# Patient Record
Sex: MALE | Race: WHITE | NOT HISPANIC OR LATINO | Employment: OTHER | URBAN - METROPOLITAN AREA
[De-identification: names, ages, dates, MRNs, and addresses within clinical notes are randomized per-mention and may not be internally consistent; named-entity substitution may affect disease eponyms.]

---

## 2017-01-03 ENCOUNTER — OFFICE VISIT - HEALTHEAST (OUTPATIENT)
Dept: FAMILY MEDICINE | Facility: CLINIC | Age: 67
End: 2017-01-03

## 2017-01-03 DIAGNOSIS — N40.0 BPH WITHOUT URINARY OBSTRUCTION: ICD-10-CM

## 2017-01-03 DIAGNOSIS — D12.6 TUBULAR ADENOMA OF COLON: ICD-10-CM

## 2017-01-03 DIAGNOSIS — H91.13 PRESBYACUSIS, BILATERAL: ICD-10-CM

## 2017-01-03 DIAGNOSIS — L30.9 DERMATITIS: ICD-10-CM

## 2017-01-03 DIAGNOSIS — Z00.01 ENCOUNTER FOR GENERAL ADULT MEDICAL EXAMINATION WITH ABNORMAL FINDINGS: ICD-10-CM

## 2017-01-03 DIAGNOSIS — E78.00 PURE HYPERCHOLESTEROLEMIA: ICD-10-CM

## 2017-01-03 DIAGNOSIS — E66.3 OVERWEIGHT (BMI 25.0-29.9): ICD-10-CM

## 2017-01-03 DIAGNOSIS — E80.6 HYPERBILIRUBINEMIA: ICD-10-CM

## 2017-01-03 DIAGNOSIS — N52.9 ERECTILE DYSFUNCTION, UNSPECIFIED ERECTILE DYSFUNCTION TYPE: ICD-10-CM

## 2017-01-03 LAB
CHOLEST SERPL-MCNC: 238 MG/DL
FASTING STATUS PATIENT QL REPORTED: YES
HDLC SERPL-MCNC: 72 MG/DL
LDLC SERPL CALC-MCNC: 150 MG/DL
PSA SERPL-MCNC: 1.8 NG/ML (ref 0–4.5)
TRIGL SERPL-MCNC: 82 MG/DL

## 2017-01-03 ASSESSMENT — MIFFLIN-ST. JEOR: SCORE: 1830.97

## 2017-01-06 ENCOUNTER — RECORDS - HEALTHEAST (OUTPATIENT)
Dept: ADMINISTRATIVE | Facility: OTHER | Age: 67
End: 2017-01-06

## 2017-01-26 ENCOUNTER — RECORDS - HEALTHEAST (OUTPATIENT)
Dept: ADMINISTRATIVE | Facility: OTHER | Age: 67
End: 2017-01-26

## 2017-02-06 ENCOUNTER — RECORDS - HEALTHEAST (OUTPATIENT)
Dept: ADMINISTRATIVE | Facility: OTHER | Age: 67
End: 2017-02-06

## 2017-08-11 ENCOUNTER — OFFICE VISIT - HEALTHEAST (OUTPATIENT)
Dept: FAMILY MEDICINE | Facility: CLINIC | Age: 67
End: 2017-08-11

## 2017-08-11 ENCOUNTER — HOSPITAL ENCOUNTER (OUTPATIENT)
Dept: RADIOLOGY | Facility: CLINIC | Age: 67
Discharge: HOME | End: 2017-08-11
Attending: PHYSICIAN ASSISTANT

## 2017-08-11 DIAGNOSIS — M79.671 RIGHT FOOT PAIN: ICD-10-CM

## 2017-08-11 DIAGNOSIS — M25.471 RIGHT ANKLE SWELLING: ICD-10-CM

## 2017-10-09 ENCOUNTER — RECORDS - HEALTHEAST (OUTPATIENT)
Dept: ADMINISTRATIVE | Facility: OTHER | Age: 67
End: 2017-10-09

## 2017-10-09 ENCOUNTER — COMMUNICATION - HEALTHEAST (OUTPATIENT)
Dept: SCHEDULING | Facility: CLINIC | Age: 67
End: 2017-10-09

## 2017-10-10 ENCOUNTER — COMMUNICATION - HEALTHEAST (OUTPATIENT)
Dept: FAMILY MEDICINE | Facility: CLINIC | Age: 67
End: 2017-10-10

## 2017-10-10 ENCOUNTER — RECORDS - HEALTHEAST (OUTPATIENT)
Dept: ADMINISTRATIVE | Facility: OTHER | Age: 67
End: 2017-10-10

## 2017-10-10 DIAGNOSIS — I26.99 PULMONARY EMBOLISM, BILATERAL (H): ICD-10-CM

## 2017-10-11 ENCOUNTER — COMMUNICATION - HEALTHEAST (OUTPATIENT)
Dept: FAMILY MEDICINE | Facility: CLINIC | Age: 67
End: 2017-10-11

## 2017-10-13 ENCOUNTER — OFFICE VISIT - HEALTHEAST (OUTPATIENT)
Dept: FAMILY MEDICINE | Facility: CLINIC | Age: 67
End: 2017-10-13

## 2017-10-13 ENCOUNTER — COMMUNICATION - HEALTHEAST (OUTPATIENT)
Dept: NURSING | Facility: CLINIC | Age: 67
End: 2017-10-13

## 2017-10-13 ENCOUNTER — AMBULATORY - HEALTHEAST (OUTPATIENT)
Dept: LAB | Facility: CLINIC | Age: 67
End: 2017-10-13

## 2017-10-13 DIAGNOSIS — I26.99 PULMONARY EMBOLISM, BILATERAL (H): ICD-10-CM

## 2017-10-13 DIAGNOSIS — I48.91 ATRIAL FIBRILLATION (H): ICD-10-CM

## 2017-10-13 DIAGNOSIS — F41.9 ANXIETY: ICD-10-CM

## 2017-10-13 DIAGNOSIS — I34.0 MITRAL REGURGITATION: ICD-10-CM

## 2017-10-16 ENCOUNTER — AMBULATORY - HEALTHEAST (OUTPATIENT)
Dept: LAB | Facility: CLINIC | Age: 67
End: 2017-10-16

## 2017-10-16 ENCOUNTER — COMMUNICATION - HEALTHEAST (OUTPATIENT)
Dept: NURSING | Facility: CLINIC | Age: 67
End: 2017-10-16

## 2017-10-16 DIAGNOSIS — I26.99 PULMONARY EMBOLISM, BILATERAL (H): ICD-10-CM

## 2017-10-17 ENCOUNTER — COMMUNICATION - HEALTHEAST (OUTPATIENT)
Dept: NURSING | Facility: CLINIC | Age: 67
End: 2017-10-17

## 2017-10-17 ENCOUNTER — AMBULATORY - HEALTHEAST (OUTPATIENT)
Dept: LAB | Facility: CLINIC | Age: 67
End: 2017-10-17

## 2017-10-17 ENCOUNTER — RECORDS - HEALTHEAST (OUTPATIENT)
Dept: ADMINISTRATIVE | Facility: OTHER | Age: 67
End: 2017-10-17

## 2017-10-17 DIAGNOSIS — I26.99 PULMONARY EMBOLISM, BILATERAL (H): ICD-10-CM

## 2017-10-19 ENCOUNTER — COMMUNICATION - HEALTHEAST (OUTPATIENT)
Dept: FAMILY MEDICINE | Facility: CLINIC | Age: 67
End: 2017-10-19

## 2017-10-19 DIAGNOSIS — I26.99 PULMONARY EMBOLISM, BILATERAL (H): ICD-10-CM

## 2017-10-23 ENCOUNTER — AMBULATORY - HEALTHEAST (OUTPATIENT)
Dept: LAB | Facility: CLINIC | Age: 67
End: 2017-10-23

## 2017-10-23 ENCOUNTER — COMMUNICATION - HEALTHEAST (OUTPATIENT)
Dept: NURSING | Facility: CLINIC | Age: 67
End: 2017-10-23

## 2017-10-23 DIAGNOSIS — I26.99 PULMONARY EMBOLISM, BILATERAL (H): ICD-10-CM

## 2017-10-24 ENCOUNTER — AMBULATORY - HEALTHEAST (OUTPATIENT)
Dept: CARDIOLOGY | Facility: CLINIC | Age: 67
End: 2017-10-24

## 2017-10-25 ENCOUNTER — OFFICE VISIT - HEALTHEAST (OUTPATIENT)
Dept: CARDIOLOGY | Facility: CLINIC | Age: 67
End: 2017-10-25

## 2017-10-25 DIAGNOSIS — I34.0 MITRAL VALVE INSUFFICIENCY: ICD-10-CM

## 2017-10-25 DIAGNOSIS — R07.89 OTHER CHEST PAIN: ICD-10-CM

## 2017-10-25 DIAGNOSIS — I48.91 ATRIAL FIBRILLATION, UNSPECIFIED TYPE (H): ICD-10-CM

## 2017-10-25 DIAGNOSIS — I26.99 PULMONARY EMBOLISM, BILATERAL (H): ICD-10-CM

## 2017-10-25 ASSESSMENT — MIFFLIN-ST. JEOR: SCORE: 1844.46

## 2017-10-26 ENCOUNTER — OFFICE VISIT - HEALTHEAST (OUTPATIENT)
Dept: FAMILY MEDICINE | Facility: CLINIC | Age: 67
End: 2017-10-26

## 2017-10-26 ENCOUNTER — COMMUNICATION - HEALTHEAST (OUTPATIENT)
Dept: NURSING | Facility: CLINIC | Age: 67
End: 2017-10-26

## 2017-10-26 DIAGNOSIS — I26.99 PULMONARY EMBOLISM, BILATERAL (H): ICD-10-CM

## 2017-10-26 DIAGNOSIS — F41.9 ANXIETY: ICD-10-CM

## 2017-10-26 DIAGNOSIS — I34.0 MITRAL VALVE INSUFFICIENCY, UNSPECIFIED ETIOLOGY: ICD-10-CM

## 2017-10-26 DIAGNOSIS — I48.91 ATRIAL FIBRILLATION, UNSPECIFIED TYPE (H): ICD-10-CM

## 2017-10-26 DIAGNOSIS — R06.02 SOB (SHORTNESS OF BREATH): ICD-10-CM

## 2017-10-26 LAB
ATRIAL RATE - MUSE: NORMAL BPM
DIASTOLIC BLOOD PRESSURE - MUSE: NORMAL MMHG
INTERPRETATION ECG - MUSE: NORMAL
P AXIS - MUSE: NORMAL DEGREES
PR INTERVAL - MUSE: NORMAL MS
QRS DURATION - MUSE: 74 MS
QT - MUSE: 394 MS
QTC - MUSE: 399 MS
R AXIS - MUSE: 9 DEGREES
SYSTOLIC BLOOD PRESSURE - MUSE: NORMAL MMHG
T AXIS - MUSE: 35 DEGREES
VENTRICULAR RATE- MUSE: 62 BPM

## 2017-10-27 ENCOUNTER — SURGERY - HEALTHEAST (OUTPATIENT)
Dept: CARDIOLOGY | Facility: CLINIC | Age: 67
End: 2017-10-27

## 2017-10-28 ENCOUNTER — COMMUNICATION - HEALTHEAST (OUTPATIENT)
Dept: SCHEDULING | Facility: CLINIC | Age: 67
End: 2017-10-28

## 2017-10-30 ENCOUNTER — AMBULATORY - HEALTHEAST (OUTPATIENT)
Dept: LAB | Facility: CLINIC | Age: 67
End: 2017-10-30

## 2017-10-30 ENCOUNTER — COMMUNICATION - HEALTHEAST (OUTPATIENT)
Dept: NURSING | Facility: CLINIC | Age: 67
End: 2017-10-30

## 2017-10-30 DIAGNOSIS — I26.99 PULMONARY EMBOLISM, BILATERAL (H): ICD-10-CM

## 2017-11-06 ENCOUNTER — COMMUNICATION - HEALTHEAST (OUTPATIENT)
Dept: NURSING | Facility: CLINIC | Age: 67
End: 2017-11-06

## 2017-11-06 ENCOUNTER — AMBULATORY - HEALTHEAST (OUTPATIENT)
Dept: LAB | Facility: CLINIC | Age: 67
End: 2017-11-06

## 2017-11-06 DIAGNOSIS — I26.99 PULMONARY EMBOLISM, BILATERAL (H): ICD-10-CM

## 2017-11-10 ENCOUNTER — RECORDS - HEALTHEAST (OUTPATIENT)
Dept: ADMINISTRATIVE | Facility: OTHER | Age: 67
End: 2017-11-10

## 2017-11-10 ENCOUNTER — OFFICE VISIT - HEALTHEAST (OUTPATIENT)
Dept: CARDIOLOGY | Facility: CLINIC | Age: 67
End: 2017-11-10

## 2017-11-10 DIAGNOSIS — I48.19 PERSISTENT ATRIAL FIBRILLATION (H): ICD-10-CM

## 2017-11-10 DIAGNOSIS — R00.1 SINUS BRADYCARDIA: ICD-10-CM

## 2017-11-10 DIAGNOSIS — I34.0 NON-RHEUMATIC MITRAL REGURGITATION: ICD-10-CM

## 2017-11-10 ASSESSMENT — MIFFLIN-ST. JEOR: SCORE: 1841.73

## 2017-11-13 ENCOUNTER — COMMUNICATION - HEALTHEAST (OUTPATIENT)
Dept: NURSING | Facility: CLINIC | Age: 67
End: 2017-11-13

## 2017-11-13 ENCOUNTER — AMBULATORY - HEALTHEAST (OUTPATIENT)
Dept: CARDIOLOGY | Facility: CLINIC | Age: 67
End: 2017-11-13

## 2017-11-13 ENCOUNTER — AMBULATORY - HEALTHEAST (OUTPATIENT)
Dept: LAB | Facility: CLINIC | Age: 67
End: 2017-11-13

## 2017-11-13 DIAGNOSIS — I26.99 PULMONARY EMBOLISM, BILATERAL (H): ICD-10-CM

## 2017-11-15 ENCOUNTER — ANESTHESIA - HEALTHEAST (OUTPATIENT)
Dept: CARDIOLOGY | Facility: CLINIC | Age: 67
End: 2017-11-15

## 2017-11-15 ENCOUNTER — HOSPITAL ENCOUNTER (OUTPATIENT)
Dept: CARDIOLOGY | Facility: CLINIC | Age: 67
Discharge: HOME | End: 2017-11-15
Attending: INTERNAL MEDICINE

## 2017-11-15 DIAGNOSIS — I48.91 ATRIAL FIBRILLATION, UNSPECIFIED TYPE (H): ICD-10-CM

## 2017-11-15 LAB
BSA FOR ECHO PROCEDURE: 2.28 M2
CV BLOOD PRESSURE: NORMAL MMHG
CV ECHO HEIGHT: 75 IN
CV ECHO WEIGHT: 217 LBS
ECHO EJECTION FRACTION ESTIMATED: 55 %
LEFT VENTRICLE HEART RATE: 79 BPM
MITRAL REGURGITANT VELOCITY TIME INTEGRAL: 194 CM
MR FLOW: 54 CM3
MR MEAN GRADIENT: 81 MMHG
MR MEAN VELOCITY: 427 CM/S
MR PEAK GRADIENT: 119.7 MMHG
MR PISA EROA: 0.3 CM2
MR PISA RADIUS: 0.9 CM
MR PISA VN NYQUIST: 29.7 CM/S
MV REGURGITANT VOLUME: 53.6 CC
NUC REST DIASTOLIC VOLUME INDEX: 3472 LBS
NUC REST SYSTOLIC VOLUME INDEX: 75 IN
PISA MR PEAK VEL: 547 CM/S
TRICUSPID REGURGITATION PEAK PRESSURE GRADIENT: 16.5 MMHG
TRICUSPID VALVE PEAK REGURGITANT VELOCITY: 203 CM/S

## 2017-11-16 ENCOUNTER — AMBULATORY - HEALTHEAST (OUTPATIENT)
Dept: NURSING | Facility: CLINIC | Age: 67
End: 2017-11-16

## 2017-11-21 ENCOUNTER — AMBULATORY - HEALTHEAST (OUTPATIENT)
Dept: LAB | Facility: CLINIC | Age: 67
End: 2017-11-21

## 2017-11-21 ENCOUNTER — COMMUNICATION - HEALTHEAST (OUTPATIENT)
Dept: NURSING | Facility: CLINIC | Age: 67
End: 2017-11-21

## 2017-11-21 DIAGNOSIS — I26.99 PULMONARY EMBOLISM, BILATERAL (H): ICD-10-CM

## 2017-11-28 ENCOUNTER — AMBULATORY - HEALTHEAST (OUTPATIENT)
Dept: LAB | Facility: CLINIC | Age: 67
End: 2017-11-28

## 2017-11-28 ENCOUNTER — COMMUNICATION - HEALTHEAST (OUTPATIENT)
Dept: NURSING | Facility: CLINIC | Age: 67
End: 2017-11-28

## 2017-11-28 DIAGNOSIS — I26.99 PULMONARY EMBOLISM, BILATERAL (H): ICD-10-CM

## 2017-12-08 ENCOUNTER — COMMUNICATION - HEALTHEAST (OUTPATIENT)
Dept: NURSING | Facility: CLINIC | Age: 67
End: 2017-12-08

## 2017-12-08 ENCOUNTER — OFFICE VISIT - HEALTHEAST (OUTPATIENT)
Dept: CARDIOLOGY | Facility: CLINIC | Age: 67
End: 2017-12-08

## 2017-12-08 DIAGNOSIS — R00.1 SINUS BRADYCARDIA: ICD-10-CM

## 2017-12-08 DIAGNOSIS — I48.19 PERSISTENT ATRIAL FIBRILLATION (H): ICD-10-CM

## 2017-12-08 DIAGNOSIS — I26.99 PULMONARY EMBOLISM, BILATERAL (H): ICD-10-CM

## 2017-12-08 DIAGNOSIS — I34.0 NON-RHEUMATIC MITRAL REGURGITATION: ICD-10-CM

## 2017-12-08 LAB
ATRIAL RATE - MUSE: 170 BPM
DIASTOLIC BLOOD PRESSURE - MUSE: NORMAL MMHG
INTERPRETATION ECG - MUSE: NORMAL
P AXIS - MUSE: NORMAL DEGREES
PR INTERVAL - MUSE: NORMAL MS
QRS DURATION - MUSE: 74 MS
QT - MUSE: 378 MS
QTC - MUSE: 396 MS
R AXIS - MUSE: 23 DEGREES
SYSTOLIC BLOOD PRESSURE - MUSE: NORMAL MMHG
T AXIS - MUSE: 1 DEGREES
VENTRICULAR RATE- MUSE: 66 BPM

## 2017-12-08 ASSESSMENT — MIFFLIN-ST. JEOR: SCORE: 1830.4

## 2017-12-13 ENCOUNTER — COMMUNICATION - HEALTHEAST (OUTPATIENT)
Dept: FAMILY MEDICINE | Facility: CLINIC | Age: 67
End: 2017-12-13

## 2017-12-13 DIAGNOSIS — I48.19 PERSISTENT ATRIAL FIBRILLATION (H): ICD-10-CM

## 2017-12-22 ENCOUNTER — COMMUNICATION - HEALTHEAST (OUTPATIENT)
Dept: NURSING | Facility: CLINIC | Age: 67
End: 2017-12-22

## 2017-12-22 DIAGNOSIS — I26.99 PULMONARY EMBOLISM, BILATERAL (H): ICD-10-CM

## 2018-01-04 ENCOUNTER — COMMUNICATION - HEALTHEAST (OUTPATIENT)
Dept: NURSING | Facility: CLINIC | Age: 68
End: 2018-01-04

## 2018-01-04 ENCOUNTER — AMBULATORY - HEALTHEAST (OUTPATIENT)
Dept: LAB | Facility: CLINIC | Age: 68
End: 2018-01-04

## 2018-01-04 DIAGNOSIS — I26.99 PULMONARY EMBOLISM, BILATERAL (H): ICD-10-CM

## 2018-01-04 LAB — INR PPP: 2.4 (ref 0.9–1.1)

## 2018-01-16 ENCOUNTER — COMMUNICATION - HEALTHEAST (OUTPATIENT)
Dept: NURSING | Facility: CLINIC | Age: 68
End: 2018-01-16

## 2018-01-16 DIAGNOSIS — I48.19 PERSISTENT ATRIAL FIBRILLATION (H): ICD-10-CM

## 2018-02-06 ENCOUNTER — COMMUNICATION - HEALTHEAST (OUTPATIENT)
Dept: NURSING | Facility: CLINIC | Age: 68
End: 2018-02-06

## 2018-02-06 ENCOUNTER — OFFICE VISIT - HEALTHEAST (OUTPATIENT)
Dept: FAMILY MEDICINE | Facility: CLINIC | Age: 68
End: 2018-02-06

## 2018-02-06 DIAGNOSIS — E78.00 PURE HYPERCHOLESTEROLEMIA: ICD-10-CM

## 2018-02-06 DIAGNOSIS — I48.19 PERSISTENT ATRIAL FIBRILLATION (H): ICD-10-CM

## 2018-02-06 DIAGNOSIS — N40.0 BPH WITHOUT URINARY OBSTRUCTION: ICD-10-CM

## 2018-02-06 DIAGNOSIS — Z00.01 ENCOUNTER FOR GENERAL ADULT MEDICAL EXAMINATION WITH ABNORMAL FINDINGS: ICD-10-CM

## 2018-02-06 DIAGNOSIS — I48.91 ATRIAL FIBRILLATION, UNSPECIFIED TYPE (H): ICD-10-CM

## 2018-02-06 DIAGNOSIS — I26.99 PULMONARY EMBOLISM, BILATERAL (H): ICD-10-CM

## 2018-02-06 DIAGNOSIS — I34.0 MITRAL VALVE INSUFFICIENCY, UNSPECIFIED ETIOLOGY: ICD-10-CM

## 2018-02-06 DIAGNOSIS — D12.6 TUBULAR ADENOMA OF COLON: ICD-10-CM

## 2018-02-06 DIAGNOSIS — N52.9 ERECTILE DYSFUNCTION, UNSPECIFIED ERECTILE DYSFUNCTION TYPE: ICD-10-CM

## 2018-02-06 DIAGNOSIS — E66.3 OVERWEIGHT (BMI 25.0-29.9): ICD-10-CM

## 2018-02-06 DIAGNOSIS — D64.9 NORMOCHROMIC NORMOCYTIC ANEMIA: ICD-10-CM

## 2018-02-06 DIAGNOSIS — F41.9 ANXIETY: ICD-10-CM

## 2018-02-06 LAB
ANION GAP SERPL CALCULATED.3IONS-SCNC: 11 MMOL/L (ref 5–18)
BUN SERPL-MCNC: 11 MG/DL (ref 8–22)
CALCIUM SERPL-MCNC: 9.1 MG/DL (ref 8.5–10.5)
CHLORIDE BLD-SCNC: 107 MMOL/L (ref 98–107)
CHOLEST SERPL-MCNC: 214 MG/DL
CO2 SERPL-SCNC: 22 MMOL/L (ref 22–31)
CREAT SERPL-MCNC: 0.77 MG/DL (ref 0.7–1.3)
ERYTHROCYTE [DISTWIDTH] IN BLOOD BY AUTOMATED COUNT: 12.8 % (ref 11–14.5)
FASTING STATUS PATIENT QL REPORTED: YES
GFR SERPL CREATININE-BSD FRML MDRD: >60 ML/MIN/1.73M2
GLUCOSE BLD-MCNC: 91 MG/DL (ref 70–125)
HCT VFR BLD AUTO: 42 % (ref 40–54)
HCV AB SERPL QL IA: NEGATIVE
HDLC SERPL-MCNC: 66 MG/DL
HGB BLD-MCNC: 14.1 G/DL (ref 14–18)
INR PPP: 3.3 (ref 0.9–1.1)
LDLC SERPL CALC-MCNC: 130 MG/DL
MAGNESIUM SERPL-MCNC: 2 MG/DL (ref 1.8–2.6)
MCH RBC QN AUTO: 30.5 PG (ref 27–34)
MCHC RBC AUTO-ENTMCNC: 33.7 G/DL (ref 32–36)
MCV RBC AUTO: 90 FL (ref 80–100)
PLATELET # BLD AUTO: 169 THOU/UL (ref 140–440)
PMV BLD AUTO: 8.1 FL (ref 7–10)
POTASSIUM BLD-SCNC: 4.2 MMOL/L (ref 3.5–5)
PSA SERPL-MCNC: 2 NG/ML (ref 0–4.5)
RBC # BLD AUTO: 4.64 MILL/UL (ref 4.4–6.2)
SODIUM SERPL-SCNC: 140 MMOL/L (ref 136–145)
TRIGL SERPL-MCNC: 88 MG/DL
TSH SERPL DL<=0.005 MIU/L-ACNC: 1.41 UIU/ML (ref 0.3–5)
WBC: 4.2 THOU/UL (ref 4–11)

## 2018-02-06 ASSESSMENT — MIFFLIN-ST. JEOR: SCORE: 1827

## 2018-02-20 ENCOUNTER — COMMUNICATION - HEALTHEAST (OUTPATIENT)
Dept: NURSING | Facility: CLINIC | Age: 68
End: 2018-02-20

## 2018-02-20 ENCOUNTER — AMBULATORY - HEALTHEAST (OUTPATIENT)
Dept: LAB | Facility: CLINIC | Age: 68
End: 2018-02-20

## 2018-02-20 DIAGNOSIS — I26.99 PULMONARY EMBOLISM, BILATERAL (H): ICD-10-CM

## 2018-02-20 DIAGNOSIS — I48.19 PERSISTENT ATRIAL FIBRILLATION (H): ICD-10-CM

## 2018-02-20 LAB — INR PPP: 2.6 (ref 0.9–1.1)

## 2018-02-28 ENCOUNTER — COMMUNICATION - HEALTHEAST (OUTPATIENT)
Dept: FAMILY MEDICINE | Facility: CLINIC | Age: 68
End: 2018-02-28

## 2018-03-05 ENCOUNTER — COMMUNICATION - HEALTHEAST (OUTPATIENT)
Dept: FAMILY MEDICINE | Facility: CLINIC | Age: 68
End: 2018-03-05

## 2018-03-05 DIAGNOSIS — F41.9 ANXIETY: ICD-10-CM

## 2018-03-06 ENCOUNTER — COMMUNICATION - HEALTHEAST (OUTPATIENT)
Dept: INTERNAL MEDICINE | Facility: CLINIC | Age: 68
End: 2018-03-06

## 2018-03-06 ENCOUNTER — AMBULATORY - HEALTHEAST (OUTPATIENT)
Dept: LAB | Facility: CLINIC | Age: 68
End: 2018-03-06

## 2018-03-06 DIAGNOSIS — I48.19 PERSISTENT ATRIAL FIBRILLATION (H): ICD-10-CM

## 2018-03-06 DIAGNOSIS — I26.99 PULMONARY EMBOLISM, BILATERAL (H): ICD-10-CM

## 2018-03-06 LAB — INR PPP: 2.7 (ref 0.9–1.1)

## 2018-03-08 ENCOUNTER — RECORDS - HEALTHEAST (OUTPATIENT)
Dept: ADMINISTRATIVE | Facility: OTHER | Age: 68
End: 2018-03-08

## 2018-04-06 ENCOUNTER — OFFICE VISIT - HEALTHEAST (OUTPATIENT)
Dept: FAMILY MEDICINE | Facility: CLINIC | Age: 68
End: 2018-04-06

## 2018-04-06 DIAGNOSIS — J40 BRONCHITIS: ICD-10-CM

## 2018-04-06 DIAGNOSIS — I48.91 ATRIAL FIBRILLATION (H): ICD-10-CM

## 2018-04-06 DIAGNOSIS — R05.9 COUGH: ICD-10-CM

## 2018-04-11 ENCOUNTER — AMBULATORY - HEALTHEAST (OUTPATIENT)
Dept: LAB | Facility: CLINIC | Age: 68
End: 2018-04-11

## 2018-04-11 ENCOUNTER — COMMUNICATION - HEALTHEAST (OUTPATIENT)
Dept: ANTICOAGULATION | Facility: CLINIC | Age: 68
End: 2018-04-11

## 2018-04-11 DIAGNOSIS — I48.19 PERSISTENT ATRIAL FIBRILLATION (H): ICD-10-CM

## 2018-04-11 DIAGNOSIS — I26.99 PULMONARY EMBOLISM, BILATERAL (H): ICD-10-CM

## 2018-04-11 LAB — INR PPP: 2.8 (ref 0.9–1.1)

## 2018-04-30 ENCOUNTER — COMMUNICATION - HEALTHEAST (OUTPATIENT)
Dept: FAMILY MEDICINE | Facility: CLINIC | Age: 68
End: 2018-04-30

## 2018-05-09 ENCOUNTER — COMMUNICATION - HEALTHEAST (OUTPATIENT)
Dept: FAMILY MEDICINE | Facility: CLINIC | Age: 68
End: 2018-05-09

## 2018-05-09 DIAGNOSIS — I48.19 PERSISTENT ATRIAL FIBRILLATION (H): ICD-10-CM

## 2018-05-10 ENCOUNTER — AMBULATORY - HEALTHEAST (OUTPATIENT)
Dept: CARDIOLOGY | Facility: CLINIC | Age: 68
End: 2018-05-10

## 2018-05-10 ENCOUNTER — COMMUNICATION - HEALTHEAST (OUTPATIENT)
Dept: ANTICOAGULATION | Facility: CLINIC | Age: 68
End: 2018-05-10

## 2018-05-10 ENCOUNTER — OFFICE VISIT - HEALTHEAST (OUTPATIENT)
Dept: CARDIOLOGY | Facility: CLINIC | Age: 68
End: 2018-05-10

## 2018-05-10 DIAGNOSIS — I26.99 PULMONARY EMBOLISM, BILATERAL (H): ICD-10-CM

## 2018-05-10 DIAGNOSIS — I48.19 PERSISTENT ATRIAL FIBRILLATION (H): ICD-10-CM

## 2018-05-10 DIAGNOSIS — I34.0 NON-RHEUMATIC MITRAL REGURGITATION: ICD-10-CM

## 2018-05-10 LAB — POC INR - HE - HISTORICAL: 2.4 (ref 0.9–1.1)

## 2018-05-10 ASSESSMENT — MIFFLIN-ST. JEOR: SCORE: 1840.6

## 2018-06-21 ENCOUNTER — AMBULATORY - HEALTHEAST (OUTPATIENT)
Dept: LAB | Facility: CLINIC | Age: 68
End: 2018-06-21

## 2018-06-21 ENCOUNTER — COMMUNICATION - HEALTHEAST (OUTPATIENT)
Dept: ANTICOAGULATION | Facility: CLINIC | Age: 68
End: 2018-06-21

## 2018-06-21 DIAGNOSIS — I26.99 PULMONARY EMBOLISM, BILATERAL (H): ICD-10-CM

## 2018-06-21 DIAGNOSIS — I48.19 PERSISTENT ATRIAL FIBRILLATION (H): ICD-10-CM

## 2018-06-21 LAB — INR PPP: 2.2 (ref 0.9–1.1)

## 2018-08-02 ENCOUNTER — COMMUNICATION - HEALTHEAST (OUTPATIENT)
Dept: ANTICOAGULATION | Facility: CLINIC | Age: 68
End: 2018-08-02

## 2018-08-02 ENCOUNTER — AMBULATORY - HEALTHEAST (OUTPATIENT)
Dept: LAB | Facility: CLINIC | Age: 68
End: 2018-08-02

## 2018-08-02 DIAGNOSIS — I48.19 PERSISTENT ATRIAL FIBRILLATION (H): ICD-10-CM

## 2018-08-02 DIAGNOSIS — I26.99 PULMONARY EMBOLISM, BILATERAL (H): ICD-10-CM

## 2018-08-02 LAB — INR PPP: 1.9 (ref 0.9–1.1)

## 2018-08-06 ENCOUNTER — COMMUNICATION - HEALTHEAST (OUTPATIENT)
Dept: FAMILY MEDICINE | Facility: CLINIC | Age: 68
End: 2018-08-06

## 2018-08-06 DIAGNOSIS — F41.9 ANXIETY: ICD-10-CM

## 2018-08-07 ENCOUNTER — COMMUNICATION - HEALTHEAST (OUTPATIENT)
Dept: ANTICOAGULATION | Facility: CLINIC | Age: 68
End: 2018-08-07

## 2018-08-07 ENCOUNTER — OFFICE VISIT - HEALTHEAST (OUTPATIENT)
Dept: FAMILY MEDICINE | Facility: CLINIC | Age: 68
End: 2018-08-07

## 2018-08-07 DIAGNOSIS — I26.99 PULMONARY EMBOLISM, BILATERAL (H): ICD-10-CM

## 2018-08-07 DIAGNOSIS — E78.00 PURE HYPERCHOLESTEROLEMIA: ICD-10-CM

## 2018-08-07 DIAGNOSIS — I48.19 PERSISTENT ATRIAL FIBRILLATION (H): ICD-10-CM

## 2018-08-07 DIAGNOSIS — R06.02 SOB (SHORTNESS OF BREATH): ICD-10-CM

## 2018-08-07 DIAGNOSIS — F41.9 ANXIETY: ICD-10-CM

## 2018-08-07 DIAGNOSIS — R05.9 COUGH: ICD-10-CM

## 2018-08-07 DIAGNOSIS — R06.83 SNORING: ICD-10-CM

## 2018-08-07 LAB
ANION GAP SERPL CALCULATED.3IONS-SCNC: 6 MMOL/L (ref 5–18)
BUN SERPL-MCNC: 13 MG/DL (ref 8–22)
CALCIUM SERPL-MCNC: 9.5 MG/DL (ref 8.5–10.5)
CHLORIDE BLD-SCNC: 107 MMOL/L (ref 98–107)
CO2 SERPL-SCNC: 28 MMOL/L (ref 22–31)
CREAT SERPL-MCNC: 0.77 MG/DL (ref 0.7–1.3)
GFR SERPL CREATININE-BSD FRML MDRD: >60 ML/MIN/1.73M2
GLUCOSE BLD-MCNC: 94 MG/DL (ref 70–125)
INR PPP: 3 (ref 0.9–1.1)
POTASSIUM BLD-SCNC: 4.4 MMOL/L (ref 3.5–5)
SODIUM SERPL-SCNC: 141 MMOL/L (ref 136–145)

## 2018-08-08 ENCOUNTER — COMMUNICATION - HEALTHEAST (OUTPATIENT)
Dept: PULMONOLOGY | Facility: OTHER | Age: 68
End: 2018-08-08

## 2018-08-16 ENCOUNTER — AMBULATORY - HEALTHEAST (OUTPATIENT)
Dept: LAB | Facility: CLINIC | Age: 68
End: 2018-08-16

## 2018-08-16 ENCOUNTER — COMMUNICATION - HEALTHEAST (OUTPATIENT)
Dept: ANTICOAGULATION | Facility: CLINIC | Age: 68
End: 2018-08-16

## 2018-08-16 DIAGNOSIS — I48.19 PERSISTENT ATRIAL FIBRILLATION (H): ICD-10-CM

## 2018-08-16 DIAGNOSIS — I26.99 PULMONARY EMBOLISM, BILATERAL (H): ICD-10-CM

## 2018-08-16 LAB — INR PPP: 1.9 (ref 0.9–1.1)

## 2018-08-23 ENCOUNTER — OFFICE VISIT - HEALTHEAST (OUTPATIENT)
Dept: PULMONOLOGY | Facility: OTHER | Age: 68
End: 2018-08-23

## 2018-08-23 DIAGNOSIS — R05.3 CHRONIC COUGH: ICD-10-CM

## 2018-08-23 DIAGNOSIS — R06.02 SHORTNESS OF BREATH: ICD-10-CM

## 2018-08-29 ENCOUNTER — COMMUNICATION - HEALTHEAST (OUTPATIENT)
Dept: FAMILY MEDICINE | Facility: CLINIC | Age: 68
End: 2018-08-29

## 2018-08-30 ENCOUNTER — COMMUNICATION - HEALTHEAST (OUTPATIENT)
Dept: ANTICOAGULATION | Facility: CLINIC | Age: 68
End: 2018-08-30

## 2018-08-30 ENCOUNTER — AMBULATORY - HEALTHEAST (OUTPATIENT)
Dept: LAB | Facility: CLINIC | Age: 68
End: 2018-08-30

## 2018-08-30 DIAGNOSIS — I26.99 PULMONARY EMBOLISM, BILATERAL (H): ICD-10-CM

## 2018-08-30 DIAGNOSIS — I48.19 PERSISTENT ATRIAL FIBRILLATION (H): ICD-10-CM

## 2018-08-30 LAB — INR PPP: 2.9 (ref 0.9–1.1)

## 2018-09-04 ENCOUNTER — HOSPITAL ENCOUNTER (OUTPATIENT)
Dept: RADIOLOGY | Facility: HOSPITAL | Age: 68
Discharge: HOME | End: 2018-09-04
Attending: INTERNAL MEDICINE

## 2018-09-04 ENCOUNTER — HOSPITAL ENCOUNTER (OUTPATIENT)
Dept: RESPIRATORY THERAPY | Facility: HOSPITAL | Age: 68
Discharge: HOME | End: 2018-09-04
Attending: INTERNAL MEDICINE

## 2018-09-04 DIAGNOSIS — R06.02 SHORTNESS OF BREATH: ICD-10-CM

## 2018-09-04 LAB — HGB BLD-MCNC: 13.8 G/DL (ref 14–18)

## 2018-09-05 ENCOUNTER — RECORDS - HEALTHEAST (OUTPATIENT)
Dept: ADMINISTRATIVE | Facility: OTHER | Age: 68
End: 2018-09-05

## 2018-09-13 ENCOUNTER — AMBULATORY - HEALTHEAST (OUTPATIENT)
Dept: LAB | Facility: CLINIC | Age: 68
End: 2018-09-13

## 2018-09-13 ENCOUNTER — COMMUNICATION - HEALTHEAST (OUTPATIENT)
Dept: ANTICOAGULATION | Facility: CLINIC | Age: 68
End: 2018-09-13

## 2018-09-13 DIAGNOSIS — I26.99 PULMONARY EMBOLISM, BILATERAL (H): ICD-10-CM

## 2018-09-13 DIAGNOSIS — I48.19 PERSISTENT ATRIAL FIBRILLATION (H): ICD-10-CM

## 2018-09-13 LAB — INR PPP: 2.7 (ref 0.9–1.1)

## 2018-09-26 ENCOUNTER — OFFICE VISIT - HEALTHEAST (OUTPATIENT)
Dept: SLEEP MEDICINE | Facility: CLINIC | Age: 68
End: 2018-09-26

## 2018-09-26 DIAGNOSIS — R06.83 SNORING: ICD-10-CM

## 2018-09-26 DIAGNOSIS — G47.8 SLEEP DYSFUNCTION WITH SLEEP STAGE DISTURBANCE: ICD-10-CM

## 2018-09-26 DIAGNOSIS — G47.10 HYPERSOMNIA: ICD-10-CM

## 2018-09-26 ASSESSMENT — MIFFLIN-ST. JEOR: SCORE: 1854.21

## 2018-10-11 ENCOUNTER — AMBULATORY - HEALTHEAST (OUTPATIENT)
Dept: NURSING | Facility: CLINIC | Age: 68
End: 2018-10-11

## 2018-10-11 ENCOUNTER — COMMUNICATION - HEALTHEAST (OUTPATIENT)
Dept: ANTICOAGULATION | Facility: CLINIC | Age: 68
End: 2018-10-11

## 2018-10-11 ENCOUNTER — AMBULATORY - HEALTHEAST (OUTPATIENT)
Dept: LAB | Facility: CLINIC | Age: 68
End: 2018-10-11

## 2018-10-11 DIAGNOSIS — I48.19 PERSISTENT ATRIAL FIBRILLATION (H): ICD-10-CM

## 2018-10-11 DIAGNOSIS — I26.99 PULMONARY EMBOLISM, BILATERAL (H): ICD-10-CM

## 2018-10-11 LAB — INR PPP: 2.9 (ref 0.9–1.1)

## 2018-10-30 ENCOUNTER — COMMUNICATION - HEALTHEAST (OUTPATIENT)
Dept: FAMILY MEDICINE | Facility: CLINIC | Age: 68
End: 2018-10-30

## 2018-10-30 DIAGNOSIS — I48.19 PERSISTENT ATRIAL FIBRILLATION (H): ICD-10-CM

## 2018-11-01 ENCOUNTER — RECORDS - HEALTHEAST (OUTPATIENT)
Dept: ADMINISTRATIVE | Facility: OTHER | Age: 68
End: 2018-11-01

## 2018-11-01 ENCOUNTER — RECORDS - HEALTHEAST (OUTPATIENT)
Dept: SLEEP MEDICINE | Facility: CLINIC | Age: 68
End: 2018-11-01

## 2018-11-01 DIAGNOSIS — G47.10 HYPERSOMNIA, UNSPECIFIED: ICD-10-CM

## 2018-11-01 DIAGNOSIS — G47.8 OTHER SLEEP DISORDERS: ICD-10-CM

## 2018-11-01 DIAGNOSIS — R06.83 SNORING: ICD-10-CM

## 2018-11-07 ENCOUNTER — COMMUNICATION - HEALTHEAST (OUTPATIENT)
Dept: SLEEP MEDICINE | Facility: CLINIC | Age: 68
End: 2018-11-07

## 2018-11-07 DIAGNOSIS — G47.33 OSA (OBSTRUCTIVE SLEEP APNEA): ICD-10-CM

## 2018-11-08 ENCOUNTER — COMMUNICATION - HEALTHEAST (OUTPATIENT)
Dept: SLEEP MEDICINE | Facility: CLINIC | Age: 68
End: 2018-11-08

## 2018-11-08 ENCOUNTER — AMBULATORY - HEALTHEAST (OUTPATIENT)
Dept: LAB | Facility: CLINIC | Age: 68
End: 2018-11-08

## 2018-11-08 ENCOUNTER — COMMUNICATION - HEALTHEAST (OUTPATIENT)
Dept: ADMINISTRATIVE | Facility: CLINIC | Age: 68
End: 2018-11-08

## 2018-11-08 ENCOUNTER — COMMUNICATION - HEALTHEAST (OUTPATIENT)
Dept: ANTICOAGULATION | Facility: CLINIC | Age: 68
End: 2018-11-08

## 2018-11-08 DIAGNOSIS — I26.99 PULMONARY EMBOLISM, BILATERAL (H): ICD-10-CM

## 2018-11-08 DIAGNOSIS — I48.19 PERSISTENT ATRIAL FIBRILLATION (H): ICD-10-CM

## 2018-11-08 LAB — INR PPP: 2.8 (ref 0.9–1.1)

## 2018-11-14 ENCOUNTER — COMMUNICATION - HEALTHEAST (OUTPATIENT)
Dept: SLEEP MEDICINE | Facility: CLINIC | Age: 68
End: 2018-11-14

## 2018-11-15 ENCOUNTER — OFFICE VISIT - HEALTHEAST (OUTPATIENT)
Dept: PULMONOLOGY | Facility: OTHER | Age: 68
End: 2018-11-15

## 2018-11-15 DIAGNOSIS — R05.9 COUGH: ICD-10-CM

## 2018-11-15 DIAGNOSIS — J34.89 RHINORRHEA: ICD-10-CM

## 2018-11-19 ENCOUNTER — AMBULATORY - HEALTHEAST (OUTPATIENT)
Dept: SLEEP MEDICINE | Facility: CLINIC | Age: 68
End: 2018-11-19

## 2018-11-20 ENCOUNTER — COMMUNICATION - HEALTHEAST (OUTPATIENT)
Dept: PULMONOLOGY | Facility: OTHER | Age: 68
End: 2018-11-20

## 2018-11-20 DIAGNOSIS — J34.89 RHINORRHEA: ICD-10-CM

## 2018-11-20 DIAGNOSIS — R05.9 COUGH: ICD-10-CM

## 2018-12-03 ENCOUNTER — COMMUNICATION - HEALTHEAST (OUTPATIENT)
Dept: FAMILY MEDICINE | Facility: CLINIC | Age: 68
End: 2018-12-03

## 2018-12-03 DIAGNOSIS — Z00.00 HEALTHCARE MAINTENANCE: ICD-10-CM

## 2018-12-04 ENCOUNTER — AMBULATORY - HEALTHEAST (OUTPATIENT)
Dept: NURSING | Facility: CLINIC | Age: 68
End: 2018-12-04

## 2018-12-04 DIAGNOSIS — Z00.00 HEALTHCARE MAINTENANCE: ICD-10-CM

## 2018-12-05 ENCOUNTER — COMMUNICATION - HEALTHEAST (OUTPATIENT)
Dept: ANTICOAGULATION | Facility: CLINIC | Age: 68
End: 2018-12-05

## 2018-12-05 DIAGNOSIS — I26.99 PULMONARY EMBOLISM, BILATERAL (H): ICD-10-CM

## 2018-12-05 DIAGNOSIS — I48.19 PERSISTENT ATRIAL FIBRILLATION (H): ICD-10-CM

## 2018-12-20 ENCOUNTER — COMMUNICATION - HEALTHEAST (OUTPATIENT)
Dept: ANTICOAGULATION | Facility: CLINIC | Age: 68
End: 2018-12-20

## 2018-12-20 ENCOUNTER — AMBULATORY - HEALTHEAST (OUTPATIENT)
Dept: LAB | Facility: CLINIC | Age: 68
End: 2018-12-20

## 2018-12-20 ENCOUNTER — COMMUNICATION - HEALTHEAST (OUTPATIENT)
Dept: FAMILY MEDICINE | Facility: CLINIC | Age: 68
End: 2018-12-20

## 2018-12-20 DIAGNOSIS — I26.99 PULMONARY EMBOLISM, BILATERAL (H): ICD-10-CM

## 2018-12-20 DIAGNOSIS — I48.19 PERSISTENT ATRIAL FIBRILLATION (H): ICD-10-CM

## 2018-12-20 LAB — INR PPP: 2.9 (ref 0.9–1.1)

## 2019-01-02 ENCOUNTER — COMMUNICATION - HEALTHEAST (OUTPATIENT)
Dept: FAMILY MEDICINE | Facility: CLINIC | Age: 69
End: 2019-01-02

## 2019-01-03 ENCOUNTER — OFFICE VISIT - HEALTHEAST (OUTPATIENT)
Dept: SLEEP MEDICINE | Facility: CLINIC | Age: 69
End: 2019-01-03

## 2019-01-03 DIAGNOSIS — G47.8 SLEEP DYSFUNCTION WITH SLEEP STAGE DISTURBANCE: ICD-10-CM

## 2019-01-03 DIAGNOSIS — G47.33 OBSTRUCTIVE SLEEP APNEA: ICD-10-CM

## 2019-01-03 ASSESSMENT — MIFFLIN-ST. JEOR: SCORE: 1895.04

## 2019-01-04 ENCOUNTER — COMMUNICATION - HEALTHEAST (OUTPATIENT)
Dept: FAMILY MEDICINE | Facility: CLINIC | Age: 69
End: 2019-01-04

## 2019-01-31 ENCOUNTER — COMMUNICATION - HEALTHEAST (OUTPATIENT)
Dept: ANTICOAGULATION | Facility: CLINIC | Age: 69
End: 2019-01-31

## 2019-01-31 ENCOUNTER — AMBULATORY - HEALTHEAST (OUTPATIENT)
Dept: LAB | Facility: CLINIC | Age: 69
End: 2019-01-31

## 2019-01-31 DIAGNOSIS — I26.99 PULMONARY EMBOLISM, BILATERAL (H): ICD-10-CM

## 2019-01-31 DIAGNOSIS — I48.19 PERSISTENT ATRIAL FIBRILLATION (H): ICD-10-CM

## 2019-01-31 LAB — INR PPP: 3.9 (ref 0.9–1.1)

## 2019-02-08 ENCOUNTER — OFFICE VISIT - HEALTHEAST (OUTPATIENT)
Dept: FAMILY MEDICINE | Facility: CLINIC | Age: 69
End: 2019-02-08

## 2019-02-08 DIAGNOSIS — F41.9 ANXIETY: ICD-10-CM

## 2019-02-08 DIAGNOSIS — R05.3 CHRONIC COUGH: ICD-10-CM

## 2019-02-08 DIAGNOSIS — Z00.01 ENCOUNTER FOR GENERAL ADULT MEDICAL EXAMINATION WITH ABNORMAL FINDINGS: ICD-10-CM

## 2019-02-08 DIAGNOSIS — D64.9 NORMOCHROMIC NORMOCYTIC ANEMIA: ICD-10-CM

## 2019-02-08 DIAGNOSIS — I26.99 PULMONARY EMBOLISM, BILATERAL (H): ICD-10-CM

## 2019-02-08 DIAGNOSIS — E66.3 OVERWEIGHT (BMI 25.0-29.9): ICD-10-CM

## 2019-02-08 DIAGNOSIS — D12.6 TUBULAR ADENOMA OF COLON: ICD-10-CM

## 2019-02-08 DIAGNOSIS — I34.0 MITRAL VALVE INSUFFICIENCY, UNSPECIFIED ETIOLOGY: ICD-10-CM

## 2019-02-08 DIAGNOSIS — N40.0 BPH WITHOUT URINARY OBSTRUCTION: ICD-10-CM

## 2019-02-08 DIAGNOSIS — I48.19 PERSISTENT ATRIAL FIBRILLATION (H): ICD-10-CM

## 2019-02-08 DIAGNOSIS — E78.00 PURE HYPERCHOLESTEROLEMIA: ICD-10-CM

## 2019-02-08 DIAGNOSIS — G47.33 OSA ON CPAP: ICD-10-CM

## 2019-02-08 DIAGNOSIS — K40.90 LEFT INGUINAL HERNIA: ICD-10-CM

## 2019-02-08 DIAGNOSIS — Z12.5 ENCOUNTER FOR SCREENING FOR MALIGNANT NEOPLASM OF PROSTATE: ICD-10-CM

## 2019-02-08 LAB
ANION GAP SERPL CALCULATED.3IONS-SCNC: 8 MMOL/L (ref 5–18)
BUN SERPL-MCNC: 16 MG/DL (ref 8–22)
CALCIUM SERPL-MCNC: 9.4 MG/DL (ref 8.5–10.5)
CHLORIDE BLD-SCNC: 107 MMOL/L (ref 98–107)
CHOLEST SERPL-MCNC: 231 MG/DL
CO2 SERPL-SCNC: 25 MMOL/L (ref 22–31)
CREAT SERPL-MCNC: 0.85 MG/DL (ref 0.7–1.3)
ERYTHROCYTE [DISTWIDTH] IN BLOOD BY AUTOMATED COUNT: 11.7 % (ref 11–14.5)
FASTING STATUS PATIENT QL REPORTED: YES
GFR SERPL CREATININE-BSD FRML MDRD: >60 ML/MIN/1.73M2
GLUCOSE BLD-MCNC: 85 MG/DL (ref 70–125)
HCT VFR BLD AUTO: 42.9 % (ref 40–54)
HDLC SERPL-MCNC: 67 MG/DL
HGB BLD-MCNC: 14.2 G/DL (ref 14–18)
LDLC SERPL CALC-MCNC: 148 MG/DL
MCH RBC QN AUTO: 30.6 PG (ref 27–34)
MCHC RBC AUTO-ENTMCNC: 33 G/DL (ref 32–36)
MCV RBC AUTO: 93 FL (ref 80–100)
PLATELET # BLD AUTO: 170 THOU/UL (ref 140–440)
PMV BLD AUTO: 8.1 FL (ref 7–10)
POTASSIUM BLD-SCNC: 4.5 MMOL/L (ref 3.5–5)
PSA SERPL-MCNC: 1.3 NG/ML (ref 0–4.5)
RBC # BLD AUTO: 4.62 MILL/UL (ref 4.4–6.2)
SODIUM SERPL-SCNC: 140 MMOL/L (ref 136–145)
TRIGL SERPL-MCNC: 82 MG/DL
WBC: 3.5 THOU/UL (ref 4–11)

## 2019-02-08 ASSESSMENT — MIFFLIN-ST. JEOR: SCORE: 1885.4

## 2019-02-11 ENCOUNTER — COMMUNICATION - HEALTHEAST (OUTPATIENT)
Dept: FAMILY MEDICINE | Facility: CLINIC | Age: 69
End: 2019-02-11

## 2019-02-11 ENCOUNTER — RECORDS - HEALTHEAST (OUTPATIENT)
Dept: ADMINISTRATIVE | Facility: OTHER | Age: 69
End: 2019-02-11

## 2019-02-15 ENCOUNTER — AMBULATORY - HEALTHEAST (OUTPATIENT)
Dept: LAB | Facility: CLINIC | Age: 69
End: 2019-02-15

## 2019-02-15 ENCOUNTER — COMMUNICATION - HEALTHEAST (OUTPATIENT)
Dept: ANTICOAGULATION | Facility: CLINIC | Age: 69
End: 2019-02-15

## 2019-02-15 DIAGNOSIS — I26.99 PULMONARY EMBOLISM, BILATERAL (H): ICD-10-CM

## 2019-02-15 DIAGNOSIS — I48.19 PERSISTENT ATRIAL FIBRILLATION (H): ICD-10-CM

## 2019-02-15 LAB — INR PPP: 1.4 (ref 0.9–1.1)

## 2019-02-19 ENCOUNTER — AMBULATORY - HEALTHEAST (OUTPATIENT)
Dept: LAB | Facility: CLINIC | Age: 69
End: 2019-02-19

## 2019-02-19 ENCOUNTER — COMMUNICATION - HEALTHEAST (OUTPATIENT)
Dept: ANTICOAGULATION | Facility: CLINIC | Age: 69
End: 2019-02-19

## 2019-02-19 DIAGNOSIS — I26.99 PULMONARY EMBOLISM, BILATERAL (H): ICD-10-CM

## 2019-02-19 DIAGNOSIS — I48.19 PERSISTENT ATRIAL FIBRILLATION (H): ICD-10-CM

## 2019-02-19 LAB — INR PPP: 2.1 (ref 0.9–1.1)

## 2019-03-04 ENCOUNTER — COMMUNICATION - HEALTHEAST (OUTPATIENT)
Dept: ANTICOAGULATION | Facility: CLINIC | Age: 69
End: 2019-03-04

## 2019-03-04 ENCOUNTER — AMBULATORY - HEALTHEAST (OUTPATIENT)
Dept: LAB | Facility: CLINIC | Age: 69
End: 2019-03-04

## 2019-03-04 DIAGNOSIS — I48.19 PERSISTENT ATRIAL FIBRILLATION (H): ICD-10-CM

## 2019-03-04 DIAGNOSIS — I26.99 PULMONARY EMBOLISM, BILATERAL (H): ICD-10-CM

## 2019-03-04 LAB — INR PPP: 2.3 (ref 0.9–1.1)

## 2019-03-22 ENCOUNTER — COMMUNICATION - HEALTHEAST (OUTPATIENT)
Dept: FAMILY MEDICINE | Facility: CLINIC | Age: 69
End: 2019-03-22

## 2019-03-22 DIAGNOSIS — I48.19 PERSISTENT ATRIAL FIBRILLATION (H): ICD-10-CM

## 2019-03-25 ENCOUNTER — COMMUNICATION - HEALTHEAST (OUTPATIENT)
Dept: FAMILY MEDICINE | Facility: CLINIC | Age: 69
End: 2019-03-25

## 2019-03-25 DIAGNOSIS — I48.19 PERSISTENT ATRIAL FIBRILLATION (H): ICD-10-CM

## 2019-04-01 ENCOUNTER — COMMUNICATION - HEALTHEAST (OUTPATIENT)
Dept: ANTICOAGULATION | Facility: CLINIC | Age: 69
End: 2019-04-01

## 2019-04-01 ENCOUNTER — OFFICE VISIT - HEALTHEAST (OUTPATIENT)
Dept: SLEEP MEDICINE | Facility: CLINIC | Age: 69
End: 2019-04-01

## 2019-04-01 ENCOUNTER — AMBULATORY - HEALTHEAST (OUTPATIENT)
Dept: LAB | Facility: CLINIC | Age: 69
End: 2019-04-01

## 2019-04-01 DIAGNOSIS — I26.99 PULMONARY EMBOLISM, BILATERAL (H): ICD-10-CM

## 2019-04-01 DIAGNOSIS — I48.19 PERSISTENT ATRIAL FIBRILLATION (H): ICD-10-CM

## 2019-04-01 DIAGNOSIS — G47.33 OBSTRUCTIVE SLEEP APNEA: ICD-10-CM

## 2019-04-01 DIAGNOSIS — G47.8 SLEEP DYSFUNCTION WITH SLEEP STAGE DISTURBANCE: ICD-10-CM

## 2019-04-01 LAB — INR PPP: 2.8 (ref 0.9–1.1)

## 2019-04-01 ASSESSMENT — MIFFLIN-ST. JEOR: SCORE: 1880.86

## 2019-04-03 ENCOUNTER — COMMUNICATION - HEALTHEAST (OUTPATIENT)
Dept: FAMILY MEDICINE | Facility: CLINIC | Age: 69
End: 2019-04-03

## 2019-04-03 DIAGNOSIS — I48.19 PERSISTENT ATRIAL FIBRILLATION (H): ICD-10-CM

## 2019-04-29 ENCOUNTER — COMMUNICATION - HEALTHEAST (OUTPATIENT)
Dept: ANTICOAGULATION | Facility: CLINIC | Age: 69
End: 2019-04-29

## 2019-04-29 ENCOUNTER — AMBULATORY - HEALTHEAST (OUTPATIENT)
Dept: LAB | Facility: CLINIC | Age: 69
End: 2019-04-29

## 2019-04-29 DIAGNOSIS — I26.99 PULMONARY EMBOLISM, BILATERAL (H): ICD-10-CM

## 2019-04-29 DIAGNOSIS — I48.19 PERSISTENT ATRIAL FIBRILLATION (H): ICD-10-CM

## 2019-04-29 LAB — INR PPP: 2.6 (ref 0.9–1.1)

## 2019-05-21 ENCOUNTER — HOSPITAL ENCOUNTER (OUTPATIENT)
Dept: CARDIOLOGY | Facility: HOSPITAL | Age: 69
Discharge: HOME | End: 2019-05-21
Attending: INTERNAL MEDICINE

## 2019-05-21 DIAGNOSIS — I34.0 NON-RHEUMATIC MITRAL REGURGITATION: ICD-10-CM

## 2019-05-21 DIAGNOSIS — I48.19 PERSISTENT ATRIAL FIBRILLATION (H): ICD-10-CM

## 2019-05-21 LAB
AORTIC ROOT: 3.7 CM
AORTIC VALVE MEAN VELOCITY: 98.2 CM/S
AV CUSP SEPERATION: 2 CM
AV CUSP SEPERATION: 2 CM
AV DIMENSIONLESS INDEX VTI: 0.7
AV MEAN GRADIENT: 4 MMHG
AV PEAK GRADIENT: 5.8 MMHG
BSA FOR ECHO PROCEDURE: 2.33 M2
CV ECHO HEIGHT: 74.8 IN
CV ECHO WEIGHT: 228 LBS
DOP CALC AO PEAK VEL: 120 CM/S
DOP CALC AO VTI: 27.7 CM
DOP CALC MV VTI: 30.1 CM
DOP CALCLVOT PEAK VEL VTI: 19.4 CM
EJECTION FRACTION: 54 % (ref 55–75)
FRACTIONAL SHORTENING: 33.3 % (ref 28–44)
INTERVENTRICULAR SEPTUM IN END DIASTOLE: 1.3 CM (ref 0.6–1)
IVS/PW RATIO: 1
LA AREA 1: 33.8 CM2
LA AREA 2: 34.7 CM2
LEFT ATRIUM LENGTH: 7.08 CM
LEFT ATRIUM SIZE: 5 CM
LEFT ATRIUM VOLUME INDEX: 60.4 ML/M2
LEFT ATRIUM VOLUME: 140.8 ML
LEFT VENTRICLE DIASTOLIC VOLUME INDEX: 49.8 CM3/M2 (ref 34–74)
LEFT VENTRICLE DIASTOLIC VOLUME: 116 CM3 (ref 62–150)
LEFT VENTRICLE HEART RATE: 86 BPM
LEFT VENTRICLE MASS INDEX: 105.5 G/M2
LEFT VENTRICLE SYSTOLIC VOLUME INDEX: 22.7 CM3/M2 (ref 11–31)
LEFT VENTRICLE SYSTOLIC VOLUME: 53 CM3 (ref 21–61)
LEFT VENTRICULAR INTERNAL DIMENSION IN DIASTOLE: 4.8 CM (ref 4.2–5.8)
LEFT VENTRICULAR INTERNAL DIMENSION IN SYSTOLE: 3.2 CM (ref 2.5–4)
LEFT VENTRICULAR MASS: 245.7 G
LEFT VENTRICULAR OUTFLOW TRACT MEAN GRADIENT: 2 MMHG
LEFT VENTRICULAR OUTFLOW TRACT MEAN VELOCITY: 71.5 CM/S
LEFT VENTRICULAR POSTERIOR WALL IN END DIASTOLE: 1.3 CM (ref 0.6–1)
MITRAL REGURGITANT VELOCITY TIME INTEGRAL: 186 CM
MITRAL VALVE DECELERATION SLOPE: 3100 MM/S2
MITRAL VALVE MEAN INFLOW VELOCITY: 69.6 CM/S
MITRAL VALVE PEAK VELOCITY: 120 CM/S
MITRAL VALVE PRESSURE HALF-TIME: 116 MS
MR FLOW: 22 CM3
MR MEAN GRADIENT: 98 MMHG
MR MEAN VELOCITY: 487 CM/S
MR PEAK GRADIENT: 121.9 MMHG
MR PISA EROA: 0.2 CM2
MR PISA RADIUS: 0.8 CM
MR PISA VN NYQUIST: 27.9 CM/S
MV AVERAGE E/E' RATIO: 14.2 CM/S
MV DECELERATION TIME: 194 MS
MV E'TISSUE VEL-LAT: 9.85 CM/S
MV E'TISSUE VEL-MED: 5.65 CM/S
MV LATERAL E/E' RATIO: 11.2
MV MEAN GRADIENT: 2 MMHG
MV MEDIAL E/E' RATIO: 19.5
MV PEAK E VELOCITY: 110 CM/S
MV PEAK GRADIENT: 5.8 MMHG
MV REGURGITANT VOLUME: 37.8 CC
MV VALVE AREA PRESSURE 1/2 METHOD: 1.9 CM2
NUC REST DIASTOLIC VOLUME INDEX: 3648 LBS
NUC REST SYSTOLIC VOLUME INDEX: 74.75 IN
PISA MR PEAK VEL: 552 CM/S
TRICUSPID REGURGITATION PEAK PRESSURE GRADIENT: 28.9 MMHG
TRICUSPID VALVE ANULAR PLANE SYSTOLIC EXCURSION: 2.2 CM
TRICUSPID VALVE PEAK REGURGITANT VELOCITY: 269 CM/S

## 2019-05-21 ASSESSMENT — MIFFLIN-ST. JEOR: SCORE: 1880.86

## 2019-06-03 ENCOUNTER — OFFICE VISIT - HEALTHEAST (OUTPATIENT)
Dept: CARDIOLOGY | Facility: CLINIC | Age: 69
End: 2019-06-03

## 2019-06-03 ENCOUNTER — COMMUNICATION - HEALTHEAST (OUTPATIENT)
Dept: ANTICOAGULATION | Facility: CLINIC | Age: 69
End: 2019-06-03

## 2019-06-03 ENCOUNTER — AMBULATORY - HEALTHEAST (OUTPATIENT)
Dept: LAB | Facility: CLINIC | Age: 69
End: 2019-06-03

## 2019-06-03 ENCOUNTER — AMBULATORY - HEALTHEAST (OUTPATIENT)
Dept: ANTICOAGULATION | Facility: CLINIC | Age: 69
End: 2019-06-03

## 2019-06-03 DIAGNOSIS — I26.99 PULMONARY EMBOLISM, BILATERAL (H): ICD-10-CM

## 2019-06-03 DIAGNOSIS — I34.0 MITRAL VALVE INSUFFICIENCY: ICD-10-CM

## 2019-06-03 DIAGNOSIS — I48.19 PERSISTENT ATRIAL FIBRILLATION (H): ICD-10-CM

## 2019-06-03 LAB — INR PPP: 3.5 (ref 0.9–1.1)

## 2019-06-03 ASSESSMENT — MIFFLIN-ST. JEOR: SCORE: 1876.33

## 2019-06-18 ENCOUNTER — COMMUNICATION - HEALTHEAST (OUTPATIENT)
Dept: FAMILY MEDICINE | Facility: CLINIC | Age: 69
End: 2019-06-18

## 2019-06-18 ENCOUNTER — COMMUNICATION - HEALTHEAST (OUTPATIENT)
Dept: ANTICOAGULATION | Facility: CLINIC | Age: 69
End: 2019-06-18

## 2019-06-18 ENCOUNTER — AMBULATORY - HEALTHEAST (OUTPATIENT)
Dept: LAB | Facility: CLINIC | Age: 69
End: 2019-06-18

## 2019-06-18 DIAGNOSIS — I26.99 PULMONARY EMBOLISM, BILATERAL (H): ICD-10-CM

## 2019-06-18 DIAGNOSIS — I48.19 PERSISTENT ATRIAL FIBRILLATION (H): ICD-10-CM

## 2019-06-18 DIAGNOSIS — F41.9 ANXIETY: ICD-10-CM

## 2019-06-18 LAB — INR PPP: 3 (ref 0.9–1.1)

## 2019-07-02 ENCOUNTER — COMMUNICATION - HEALTHEAST (OUTPATIENT)
Dept: ANTICOAGULATION | Facility: CLINIC | Age: 69
End: 2019-07-02

## 2019-07-02 ENCOUNTER — AMBULATORY - HEALTHEAST (OUTPATIENT)
Dept: LAB | Facility: CLINIC | Age: 69
End: 2019-07-02

## 2019-07-02 DIAGNOSIS — I48.19 PERSISTENT ATRIAL FIBRILLATION (H): ICD-10-CM

## 2019-07-02 DIAGNOSIS — I26.99 PULMONARY EMBOLISM, BILATERAL (H): ICD-10-CM

## 2019-07-02 LAB — INR PPP: 2.8 (ref 0.9–1.1)

## 2019-07-25 ENCOUNTER — OFFICE VISIT - HEALTHEAST (OUTPATIENT)
Dept: FAMILY MEDICINE | Facility: CLINIC | Age: 69
End: 2019-07-25

## 2019-07-25 ENCOUNTER — COMMUNICATION - HEALTHEAST (OUTPATIENT)
Dept: ANTICOAGULATION | Facility: CLINIC | Age: 69
End: 2019-07-25

## 2019-07-25 DIAGNOSIS — D64.9 NORMOCHROMIC NORMOCYTIC ANEMIA: ICD-10-CM

## 2019-07-25 DIAGNOSIS — I26.99 PULMONARY EMBOLISM, BILATERAL (H): ICD-10-CM

## 2019-07-25 DIAGNOSIS — K40.90 LEFT INGUINAL HERNIA: ICD-10-CM

## 2019-07-25 DIAGNOSIS — I34.0 MITRAL VALVE INSUFFICIENCY, UNSPECIFIED ETIOLOGY: ICD-10-CM

## 2019-07-25 DIAGNOSIS — I48.19 PERSISTENT ATRIAL FIBRILLATION (H): ICD-10-CM

## 2019-07-25 LAB
ANION GAP SERPL CALCULATED.3IONS-SCNC: 10 MMOL/L (ref 5–18)
BUN SERPL-MCNC: 16 MG/DL (ref 8–22)
CALCIUM SERPL-MCNC: 10.2 MG/DL (ref 8.5–10.5)
CHLORIDE BLD-SCNC: 107 MMOL/L (ref 98–107)
CO2 SERPL-SCNC: 26 MMOL/L (ref 22–31)
CREAT SERPL-MCNC: 0.87 MG/DL (ref 0.7–1.3)
ERYTHROCYTE [DISTWIDTH] IN BLOOD BY AUTOMATED COUNT: 12 % (ref 11–14.5)
GFR SERPL CREATININE-BSD FRML MDRD: >60 ML/MIN/1.73M2
GLUCOSE BLD-MCNC: 76 MG/DL (ref 70–125)
HCT VFR BLD AUTO: 41.5 % (ref 40–54)
HGB BLD-MCNC: 13.9 G/DL (ref 14–18)
INR PPP: 2.4 (ref 0.9–1.1)
MCH RBC QN AUTO: 30.6 PG (ref 27–34)
MCHC RBC AUTO-ENTMCNC: 33.4 G/DL (ref 32–36)
MCV RBC AUTO: 92 FL (ref 80–100)
PLATELET # BLD AUTO: 193 THOU/UL (ref 140–440)
PMV BLD AUTO: 9 FL (ref 7–10)
POTASSIUM BLD-SCNC: 4.5 MMOL/L (ref 3.5–5)
RBC # BLD AUTO: 4.53 MILL/UL (ref 4.4–6.2)
SODIUM SERPL-SCNC: 143 MMOL/L (ref 136–145)
WBC: 5.6 THOU/UL (ref 4–11)

## 2019-07-29 ENCOUNTER — OFFICE VISIT - HEALTHEAST (OUTPATIENT)
Dept: SURGERY | Facility: CLINIC | Age: 69
End: 2019-07-29

## 2019-07-29 DIAGNOSIS — K40.90 LEFT INGUINAL HERNIA: ICD-10-CM

## 2019-07-30 ENCOUNTER — COMMUNICATION - HEALTHEAST (OUTPATIENT)
Dept: FAMILY MEDICINE | Facility: CLINIC | Age: 69
End: 2019-07-30

## 2019-08-02 ENCOUNTER — OFFICE VISIT - HEALTHEAST (OUTPATIENT)
Dept: FAMILY MEDICINE | Facility: CLINIC | Age: 69
End: 2019-08-02

## 2019-08-02 DIAGNOSIS — I34.0 MITRAL VALVE INSUFFICIENCY, UNSPECIFIED ETIOLOGY: ICD-10-CM

## 2019-08-02 DIAGNOSIS — I48.19 PERSISTENT ATRIAL FIBRILLATION (H): ICD-10-CM

## 2019-08-02 DIAGNOSIS — D64.9 NORMOCHROMIC NORMOCYTIC ANEMIA: ICD-10-CM

## 2019-08-02 DIAGNOSIS — Z01.818 PRE-OP EXAM: ICD-10-CM

## 2019-08-02 DIAGNOSIS — I26.99 PULMONARY EMBOLISM, BILATERAL (H): ICD-10-CM

## 2019-08-02 DIAGNOSIS — K40.90 LEFT INGUINAL HERNIA: ICD-10-CM

## 2019-08-02 LAB
ATRIAL RATE - MUSE: 71 BPM
DIASTOLIC BLOOD PRESSURE - MUSE: NORMAL MMHG
INTERPRETATION ECG - MUSE: NORMAL
P AXIS - MUSE: NORMAL DEGREES
PR INTERVAL - MUSE: NORMAL MS
QRS DURATION - MUSE: 84 MS
QT - MUSE: 396 MS
QTC - MUSE: 427 MS
R AXIS - MUSE: -13 DEGREES
SYSTOLIC BLOOD PRESSURE - MUSE: NORMAL MMHG
T AXIS - MUSE: -3 DEGREES
VENTRICULAR RATE- MUSE: 70 BPM

## 2019-08-02 ASSESSMENT — MIFFLIN-ST. JEOR: SCORE: 1863.46

## 2019-08-09 ASSESSMENT — MIFFLIN-ST. JEOR: SCORE: 1862.15

## 2019-08-13 ENCOUNTER — COMMUNICATION - HEALTHEAST (OUTPATIENT)
Dept: SURGERY | Facility: CLINIC | Age: 69
End: 2019-08-13

## 2019-08-14 ENCOUNTER — ANESTHESIA - HEALTHEAST (OUTPATIENT)
Dept: SURGERY | Facility: AMBULATORY SURGERY CENTER | Age: 69
End: 2019-08-14

## 2019-08-15 ENCOUNTER — SURGERY - HEALTHEAST (OUTPATIENT)
Dept: SURGERY | Facility: AMBULATORY SURGERY CENTER | Age: 69
End: 2019-08-15

## 2019-08-15 ASSESSMENT — MIFFLIN-ST. JEOR: SCORE: 1862.15

## 2019-08-20 ENCOUNTER — COMMUNICATION - HEALTHEAST (OUTPATIENT)
Dept: ANTICOAGULATION | Facility: CLINIC | Age: 69
End: 2019-08-20

## 2019-08-20 ENCOUNTER — AMBULATORY - HEALTHEAST (OUTPATIENT)
Dept: LAB | Facility: CLINIC | Age: 69
End: 2019-08-20

## 2019-08-20 DIAGNOSIS — I26.99 PULMONARY EMBOLISM, BILATERAL (H): ICD-10-CM

## 2019-08-20 DIAGNOSIS — I48.19 PERSISTENT ATRIAL FIBRILLATION (H): ICD-10-CM

## 2019-08-20 LAB — INR PPP: 1.5 (ref 0.9–1.1)

## 2019-08-29 ENCOUNTER — OFFICE VISIT - HEALTHEAST (OUTPATIENT)
Dept: SURGERY | Facility: CLINIC | Age: 69
End: 2019-08-29

## 2019-08-29 DIAGNOSIS — Z48.89 POSTOPERATIVE VISIT: ICD-10-CM

## 2019-08-29 ASSESSMENT — MIFFLIN-ST. JEOR: SCORE: 1844.01

## 2019-09-03 ENCOUNTER — AMBULATORY - HEALTHEAST (OUTPATIENT)
Dept: LAB | Facility: CLINIC | Age: 69
End: 2019-09-03

## 2019-09-03 ENCOUNTER — COMMUNICATION - HEALTHEAST (OUTPATIENT)
Dept: ANTICOAGULATION | Facility: CLINIC | Age: 69
End: 2019-09-03

## 2019-09-03 DIAGNOSIS — I48.19 PERSISTENT ATRIAL FIBRILLATION (H): ICD-10-CM

## 2019-09-03 DIAGNOSIS — I26.99 PULMONARY EMBOLISM, BILATERAL (H): ICD-10-CM

## 2019-09-03 LAB — INR PPP: 2.4 (ref 0.9–1.1)

## 2019-09-10 ENCOUNTER — COMMUNICATION - HEALTHEAST (OUTPATIENT)
Dept: CARDIOLOGY | Facility: CLINIC | Age: 69
End: 2019-09-10

## 2019-09-10 ENCOUNTER — AMBULATORY - HEALTHEAST (OUTPATIENT)
Dept: CARDIOLOGY | Facility: CLINIC | Age: 69
End: 2019-09-10

## 2019-09-10 DIAGNOSIS — I48.91 A-FIB (H): ICD-10-CM

## 2019-09-10 DIAGNOSIS — Z00.6 CLINICAL TRIAL EXAM: ICD-10-CM

## 2019-09-10 DIAGNOSIS — I48.19 PERSISTENT ATRIAL FIBRILLATION (H): ICD-10-CM

## 2019-09-10 LAB
ATRIAL RATE - MUSE: 468 BPM
DIASTOLIC BLOOD PRESSURE - MUSE: NORMAL MMHG
INTERPRETATION ECG - MUSE: NORMAL
P AXIS - MUSE: NORMAL DEGREES
PR INTERVAL - MUSE: NORMAL MS
QRS DURATION - MUSE: 86 MS
QT - MUSE: 402 MS
QTC - MUSE: 427 MS
R AXIS - MUSE: -1 DEGREES
SYSTOLIC BLOOD PRESSURE - MUSE: NORMAL MMHG
T AXIS - MUSE: 6 DEGREES
VENTRICULAR RATE- MUSE: 68 BPM

## 2019-09-10 ASSESSMENT — MIFFLIN-ST. JEOR: SCORE: 1853.99

## 2019-09-13 ENCOUNTER — AMBULATORY - HEALTHEAST (OUTPATIENT)
Dept: CARDIOLOGY | Facility: CLINIC | Age: 69
End: 2019-09-13

## 2019-09-17 ENCOUNTER — AMBULATORY - HEALTHEAST (OUTPATIENT)
Dept: LAB | Facility: CLINIC | Age: 69
End: 2019-09-17

## 2019-09-17 ENCOUNTER — COMMUNICATION - HEALTHEAST (OUTPATIENT)
Dept: ANTICOAGULATION | Facility: CLINIC | Age: 69
End: 2019-09-17

## 2019-09-17 DIAGNOSIS — I26.99 PULMONARY EMBOLISM, BILATERAL (H): ICD-10-CM

## 2019-09-17 DIAGNOSIS — I48.19 PERSISTENT ATRIAL FIBRILLATION (H): ICD-10-CM

## 2019-09-17 LAB — INR PPP: 2.8 (ref 0.9–1.1)

## 2019-09-24 ENCOUNTER — OFFICE VISIT - HEALTHEAST (OUTPATIENT)
Dept: FAMILY MEDICINE | Facility: CLINIC | Age: 69
End: 2019-09-24

## 2019-09-24 DIAGNOSIS — L50.9 HIVES: ICD-10-CM

## 2019-09-24 ASSESSMENT — MIFFLIN-ST. JEOR: SCORE: 1871.22

## 2019-10-07 ENCOUNTER — COMMUNICATION - HEALTHEAST (OUTPATIENT)
Dept: FAMILY MEDICINE | Facility: CLINIC | Age: 69
End: 2019-10-07

## 2019-10-07 DIAGNOSIS — I48.19 PERSISTENT ATRIAL FIBRILLATION (H): ICD-10-CM

## 2019-10-15 ENCOUNTER — AMBULATORY - HEALTHEAST (OUTPATIENT)
Dept: LAB | Facility: CLINIC | Age: 69
End: 2019-10-15

## 2019-10-15 ENCOUNTER — COMMUNICATION - HEALTHEAST (OUTPATIENT)
Dept: ANTICOAGULATION | Facility: CLINIC | Age: 69
End: 2019-10-15

## 2019-10-15 DIAGNOSIS — I26.99 PULMONARY EMBOLISM, BILATERAL (H): ICD-10-CM

## 2019-10-15 DIAGNOSIS — I48.19 PERSISTENT ATRIAL FIBRILLATION (H): ICD-10-CM

## 2019-10-15 LAB — INR PPP: 2.4 (ref 0.9–1.1)

## 2019-11-12 ENCOUNTER — AMBULATORY - HEALTHEAST (OUTPATIENT)
Dept: LAB | Facility: CLINIC | Age: 69
End: 2019-11-12

## 2019-11-12 ENCOUNTER — COMMUNICATION - HEALTHEAST (OUTPATIENT)
Dept: ANTICOAGULATION | Facility: CLINIC | Age: 69
End: 2019-11-12

## 2019-11-12 DIAGNOSIS — I26.99 PULMONARY EMBOLISM, BILATERAL (H): ICD-10-CM

## 2019-11-12 DIAGNOSIS — I48.19 PERSISTENT ATRIAL FIBRILLATION (H): ICD-10-CM

## 2019-11-12 LAB — INR PPP: 3.3 (ref 0.9–1.1)

## 2019-11-20 ENCOUNTER — AMBULATORY - HEALTHEAST (OUTPATIENT)
Dept: OTHER | Facility: CLINIC | Age: 69
End: 2019-11-20

## 2019-11-25 ENCOUNTER — AMBULATORY - HEALTHEAST (OUTPATIENT)
Dept: CARDIOLOGY | Facility: CLINIC | Age: 69
End: 2019-11-25

## 2019-11-25 DIAGNOSIS — I48.19 PERSISTENT ATRIAL FIBRILLATION (H): ICD-10-CM

## 2019-11-25 DIAGNOSIS — Z00.6 CLINICAL TRIAL EXAM: ICD-10-CM

## 2019-11-25 LAB
ATRIAL RATE - MUSE: 441 BPM
DIASTOLIC BLOOD PRESSURE - MUSE: NORMAL
INTERPRETATION ECG - MUSE: NORMAL
P AXIS - MUSE: NORMAL
PR INTERVAL - MUSE: NORMAL
QRS DURATION - MUSE: 86 MS
QT - MUSE: 404 MS
QTC - MUSE: 420 MS
R AXIS - MUSE: -13 DEGREES
SYSTOLIC BLOOD PRESSURE - MUSE: NORMAL
T AXIS - MUSE: 10 DEGREES
VENTRICULAR RATE- MUSE: 65 BPM

## 2019-11-26 ENCOUNTER — COMMUNICATION - HEALTHEAST (OUTPATIENT)
Dept: ANTICOAGULATION | Facility: CLINIC | Age: 69
End: 2019-11-26

## 2019-11-26 ENCOUNTER — AMBULATORY - HEALTHEAST (OUTPATIENT)
Dept: LAB | Facility: CLINIC | Age: 69
End: 2019-11-26

## 2019-11-26 DIAGNOSIS — I48.19 PERSISTENT ATRIAL FIBRILLATION (H): ICD-10-CM

## 2019-11-26 DIAGNOSIS — I26.99 PULMONARY EMBOLISM, BILATERAL (H): ICD-10-CM

## 2019-11-26 LAB — INR PPP: 2.6 (ref 0.9–1.1)

## 2019-12-04 ENCOUNTER — COMMUNICATION - HEALTHEAST (OUTPATIENT)
Dept: ANTICOAGULATION | Facility: CLINIC | Age: 69
End: 2019-12-04

## 2019-12-04 DIAGNOSIS — I48.19 PERSISTENT ATRIAL FIBRILLATION (H): ICD-10-CM

## 2019-12-04 DIAGNOSIS — I26.99 PULMONARY EMBOLISM, BILATERAL (H): ICD-10-CM

## 2019-12-10 ENCOUNTER — AMBULATORY - HEALTHEAST (OUTPATIENT)
Dept: LAB | Facility: CLINIC | Age: 69
End: 2019-12-10

## 2019-12-10 ENCOUNTER — COMMUNICATION - HEALTHEAST (OUTPATIENT)
Dept: ANTICOAGULATION | Facility: CLINIC | Age: 69
End: 2019-12-10

## 2019-12-10 DIAGNOSIS — I26.99 PULMONARY EMBOLISM, BILATERAL (H): ICD-10-CM

## 2019-12-10 DIAGNOSIS — I48.19 PERSISTENT ATRIAL FIBRILLATION (H): ICD-10-CM

## 2019-12-10 LAB — INR PPP: 2.7 (ref 0.9–1.1)

## 2020-01-07 ENCOUNTER — AMBULATORY - HEALTHEAST (OUTPATIENT)
Dept: LAB | Facility: CLINIC | Age: 70
End: 2020-01-07

## 2020-01-07 ENCOUNTER — COMMUNICATION - HEALTHEAST (OUTPATIENT)
Dept: ANTICOAGULATION | Facility: CLINIC | Age: 70
End: 2020-01-07

## 2020-01-07 DIAGNOSIS — I48.19 PERSISTENT ATRIAL FIBRILLATION (H): ICD-10-CM

## 2020-01-07 DIAGNOSIS — I26.99 PULMONARY EMBOLISM, BILATERAL (H): ICD-10-CM

## 2020-01-07 LAB — INR PPP: 3.3 (ref 0.9–1.1)

## 2020-01-21 ENCOUNTER — COMMUNICATION - HEALTHEAST (OUTPATIENT)
Dept: ANTICOAGULATION | Facility: CLINIC | Age: 70
End: 2020-01-21

## 2020-01-21 ENCOUNTER — AMBULATORY - HEALTHEAST (OUTPATIENT)
Dept: LAB | Facility: CLINIC | Age: 70
End: 2020-01-21

## 2020-01-21 DIAGNOSIS — I26.99 PULMONARY EMBOLISM, BILATERAL (H): ICD-10-CM

## 2020-01-21 DIAGNOSIS — I48.19 PERSISTENT ATRIAL FIBRILLATION (H): ICD-10-CM

## 2020-01-21 LAB — INR PPP: 2.3 (ref 0.9–1.1)

## 2020-02-05 ENCOUNTER — COMMUNICATION - HEALTHEAST (OUTPATIENT)
Dept: FAMILY MEDICINE | Facility: CLINIC | Age: 70
End: 2020-02-05

## 2020-02-05 ENCOUNTER — AMBULATORY - HEALTHEAST (OUTPATIENT)
Dept: LAB | Facility: CLINIC | Age: 70
End: 2020-02-05

## 2020-02-05 ENCOUNTER — COMMUNICATION - HEALTHEAST (OUTPATIENT)
Dept: ANTICOAGULATION | Facility: CLINIC | Age: 70
End: 2020-02-05

## 2020-02-05 DIAGNOSIS — I26.99 PULMONARY EMBOLISM, BILATERAL (H): ICD-10-CM

## 2020-02-05 DIAGNOSIS — F41.9 ANXIETY: ICD-10-CM

## 2020-02-05 DIAGNOSIS — I48.19 PERSISTENT ATRIAL FIBRILLATION (H): ICD-10-CM

## 2020-02-05 LAB — INR PPP: 2.2 (ref 0.9–1.1)

## 2020-02-11 ENCOUNTER — OFFICE VISIT - HEALTHEAST (OUTPATIENT)
Dept: FAMILY MEDICINE | Facility: CLINIC | Age: 70
End: 2020-02-11

## 2020-02-11 DIAGNOSIS — G47.33 OSA ON CPAP: ICD-10-CM

## 2020-02-11 DIAGNOSIS — Z12.5 ENCOUNTER FOR SCREENING FOR MALIGNANT NEOPLASM OF PROSTATE: ICD-10-CM

## 2020-02-11 DIAGNOSIS — E78.00 PURE HYPERCHOLESTEROLEMIA: ICD-10-CM

## 2020-02-11 DIAGNOSIS — F41.9 ANXIETY: ICD-10-CM

## 2020-02-11 DIAGNOSIS — I34.0 MITRAL VALVE INSUFFICIENCY, UNSPECIFIED ETIOLOGY: ICD-10-CM

## 2020-02-11 DIAGNOSIS — E66.3 OVERWEIGHT (BMI 25.0-29.9): ICD-10-CM

## 2020-02-11 DIAGNOSIS — Z00.01 ENCOUNTER FOR GENERAL ADULT MEDICAL EXAMINATION WITH ABNORMAL FINDINGS: ICD-10-CM

## 2020-02-11 DIAGNOSIS — D12.6 TUBULAR ADENOMA OF COLON: ICD-10-CM

## 2020-02-11 DIAGNOSIS — I48.19 PERSISTENT ATRIAL FIBRILLATION (H): ICD-10-CM

## 2020-02-11 DIAGNOSIS — D64.9 NORMOCHROMIC NORMOCYTIC ANEMIA: ICD-10-CM

## 2020-02-11 LAB
ANION GAP SERPL CALCULATED.3IONS-SCNC: 12 MMOL/L (ref 5–18)
BUN SERPL-MCNC: 10 MG/DL (ref 8–22)
CALCIUM SERPL-MCNC: 9.6 MG/DL (ref 8.5–10.5)
CHLORIDE BLD-SCNC: 106 MMOL/L (ref 98–107)
CHOLEST SERPL-MCNC: 215 MG/DL
CO2 SERPL-SCNC: 23 MMOL/L (ref 22–31)
CREAT SERPL-MCNC: 0.81 MG/DL (ref 0.7–1.3)
ERYTHROCYTE [DISTWIDTH] IN BLOOD BY AUTOMATED COUNT: 12.1 % (ref 11–14.5)
FASTING STATUS PATIENT QL REPORTED: YES
GFR SERPL CREATININE-BSD FRML MDRD: >60 ML/MIN/1.73M2
GLUCOSE BLD-MCNC: 78 MG/DL (ref 70–125)
HCT VFR BLD AUTO: 40.2 % (ref 40–54)
HDLC SERPL-MCNC: 72 MG/DL
HGB BLD-MCNC: 13.8 G/DL (ref 14–18)
LDLC SERPL CALC-MCNC: 127 MG/DL
MCH RBC QN AUTO: 30.7 PG (ref 27–34)
MCHC RBC AUTO-ENTMCNC: 34.2 G/DL (ref 32–36)
MCV RBC AUTO: 90 FL (ref 80–100)
PLATELET # BLD AUTO: 163 THOU/UL (ref 140–440)
PMV BLD AUTO: 8.5 FL (ref 7–10)
POTASSIUM BLD-SCNC: 4.2 MMOL/L (ref 3.5–5)
PSA SERPL-MCNC: 1.6 NG/ML (ref 0–4.5)
RBC # BLD AUTO: 4.49 MILL/UL (ref 4.4–6.2)
SODIUM SERPL-SCNC: 141 MMOL/L (ref 136–145)
TRIGL SERPL-MCNC: 82 MG/DL
WBC: 4.7 THOU/UL (ref 4–11)

## 2020-02-11 ASSESSMENT — ANXIETY QUESTIONNAIRES
1. FEELING NERVOUS, ANXIOUS, OR ON EDGE: NOT AT ALL
1. FEELING NERVOUS, ANXIOUS, OR ON EDGE: NOT AT ALL
IF YOU CHECKED OFF ANY PROBLEMS ON THIS QUESTIONNAIRE, HOW DIFFICULT HAVE THESE PROBLEMS MADE IT FOR YOU TO DO YOUR WORK, TAKE CARE OF THINGS AT HOME, OR GET ALONG WITH OTHER PEOPLE: NOT DIFFICULT AT ALL
2. NOT BEING ABLE TO STOP OR CONTROL WORRYING: NOT AT ALL
5. BEING SO RESTLESS THAT IT IS HARD TO SIT STILL: NOT AT ALL
GAD7 TOTAL SCORE: 0
3. WORRYING TOO MUCH ABOUT DIFFERENT THINGS: NOT AT ALL
6. BECOMING EASILY ANNOYED OR IRRITABLE: NOT AT ALL
4. TROUBLE RELAXING: NOT AT ALL
2. NOT BEING ABLE TO STOP OR CONTROL WORRYING: NOT AT ALL
7. FEELING AFRAID AS IF SOMETHING AWFUL MIGHT HAPPEN: NOT AT ALL

## 2020-02-11 ASSESSMENT — PATIENT HEALTH QUESTIONNAIRE - PHQ9: SUM OF ALL RESPONSES TO PHQ QUESTIONS 1-9: 1

## 2020-02-11 ASSESSMENT — MIFFLIN-ST. JEOR: SCORE: 1854.78

## 2020-02-12 ASSESSMENT — PATIENT HEALTH QUESTIONNAIRE - PHQ9: SUM OF ALL RESPONSES TO PHQ QUESTIONS 1-9: 1

## 2020-02-27 ENCOUNTER — OFFICE VISIT - HEALTHEAST (OUTPATIENT)
Dept: FAMILY MEDICINE | Facility: CLINIC | Age: 70
End: 2020-02-27

## 2020-02-27 DIAGNOSIS — I48.19 PERSISTENT ATRIAL FIBRILLATION (H): ICD-10-CM

## 2020-02-27 DIAGNOSIS — H26.9 CATARACT OF BOTH EYES, UNSPECIFIED CATARACT TYPE: ICD-10-CM

## 2020-02-27 DIAGNOSIS — Z01.818 PREOPERATIVE EXAMINATION: ICD-10-CM

## 2020-02-27 DIAGNOSIS — G47.33 OSA ON CPAP: ICD-10-CM

## 2020-02-27 DIAGNOSIS — D64.9 NORMOCHROMIC NORMOCYTIC ANEMIA: ICD-10-CM

## 2020-02-27 DIAGNOSIS — F41.9 ANXIETY: ICD-10-CM

## 2020-02-27 ASSESSMENT — MIFFLIN-ST. JEOR: SCORE: 1868.39

## 2020-03-03 ENCOUNTER — AMBULATORY - HEALTHEAST (OUTPATIENT)
Dept: LAB | Facility: CLINIC | Age: 70
End: 2020-03-03

## 2020-03-03 ENCOUNTER — COMMUNICATION - HEALTHEAST (OUTPATIENT)
Dept: ANTICOAGULATION | Facility: CLINIC | Age: 70
End: 2020-03-03

## 2020-03-03 DIAGNOSIS — I26.99 PULMONARY EMBOLISM, BILATERAL (H): ICD-10-CM

## 2020-03-03 DIAGNOSIS — I48.19 PERSISTENT ATRIAL FIBRILLATION (H): ICD-10-CM

## 2020-03-03 LAB — INR PPP: 2.6 (ref 0.9–1.1)

## 2020-03-09 ENCOUNTER — COMMUNICATION - HEALTHEAST (OUTPATIENT)
Dept: FAMILY MEDICINE | Facility: CLINIC | Age: 70
End: 2020-03-09

## 2020-04-01 ENCOUNTER — AMBULATORY - HEALTHEAST (OUTPATIENT)
Dept: LAB | Facility: CLINIC | Age: 70
End: 2020-04-01

## 2020-04-01 ENCOUNTER — COMMUNICATION - HEALTHEAST (OUTPATIENT)
Dept: ANTICOAGULATION | Facility: CLINIC | Age: 70
End: 2020-04-01

## 2020-04-01 DIAGNOSIS — I26.99 PULMONARY EMBOLISM, BILATERAL (H): ICD-10-CM

## 2020-04-01 DIAGNOSIS — I48.19 PERSISTENT ATRIAL FIBRILLATION (H): ICD-10-CM

## 2020-04-01 LAB — INR PPP: 2.5 (ref 0.9–1.1)

## 2020-05-13 ENCOUNTER — AMBULATORY - HEALTHEAST (OUTPATIENT)
Dept: LAB | Facility: CLINIC | Age: 70
End: 2020-05-13

## 2020-05-13 ENCOUNTER — COMMUNICATION - HEALTHEAST (OUTPATIENT)
Dept: ANTICOAGULATION | Facility: CLINIC | Age: 70
End: 2020-05-13

## 2020-05-13 DIAGNOSIS — I48.19 PERSISTENT ATRIAL FIBRILLATION (H): ICD-10-CM

## 2020-05-13 DIAGNOSIS — I26.99 PULMONARY EMBOLISM, BILATERAL (H): ICD-10-CM

## 2020-05-13 LAB — INR PPP: 2.4 (ref 0.9–1.1)

## 2020-06-02 ENCOUNTER — COMMUNICATION - HEALTHEAST (OUTPATIENT)
Dept: FAMILY MEDICINE | Facility: CLINIC | Age: 70
End: 2020-06-02

## 2020-06-02 DIAGNOSIS — I48.19 PERSISTENT ATRIAL FIBRILLATION (H): ICD-10-CM

## 2020-06-05 ENCOUNTER — OFFICE VISIT - HEALTHEAST (OUTPATIENT)
Dept: CARDIOLOGY | Facility: CLINIC | Age: 70
End: 2020-06-05

## 2020-06-05 DIAGNOSIS — I48.19 PERSISTENT ATRIAL FIBRILLATION (H): ICD-10-CM

## 2020-06-05 DIAGNOSIS — G47.33 OSA ON CPAP: ICD-10-CM

## 2020-06-05 DIAGNOSIS — I34.0 MITRAL VALVE INSUFFICIENCY: ICD-10-CM

## 2020-06-05 ASSESSMENT — MIFFLIN-ST. JEOR: SCORE: 1871.22

## 2020-06-29 ENCOUNTER — AMBULATORY - HEALTHEAST (OUTPATIENT)
Dept: LAB | Facility: CLINIC | Age: 70
End: 2020-06-29

## 2020-06-29 ENCOUNTER — COMMUNICATION - HEALTHEAST (OUTPATIENT)
Dept: ANTICOAGULATION | Facility: CLINIC | Age: 70
End: 2020-06-29

## 2020-06-29 DIAGNOSIS — I26.99 PULMONARY EMBOLISM, BILATERAL (H): ICD-10-CM

## 2020-06-29 DIAGNOSIS — I48.19 PERSISTENT ATRIAL FIBRILLATION (H): ICD-10-CM

## 2020-06-29 LAB — INR PPP: 2.6 (ref 0.9–1.1)

## 2020-08-25 ENCOUNTER — AMBULATORY - HEALTHEAST (OUTPATIENT)
Dept: LAB | Facility: CLINIC | Age: 70
End: 2020-08-25

## 2020-08-25 ENCOUNTER — COMMUNICATION - HEALTHEAST (OUTPATIENT)
Dept: ANTICOAGULATION | Facility: CLINIC | Age: 70
End: 2020-08-25

## 2020-08-25 DIAGNOSIS — I26.99 PULMONARY EMBOLISM, BILATERAL (H): ICD-10-CM

## 2020-08-25 DIAGNOSIS — I48.19 PERSISTENT ATRIAL FIBRILLATION (H): ICD-10-CM

## 2020-08-25 LAB — INR PPP: 3 (ref 0.9–1.1)

## 2020-09-01 ENCOUNTER — COMMUNICATION - HEALTHEAST (OUTPATIENT)
Dept: FAMILY MEDICINE | Facility: CLINIC | Age: 70
End: 2020-09-01

## 2020-09-09 ENCOUNTER — OFFICE VISIT - HEALTHEAST (OUTPATIENT)
Dept: FAMILY MEDICINE | Facility: CLINIC | Age: 70
End: 2020-09-09

## 2020-09-09 ENCOUNTER — COMMUNICATION - HEALTHEAST (OUTPATIENT)
Dept: ANTICOAGULATION | Facility: CLINIC | Age: 70
End: 2020-09-09

## 2020-09-09 DIAGNOSIS — F41.9 ANXIETY: ICD-10-CM

## 2020-09-09 DIAGNOSIS — Z01.818 PREOPERATIVE EXAMINATION: ICD-10-CM

## 2020-09-09 DIAGNOSIS — I48.19 PERSISTENT ATRIAL FIBRILLATION (H): ICD-10-CM

## 2020-09-09 DIAGNOSIS — H43.393 FLOATER, VITREOUS, BILATERAL: ICD-10-CM

## 2020-09-09 DIAGNOSIS — I26.99 PULMONARY EMBOLISM, BILATERAL (H): ICD-10-CM

## 2020-09-09 DIAGNOSIS — G47.33 OSA ON CPAP: ICD-10-CM

## 2020-09-09 DIAGNOSIS — Z86.711 HISTORY OF PULMONARY EMBOLISM: ICD-10-CM

## 2020-09-09 DIAGNOSIS — D64.9 NORMOCHROMIC NORMOCYTIC ANEMIA: ICD-10-CM

## 2020-09-09 LAB
ANION GAP SERPL CALCULATED.3IONS-SCNC: 9 MMOL/L (ref 5–18)
BUN SERPL-MCNC: 10 MG/DL (ref 8–22)
CALCIUM SERPL-MCNC: 9.2 MG/DL (ref 8.5–10.5)
CHLORIDE BLD-SCNC: 106 MMOL/L (ref 98–107)
CO2 SERPL-SCNC: 26 MMOL/L (ref 22–31)
CREAT SERPL-MCNC: 0.75 MG/DL (ref 0.7–1.3)
ERYTHROCYTE [DISTWIDTH] IN BLOOD BY AUTOMATED COUNT: 12.3 % (ref 11–14.5)
GFR SERPL CREATININE-BSD FRML MDRD: >60 ML/MIN/1.73M2
GLUCOSE BLD-MCNC: 96 MG/DL (ref 70–125)
HCT VFR BLD AUTO: 43.5 % (ref 40–54)
HGB BLD-MCNC: 14.5 G/DL (ref 14–18)
MCH RBC QN AUTO: 30.5 PG (ref 27–34)
MCHC RBC AUTO-ENTMCNC: 33.2 G/DL (ref 32–36)
MCV RBC AUTO: 92 FL (ref 80–100)
PLATELET # BLD AUTO: 178 THOU/UL (ref 140–440)
PMV BLD AUTO: 8.5 FL (ref 7–10)
POTASSIUM BLD-SCNC: 4.3 MMOL/L (ref 3.5–5)
RBC # BLD AUTO: 4.74 MILL/UL (ref 4.4–6.2)
SODIUM SERPL-SCNC: 141 MMOL/L (ref 136–145)
WBC: 4.6 THOU/UL (ref 4–11)

## 2020-09-09 ASSESSMENT — MIFFLIN-ST. JEOR: SCORE: 1836.07

## 2020-09-10 LAB
ATRIAL RATE - MUSE: 51 BPM
DIASTOLIC BLOOD PRESSURE - MUSE: NORMAL
INTERPRETATION ECG - MUSE: NORMAL
P AXIS - MUSE: NORMAL
PR INTERVAL - MUSE: NORMAL
QRS DURATION - MUSE: 148 MS
QT - MUSE: 446 MS
QTC - MUSE: 422 MS
R AXIS - MUSE: -36 DEGREES
SYSTOLIC BLOOD PRESSURE - MUSE: NORMAL
T AXIS - MUSE: 22 DEGREES
VENTRICULAR RATE- MUSE: 54 BPM

## 2020-09-11 ENCOUNTER — COMMUNICATION - HEALTHEAST (OUTPATIENT)
Dept: FAMILY MEDICINE | Facility: CLINIC | Age: 70
End: 2020-09-11

## 2020-09-17 ENCOUNTER — COMMUNICATION - HEALTHEAST (OUTPATIENT)
Dept: FAMILY MEDICINE | Facility: CLINIC | Age: 70
End: 2020-09-17

## 2020-09-17 DIAGNOSIS — I26.99 PULMONARY EMBOLISM, BILATERAL (H): ICD-10-CM

## 2020-10-02 ENCOUNTER — RECORDS - HEALTHEAST (OUTPATIENT)
Dept: ADMINISTRATIVE | Facility: OTHER | Age: 70
End: 2020-10-02

## 2020-10-16 ENCOUNTER — COMMUNICATION - HEALTHEAST (OUTPATIENT)
Dept: ANTICOAGULATION | Facility: CLINIC | Age: 70
End: 2020-10-16

## 2020-10-16 ENCOUNTER — AMBULATORY - HEALTHEAST (OUTPATIENT)
Dept: LAB | Facility: CLINIC | Age: 70
End: 2020-10-16

## 2020-10-16 DIAGNOSIS — I26.99 PULMONARY EMBOLISM, BILATERAL (H): ICD-10-CM

## 2020-10-16 DIAGNOSIS — I48.19 PERSISTENT ATRIAL FIBRILLATION (H): ICD-10-CM

## 2020-10-16 LAB — INR PPP: 1.6 (ref 0.9–1.1)

## 2020-10-20 ENCOUNTER — COMMUNICATION - HEALTHEAST (OUTPATIENT)
Dept: ANTICOAGULATION | Facility: CLINIC | Age: 70
End: 2020-10-20

## 2020-10-20 ENCOUNTER — AMBULATORY - HEALTHEAST (OUTPATIENT)
Dept: LAB | Facility: CLINIC | Age: 70
End: 2020-10-20

## 2020-10-20 DIAGNOSIS — I48.19 PERSISTENT ATRIAL FIBRILLATION (H): ICD-10-CM

## 2020-10-20 DIAGNOSIS — I26.99 PULMONARY EMBOLISM, BILATERAL (H): ICD-10-CM

## 2020-10-20 LAB — INR PPP: 2.3 (ref 0.9–1.1)

## 2020-11-03 ENCOUNTER — COMMUNICATION - HEALTHEAST (OUTPATIENT)
Dept: ANTICOAGULATION | Facility: CLINIC | Age: 70
End: 2020-11-03

## 2020-11-03 ENCOUNTER — AMBULATORY - HEALTHEAST (OUTPATIENT)
Dept: LAB | Facility: CLINIC | Age: 70
End: 2020-11-03

## 2020-11-03 DIAGNOSIS — I26.99 PULMONARY EMBOLISM, BILATERAL (H): ICD-10-CM

## 2020-11-03 DIAGNOSIS — I48.19 PERSISTENT ATRIAL FIBRILLATION (H): ICD-10-CM

## 2020-11-03 LAB — INR PPP: 2.9 (ref 0.9–1.1)

## 2020-11-09 ENCOUNTER — COMMUNICATION - HEALTHEAST (OUTPATIENT)
Dept: SCHEDULING | Facility: CLINIC | Age: 70
End: 2020-11-09

## 2020-11-09 DIAGNOSIS — I48.19 PERSISTENT ATRIAL FIBRILLATION (H): ICD-10-CM

## 2020-11-09 DIAGNOSIS — F41.9 ANXIETY: ICD-10-CM

## 2020-11-25 ENCOUNTER — AMBULATORY - HEALTHEAST (OUTPATIENT)
Dept: ANTICOAGULATION | Facility: CLINIC | Age: 70
End: 2020-11-25

## 2020-11-25 DIAGNOSIS — I26.99 PULMONARY EMBOLISM, BILATERAL (H): ICD-10-CM

## 2020-11-25 DIAGNOSIS — I48.19 PERSISTENT ATRIAL FIBRILLATION (H): ICD-10-CM

## 2020-12-01 ENCOUNTER — COMMUNICATION - HEALTHEAST (OUTPATIENT)
Dept: ANTICOAGULATION | Facility: CLINIC | Age: 70
End: 2020-12-01

## 2020-12-01 ENCOUNTER — AMBULATORY - HEALTHEAST (OUTPATIENT)
Dept: LAB | Facility: CLINIC | Age: 70
End: 2020-12-01

## 2020-12-01 DIAGNOSIS — I26.99 PULMONARY EMBOLISM, BILATERAL (H): ICD-10-CM

## 2020-12-01 DIAGNOSIS — I48.19 PERSISTENT ATRIAL FIBRILLATION (H): ICD-10-CM

## 2020-12-01 LAB — INR PPP: 3.4 (ref 0.9–1.1)

## 2020-12-15 ENCOUNTER — AMBULATORY - HEALTHEAST (OUTPATIENT)
Dept: LAB | Facility: CLINIC | Age: 70
End: 2020-12-15

## 2020-12-15 ENCOUNTER — COMMUNICATION - HEALTHEAST (OUTPATIENT)
Dept: ANTICOAGULATION | Facility: CLINIC | Age: 70
End: 2020-12-15

## 2020-12-15 DIAGNOSIS — I26.99 PULMONARY EMBOLISM, BILATERAL (H): ICD-10-CM

## 2020-12-15 DIAGNOSIS — I48.19 PERSISTENT ATRIAL FIBRILLATION (H): ICD-10-CM

## 2020-12-15 LAB — INR PPP: 2.3 (ref 0.9–1.1)

## 2021-01-05 ENCOUNTER — AMBULATORY - HEALTHEAST (OUTPATIENT)
Dept: LAB | Facility: CLINIC | Age: 71
End: 2021-01-05

## 2021-01-05 ENCOUNTER — COMMUNICATION - HEALTHEAST (OUTPATIENT)
Dept: ANTICOAGULATION | Facility: CLINIC | Age: 71
End: 2021-01-05

## 2021-01-05 DIAGNOSIS — I26.99 PULMONARY EMBOLISM, BILATERAL (H): ICD-10-CM

## 2021-01-05 DIAGNOSIS — I48.19 PERSISTENT ATRIAL FIBRILLATION (H): ICD-10-CM

## 2021-01-05 LAB — INR PPP: 2.5 (ref 0.9–1.1)

## 2021-01-26 ENCOUNTER — COMMUNICATION - HEALTHEAST (OUTPATIENT)
Dept: FAMILY MEDICINE | Facility: CLINIC | Age: 71
End: 2021-01-26

## 2021-02-02 ENCOUNTER — COMMUNICATION - HEALTHEAST (OUTPATIENT)
Dept: ANTICOAGULATION | Facility: CLINIC | Age: 71
End: 2021-02-02

## 2021-02-02 ENCOUNTER — AMBULATORY - HEALTHEAST (OUTPATIENT)
Dept: LAB | Facility: CLINIC | Age: 71
End: 2021-02-02

## 2021-02-02 DIAGNOSIS — I26.99 PULMONARY EMBOLISM, BILATERAL (H): ICD-10-CM

## 2021-02-02 DIAGNOSIS — I48.19 PERSISTENT ATRIAL FIBRILLATION (H): ICD-10-CM

## 2021-02-02 LAB — INR PPP: 2.4 (ref 0.9–1.1)

## 2021-02-05 ENCOUNTER — COMMUNICATION - HEALTHEAST (OUTPATIENT)
Dept: ANTICOAGULATION | Facility: CLINIC | Age: 71
End: 2021-02-05

## 2021-02-05 ENCOUNTER — OFFICE VISIT - HEALTHEAST (OUTPATIENT)
Dept: FAMILY MEDICINE | Facility: CLINIC | Age: 71
End: 2021-02-05

## 2021-02-05 DIAGNOSIS — Z01.818 PREOP GENERAL PHYSICAL EXAM: ICD-10-CM

## 2021-02-05 DIAGNOSIS — I26.99 PULMONARY EMBOLISM, BILATERAL (H): ICD-10-CM

## 2021-02-05 DIAGNOSIS — D64.9 NORMOCHROMIC NORMOCYTIC ANEMIA: ICD-10-CM

## 2021-02-05 DIAGNOSIS — I48.19 PERSISTENT ATRIAL FIBRILLATION (H): ICD-10-CM

## 2021-02-05 DIAGNOSIS — H43.392 FLOATER, VITREOUS, LEFT: ICD-10-CM

## 2021-02-05 DIAGNOSIS — G47.33 OSA ON CPAP: ICD-10-CM

## 2021-02-05 DIAGNOSIS — F41.9 ANXIETY: ICD-10-CM

## 2021-02-05 LAB
ANION GAP SERPL CALCULATED.3IONS-SCNC: 9 MMOL/L (ref 5–18)
ATRIAL RATE - MUSE: NORMAL
BUN SERPL-MCNC: 12 MG/DL (ref 8–22)
CALCIUM SERPL-MCNC: 9.1 MG/DL (ref 8.5–10.5)
CHLORIDE BLD-SCNC: 106 MMOL/L (ref 98–107)
CO2 SERPL-SCNC: 25 MMOL/L (ref 22–31)
CREAT SERPL-MCNC: 0.81 MG/DL (ref 0.7–1.3)
DIASTOLIC BLOOD PRESSURE - MUSE: NORMAL
ERYTHROCYTE [DISTWIDTH] IN BLOOD BY AUTOMATED COUNT: 12.7 % (ref 11–14.5)
GFR SERPL CREATININE-BSD FRML MDRD: >60 ML/MIN/1.73M2
GLUCOSE BLD-MCNC: 76 MG/DL (ref 70–125)
HCT VFR BLD AUTO: 40.4 % (ref 40–54)
HGB BLD-MCNC: 13.5 G/DL (ref 14–18)
INR PPP: 2.2 (ref 0.9–1.1)
INTERPRETATION ECG - MUSE: NORMAL
MCH RBC QN AUTO: 30.6 PG (ref 27–34)
MCHC RBC AUTO-ENTMCNC: 33.4 G/DL (ref 32–36)
MCV RBC AUTO: 92 FL (ref 80–100)
P AXIS - MUSE: NORMAL
PLATELET # BLD AUTO: 172 THOU/UL (ref 140–440)
PMV BLD AUTO: 10 FL (ref 7–10)
POTASSIUM BLD-SCNC: 4.3 MMOL/L (ref 3.5–5)
PR INTERVAL - MUSE: NORMAL
QRS DURATION - MUSE: 140 MS
QT - MUSE: 430 MS
QTC - MUSE: 450 MS
R AXIS - MUSE: -32 DEGREES
RBC # BLD AUTO: 4.41 MILL/UL (ref 4.4–6.2)
SODIUM SERPL-SCNC: 140 MMOL/L (ref 136–145)
SYSTOLIC BLOOD PRESSURE - MUSE: NORMAL
T AXIS - MUSE: 5 DEGREES
VENTRICULAR RATE- MUSE: 66 BPM
WBC: 5.9 THOU/UL (ref 4–11)

## 2021-02-16 ENCOUNTER — COMMUNICATION - HEALTHEAST (OUTPATIENT)
Dept: ANTICOAGULATION | Facility: CLINIC | Age: 71
End: 2021-02-16

## 2021-02-16 ENCOUNTER — AMBULATORY - HEALTHEAST (OUTPATIENT)
Dept: LAB | Facility: CLINIC | Age: 71
End: 2021-02-16

## 2021-02-16 DIAGNOSIS — I48.19 PERSISTENT ATRIAL FIBRILLATION (H): ICD-10-CM

## 2021-02-16 DIAGNOSIS — I26.99 PULMONARY EMBOLISM, BILATERAL (H): ICD-10-CM

## 2021-02-16 LAB — INR PPP: 1.9 (ref 0.9–1.1)

## 2021-02-19 ENCOUNTER — COMMUNICATION - HEALTHEAST (OUTPATIENT)
Dept: ANTICOAGULATION | Facility: CLINIC | Age: 71
End: 2021-02-19

## 2021-02-19 ENCOUNTER — AMBULATORY - HEALTHEAST (OUTPATIENT)
Dept: LAB | Facility: CLINIC | Age: 71
End: 2021-02-19

## 2021-02-19 DIAGNOSIS — I26.99 PULMONARY EMBOLISM, BILATERAL (H): ICD-10-CM

## 2021-02-19 DIAGNOSIS — I48.19 PERSISTENT ATRIAL FIBRILLATION (H): ICD-10-CM

## 2021-02-19 LAB — INR PPP: 2.3 (ref 0.9–1.1)

## 2021-03-08 ENCOUNTER — COMMUNICATION - HEALTHEAST (OUTPATIENT)
Dept: ANTICOAGULATION | Facility: CLINIC | Age: 71
End: 2021-03-08

## 2021-03-08 ENCOUNTER — AMBULATORY - HEALTHEAST (OUTPATIENT)
Dept: LAB | Facility: CLINIC | Age: 71
End: 2021-03-08

## 2021-03-08 DIAGNOSIS — I26.99 PULMONARY EMBOLISM, BILATERAL (H): ICD-10-CM

## 2021-03-08 DIAGNOSIS — I48.19 PERSISTENT ATRIAL FIBRILLATION (H): ICD-10-CM

## 2021-03-08 LAB — INR PPP: 2.4 (ref 0.9–1.1)

## 2021-04-05 ENCOUNTER — COMMUNICATION - HEALTHEAST (OUTPATIENT)
Dept: PHARMACY | Facility: CLINIC | Age: 71
End: 2021-04-05

## 2021-04-05 ENCOUNTER — AMBULATORY - HEALTHEAST (OUTPATIENT)
Dept: LAB | Facility: CLINIC | Age: 71
End: 2021-04-05

## 2021-04-05 ENCOUNTER — COMMUNICATION - HEALTHEAST (OUTPATIENT)
Dept: ANTICOAGULATION | Facility: CLINIC | Age: 71
End: 2021-04-05

## 2021-04-05 ENCOUNTER — COMMUNICATION - HEALTHEAST (OUTPATIENT)
Dept: ADMINISTRATIVE | Facility: CLINIC | Age: 71
End: 2021-04-05

## 2021-04-05 DIAGNOSIS — I26.99 PULMONARY EMBOLISM, BILATERAL (H): ICD-10-CM

## 2021-04-05 DIAGNOSIS — I48.19 PERSISTENT ATRIAL FIBRILLATION (H): ICD-10-CM

## 2021-04-05 LAB — INR PPP: 2.5 (ref 0.9–1.1)

## 2021-04-05 RX ORDER — WARFARIN SODIUM 7.5 MG/1
TABLET ORAL
Qty: 90 TABLET | Refills: 3 | Status: SHIPPED | OUTPATIENT
Start: 2021-04-05 | End: 2022-04-19

## 2021-04-12 ENCOUNTER — COMMUNICATION - HEALTHEAST (OUTPATIENT)
Dept: PHARMACY | Facility: CLINIC | Age: 71
End: 2021-04-12

## 2021-04-12 DIAGNOSIS — F41.9 ANXIETY: ICD-10-CM

## 2021-04-12 RX ORDER — ESCITALOPRAM OXALATE 10 MG/1
10 TABLET ORAL DAILY
Qty: 90 TABLET | Refills: 3 | Status: SHIPPED | OUTPATIENT
Start: 2021-04-12 | End: 2022-03-08

## 2021-05-03 ENCOUNTER — COMMUNICATION - HEALTHEAST (OUTPATIENT)
Dept: ANTICOAGULATION | Facility: CLINIC | Age: 71
End: 2021-05-03

## 2021-05-03 ENCOUNTER — AMBULATORY - HEALTHEAST (OUTPATIENT)
Dept: LAB | Facility: CLINIC | Age: 71
End: 2021-05-03

## 2021-05-03 DIAGNOSIS — I26.99 PULMONARY EMBOLISM, BILATERAL (H): ICD-10-CM

## 2021-05-03 DIAGNOSIS — I48.19 PERSISTENT ATRIAL FIBRILLATION (H): ICD-10-CM

## 2021-05-03 LAB — INR PPP: 2.8 (ref 0.9–1.1)

## 2021-05-06 DIAGNOSIS — G47.33 OBSTRUCTIVE SLEEP APNEA (ADULT) (PEDIATRIC): Primary | ICD-10-CM

## 2021-05-24 ENCOUNTER — RECORDS - HEALTHEAST (OUTPATIENT)
Dept: ADMINISTRATIVE | Facility: CLINIC | Age: 71
End: 2021-05-24

## 2021-05-26 VITALS
DIASTOLIC BLOOD PRESSURE: 88 MMHG | RESPIRATION RATE: 20 BRPM | SYSTOLIC BLOOD PRESSURE: 134 MMHG | HEART RATE: 74 BPM | TEMPERATURE: 97.8 F

## 2021-05-27 ASSESSMENT — PATIENT HEALTH QUESTIONNAIRE - PHQ9: SUM OF ALL RESPONSES TO PHQ QUESTIONS 1-9: 1

## 2021-05-27 NOTE — PROGRESS NOTES
Dear Dr. Baez, Mamadou MONTEMAYOR MD  [address]  [address] [address],    Thank you for the opportunity to participate in the care of Prem Taylor.     He is a 68 y.o. y/o male patient who comes to the sleep medicine clinic for follow up.  The patient states that he still has not noticed any dramatic improvement in his quality of sleep.  His wife is still happy that his snoring has disappeared with CPAP usage.  He will continue using CPAP therapy despite no drastic improvement in his quality of sleep.    Compliance Download data for 30 Days:  Pressure setting:APAP 6-16 cwp  Residual AHI:2 events per hour  Leak:Minimal   Compliance:100%  Mask Tolerance:Good  Skin irritation:None    Past Medical History:   Diagnosis Date     Atrial fibrillation (H) 10/09/2017     Pulmonary embolism, blood-clot, obstetric 10/09/2017       Past Surgical History:   Procedure Laterality Date     CERVICAL FUSION  2001     HERNIA REPAIR  2005     TONSILLECTOMY      childhood       Social History     Socioeconomic History     Marital status:      Spouse name: Not on file     Number of children: Not on file     Years of education: Not on file     Highest education level: Not on file   Occupational History     Not on file   Social Needs     Financial resource strain: Not on file     Food insecurity:     Worry: Not on file     Inability: Not on file     Transportation needs:     Medical: Not on file     Non-medical: Not on file   Tobacco Use     Smoking status: Former Smoker     Smokeless tobacco: Never Used   Substance and Sexual Activity     Alcohol use: Yes     Drug use: No     Sexual activity: Not on file   Lifestyle     Physical activity:     Days per week: Not on file     Minutes per session: Not on file     Stress: Not on file   Relationships     Social connections:     Talks on phone: Not on file     Gets together: Not on file     Attends Cheondoism service: Not on file     Active member of club or organization: Not on  file     Attends meetings of clubs or organizations: Not on file     Relationship status: Not on file     Intimate partner violence:     Fear of current or ex partner: Not on file     Emotionally abused: Not on file     Physically abused: Not on file     Forced sexual activity: Not on file   Other Topics Concern     Not on file   Social History Narrative     Not on file         Current Outpatient Medications   Medication Sig Dispense Refill     enoxaparin (LOVENOX) 100 mg/mL Syrg Inject 1 mL (100 mg total) under the skin every 12 (twelve) hours. Before and after procedure as directed 6 Syringe 0     escitalopram oxalate (LEXAPRO) 10 MG tablet Take 1 tablet (10 mg total) by mouth daily. 90 tablet 3     fluticasone (FLONASE) 50 mcg/actuation nasal spray 1 spray per nare twice daily. 48 g 3     loratadine (CLARITIN) 10 mg tablet Take 1 tablet (10 mg total) by mouth daily. 90 tablet 3     metoprolol tartrate (LOPRESSOR) 25 MG tablet Take 0.5 tablets (12.5 mg total) by mouth 2 (two) times a day. 90 tablet 3     MULTIVITAMIN (MULTIPLE VITAMIN ORAL) Take 1 tablet by mouth daily.       sildenafil (VIAGRA) 100 MG tablet Take 50 mg by mouth. One hour prior to intercourse.       warfarin (COUMADIN/JANTOVEN) 7.5 MG tablet Take 1 tablet (7.5 mg) by mouth daily. Adjust dose per INR results as instructed. 90 tablet 1     No current facility-administered medications for this visit.        No Known Allergies    Epworths Sleepiness Scale 9/26/2018 1/3/2019 4/1/2019   Sitting and reading 3 3 3   Watching TV 2 1 2   Sitting, inactive in a public place (e.g. a theatre or a meeting) 1 0 0   As a passenger in a car for an hour without a break 0 0 0   Lying down to rest in the afternoon when circumstances permit 2 2 2   Sitting and talking to someone 1 0 0   Sitting quietly after a lunch without alcohol 1 1 1   In a car, while stopped for a few minutes in traffic 0 0 0   Total score 10 7 8       Physical Exam:  /70 (Patient Site:  "Right Arm, Patient Position: Sitting, Cuff Size: Adult Regular)   Pulse 72   Ht 6' 2.75\" (1.899 m)   Wt (!) 228 lb (103.4 kg)   SpO2 96%   BMI 28.69 kg/m    BMI:Body mass index is 28.69 kg/m .   GEN: NAD,   Psych: normal mood, normal affect    Labs/Studies:     I reviewed the efficacy and compliance report from his device. Data summarized on the HPI and the CPAP compliance flow sheet.     Lab Results   Component Value Date    WBC 3.5 (L) 02/08/2019    HGB 14.2 02/08/2019    HCT 42.9 02/08/2019    MCV 93 02/08/2019     02/08/2019         Chemistry        Component Value Date/Time     02/08/2019 0855    K 4.5 02/08/2019 0855     02/08/2019 0855    CO2 25 02/08/2019 0855    BUN 16 02/08/2019 0855    CREATININE 0.85 02/08/2019 0855    GLU 85 02/08/2019 0855        Component Value Date/Time    CALCIUM 9.4 02/08/2019 0855    ALKPHOS 55 01/03/2017 0753    AST 21 01/03/2017 0753    ALT 19 01/03/2017 0753    BILITOT 1.0 01/03/2017 0753            No results found for: FERRITIN         Assessment and Plan:  In summary Prem Taylor is a 68 y.o. year old male who is here for follow-up.    1.  Obstructive sleep apnea on CPAP  I will narrow the patient CPAP pressure range to 9-13.2 CWP.  I had the patient test at this pressure setting in front of me and he felt it was comfortable.  2.  Other sleep disturbance     Patient verbalized understanding of these issues, agrees with the plan and all questions were answered today. Patient was given an opportuntity to voice any other symptoms or concerns not listed above. Patient did not have any other symptoms or concerns.      Patient told to return in 24 months.     Adarsh Titus DO  Board Certified in Internal Medicine and Sleep Medicine  [facility].    more than 50% of the encounter was used for counseling or coordination of care.    (Note created with Dragon voice recognition and unintended spelling errors and word substitutions may " occur)

## 2021-05-27 NOTE — TELEPHONE ENCOUNTER
ANTICOAGULATION  MANAGEMENT    Assessment     Today's INR result of 2.8 is Therapeutic (goal INR of 2.0-3.0)        Warfarin taken as previously instructed    No new diet changes affecting INR    No new medication/supplements affecting INR    Continues to tolerate warfarin with no reported s/s of bleeding or thromboembolism     Previous INR was Therapeutic    Plan:     Left a detailed message for Prem regarding INR result and instructed:     Warfarin Dosing Instructions:  Continue current warfarin dose 7.5 mg daily.    Instructed patient to follow up no later than: 4 weeks.    Education provided: importance of taking warfarin as instructed    Instructed to call the [facility] for any changes, questions or concerns. (#[phone])   ?   Morales Tapia RN    Subjective/Objective:      Prem Taylor, a 68 y.o. male is on warfarin.     Prem reports:     Home warfarin dose: as updated on anticoagulation calendar per template     Missed doses: No     Medication changes:  No     S/S of bleeding or thromboembolism:  No     New Injury or illness:  No     Changes in diet or alcohol consumption:  No  Upcoming surgery, procedure or cardioversion:  No    Anticoagulation Episode Summary     Current INR goal:   2.0-3.0   TTR:   81.5 % (1.4 y)   Next INR check:   4/29/2019   INR from last check:   2.80 (4/1/2019)   Weekly max warfarin dose:      Target end date:      INR check location:      Preferred lab:      Send INR reminders to:   ANTICOAGULATION POOL B (MPW,HUG,STW,RVL,OAK,RLN)    Indications    Pulmonary embolism  bilateral (H) [I26.99]           Comments:            Anticoagulation Care Providers     Provider Role Specialty Phone number    Mamadou Baez MD Referring Family Medicine [phone]

## 2021-05-27 NOTE — TELEPHONE ENCOUNTER
"Last RX was set to \"no print\".    Patient is out of medication. Needs ASAP.    Please have a covering provider approve/send.   "

## 2021-05-28 ASSESSMENT — ANXIETY QUESTIONNAIRES: GAD7 TOTAL SCORE: 0

## 2021-05-29 NOTE — TELEPHONE ENCOUNTER
ANTICOAGULATION  MANAGEMENT    Assessment     Today's INR result of 3.0 is Therapeutic (goal INR of 2.0-3.0)        Warfarin taken as previously instructed    No new diet changes affecting INR    No new medication/supplements affecting INR    Continues to tolerate warfarin with no reported s/s of bleeding or thromboembolism     Previous INR was Supratherapeutic    Plan:     Left a detailed message for Prem regarding INR result and instructed:     Warfarin Dosing Instructions:  Continue current warfarin dose 7.5 mg daily.    Instructed patient to follow up no later than: 2 weeks.     Education provided: importance of taking warfarin as instructed      Instructed to call the [facility] for any changes, questions or concerns. (#[phone])   ?   Morales Tapia RN    Subjective/Objective:      Prem Taylor, a 68 y.o. male is on warfarin.     Prem reports:     Home warfarin dose: as updated on anticoagulation calendar per template     Missed doses: No     Medication changes:  No     S/S of bleeding or thromboembolism:  No     New Injury or illness:  No     Changes in diet or alcohol consumption:  No     Upcoming surgery, procedure or cardioversion:  No    Anticoagulation Episode Summary     Current INR goal:   2.0-3.0   TTR:   78.2 % (1.7 y)   Next INR check:   7/2/2019   INR from last check:   3.00 (6/18/2019)   Weekly max warfarin dose:      Target end date:      INR check location:      Preferred lab:      Send INR reminders to:   RUBY MAURICIO    Indications    Pulmonary embolism  bilateral (H) [I26.99]           Comments:            Anticoagulation Care Providers     Provider Role Specialty Phone number    Mamadou Baez MD Referring Family Medicine [phone]

## 2021-05-29 NOTE — PROGRESS NOTES
[facility] Office Note    Assessment / Plan:    1. Persistent, likely permanent, atrial fibrillation.  Plan to continue rate control strategy.  Vague sense of decreased activity tolerance may be at this point due to metoprolol's slowing of ventricular response.  He will try stopping the low-dose metoprolol at this point.  We discussed that while ablation may be an option in the future at this point given his lack of symptoms generally associated with the atrial fibrillation, it is unlikely to be helpful.  He will of course need to maintain anticoagulant use regardless.  2. Mitral insufficiency.  Mild to moderate by echo.  Insufficient severity to contribute to exertional dyspnea  3. Bilateral pulmonary embolism.  Uncertain etiology, will be on long-term anticoagulation.    Follow-up one year  ______________________________________________________________________    Subjective:    I had the opportunity to see Prem Taylor at the [facility]. Prem Taylor is a 68 y.o. male with a history of  hyperlipidemia, who presented 10/2017 with exertional shortness of breath.  He was found to have bilateral pulmonary emboli and new onset atrial fibrillation.  He was initiated on warfarin and Lovenox, along with metoprolol for rate control.  No DVT was identified.  Echocardiogram showed normal left and right ventricular function.  He subsequently underwent cardioversion, but had asymptomatic return to atrial fibrillation.  Since that time he has been maintained on a rate control strategy.  He returns today for routine follow-up with his wife.    Since I saw him last year, his weight is up 7 pounds.  He was also diagnosed with obstructive sleep apnea and uses a CPAP mask nightly, but notes no significant improvement in his activity tolerance.  His wife notes a significant improvement in his snoring, however.  He continues to be active, working out on the treadmill 5 days a week.  He reports that  his stamina is overall fairly stable but still not what it was before he was diagnosed with pulmonary emboli and atrial fibrillation.  He is only aware of the fibrillation when he checks his pulse and notes that it is always present when he checks.  He has no lightheadedness or dizziness.  He denies orthopnea or paroxysmal nocturnal dyspnea.  He has had no syncope or near syncopal spells.  His brother underwent ablation, he wonders whether this would apply to him.    ______________________________________________________________________    Problem List:  Patient Active Problem List   Diagnosis     Hearing Loss     Hypercholesterolemia     Presbycusis     Sinus bradycardia     BPH without urinary obstruction     Atypical Chest Pain     Blood Pressure Isolated Elevated     Male erectile dysfunction     Basal cell carcinoma, ear     Erectile dysfunction, unspecified erectile dysfunction type     Overweight (BMI 25.0-29.9)     Tubular adenoma of colon     Hyperbilirubinemia     Pulmonary embolism, bilateral (H)     Persistent atrial fibrillation (H)     Mitral valve insufficiency     ALISE on CPAP     Anxiety     Chronic cough     Normochromic normocytic anemia     Left inguinal hernia     Medical History:  Past Medical History:   Diagnosis Date     Atrial fibrillation (H) 10/09/2017     Pulmonary embolism, blood-clot, obstetric 10/09/2017     Surgical History:  Past Surgical History:   Procedure Laterality Date     CERVICAL FUSION  2001     HERNIA REPAIR  2005     TONSILLECTOMY      childhood     Social History:  Social History     Socioeconomic History     Marital status:      Spouse name: Not on file     Number of children: Not on file     Years of education: Not on file     Highest education level: Not on file   Occupational History     Not on file   Social Needs     Financial resource strain: Not on file     Food insecurity:     Worry: Not on file     Inability: Not on file     Transportation needs:      Medical: Not on file     Non-medical: Not on file   Tobacco Use     Smoking status: Former Smoker     Smokeless tobacco: Never Used   Substance and Sexual Activity     Alcohol use: Yes     Drug use: No     Sexual activity: Not on file   Lifestyle     Physical activity:     Days per week: Not on file     Minutes per session: Not on file     Stress: Not on file   Relationships     Social connections:     Talks on phone: Not on file     Gets together: Not on file     Attends Hindu service: Not on file     Active member of club or organization: Not on file     Attends meetings of clubs or organizations: Not on file     Relationship status: Not on file     Intimate partner violence:     Fear of current or ex partner: Not on file     Emotionally abused: Not on file     Physically abused: Not on file     Forced sexual activity: Not on file   Other Topics Concern     Not on file   Social History Narrative     Not on file     Sleep History:  Restorative  Exercise History:  At the [facility] does treadmill and a variety of different aerobic activities 5 days a week plus some weightlifting.     Review of Systems:   General: WNL  Eyes: WNL  Ears/Nose/Throat: Hearing Loss  Lungs: Cough, Shortness of Breath  Heart: Shortness of Breath with activity, Irregular Heartbeat  Stomach: WNL  Bladder: WNL  Muscle/Joints: WNL  Skin: WNL  Nervous System: WNL  Mental Health: Anxiety     Blood: WNL          Family History:  Family History   Problem Relation Age of Onset     Heart disease Mother      Hypertension Mother      Dementia Mother      Heart attack Father          Allergies:  No Known Allergies  Medications:  Current Outpatient Medications   Medication Sig Dispense Refill     escitalopram oxalate (LEXAPRO) 10 MG tablet Take 1 tablet (10 mg total) by mouth daily. 90 tablet 3     metoprolol tartrate (LOPRESSOR) 25 MG tablet Take 0.5 tablets (12.5 mg total) by mouth 2 (two) times a day. 90 tablet 3     MULTIVITAMIN (MULTIPLE VITAMIN ORAL)  "Take 1 tablet by mouth daily.       warfarin (COUMADIN/JANTOVEN) 7.5 MG tablet TAKE ONE TABLET BY MOUTH EVERY DAY . ADJUST DOSE PER INR RESULTS AS INSTRUCTED 90 tablet 1     enoxaparin (LOVENOX) 100 mg/mL Syrg Inject 1 mL (100 mg total) under the skin every 12 (twelve) hours. Before and after procedure as directed 6 Syringe 0     fluticasone (FLONASE) 50 mcg/actuation nasal spray 1 spray per nare twice daily. 48 g 3     loratadine (CLARITIN) 10 mg tablet Take 1 tablet (10 mg total) by mouth daily. 90 tablet 3     sildenafil (VIAGRA) 100 MG tablet Take 50 mg by mouth. One hour prior to intercourse.       No current facility-administered medications for this visit.        Objective:   Wt Readings from Last 3 Encounters:   06/03/19 (!) 227 lb (103 kg)   05/21/19 (!) 228 lb (103.4 kg)   04/01/19 (!) 228 lb (103.4 kg)     Vital signs:  /80 (Patient Site: Left Arm, Patient Position: Sitting, Cuff Size: Adult Regular)   Pulse (!) 56   Resp 16   Ht 6' 2.75\" (1.899 m)   Wt (!) 227 lb (103 kg)   BMI 28.56 kg/m        Physical Exam:  Resting heart rate 72 bpm, up to 84 after climbing 10 steps rapidly.  Mildly dyspneic  GENERAL APPEARANCE: Alert, cooperative and in no acute distress.  HEENT: Conjunctivae not injected.  No scleral icterus. No Xanthelasma. Oral mucous membranes pink and moist.  NECK: No JVD.  No Hepatojugular reflux. Thyroid not enlarged.  CHEST: clear to auscultation  CARDIOVASCULAR: Nondisplaced point of maximal impulse.  Irregularly irregular S1, S2 without murmur ,clicks or rubs. Brachial, radial and posterior tibial pulses are intact and symmetric. No carotid bruits noted.  ABDOMEN: Nontender. BS+. No bruits.  EXTREMITIES: No cyanosis, clubbing or edema.  SKIN:  No rash, bruising    Lab Results:  LIPIDS:  Lab Results   Component Value Date    CHOL 231 (H) 02/08/2019    CHOL 214 (H) 02/06/2018    CHOL 238 (H) 01/03/2017     Lab Results   Component Value Date    HDL 67 02/08/2019    HDL 66 " 02/06/2018    HDL 72 01/03/2017     Lab Results   Component Value Date    LDLCALC 148 (H) 02/08/2019    LDLCALC 130 (H) 02/06/2018    LDLCALC 150 (H) 01/03/2017     Lab Results   Component Value Date    TRIG 82 02/08/2019    TRIG 88 02/06/2018    TRIG 82 01/03/2017       Transesophageal echo 11/2017:  Summary     No previous study for comparison.     Left ventricle ejection fraction is normal. The estimated left ventricular ejection fraction is 55%.    Moderate mitral regurgitation    No left atrial appendage thrombus seen     Echocardiogram 5/2019:    When compared to the previous study dated 11/15/2017, no significant change.    Left ventricle ejection fraction is normal. The calculated left ventricular ejection fraction is 54%. Mild concentric hypertrophy noted.    Normal left ventricular size and systolic function.    Normal right ventricular size and systolic function.    Mild to moderate mitral regurgitation.          HOLLY GRACIA MD [facility]  [phone]    This note created using Dragon voice recognition software.  Sound alike errors may have escaped editing.

## 2021-05-29 NOTE — TELEPHONE ENCOUNTER
Refill Approved    Rx renewed per Medication Renewal Policy. Medication was last renewed on 2.8.19.    Viki Sheth, [facility] Connection Triage/Med Refill 6/19/2019     Requested Prescriptions   Pending Prescriptions Disp Refills     escitalopram oxalate (LEXAPRO) 10 MG tablet [Pharmacy Med Name: ESCITALOPRAM OXALATE 10MG TABS] 90 tablet 3     Sig: TAKE ONE TABLET BY MOUTH EVERY DAY       [facility] Refill Protocol  Passed - 6/18/2019 12:16 PM        Passed - PCP or prescribing provider visit in last year     Last office visit with prescriber/PCP: 8/7/2018 Mamadou Baez MD OR same dept: 8/7/2018 Mamadou Baez MD OR same specialty: 8/7/2018 Mamadou Baez MD  Last physical: 2/8/2019 Last MTM visit: Visit date not found   Next visit within 3 mo: Visit date not found  Next physical within 3 mo: Visit date not found  Prescriber OR PCP: Mamadou Baez MD  Last diagnosis associated with med order: 1. Anxiety  - escitalopram oxalate (LEXAPRO) 10 MG tablet [Pharmacy Med Name: ESCITALOPRAM OXALATE 10MG TABS]; TAKE ONE TABLET BY MOUTH EVERY DAY  Dispense: 90 tablet; Refill: 3    If protocol passes may refill for 12 months if within 3 months of last provider visit (or a total of 15 months).

## 2021-05-29 NOTE — PATIENT INSTRUCTIONS - HE
1. Try stopping the metoprolol, see if this improves your activity tolerance.  2. Continue your regular exercise regimen.  There is no substitute!  3. I look forward to seeing you again in 1 year

## 2021-05-29 NOTE — TELEPHONE ENCOUNTER
Patient Returning Call  Reason for call:  Returning INR call  Information relayed to patient:  Message will be sent & he will receive a call back.  Patient has additional questions:  No  If YES, what are your questions/concerns:  n/a  Okay to leave a detailed message?: Yes

## 2021-05-29 NOTE — TELEPHONE ENCOUNTER
ANTICOAGULATION  MANAGEMENT    Assessment     Today's INR result of 3.5 is Supratherapeutic (goal INR of 2.0-3.0)        Warfarin taken as previously instructed    Change in alcohol intake may be affecting INR- had more alcohol over the weekend.     No new medication/supplements affecting INR    Continues to tolerate warfarin with no reported s/s of bleeding or thromboembolism     Previous INR was Therapeutic    Plan:     Spoke with Prem regarding INR result and instructed:     Warfarin Dosing Instructions:  Take one time lower dose of 3.75 mg today only then continue current warfarin dose 7.5 mg daily.    Instructed patient to follow up no later than: 2 weeks.     Education provided: potential interaction between warfarin and alcohol and importance of taking warfarin as instructed    Ryan verbalizes understanding and agrees to warfarin dosing plan.    Instructed to call the [facility] for any changes, questions or concerns. (#[phone])   ?   Morales Tapia RN    Subjective/Objective:      Prem Taylor, a 68 y.o. male is on warfarin.     Prem reports:     Home warfarin dose: verbally confirmed home dose with pt and updated on anticoagulation calendar     Missed doses: No    Medication changes:  No     S/S of bleeding or thromboembolism:  No     New Injury or illness:  No     Changes in diet or alcohol consumption:  Yes: had more alcohol over the weekend.      Upcoming surgery, procedure or cardioversion:  No    Anticoagulation Episode Summary     Current INR goal:   2.0-3.0   TTR:   80.2 % (1.6 y)   Next INR check:   6/17/2019   INR from last check:   3.50! (6/3/2019)   Weekly max warfarin dose:      Target end date:      INR check location:      Preferred lab:      Send INR reminders to:   RUBY MAURICIO    Indications    Pulmonary embolism  bilateral (H) [I26.99]           Comments:            Anticoagulation Care Providers     Provider Role Specialty Phone number    Mamadou Baez MD  [facility] [phone]

## 2021-05-30 VITALS — BODY MASS INDEX: 26.98 KG/M2 | HEIGHT: 75 IN | WEIGHT: 217 LBS

## 2021-05-30 NOTE — TELEPHONE ENCOUNTER
ANTICOAGULATION  MANAGEMENT    Assessment     Today's INR result of 2.8 is Therapeutic (goal INR of 2.0-3.0)        Warfarin taken as previously instructed    No new diet changes affecting INR    No new medication/supplements affecting INR    Continues to tolerate warfarin with no reported s/s of bleeding or thromboembolism     Previous INR was Therapeutic    Plan:     Left a detailed message for Prem regarding INR result and instructed:     Warfarin Dosing Instructions:  Continue current warfarin dose 7.5 mg daily.    Instructed patient to follow up no later than: 4 weeks.    Education provided: importance of taking warfarin as instructed      Instructed to call the [facility] for any changes, questions or concerns. (#[phone])   ?   Morales Tapia RN    Subjective/Objective:      rPem Taylor, a 68 y.o. male is on warfarin.     Prem reports:     Home warfarin dose: as updated on anticoagulation calendar per template     Missed doses: No     Medication changes:  No     S/S of bleeding or thromboembolism:  No     New Injury or illness:  No     Changes in diet or alcohol consumption:  No     Upcoming surgery, procedure or cardioversion:  No    Anticoagulation Episode Summary     Current INR goal:   2.0-3.0   TTR:   78.7 % (1.7 y)   Next INR check:   7/30/2019   INR from last check:   2.80 (7/2/2019)   Weekly max warfarin dose:      Target end date:      INR check location:      Preferred lab:      Send INR reminders to:   RUBY MAURICIO    Indications    Pulmonary embolism  bilateral (H) [I26.99]           Comments:            Anticoagulation Care Providers     Provider Role Specialty Phone number    Mamadou Baez MD Referring Family Medicine [phone]

## 2021-05-30 NOTE — PROGRESS NOTES
HPI:  This is a 68 y.o. male here today with concerns of pain and bulging in his left groin area. He has noted this for the past few month(s). The symptoms have progressed and increased over this time. He comes in for evaluation secondary to the hernia causing enough issues to bother them with daily activities or chores.    Allergies:Patient has no known allergies.    Past Medical History:   Diagnosis Date     Atrial fibrillation (H) 10/09/2017     Pulmonary embolism, blood-clot, obstetric 10/09/2017     Sleep apnea     Uses CPAP       Past Surgical History:   Procedure Laterality Date     CERVICAL FUSION  2001     HERNIA REPAIR  2005     TONSILLECTOMY      childhood       CURRENT MEDS:      Family History   Problem Relation Age of Onset     Heart disease Mother      Hypertension Mother      Dementia Mother      Heart attack Father         reports that he has quit smoking. He has never used smokeless tobacco. He reports that he drinks alcohol. He reports that he does not use drugs.    Review of Systems - Negative except left groin pain and bulge.  Increase in size over time.Otherwise twelve system of review is negative.      Vitals:    07/29/19 1012   BP: 114/66   Patient Site: Right Arm   Patient Position: Sitting   Cuff Size: Adult Small   Pulse: 76   SpO2: 98%   Weight: (!) 223 lb (101.2 kg)       Body mass index is 28.06 kg/m .    EXAM:  General: NAD   HEENT: normocephalic, PERRLA and EOMS intact  Mounth: Mucus membranes moist  Neck: Supple  Chest: Clear to auscultation bilaterally  CV: RRR  ABD: Soft nontender and nondistended, left inguinal hernia, reducible  EXT: Warm, pulses intact,   Neuro: Alert and oriented x3  Back: no CVA tenderness      Assessment/Plan: Pt with a left inguinal hernia. I discussed this at length with He.  I went over conservative management as well as surgical treatment of this.. I would plan on doing this via anlaparoscopic  approach with possible use of mesh. I went over the small  risks of surgery including but not limited to bleeding and infection, anesthesia, recurrence rates and nerve injury. I discussed the expected recovery time as well. Will get this scheduled. He will contact us to have this scheduled.     He is on Coumadin he will need to stop the 6 days ahead of time and convert over Lovenox is done this before and we will work this out with Dr. Baez his primary doc.     Yan Leiva MD  [facility] Department of Surgery

## 2021-05-30 NOTE — PROGRESS NOTES
"Assessment/Plan:    1. Left inguinal hernia  Large left inguinal hernia, reducible.  Symptomatic however.  Referred to see Dr. Yan Leiva for definitive herniorrhaphy procedure.  - Ambulatory referral to General Surgery    2. Persistent atrial fibrillation (H)  Persistent atrial fibrillation, rate controlled with heart rate 80 bpm.  Continues chronic warfarin anticoagulation currently 7.5 mg daily with INR 2.4.  Does not qualify for novel anticoagulant with mitral valve insufficiency.  - INR  - Basic Metabolic Panel    3. Pulmonary embolism, bilateral (H)  Requires chronic warfarin anticoagulation.  Warfarin 7.5 mg daily.  - INR    4. Mitral valve insufficiency, unspecified etiology  Mitral valve insufficiency history.  Followed by Dr. Xavier.  Seen Briseyda 3, 2019 and will follow-up on annual basis.    5. Normochromic normocytic anemia  CBC obtained.  - HM2(CBC w/o Differential)        Subjective:    Prem Taylor is seen today for left inguinal hernia.  Noted at physical 2019.  Symptomatic.  Using a truss with some benefit.  Able to get it to go back if laying down at night.  Worse during the day.  Chronic atrial fibrillation.  Needs to be on warfarin anticoagulation with bilateral pulmonary emboli.  Does not qualify for novel agent due to mitral insufficiency history.  Normochromic normocytic anemia noted.  No bleeding concerns.  Denies recent illness.  Denies chest pain.  Had discontinued metoprolol due to some exercise intolerance.  Is not had associated tachycardia with atrial fibrillation.  Comprehensive review of systems as above otherwise all negative.     \"Aubree\" x 44 years   2 daughters - Renee (38) and Susannah (35)  3 grandchildren  No smoke (quit  after 1 and / ppd)   EtOH: weekends \"few beers\"   Surgeries: anterior cervical fusion; inguinal hernia; tonsils   Hospitalizations: [facility] 10/9/17-10/10/17 bilateral pulmonary emboli with a. 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)  Exercise: run 4x/week at 6-6.5 mph; situps and pushups    Past Surgical History:   Procedure Laterality Date     CERVICAL FUSION       HERNIA REPAIR       TONSILLECTOMY      childhood        Family History   Problem Relation Age of Onset     Heart disease Mother      Hypertension Mother      Dementia Mother      Heart attack Father         Past Medical History:   Diagnosis Date     Atrial fibrillation (H) 10/09/2017     Pulmonary embolism, blood-clot, obstetric 10/09/2017        Social History     Tobacco Use     Smoking status: Former Smoker     Smokeless tobacco: Never Used   Substance Use Topics     Alcohol use: Yes     Drug use: No        Current Outpatient Medications   Medication Sig Dispense Refill     escitalopram oxalate (LEXAPRO) 10 MG tablet TAKE ONE TABLET BY MOUTH EVERY DAY 90 tablet 2     MULTIVITAMIN (MULTIPLE VITAMIN ORAL) Take 1 tablet by mouth daily.       sildenafil (VIAGRA) 100 MG tablet Take 50 mg by mouth. One hour prior to intercourse.       warfarin (COUMADIN/JANTOVEN) 7.5 MG tablet TAKE ONE TABLET BY MOUTH EVERY DAY . ADJUST DOSE PER INR RESULTS AS INSTRUCTED 90 tablet 1     No current facility-administered medications for this visit.           Objective:    Vitals:    19 1521   BP: 130/80   Pulse: 79   SpO2: 96%   Weight: 222 lb (100.7 kg)      Body mass index is 27.93 kg/m .    Alert.  No apparent distress.  Chest clear.  Cardiac exam irregular.  Rate controlled.  Ventricular rate 80 bpm.  Abdomen benign.  Large left inguinal hernia, reducible without evidence for incarceration or strangulation.  No right inguinal hernia appreciated.  Extremities warm and dry.      Echo 19 Summary       When compared to the previous study dated 11/15/2017, no significant change.    Left ventricle ejection fraction is normal. The calculated left  ventricular ejection fraction is 54%. Mild concentric hypertrophy noted.    Normal left ventricular size and systolic function.    Normal right ventricular size and systolic function.    Mild to moderate mitral regurgitation.           This note has been dictated using voice recognition software and as a result may contain minor grammatical errors and unintended word substitutions.

## 2021-05-30 NOTE — TELEPHONE ENCOUNTER
"YUNG-PROCEDURAL ANTICOAGULATION  MANAGEMENT    Assessment     Warfarin interruption plan for Hernia surgery on 08/15/2019.    Atrial Fibrillation and History of VTE, mitral insufficiency      Risk stratification for thromboembolism: low/moderate. (2017 ACC periprocedure pathway for NVAF)  But patient has 2 separate indications for bridging, which each alone are low, but may be additive.       \"[id number] = 3\",    2018 American Society of Hematology recommends against bridging in low and moderate risk VTE patients holding warfarin      Plan       Pre-Procedure:    Hold warfarin until after procedure startin/10/2019       Post-Procedure:    Resume home warfarin dose if okay with provider doing procedure on night of procedure, 08/15/2019 PM: 7.5mg    No Lovenox necessary per cardiology    Recheck INR 5 days after resuming warfarin   ?   Jeanteh Munoz, PharmD    Subjective/Objective:      Prem Taylor, a 68 y.o. male    Reason for Anticoagulation: Atrial Fibrillation and History of VTE    Goal INR Range: 2-3    Patient bridged in past: Yes, 2019    Pertinent History: Bridged for colonoscopy in 2019, as of 2019, 2.5 years+ post PE.    Wt Readings from Last 3 Encounters:   19 (!) 223 lb (101.2 kg)   19 222 lb (100.7 kg)   19 (!) 227 lb (103 kg)        Ideal body weight: 83.9 kg (185 lb 0.3 oz)  Adjusted ideal body weight: 90.8 kg (200 lb 3.4 oz)     BMI: There is no height or weight on file to calculate BMI.    Lab Results   Component Value Date    INR 2.40 (H) 2019    INR 2.80 (H) 2019    INR 3.00 (H) 2019     Lab Results   Component Value Date    HGB 13.9 (L) 2019    HCT 41.5 2019     2019     Lab Results   Component Value Date    CREATININE 0.87 2019    CREATININE 0.85 2019    CREATININE 0.77 2018     Estimated CrCl: Estimated Creatinine Clearance: 105.8 mL/min (by C-G formula based on SCr of 0.87 " mg/dL).

## 2021-05-30 NOTE — TELEPHONE ENCOUNTER
Orders being requested: Hold and Bridge order for hernia surgery  Reason service is needed/diagnosis: Hernia Surgery 8/15  When are orders needed by: in time for surgery  Where to send Orders: in chart call the patient to advise  Okay to leave detailed message?  Yes

## 2021-05-30 NOTE — TELEPHONE ENCOUNTER
ANTICOAGULATION  MANAGEMENT    Assessment     Today's INR result of 2.4 is Therapeutic (goal INR of 2.0-3.0)        Warfarin taken as previously instructed    No new diet changes affecting INR    No new medication/supplements affecting INR    Continues to tolerate warfarin with no reported s/s of bleeding or thromboembolism     Previous INR was Therapeutic    Plan:     Left a detailed message for Prem regarding INR result and instructed:     Warfarin Dosing Instructions:  Continue current warfarin dose 7.5 mg daily.    Instructed patient to follow up no later than: 4 weeks.     Education provided: importance of taking warfarin as instructed      Instructed to call the [facility] for any changes, questions or concerns. (#[phone])   ?   Morales Tapia RN    Subjective/Objective:      Prem Taylor, a 68 y.o. male is on warfarin.     Prem reports:     Home warfarin dose: as updated on anticoagulation calendar per template     Missed doses: No     Medication changes:  No     S/S of bleeding or thromboembolism:  No     New Injury or illness:  No     Changes in diet or alcohol consumption:  No     Upcoming surgery, procedure or cardioversion: hernia surgery not scheduled yet.     Anticoagulation Episode Summary     Current INR goal:   2.0-3.0   TTR:   79.4 % (1.8 y)   Next INR check:   8/22/2019   INR from last check:   2.40 (7/25/2019)   Weekly max warfarin dose:      Target end date:      INR check location:      Preferred lab:      Send INR reminders to:   RUBY MAURICIO    Indications    Pulmonary embolism  bilateral (H) [I26.99]           Comments:            Anticoagulation Care Providers     Provider Role Specialty Phone number    Mamadou Baez MD Referring Family Medicine [phone]

## 2021-05-31 VITALS — BODY MASS INDEX: 26.98 KG/M2 | WEIGHT: 217 LBS | HEIGHT: 75 IN

## 2021-05-31 VITALS — WEIGHT: 215 LBS | BODY MASS INDEX: 27.05 KG/M2

## 2021-05-31 VITALS — WEIGHT: 214 LBS | BODY MASS INDEX: 26.93 KG/M2

## 2021-05-31 VITALS — BODY MASS INDEX: 26.18 KG/M2 | HEIGHT: 76 IN | WEIGHT: 215 LBS

## 2021-05-31 VITALS — BODY MASS INDEX: 26.25 KG/M2 | WEIGHT: 215.6 LBS | HEIGHT: 76 IN

## 2021-05-31 VITALS — HEIGHT: 75 IN | BODY MASS INDEX: 26.86 KG/M2 | WEIGHT: 216 LBS

## 2021-05-31 VITALS — BODY MASS INDEX: 26.41 KG/M2 | WEIGHT: 217 LBS

## 2021-05-31 NOTE — PROGRESS NOTES
"HPI: Pt is here for follow up left inguinal hernia with Dr. Leiva on 8/15/2019.   he is doing well.  Pain is well controlled.  No difficulties with the surgical wound/wounds.  he is eating well and denies fever and chills.         /78 (Patient Site: Right Arm, Patient Position: Sitting, Cuff Size: Adult Large)   Pulse 83   Ht 6' 3\" (1.905 m)   Wt 219 lb (99.3 kg)   SpO2 96%   BMI 27.37 kg/m      EXAM:  GENERAL:Appears well  ABDOMEN:  Soft, +BS  SURGICAL WOUNDS:  Incisions healing well, no enduration or drainage.      Assessment/Plan: Doing well after surgery and should follow up as needed.    Johana Arauz , [facility] Surgery       "

## 2021-05-31 NOTE — TELEPHONE ENCOUNTER
I spoke to Ryan today regarding his insurance. He is aware that Dr. Leiva is out of network. He was curious to find out what the out of network cost would be for the surgeons fee. I did let him know that I put in a cost of care estimate on the fair"[employer]org/billing website and they will call him with that estimate. I made a note in the request that we are only looking for the out of network surgeon fee with any discounts applied as patient is aware that he will owe 40% of that balance. Ryan really appreciated the call and plans to move forward with his procedure.    I also informed him that per [facility], he would only be responsible for the $75.00 copay and other charges will be covered at 100%.     Raquel Rolon, [facility]  Surgery Scheduling  [phone]

## 2021-05-31 NOTE — TELEPHONE ENCOUNTER
ANTICOAGULATION  MANAGEMENT    Assessment     Today's INR result of 2.4 is Therapeutic (goal INR of 2.0-3.0)        Warfarin taken as previously instructed    No new diet changes affecting INR    No new medication/supplements affecting INR    Continues to tolerate warfarin with no reported s/s of bleeding or thromboembolism     Previous INR was Subtherapeutic    Plan:     Left a detailed message for Prem regarding INR result and instructed:     Warfarin Dosing Instructions:  Continue current warfarin dose 7.5 mg daily.    Instructed patient to follow up no later than: 2 weeks.     Education provided: importance of taking warfarin as instructed      Instructed to call the [facility] for any changes, questions or concerns. (#[phone])   ?   Morales Tapia RN    Subjective/Objective:      Prem Taylor, a 68 y.o. male is on warfarin.     Prem reports:     Home warfarin dose: as updated on anticoagulation calendar per template     Missed doses: No     Medication changes:  No     S/S of bleeding or thromboembolism:  No     New Injury or illness:  No     Changes in diet or alcohol consumption:  No     Upcoming surgery, procedure or cardioversion:  No    Anticoagulation Episode Summary     Current INR goal:   2.0-3.0   TTR:   69.4 %   Next INR check:   9/17/2019   INR from last check:   2.40 (9/3/2019)   Weekly max warfarin dose:      Target end date:      INR check location:      Preferred lab:      Send INR reminders to:   RUBY MAURICIO    Indications    Pulmonary embolism  bilateral (H) [I26.99]           Comments:            Anticoagulation Care Providers     Provider Role Specialty Phone number    Mamadou Baez MD Referring Family Medicine [phone]

## 2021-05-31 NOTE — TELEPHONE ENCOUNTER
Reason contacted:  Hold/bridge orders  Information relayed:  Below message   Additional questions:  No  Further follow-up needed:  No  Okay to leave a detailed message:  No

## 2021-05-31 NOTE — ANESTHESIA POSTPROCEDURE EVALUATION
Patient: Prem Taylor  HERNIORRHAPHY, INGUINAL, LAPAROSCOPIC  Anesthesia type: general    Patient location: Phase II Recovery  Last vitals:   Vitals Value Taken Time   /75 8/15/2019 12:01 PM   Temp 36.4  C (97.5  F) 8/15/2019 11:12 AM   Pulse 68 8/15/2019 12:09 PM   Resp 16 8/15/2019 12:00 PM   SpO2 96 % 8/15/2019 12:09 PM   Vitals shown include unvalidated device data.  Post vital signs: stable  Level of consciousness: awake and responds to simple questions  Post-anesthesia pain: pain controlled  Post-anesthesia nausea and vomiting: no  Pulmonary: unassisted  Cardiovascular: stable  Hydration: adequate  Anesthetic events: no    QCDR Measures:  ASA# 11 - Deedee-op Cardiac Arrest: ASA11B - Patient did NOT experience unanticipated cardiac arrest  ASA# 12 - Deedee-op Mortality Rate: ASA12B - Patient did NOT die  ASA# 13 - PACU Re-Intubation Rate: ASA13B - Patient did NOT require a new airway mgmt  ASA# 10 - Composite Anes Safety: ASA10A - No serious adverse event    Additional Notes:

## 2021-05-31 NOTE — TELEPHONE ENCOUNTER
Pts Care Provider: Dr. Leiva    Caller: Ryan    Phone Number: [phone]   OK to leave message: Yes    Reason for Call:  PT has questions about how the surgery with Dr. Leiva is going to be billed. He already called the cost of care department and they told him to call here and figure that out and then they will be able to let him know about the cost.   He was notified by his insurance that the provider is not covered but the facility is.  He would like to discuss what the cost would be for this. Please call him at his home phone [phone] first then try mobile [phone]

## 2021-05-31 NOTE — ANESTHESIA CARE TRANSFER NOTE
Last vitals:   Vitals:    08/15/19 1112   BP: (!) 131/93   Pulse: 76   Resp: 18   Temp: 36.4  C (97.5  F)   SpO2: 99%     Patient spontaneous RR, -500s, suctioned, following commands, extubated to facemask 10LPM, O2 sats 100%. VSS. Report to RN.    Patient's level of consciousness is drowsy  Spontaneous respirations: yes  Maintains airway independently: yes  Dentition unchanged: yes  Oropharynx: oropharynx clear of all foreign objects    QCDR Measures:  ASA# 20 - Surgical Safety Checklist: WHO surgical safety checklist completed prior to induction    PQRS# 430 - Adult PONV Prevention: 4558F - Pt received => 2 anti-emetic agents (different classes) preop & intraop  ASA# 8 - Peds PONV Prevention: NA - Not pediatric patient, not GA or 2 or more risk factors NOT present  PQRS# 424 - Deedee-op Temp Management: 4559F - At least one body temp DOCUMENTED => 35.5C or 95.9F within required timeframe  PQRS# 426 - PACU Transfer Protocol: - Transfer of care checklist used  ASA# 14 - Acute Post-op Pain: ASA14B - Patient did NOT experience pain >= 7 out of 10

## 2021-05-31 NOTE — TELEPHONE ENCOUNTER
ANTICOAGULATION  MANAGEMENT    Assessment     Today's INR result of 1.5 is Subtherapeutic (goal INR of 2.0-3.0)        Warfarin recently held as instructed which may be affecting INR- resumed after hernia surgery on 8/15.     No new diet changes affecting INR    No new medication/supplements affecting INR    Continues to tolerate warfarin with no reported s/s of bleeding or thromboembolism     Previous INR was Subtherapeutic    Plan:     Spoke with Prem regarding INR result and instructed:     Warfarin Dosing Instructions:  Take booster dose of 11.25 mg today only then continue current warfarin dose 7.5 mg daily.    Instructed patient to follow up no later than: 1-2 weeks.     Education provided: importance of taking warfarin as instructed    Ryan verbalizes understanding and agrees to warfarin dosing plan.    Instructed to call the [facility] for any changes, questions or concerns. (#[phone])   ?   Morales Tapia RN    Subjective/Objective:      Prem Taylor, a 68 y.o. male is on warfarin.     Prem reports:     Home warfarin dose: verbally confirmed home dose with pt and updated on anticoagulation calendar     Missed doses: No     Medication changes:  No     S/S of bleeding or thromboembolism:  No     New Injury or illness:  No     Changes in diet or alcohol consumption:  No     Upcoming surgery, procedure or cardioversion:  No    Anticoagulation Episode Summary     Current INR goal:   2.0-3.0   TTR:   77.3 % (1.8 y)   Next INR check:   9/3/2019   INR from last check:   1.50! (8/20/2019)   Weekly max warfarin dose:      Target end date:      INR check location:      Preferred lab:      Send INR reminders to:   RUBY MAURICIO    Indications    Pulmonary embolism  bilateral (H) [I26.99]           Comments:            Anticoagulation Care Providers     Provider Role Specialty Phone number    Mamadou Baez MD Referring Family Medicine [phone]

## 2021-05-31 NOTE — TELEPHONE ENCOUNTER
(NOTE COPIED FROM PA WORK QUEUE)  Called [facility] today to see if a PA is required for his upcoming surgery with Dr. Leiva.     Representative: Rich  Phone#: [phone]  CPT code: 22988  RICK NPI# [id number]  Ferny NPI: [id number]  Reference#: [id number]     Prior authorization is not required for code 66169. RICK is in network with the patients plan coverage.  However, [facility] providers are not in network with the patients plan this would mean  would be billed out of network.      Raquel Rolon [facility]  Surgery Scheduling  [phone]

## 2021-05-31 NOTE — ANESTHESIA PREPROCEDURE EVALUATION
Anesthesia Evaluation      Patient summary reviewed   History of anesthetic complications: may have PONV.     Airway   Mallampati: II  Neck ROM: full   Pulmonary - normal exam   (+) sleep apnea on CPAP, ,                          Cardiovascular - normal exam  (+) valvular problems/murmurs (mild to moderate) MR, dysrhythmias (Afib), ,     ECG reviewed     ROS comment: H/o PE, on coumadin. Last dose 1 week ago. Was told not to bridge with lovenox. Will take next coumadin dose this evening.      Neuro/Psych    (+) depression, anxiety/panic attacks,     Endo/Other - negative ROS      GI/Hepatic/Renal - negative ROS           Dental    (+) caps                       Anesthesia Plan  Planned anesthetic: general endotracheal  D/t questionable PONV, patient okay with zofran/decadron. Declines scop patch at this time.    ASA 2   Induction: intravenous   Anesthetic plan and risks discussed with: patient    Post-op plan: routine recovery

## 2021-05-31 NOTE — PROGRESS NOTES
Preoperative Exam    Scheduled Procedure: Left Inguinal Hernia  Surgery Date:  08/15/2019  Surgery Location: [facility], fax [phone]    Surgeon:  Dr. Leiva    Assessment/Plan:     1. Pre-op exam  Preoperative cardiovascular exam completed.  Scheduled left inguinal herniorrhaphy procedure noted.  No contraindication to scheduled surgery identified.  - Electrocardiogram Perform and Read    2. Left inguinal hernia  Left inguinal hernia, large, reducible.  Scheduled left inguinal herniorrhaphy procedure noted.    3. Persistent atrial fibrillation (H)  Persistent atrial fibrillation, rate controlled.  Remains off metoprolol due to prior exercise intolerance.  No tachycardia described.  Anticipate holding warfarin 5 days prior to scheduled procedure with Lovenox bridging to be utilized.  Recent base metabolic panel July 25, 2019 normal.    4. Pulmonary embolism, bilateral (H)  History of bilateral pulmonary emboli.  Is on chronic warfarin anticoagulation.  Anticipate holding warfarin 5 days prior to scheduled procedure with Lovenox bridging to be utilized.    5. Mitral valve insufficiency, unspecified etiology  Mitral valve insufficiency.  Does not qualify for novel agent for anticoagulation.    6. Normochromic normocytic anemia  Recent CBC normal with resolved normochromic normocytic anemia.        Surgical Procedure Risk: Low (reported cardiac risk generally < 1%)  Have you had prior anesthesia?: Yes  Have you or any family members had a previous anesthesia reaction:  No  Do you or any family members have a history of a clotting or bleeding disorder?: No  Cardiac Risk Assessment: no increased risk for major cardiac complications    APPROVAL GIVEN to proceed with proposed procedure, without further diagnostic evaluation    Functional Status: Independent  Patient plans to recover at home with family.     Subjective:      Prem Taylor is a 68 y.o. male who presents for a preoperative consultation.  "  Noted left inguinal hernia.  Noted at physical 2019.  Symptomatic.  Using a truss with some benefit.  Able to get it to go back if laying down at night.  Worse during the day.  Chronic atrial fibrillation.  Needs to be on warfarin anticoagulation with bilateral pulmonary emboli.  Does not qualify for novel agent due to mitral insufficiency history.  Normochromic normocytic anemia noted.  No bleeding concerns.  Denies recent illness.  Denies chest pain.  Had discontinued metoprolol due to some exercise intolerance.  Is not had associated tachycardia with atrial fibrillation.    All other systems reviewed and are negative, other than those listed in the HPI.     \"Aubree\" x 44 years   2 daughters - Renee (38) and Susannah (35)  3 grandchildren  No smoke (quit  after 1 and  ppd)   EtOH: weekends \"few beers\"   Surgeries: anterior cervical fusion; inguinal hernia; tonsils   Hospitalizations: [facility] 10/9/17-10/10/17 bilateral pulmonary emboli with a. 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    Current Outpatient Medications   Medication Sig Dispense Refill     enoxaparin (LOVENOX) 100 mg/mL Syrg        escitalopram oxalate (LEXAPRO) 10 MG tablet TAKE ONE TABLET BY MOUTH EVERY DAY 90 tablet 2     MULTIVITAMIN (MULTIPLE VITAMIN ORAL) Take 1 tablet by mouth daily.   "     warfarin (COUMADIN/JANTOVEN) 7.5 MG tablet TAKE ONE TABLET BY MOUTH EVERY DAY . ADJUST DOSE PER INR RESULTS AS INSTRUCTED 90 tablet 1     No current facility-administered medications for this visit.         No Known Allergies    Patient Active Problem List   Diagnosis     Hearing Loss     Hypercholesterolemia     Presbycusis     Sinus bradycardia     BPH without urinary obstruction     Atypical Chest Pain     Blood Pressure Isolated Elevated     Male erectile dysfunction     Basal cell carcinoma, ear     Erectile dysfunction, unspecified erectile dysfunction type     Overweight (BMI 25.0-29.9)     Tubular adenoma of colon     Hyperbilirubinemia     Pulmonary embolism, bilateral (H)     Persistent atrial fibrillation (H)     Mitral valve insufficiency     ALISE on CPAP     Anxiety     Chronic cough     Normochromic normocytic anemia     Left inguinal hernia       Past Medical History:   Diagnosis Date     Atrial fibrillation (H) 10/09/2017     Pulmonary embolism, blood-clot, obstetric 10/09/2017     Sleep apnea     Uses CPAP       Past Surgical History:   Procedure Laterality Date     CERVICAL FUSION  2001     HERNIA REPAIR  2005     TONSILLECTOMY      childhood       Social History     Socioeconomic History     Marital status:      Spouse name: Not on file     Number of children: Not on file     Years of education: Not on file     Highest education level: Not on file   Occupational History     Not on file   Social Needs     Financial resource strain: Not on file     Food insecurity:     Worry: Not on file     Inability: Not on file     Transportation needs:     Medical: Not on file     Non-medical: Not on file   Tobacco Use     Smoking status: Former Smoker     Smokeless tobacco: Never Used   Substance and Sexual Activity     Alcohol use: Yes     Drug use: No     Sexual activity: Not on file   Lifestyle     Physical activity:     Days per week: Not on file     Minutes per session: Not on file     Stress:  "Not on file   Relationships     Social connections:     Talks on phone: Not on file     Gets together: Not on file     Attends Rastafarian service: Not on file     Active member of club or organization: Not on file     Attends meetings of clubs or organizations: Not on file     Relationship status: Not on file     Intimate partner violence:     Fear of current or ex partner: Not on file     Emotionally abused: Not on file     Physically abused: Not on file     Forced sexual activity: Not on file   Other Topics Concern     Not on file   Social History Narrative     Not on file       Patient Care Team:  Mamadou Baez MD as PCP - General  Gerardo Velez MD as Physician (Cardiology)  Miky Lindsey MD (Hematology and Oncology)          Objective:     Vitals:    08/02/19 1219   BP: 110/80   Pulse: 66   SpO2: 97%   Weight: (!) 222 lb 9.6 oz (101 kg)   Height: 6' 3.2\" (1.91 m)         Physical Exam:    General Appearance:    Alert, cooperative, no distress, appears stated age.     Head:    Normocephalic, without obvious abnormality, atraumatic   Eyes:    PERRL, conjunctiva/corneas clear, EOM's intact, fundi     benign, both eyes        Ears:    Normal TM's and external ear canals, both ears   Nose:   Nares normal, septum midline, mucosa normal, no drainage    or sinus tenderness   Throat:   Lips, mucosa, and tongue normal; teeth and gums normal   Neck:   Supple, symmetrical, trachea midline, no adenopathy;        thyroid:  No enlargement/tenderness/nodules; no carotid    bruit or JVD   Back:     Symmetric, no curvature, ROM normal, no CVA tenderness   Lungs:     Clear to auscultation bilaterally, respirations unlabored   Chest wall:    No tenderness or deformity   Heart:    Regular rate and rhythm, S1 and S2 normal, no murmur, rub   or gallop   Abdomen:     Soft, non-tender, bowel sounds active all four quadrants,     no masses, no organomegaly.     Genitalia:    deferred.  Large left inguinal hernia, reducible " without evidence for incarceration or strangulation.  No right inguinal hernia appreciated on exam 7/25/19.   Rectal:    deferred   Extremities:   Extremities normal, atraumatic, no cyanosis or edema   Pulses:   2+ and symmetric all extremities   Skin:   Skin color, texture, turgor normal, no rashes or lesions   Lymph nodes:   Cervical, supraclavicular, and axillary nodes normal   Neurologic:   CNII-XII intact. Normal strength, sensation and reflexes       throughout        There are no Patient Instructions on file for this visit.    EKG 5/21/19:    Atrial fibrillation  Inferior infarct , age undetermined  Abnormal ECG  When compared with ECG of 08-DEC-2017 09:19,  Inferior infarct is now Present    Labs (from 7/25/19):  Results for orders placed or performed in visit on 07/25/19   Basic Metabolic Panel   Result Value Ref Range    Sodium 143 136 - 145 mmol/L    Potassium 4.5 3.5 - 5.0 mmol/L    Chloride 107 98 - 107 mmol/L    CO2 26 22 - 31 mmol/L    Anion Gap, Calculation 10 5 - 18 mmol/L    Glucose 76 70 - 125 mg/dL    Calcium 10.2 8.5 - 10.5 mg/dL    BUN 16 8 - 22 mg/dL    Creatinine 0.87 0.70 - 1.30 mg/dL    GFR MDRD Af Amer >60 >60 mL/min/1.73m2    GFR MDRD Non Af Amer >60 >60 mL/min/1.73m2        Lab Results   Component Value Date    WBC 5.6 07/25/2019    HGB 13.9 (L) 07/25/2019    HCT 41.5 07/25/2019    MCV 92 07/25/2019     07/25/2019        Immunization History   Administered Date(s) Administered     DT (pediatric) 09/09/1997     Influenza high dose, seasonal 10/03/2016, 10/11/2018     Influenza, inj, historic,unspecified 10/30/2014, 10/10/2017     Influenza, seasonal,quad inj 36+ mos 09/22/2015     Influenza, seasonal,quad inj 6-35 mos 10/26/2010, 11/16/2011, 12/06/2012     Pneumo Conj 13-V (2010&after) 01/03/2017     Pneumo Polysac 23-V 02/06/2018     Td, adult adsorbed, PF 01/03/2017     Td,adult,historic,unspecified 09/09/1997     Tdap 09/27/2007     ZOSTER, LIVE 11/16/2011     ZOSTER,  RECOMBINANT, IM 12/04/2018, 02/08/2019         Echo 5/21/19 Summary:     When compared to the previous study dated 11/15/2017, no significant change.    Left ventricle ejection fraction is normal. The calculated left ventricular ejection fraction is 54%. Mild concentric hypertrophy noted.    Normal left ventricular size and systolic function.    Normal right ventricular size and systolic function.    Mild to moderate mitral regurgitation.           Electronically signed by Mamadou Baez MD 08/02/19 12:23 PM

## 2021-06-01 VITALS — WEIGHT: 217.8 LBS | BODY MASS INDEX: 27.59 KG/M2

## 2021-06-01 VITALS — WEIGHT: 219 LBS | BODY MASS INDEX: 27.74 KG/M2

## 2021-06-01 VITALS — WEIGHT: 224 LBS | BODY MASS INDEX: 28.38 KG/M2

## 2021-06-01 VITALS — BODY MASS INDEX: 27.35 KG/M2 | WEIGHT: 220 LBS | HEIGHT: 75 IN

## 2021-06-01 NOTE — PROGRESS NOTES
Assessment/Plan:     Patient presents to clinic with significant and diffuse urticaria of unknown origin.  Recommended 5 days of prednisone 10 mg if tolerated.  Zyrtec during the day and Benadryl at night.  Topical anti-itch creams.  I recommended using ice if patient finds the urge to scratch overwhelming.    We had an extensive discussion about the respiratory risks and patient states that he is not currently concerned.  He declined an EpiPen.  I think this is reasonable as he had no respiratory compromise.  Discussed that if he has a severe reaction again, low threshold to send to allergy and immunology, although it is unlikely they will be able to determine the cause.    Handout provided on urticaria, symptomatic treatment recommended, patient will attempt to avoid triggers or to identify triggers if they recur.    Problem List Items Addressed This Visit     None      Visit Diagnoses     Hives    -  Primary    Relevant Medications    predniSONE (DELTASONE) 10 mg tablet          Return to clinic in 6 months for routine physical.    Total time spent with patient was 15 minutes with greater than 50% spent in face-to-face counseling regarding the above plan.    Subjective:      Prem Taylor is a 69 y.o. male who presents to clinic for hives.    Patient has never had significant urticaria in the past.  These lesions occurred less than 24 hours ago.  He has tried Zyrtec which helped during the night, but when he thinks about scratching it makes him feel almost overwhelmed with the urge.  He has scratched himself significantly on the legs secondary to be being unable to control it.  He is uncertain what caused the hives.  He was hanging drapes made in [place] which she thought might be a culprit.  Otherwise, he had a spring roll but no other significantly new foods.  It is not worsening and he denies any shortness of breath or throat swelling.  He thought he might be having a reaction like that one point but then  "states he thought it was on his head.  It is not present now.        Past Medical History, Family History, and Social History reviewed.     Current Outpatient Medications on File Prior to Visit   Medication Sig Dispense Refill     escitalopram oxalate (LEXAPRO) 10 MG tablet TAKE ONE TABLET BY MOUTH EVERY DAY 90 tablet 2     MULTIVITAMIN (MULTIPLE VITAMIN ORAL) Take 1 tablet by mouth daily.       warfarin (COUMADIN/JANTOVEN) 7.5 MG tablet TAKE ONE TABLET BY MOUTH EVERY DAY . ADJUST DOSE PER INR RESULTS AS INSTRUCTED 90 tablet 1     No current facility-administered medications on file prior to visit.        Review of systems is as stated in HPI.  Endorses: tinnitus, spots in vision  The remainder of the 10 system review is otherwise negative.    Objective:     /80   Pulse 81   Temp 97.9  F (36.6  C)   Ht 6' 3\" (1.905 m)   Wt (!) 225 lb (102.1 kg)   SpO2 97%   BMI 28.12 kg/m   Body mass index is 28.12 kg/m .    Gen: Alert, NAD, appears stated age, normal hygiene   Resp: CTAB, no wheezes, rales or ronchi  Lymph: Significant submandibular adenopathy  SKIN: Dramatic and extensive urticarial lesions scattered diffusely across patient's extremities and trunk, some so tightly clustered they appear confluent, evidence of significant excoriation      This note has been dictated using voice recognition software. Any grammatical or context distortions are unintentional and inherent to the the software.     Priscilla Ye MD      "

## 2021-06-01 NOTE — TELEPHONE ENCOUNTER
Zestar Study Consent Visit    Study description: ECG and PPG Study: Zestar Study      Prem Taylor a 69 y.o. male , was contacted by today to discuss participation in the Zestar study.     The patient called the Clinical Trials Office to inquire about study participation.  The consent form was reviewed with the patient.     The review of the study included:    Study purpose     Conflict of interest    Device description      Study visits    Risks of participation    Benefits (if any)    Alternatives    Voluntary participation    Confidentiality     Compensation/costs of participation    Study stipends    Injury and legal rights    The subject was queried in regards to his willingness to continue and come in for scheduled appointment. his questions were answered to his satisfaction.   The patient has given his preliminary agreement to volunteer to participate in the above noted study.     Plan: Prem Taylor will come to [facility] on 09/10/2019 to continue consent process. If he continues to agrees to participate, the study visit will be done on the same day.    Nery Gary RN

## 2021-06-01 NOTE — TELEPHONE ENCOUNTER
ANTICOAGULATION  MANAGEMENT    Assessment     Today's INR result of 2.8 is Therapeutic (goal INR of 2.0-3.0)        Warfarin taken as previously instructed    No new diet changes affecting INR    No new medication/supplements affecting INR    Continues to tolerate warfarin with no reported s/s of bleeding or thromboembolism     Previous INR was Therapeutic    Plan:     Spoke with Prem regarding INR result and instructed:     Warfarin Dosing Instructions:  Continue current warfarin dose 7.5 mg daily.    Instructed patient to follow up no later than: 4 weeks.     Education provided: importance of taking warfarin as instructed    Ryan verbalizes understanding and agrees to warfarin dosing plan.    Instructed to call the [facility] for any changes, questions or concerns. (#[phone])   ?   Morales Tapia RN    Subjective/Objective:      Prem Taylor, a 69 y.o. male is on warfarin.     Prem reports:     Home warfarin dose: verbally confirmed home dose with pt and updated on anticoagulation calendar     Missed doses: No     Medication changes:  No     S/S of bleeding or thromboembolism:  No     New Injury or illness:  No     Changes in diet or alcohol consumption:  No     Upcoming surgery, procedure or cardioversion:  No    Anticoagulation Episode Summary     Current INR goal:   2.0-3.0   TTR:   69.4 %   Next INR check:   10/15/2019   INR from last check:   2.80 (9/17/2019)   Weekly max warfarin dose:      Target end date:      INR check location:      Preferred lab:      Send INR reminders to:   RUBY MAURICIO    Indications    Pulmonary embolism  bilateral (H) [I26.99]           Comments:            Anticoagulation Care Providers     Provider Role Specialty Phone number    Mamadou Baez MD Referring Family Medicine [phone]

## 2021-06-02 VITALS — BODY MASS INDEX: 28.85 KG/M2 | HEIGHT: 75 IN | WEIGHT: 232 LBS

## 2021-06-02 VITALS — WEIGHT: 223 LBS | BODY MASS INDEX: 28.25 KG/M2

## 2021-06-02 VITALS — WEIGHT: 228 LBS | HEIGHT: 75 IN | BODY MASS INDEX: 28.35 KG/M2

## 2021-06-02 VITALS — BODY MASS INDEX: 28.25 KG/M2 | WEIGHT: 223 LBS

## 2021-06-02 VITALS — BODY MASS INDEX: 28.25 KG/M2 | HEIGHT: 75 IN

## 2021-06-02 VITALS — HEIGHT: 75 IN | WEIGHT: 229 LBS | BODY MASS INDEX: 28.47 KG/M2

## 2021-06-02 VITALS — BODY MASS INDEX: 27.73 KG/M2 | WEIGHT: 223 LBS | HEIGHT: 75 IN

## 2021-06-02 NOTE — TELEPHONE ENCOUNTER
ANTICOAGULATION  MANAGEMENT    Assessment     Today's INR result of 2.4 is Therapeutic (goal INR of 2.0-3.0)        Warfarin taken as previously instructed    No new diet changes affecting INR    No new medication/supplements affecting INR    Continues to tolerate warfarin with no reported s/s of bleeding or thromboembolism     Previous INR was Therapeutic    Plan:     Left a detailed message for Prem regarding INR result and instructed:     Warfarin Dosing Instructions:  Continue current warfarin dose 7.5 mg daily.    Instructed patient to follow up no later than: 4 weeks.    Education provided: importance of taking warfarin as instructed      Instructed to call the [facility] for any changes, questions or concerns. (#[phone])   ?   Morales Tapia RN    Subjective/Objective:      Prem Taylor, a 69 y.o. male is on warfarin.     Prem reports:     Home warfarin dose: as updated on anticoagulation calendar per template     Missed doses: No     Medication changes:  Yes: 9/24 started Prednisone x5 days (per chart review)     S/S of bleeding or thromboembolism:  No     New Injury or illness:  Yes: hives (per chart review of OV 9/24)     Changes in diet or alcohol consumption:  No     Upcoming surgery, procedure or cardioversion:  No    Anticoagulation Episode Summary     Current INR goal:   2.0-3.0   TTR:   69.4 %   Next INR check:   11/12/2019   INR from last check:   2.40 (10/15/2019)   Weekly max warfarin dose:      Target end date:      INR check location:      Preferred lab:      Send INR reminders to:   RUBY MAURICIO    Indications    Pulmonary embolism  bilateral (H) [I26.99]           Comments:            Anticoagulation Care Providers     Provider Role Specialty Phone number    Mamadou Baez MD Referring Family Medicine [phone]

## 2021-06-03 VITALS — WEIGHT: 228 LBS | HEIGHT: 75 IN | BODY MASS INDEX: 28.35 KG/M2

## 2021-06-03 VITALS
TEMPERATURE: 97.9 F | OXYGEN SATURATION: 97 % | SYSTOLIC BLOOD PRESSURE: 110 MMHG | BODY MASS INDEX: 27.98 KG/M2 | HEART RATE: 81 BPM | WEIGHT: 225 LBS | DIASTOLIC BLOOD PRESSURE: 80 MMHG | HEIGHT: 75 IN

## 2021-06-03 VITALS
BODY MASS INDEX: 27.5 KG/M2 | SYSTOLIC BLOOD PRESSURE: 124 MMHG | WEIGHT: 221.2 LBS | DIASTOLIC BLOOD PRESSURE: 80 MMHG | HEIGHT: 75 IN | TEMPERATURE: 97.9 F | HEART RATE: 72 BPM

## 2021-06-03 VITALS — WEIGHT: 223 LBS | HEIGHT: 75 IN | BODY MASS INDEX: 27.73 KG/M2

## 2021-06-03 VITALS — BODY MASS INDEX: 27.68 KG/M2 | WEIGHT: 222.6 LBS | HEIGHT: 75 IN

## 2021-06-03 VITALS — BODY MASS INDEX: 28.23 KG/M2 | WEIGHT: 227 LBS | HEIGHT: 75 IN

## 2021-06-03 VITALS — BODY MASS INDEX: 27.23 KG/M2 | HEIGHT: 75 IN | WEIGHT: 219 LBS

## 2021-06-03 VITALS — WEIGHT: 222 LBS | BODY MASS INDEX: 27.93 KG/M2

## 2021-06-03 VITALS — BODY MASS INDEX: 28.06 KG/M2 | WEIGHT: 223 LBS

## 2021-06-03 NOTE — TELEPHONE ENCOUNTER
ANTICOAGULATION  MANAGEMENT    Assessment     Today's INR result of 2.6 is Therapeutic (goal INR of 2.0-3.0)        Warfarin taken as previously instructed    No new diet changes affecting INR    No new medication/supplements affecting INR    Continues to tolerate warfarin with no reported s/s of bleeding or thromboembolism     Previous INR was Supratherapeutic    Plan:     Left a detailed message for Prem regarding INR result and instructed:     Warfarin Dosing Instructions:  Continue current warfarin dose 7.5 mg daily.    Instructed patient to follow up no later than: 2 weeks.    Education provided: importance of taking warfarin as instructed      Instructed to call the [facility] for any changes, questions or concerns. (#[phone])   ?   Morales Tapia RN    Subjective/Objective:      Prem Taylor, a 69 y.o. male is on warfarin.     Prem reports:     Home warfarin dose: as updated on anticoagulation calendar per template     Missed doses: No     Medication changes:  No     S/S of bleeding or thromboembolism:  No     New Injury or illness:  No     Changes in diet or alcohol consumption:  No     Upcoming surgery, procedure or cardioversion:  No    Anticoagulation Episode Summary     Current INR goal:   2.0-3.0   TTR:   65.2 % (1 y)   Next INR check:   12/10/2019   INR from last check:   2.60 (11/26/2019)   Weekly max warfarin dose:      Target end date:      INR check location:      Preferred lab:      Send INR reminders to:   RUBY MAURICIO    Indications    Pulmonary embolism  bilateral (H) [I26.99]           Comments:            Anticoagulation Care Providers     Provider Role Specialty Phone number    Mamadou Baez MD Referring Family Medicine [phone]

## 2021-06-03 NOTE — TELEPHONE ENCOUNTER
ANTICOAGULATION  MANAGEMENT    Assessment     Today's INR result of 3.3 is Supratherapeutic (goal INR of 2.0-3.0)        Warfarin taken as previously instructed    Decreased greens/vitamin K intake may be affecting INR- getting back to normal greens    Had more alcohol last week in [facility].     No new medication/supplements affecting INR    Continues to tolerate warfarin with no reported s/s of bleeding or thromboembolism     Previous INR was Therapeutic    Plan:     Spoke with Prem regarding INR result and instructed:     Warfarin Dosing Instructions:  Take lower dose of 3.75 mg today only then continue current warfarin dose 7.5 mg daily.    Instructed patient to follow up no later than: 2 weeks. Appt is made for 11/26.     Education provided: importance of consistent vitamin K intake, potential interaction between warfarin and alcohol and importance of taking warfarin as instructed    Ryan verbalizes understanding and agrees to warfarin dosing plan.    Instructed to call the [facility] for any changes, questions or concerns. (#[phone])   ?   Morales Tapia RN    Subjective/Objective:      Prem FALCON Claudia, a 69 y.o. male is on warfarin.     Prem reports:     Home warfarin dose: verbally confirmed home dose with pt and updated on anticoagulation calendar     Missed doses: No     Medication changes:  No     S/S of bleeding or thromboembolism:  No     New Injury or illness:  No     Changes in diet or alcohol consumption:  Yes: had less greens and more alcohol.     Upcoming surgery, procedure or cardioversion:  No    Anticoagulation Episode Summary     Current INR goal:   2.0-3.0   TTR:   66.9 %   Next INR check:   11/26/2019   INR from last check:   3.30! (11/12/2019)   Weekly max warfarin dose:      Target end date:      INR check location:      Preferred lab:      Send INR reminders to:   RUBY MAURICIO    Indications    Pulmonary embolism  bilateral (H) [I26.99]           Comments:             Anticoagulation Care Providers     Provider Role Specialty Phone number    Mamadou Baez MD Referring Family Medicine [phone]

## 2021-06-04 VITALS
BODY MASS INDEX: 28.75 KG/M2 | DIASTOLIC BLOOD PRESSURE: 70 MMHG | HEIGHT: 74 IN | WEIGHT: 224 LBS | OXYGEN SATURATION: 98 % | HEART RATE: 84 BPM | SYSTOLIC BLOOD PRESSURE: 130 MMHG

## 2021-06-04 VITALS
HEART RATE: 72 BPM | BODY MASS INDEX: 29.13 KG/M2 | WEIGHT: 227 LBS | OXYGEN SATURATION: 98 % | DIASTOLIC BLOOD PRESSURE: 70 MMHG | HEIGHT: 74 IN | SYSTOLIC BLOOD PRESSURE: 120 MMHG

## 2021-06-04 VITALS
WEIGHT: 219 LBS | SYSTOLIC BLOOD PRESSURE: 124 MMHG | HEART RATE: 72 BPM | DIASTOLIC BLOOD PRESSURE: 78 MMHG | BODY MASS INDEX: 27.23 KG/M2 | TEMPERATURE: 97.3 F | OXYGEN SATURATION: 98 % | HEIGHT: 75 IN

## 2021-06-04 VITALS
BODY MASS INDEX: 27.98 KG/M2 | RESPIRATION RATE: 16 BRPM | HEIGHT: 75 IN | DIASTOLIC BLOOD PRESSURE: 74 MMHG | SYSTOLIC BLOOD PRESSURE: 118 MMHG | HEART RATE: 70 BPM | WEIGHT: 225 LBS

## 2021-06-04 NOTE — TELEPHONE ENCOUNTER
ANTICOAGULATION  MANAGEMENT    Assessment     Today's INR result of 2.7 is Therapeutic (goal INR of 2.0-3.0)        Warfarin taken as previously instructed    No new diet changes affecting INR    No new medication/supplements affecting INR    Continues to tolerate warfarin with no reported s/s of bleeding or thromboembolism     Previous INR was Therapeutic    Plan:     Left a detailed message for Prem regarding INR result and instructed:     Warfarin Dosing Instructions:  Continue current warfarin dose 7.5 mg daily.    Instructed patient to follow up no later than: 4 weeks.    Education provided: importance of taking warfarin as instructed      Instructed to call the [facility] for any changes, questions or concerns. (#[phone])   ?   Morales Tapia RN    Subjective/Objective:      Prem Taylor, a 69 y.o. male is on warfarin.     Prem reports:     Home warfarin dose: as updated on anticoagulation calendar per template     Missed doses: No     Medication changes:  No     S/S of bleeding or thromboembolism:  No     New Injury or illness:  No     Changes in diet or alcohol consumption:  No     Upcoming surgery, procedure or cardioversion:  No    Anticoagulation Episode Summary     Current INR goal:   2.0-3.0   TTR:   65.2 % (1 y)   Next INR check:   1/7/2020   INR from last check:   2.70 (12/10/2019)   Weekly max warfarin dose:      Target end date:      INR check location:      Preferred lab:      Send INR reminders to:   RUBY MAURICIO    Indications    Pulmonary embolism  bilateral (H) [I26.99]           Comments:            Anticoagulation Care Providers     Provider Role Specialty Phone number    Mamadou Baez MD Referring Family Medicine [phone]

## 2021-06-05 VITALS
OXYGEN SATURATION: 99 % | WEIGHT: 216 LBS | DIASTOLIC BLOOD PRESSURE: 70 MMHG | SYSTOLIC BLOOD PRESSURE: 116 MMHG | TEMPERATURE: 98.2 F | HEART RATE: 66 BPM | BODY MASS INDEX: 27.36 KG/M2

## 2021-06-05 NOTE — TELEPHONE ENCOUNTER
Refill Approved    Rx renewed per Medication Renewal Policy. Medication was last renewed on 6/19/19.    Kayleigh Hussein, [facility] Triage/Med Refill 2/5/2020     Requested Prescriptions   Pending Prescriptions Disp Refills     escitalopram oxalate (LEXAPRO) 10 MG tablet [Pharmacy Med Name: ESCITALOPRAM OXALATE 10MG TABS] 90 tablet 2     Sig: TAKE ONE TABLET BY MOUTH EVERY DAY       [facility] Refill Protocol  Passed - 2/5/2020 11:45 AM        Passed - PCP or prescribing provider visit in last year     Last office visit with prescriber/PCP: 7/25/2019 Mamadou Baez MD OR same dept: 9/24/2019 Priscilla Ye MD OR same specialty: 9/24/2019 Priscilla Ye MD  Last physical: 8/2/2019 Last MTM visit: Visit date not found   Next visit within 3 mo: Visit date not found  Next physical within 3 mo: Visit date not found  Prescriber OR PCP: Mamadou Baez MD  Last diagnosis associated with med order: 1. Anxiety  - escitalopram oxalate (LEXAPRO) 10 MG tablet [Pharmacy Med Name: ESCITALOPRAM OXALATE 10MG TABS]; TAKE ONE TABLET BY MOUTH EVERY DAY  Dispense: 90 tablet; Refill: 2    If protocol passes may refill for 12 months if within 3 months of last provider visit (or a total of 15 months).

## 2021-06-05 NOTE — TELEPHONE ENCOUNTER
ANTICOAGULATION  MANAGEMENT    Assessment     Today's INR result of 2.3 is Therapeutic (goal INR of 2.0-3.0)        Warfarin taken as previously instructed    No new diet changes affecting INR    No new medication/supplements affecting INR    Continues to tolerate warfarin with no reported s/s of bleeding or thromboembolism     Previous INR was Supratherapeutic    Plan:     Left a detailed message for Prem regarding INR result and instructed:     Warfarin Dosing Instructions:  Continue current warfarin dose 7.5 mg daily.    Instructed patient to follow up no later than: 2 weeks.    Education provided: importance of taking warfarin as instructed      Instructed to call the [facility] for any changes, questions or concerns. (#[phone])   ?   Morales Tapia RN    Subjective/Objective:      Prem Taylor, a 69 y.o. male is on warfarin.     Prem reports:     Home warfarin dose: as updated on anticoagulation calendar per template     Missed doses: No     Medication changes:  No     S/S of bleeding or thromboembolism:  No     New Injury or illness:  No     Changes in diet or alcohol consumption:  No     Upcoming surgery, procedure or cardioversion:  No    Anticoagulation Episode Summary     Current INR goal:   2.0-3.0   TTR:   67.8 % (1 y)   Next INR check:   2/4/2020   INR from last check:   2.30 (1/21/2020)   Weekly max warfarin dose:      Target end date:      INR check location:      Preferred lab:      Send INR reminders to:   RUBY MAURICIO    Indications    Pulmonary embolism  bilateral (H) [I26.99]           Comments:            Anticoagulation Care Providers     Provider Role Specialty Phone number    Mamadou Baez MD Referring Family Medicine [phone]

## 2021-06-05 NOTE — TELEPHONE ENCOUNTER
ANTICOAGULATION  MANAGEMENT    Assessment     Today's INR result of 2.2 is Therapeutic (goal INR of 2.0-3.0)        Warfarin taken as previously instructed    No new diet changes affecting INR    No new medication/supplements affecting INR    Continues to tolerate warfarin with no reported s/s of bleeding or thromboembolism     Previous INR was Therapeutic    Plan:     Left a detailed message for Perm regarding INR result and instructed:     Warfarin Dosing Instructions:  Continue current warfarin dose 7.5 mg daily.    Instructed patient to follow up no later than: 4 weeks.     Education provided: importance of taking warfarin as instructed      Instructed to call the [facility] for any changes, questions or concerns. (#[phone])   ?   Morales Tapia RN    Subjective/Objective:      Prem Taylor, a 69 y.o. male is on warfarin.     Prem reports:     Home warfarin dose: as updated on anticoagulation calendar per template     Missed doses: No     Medication changes:  No     S/S of bleeding or thromboembolism:  No     New Injury or illness:  No     Changes in diet or alcohol consumption:  No     Upcoming surgery, procedure or cardioversion:  No    Anticoagulation Episode Summary     Current INR goal:   2.0-3.0   TTR:   71.9 % (1 y)   Next INR check:   3/4/2020   INR from last check:   2.20 (2/5/2020)   Weekly max warfarin dose:      Target end date:      INR check location:      Preferred lab:      Send INR reminders to:   RUBY MAURICIO    Indications    Pulmonary embolism  bilateral (H) [I26.99]           Comments:            Anticoagulation Care Providers     Provider Role Specialty Phone number    Mamadou Baez MD Referring Family Medicine [phone]

## 2021-06-05 NOTE — TELEPHONE ENCOUNTER
ANTICOAGULATION  MANAGEMENT    Assessment     Today's INR result of 3.3 is Supratherapeutic (goal INR of 2.0-3.0)        Warfarin taken as previously instructed    Decreased greens/vitamin K intake may be affecting INR. Had fewer greens and a little more alcohol during vacation last week.     No new medication/supplements affecting INR    Continues to tolerate warfarin with no reported s/s of bleeding or thromboembolism     Previous INR was Therapeutic    Plan:     Spoke with Prem regarding INR result and instructed:     Warfarin Dosing Instructions:  Take lower dose of 3.75 mg today only then continue current warfarin dose 7.5 mg daily.    Instructed patient to follow up no later than: 2 weeks.    Education provided: importance of consistent vitamin K intake and importance of taking warfarin as instructed    Ryan verbalizes understanding and agrees to warfarin dosing plan.    Instructed to call the [facility] for any changes, questions or concerns. (#[phone])   ?   Morales Tapai RN    Subjective/Objective:      Prem Taylor, a 69 y.o. male is on warfarin.     Prem reports:     Home warfarin dose: verbally confirmed home dose with pt and updated on anticoagulation calendar     Missed doses: No     Medication changes:  No     S/S of bleeding or thromboembolism:  No     New Injury or illness:  No     Changes in diet or alcohol consumption:  Yes: less greens and more alcohol.     Upcoming surgery, procedure or cardioversion:  No    Anticoagulation Episode Summary     Current INR goal:   2.0-3.0   TTR:   65.1 % (1 y)   Next INR check:   1/21/2020   INR from last check:   3.30! (1/7/2020)   Weekly max warfarin dose:      Target end date:      INR check location:      Preferred lab:      Send INR reminders to:   RUBY MAURICIO    Indications    Pulmonary embolism  bilateral (H) [I26.99]           Comments:            Anticoagulation Care Providers     Provider Role Specialty Phone number    Nestor  Mamadou MONTEMAYOR MD [facility] [phone]

## 2021-06-06 NOTE — TELEPHONE ENCOUNTER
Called back and spoke with Ryan. He said he's having a little cold and wondering if he should take Tylenol or Advil. ACN advised that pt should take Tylenol and avoid Advil since he's also on warfarin.     Pt verbalized understanding.

## 2021-06-06 NOTE — TELEPHONE ENCOUNTER
ANTICOAGULATION  MANAGEMENT    Assessment     Today's INR result of 2.6 is Therapeutic (goal INR of 2.0-3.0)        Warfarin taken as previously instructed    No new diet changes affecting INR    No new medication/supplements affecting INR    Continues to tolerate warfarin with no reported s/s of bleeding or thromboembolism     Previous INR was Therapeutic    Plan:     Spoke with Prem regarding INR result and instructed:     Warfarin Dosing Instructions:  Continue current warfarin dose 7.5 mg daily. (0 % change)    Instructed patient to follow up no later than: one month.    Education provided: importance of therapeutic range, importance of following up for INR monitoring at instructed interval and importance of taking warfarin as instructed    Ryan verbalizes understanding and agrees to warfarin dosing plan.    Instructed to call the [facility] for any changes, questions or concerns. (#[phone])   ?   Mee Maguire RN    Subjective/Objective:      Prem Taylor, a 69 y.o. male is on warfarin.     Prem reports:     Home warfarin dose: verbally confirmed home dose with Ryan and updated on anticoagulation calendar     Missed doses: No     Medication changes:  No     S/S of bleeding or thromboembolism:  No     New Injury or illness:  No     Changes in diet or alcohol consumption:  No     Upcoming surgery, procedure or cardioversion:  Yes: cataract tomorrow and other eye next week.    Anticoagulation Episode Summary     Current INR goal:   2.0-3.0   TTR:   74.0 % (1 y)   Next INR check:   3/31/2020   INR from last check:   2.60 (3/3/2020)   Weekly max warfarin dose:      Target end date:      INR check location:      Preferred lab:      Send INR reminders to:   RUBY MAURICIO    Indications    Pulmonary embolism  bilateral (H) [I26.99]           Comments:            Anticoagulation Care Providers     Provider Role Specialty Phone number    Mamadou Baez MD Referring Family Medicine [phone]

## 2021-06-06 NOTE — TELEPHONE ENCOUNTER
FYI - Status Update  Who is Calling: Patient  Update: Anticoagulation patient has question. Tylenol or Advil - can't remember which one is OK for him to take, and which one he should avoid.  Please call   Okay to leave a detailed message?:  Yes

## 2021-06-06 NOTE — PROGRESS NOTES
Preoperative Exam    Scheduled Procedure:   bilateral cataract repair  Surgery Date:  3/4/20 & 3/11/20  Surgery Location: [facility] in [place]    Surgeon:  Dr. Ventura    Assessment/Plan:     1. Preoperative examination  Preoperative examination completed.  Bilateral cataracts noted.  No contraindication to schedule procedure identified.    2. Cataract of both eyes, unspecified cataract type  Bilateral cataracts noted with scheduled bilateral cataract repair noted as above.    3. Persistent atrial fibrillation  Persistent atrial fibrillation remains weight rate controlled and continues warfarin anticoagulation 7.5 mg daily with recent INR 2.2.  Has scheduled repeat INR on March 3, 2020.    4. ALISE on CPAP  CPAP for ALISE management.    5. Normochromic normocytic anemia  Recent mild normochromic normocytic anemia, stable.  No bleeding concerns currently.    Lab Results   Component Value Date    WBC 4.7 02/11/2020    HGB 13.8 (L) 02/11/2020    HCT 40.2 02/11/2020    MCV 90 02/11/2020     02/11/2020        6. Anxiety  Escitalopram 10 mg daily for anxiety management.        Surgical Procedure Risk: Low (reported cardiac risk generally < 1%)  Have you had prior anesthesia?: Yes  Have you or any family members had a previous anesthesia reaction:  No  Do you or any family members have a history of a clotting or bleeding disorder?: Yes - PE on warfarin   Cardiac Risk Assessment: no increased risk for major cardiac complications    APPROVAL GIVEN to proceed with proposed procedure, without further diagnostic evaluation    Functional Status: Independent  Patient plans to recover at home with family.     Subjective:      Prem Taylor is a 69 y.o. male who presents for a preoperative consultation.  Bilateral cataracts requiring repair.  History of persistent atrial fibrillation.  Remains rate controlled.  Prior EKG 11/25/19.  Warfarin anticoagulation 7.5 mg daily.  Recent INR = 2.2  on 2/5/20.   Escitalopram  "10 mg daily for anxiety management.  Recent left inguinal herniorrhaphy procedure which went well without complication.  History of bilateral pulmonary emboli historically.  CPAP for ALISE management.  Prior tubular adenoma on colonoscopy 2019 and repeats at 5-year interval.  Suboptimal diet.  Remains active.  Comprehensive review of systems as above otherwise all negative.    All other systems reviewed and are negative, other than those listed in the HPI.     \"[place]\" x 44 years   2 daughters - Renee (38) and Susannah (35)  3 grandchildren  No smoke (quit  after 1 and  ppd)   EtOH: weekends \"few beers\"   Surgeries: anterior cervical fusion; inguinal hernia; tonsils   Hospitalizations: [facility] 10/9/17-10/10/17 bilateral pulmonary emboli with a. 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    Current Outpatient Medications   Medication Sig Dispense Refill     escitalopram oxalate (LEXAPRO) 10 MG tablet TAKE ONE TABLET BY MOUTH EVERY DAY 90 tablet 2     MULTIVITAMIN (MULTIPLE VITAMIN ORAL) Take 1 tablet by mouth daily.       warfarin (COUMADIN/JANTOVEN) 7.5 MG tablet TAKE ONE TABLET BY MOUTH EVERY DAY . ADJUST DOSE PER INR RESULTS AS INSTRUCTED 90 tablet 1     No current facility-administered medications for this visit.         No Known " Allergies    Patient Active Problem List   Diagnosis     Hearing Loss     Hypercholesterolemia     Presbycusis     Sinus bradycardia     BPH without urinary obstruction     Atypical Chest Pain     Blood Pressure Isolated Elevated     Male erectile dysfunction     Basal cell carcinoma, ear     Erectile dysfunction, unspecified erectile dysfunction type     Overweight (BMI 25.0-29.9)     Tubular adenoma of colon     Hyperbilirubinemia     Pulmonary embolism, bilateral (H)     Persistent atrial fibrillation     Mitral valve insufficiency     ALISE on CPAP     Anxiety     Chronic cough     Normochromic normocytic anemia     Left inguinal hernia       Past Medical History:   Diagnosis Date     Anemia      Anxiety      Atrial fibrillation (H) 10/09/2017     Cancer (H)      CPAP (continuous positive airway pressure) dependence 10/2019    per patient     Depression      History of blood clots      Pulmonary embolism, blood-clot, obstetric 10/09/2017     Sleep apnea     Uses CPAP       Past Surgical History:   Procedure Laterality Date     BACK SURGERY       CERVICAL FUSION  2001     HERNIA REPAIR  2005     ID LAP,INGUINAL HERNIA REPR,INITIAL Left 8/15/2019    Procedure: HERNIORRHAPHY, INGUINAL, LAPAROSCOPIC;  Surgeon: Yan Leiva MD;  Location: [facility];  Service: General     TONSILLECTOMY      childhood       Social History     Socioeconomic History     Marital status:      Spouse name: Not on file     Number of children: Not on file     Years of education: Not on file     Highest education level: Not on file   Occupational History     Not on file   Social Needs     Financial resource strain: Not on file     Food insecurity:     Worry: Not on file     Inability: Not on file     Transportation needs:     Medical: Not on file     Non-medical: Not on file   Tobacco Use     Smoking status: Former Smoker     Smokeless tobacco: Never Used   Substance and Sexual Activity     Alcohol use: Yes     Drug use: No  "    Sexual activity: Not on file   Lifestyle     Physical activity:     Days per week: Not on file     Minutes per session: Not on file     Stress: Not on file   Relationships     Social connections:     Talks on phone: Not on file     Gets together: Not on file     Attends Sabianism service: Not on file     Active member of club or organization: Not on file     Attends meetings of clubs or organizations: Not on file     Relationship status: Not on file     Intimate partner violence:     Fear of current or ex partner: Not on file     Emotionally abused: Not on file     Physically abused: Not on file     Forced sexual activity: Not on file   Other Topics Concern     Not on file   Social History Narrative     Not on file       Patient Care Team:  Mamadou Baez MD as PCP - General  Gerardo Velez MD as Physician (Cardiology)  Miky Lindsey MD (Hematology and Oncology)  Mamadou Baez MD as Assigned PCP          Objective:     Vitals:    20 1617   BP: 120/70   Pulse: 72   SpO2: 98%   Weight: (!) 227 lb (103 kg)   Height: 6' 2.25\" (1.886 m)         Physical Exam:  Physical Exam     General Appearance:  Alert, cooperative, no distress, appears stated age   HEENT:  Normal.  No acute findings.  Glasses in place.   Neck: Supple, symmetrical, no adenopathy.   Lungs:   Clear to auscultation bilaterally, respirations unlabored.  No expiratory wheeze or inspiratory crackles noted.   Heart:  Regular rate and rhythm, S1, S2 normal, no murmur, rub or gallop   Abdomen:   Soft, non-tender, positive bowel sounds, no masses, no organomegaly   Extremities: Extremities normal.  No cyanosis or edema   Skin: Warm, dry.  Skin color, texture, turgor normal, no rashes or lesions   Neurologic: Nonfocal.          There are no Patient Instructions on file for this visit.    EK19  Atrial fibrillation  Abnormal ECG  When compared with ECG of 10-SEP-2019 13:25,  Criteria for Septal infarct are no longer " Present  Confirmed by JACLYN RODAS MD LOC:[facility] ([id number]) on 11/25/2019 3:45:46 PM    Labs:  No labs were ordered during this visit    Immunization History   Administered Date(s) Administered     DT (pediatric) 09/09/1997     Influenza high dose,seasonal,PF, 65+ yrs 10/03/2016, 10/11/2018, 09/24/2019     Influenza, inj, historic,unspecified 10/30/2014, 10/10/2017     Influenza, seasonal,quad inj 6-35 mos 10/26/2010, 11/16/2011, 12/06/2012     Influenza,seasonal,quad inj =/> 6months 09/22/2015     Pneumo Conj 13-V (2010&after) 01/03/2017     Pneumo Polysac 23-V 02/06/2018     Td, adult adsorbed, PF 01/03/2017     Td,adult,historic,unspecified 09/09/1997     Tdap 09/27/2007     ZOSTER, LIVE 11/16/2011     ZOSTER, RECOMBINANT, IM 12/04/2018, 02/08/2019           Electronically signed by Mamadou Baez MD 02/27/20 4:18 PM

## 2021-06-06 NOTE — PROGRESS NOTES
Assessment and Plan:       1. Encounter for general adult medical examination with abnormal findings  Annual wellness visit completed.  Risks associated with suboptimal diet as well as some decreased hearing.  Cerumen impaction noted bilateral removed with curette tolerated well.  Improvement noted following.  Annual wellness visits to continue.  Screening PSA to be completed based on age criteria.  Immunizations reviewed and up-to-date.  Prior colonoscopy February 11, 2019 with tubular adenoma and will repeat at 5-year interval.  - PSA (Prostatic-Specific Antigen), Annual Screen    2. Overweight (BMI 25.0-29.9)  Overweight status with weight goal less than 220 pounds initially, less than 210 pounds ideally.    3. ALISE on CPAP  CPAP for ALISE.  Tolerating well.  Also describes benefit apparently with reduced nocturia.    4. Tubular adenoma of colon  As above, repeat colonoscopy at 5-year interval with prior colonoscopy February 11, 2019.    5. Persistent atrial fibrillation  Persistent atrial fibrillation.  Continues warfarin anticoagulation 7.5 mg daily.  - Basic Metabolic Panel    6. Hypercholesterolemia  Check lipid cascade today while fasting.  Therapeutic lifestyle changes reviewed as noted above.  - Lipid Cascade    7. Mitral valve insufficiency, unspecified etiology  Mitral valve insufficiency, asymptomatic.    8. Normochromic normocytic anemia  History of mild normochromic normocytic anemia.  Repeat CBC.  - HM2(CBC w/o Differential)    9. Anxiety  Escitalopram 10 mg daily to continue.  KALPANA 7 questionnaire 0 out of 21 with PHQ 9 questionnaire 1 out of 27.    10. Encounter for screening for malignant neoplasm of prostate   Screening PSA completed.  - PSA (Prostatic-Specific Antigen), Annual Screen        The patient's current medical problems were reviewed.    I have had an Advance Directives discussion with the patient.  The following high BMI interventions were performed this visit: encouragement to exercise,  "weight monitoring, weight loss from baseline weight and lifestyle education regarding diet.  Ensure ongoing efforts to achieve weight goal < 210 pounds initially, < 200 pounds ideally.     The following health maintenance schedule was reviewed with the patient and provided in printed form in the after visit summary:   Health Maintenance   Topic Date Due     FALL RISK ASSESSMENT  2020     MEDICARE ANNUAL WELLNESS VISIT  2021     LIPID  2024     ADVANCE CARE PLANNING  2024     TD 18+ HE  2027     COLONOSCOPY  2029     HEPATITIS C SCREENING  Completed     PNEUMOCOCCAL IMMUNIZATION 65+ LOW/MEDIUM RISK  Completed     INFLUENZA VACCINE RULE BASED  Completed     ZOSTER VACCINES  Completed        Subjective:     Chief Complaint: Prem Taylor is an 69 y.o. male here for an Annual Wellness visit.     HPI: In general doing well.  Has upcoming cataract surgery in the next month or so and will schedule preoperative clearance for that.  History of persistent atrial fibrillation.  Remains rate controlled.  Warfarin anticoagulation 7.5 mg daily.  Escitalopram 10 mg daily for anxiety management.  Recent left inguinal herniorrhaphy procedure which went well without complication.  History of bilateral pulmonary emboli historically.  CPAP for ALISE management.  Prior tubular adenoma on colonoscopy 2019 and repeats at 5-year interval.  Suboptimal diet.  Remains active.  Comprehensive review of systems as above otherwise all negative.    Review of Systems:  Please see above.  The rest of the review of systems are negative for all systems.     \"Aubree\" x 44 years   2 daughters - Renee (38) and Susannah (35)  3 grandchildren  No smoke (quit  after 1 and 1/2 ppd)   EtOH: weekends \"few beers\"   Surgeries: anterior cervical fusion; inguinal hernia; tonsils   Hospitalizations: [facility] 10/9/17-10/10/17 bilateral pulmonary emboli with a. 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)  Exercise: run 4x/week at 6-6.5 mph; situps and pushups    Patient Care Team:  Mamadou Baez MD as PCP - Gerardo Weaver MD as Physician (Cardiology)  Miky Lindsey MD (Hematology and Oncology)  Mamadou Baez MD as Assigned PCP     Patient Active Problem List   Diagnosis     Hearing Loss     Hypercholesterolemia     Presbycusis     Sinus bradycardia     BPH without urinary obstruction     Atypical Chest Pain     Blood Pressure Isolated Elevated     Male erectile dysfunction     Basal cell carcinoma, ear     Erectile dysfunction, unspecified erectile dysfunction type     Overweight (BMI 25.0-29.9)     Tubular adenoma of colon     Hyperbilirubinemia     Pulmonary embolism, bilateral (H)     Persistent atrial fibrillation     Mitral valve insufficiency     ALISE on CPAP     Anxiety     Chronic cough     Normochromic normocytic anemia     Left inguinal hernia     Past Medical History:   Diagnosis Date     Anemia      Anxiety      Atrial fibrillation (H) 10/09/2017     Cancer (H)      CPAP (continuous positive airway pressure) dependence 10/2019    per patient     Depression      History of blood clots      Pulmonary embolism, blood-clot, obstetric 10/09/2017     Sleep apnea     Uses CPAP      Past Surgical History:   Procedure Laterality Date     BACK SURGERY       CERVICAL FUSION  2001     HERNIA REPAIR       OR LAP,INGUINAL HERNIA REPR,INITIAL Left 8/15/2019    Procedure: HERNIORRHAPHY, INGUINAL, LAPAROSCOPIC;  Surgeon: Yan Leiva MD;  Location: [facility];  Service: General     TONSILLECTOMY      childhood      Family History   Problem Relation Age of Onset     Heart disease Mother      Hypertension Mother      Dementia Mother      Heart attack Father       Social History     Socioeconomic History     Marital status:      Spouse name: Not on file      "Number of children: Not on file     Years of education: Not on file     Highest education level: Not on file   Occupational History     Not on file   Social Needs     Financial resource strain: Not on file     Food insecurity:     Worry: Not on file     Inability: Not on file     Transportation needs:     Medical: Not on file     Non-medical: Not on file   Tobacco Use     Smoking status: Former Smoker     Smokeless tobacco: Never Used   Substance and Sexual Activity     Alcohol use: Yes     Drug use: No     Sexual activity: Not on file   Lifestyle     Physical activity:     Days per week: Not on file     Minutes per session: Not on file     Stress: Not on file   Relationships     Social connections:     Talks on phone: Not on file     Gets together: Not on file     Attends Taoism service: Not on file     Active member of club or organization: Not on file     Attends meetings of clubs or organizations: Not on file     Relationship status: Not on file     Intimate partner violence:     Fear of current or ex partner: Not on file     Emotionally abused: Not on file     Physically abused: Not on file     Forced sexual activity: Not on file   Other Topics Concern     Not on file   Social History Narrative     Not on file      Current Outpatient Medications   Medication Sig Dispense Refill     escitalopram oxalate (LEXAPRO) 10 MG tablet TAKE ONE TABLET BY MOUTH EVERY DAY 90 tablet 2     MULTIVITAMIN (MULTIPLE VITAMIN ORAL) Take 1 tablet by mouth daily.       warfarin (COUMADIN/JANTOVEN) 7.5 MG tablet TAKE ONE TABLET BY MOUTH EVERY DAY . ADJUST DOSE PER INR RESULTS AS INSTRUCTED 90 tablet 1     No current facility-administered medications for this visit.       Objective:   Vital Signs:   Visit Vitals  /70   Pulse 84   Ht 6' 2.25\" (1.886 m)   Wt (!) 224 lb (101.6 kg)   SpO2 98%   BMI 28.57 kg/m         VisionScreening:  No exam data present     PHYSICAL EXAM    General Appearance:    Alert, cooperative, no distress, " appears stated age.  Overweight.   Head:    Normocephalic, without obvious abnormality, atraumatic   Eyes:    PERRL, conjunctiva/corneas clear, EOM's intact, fundi     benign, both eyes        Ears:    Normal TM's and external ear canals, both ears   Nose:   Nares normal, septum midline, mucosa normal, no drainage    or sinus tenderness   Throat:   Lips, mucosa, and tongue normal; teeth and gums normal   Neck:   Supple, symmetrical, trachea midline, no adenopathy;        thyroid:  No enlargement/tenderness/nodules; no carotid    bruit or JVD   Back:     Symmetric, no curvature, ROM normal, no CVA tenderness   Lungs:     Clear to auscultation bilaterally, respirations unlabored   Chest wall:    No tenderness or deformity   Heart:    Regular rate and rhythm, S1 and S2 normal, no murmur, rub   or gallop   Abdomen:     Soft, non-tender, bowel sounds active all four quadrants,     no masses, no organomegaly.     Genitalia:    Normal male without lesion, discharge or tenderness.  No inguinal hernia noted.     Rectal:    Normal tone.  Prostate enlarged o/w symmetric, no masses or tenderness.   Extremities:   Extremities normal, atraumatic, no cyanosis or edema   Pulses:   2+ and symmetric all extremities   Skin:   Skin color, texture, turgor normal, no rashes or lesions   Lymph nodes:   Cervical, supraclavicular, and axillary nodes normal   Neurologic:   CNII-XII intact. Normal strength, sensation and reflexes       throughout        Assessment Results 2/11/2020   Activities of Daily Living No help needed   Instrumental Activities of Daily Living No help needed   Get Up and Go Score Less than 12 seconds   Mini Cog Total Score 5   Some recent data might be hidden     A Mini-Cog score of 0-2 suggests the possibility of dementia, score of 3-5 suggests no dementia    Identified Health Risks:     The patient was counseled and encouraged to consider modifying their diet and eating habits. He was provided with information on  recommended healthy diet options.  The patient was provided with written information regarding signs of hearing loss.  Patient's advanced directive was discussed and I am comfortable with the patient's wishes.

## 2021-06-08 ENCOUNTER — OFFICE VISIT - HEALTHEAST (OUTPATIENT)
Dept: CARDIOLOGY | Facility: CLINIC | Age: 71
End: 2021-06-08

## 2021-06-08 DIAGNOSIS — I48.19 PERSISTENT ATRIAL FIBRILLATION (H): ICD-10-CM

## 2021-06-08 DIAGNOSIS — G47.33 OSA ON CPAP: ICD-10-CM

## 2021-06-08 DIAGNOSIS — I26.99 PULMONARY EMBOLISM, BILATERAL (H): ICD-10-CM

## 2021-06-08 ASSESSMENT — MIFFLIN-ST. JEOR: SCORE: 1848.54

## 2021-06-08 NOTE — TELEPHONE ENCOUNTER
ANTICOAGULATION  MANAGEMENT    Assessment     Today's INR result of 2.4 is Therapeutic (goal INR of 2.0-3.0)        Warfarin taken as previously instructed    No new diet changes affecting INR    No new medication/supplements affecting INR    Continues to tolerate warfarin with no reported s/s of bleeding or thromboembolism     Previous INR was Therapeutic    Plan:     Spoke with Prem regarding INR result and instructed:     Warfarin Dosing Instructions:  Continue current warfarin dose 7.5 mg daily.  (0 % change)    Instructed patient to follow up no later than: 6 weeks.    Education provided: importance of therapeutic range, importance of following up for INR monitoring at instructed interval and importance of taking warfarin as instructed    Ryan verbalizes understanding and agrees to warfarin dosing plan.    Instructed to call the [facility] for any changes, questions or concerns. (#[phone])   ?   Mee Maguire RN    Subjective/Objective:      Prem Taylor, a 69 y.o. male is on warfarin.     Prem reports:     Home warfarin dose: verbally confirmed home dose with Ryan and updated on anticoagulation calendar     Missed doses: No     Medication changes:  No     S/S of bleeding or thromboembolism:  No     New Injury or illness:  No     Changes in diet or alcohol consumption:  No     Upcoming surgery, procedure or cardioversion:  No    Anticoagulation Episode Summary     Current INR goal:   2.0-3.0   TTR:   74.0 % (1 y)   Next INR check:   6/24/2020   INR from last check:   2.40 (5/13/2020)   Weekly max warfarin dose:      Target end date:      INR check location:      Preferred lab:      Send INR reminders to:   RUBY MAURICIO    Indications    Pulmonary embolism  bilateral (H) [I26.99]           Comments:            Anticoagulation Care Providers     Provider Role Specialty Phone number    Mamadou Baez MD Referring Family Medicine [phone]

## 2021-06-08 NOTE — PROGRESS NOTES
Assessment and Plan:       1. Encounter for general adult medical examination with abnormal findings  Anticipate repeat colonoscopy January 2019 with history of tubular adenoma ×2 January 30, 2014.  Patient elects annual PSA screening.  Annual exams to continue.  - Pneumococcal conjugate vaccine 13-valent 6wks-17yrs; >50yrs  - Td, Preservative Free  - PSA (Prostatic-Specific Antigen), Annual Screen    2. Presbyacusis, bilateral  Declines audiology referral.  Describes issue as more of a problem for his wife.  Cerumen removed from bilateral external auditory canals with curet.  Tympanic membranes otherwise appear normal.  We will continue to monitor.    3. Hypercholesterolemia  History of hypercholesterolemia however protective HDL 72 December 29, 2015.  Repeat lipid cascade today and continue attempts at dietary and exercise modifications for weight goal less than 210 pounds initially, less than 200 pounds ideally.  - Lipid Cascade    4. Overweight (BMI 25.0-29.9)  Weight goal < 210 pounds initially, < 200 pounds ideally.     5. Erectile dysfunction, unspecified erectile dysfunction type  Viagra available on as-needed basis.    6. BPH without urinary obstruction  Otherwise asymptomatic.  Nocturia one time per night.  We will continue to monitor.    7. Hyperbilirubinemia  Prior mild elevation of 1.3.  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  Prior CBC normal.  Continue to monitor.  - Comprehensive Metabolic Panel    8. Tubular adenoma of colon  Repeat colonoscopy January 2019 (5 year interval).    9.  Dermatitis  Hydrocortisone 1% cream 2-3 times daily as needed to areas of dermatitis involving right gluteal region.    The patient's current medical problems were reviewed.    I have had an Advance Directives discussion with the patient.  The following high BMI interventions were performed this visit: encouragement to exercise, weight monitoring, weight loss from baseline weight and lifestyle education regarding diet  The  "following health maintenance schedule was reviewed with the patient and provided in printed form in the after visit summary:   Health Maintenance   Topic Date Due     INFLUENZA VACCINE RULE BASED (1) 2016     PNEUMOCOCCAL POLYSACCHARIDE VACCINE AGE 65 AND OVER  2016     PNEUMOCOCCAL CONJUGATE VACCINE FOR ADULTS (PCV13 OR PREVNAR)  2016     FALL RISK ASSESSMENT  2016     TD 18+ HE  2017     ADVANCE DIRECTIVES DISCUSSED WITH PATIENT  2019     COLONOSCOPY  2024     TDAP ADULT ONE TIME DOSE  Completed     ZOSTER VACCINE  Completed        Subjective:   Chief Complaint: Prem Taylor is an 66 y.o. male here for an Annual Wellness visit.   HPI:  Doing well.  No concerns.  Past medical social and family history updated as noted below.  Does not require refills.  Denies chest pain or shortness of breath.  Exercises on a consistent basis.  Needs to include more fruits and vegetables in diet.  Hearing loss issues according to his wife.  Has not completed advanced directives previously.     \"Aubree\" x 41 years   2 daughters   No smoke (quit  after 1 and 1/ ppd)   EtOH: weekends \"few beers\"   Surgeries: anterior cervical fusion; inguinal hernia; tonsils   Hospitalizations: none   Mom -  86 due to CHF; Alzeihmer's dementia   Dad -  52 due to MI   1 bro - prostate CA   1 sis -   Work: manager in a dental lab   Exercise: run 4x/week at 6-6.5 mph; situps and pushups     Review of Systems:  Please see above.  The rest of the review of systems are negative for all systems.    Patient Care Team:  Mamadou Baez MD as PCP - General     Patient Active Problem List   Diagnosis     Hearing Loss     Hypercholesterolemia     Presbycusis     Sinus Bradycardia     BPH without urinary obstruction     Atypical Chest Pain     Blood Pressure Isolated Elevated     Male erectile dysfunction     Basal cell carcinoma, ear     Erectile dysfunction, unspecified erectile dysfunction " "type     Overweight (BMI 25.0-29.9)     Tubular adenoma of colon     Hyperbilirubinemia     Past Medical History   Diagnosis Date     Hypercholesterolemia       Past Surgical History   Procedure Laterality Date     Hernia repair       Cervical fusion       Tonsillectomy       Spine surgery        Family History   Problem Relation Age of Onset     Heart disease Mother      Hypertension Mother      Dementia Mother      Heart attack Father       Social History     Social History     Marital status:      Spouse name: N/A     Number of children: N/A     Years of education: N/A     Occupational History     Not on file.     Social History Main Topics     Smoking status: Former Smoker     Smokeless tobacco: Never Used     Alcohol use Yes     Drug use: No     Sexual activity: Not on file     Other Topics Concern     Not on file     Social History Narrative      Current Outpatient Prescriptions   Medication Sig Dispense Refill     aspirin 81 MG EC tablet Take 81 mg by mouth daily.        MULTIVITAMIN (MULTIPLE VITAMIN ORAL) Take 1 tablet by mouth daily.       sildenafil (VIAGRA) 100 MG tablet Take 50 mg by mouth. One hour prior to intercourse.       No current facility-administered medications for this visit.       Objective:   Vital Signs:   Visit Vitals     /74     Pulse (!) 52     Ht 6' 2.75\" (1.899 m)     Wt 217 lb (98.4 kg)     BMI 27.3 kg/m2        VisionScreening:   Visual Acuity Screening    Right eye Left eye Both eyes   Without correction:      With correction: 20/40 20/25 20/25        PHYSICAL EXAM    General Appearance:    Alert, cooperative, no distress, appears stated age.  Overweight.   Head:    Normocephalic, without obvious abnormality, atraumatic   Eyes:    PERRL, conjunctiva/corneas clear, EOM's intact, fundi     benign, both eyes        Ears:    Normal TM's and external ear canals, both ears   Nose:   Nares normal, septum midline, mucosa normal, no drainage    or sinus tenderness   Throat:   " Lips, mucosa, and tongue normal; teeth and gums normal   Neck:   Supple, symmetrical, trachea midline, no adenopathy;        thyroid:  No enlargement/tenderness/nodules; no carotid    bruit or JVD   Back:     Symmetric, no curvature, ROM normal, no CVA tenderness   Lungs:     Clear to auscultation bilaterally, respirations unlabored   Chest wall:    No tenderness or deformity   Heart:    Regular rate and rhythm, S1 and S2 normal, no murmur, rub   or gallop   Abdomen:     Soft, non-tender, bowel sounds active all four quadrants,     no masses, no organomegaly.  No AAA.   Genitalia:    Normal male without lesion, discharge or tenderness.  No inguinal hernia noted.     Rectal:    Normal tone.  Prostate normal/symmetric, no masses or tenderness.   Extremities:   Extremities normal, atraumatic, no cyanosis or edema   Pulses:   2+ and symmetric all extremities   Skin:   Skin color, texture, turgor normal, or lesions.  Mild right gluteal dermatitis.     Lymph nodes:   Cervical, supraclavicular, and axillary nodes normal   Neurologic:   CNII-XII intact. Normal strength, sensation and reflexes       throughout        Assessment Results 1/3/2017   Activities of Daily Living No help needed   Instrumental Activities of Daily Living No help needed   Mini Cog Total Score 5     A Mini-Cog score of 0-2 suggests the possibility of dementia, score of 3-5 suggests no dementia    Identified Health Risks:     The patient was counseled and encouraged to consider modifying their diet and eating habits. He was provided with information on recommended healthy diet options.  The patient was provided with written information regarding signs of hearing loss.  Information regarding advance directives (living [place]), including where he can download the appropriate form, was provided to the patient via the AVS.

## 2021-06-09 NOTE — TELEPHONE ENCOUNTER
ANTICOAGULATION  MANAGEMENT    Assessment     Today's INR result of 2.6 is Therapeutic (goal INR of 2.0-3.0)        Warfarin taken as previously instructed    No new diet changes affecting INR    No new medication/supplements affecting INR    Continues to tolerate warfarin with no reported s/s of bleeding or thromboembolism     Previous INR was Therapeutic    Plan:     Spoke with Prem regarding INR result and instructed:     Warfarin Dosing Instructions:  Continue current warfarin dose 7.5 mg daily.    Instructed patient to follow up no later than: 6-8 weeks.    Education provided: importance of following up for INR monitoring at instructed interval and importance of taking warfarin as instructed    Ryan verbalizes understanding and agrees to warfarin dosing plan.    Instructed to call the [facility] for any changes, questions or concerns. (#[phone])   ?   Morales Tapia RN    Subjective/Objective:      Prem Taylor, a 69 y.o. male is on warfarin.     Prem reports:     Home warfarin dose: verbally confirmed home dose with pt and updated on anticoagulation calendar     Missed doses: No     Medication changes:  No     S/S of bleeding or thromboembolism:  No     New Injury or illness:  No     Changes in diet or alcohol consumption:  No     Upcoming surgery, procedure or cardioversion:  No    Anticoagulation Episode Summary     Current INR goal:   2.0-3.0   TTR:   83.4 % (1 y)   Next INR check:   8/24/2020   INR from last check:   2.60 (6/29/2020)   Weekly max warfarin dose:      Target end date:      INR check location:      Preferred lab:      Send INR reminders to:   RUBY MAURICIO    Indications    Pulmonary embolism  bilateral (H) [I26.99]           Comments:            Anticoagulation Care Providers     Provider Role Specialty Phone number    Mamadou Baez MD Referring Family Medicine [phone]

## 2021-06-10 NOTE — TELEPHONE ENCOUNTER
ANTICOAGULATION  MANAGEMENT    Assessment     Today's INR result of 3.0 is Therapeutic (goal INR of 2.0-3.0)        Warfarin taken as previously instructed    No new diet changes affecting INR    No new medication/supplements affecting INR    Continues to tolerate warfarin with no reported s/s of bleeding or thromboembolism     Previous INR was Therapeutic    Plan:     Left detailed message for Prem regarding INR result and instructed:     Warfarin Dosing Instructions:  Continue current warfarin dose 7.5 mg daily.    Instructed patient to follow up no later than: 8 weeks.    Education provided: importance of taking warfarin as instructed      Instructed to call the [facility] for any changes, questions or concerns. (#[phone])   ?   Morales Tapia RN    Subjective/Objective:      Prem Taylor, a 69 y.o. male is on warfarin.     Prem reports:     Home warfarin dose: as updated on anticoagulation calendar per template     Missed doses: No     Medication changes:  No     S/S of bleeding or thromboembolism:  No     New Injury or illness:  No     Changes in diet or alcohol consumption:  No     Upcoming surgery, procedure or cardioversion:  No    Anticoagulation Episode Summary     Current INR goal:   2.0-3.0   TTR:   90.2 % (1 y)   Next INR check:   10/20/2020   INR from last check:   3.00 (8/25/2020)   Weekly max warfarin dose:      Target end date:      INR check location:      Preferred lab:      Send INR reminders to:   RUBY MAURICIO    Indications    Pulmonary embolism  bilateral (H) [I26.99]           Comments:            Anticoagulation Care Providers     Provider Role Specialty Phone number    Mamadou Baez MD Referring Family Medicine [phone]

## 2021-06-11 NOTE — TELEPHONE ENCOUNTER
New Appointment Needed  What is the reason for the visit:    Pre-Op Appt Request  When is the surgery? :  09/14/20    Where is the surgery?:   [facility]    Who is the surgeon? :  Dr. Tamez    What type of surgery is being done?: Retina Surgery    Provider Preference: PCP only     How soon do you need to be seen?: Asap - Will be out of town 09/07 & 09/08    Waitlist offered?: No    Okay to leave a detailed message:  Yes

## 2021-06-11 NOTE — TELEPHONE ENCOUNTER
"YUNG-PROCEDURAL ANTICOAGULATION  MANAGEMENT    Assessment     Warfarin interruption plan for Right Eye Vitrectomy on 9/21/20.    Atrial Fibrillation, Hx of PE      Bridging requested by Dr. Baez    Plan       Pre-Procedure:    Hold warfarin until after procedure starting: Thursday 9/17    Lovenox 100 mg subq Q 12 hrs (1 mg/kg Q 12 hr for CrCl >30)     Start Lovenox: Sat 9/19  AM    Last dose of Lovenox prior to procedure: Sunday 9/20 AM       Post-Procedure:    Resume warfarin dose if okay with provider doing procedure on night of procedure, 9/21 PM: 15 mg x 1 then resume 7.5 mg daily    Resume Lovenox ~ 24-48 hrs post procedure when okay with provider doing procedure. Continue until INR >= 2 per protocol    Recheck INR 4-6 days after resuming warfarin     Spoke to Ryan. Reviewed above instructions.  Enoxaparin (Lovenox) education reviewed: role of enoxaparin in bridge therapy, prescribed enoxaparin dose and frequency, recommended injection site and site rotation, proper technique for administration of subcutaneous injection, disposal of syringes, monitoring for signs and symptoms of bruising and bleeding and monitoring for signs and symptoms of clotting.     Ryan verbalizes understanding and agrees to plan    Lovenox Rx sent to [facility]  ?  Jessica Orosco, PharmD    Subjective/Objective:      Prem Taylor, a 70 y.o. male    Reason for Anticoagulation: Atrial Fibrillation and History of VTE    Goal INR Range: 2-3    Patient bridged in past: Yes      Wt Readings from Last 3 Encounters:   09/09/20 219 lb (99.3 kg)   06/05/20 (!) 225 lb (102.1 kg)   02/27/20 (!) 227 lb (103 kg)        Ideal body weight: 83.4 kg (183 lb 12.1 oz)  Adjusted ideal body weight: 89.7 kg (197 lb 13.6 oz)     Estimated body mass index is 27.74 kg/m  as calculated from the following:    Height as of an earlier encounter on 9/9/20: 6' 2.5\" (1.892 m).    Weight as of an earlier encounter on 9/9/20: 219 lb (99.3 kg).      Lab Results "   Component Value Date    INR 3.00 (H) 08/25/2020    INR 2.60 (H) 06/29/2020    INR 2.40 (H) 05/13/2020     Lab Results   Component Value Date    HGB 14.5 09/09/2020    HCT 43.5 09/09/2020     09/09/2020     Lab Results   Component Value Date    CREATININE 0.75 09/09/2020    CREATININE 0.81 02/11/2020    CREATININE 0.87 07/25/2019      Estimated Creatinine Clearance: 130.6 mL/min (by C-G formula based on SCr of 0.75 mg/dL).

## 2021-06-11 NOTE — TELEPHONE ENCOUNTER
Spoke with Ryan. His eye surgery is rescheduled to 10/12/2020 because the surgeon office said there's other more urgent cases had to be done before his.    Revised warfarin hold and Lovenox bridge orders (based on orders in 9/9/2020 encounter):    Pre-Procedure:  ? Hold warfarin until after procedure starting: Thursday 10/8  ? Lovenox 100 mg subq Q 12 hrs (1 mg/kg Q 12 hr for CrCl >30)     Start Lovenox: Sat 10/10  AM    Last dose of Lovenox prior to procedure: Sunday 10/11 AM            ? Post-Procedure:    Resume warfarin dose if okay with provider doing procedure on night of procedure, 10/12 PM: 15 mg x 1 then resume 7.5 mg daily    Resume Lovenox ~ 24-48 hrs post procedure when okay with provider doing procedure. Continue until INR >= 2 per protocol    Recheck INR 4-6 days after resuming warfarin    Pt verbalized understanding. No further questions/ concerns at this time.

## 2021-06-11 NOTE — TELEPHONE ENCOUNTER
Who is calling:  Patient  Reason for Call:  The patient is calling regarding his procedure for Eye Vitrectomy as the date has changed to 9/23, next Wednesday rather than Monday 9/21. He is bridging for his procedure and is scheduled for a INR on 9/25. Please advise at the number provided regarding his surgery being moved and his bridging instruction.    Okay to leave a detailed message: Yes

## 2021-06-11 NOTE — PROGRESS NOTES
"[facility]  [address]  [facility] [address]  Dept: [phone]  Dept Fax: [phone]  Primary Provider: Hanna Tyson MD  Pre-op Performing Provider: HANNA TYSON    PREOPERATIVE EVALUATION:  Today's date: 2020    Prem Taylor is a 70 y.o. male who presents for a preoperative evaluation.    Surgical Information:  Surgery Details 2020   Surgery/Procedure: Victrectomy   Surgery Location: [facility]   Surgeon: Amber Bull [facility]ina   Surgery Date: 2020   Time of Surgery: 9:00 AM   Where patient plans to recover: at home with family     Fax number for surgical facility: [phone] and also [phone]  Type of Anesthesia Anticipated: to be determined    Subjective     HPI related to upcoming procedure: bilateral eye floaters.  Scheduled right eye vitrectomy 20 likely followed by similar procedure left eye ~ 4 months later.     \"Aubree\" x 44 years   2 daughters - Renee (38) and Susannah (35)  3 grandchildren  No smoke (quit  after 1 and  ppd)   EtOH: weekends \"few beers\"   Surgeries: anterior cervical fusion; inguinal hernia; tonsils; right eye cataract 3/4/20 and left eye cataract 3/11/20; \"right eye vitrectomy scheduled for 20 due to floaters\"  Hospitalizations: [facility] 10/9/17-10/10/17 bilateral pulmonary emboli with a. 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 No   Have you ever had surgery on your heart or blood vessels, such as a stent placement, a coronary artery bypass, or surgery on an artery in your head, neck, heart, or legs? No   Do you have chest pain with activity? No   Do you have a history of  heart failure? No   Do you currently have a cold, bronchitis or symptoms of " other infection? No   Do you have a cough, shortness of breath, or wheezing? No   Do you or anyone in your family have previous history of blood clots? YES - hx PE 3 years ago   Do you or does anyone in your family have a serious bleeding problem such as prolonged bleeding following surgeries or cuts? No   Have you ever had problems with anemia or been told to take iron pills? No   Have you had any abnormal blood loss such as black, tarry or bloody stools? No   Have you ever had a blood transfusion? No   Are you willing to have a blood transfusion if it is medically needed before, during, or after your surgery? Yes   Have you or any of your relatives ever had problems with anesthesia? No   Do you have sleep apnea, excessive snoring or daytime drowsiness? YES - using a CPAP machine    Do you have a CPAP machine? Yes   Do you have any artifical heart valves or other implanted medical devices like a pacemaker, defibrillator, or continuous glucose monitor? No   Do you have artificial joints? No   Are you allergic to latex? No           RX [place] ([place] reviewed:  reviewed - no concerns    See problem list for active medical problems.  Problems all longstanding and stable, except as noted/documented.  See ROS for pertinent symptoms related to these conditions.      Review of Systems  CONSTITUTIONAL: NEGATIVE for fever, chills, change in weight  ENT/MOUTH: Negative for ear, mouth, and throat problems  RESP: NEGATIVE for significant cough or SOB  CV: NEGATIVE for chest pain, palpitations or peripheral edema    Patient Active Problem List    Diagnosis Date Noted     ALISE on CPAP 02/08/2019     Anxiety 02/08/2019     Chronic cough 02/08/2019     Normochromic normocytic anemia 02/08/2019     Left inguinal hernia 02/08/2019     Persistent atrial fibrillation (H) 10/25/2017     Mitral valve insufficiency 10/25/2017     Pulmonary embolism, bilateral (H) 10/10/2017     Erectile dysfunction, unspecified erectile  dysfunction type 01/03/2017     Overweight (BMI 25.0-29.9) 01/03/2017     Tubular adenoma of colon 01/03/2017     Hyperbilirubinemia 01/03/2017     Male erectile dysfunction 12/23/2014     Basal cell carcinoma, ear 12/23/2014     Hearing Loss      Hypercholesterolemia      Presbycusis      Sinus bradycardia      BPH without urinary obstruction      Atypical Chest Pain      Blood Pressure Isolated Elevated      Past Medical History:   Diagnosis Date     Anemia      Anxiety      Atrial fibrillation (H) 10/09/2017     Cancer (H)      CPAP (continuous positive airway pressure) dependence 10/2019    per patient     Depression      History of blood clots      Pulmonary embolism, blood-clot, obstetric 10/09/2017     Sleep apnea     Uses CPAP     Past Surgical History:   Procedure Laterality Date     BACK SURGERY       CERVICAL FUSION  2001     HERNIA REPAIR  2005     UT LAP,INGUINAL HERNIA REPR,INITIAL Left 8/15/2019    Procedure: HERNIORRHAPHY, INGUINAL, LAPAROSCOPIC;  Surgeon: Yan Leiva MD;  Location: [facility];  Service: General     TONSILLECTOMY      childhood     Current Outpatient Medications   Medication Sig Dispense Refill     escitalopram oxalate (LEXAPRO) 10 MG tablet TAKE ONE TABLET BY MOUTH EVERY DAY 90 tablet 2     MULTIVITAMIN (MULTIPLE VITAMIN ORAL) Take 1 tablet by mouth daily.       warfarin ANTICOAGULANT (COUMADIN/JANTOVEN) 7.5 MG tablet Take 1 tablet (7.5 mg) by mouth daily. Adjust dose based on INR results as directed. 90 tablet 1     No current facility-administered medications for this visit.        No Known Allergies    Social History     Tobacco Use     Smoking status: Former Smoker     Smokeless tobacco: Never Used   Substance Use Topics     Alcohol use: Yes      Family History   Problem Relation Age of Onset     Heart disease Mother      Hypertension Mother      Dementia Mother      Heart attack Father      Social History     Substance and Sexual Activity   Drug Use No          "  Objective   /78   Pulse 72   Temp 97.3  F (36.3  C)   Ht 6' 2.5\" (1.892 m)   Wt 219 lb (99.3 kg)   SpO2 98%   BMI 27.74 kg/m    Physical Exam  GENERAL APPEARANCE: healthy, alert and no distress  HENT: ear canals and TM's normal and nose and mouth without ulcers or lesions  RESP: lungs clear to auscultation - no rales, rhonchi or wheezes  CV: regular rate and rhythm, normal S1 S2, no S3 or S4 and no murmur, click or rub  ABDOMEN: soft, nontender, no HSM or masses and bowel sounds normal  NEURO: Normal strength and tone, sensory exam grossly normal, mentation intact and speech normal    Recent Labs   Lab Test 08/25/20  0723 06/29/20  0912  02/11/20  1429  07/25/19  1537   HGB  --   --   --  13.8*  --  13.9*   PLT  --   --   --  163  --  193   INR 3.00* 2.60*   < >  --    < >  --    NA  --   --   --  141  --  143   K  --   --   --  4.2  --  4.5   CREATININE  --   --   --  0.81  --  0.87    < > = values in this interval not displayed.        PRE-OP Diagnostics:   Labs pending at this time. Results will be reviewed when available.  EKG required for significant arrhythmia and not completed in the last 90 days.  Atrial fibrillation with slow ventricular response   Left axis deviation   Right bundle branch block   Inferior infarct (cited on or before 25-NOV-2019)   Abnormal ECG   When compared with ECG of 25-NOV-2019 12:57,   Right bundle branch block is now Present      Assessment & Plan       General Appearance:  Alert, cooperative, no distress, appears stated age   HEENT:  Normal.  No acute findings.   Neck: Supple, symmetrical, no adenopathy.   Lungs:   Clear to auscultation bilaterally, respirations unlabored.  No expiratory wheeze or inspiratory crackles noted.   Heart:  Irregular rate and rhythm, S1, S2 normal, no murmur, rub or gallop   Abdomen:   Soft, non-tender, positive bowel sounds, no masses, no organomegaly   Extremities: Extremities normal.  No cyanosis or edema   Skin: Warm, dry.  Skin color, " texture, turgor normal, no rashes or lesions   Neurologic: Nonfocal.          The proposed surgical procedure is considered LOW risk.    REVISED CARDIAC RISK INDEX   The patient has the following serious cardiovascular risks for perioperative complications:  No serious cardiac risks = 0 points    INTERPRETATION: 0 points: Class I (very low risk - 0.4% complication rate)    1. Preoperative examination  Preoperative examination completed.  Bilateral floaters described with scheduled right eye vitrectomy September 24, 2020 followed by left eye vitrectomy approximately 4 months later likely.    2. Floater, vitreous, bilateral  Bilateral eye floaters with scheduled vitrectomy procedure as noted.  No contraindication to scheduled procedure.    3. Persistent atrial fibrillation (H)  Persistent atrial fibrillation, rate controlled.  Continues chronic warfarin anticoagulation.  Will hold 4 days prior to procedure with bridging with Lovenox due to history of pulmonary emboli.  - Electrocardiogram Perform and Read  - Basic Metabolic Panel    4. ALISE on CPAP  Continue CPAP for ALISE management.    5. Normochromic normocytic anemia  Normochromic normocytic anemia noted.  CBC to ensure stable.  - HM2(CBC w/o Differential)    6. Anxiety  History of anxiety.  S-Citalopram 10 mg daily.  Tolerating well.    7. History of pulmonary embolism  History of bilateral pulmonary emboli noted.  Is on chronic warfarin anticoagulation.  Will bridge while holding warfarin x4 days prior to scheduled procedure.      The patient has the following additional risks and recommendations for perioperative complications:      CARDIOVASCULAR:   - continues to follow with Dr. Velez    OBSTRUCTIVE SLEEP APNEA:    - Patient advised to use their home CPAP when released from surgery    ANEMIA/BLEEDING/CLOTTING:    - History of DVT or PE, consider DVT prevention postoperatively   - Anemia and does not require treatment prior to surgery. Monitor hemoglobin  postoperatively     MEDICATION INSTRUCTIONS:  Patient is to take all scheduled medications on the day of surgery EXCEPT for modifications listed below:   Hold warfarin x 4 days prior (will bridge with Lovenox)    RECOMMENDATION:  APPROVAL GIVEN to proceed with proposed procedure pending review of diagnostic evaluation.    Return in about 4 months (around 1/9/2021) for preop exam for left eye vitrectomy anticipated.    Signed Electronically by: Mamadou Baez MD    Copy of this evaluation report is provided to requesting physician.    Preop [facility] Preop Guidelines    Revised Cardiac Risk Index

## 2021-06-11 NOTE — TELEPHONE ENCOUNTER
----- Message from Mamadou Baez MD sent at 9/9/2020  9:38 AM CDT -----  Regarding: hold and bridge orders  Please contact patient to assist with warfarin hold (4 days per eye specialist) and bridge orders (h/o P.E.) prior to scheduled eye surgery on 9/24/20.    Thank you :)    Mamadou Baez MD

## 2021-06-12 NOTE — PROGRESS NOTES
ASSESSMENT/PLAN:   1. Right ankle swelling  HM1(CBC and Differential)    Lyme Antibody Cascade    C-Reactive Protein(CRP)    Uric Acid    Sedimentation Rate    XR Ankle Right 3 or More VWS    XR Foot Right 3 or More VWS    HM1 (CBC with Diff)    doxycycline (MONODOX) 100 MG capsule    predniSONE (DELTASONE) 20 MG tablet   2. Right foot pain  HM1(CBC and Differential)    Lyme Antibody Cascade    C-Reactive Protein(CRP)    Uric Acid    Sedimentation Rate    XR Ankle Right 3 or More VWS    XR Foot Right 3 or More VWS    HM1 (CBC with Diff)     Patient presents with an acute right ankle and foot swelling. He appeared well, was afebrile, and was not having any systemic symptoms to suggest osteomyelitis or septic joint. No leukocytosis or left shift, either. Sed rate was WNL, however CRP was elevated at 1.9, suggesting acute inflammatory process. Uric acid level was WNL, however does not completely r/o gout, though he has never had gout before.   Xrays demonstrated no fracture, dislocation, or any other acute findings to be causing his pain.  No calf pain/tenderness and all symptoms are just involving foot and ankle. Furthermore, no clot risk factors thus I do not suspect DVT.  Remaining differential includes but is not limited to: gout, pseudogout, Lyme arthritis.  We will start him on doxycycline for possible Lyme arthritis today despite no known exposures, we are in endemic [place]. I encouraged him to finish out the course of doxycycline even if tests return normal.   I also prescribed prednisone for anti-inflammatory effects.  Discussed safe use of both medications and precautions. Pain is well under control and he is getting around safely.  At the end of the encounter, I discussed results, diagnosis, medications. Discussed red flags for immediate return to clinic/ER, as well as indications for follow up if no improvement. Patient understood and agreed to plan. Patient was stable for discharge.                SUBJECTIVE:    Prem Taylor is a 66 y.o. male who presents today for evaluation of right foot pain and swelling x 3 days. He admits to redness just past the ankle. It is very tender to the touch. The ankle also hurts.  It started on the dorsal forefoot and now the ankle is getting more puffy. He admits to pain with weight bearing, but he is still able to walk.   He did have a blister on the back of the right heel several weeks ago, but it is not visible anymore.   No fever.  No known injuries. No known tick exposures. No recent travel.  No PMH surgeries to right foot. He did have a cortisone shot in the ankle for osteoarthritis back in February 2017.  No other joint swelling or pain. No PMH gout. This has never happened to him before.    Past Medical History:  Patient Active Problem List   Diagnosis     Hearing Loss     Hypercholesterolemia     Presbycusis     Sinus Bradycardia     BPH without urinary obstruction     Atypical Chest Pain     Blood Pressure Isolated Elevated     Male erectile dysfunction     Basal cell carcinoma, ear     Erectile dysfunction, unspecified erectile dysfunction type     Overweight (BMI 25.0-29.9)     Tubular adenoma of colon     Hyperbilirubinemia       Surgical History:  Past Surgical History:   Procedure Laterality Date     CERVICAL FUSION       HERNIA REPAIR       SPINE SURGERY       TONSILLECTOMY             Family History:  Family History   Problem Relation Age of Onset     Heart disease Mother      Hypertension Mother      Dementia Mother      Heart attack Father      Reviewed; Non-contributory      Social History:    History   Smoking Status     Former Smoker   Smokeless Tobacco     Never Used     Smoking: none  Alcohol use: a few beers/weekend  Other drug use: none  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        MULTIVITAMIN  (MULTIPLE VITAMIN ORAL) Take 1 tablet by mouth daily.       sildenafil (VIAGRA) 100 MG tablet Take 50 mg by mouth. One hour prior to intercourse.       No current facility-administered medications on file prior to visit.        Allergies:   No Known Allergies    I personally reviewed patient's past medical, surgical, social, family history and allergies.    ROS:  Review of Systems  See HPI for 6pt ROS, otherwise negative      OBJECTIVE:   Vitals:    08/11/17 1736   BP: 114/60   Patient Site: Right Arm   Patient Position: Sitting   Cuff Size: Adult Regular   Pulse: 64   Resp: 20   Temp: 98.1  F (36.7  C)   TempSrc: Oral   SpO2: 98%   Weight: 215 lb (97.5 kg)     General: well-appearing, male in no acute distress. Afebrile. Appears comfortable.   Lungs:  No distress.  Muscloloskeletal: Right lower extremity: DP pulse 2+.  Significant edema of left ankle with erythema and warmth of skin, most intense at the dorsal foot just distal to the ankle. There is tenderness of the soft tissue overlaying the tarsals. No ankle bony tenderness. No achilles tenderness. No calf tenderness.  No tenderness of proximal, middle, or distal tibia or fibula.  Ankle AROM plantar and dorsiflexion intact and with 5/5 strength. No ankle joint instability.  Gait: very mild antalgia favoring left lower extremity.  Neuro: Alert. Sensation to light touch intact in distal right lower extremity.  Psych: Normal mood and affect.         Radiology:  I personally ordered and viewed this study. I agree with below radiology findings.    Xr Ankle Right 3 Or More Vws    Result Date: 8/11/2017  XR ANKLE RIGHT 3 OR MORE VWS 8/11/2017 7:16 PM INDICATION: Effusion, right ankle COMPARISON: None. FINDINGS: There is soft tissue swelling with ankle joint effusion no acute fracture or dislocation. There is osteoarthritic change at the talonavicular joint. There is pes planus and a plantar calcaneal spur. Findings were called to the ER posteriorly at  this 1924 hours  on 8/11/2017.    Xr Foot Right 3 Or More Vws    Result Date: 8/11/2017  XR FOOT RIGHT 3 OR MORE VWS 8/11/2017 7:16 PM INDICATION: Foot pain.    COMPARISON: None. FINDINGS: There is past planus with narrowing of the tibiotalar joint and osteophytic spurring suggesting osteoarthritis. No acute fracture or dislocation. No plain radiographic evidence for osteomyelitis. There is a plantar calcaneal spur.        Laboratory Studies:  I personally ordered and interpreted these studies.    Results for orders placed or performed in visit on 08/11/17   C-Reactive Protein(CRP)   Result Value Ref Range    CRP 1.9 (H) 0.0 - 0.8 mg/dL   Uric Acid   Result Value Ref Range    Uric Acid 4.2 3.0 - 8.0 mg/dL   Sedimentation Rate   Result Value Ref Range    Sed Rate 9 0 - 15 mm/hr   HM1 (CBC with Diff)   Result Value Ref Range    WBC 5.2 4.0 - 11.0 thou/uL    RBC 4.66 4.40 - 6.20 mill/uL    Hemoglobin 14.1 14.0 - 18.0 g/dL    Hematocrit 42.8 40.0 - 54.0 %    MCV 92 80 - 100 fL    MCH 30.1 27.0 - 34.0 pg    MCHC 32.8 32.0 - 36.0 g/dL    RDW 11.6 11.0 - 14.5 %    Platelets 180 140 - 440 thou/uL    MPV 8.1 7.0 - 10.0 fL    Neutrophils % 55 50 - 70 %    Lymphocytes % 30 20 - 40 %    Monocytes % 12 (H) 2 - 10 %    Eosinophils % 3 0 - 6 %    Basophils % 1 0 - 2 %    Neutrophils Absolute 2.9 2.0 - 7.7 thou/uL    Lymphocytes Absolute 1.6 0.8 - 4.4 thou/uL    Monocytes Absolute 0.6 0.0 - 0.9 thou/uL    Eosinophils Absolute 0.1 0.0 - 0.4 thou/uL    Basophils Absolute 0.0 0.0 - 0.2 thou/uL

## 2021-06-12 NOTE — TELEPHONE ENCOUNTER
ANTICOAGULATION  MANAGEMENT    Assessment     Today's INR result of 2.3 is Therapeutic (goal INR of 2.0-3.0)        Warfarin taken as previously instructed    No new diet changes affecting INR    No new medication/supplements affecting INR    Continues to tolerate warfarin with no reported s/s of bleeding or thromboembolism     Previous INR was Subtherapeutic    Plan:     Spoke on phone with Prem regarding INR result and instructed:     Warfarin Dosing Instructions:  Continue current warfarin dose 7.5 mg daily.    Discontinue Lovenox now.    Instructed patient to follow up no later than: 2 weeks.    Education provided: importance of following up for INR monitoring at instructed interval and importance of taking warfarin as instructed    Ryan verbalizes understanding and agrees to warfarin dosing plan.    Instructed to call the [facility] for any changes, questions or concerns. (#[phone])   ?   Morales Tapia RN    Subjective/Objective:      Prem Taylor, a 70 y.o. male is on warfarin.     Prem reports:     Home warfarin dose: template incorrect; verbally confirmed home dose with pt and updated on anticoagulation calendar     Missed doses: No     Medication changes:  No     S/S of bleeding or thromboembolism:  No     New Injury or illness:  No     Changes in diet or alcohol consumption:  No     Upcoming surgery, procedure or cardioversion:  No    Anticoagulation Episode Summary     Current INR goal:  2.0-3.0   TTR:  86.1 % (1 y)   Next INR check:  11/3/2020   INR from last check:  2.30 (10/20/2020)   Weekly max warfarin dose:     Target end date:     INR check location:     Preferred lab:     Send INR reminders to:  RUBY MAURICIO    Indications    Pulmonary embolism  bilateral (H) [I26.99]           Comments:           Anticoagulation Care Providers     Provider Role Specialty Phone number    Mamadou Baez MD Referring Family Medicine [phone]         appropriate

## 2021-06-12 NOTE — TELEPHONE ENCOUNTER
ANTICOAGULATION  MANAGEMENT    Assessment     Today's INR result of 2.9 is Therapeutic (goal INR of 2.0-3.0)        Warfarin taken as previously instructed    No new diet changes affecting INR    No new medication/supplements affecting INR    Continues to tolerate warfarin with no reported s/s of bleeding or thromboembolism     Previous INR was Therapeutic    Plan:     Left detailed message for Prem regarding INR result and instructed:     Warfarin Dosing Instructions:  Continue current warfarin dose 7.5 mg daily.    Instructed patient to follow up no later than: 4 weeks.     Education provided: importance of following up for INR monitoring at instructed interval and importance of taking warfarin as instructed      Instructed to call the [facility] for any changes, questions or concerns. (#[phone])   ?   Morales Tapia RN    Subjective/Objective:      Prem Taylor, a 70 y.o. male is on warfarin.     Prem reports:     Home warfarin dose: as updated on anticoagulation calendar per template     Missed doses: No     Medication changes:  No     S/S of bleeding or thromboembolism:  No     New Injury or illness:  No     Changes in diet or alcohol consumption:  No     Upcoming surgery, procedure or cardioversion:  No    Anticoagulation Episode Summary     Current INR goal:  2.0-3.0   TTR:  86.4 % (1 y)   Next INR check:  12/1/2020   INR from last check:  2.90 (11/3/2020)   Weekly max warfarin dose:     Target end date:     INR check location:     Preferred lab:     Send INR reminders to:  RUBY MAURICIO    Indications    Pulmonary embolism  bilateral (H) [I26.99]           Comments:           Anticoagulation Care Providers     Provider Role Specialty Phone number    Mamadou Baez MD Referring Family Medicine [phone]

## 2021-06-13 NOTE — PROGRESS NOTES
Assessment:    1. Pulmonary embolism, bilateral     2. Atrial fibrillation  Ambulatory referral to Cardiology   3. Mitral regurgitation  Ambulatory referral to Cardiology   4. Anxiety  escitalopram oxalate (LEXAPRO) 5 MG tablet         Plan:    40 minutes total time with patient, > 50% with counseling and coordination of cares.  Transitional care management was completed.  Medication reconciliation as noted below.  Reviewed recent admission to [facility] following presentation to emergency room with shortness of breath worse with exertion with noted bilateral pulmonary emboli with cor pulmonale and CT.  Bilateral leg ultrasound negative for DVT.  Was admitted October 9, 2017 through October 10, 2017 at [facility] and started on anticoagulation.  Noted atrial fibrillation, new diagnosis, rate controlled.  Continues metoprolol tartrate 25 mg twice daily.  Is on Lovenox 95 mg every 12 hours while titrating warfarin currently 7.5 mg daily since October 10, 2017 with INR goal 2-3 ongoing.  Will see hematologist through [facility] in November 7, 2017.  Referral placed to see cardiologist regarding moderate to severe mitral regurgitation as well as underlying atrial fibrillation.  Did discuss patient's request to go to [place] Tuesday through Saturday next week and need for compression stockings and frequent stops in order to exercise to prevent venous pooling, further DVT or PE issues etc.  Will restrict activities ×2 weeks then may return to activity as tolerated per discharge summary.  Discussed associated anxiety with recent diagnoses as well as upcoming [facility] etc.  S-Citalopram 5 mg once daily was prescribed.  Patient elects follow-up in this office for physical exam in 3 months, sooner if concerns otherwise would consider titrating a citalopram from 5 mg up to 10 mg daily.        Subjective:    Prem Tayolr is seen today for hospital follow-up.  Presented to emergency room with shortness of  "breath with exertion plus palpitations.  Had chest CT that showed bilateral pulmonary emboli.  Bilateral lower extremity ultrasound negative for DVT.  CT showing cor pulmonale.  Echocardiogram with evidence of moderate to severe mitral regurgitation described.  Mild to moderate previously in  on echocardiogram.  Still has some shortness of breath with exertion however no significant change.  No fevers or chills.  No leg pain.  No ankle swelling.  Started on metoprolol tartrate 25 mg twice daily for rate control.  States blood pressures were elevated during hospitalization without history of hypertension.  Currently on warfarin 7.5 mg daily for INR goal between 2 and 3 otherwise is using Lovenox 95 mg every 12 hours currently.  Has appointment hematologist 2017 however has not scheduled appointment with cardiologist.  INR today was 1.2.  Hoping to travel to [place] next week after initially planning to go to [facility] now only [place] from Tuesday through Saturday and then will return.  Has compression stockings available.  No recent illness.  Does have increased anxiety in general however worsens recent diagnoses as noted above.  Past medical social and family history reviewed and updated as noted below.     \"Aubree\" x 44 years   2 daughters - Renee (38) and Susannah (35)  No smoke (quit  after 1 and  ppd)   EtOH: weekends \"few beers\"   Surgeries: anterior cervical fusion; inguinal hernia; tonsils   Hospitalizations: [facility] 10/9/18-10/10/17 bilateral pulmonary emboli with a. fib  Mom -  86 due to CHF; Alzheimer's dementia   Dad -  52 due to MI   1 bro - prostate CA   1 sis -   Work: manager in a dental lab   Exercise: run 4x/week at 6-6.5 mph; situps and pushups    Past Surgical History:   Procedure Laterality Date     CERVICAL FUSION       HERNIA REPAIR       SPINE SURGERY       TONSILLECTOMY          Family History   Problem Relation Age of Onset     Heart " disease Mother      Hypertension Mother      Dementia Mother      Heart attack Father         Past Medical History:   Diagnosis Date     Hypercholesterolemia         Social History   Substance Use Topics     Smoking status: Former Smoker     Smokeless tobacco: Never Used     Alcohol use Yes        Current Outpatient Prescriptions   Medication Sig Dispense Refill     aspirin 81 MG EC tablet Take 81 mg by mouth daily.        enoxaparin (LOVENOX) 100 mg/mL Syrg        metoprolol tartrate (LOPRESSOR) 25 MG tablet Take 25 mg by mouth 2 (two) times a day.       MULTIVITAMIN (MULTIPLE VITAMIN ORAL) Take 1 tablet by mouth daily.       sildenafil (VIAGRA) 100 MG tablet Take 50 mg by mouth. One hour prior to intercourse.       warfarin (COUMADIN) 7.5 MG tablet        escitalopram oxalate (LEXAPRO) 5 MG tablet Take 1 tablet (5 mg total) by mouth daily. 30 tablet 5     No current facility-administered medications for this visit.           Objective:    Vitals:    10/13/17 1317   BP: 120/70   Pulse: 72   SpO2: 99%   Weight: 214 lb (97.1 kg)      Body mass index is 26.93 kg/(m^2).    Alert.  No apparent distress.  HEENT exam normal with moist mucous membranes.  Glasses in place.  Chest clear to auscultation.  Cardiac exam irregular however rate controlled pulse 72.  Abdomen benign.  Extremities warm and dry without calf tenderness.  No ankle edema.  No rash.

## 2021-06-13 NOTE — PROGRESS NOTES
Anticoagulation Annual Referral Renewal Review    Prem Taylor's chart reviewed for annual renewal of referral to anticoagulation monitoring.        Criteria for anticoagulation nurse and/or pharmacist renewal met   Warfarin indication: Atrial Fibrillation and PE Yes, per indication   Current with INR monitoring/compliant Yes Yes   Date of last office visit 9/9/20 Yes, had office visit within last year   Time in Therapeutic Range (TTR) 86 % Yes, TTR > 60%       Prem Taylor met all criteria for anticoagulation management program initiated renewal.  New INR standing orders and anticoagulation referral renewal placed.      Amanda Borges RN  1:08 PM

## 2021-06-13 NOTE — PROGRESS NOTES
CARDIOLOGY CLINIC CONSULT NOTE     Assessment/Plan:   1. New onset atrial fibrillation.  Rate control adequate on low-dose metoprolol.  We will plan A. fib clinic in 2 weeks time to discuss cardioversion, with likely JANUSZ and cardioversion on the same day.  Follow-up with me one month after that  2. Mitral insufficiency.  Reported to be moderate to severe on recent echocardiogram.  Valve abnormalities leading to the mitral insufficiency is not reported.  We will schedule a transesophageal echocardiogram to better assess and determine whether further intervention is warranted.  3. Bilateral pulmonary embolism.  Uncertain etiology, may have triggered onset of atrial fibrillation.    Follow up A. fib clinic 2-3 weeks, with me in 2 months     History of Present Illness:     It is my pleasure to see Prem Taylor at the [facility] clinic for evaluation of atrial fibrillation.    Prem Taylor is a 67 y.o. male with a past medical history of hyperlipidemia, who presented 10/10/2017 to [facility] with 1 week history of exertional shortness of breath and was found to have bilateral pulmonary emboli and new onset atrial fibrillation.  He was initiated on warfarin and Lovenox, along with metoprolol for rate control.  No DVt was identified.  Echocardiogram showed normal left and right ventricular function.    Since his hospital discharge he has continued to note easy fatigue.  He has not been aware of palpitations or chest pains.  He has not been able to resume his regular treadmill routine.  Previously he had been feeling well, with no recent weight loss.  He was on no medications prior to his hospitalization.  His INR has now been therapeutic over the last week.    He has no history of orthopnea or paroxysmal nocturnal dyspnea.  He has not had syncope or near syncopal spells.  He has had no peripheral edema.  He has had no weight loss or change in his bowel or bladder habits.    Past Medical  History:     Patient Active Problem List   Diagnosis     Hearing Loss     Hypercholesterolemia     Presbycusis     Sinus Bradycardia     BPH without urinary obstruction     Atypical Chest Pain     Blood Pressure Isolated Elevated     Male erectile dysfunction     Basal cell carcinoma, ear     Erectile dysfunction, unspecified erectile dysfunction type     Overweight (BMI 25.0-29.9)     Tubular adenoma of colon     Hyperbilirubinemia     Pulmonary embolism, bilateral       Past Surgical History:     Past Surgical History:   Procedure Laterality Date     CERVICAL FUSION       HERNIA REPAIR       SPINE SURGERY       TONSILLECTOMY         Family History:     Family History   Problem Relation Age of Onset     Heart disease Mother      Hypertension Mother      Dementia Mother      Heart attack Father        Social History:    reports that he has quit smoking. He has never used smokeless tobacco. He reports that he drinks alcohol. He reports that he does not use illicit drugs.  Alcohol intake is approximately 6 drinks per week, generally on weekends   Exercise: Treadmill exercise for 50 minutes, 3.5 miles 4 days a week until his recent hospitalization    Sleep: Restorative    Meds:     Current Outpatient Prescriptions on File Prior to Visit   Medication Sig Dispense Refill     aspirin 81 MG EC tablet Take 81 mg by mouth daily.        enoxaparin (LOVENOX) 100 mg/mL Syrg Inject 95 mg subcutaneously every 12 hours. 2 Syringe 0     escitalopram oxalate (LEXAPRO) 5 MG tablet Take 1 tablet (5 mg total) by mouth daily. 30 tablet 5     metoprolol tartrate (LOPRESSOR) 25 MG tablet Take 25 mg by mouth 2 (two) times a day.       MULTIVITAMIN (MULTIPLE VITAMIN ORAL) Take 1 tablet by mouth daily.       sildenafil (VIAGRA) 100 MG tablet Take 50 mg by mouth. One hour prior to intercourse.       warfarin (COUMADIN) 7.5 MG tablet        No current facility-administered medications on file prior to visit.        Allergies:   Review of  "patient's allergies indicates no known allergies.    Review of Systems:     General: Night Sweats  Eyes: WNL  Ears/Nose/Throat: WNL  Lungs: Cough, Shortness of Breath  Heart: Shortness of Breath with activity, Irregular Heartbeat  Stomach: WNL  Bladder: WNL  Muscle/Joints: WNL  Skin: WNL  Nervous System: WNL  Mental Health: Anxiety     Blood: WNL        Objective:      Physical Exam  215 lb 9.6 oz (97.8 kg)  6' 4\" (1.93 m)  Body mass index is 26.24 kg/(m^2).  /62 (Patient Site: Right Arm, Patient Position: Sitting, Cuff Size: Adult Large)  Pulse 72  Resp 28  Ht 6' 4\" (1.93 m) Comment: shoes on  Wt 215 lb 9.6 oz (97.8 kg) Comment: shoes on  BMI 26.24 kg/m2  Heart rate at rest 81, increasing to 96 bpm following 10 steps.  No dyspnea  General Appearance : Awake, Alert, No acute distress  HEENT: No Scleral icterus; the mucous membranes were pink and moist.  Conjunctivae not injected  Neck:  No cervical bruits, jugular venous distention, or thyromegaly   Chest: The spine was straight  Lungs: Respirations unlabored; the lungs are clear to auscultation.  No wheezing   Cardiovascular:   Normal point of maximal impulse.  Auscultation reveals irregularly irregular first and second heart sounds with norubs, or gallops.  2 out of 6 blowing apical systolic murmur with radiation to the axilla.   carotid, radial, and dorsalis pedal pulses and intact.    Abdomen: No organomegaly, masses, bruits, or tenderness. Bowels sounds are present  Extremities: No edema  Skin: No xanthelasma. Warm, Dry.  Musculoskeletal: No tenderness.  Neurologic:  No tenderness.      EKG:  Atrial fibrillation at 62 bpm.  No previous study is available for comparison.    Cardiac Imaging Studies:  Echocardiogram 10/2017 at [facility]  :  CONCLUSION:    1. Normal LV size, thickness and systolic function, EF > 55%.    2. Mild RV dilation with normal systolic function.    3. Mild to moderate biatrial enlargement.    4. Moderate to severe mitral " regurgitation.    5. Mildly dilated aortic root (3.9 cm) and ascending aorta (4 cm).    6. Mildly elevated RA pressure based on IVC dimensions.    7. Negative bubble study, with and without Valsalva.     8. No comparison studies available.    Left Ventricular Ejection Fraction: 60 %     Lab Review   Lab Results   Component Value Date     01/03/2017    K 4.2 01/03/2017     (H) 01/03/2017    CO2 25 01/03/2017    BUN 13 01/03/2017    CREATININE 0.80 01/03/2017    CALCIUM 9.5 01/03/2017     Lab Results   Component Value Date    WBC 5.2 08/11/2017    WBC 4.5 12/23/2014    HGB 14.1 08/11/2017    HCT 42.8 08/11/2017    MCV 92 08/11/2017     08/11/2017     Lab Results   Component Value Date    CHOL 238 (H) 01/03/2017    TRIG 82 01/03/2017    HDL 72 01/03/2017    LDLCALC 150 (H) 01/03/2017     No results found for: TROPONINI  No results found for: BNP  Lab Results   Component Value Date    TSH 1.15 12/23/2014       Gerardo Vleez MD [facility]    His note created using Dragon voice recognition software. Sound alike errors may have escaped editing.

## 2021-06-13 NOTE — TELEPHONE ENCOUNTER
ANTICOAGULATION  MANAGEMENT    Assessment     Today's INR result of 2.3 is Therapeutic (goal INR of 2.0-3.0)        Warfarin taken as previously instructed    No new diet changes affecting INR    No new medication/supplements affecting INR    Continues to tolerate warfarin with no reported s/s of bleeding or thromboembolism     Previous INR was Supratherapeutic    Plan:     Spoke on phone with Prem regarding INR result and instructed:     Warfarin Dosing Instructions:  Continue current warfarin dose 7.5 mg daily   (0 % change)    Instructed patient to follow up no later than: 3 weeks    Education provided: target INR goal and significance of current INR result, importance of notifying clinic for changes in medications and importance of notifying clinic for diarrhea, nausea/vomiting, reduced intake and/or illness    Ryan verbalizes understanding and agrees to warfarin dosing plan.    Instructed to call the [facility] for any changes, questions or concerns. (#[phone])   ?   Marcia Angel RN    Subjective/Objective:      Prem Taylor, a 70 y.o. male is on warfarin.     Prem reports:     Home warfarin dose: verbally confirmed home dose with Ryan and updated on anticoagulation calendar     Missed doses: No     Medication changes:  No     S/S of bleeding or thromboembolism:  No     New Injury or illness:  No     Changes in diet or alcohol consumption:  No     Upcoming surgery, procedure or cardioversion:  No    Anticoagulation Episode Summary     Current INR goal:  2.0-3.0   TTR:  82.8 % (1 y)   Next INR check:  1/5/2021   INR from last check:  2.30 (12/15/2020)   Weekly max warfarin dose:     Target end date:     INR check location:     Preferred lab:     Send INR reminders to:  RUBY MAURICIO    Indications    Pulmonary embolism  bilateral (H) [I26.99]           Comments:           Anticoagulation Care Providers     Provider Role Specialty Phone number    Mamadou Baez MD Referring Family Medicine  [phone]

## 2021-06-13 NOTE — PROGRESS NOTES
Assessment:    1. Pulmonary embolism, bilateral     2. Atrial fibrillation, unspecified type     3. Mitral valve insufficiency, unspecified etiology     4. Anxiety     5. SOB (shortness of breath)  HM2(CBC w/o Differential)    Basic Metabolic Panel         Plan:    Reviewed history of bilateral pulmonary emboli as noted below.  Stable.  Component of anxiety with noted shortness of breath with activity likely new baseline following prior diagnoses with anticipated ongoing improvement over next 3-6 months before returning to baseline.  Patient will follow up with Dr. Xavier December 8, 2017 with JANUSZ to be completed prior.  Also will see Makayla Mcdaniel November 10, 2017 for atrial fibrillation management currently rate controlled.  Currently warfarin 7.5 mg daily and will repeat INR today.  CBC and basic metabolic panel regarding mild shortness of breath however anticipate secondary to PE load with ongoing improvement.  Moderate to severe mitral regurgitation as noted.  Patient will follow up in this office no later than January 2018.  Sooner with concerns.        Subjective:    Prem Taylor is seen today for follow-up evaluation.  Some shortness of breath since resuming light activity on Monday morning.  Has returned to work this week after returning from a trip to [place].  Past medical history was reviewed as noted below from office visit October 13, 2017 regarding findings of bilateral pulmonary emboli requiring hospitalization to [facility].  Since this time continues to improve slowly.  Noted atrial fibrillation.  Was seen by Dr. Xavier earlier this week with recommendation to pursue JANUSZ study regarding moderate to severe mitral regurgitation findings otherwise we will see atrial fibrillation clinic November 10, 2017.  Using a citalopram 5 mg daily for anxiety and has not noticed significant side effects nor change per se.  Metoprolol tartrate 25 mg twice daily for atrial fibrillation rate control.   Comprehensive review of systems as above otherwise all negative.  No illness.    Reviewed office note from 10/13/17:  Prem Taylor is seen today for hospital follow-up.  Presented to emergency room with shortness of breath with exertion plus palpitations.  Had chest CT that showed bilateral pulmonary emboli.  Bilateral lower extremity ultrasound negative for DVT.  CT showing cor pulmonale.  Echocardiogram with evidence of moderate to severe mitral regurgitation described.  Mild to moderate previously in 2015 on echocardiogram.  Still has some shortness of breath with exertion however no significant change.  No fevers or chills.  No leg pain.  No ankle swelling.  Started on metoprolol tartrate 25 mg twice daily for rate control.  States blood pressures were elevated during hospitalization without history of hypertension.  Currently on warfarin 7.5 mg daily for INR goal between 2 and 3 otherwise is using Lovenox 95 mg every 12 hours currently.  Has appointment hematologist November 7, 2017 however has not scheduled appointment with cardiologist.  INR today was 1.2.  Hoping to travel to [place] next week after initially planning to go to [facility] now only [place] from Tuesday through Saturday and then will return.  Has compression stockings available.  No recent illness.  Does have increased anxiety in general however worsens recent diagnoses as noted above.  Past medical social and family history reviewed and updated as noted below.  Plan:  40 minutes total time with patient, > 50% with counseling and coordination of cares.  Transitional care management was completed.  Medication reconciliation as noted below.  Reviewed recent admission to [facility] following presentation to emergency room with shortness of breath worse with exertion with noted bilateral pulmonary emboli with cor pulmonale and CT.  Bilateral leg ultrasound negative for DVT.  Was admitted October 9, 2017 through October 10, 2017 at  "[facility] and started on anticoagulation.  Noted atrial fibrillation, new diagnosis, rate controlled.  Continues metoprolol tartrate 25 mg twice daily.  Is on Lovenox 95 mg every 12 hours while titrating warfarin currently 7.5 mg daily since October 10, 2017 with INR goal 2-3 ongoing.  Will see hematologist through health [facility] in 2017.  Referral placed to see cardiologist regarding moderate to severe mitral regurgitation as well as underlying atrial fibrillation.  Did discuss patient's request to go to [place] Tuesday through Saturday next week and need for compression stockings and frequent stops in order to exercise to prevent venous pooling, further DVT or PE issues etc.  Will restrict activities ×2 weeks then may return to activity as tolerated per discharge summary.  Discussed associated anxiety with recent diagnoses as well as upcoming senior care etc.  S-Citalopram 5 mg once daily was prescribed.  Patient elects follow-up in this office for physical exam in 3 months, sooner if concerns otherwise would consider titrating a citalopram from 5 mg up to 10 mg daily.      \"Aubree\" x 44 years   2 daughters - Renee (38) and Susannah (35)  No smoke (quit  after 1 and  ppd)   EtOH: weekends \"few beers\"   Surgeries: anterior cervical fusion; inguinal hernia; tonsils   Hospitalizations: [facility] 10/9/18-10/10/17 bilateral pulmonary emboli with a. fib  Mom -  86 due to CHF; Alzheimer's dementia   Dad -  52 due to MI   1 bro - prostate CA   1 sis -   Work: manager in a dental lab   Exercise: run 4x/week at 6-6.5 mph; situps and pushups    Past Surgical History:   Procedure Laterality Date     CERVICAL FUSION       HERNIA REPAIR       SPINE SURGERY       TONSILLECTOMY          Family History   Problem Relation Age of Onset     Heart disease Mother      Hypertension Mother      Dementia Mother      Heart attack Father         Past Medical History:   Diagnosis Date     " Hypercholesterolemia         Social History   Substance Use Topics     Smoking status: Former Smoker     Smokeless tobacco: Never Used     Alcohol use Yes        Current Outpatient Prescriptions   Medication Sig Dispense Refill     aspirin 81 MG EC tablet Take 81 mg by mouth daily.        enoxaparin (LOVENOX) 100 mg/mL Syrg Inject 95 mg subcutaneously every 12 hours. 2 Syringe 0     escitalopram oxalate (LEXAPRO) 5 MG tablet Take 1 tablet (5 mg total) by mouth daily. 30 tablet 5     metoprolol tartrate (LOPRESSOR) 25 MG tablet Take 25 mg by mouth 2 (two) times a day.       MULTIVITAMIN (MULTIPLE VITAMIN ORAL) Take 1 tablet by mouth daily.       sildenafil (VIAGRA) 100 MG tablet Take 50 mg by mouth. One hour prior to intercourse.       warfarin (COUMADIN) 7.5 MG tablet        No current facility-administered medications for this visit.           Objective:    Vitals:    10/26/17 1019   BP: 104/80   Pulse: 75   SpO2: 97%   Weight: 217 lb (98.4 kg)      Body mass index is 26.41 kg/(m^2).    Alert.  No apparent distress with heart rate stable pulse 75, irregular.  Blood pressure 112/76 on recheck.  Abdomen benign.  Extremities warm dry without peripheral edema issues.  No significant psychomotor agitation.  Pleasant.

## 2021-06-13 NOTE — TELEPHONE ENCOUNTER
Patient last seen Sept 2020.  Rx sent for 1 year.    Judit Young, Pharm.D., [facility]  Medication Therapy Management Pharmacist  [facility]

## 2021-06-14 ENCOUNTER — HOSPITAL ENCOUNTER (OUTPATIENT)
Dept: CARDIOLOGY | Facility: HOSPITAL | Age: 71
Discharge: HOME | End: 2021-06-14
Attending: INTERNAL MEDICINE
Payer: COMMERCIAL

## 2021-06-14 DIAGNOSIS — I26.99 PULMONARY EMBOLISM, BILATERAL (H): ICD-10-CM

## 2021-06-14 DIAGNOSIS — G47.33 OSA ON CPAP: ICD-10-CM

## 2021-06-14 DIAGNOSIS — I48.19 PERSISTENT ATRIAL FIBRILLATION (H): ICD-10-CM

## 2021-06-14 NOTE — TELEPHONE ENCOUNTER
ANTICOAGULATION  MANAGEMENT    Assessment     Today's INR result of 2.4 is Therapeutic (goal INR of 2.0-3.0)        Warfarin taken as previously instructed    No new diet changes affecting INR    No new medication/supplements affecting INR    Continues to tolerate warfarin with no reported s/s of bleeding or thromboembolism     Previous INR was Therapeutic    Plan:     Spoke on phone with Ryan regarding INR result and instructed:      Warfarin Dosing Instructions:  Continue current warfarin dose 7.5 mg daily.  Start holding warfarin 2/5/21.  Patient will call eye surgeon's clinic with INR goal prior to surgery    Instructed patient to follow up no later than: 2/5 pre-op    Education provided: monitoring for bleeding signs and symptoms, monitoring for clotting signs and symptoms and importance of notifying clinic of upcoming surgeries and procedures 2 weeks in advance    Ryan verbalizes understanding and agrees to warfarin dosing plan.    Instructed to call the [facility] for any changes, questions or concerns. (#[phone])   ?   Priscilla Saxena RN    Subjective/Objective:      Prem Taylor, a 70 y.o. male is on warfarin. Ryan Davis reports:     Home warfarin dose: verbally confirmed home dose with Ryan and updated on anticoagulation calendar     Missed doses: No     Medication changes:  No     S/S of bleeding or thromboembolism:  No     New Injury or illness:  No     Changes in diet or alcohol consumption:  No     Upcoming surgery, procedure or cardioversion:  Yes, Eye Vitrectomy 2/10/21    Anticoagulation Episode Summary     Current INR goal:  2.0-3.0   TTR:  87.7 % (1 y)   Next INR check:  3/2/2021   INR from last check:  2.40 (2/2/2021)   Weekly max warfarin dose:     Target end date:     INR check location:     Preferred lab:     Send INR reminders to:  RUBY MAURICIO    Indications    Pulmonary embolism  bilateral (H) [I26.99]           Comments:           Anticoagulation Care Providers      Provider Role Specialty Phone number    Mamadou Baez MD Referring Family Medicine [phone]        Priscilla Holloway, RN, BSN  Anticoagulation Clinic

## 2021-06-14 NOTE — PROGRESS NOTES
ANTICOAGULATION  MANAGEMENT    Reviewed records from recent Hospital Admission for cardioversion.    No adjustment to anticoagulation plan needed    Morales Tapia RN

## 2021-06-14 NOTE — ANESTHESIA PREPROCEDURE EVALUATION
Anesthesia Evaluation      Patient summary reviewed   No history of anesthetic complications     Airway   Mallampati: III  Neck ROM: full   Pulmonary - negative ROS and normal exam    breath sounds clear to auscultation                         Cardiovascular - normal exam  Exercise tolerance: > or = 4 METS  (+) valvular problems/murmurs, dysrhythmias, , hypercholesterolemia,   Received beta blockers in last 24 hours prior to incision.  ,  ECG reviewed  Rhythm: regular  Rate: normal,         Neuro/Psych - negative ROS     Endo/Other       Comments: H/o pe      GI/Hepatic/Renal - negative ROS      Other findings: Summary   Normal left ventricular size and systolic function.   Left ventricular ejection fraction is visually estimated to be 50-55%.   LV function appears lower limits of normal   Mildly enlarged left atrium.   Mildly enlarged right atrium.   Mild to moderate mitral regurgitation is present.   Mild bradycardia      Dental    (+) poor dentition and chipped                       Anesthesia Plan  Planned anesthetic: general mask and total IV anesthesia  Brief GA with Brevital  ASA 4   Induction: intravenous   Anesthetic plan and risks discussed with: patient and spouse    Post-op plan: routine recovery

## 2021-06-14 NOTE — TELEPHONE ENCOUNTER
Left message to call back for: pt  Information to relay to patient:  Left to call and schedule his pre op with dr alves or Brittanie Graham.

## 2021-06-14 NOTE — PROGRESS NOTES
[facility] Office Note    Assessment / Plan:    1. Persistent recurrent atrial fibrillation, onset 10/2017.  Status post successful cardioversion.   But now with evidence of recurrence, without symptoms.  Rate control adequate on low-dose metoprolol.    Continue anticoagulation.  Okay to resume treadmill exercise.  Plan rate control strategy at this time.  2. Mitral insufficiency.  Moderate insufficiency due to leaflet thickening.  May be contributing to recurrences of atrial fibrillation.  Not severe enough to warrant intervention at this time  3. Bilateral pulmonary embolism.  Uncertain etiology, may have triggered onset of atrial fibrillation.  Will be on long-term anticoagulation.    Follow-up 6 months    ______________________________________________________________________    Subjective:    I had the opportunity to see Prem Taylor at the [facility]. Prem Taylor is a 67 y.o. male with a history of  hyperlipidemia, who presented 10/10/2017 to [facility] with 1 week history of exertional shortness of breath.  He was found to have bilateral pulmonary emboli and new onset atrial fibrillation.  He was initiated on warfarin and Lovenox, along with metoprolol for rate control.  No DVT was identified.  Echocardiogram showed normal left and right ventricular function.  He was subsequently seen here and scheduled for cardioversion which was completed a month ago.  He returns today for routine follow-up with his wife and daughter.    Transesophageal echo imaging prior to cardioversion showed only moderate mitral insufficiency with nonspecific valve thickening.  Normal left ventricular systolic function was again confirmed.  Normal right heart function was also noted.  He continues on warfarin.  He did not notice any significant change in his stamina but has not resumed his treadmill exercise yet.  He does have a heart rate monitor on his wrist that he has noted as quite a  variable heart rate though the rate is generally from the 60s-80s.  He has had no lightheadedness or dizziness, chest pains, or peripheral edema.    ______________________________________________________________________    Problem List:  Patient Active Problem List   Diagnosis     Hearing Loss     Hypercholesterolemia     Presbycusis     Sinus bradycardia     BPH without urinary obstruction     Atypical Chest Pain     Blood Pressure Isolated Elevated     Male erectile dysfunction     Basal cell carcinoma, ear     Erectile dysfunction, unspecified erectile dysfunction type     Overweight (BMI 25.0-29.9)     Tubular adenoma of colon     Hyperbilirubinemia     Pulmonary embolism, bilateral     Persistent atrial fibrillation     Mitral valve insufficiency     Medical History:  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     Surgical History:  Past Surgical History:   Procedure Laterality Date     CERVICAL FUSION  2001     HERNIA REPAIR  2005     TONSILLECTOMY      childhood     Social History:  Social History     Social History     Marital status:      Spouse name: N/A     Number of children: N/A     Years of education: N/A     Occupational History     Not on file.     Social History Main Topics     Smoking status: Former Smoker     Smokeless tobacco: Never Used     Alcohol use Yes     Drug use: No     Sexual activity: Not on file     Other Topics Concern     Not on file     Social History Narrative     Sleep History:  Restorative  Exercise History:  Walking.  Plans to resume walking on treadmill 4-5 days a week    Review of Systems:   General: WNL  Eyes: WNL  Ears/Nose/Throat: WNL  Lungs: WNL  Heart: WNL  Stomach: WNL  Bladder: WNL  Muscle/Joints: WNL  Skin: WNL  Nervous System: WNL  Mental Health: WNL     Blood: WNL          Family History:  Family History   Problem Relation Age of Onset     Heart disease Mother       "Hypertension Mother      Dementia Mother      Heart attack Father          Allergies:  No Known Allergies  Medications:  Current Outpatient Prescriptions   Medication Sig Dispense Refill     escitalopram oxalate (LEXAPRO) 5 MG tablet Take 1 tablet (5 mg total) by mouth daily. 30 tablet 5     metoprolol tartrate (LOPRESSOR) 25 MG tablet Take 0.5 tablets (12.5 mg total) by mouth 2 (two) times a day.  0     MULTIVITAMIN (MULTIPLE VITAMIN ORAL) Take 1 tablet by mouth daily.       sildenafil (VIAGRA) 100 MG tablet Take 50 mg by mouth. One hour prior to intercourse.       warfarin (COUMADIN) 7.5 MG tablet        No current facility-administered medications for this visit.        Objective:   Wt Readings from Last 3 Encounters:   12/08/17 216 lb (98 kg)   11/15/17 217 lb (98.4 kg)   11/10/17 215 lb (97.5 kg)     Vital signs:  /82 (Patient Site: Right Arm, Patient Position: Sitting, Cuff Size: Adult Regular)  Pulse 68  Resp 24  Ht 6' 3\" (1.905 m)  Wt 216 lb (98 kg)  BMI 27 kg/m2      Physical Exam:  Resting heart rate 80 bpm, up to 100 after climbing 10 steps rapidly.  GENERAL APPEARANCE: Alert, cooperative and in no acute distress.  HEENT: Conjunctivae not injected.  No scleral icterus. No Xanthelasma. Oral mucous membranes pink and moist.  NECK: No JVD.  No Hepatojugular reflux. Thyroid not enlarged.  CHEST: clear to auscultation  CARDIOVASCULAR: Irregularly irregular S1, S2 without murmur ,clicks or rubs. Brachial, radial and posterior tibial pulses are intact and symmetric. No carotid bruits noted.  ABDOMEN: Nontender. BS+. No bruits.  EXTREMITIES: No cyanosis, clubbing or edema.  SKIN:  No rash, bruising    Lab Results:  LIPIDS:  Lab Results   Component Value Date    CHOL 238 (H) 01/03/2017    CHOL 253 (H) 12/29/2015    CHOL 240 (H) 12/23/2014     Lab Results   Component Value Date    HDL 72 01/03/2017    HDL 72 12/29/2015    HDL 78 12/23/2014     Lab Results   Component Value Date    LDLCALC 150 (H) " 01/03/2017    LDLCALC 164 (H) 12/29/2015    LDLCALC 148 (H) 12/23/2014     Lab Results   Component Value Date    TRIG 82 01/03/2017    TRIG 83 12/29/2015    TRIG 68 12/23/2014       Transesophageal echo 11/2017:  Summary     No previous study for comparison.     Left ventricle ejection fraction is normal. The estimated left ventricular ejection fraction is 55%.    Moderate mitral regurgitation    No left atrial appendage thrombus seen       ECG today: Atrial fibrillation at 66 bpm.        HOLLY GARCIA MD [facility]  [phone]    This note created using Dragon voice recognition software.  Sound alike errors may have escaped editing.

## 2021-06-14 NOTE — ANESTHESIA CARE TRANSFER NOTE
Last vitals:   Vitals:    11/15/17 1322   BP: 148/76   Pulse: 64   Resp: 16   Temp:    SpO2: 100%     Patient's level of consciousness is awake and drowsy  Spontaneous respirations: yes  Maintains airway independently: yes  Dentition unchanged: yes  Oropharynx: oropharynx clear of all foreign objects    QCDR Measures:  ASA# 20 - Surgical Safety Checklist: WHO surgical safety checklist completed prior to induction  PQRS# 430 - Adult PONV Prevention: [id number]-1P - Medical reason for NOT administering combination therapy  ASA# 8 - Peds PONV Prevention: NA - Not pediatric patient, not GA or 2 or more risk factors NOT present  PQRS# 424 - Deedee-op Temp Management: NA - MAC anesthesia or case < 60 minutes  PQRS# 426 - PACU Transfer Protocol:NA - Patient did not go to PACU  ASA# 14 - Acute Post-op Pain: ASA14B - Patient did NOT experience pain >= 7 out of 10

## 2021-06-14 NOTE — TELEPHONE ENCOUNTER
[facility]G-PROCEDURAL ANTICOAGULATION  MANAGEMENT    Assessment     Warfarin interruption plan for eye surgery, vitrectomy on 02/10/2021.      Patient states eye provider requests 4-5 day hold ideally, with INR below 2.0    History of PE, Afib      Risk stratification for thromboembolism: moderate. (2012 Chest guidelines)      [id number] = 3 (Age, HX VTE ++) DX at time of PE    PE 2017      VTE: 2016 Anticoagulation Forum clinical guidance recommends, no bridge therapy for most VTE patients interrupting warfarin with possible exceptions for VTE within previous month, prior hx recurrent VTE during anticoagulation therapy interruption, or undergoing a procedure with high for VTE  (joint replacement surgery or major abdominal cancer resection    NVAF: 2017 ACC periprocedure pathway for NVAF advises No/possible bridge for low risk stratification ([id number] score <=4 or and no prior hx of stroke, TIA or systemic embolism    Plan       Pre-Procedure:    Option 1: INR check at pre-op 2021, and adjust dose to target INR slightly below 2, with possible 1-2 day hold, and boost night of procedure  Option 2    Hold warfarin until after procedure startin2021     Lovenox 100 mg subq Q 12 hrs (1 mg/kg Q 12 hr for CrCl >30ml/min, calc ~53ml/min with most recent Scr on file)     Start Lovenox: 2021    Last dose of Lovenox prior to procedure: 2021       Post-Procedure:    Resume home warfarin dose if okay with provider doing procedure on night of procedure, 02/10/2021 PM: 15mg    Resume Lovenox ~ 24-48 hrs post procedure when okay with provider doing procedure. Continue until INR >= 2.3    Recheck INR 5-7 days after resuming warfarin   ?   Jeaneth Munoz, PharmD  [facility] Anticoagulation    Subjective/Objective:      Prem Taylor, a 70 y.o. male    Goal INR Range: 2-3    Patient bridged in past: Yes with same procedure in 2020    Pertinent History: N/A    Wt Readings from Last 3 Encounters:  "  09/09/20 219 lb (99.3 kg)   06/05/20 (!) 225 lb (102.1 kg)   02/27/20 (!) 227 lb (103 kg)        Ideal body weight: 83.4 kg (183 lb 12.1 oz)  Adjusted ideal body weight: 89.7 kg (197 lb 13.6 oz)     Estimated body mass index is 27.74 kg/m  as calculated from the following:    Height as of 9/9/20: 6' 2.5\" (1.892 m).    Weight as of 9/9/20: 219 lb (99.3 kg).    Lab Results   Component Value Date    INR 2.40 (H) 02/02/2021    INR 2.50 (H) 01/05/2021    INR 2.30 (H) 12/15/2020     Lab Results   Component Value Date    HGB 14.5 09/09/2020    HCT 43.5 09/09/2020     09/09/2020     Lab Results   Component Value Date    CREATININE 0.75 09/09/2020    CREATININE 0.81 02/11/2020    CREATININE 0.87 07/25/2019     Estimated CrCl: CrCl cannot be calculated (Patient's most recent lab result is older than the maximum 10 days allowed.).    "

## 2021-06-14 NOTE — TELEPHONE ENCOUNTER
Who is calling:  Patient  Reason for Call:  The patient is calling [facility] regarding his upcoming surgery on 2/10. His surgeon is recommending a 4-5 hold on Warfarin but would defer to PCP to decide and a 2.0 threshold for his INR. The patient is not requesting a return call but if there are questions please advise at the number provided.    Okay to leave a detailed message: Yes

## 2021-06-14 NOTE — PROGRESS NOTES
[facility] HEART [facility]   Arrhythmia Clinic    Assessment/Plan:  Diagnoses and all orders for this visit:    Persistent atrial fibrillation and difficult to tell if shortness of breath is related to PEs or A. fib or valvular disease.  I discussed high association between mitral valve disease and atrial fibrillation.  Atrial fibrillation will likely be an ongoing issue for Ryan.  I discussed natural progression with atrial fibrillation.  I discussed differences between atrial fib in sinus rhythm clinically.  I think it will be easier for him to tell if he is out of rhythm if he is exercising as will note heart rates will elevate more easily and stay up longer.  He wears a heart rate bracelet which may be helpful.  This is tied to his cell phone.  I discussed the differences between heart rate graphs in sinus versus A. fib.  This is based on  limited knowledge of his  heart rates in sinus rhythm.  Will need to evaluate whether he is symptomatic or not post cardioversion.  It is not taking a lot of medication to control heart rate in atrial fib so doable if he were asymptomatic to follow rate control.  If we are going to strive for rhythm control we have limited antiarrhythmic options.  Tigas and is probably the best option since it does not lower heart rate in regular rhythm.  I discussed risks with antiarrhythmics with Ryan with a tendency toward sinus bradycardia as to precipitating need for permanent pacemaker.  Will decrease metoprolol tartrate from 25 mg twice daily to 12.5 mg twice daily the morning of cardioversion.  Will need to evaluate heart rates after cardioversion to see if he will tolerate staying on this medication.  If he needs surgery for his valve I would ask surgeon to consider maze and left atrial appendage amputation at the time of cardiac surgery.  I recommended only cardioversion for now and will see what his natural progression is.  I have warned him that he may have early reoccurrence given  mitral valve disease.  I discussed cardioversion and prep for it.  I gave him information sheets on atrial fib and cardioversion since they are both new for him.  He had a lot of questions as he is done some reading.  These were answered to his and his wife's satisfaction.  See after visit summary for more instructions regarding cardioversion.  He is ready and will add cardioversion on Wednesday since this is what Dr. Velez promised him.  To follow-up with Dr. Velez as planned on December 8  -     EP Cardioversion External; Future; Expected date: 11/10/17    Non-rheumatic mitral regurgitation and scheduled for JANUSZ next Wednesday to evaluate degree of mitral regurg as listed moderate to severe on transthoracic echo.    Sinus bradycardia and no bradycardic symptoms in the past.    Other orders  -     Verify informed consent for Elective Cardioversion; Standing  -     Vital signs; Standing  -     NPO 8 hours prior to procedure; Standing  -     Notify Anesthesiologist -; Standing  -     Verify 100% compliance with oral anti-coagulant over the past 3 weeks verbally with the patient prior to cardioversion. Notify procedularlist if missed doses.; Standing  -     Emergency equipment at bedside; Standing  -     Oxygen nasal cannula; Standing  -     Inpatient consult to Anesthesiology Reason for Consult? cardioversion; Did you contact the consulting MD? No; Consult priority: Today (urgent); Communication for MD: No phone communication necessary for now; Standing  -     POCT INR (if on warfarin); Standing  -     Insert and maintain IV; Standing  -     lidocaine (PF) 10 mg/mL (1 %) injection 0.1-0.3 mL (XYLOCAINE-MPF); Inject 0.1-0.3 mL under the skin as needed (for IV insertion).  -     sodium chloride 0.9%; Infuse 25 mL/hr into a venous catheter continuous.  -     Saline lock IV; Standing  -     sodium chloride 0.9 % flush 3 mL (NS); Infuse 3 mL into a venous catheter Line Care.    40 minutes were spent in face-to-face  counseling regarding above diagnoses and options for treatment.    ______________________________________________________________________    Subjective:    I had the opportunity to see Prem Taylor at the [facility]. Prem Taylor is a 67 y.o. male and here for EP follow-up regarding persistent atrial fibrillation which is new diagnosis for Ryan .  Prem Taylor has a known history of hyperlipidemia, who presented 10/10/2017 to [facility] with 1 week history of exertional shortness of breath and was found to have bilateral pulmonary emboli and new onset atrial fibrillation.  He was initiated on warfarin and Lovenox, along with metoprolol for rate control.  No DVT was identified.  Echocardiogram showed normal left and right ventricular function and moderate to severe MR.  He has seen Dr. Velez in our clinic for this and persistent atrial fib.  He has a tendency towards sinus bradycardia.  In 2014 I noted in primary clinic note his heart rate was 48 and no medications.  Prior to admission to [facility], he was on no prescription medications.  He has been doing weekly or twice a week INRs and all therapeutic since October 16, 2017.  He has another INR planned for Monday.  He has been told to take it easy so he has not been exercising on the treadmill but prior to admission to [facility] was noting shortness of breath with activity like this.  He is not short of breath at rest or with mild ADLs like walking room to room.  He denies any lightheadedness, dizziness or angina.  He has no palpitations.   This visit will serve as history and physical for cardioversion.  See problem list for more details.  Medical, past medical, surgical and social history reviewed and updated.  Meds and allergies reviewed.       ______________________________________________________________________    Problem List:  Patient Active Problem List   Diagnosis     Hearing Loss     Hypercholesterolemia     Presbycusis      "Sinus bradycardia     BPH without urinary obstruction     Atypical Chest Pain     Blood Pressure Isolated Elevated     Male erectile dysfunction     Basal cell carcinoma, ear     Erectile dysfunction, unspecified erectile dysfunction type     Overweight (BMI 25.0-29.9)     Tubular adenoma of colon     Hyperbilirubinemia     Pulmonary embolism, bilateral     Persistent atrial fibrillation     Mitral valve insufficiency     Medical History:  Past Medical History:   Diagnosis Date     Hypercholesterolemia      Surgical History:  Past Surgical History:   Procedure Laterality Date     CERVICAL FUSION       HERNIA REPAIR       SPINE SURGERY       TONSILLECTOMY       Social History:  Social History   Substance Use Topics     Smoking status: Former Smoker     Smokeless tobacco: Never Used     Alcohol use Yes        Review of Systems: Review of Systems:   General: WNL  Eyes: WNL  Ears/Nose/Throat: WNL  Lungs: WNL  Heart: WNL  Stomach: WNL  Bladder: WNL  Muscle/Joints: WNL  Skin: WNL  Nervous System: WNL  Mental Health: WNL     Blood: WNL      Family History:  Family History   Problem Relation Age of Onset     Heart disease Mother      Hypertension Mother      Dementia Mother      Heart attack Father          Allergies:  No Known Allergies  Medications:  Current Outpatient Prescriptions   Medication Sig Dispense Refill     escitalopram oxalate (LEXAPRO) 5 MG tablet Take 1 tablet (5 mg total) by mouth daily. 30 tablet 5     metoprolol tartrate (LOPRESSOR) 25 MG tablet Take 25 mg by mouth 2 (two) times a day.       MULTIVITAMIN (MULTIPLE VITAMIN ORAL) Take 1 tablet by mouth daily.       sildenafil (VIAGRA) 100 MG tablet Take 50 mg by mouth. One hour prior to intercourse.       warfarin (COUMADIN) 7.5 MG tablet        No current facility-administered medications for this visit.        Objective:   Vital signs:  /76 (Patient Site: Right Arm, Patient Position: Sitting, Cuff Size: Adult Large)  Pulse 64  Resp 16  Ht 6' 4\" " (1.93 m)  Wt 215 lb (97.5 kg)  BMI 26.17 kg/m2      Physical Exam:    GENERAL APPEARANCE: Alert, cooperative and in no acute distress.  HEENT: No scleral icterus. No Xanthelasma. Oral mucuos membranes pink and moist.  NECK: JVP Nl.   CHEST: clear to auscultation  CARDIOVASCULAR: S1, S2 without clicks or rubs. Irregular, irregular and systolic murmur.  Radial and posterior tibial pulses are intact and symetric.   EXTREMITIES: No cyanosis, clubbing or edema.    Results personally reviewed:  Results for orders placed during the hospital encounter of 02/05/15   Echo Complete [ECH10] 02/05/2015    Narrative     Summary   Normal left ventricular size and systolic function.   Left ventricular ejection fraction is visually estimated to be 50-55%.   LV function appears lower limits of normal   Mildly enlarged left atrium.   Mildly enlarged right atrium.   Mild to moderate mitral regurgitation is present.   Mild bradycardia           Echocardiogram 10/2017 at [facility]  :  CONCLUSION:    1. Normal LV size, thickness and systolic function, EF > 55%.    2. Mild RV dilation with normal systolic function.    3. Mild to moderate biatrial enlargement.    4. Moderate to severe mitral regurgitation.    5. Mildly dilated aortic root (3.9 cm) and ascending aorta (4 cm).    6. Mildly elevated RA pressure based on IVC dimensions.    7. Negative bubble study, with and without Valsalva.     8. No comparison studies available.    Left Ventricular Ejection Fraction: 60 %    Results for orders placed or performed in visit on 10/25/17   ECG 12 lead with MUSE   Result Value Ref Range    SYSTOLIC BLOOD PRESSURE  mmHg    DIASTOLIC BLOOD PRESSURE  mmHg    VENTRICULAR RATE 62 BPM    ATRIAL RATE  BPM    P-R INTERVAL  ms    QRS DURATION 74 ms    Q-T INTERVAL 394 ms    QTC CALCULATION (BEZET) 399 ms    P Axis  degrees    R AXIS 9 degrees    T AXIS 35 degrees    MUSE DIAGNOSIS       Atrial fibrillation  Abnormal ECG  No previous ECGs  available  Confirmed by CRISTOBAL VERMA MD LOC:JN ([id number]) on 10/26/2017 4:34:24 PM         TSH:   Lab Results   Component Value Date    TSH 1.15 12/23/2014     BNP: No results found for: BNP  BMP:  Lab Results   Component Value Date    CREATININE 0.80 10/26/2017    BUN 11 10/26/2017     10/26/2017    K 4.3 10/26/2017     10/26/2017    CO2 24 10/26/2017       This note has been dictated using voice recognition software. Any grammatical or context distortions are unintentional and inherent to the software.    KASANDRA ROSSI RN, [facility] HEART [facility]  [phone]

## 2021-06-14 NOTE — PROGRESS NOTES
JANUSZ done pre cardioversion.  Rhythm atrial fib., rate 60-70's.  Meds versed 3mg and fentanyl 100 mcg given IV per order. Will continue NPO status until after cardioversion.  Report given to MARIA LUISA RN.

## 2021-06-14 NOTE — PROGRESS NOTES
1950  [phone] (home)  [phone] (mobile)    +++Important patient information for CSC/Cath Lab staff : PT HAVING JANUSZ AT 9 45+++    [facility] EP Cath Lab Procedure Order   Cardioversion:    Cardioversion  AND JANUSZ    PT INR'S ARE IN [facility] UNDER LAB TAB CLEARED BY NICHOLAS MCDANIEL CNP  PT WILL HAVE INR TODAY KNOWING IF BELOW 2 CV WOULD BE CANCELLED  11/6=2.9  10/30=3.5  10/26=3.2  10/23=2.9  10/19=2.0  10/17=2.0      Diagnosis:  AF  Anticipated Case Duration:  Standard  Scheduling Needs/Timeframe:  PT IS SET FOR JANUSZ AT 9 45 AND CV AT 1 00 WITH NICHOLAS MCDANIEL CNP    Current Device: None None  Device Company/Device Rep Needed for Procedure: None    Anesthesia:  General-CV Only  Research Protocol:  No    [facility] EP Cath Lab Prep   Ordering Provider: Nicholas Mcdaniel NP  Ordering Date: 11/10/2017  Orders Status: Intial order placed and Order set placed  EP NC Contact: Bozena Saravia LPN    H&P:  Compled by NICHOLAS MCDANIEL CNP on 11/10/2017  PCP: Mamadou Baez MD, [phone]    Pre-op Labs: N/A for procedure    Medical Records Pertinent for Procedure:  N/A    Patient Education:    PT HAS A  FOR PROCEDURE  PT INSTRUCTED TO HOLD ANY VITAMINS, CALCIUM, IRON OR SUPPLEMENTS THE MORNING OF CV  PT HAVING JANUSZ 9 45 ARRIVAL, PREP REVIEWED  PT INSTRUCTED TO DECREASE METOPROLOL TO 12.5 MG. MORNING OF CV  PT'S INR'S ARE IN EPIC UNDER LAB TAB, CLEARED BY NICHOLAS MCDANIEL CNP  PT HAS A FOLLOW UP WITH DR GARCIA 12/8 AND IS TO KEEP THAT    Teach with Patient: Completed via phone on 11/13/2017    Risks Reviewed:     Cardioversion    >90% acute success rate, <10% failure to convert or   reverts shortly after cardioversion.    <1% embolic event of (CVA, pulmonary embolism, or   other site).    75% risk for superficial burn.  Risks associated with general anesthesia will be addressed by the Anesthesiology Department    Consent: Will be obtained in [facility] day of procedure    Pre-Procedure Instructions that were Reviewed with Patient:  NPO after  midnight, Notified patient of time and date of procedure by CV , Transportation arrangements needed s/p procedure, Post-procedure follow up process and Sedation plan/orders    Pre-Procedure Medication Instructions:  Instructions given to pt regarding anticoagulants: Coumadin- instructed to continue anticoagulation uninterrupted through their procedure  Instructions given to pt regarding antiarrhythmic medication: Beta Blocker; Pt instructed to continue medication prior to procedure  Instructions for medication, other than anticoagulants/antiarrhythmics listed above, given to pt: to take all morning medications with small sips of water, with the exception of OTC supplements and MVI    No Known Allergies    Current Outpatient Prescriptions:      escitalopram oxalate (LEXAPRO) 5 MG tablet, Take 1 tablet (5 mg total) by mouth daily., Disp: 30 tablet, Rfl: 5     metoprolol tartrate (LOPRESSOR) 25 MG tablet, Take 25 mg by mouth 2 (two) times a day., Disp: , Rfl:      MULTIVITAMIN (MULTIPLE VITAMIN ORAL), Take 1 tablet by mouth daily., Disp: , Rfl:      sildenafil (VIAGRA) 100 MG tablet, Take 50 mg by mouth. One hour prior to intercourse., Disp: , Rfl:      warfarin (COUMADIN) 7.5 MG tablet, , Disp: , Rfl:

## 2021-06-14 NOTE — TELEPHONE ENCOUNTER
ANTICOAGULATION  MANAGEMENT    Assessment     Today's INR result of 2.5 is Therapeutic (goal INR of 2.0-3.0)        Warfarin taken as previously instructed    No new diet changes affecting INR    No new medication/supplements affecting INR    Continues to tolerate warfarin with no reported s/s of bleeding or thromboembolism     Previous INR was Therapeutic    Plan:     Left detailed message for Prem regarding INR result and instructed:     Warfarin Dosing Instructions:  Continue current warfarin dose 7.5 mg daily     Instructed patient to follow up no later than: 4 weeks     Instructed to call the [facility] for any changes, questions or concerns. (#[phone])   ?   Annabella Hannah RN    Subjective/Objective:      Prem Taylor, a 70 y.o. male is on warfarin.     Prem reports:     Home warfarin dose: as updated on anticoagulation calendar per template     Missed doses: No     Medication changes:  No     S/S of bleeding or thromboembolism:  No     New Injury or illness:  No     Changes in diet or alcohol consumption:  No     Upcoming surgery, procedure or cardioversion:  No    Anticoagulation Episode Summary     Current INR goal:  2.0-3.0   TTR:  86.2 % (1 y)   Next INR check:  2/2/2021   INR from last check:  2.50 (1/5/2021)   Weekly max warfarin dose:     Target end date:     INR check location:     Preferred lab:     Send INR reminders to:  RUBY MAURICIO    Indications    Pulmonary embolism  bilateral (H) [I26.99]           Comments:           Anticoagulation Care Providers     Provider Role Specialty Phone number    Mamadou Baez MD Referring Family Medicine [phone]

## 2021-06-14 NOTE — TELEPHONE ENCOUNTER
See separate telephone encounter for bridging plan for upcoming procedure.    Priscilla Holloway, RN, BSN  Anticoagulation Clinic

## 2021-06-14 NOTE — ANESTHESIA POSTPROCEDURE EVALUATION
Patient: Prem Taylor  * No procedures listed *  Anesthesia type: general    Patient location: [facility]  Last vitals:   Vitals:    11/15/17 1343   BP:    Pulse:    Resp:    Temp: 36.8  C (98.3  F)   SpO2:      Post vital signs: stable  Level of consciousness: awake and responds to simple questions  Post-anesthesia pain: pain controlled  Post-anesthesia nausea and vomiting: no  Pulmonary: unassisted, return to baseline  Cardiovascular: stable and blood pressure at baseline  Hydration: adequate  Anesthetic events: no    QCDR Measures:  ASA# 11 - Deedee-op Cardiac Arrest: ASA11B - Patient did NOT experience unanticipated cardiac arrest  ASA# 12 - Deedee-op Mortality Rate: ASA12B - Patient did NOT die  ASA# 13 - PACU Re-Intubation Rate: NA - No ETT / LMA used for case  ASA# 10 - Composite Anes Safety: ASA10A - No serious adverse event    Additional Notes:

## 2021-06-14 NOTE — TELEPHONE ENCOUNTER
New Appointment Needed  What is the reason for the visit:    Pre-Op Appt Request  When is the surgery? :  02/10/2021  Where is the surgery?:  [facility]  Who is the surgeon? :  Dr. Tamez  What type of surgery is being done?: L Eye Floater Surgery  Provider Preference: PCP only  How soon do you need to be seen?: As soon as possible  Waitlist offered?: No  Okay to leave a detailed message:  Yes

## 2021-06-15 ENCOUNTER — COMMUNICATION - HEALTHEAST (OUTPATIENT)
Dept: ANTICOAGULATION | Facility: CLINIC | Age: 71
End: 2021-06-15

## 2021-06-15 ENCOUNTER — AMBULATORY - HEALTHEAST (OUTPATIENT)
Dept: LAB | Facility: CLINIC | Age: 71
End: 2021-06-15

## 2021-06-15 DIAGNOSIS — I26.99 PULMONARY EMBOLISM, BILATERAL (H): ICD-10-CM

## 2021-06-15 DIAGNOSIS — I48.19 PERSISTENT ATRIAL FIBRILLATION (H): ICD-10-CM

## 2021-06-15 PROBLEM — D12.6 TUBULAR ADENOMA OF COLON: Status: ACTIVE | Noted: 2017-01-03

## 2021-06-15 PROBLEM — E80.6 HYPERBILIRUBINEMIA: Status: ACTIVE | Noted: 2017-01-03

## 2021-06-15 PROBLEM — N52.9 ERECTILE DYSFUNCTION, UNSPECIFIED ERECTILE DYSFUNCTION TYPE: Status: ACTIVE | Noted: 2017-01-03

## 2021-06-15 PROBLEM — E66.3 OVERWEIGHT: Status: ACTIVE | Noted: 2017-01-03

## 2021-06-15 LAB — INR PPP: 2 (ref 0.9–1.1)

## 2021-06-15 NOTE — TELEPHONE ENCOUNTER
ANTICOAGULATION  MANAGEMENT    Assessment     Today's INR result of 2.2 is Therapeutic (goal INR of 2.0-3.0)        Warfarin taken as previously instructed    No new diet changes affecting INR    No new medication/supplements affecting INR    Continues to tolerate warfarin with no reported s/s of bleeding or thromboembolism     Previous INR was Therapeutic    Plan:     Spoke on phone with Ryan regarding INR result and instructed:      Warfarin Dosing Instructions:  Continue current warfarin dose 7.5 mg daily   (0 % change)  Hold/bridge instructions in 2/2 anticoagulation encounter. Pt will start holding warfarin 2/6, bridge as instructed, and resume warfarin/lovenox after procedure as instructed by provider doing procedure.    Instructed patient to follow up no later than: Tues 2/16    Education provided: target INR goal and significance of current INR result, importance of following up for INR monitoring at instructed interval and importance of taking warfarin as instructed    Ryan verbalizes understanding and agrees to warfarin dosing plan.    Instructed to call the [facility] for any changes, questions or concerns. (#[phone])   ?   Marcia Angel RN    Subjective/Objective:      Prem Taylor, a 70 y.o. male is on warfarin. Ryan Davis reports:     Home warfarin dose: as updated on anticoagulation calendar per template     Missed doses: No     Medication changes:  No     S/S of bleeding or thromboembolism:  No     New Injury or illness:  No     Changes in diet or alcohol consumption:  No     Upcoming surgery, procedure or cardioversion:  Yes: eye surgery on 2/10    Anticoagulation Episode Summary     Current INR goal:  2.0-3.0   TTR:  87.8 % (1 y)   Next INR check:  3/2/2021   INR from last check:  2.20 (2/5/2021)   Weekly max warfarin dose:     Target end date:     INR check location:     Preferred lab:     Send INR reminders to:  RUBY MAURICIO    Indications    Pulmonary embolism  bilateral (H)  [I26.99]           Comments:           Anticoagulation Care Providers     Provider Role Specialty Phone number    Mamadou Baez MD Referring Family Medicine [phone]

## 2021-06-15 NOTE — PROGRESS NOTES
Assessment and Plan:       1. Encounter for general adult medical examination with abnormal findings  Annual wellness visit completed.  Risks associate with suboptimal diet, hearing loss and need to complete advanced directives reviewed.  Annual wellness visits to continue.  Pneumovax immunizations provided otherwise immunizations up-to-date.  Check screening PSA.  Hepatitis C screen completed based on age criteria.  - Pneumococcal polysaccharide vaccine 23-valent 3 yo or older, subq/IM  - PSA, Annual Screen (Prostatic-Specific Antigen)    2. Overweight (BMI 25.0-29.9)  Dietary and exercise modifications for weight goal less than 210 pounds initially, less than 205 pounds ideally.    3. Atrial fibrillation, unspecified type  Atrial fibrillation, rate controlled.  Check basic metabolic panel, TSH and magnesium.  Will follow up with Dr. Xavier cardiologist around May 2018 for six-month follow-up.  Continue metoprolol tartrate 25 mg using half tablet twice daily.  Able to exercise on treadmill at 4 mph rate, shovel, etc. without concerns.  - Basic Metabolic Panel  - Thyroid Stimulating Hormone (TSH)  - Magnesium    4. Pulmonary embolism, bilateral  Continues warfarin anticoagulation 7.5 mg daily.  Check INR today.    5. Anxiety  Remains on escitalopram 5 mg daily with improvement in anxiety with PHQ 9 questionnaire 0 out of 27 and KALPANA 7 questionnaire is 0 out of 21.    6. Hypercholesterolemia  Check with a cascade today.  Dietary and exercise modifications as noted above.  - Lipid Cascade    7. BPH without urinary obstruction  Stable.  No current concerns requiring BPH Rx.    8. Erectile dysfunction, unspecified erectile dysfunction type  Viagra available on an as-needed basis.    9. Tubular adenoma of colon  Tubular adenoma ×2 on colonoscopy January 30, 2014 with recommendation for repeat colonoscopy in 5 years (January 2019).    10. Mitral valve insufficiency, unspecified etiology  Asymptomatic currently described  as moderate to severe during hospitalization at [facility] October 2017.  We will continue to follow with cardiologist.    11. Normochromic normocytic anemia  Repeat CBC regarding mild normochromic normocytic anemia previously with hemoglobin 13.4 MCV 91.  - HM2(CBC w/o Differential)       The patient's current medical problems were reviewed.    I have had an Advance Directives discussion with the patient.  The following high BMI interventions were performed this visit: encouragement to exercise, weight monitoring, weight loss from baseline weight and lifestyle education regarding diet.  Weight goal < 210 pounds initially, < 205 pounds ideally.     The following health maintenance schedule was reviewed with the patient and provided in printed form in the after visit summary:   Health Maintenance   Topic Date Due     FALL RISK ASSESSMENT  02/06/2019     ADVANCE DIRECTIVES DISCUSSED WITH PATIENT  02/06/2023     COLONOSCOPY  01/30/2024     TD 18+ HE  01/03/2027     PNEUMOCOCCAL POLYSACCHARIDE VACCINE AGE 65 AND OVER  Completed     INFLUENZA VACCINE RULE BASED  Completed     PNEUMOCOCCAL CONJUGATE VACCINE FOR ADULTS (PCV13 OR PREVNAR)  Completed     ZOSTER VACCINE  Completed        Subjective:     Chief Complaint: Prem Taylor is an 67 y.o. male here for an Annual Wellness visit.     HPI: Patient seen today for annual wellness visit.  In general doing well.  Did review hospitalization October 2017 at [facility] regarding pulmonary emboli bilateral as well as noted atrial fibrillation.  Cardioversion completed however patient did return to atrial fibrillation rhythm.  Continues metoprolol tartrate 25 mg using half tablet twice daily as well as warfarin 7.5 mg daily.  Followed by Dr. Xavier and seen in December was to follow-up in 6 months and will schedule for May 2018 prior to leaving for a river cruise in [place].  History of cluster elevation.  Known BPH with urinary obstruction.  Male erectile  "dysfunction.  Hearing last without hearing aids.  History of mitral valve insufficiency.  Anxiety improved with citalopram 5 mg daily.  Able to do activities of daily living including walking on treadmill 4 mph as well as shoveling without significant concerns.  Comprehensive review of systems as above otherwise all negative.     \"Aubree\" x 44 years   2 daughters - Renee (38) and Susannah (35)  No smoke (quit  after 1 and 1/2 ppd)   EtOH: weekends \"few beers\"   Surgeries: anterior cervical fusion; inguinal hernia; tonsils   Hospitalizations: [facility] 10/9/18-10/10/17 bilateral pulmonary emboli with a. fib  Mom -  86 due to CHF; Alzheimer's dementia   Dad -  52 due to MI   1 bro - prostate CA   1 sis -   Work: manager in a dental lab   Exercise: run 4x/week at 6-6.5 mph; situps and pushups    Review of Systems:  Please see above.  The rest of the review of systems are negative for all systems.    Patient Care Team:  Mamadou Baez MD as PCP - General  Gerardo Velez MD as Physician (Cardiology)  Miky Lindsey MD (Hematology and Oncology)     Patient Active Problem List   Diagnosis     Hearing Loss     Hypercholesterolemia     Presbycusis     Sinus bradycardia     BPH without urinary obstruction     Atypical Chest Pain     Blood Pressure Isolated Elevated     Male erectile dysfunction     Basal cell carcinoma, ear     Erectile dysfunction, unspecified erectile dysfunction type     Overweight (BMI 25.0-29.9)     Tubular adenoma of colon     Hyperbilirubinemia     Pulmonary embolism, bilateral     Persistent atrial fibrillation     Mitral valve insufficiency     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      Past Surgical History:   Procedure Laterality Date     CERVICAL FUSION       HERNIA REPAIR       TONSILLECTOMY      childhood      Family History   Problem Relation Age of Onset     Heart " "disease Mother      Hypertension Mother      Dementia Mother      Heart attack Father       Social History     Social History     Marital status:      Spouse name: N/A     Number of children: N/A     Years of education: N/A     Occupational History     Not on file.     Social History Main Topics     Smoking status: Former Smoker     Smokeless tobacco: Never Used     Alcohol use Yes     Drug use: No     Sexual activity: Not on file     Other Topics Concern     Not on file     Social History Narrative      Current Outpatient Prescriptions   Medication Sig Dispense Refill     escitalopram oxalate (LEXAPRO) 5 MG tablet Take 1 tablet (5 mg total) by mouth daily. 30 tablet 5     metoprolol tartrate (LOPRESSOR) 25 MG tablet Take 0.5 tablets (12.5 mg total) by mouth 2 (two) times a day.  0     MULTIVITAMIN (MULTIPLE VITAMIN ORAL) Take 1 tablet by mouth daily.       sildenafil (VIAGRA) 100 MG tablet Take 50 mg by mouth. One hour prior to intercourse.       warfarin (COUMADIN) 7.5 MG tablet Take 1 tablet (7.5 mg) by mouth daily. Adjust dose per INR results as instructed. 90 tablet 1     No current facility-administered medications for this visit.       Objective:   Vital Signs:   Visit Vitals     /70     Pulse 72     Ht 6' 2.5\" (1.892 m)     Wt 217 lb (98.4 kg)     BMI 27.49 kg/m2        VisionScreening:  No exam data present     PHYSICAL EXAM    General Appearance:    Alert, cooperative, no distress, appears stated age.  Overweight.   Head:    Normocephalic, without obvious abnormality, atraumatic   Eyes:    PERRL, conjunctiva/corneas clear, EOM's intact, fundi     benign, both eyes.  Glasses.        Ears:    Normal TM's and external ear canals, both ears   Nose:   Nares normal, septum midline, mucosa normal, no drainage    or sinus tenderness   Throat:   Lips, mucosa, and tongue normal; teeth and gums normal   Neck:   Supple, symmetrical, trachea midline, no adenopathy;        thyroid:  No " enlargement/tenderness/nodules; no carotid    bruit or JVD   Back:     Symmetric, no curvature, ROM normal, no CVA tenderness   Lungs:     Clear to auscultation bilaterally, respirations unlabored   Chest wall:    No tenderness or deformity   Heart:    Regular rate and rhythm, S1 and S2 normal, no murmur, rub   or gallop   Abdomen:     Soft, non-tender, bowel sounds active all four quadrants,     no masses, no organomegaly.     Genitalia:    Normal male without lesion, discharge or tenderness.  No inguinal hernia noted.     Rectal:    Normal tone.  Prostate enlarged/symmetric, no masses or tenderness.   Extremities:   Extremities normal, atraumatic, no cyanosis or edema   Pulses:   2+ and symmetric all extremities   Skin:   Skin color, texture, turgor normal, no rashes or lesions   Lymph nodes:   Cervical, supraclavicular, and axillary nodes normal   Neurologic:   CNII-XII intact. Normal strength, sensation and reflexes       throughout        Assessment Results 2/6/2018   Activities of Daily Living No help needed   Instrumental Activities of Daily Living No help needed   Get Up and Go Score Less than 12 seconds   Mini Cog Total Score 5   Some recent data might be hidden     A Mini-Cog score of 0-2 suggests the possibility of dementia, score of 3-5 suggests no dementia    Identified Health Risks:     The patient was counseled and encouraged to consider modifying their diet and eating habits. He was provided with information on recommended healthy diet options.  The patient was provided with written information regarding signs of hearing loss.  Information regarding advance directives (living [place]), including where he can download the appropriate form, was provided to the patient via the AVS.

## 2021-06-15 NOTE — TELEPHONE ENCOUNTER
ANTICOAGULATION  MANAGEMENT    Assessment     Today's INR result of 2.4 is Therapeutic (goal INR of 2.0-3.0)        Warfarin taken as previously instructed    No new diet changes affecting INR    No new medication/supplements affecting INR    Continues to tolerate warfarin with no reported s/s of bleeding or thromboembolism     Previous INR was Therapeutic    Plan:     Left detailed message for Ryan regarding INR result and instructed:      Warfarin Dosing Instructions:  Continue current warfarin dose 7.5 mg daily  (0 % change)    Instructed patient to follow up no later than: 3-4 weeks.    Education provided:     Instructed to call the [facility] for any changes, questions or concerns. (#[phone])   ?   Mee Maguire RN    Subjective/Objective:      Prem Taylor, a 70 y.o. male is on warfarin. Ryan Davis reports:     Home warfarin dose: as updated on anticoagulation calendar per template     Missed doses: No     Medication changes:  No     S/S of bleeding or thromboembolism:  No     New Injury or illness:  No     Changes in diet or alcohol consumption:  No     Upcoming surgery, procedure or cardioversion:  No    Anticoagulation Episode Summary     Current INR goal:  2.0-3.0   TTR:  86.6 % (1 y)   Next INR check:  4/5/2021   INR from last check:  2.40 (3/8/2021)   Weekly max warfarin dose:     Target end date:     INR check location:     Preferred lab:     Send INR reminders to:  RUBY MAURICIO    Indications    Pulmonary embolism  bilateral (H) [I26.99]           Comments:           Anticoagulation Care Providers     Provider Role Specialty Phone number    Mamadou Baez MD Referring Family Medicine [phone]

## 2021-06-15 NOTE — PROGRESS NOTES
[facility]  [address]  [place] [address]  Dept: [phone]  Dept Fax: [phone]  Primary Provider: Mamadou Baez MD      PREOPERATIVE EVALUATION:  Today's date: 2/5/2021    Prem Taylor is a 70 y.o. male who presents for a preoperative evaluation.    Surgical Information:  Surgery/Procedure: left eye pars plana vitrectomy, 25 gauge, laser  Surgery Location: [facility] [place]  Surgeon: Dr. Tamez  Surgery Date: 2/10/21  Time of Surgery: TBD   Where patient plans to recover: At home with family  Fax number for surgical facility: [phone] & [phone]    Type of Anesthesia Anticipated: to be determined    Assessment & Plan      The proposed surgical procedure is considered LOW risk.    Preop general physical exam  Preoperative examination completed.  No contraindication to left eye surgery identified.    - Electrocardiogram Perform and Read - Clinic    Floater, vitreous, left  H/O vitreous floaters bilateral.  Status post right eye vitrectomy surgery September 24, 2020 with excellent results.  Scheduled left eye vitrectomy procedure as noted.    Persistent atrial fibrillation (H)  Persistent atrial fibrillation, rate controlled.  Continues current warfarin anticoagulation as noted.  Will hold x4 days prior to upcoming surgery with therapeutic bridging with Lovenox to be completed with known history of pulmonary emboli.  - Basic Metabolic Panel    ALISE on CPAP  Continues CPAP for ALISE management.    Normochromic normocytic anemia  History of mild normochromic normocytic anemia.  Repeat CBC to ensure stable or resolved.  - HM2(CBC w/o Differential)    Anxiety  Escitalopram 10 mg daily with benefits for anxiety management.    Pulmonary embolism, bilateral (H)  Continues current warfarin anticoagulation as noted.  Will hold x4 days prior to upcoming surgery with therapeutic bridging with Lovenox to be completed with known history of pulmonary emboli.  -  "enoxaparin ANTICOAGULANT (LOVENOX) 100 mg/mL Syrg  Dispense: 10 Syringe; Refill: 1           Risks and Recommendations:  The patient has the following additional risks and recommendations for perioperative complications:  Cardiovascular:   - continues to follow with Dr. Velez  Obstructive Sleep Apnea:    - patient advised to use their home CPAP when released from surgery  Anemia/Bleeding/Clotting:    - History of DVT or PE, consider DVT prevention postoperatively   - Hold warfarin x 4 days prior (will bridge with Lovenox)    Medication Instructions:  Patient is to take all scheduled medications on the day of surgery EXCEPT for modifications listed below:    RECOMMENDATION:  APPROVAL GIVEN to proceed with proposed procedure, without further diagnostic evaluation.          Subjective     HPI related to upcoming procedure:  History of bilateral eye floaters.  Completed right eye vitrectomy 20 with excellent results.  Now scheduled for left eye surgery 2/10/21.  History of persistent atrial fibrillation.  Tolerates well.  Has history of pulmonary emboli.  Does require therapeutic bridging with hold orders for warfarin.  Escitalopram 10 mg daily for anxiety.  CPAP for ALISE management.  History of mild normochromic normocytic anemia.  No evidence for active bleeding currently.  Denies recent illness including fever cough or shortness of breath.  Comprehensive review of systems as above otherwise all negative.      \"Aubree\" x 44 years   2 daughters - Renee (38) and Susannah (35)  3 grandchildren  No smoke (quit  after 1 and  ppd)   EtOH: weekends \"few beers\"   Surgeries: anterior cervical fusion; inguinal hernia; tonsils; right eye cataract 3/4/20 and left eye cataract 3/11/20; \"right eye vitrectomy scheduled for 20 due to floaters\"  Hospitalizations: [facility] 10/9/17-10/10/17 bilateral pulmonary emboli with a. 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 No   Have you ever had surgery on your heart or blood vessels, such as a stent placement, a coronary artery bypass, or surgery on an artery in your head, neck, heart, or legs? No   Do you have chest pain with activity? No   Do you have a history of  heart failure? No   Do you currently have a cold, bronchitis or symptoms of other infection? No   Do you have a cough, shortness of breath, or wheezing? No   Do you or anyone in your family have previous history of blood clots? YES - h/o PE 3 years ago   Do you or does anyone in your family have a serious bleeding problem such as prolonged bleeding following surgeries or cuts? No   Have you ever had problems with anemia or been told to take iron pills? No   Have you had any abnormal blood loss such as black, tarry or bloody stools? No   Have you ever had a blood transfusion? No   Are you willing to have a blood transfusion if it is medically needed before, during, or after your surgery? Yes   Have you or any of your relatives ever had problems with anesthesia? No   Do you have sleep apnea, excessive snoring or daytime drowsiness? YES - using CPAP machine   Do you have a CPAP machine? Yes   Do you have any artifical heart valves or other implanted medical devices like a pacemaker, defibrillator, or continuous glucose monitor? No   Do you have artificial joints? No   Are you allergic to latex? No     Health Care Directive:  Patient has a Health Care Directive on file.     Preoperative Review of :    reviewed - no record of controlled substances prescribed.    A-FIB - Patient has a longstanding history of chronic A-fib currently on rate control. Current treatment regimen includes Warfarin for stroke prevention and denies significant symptoms of lightheadedness,  palpitations or dyspnea.     SLEEP PROBLEM - Patient has a longstanding history of ALISE on CPAP. Patient has tried OTC medications with limited success.       Review of Systems  CONSTITUTIONAL: NEGATIVE for fever, chills, change in weight  INTEGUMENTARY/SKIN: NEGATIVE for worrisome rashes, moles or lesions  EYES: NEGATIVE for vision changes or irritation  ENT/MOUTH: NEGATIVE for ear, mouth and throat problems  RESP: NEGATIVE for significant cough or SOB  BREAST: NEGATIVE for masses, tenderness or discharge  CV: NEGATIVE for chest pain, palpitations or peripheral edema and chronic atrial fibrillation  GI: NEGATIVE for nausea, abdominal pain, heartburn, or change in bowel habits  : NEGATIVE for frequency, dysuria, or hematuria  MUSCULOSKELETAL: NEGATIVE for significant arthralgias or myalgia  NEURO: NEGATIVE for weakness, dizziness or paresthesias  ENDOCRINE: NEGATIVE for temperature intolerance, skin/hair changes  HEME: NEGATIVE for bleeding problems  PSYCHIATRIC: NEGATIVE for changes in mood or affect    Patient Active Problem List    Diagnosis Date Noted     ALISE on CPAP 02/08/2019     Anxiety 02/08/2019     Chronic cough 02/08/2019     Normochromic normocytic anemia 02/08/2019     Left inguinal hernia 02/08/2019     Persistent atrial fibrillation (H) 10/25/2017     Mitral valve insufficiency 10/25/2017     Pulmonary embolism, bilateral (H) 10/10/2017     Erectile dysfunction, unspecified erectile dysfunction type 01/03/2017     Overweight (BMI 25.0-29.9) 01/03/2017     Tubular adenoma of colon 01/03/2017     Hyperbilirubinemia 01/03/2017     Male erectile dysfunction 12/23/2014     Basal cell carcinoma, ear 12/23/2014     Hearing Loss      Hypercholesterolemia      Presbycusis      Sinus bradycardia      BPH without urinary obstruction      Atypical Chest Pain      Blood Pressure Isolated Elevated      Past Medical History:   Diagnosis Date     Anemia      Anxiety      Atrial fibrillation (H) 10/09/2017     Cancer  (H)      CPAP (continuous positive airway pressure) dependence 10/2019    per patient     Depression      History of blood clots      Pulmonary embolism, blood-clot, obstetric 10/09/2017     Sleep apnea     Uses CPAP     Past Surgical History:   Procedure Laterality Date     BACK SURGERY       CERVICAL FUSION  2001     HERNIA REPAIR  2005     CA LAP,INGUINAL HERNIA REPR,INITIAL Left 8/15/2019    Procedure: HERNIORRHAPHY, INGUINAL, LAPAROSCOPIC;  Surgeon: Yan Leiva MD;  Location: [facility];  Service: General     TONSILLECTOMY      childhood     Current Outpatient Medications   Medication Sig Dispense Refill     escitalopram oxalate (LEXAPRO) 10 MG tablet Take 1 tablet (10 mg total) by mouth daily. 90 tablet 2     MULTIVITAMIN (MULTIPLE VITAMIN ORAL) Take 1 tablet by mouth daily.       warfarin ANTICOAGULANT (COUMADIN/JANTOVEN) 7.5 MG tablet Take 1 tablet (7.5 mg) by mouth daily. Adjust dose based on INR results as directed. 90 tablet 1     enoxaparin ANTICOAGULANT (LOVENOX) 100 mg/mL Syrg Inject 1 mL (100 mg total) under the skin 2 (two) times a day. Before and after procedure as directed 10 Syringe 1     No current facility-administered medications for this visit.        No Known Allergies    Social History     Tobacco Use     Smoking status: Former Smoker     Smokeless tobacco: Never Used   Substance Use Topics     Alcohol use: Yes      Family History   Problem Relation Age of Onset     Heart disease Mother      Hypertension Mother      Dementia Mother      Heart attack Father      Social History     Substance and Sexual Activity   Drug Use No        Objective     /70   Pulse 66   Temp 98.2  F (36.8  C)   Wt 216 lb (98 kg)   SpO2 99%   BMI 27.36 kg/m    Physical Exam  GENERAL APPEARANCE: healthy, alert and no distress  HENT: ear canals and TM's normal and nose and mouth without ulcers or lesions. Glasses.  RESP: lungs clear to auscultation - no rales, rhonchi or wheezes  CV: normal S1  S2, no S3 or S4, no murmur, click or rub and irregular rhythm  ABDOMEN: soft, nontender, no HSM or masses and bowel sounds normal  NEURO: Normal strength and tone, sensory exam grossly normal, mentation intact and speech normal    Recent Labs   Lab Test 02/02/21  0724 01/05/21  0726 09/09/20  0959 09/09/20  0959 02/11/20  1429 02/11/20  1429   HGB  --   --   --  14.5  --  13.8*   PLT  --   --   --  178  --  163   INR 2.40* 2.50*   < >  --    < >  --    NA  --   --   --  141  --  141   K  --   --   --  4.3  --  4.2   CREATININE  --   --   --  0.75  --  0.81    < > = values in this interval not displayed.        PRE-OP Diagnostics:   Labs pending at this time. Results will be reviewed when available.  EKG required for significant arrhythmia and not completed in the last 90 days.  EKG (2/5/21):  Atrial fibrillation noted for ventricular rate 66 bpm.  Left axis deviation.  Right bundle branch block.  Inferior infarct, age undetermined.  Abnormal EKG.      REVISED CARDIAC RISK INDEX (RCRI)   The patient has the following serious cardiovascular risks for perioperative complications:   - No serious cardiac risks = 0 points    RCRI INTERPRETATION: 0 points: Class I (very low risk - 0.4% complication rate)           Signed Electronically by: Mamadou Baez MD    Copy of this evaluation report is provided to requesting physician.    [facility] Guidelines    Revised Cardiac Risk Index

## 2021-06-15 NOTE — TELEPHONE ENCOUNTER
ANTICOAGULATION  MANAGEMENT    Assessment     Today's INR result of 2.3 is Therapeutic (goal INR of 2.0-3.0)        Warfarin taken as previously instructed    No new diet changes affecting INR    No new medication/supplements affecting INR    Continues to tolerate warfarin with no reported s/s of bleeding or thromboembolism     Previous INR was Subtherapeutic    Plan:     Spoke on phone with Ryan regarding INR result and instructed:      Warfarin Dosing Instructions:  Continue current warfarin dose 7.5 mg daily (0 % change)  STOP lovenox    Instructed patient to follow up no later than: 2 weeks-- scheduled for 3/8    Education provided: target INR goal and significance of current INR result, importance of notifying clinic for changes in medications, importance of notifying clinic for diarrhea, nausea/vomiting, reduced intake and/or illness and importance of notifying clinic of upcoming surgeries and procedures 2 weeks in advance    Ryan verbalizes understanding and agrees to warfarin dosing plan.    Instructed to call the [facility] for any changes, questions or concerns. (#[phone])   ?   Elsie Stringer RN    Subjective/Objective:      Prem Taylor, a 70 y.o. male is on warfarin. Ryan Davis reports:     Home warfarin dose: template incorrect; verbally confirmed home dose with Ryan and updated on anticoagulation calendar     Missed doses: No     Medication changes:  No     S/S of bleeding or thromboembolism:  No     New Injury or illness:  No     Changes in diet or alcohol consumption:  No     Upcoming surgery, procedure or cardioversion:  No    Anticoagulation Episode Summary     Current INR goal:  2.0-3.0   TTR:  86.6 % (1 y)   Next INR check:  3/5/2021   INR from last check:  2.30 (2/19/2021)   Weekly max warfarin dose:     Target end date:     INR check location:     Preferred lab:     Send INR reminders to:  RUBY MAURICIO    Indications    Pulmonary embolism  bilateral (H) [I26.99]            Comments:           Anticoagulation Care Providers     Provider Role Specialty Phone number    Mamadou Baez MD Referring Family Medicine [phone]         Mauc Instructions: By selecting yes to the question below the MAUC number will be added into the note.  This will be calculated automatically based on the diagnosis chosen, the size entered, the body zone selected (H,M,L) and the specific indications you chose. You will also have the option to override the Mohs AUC if you disagree with the automatically calculated number and this option is found in the Case Summary tab.

## 2021-06-15 NOTE — PATIENT INSTRUCTIONS - HE

## 2021-06-16 PROBLEM — K40.90 LEFT INGUINAL HERNIA: Status: ACTIVE | Noted: 2019-02-08

## 2021-06-16 PROBLEM — I26.99 PULMONARY EMBOLISM, BILATERAL (H): Status: ACTIVE | Noted: 2017-10-10

## 2021-06-16 PROBLEM — R05.3 CHRONIC COUGH: Status: ACTIVE | Noted: 2019-02-08

## 2021-06-16 PROBLEM — I34.0 MITRAL VALVE INSUFFICIENCY: Status: ACTIVE | Noted: 2017-10-25

## 2021-06-16 PROBLEM — G47.33 OSA ON CPAP: Status: ACTIVE | Noted: 2019-02-08

## 2021-06-16 PROBLEM — I48.19 PERSISTENT ATRIAL FIBRILLATION (H): Status: ACTIVE | Noted: 2017-10-25

## 2021-06-16 PROBLEM — D64.9 NORMOCHROMIC NORMOCYTIC ANEMIA: Status: ACTIVE | Noted: 2019-02-08

## 2021-06-16 PROBLEM — F41.9 ANXIETY: Status: ACTIVE | Noted: 2019-02-08

## 2021-06-16 NOTE — TELEPHONE ENCOUNTER
Received refill request for escitalopram from [facility]. Last seen 2/5/21 by PCP.

## 2021-06-16 NOTE — TELEPHONE ENCOUNTER
Telephone Encounter by Jessica Orosco, PharmD at 1/4/2019 10:56 AM     Author: Jessica Orosco, PharmNICOLE Service: -- Author Type: Pharmacist    Filed: 1/4/2019 10:58 AM Encounter Date: 1/2/2019 Status: Signed    : Jessica Orosco PharmD (Pharmacist)       [facility]-PROCEDURAL ANTICOAGULATION MANAGEMENT    Returned call to [facility]. Spoke to Renee and relayed pre-procedure orders:      Okay to hold warfarin 4 days prior to procedure     Bridge with Lovenox; ACM to instruct after procedure scheduled    INR check 1-2 weeks prior to procedure      Jessica Orosco, [place]  [facility]    _______    Mamadou Baez MD   You 17 hours ago (5:31 PM)      Hello,     I would suggest bridging due to combined risk factors.     Thank you so much!     Mamadou Baez MD  (Routing comment)

## 2021-06-16 NOTE — TELEPHONE ENCOUNTER
ANTICOAGULATION  MANAGEMENT    Assessment     Today's INR result of 2.5 is Therapeutic (goal INR of 2.0-3.0)        Warfarin taken as previously instructed    No new diet changes affecting INR    No new medication/supplements affecting INR    Continues to tolerate warfarin with no reported s/s of bleeding or thromboembolism     Previous INR was Therapeutic    Plan:     Spoke on phone with Ryan regarding INR result and instructed:      Warfarin Dosing Instructions:  Continue current warfarin dose 7.5 mg daily (0 % change)    Instructed patient to follow up no later than: 4 weeks-- scheduled for 5/3    Education provided: target INR goal and significance of current INR result, importance of notifying clinic for changes in medications, importance of notifying clinic for diarrhea, nausea/vomiting, reduced intake and/or illness and importance of notifying clinic of upcoming surgeries and procedures 2 weeks in advance    Ryan verbalizes understanding and agrees to warfarin dosing plan.    Instructed to call the [facility] for any changes, questions or concerns. (#[phone])   ?   Elsie Stringer RN    Subjective/Objective:      Prem Taylor, a 70 y.o. male is on warfarin.       Ryan reports:     Home warfarin dose: as updated on anticoagulation calendar per template     Missed doses: No     Medication changes:  No     S/S of bleeding or thromboembolism:  No     New Injury or illness:  No     Changes in diet or alcohol consumption:  No     Upcoming surgery, procedure or cardioversion:  No    Anticoagulation Episode Summary     Current INR goal:  2.0-3.0   TTR:  86.6 % (1 y)   Next INR check:  5/3/2021   INR from last check:  2.50 (4/5/2021)   Weekly max warfarin dose:     Target end date:     INR check location:     Preferred lab:     Send INR reminders to:  RUBY MAURICIO    Indications    Pulmonary embolism  bilateral (H) [I26.99]           Comments:           Anticoagulation Care Providers     Provider Role  Specialty Phone number    Mamadou Baez MD Referring Family Medicine [phone]

## 2021-06-17 NOTE — PATIENT INSTRUCTIONS - HE
Patient Instructions by Mamadou Baez MD at 2/8/2019  8:00 AM     Author: Mamadou Baez MD Service: -- Author Type: Physician    Filed: 2/8/2019  8:13 AM Encounter Date: 2/8/2019 Status: Signed    : Mamadou Baez MD (Physician)         Patient Education   Alcohol Use   Many people can enjoy a glass of wine or beer without any negative consequences to their health. According to the Centers for Disease Control and Prevention (CDC), having one or fewer drinks per day for women and two or fewer per day for men is considered moderate drinking.     When people drink more than moderately, it can become concerning. Excessive drinking is defined as consuming 15 drinks or more per week for men and 8 drinks or more per week for women. There are various health problems associated with excessive drinking, which include:    Damage to vital organs like the heart, brain, liver and pancreas    Harm to the digestive tract    Weaken the immune system    Higher risk for heart disease and cancer       Patient Education   Understanding [employer] MyPlate  The USDA (US Department of Agriculture) has guidelines to help you make healthy food choices. These are called MyPlate. MyPlate shows the food groups that make up healthy meals using the image of a place setting. Before you eat, think about the healthiest choices for what to put onto your plate or into your cup or bowl. To learn more about building a healthy plate, visit www.choosemyplate.gov.       The Food Groups    Fruits: Any fruit or 100% fruit juice counts as part of the Fruit Group. Fruits may be fresh, canned, frozen, or dried, and may be whole, cut-up, or pureed. Make half your plate fruits and vegetables.    Vegetables: Any vegetable or 100% vegetable juice counts as a member of the Vegetable Group. Vegetables may be fresh, frozen, canned, or dried. They can be served raw or cooked and may be whole, cut-up, or mashed. Make half your plate fruits and  vegetables.     Grains: All foods made from grains are part of the Grains Group. These include wheat, rice, oats, cornmeal, and barley such as bread, pasta, oatmeal, cereal, tortillas, and grits. Grains should be no more than a quarter of your plate. At least half of your grains should be whole grains.    Protein: This group includes meat, poultry, seafood, beans and peas, eggs, processed soy products (like tofu), nuts (including nut butters), and seeds. Make protein choices no more than a quarter of your plate. Meat and poultry choices should be lean or low fat.    Dairy: All fluid milk products and foods made from milk that contain calcium, like yogurt and cheese are part of the Dairy Group. (Foods that have little calcium, such as cream, butter, and cream cheese, are not part of the group.) Most dairy choices should be low-fat or fat-free.    Oils: These are fats that are liquid at room temperature. They include canola, corn, olive, soybean, and sunflower oil. Foods that are mainly oil include mayonnaise, certain salad dressings, and soft margarines. You should have only 5 to 7 teaspoons of oils a day. You probably already get this much from the food you eat.  Use Particle to Help Build Your Meals  The SuperTracker can help you plan and track your meals and activity. You can look up individual foods to see or compare their nutritional value. You can get guidelines for what and how much you should eat. You can compare your food choices. And you can assess personal physical activities and see ways you can improve. Go to www.choosemyplate.gov/supertracker/.    9737-2287 [employer]. [address] [place] [address], [place] [address]. All rights reserved. This information is not intended as a substitute for professional medical care. Always follow your healthcare professional's instructions.           Patient Education   Signs of Hearing Loss  Hearing loss is a problem shared by many people. In fact, it  is one of the most common health conditions, particularly as people age. Most people over age 65 have some hearing loss, and by age 80, almost everyone does. Because hearing loss usually occurs slowly over the years, you may not realize your hearing ability has gotten worse.       Have your hearing checked  Contact your [facility] care provider if you:    Have to strain to hear normal conversation.    Have to watch other peoples faces very carefully to follow what theyre saying.    Need to ask people to repeat what theyve said.    Often misunderstand what people are saying.    Turn the volume of the television or radio up so high that others complain.    Feel that people are mumbling when theyre talking to you.    Find that the effort to hear leaves you feeling tired and irritated.    Notice, when using the phone, that you hear better with 1 ear than the other.    2900-6361 [employer]. [address] [place] [address]. All rights reserved. This information is not intended as a substitute for professional medical care. Always follow your healthcare professional's instructions.           Advance Directive  Patients advance directive was discussed and I am comfortable with the patients wishes.  Patient Education   Personalized Prevention Plan  You are due for the preventive services outlined below.  Your care team is available to assist you in scheduling these services.  If you have already completed any of these items, please share that information with your care team to update in your medical record.  Health Maintenance   Topic Date Due   ? ZOSTER VACCINES (3 of 3) 01/29/2019   ? FALL RISK ASSESSMENT  02/08/2020   ? ADVANCE DIRECTIVES DISCUSSED WITH PATIENT  02/06/2023   ? COLONOSCOPY  01/30/2024   ? TD 18+ HE  01/03/2027   ? PNEUMOCOCCAL POLYSACCHARIDE VACCINE AGE 65 AND OVER  Completed   ? INFLUENZA VACCINE RULE BASED  Completed   ? PNEUMOCOCCAL CONJUGATE VACCINE FOR ADULTS (PCV13 OR PREVNAR)   Completed

## 2021-06-17 NOTE — PROGRESS NOTES
[facility] Office Note    Assessment / Plan:    1. Persistent, likely permanent, atrial fibrillation.  Plan to continue rate control strategy.  No symptoms.  Continue warfarin and metoprolol.  2. Mitral insufficiency.  Moderate on initial echo assessment, with no audible murmur today.  We will plan to repeat echocardiogram prior to next year's visit  3. Bilateral pulmonary embolism.  Uncertain etiology, will be on long-term anticoagulation.    Follow-up one year  ______________________________________________________________________    Subjective:    I had the opportunity to see Prem Taylor at the [facility]. Prem Taylor is a 67 y.o. male with a history of  hyperlipidemia, who presented 10/2017 with exertional shortness of breath.  He was found to have bilateral pulmonary emboli and new onset atrial fibrillation.  He was initiated on warfarin and Lovenox, along with metoprolol for rate control.  No DVT was identified.  Echocardiogram showed normal left and right ventricular function.  He was subsequently seen here and underwent cardioversion, but had asymptomatic return to atrial fibrillation.  Since that time he has been maintained on a rate control strategy.  He returns today for routine follow-up with his wife.    He feels quite well today.  He retired in March and since that time has enjoyed traveling.  He did have problems with some allergies and chest tightness while in [place] but is planning to travel to [place] next week.  He has had no problems with his INR checks, and is only aware he is in atrial fibrillation if he checks his pulse.  He has had no syncope or near syncopal spells.  He has had no significant bruising or bleeding problems.  He notes that he is winded if he climbs up a flight of steps rapidly but feels that his stamina continues to improve with activities.    ______________________________________________________________________    Problem List:  Patient  Active Problem List   Diagnosis     Hearing Loss     Hypercholesterolemia     Presbycusis     Sinus bradycardia     BPH without urinary obstruction     Atypical Chest Pain     Blood Pressure Isolated Elevated     Male erectile dysfunction     Basal cell carcinoma, ear     Erectile dysfunction, unspecified erectile dysfunction type     Overweight (BMI 25.0-29.9)     Tubular adenoma of colon     Hyperbilirubinemia     Pulmonary embolism, bilateral     Persistent atrial fibrillation     Mitral valve insufficiency     Medical History:  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     Surgical History:  Past Surgical History:   Procedure Laterality Date     CERVICAL FUSION  2001     HERNIA REPAIR  2005     TONSILLECTOMY      childhood     Social History:  Social History     Social History     Marital status:      Spouse name: N/A     Number of children: N/A     Years of education: N/A     Occupational History     Not on file.     Social History Main Topics     Smoking status: Former Smoker     Smokeless tobacco: Never Used     Alcohol use Yes     Drug use: No     Sexual activity: Not on file     Other Topics Concern     Not on file     Social History Narrative     Sleep History:  Restorative  Exercise History:  Has joined the [employer] and does a variety of different aerobic activities 5 days a week plus some weightlifting.    Review of Systems:   General: WNL  Eyes: WNL  Ears/Nose/Throat: WNL  Lungs: WNL  Heart: WNL  Stomach: WNL  Bladder: WNL  Muscle/Joints: WNL  Skin: WNL  Nervous System: WNL  Mental Health: WNL     Blood: WNL          Family History:  Family History   Problem Relation Age of Onset     Heart disease Mother      Hypertension Mother      Dementia Mother      Heart attack Father          Allergies:  No Known Allergies  Medications:  Current Outpatient Prescriptions   Medication Sig Dispense Refill     escitalopram  "oxalate (LEXAPRO) 5 MG tablet Take 1 tablet (5 mg total) by mouth daily. 90 tablet 1     metoprolol tartrate (LOPRESSOR) 25 MG tablet Take 0.5 tablets (12.5 mg total) by mouth 2 (two) times a day.  0     MULTIVITAMIN (MULTIPLE VITAMIN ORAL) Take 1 tablet by mouth daily.       sildenafil (VIAGRA) 100 MG tablet Take 50 mg by mouth. One hour prior to intercourse.       warfarin (COUMADIN) 7.5 MG tablet Take 1/2 to 1 tablet (3.75 to 7.5 mg) by mouth daily. Adjust dose per INR results as instructed. 90 tablet 1     benzonatate (TESSALON PERLES) 100 MG capsule Take 1 capsule (100 mg total) by mouth every 6 (six) hours as needed for cough. 30 capsule 0     No current facility-administered medications for this visit.        Objective:   Wt Readings from Last 3 Encounters:   05/10/18 220 lb (99.8 kg)   04/06/18 217 lb 12.8 oz (98.8 kg)   02/06/18 217 lb (98.4 kg)     Vital signs:  /76 (Patient Site: Left Arm, Patient Position: Sitting, Cuff Size: Adult Regular)  Pulse 76  Resp 18  Ht 6' 2.5\" (1.892 m)  Wt 220 lb (99.8 kg)  BMI 27.87 kg/m2      Physical Exam:  Resting heart rate 76 bpm, up to 92 after climbing 10 steps rapidly.  GENERAL APPEARANCE: Alert, cooperative and in no acute distress.  HEENT: Conjunctivae not injected.  No scleral icterus. No Xanthelasma. Oral mucous membranes pink and moist.  NECK: No JVD.  No Hepatojugular reflux. Thyroid not enlarged.  CHEST: clear to auscultation  CARDIOVASCULAR: Nondisplaced point of maximal impulse.  Irregularly irregular S1, S2 without murmur ,clicks or rubs. Brachial, radial and posterior tibial pulses are intact and symmetric. No carotid bruits noted.  ABDOMEN: Nontender. BS+. No bruits.  EXTREMITIES: No cyanosis, clubbing or edema.  SKIN:  No rash, bruising    Lab Results:  LIPIDS:  Lab Results   Component Value Date    CHOL 214 (H) 02/06/2018    CHOL 238 (H) 01/03/2017    CHOL 253 (H) 12/29/2015     Lab Results   Component Value Date    HDL 66 02/06/2018    HDL " 72 01/03/2017    HDL 72 12/29/2015     Lab Results   Component Value Date    LDLCALC 130 (H) 02/06/2018    LDLCALC 150 (H) 01/03/2017    LDLCALC 164 (H) 12/29/2015     Lab Results   Component Value Date    TRIG 88 02/06/2018    TRIG 82 01/03/2017    TRIG 83 12/29/2015       Transesophageal echo 11/2017:  Summary     No previous study for comparison.     Left ventricle ejection fraction is normal. The estimated left ventricular ejection fraction is 55%.    Moderate mitral regurgitation    No left atrial appendage thrombus seen               HOLLY GARCIA MD [facility]  [phone]    This note created using Dragon voice recognition software.  Sound alike errors may have escaped editing.

## 2021-06-17 NOTE — TELEPHONE ENCOUNTER
Telephone Encounter by Morales Tapia RN at 10/16/2020  9:52 AM     Author: Morales Tapia RN Service: -- Author Type: RN, Care Manager    Filed: 10/16/2020 10:01 AM Encounter Date: 10/16/2020 Status: Signed    : Morales Tapia RN (RN, Care Manager)       ANTICOAGULATION  MANAGEMENT    Assessment     Today's INR result of 1.6 is Subtherapeutic (goal INR of 2.0-3.0)        Warfarin recently held as instructed which may be affecting INR    No new diet changes affecting INR    Concurrent use of Lovenox and warfarin may increase risk of bleeding, but not expected to affect INR    Continues to tolerate warfarin with no reported s/s of bleeding or thromboembolism     Previous INR was Therapeutic    Plan:     Spoke on phone with Prem regarding INR result and instructed:     Warfarin Dosing Instructions:  Take booster dose of 15 mg today only then continue current warfarin dose 7.5 mg daily.    Continue Lovenox q12h.     Instructed patient to follow up no later than: Tues 10/20.    Education provided: importance of following up for INR monitoring at instructed interval and importance of taking warfarin as instructed    Prem verbalizes understanding and agrees to warfarin dosing plan.    Instructed to call the [facility] for any changes, questions or concerns. (#[phone])   ?   Morales Tapia RN    Subjective/Objective:      rPem Taylor, a 70 y.o. male is on warfarin.     Prem reports:     Home warfarin dose: verbally confirmed home dose with pt and updated on anticoagulation calendar     Missed doses: No     Medication changes:  No     S/S of bleeding or thromboembolism:  No     New Injury or illness:  No     Changes in diet or alcohol consumption:  No     Upcoming surgery, procedure or cardioversion:  No    Anticoagulation Episode Summary     Current INR goal:  2.0-3.0   TTR:  86.8 % (1 y)   Next INR check:  10/20/2020   INR from last check:  1.60 (10/16/2020)   Weekly max  warfarin dose:     Target end date:     INR check location:     Preferred lab:     Send INR reminders to:  RUBY MAURICIO    Indications    Pulmonary embolism  bilateral (H) [I26.99]           Comments:           Anticoagulation Care Providers     Provider Role Specialty Phone number    Mamadou Baez MD Referring Family Medicine [phone]

## 2021-06-17 NOTE — PROGRESS NOTES
Assessment:     1. Cough  XR Chest 2 Views    doxycycline (MONODOX) 100 MG capsule    benzonatate (TESSALON PERLES) 100 MG capsule   2. Bronchitis  XR Chest 2 Views    doxycycline (MONODOX) 100 MG capsule    benzonatate (TESSALON PERLES) 100 MG capsule   3. Atrial fibrillation         Plan:     1. Cough  X-ray was read as negative by me will have radiology review clinical story with exposure to pneumonia and patient's past history pulmonary embolism and atrial fibrillation indicates treatment as follows patient will hydrate self  - XR Chest 2 Views  - doxycycline (MONODOX) 100 MG capsule; Take 1 capsule (100 mg total) by mouth 2 (two) times a day for 10 days.  Dispense: 20 capsule; Refill: 0  - benzonatate (TESSALON PERLES) 100 MG capsule; Take 1 capsule (100 mg total) by mouth every 6 (six) hours as needed for cough.  Dispense: 30 capsule; Refill: 0    2. Bronchitis  Treatment as follows  - XR Chest 2 Views  - doxycycline (MONODOX) 100 MG capsule; Take 1 capsule (100 mg total) by mouth 2 (two) times a day for 10 days.  Dispense: 20 capsule; Refill: 0  - benzonatate (TESSALON PERLES) 100 MG capsule; Take 1 capsule (100 mg total) by mouth every 6 (six) hours as needed for cough.  Dispense: 30 capsule; Refill: 0    3. Atrial fibrillation  Continue on with warfarin      Subjective:   Patient has been ill for for 5 days and is having some difficulty with a nocturnal cough says he is about 60% of normal feeling weak rundown from the coughing had a sore throat last night which is cleared today; wife recently seen and treated for bronchitis/pneumonia with antibiotics corticosteroids and cough medicine.  Patient is attending his own penitentiary party tomorrow night and needs evaluation physical exam indicates a rhonchus on the left anterior chest which clears postoperatively a chest x-ray was read by me as not indicating any pneumonic process.  Medical decision making was to cover the patient with doxycycline 100 twice daily  for 10 days; I will add an antitussive he is to hydrate himself call us if he worsens or develops any hemoptysis patient is not having any chest pain shortness of breath or any wheezing I personally reviewed family social history and reviewed his medication note that he is on warfarin    Review of Systems: A complete 14 point review of systems was obtained and is negative or as stated in the history of present illness.    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     Family History   Problem Relation Age of Onset     Heart disease Mother      Hypertension Mother      Dementia Mother      Heart attack Father      Past Surgical History:   Procedure Laterality Date     CERVICAL FUSION  2001     HERNIA REPAIR  2005     TONSILLECTOMY      childhood     Social History   Substance Use Topics     Smoking status: Former Smoker     Smokeless tobacco: Never Used     Alcohol use Yes         Objective:   /74  Pulse 68  Temp 98.8  F (37.1  C) (Oral)   Wt 217 lb 12.8 oz (98.8 kg)  BMI 27.59 kg/m2    General Appearance:  Normal  Head:  Normal  Ears: Normal  Eyes:  Normal  Nose:  Normal  Throat:  Normal  Neck:  Normal  Back:  Normal  Chest/Breast:Normal  Lungs: Rhonchus left anterior chest clears with coughing chest x-ray was grossly clear will review with radiology  Heart:  Normal; no atrial fib at time of exam  Abdomen:  Normal  Musculoskeletal:  Normal  Lymphatic:  Normal  Skin/Hair/Nails:  Normal  Neurologic:  Normal  Extremities:   no signs of DVT  Genitourinary: Normal  Pulses:  Normal           This note has been dictated using voice recognition software. Any grammatical or context distortions are unintentional and inherent to the the software.

## 2021-06-17 NOTE — PATIENT INSTRUCTIONS - HE
"Patient Instructions by Adarsh Titus DO at 1/3/2019  1:00 PM     Author: Adarsh Titus DO Service: -- Author Type: Physician    Filed: 1/3/2019  1:14 PM Encounter Date: 1/3/2019 Status: Addendum    : Adarsh Titus DO (Physician)    Related Notes: Original Note by Adarsh Titus DO (Physician) filed at 1/3/2019  1:07 PM         What is sleep apnea?    Sleep apnea is a condition that makes you stop breathing for short periods while you are asleep. There are 2 types of sleep apnea. One is called \"obstructive sleep apnea,\" and the other is called \"central sleep apnea.\"  In obstructive sleep apnea, you stop breathing because your throat narrows or closes (figure 1). In central sleep apnea, you stop breathing because your brain does not send the right signals to your muscles to make you breathe. When people talk about sleep apnea, they are usually referring to obstructive sleep apnea, which is what this article is about.  People with sleep apnea do not know that they stop breathing when they are asleep. But they do sometimes wake up startled or gasping for breath. They also often hear from loved ones that they snore.  What are the symptoms of sleep apnea? -- The main symptoms of sleep apnea are loud snoring, tiredness, and daytime sleepiness. Other symptoms can include:  ?Restless sleep  ?Waking up choking or gasping  ?Morning headaches, dry mouth, or sore throat  ?Waking up often to urinate  ?Waking up feeling unrested or groggy  ?Trouble thinking clearly or remembering things  Some people with sleep apnea don't have symptoms, or they don't know they have them. They might figure that it's normal to be tired or to snore a lot.  Should I see a doctor or nurse? -- Yes. If you think you might have sleep apnea, see your doctor.  Is there a test for sleep apnea? -- Yes. If your doctor or nurse suspects you have sleep apnea, he or she might send you for a \"sleep study.\" Sleep studies can sometimes be done " "at home, but they are usually done in a sleep lab. For the study, you spend the night in the lab, and you are hooked up to different machines that monitor your heart rate, breathing, and other body functions. The results of the test will tell your doctor or nurse if you have the disorder.  Is there anything I can do on my own to help my sleep apnea? -- Yes. Here are some things that might help:  ?Stay off your back when sleeping. (This is not always practical, because people cannot control their position while asleep. Plus, it only helps some people.)  ?Lose weight, if you are overweight  ?Avoid alcohol, because it can make sleep apnea worse  How is sleep apnea treated? -- The most effective treatment for sleep apnea is a device that keeps your airway open while you sleep. Treatment with this device is called \"continuous positive airway pressure,\" or CPAP. People getting CPAP wear a face mask at night that keeps them breathing (figure 2).  If your doctor or nurse recommends a CPAP machine, try to be patient about using it. The mask might seem uncomfortable to wear at first, and the machine might seem noisy, but using the machine can really pay off. People with sleep apnea who use a CPAP machine feel more rested and generally feel better.  There is also another device that you wear in your mouth called an \"oral appliance\" or \"mandibular advancement device.\" It also helps keep your airway open while you sleep.  In rare cases, when nothing else helps, doctors recommend surgery to keep the airway open. Surgery to do this is not always effective, and even when it is, the problem can come back.  Is sleep apnea dangerous? -- It can be. People with sleep apnea do not get good-quality sleep, so they are often tired and not alert. This puts them at risk for car accidents and other types of accidents. Plus, studies show that people with sleep apnea are more likely than others to have high blood pressure, heart attacks, and " other serious heart problems. In people with severe sleep apnea, getting treated (for example, with a CPAP machine) can help prevent some of these problems.    GRAPHICS  Airway in a person with sleep apnea    Normally when a person sleeps, the airway remains open, and air can pass from the nose and mouth to the lungs. In a person with sleep apnea, parts of the throat and mouth drop into the airway and block off the flow of air. This can cause loud snoring and interrupt breathing for short periods.  Graphic 53407 Version 5.0    Continuous positive airway pressure (CPAP) for sleep apnea    The CPAP mask gently blows air into your nose while you sleep. It puts just enough pressure on your airway to keep it from closing. The mask in this picture fits over just the nose. Other CPAP devices have masks that fit over the nose and mouth.    Equipment Instructions    We will process your PAP order and send it to a Durable Medical Equipment (DME) provider.    The medical equipment company should call you within 7 days.  If you have not heard from the company, please contact them to see if they received your order and are planning to call you.    Please call us at [phone] if you are unable to contact the medical equipment company or if they do not have the order.    If you are starting a new PAP machine, please call us after you use it the first night to let us know how it went. This call also helps us know that you received your equipment and that everything is ready. Please use our central phone number [phone]    Contact information for [employer] company:    [employer] Tel: [phone]    Please bring your machine, mask, hose and power cord on the next clinical visit with me. Thank you.

## 2021-06-17 NOTE — PATIENT INSTRUCTIONS - HE
Patient Instructions by Priscilla Ye MD at 9/24/2019 11:10 AM     Author: Priscilla Ye MD Service: -- Author Type: Physician    Filed: 9/24/2019 11:20 AM Encounter Date: 9/24/2019 Status: Addendum    : Priscilla Ye MD (Physician)    Related Notes: Original Note by Priscilla Ye MD (Physician) filed at 9/24/2019 11:19 AM       Prednisone every day for 5 days   Zyrtec in the AM, benadryl in the PM  Topical anit-itch: calamine lotion, oatmeal baths, topical bendadryl or hydrocortisone  No scratching - try ice        Patient Education     Hives (Adult)  Hives are pink or red bumps on the skin. These bumps are also known as wheals. The bumps can itch, burn, or sting. Hives can occur anywhere on the body. They vary in size and shape and can form in clusters. Individual hives can appear and go away quickly. New hives may develop as old ones fade. Hives are common and usually harmless. Occasionally hives are a sign of a serious allergy.  Hives are often caused by an allergic reaction. It may be an allergic reaction to foods such as fruit, shellfish, chocolate, nuts, or tomatoes. It may be a reaction to pollens, animal fur, or mold spores. Medicines, chemicals, and insect bites can also cause hives. And hives can be caused by hot sun or cold air. The cause of hives can be difficult to find.  You may be given medicines to relieve swelling and itching. Follow all instructions when using these medicines. The hives will usually fade in a few days, but can last up to 2 weeks.  Home care  Follow these tips:    Try to find the cause of the hives and eliminate it. Discuss possible causes with your healthcare provider. Future reactions to the same allergen may be worse.    Dont scratch the hives. Scratching will delay healing. To reduce itching, apply cool, wet compresses to the skin.    Dress in soft, loose cotton clothing.    Dont bathe in hot water. This can make the itching worse.    Apply  an ice pack or cool pack wrapped in a thin towel to your skin. This will help reduce redness and itching. But if your hives were caused by exposure to cold, then do not apply more cold to them.    You may use over-the counter antihistamines to reduce itching. Some older antihistamines, such as diphenhydramine and chlorpheniramine, are inexpensive. But they need to be taken often and may make you sleepy. They are best used at bedtime. Dont use diphenhydramine if you have glaucoma or have trouble urinating because of an enlarged prostate. Newer antihistamines, such as loratadine, cetirizine, and fexofenadine, are generally more expensive. But they tend to have fewer side effects, such as drowsiness. They can be taken less often.    Another type of antihistamine is used to treat heartburn. This type includes ranitidine, nizatidine, famotidine, and cimetidine. These are sometimes used along with the above antihistamines if a single medicine is not working.  Follow-up care  Follow up with your healthcare provider if your symptoms don't get better in 2 days. Ask your provider about allergy testing if you have had a severe reaction, or have had several episodes of hives. He or she can use the allergy testing to find out what you are allergic to.  When to seek medical advice  Call your healthcare provider right away if any of these occur:    Fever of 100.4 F (38.0 C) or higher, or as directed by your healthcare provider    Redness, swelling, or pain    Foul-smelling fluid coming from the rash  Call 911  Call 911 if any of the following occur:    Swelling of the face, throat, or tongue    Trouble breathing or swallowing    Dizziness, weakness, or fainting  Date Last Reviewed: 9/1/2016 2000-2017 [employer] [address] [place] [address], [place] [address]. All rights reserved. This information is not intended as a substitute for professional medical care. Always follow your healthcare professional's  instructions.

## 2021-06-17 NOTE — PATIENT INSTRUCTIONS - HE
"Patient Instructions by Adarsh Titus DO at 4/1/2019  1:00 PM     Author: Adarsh Titus DO Service: -- Author Type: Physician    Filed: 4/1/2019  1:16 PM Encounter Date: 4/1/2019 Status: Signed    : Adarsh Titus DO (Physician)         SLEEP HYGIENE    Sleep only as much as you need to feel rested and then get out of bed   Keep a regular sleep schedule   Avoid forcing sleep   Exercise regularly for at least 20 minutes, preferably 4 to 5 hours before bedtime   Avoid caffeinated beverages after lunch   Avoid alcohol near bedtime: no \"night cap\"   Avoid smoking, especially in the evening   Do not go to bed hungry   Adjust bedroom environment   Avoid prolonged use of light-emitting screens before bedtime    Deal with your worries before bedtime              "

## 2021-06-17 NOTE — TELEPHONE ENCOUNTER
Telephone Encounter by Marcia Angel RN at 12/1/2020 10:47 AM     Author: Marcia Angel RN Service: -- Author Type: Registered Nurse    Filed: 12/1/2020 10:51 AM Encounter Date: 12/1/2020 Status: Signed    : Marcia Angel RN (Registered Nurse)       ANTICOAGULATION  MANAGEMENT    Assessment     Today's INR result of 3.4 is Supratherapeutic (goal INR of 2.0-3.0)        Warfarin taken as previously instructed    Thanksgiving holiday may be affecting diet and INR    No new medication/supplements affecting INR    Continues to tolerate warfarin with no reported s/s of bleeding or thromboembolism     Previous INR was Therapeutic    Plan:     Spoke on phone with Prem regarding INR result and instructed:     Warfarin Dosing Instructions:  Take 3.75 mg today only then continue current warfarin dose 7.5 mg daily  (0 % change)    Instructed patient to follow up no later than: 2 weeks    Education provided: impact of vitamin K foods on INR, vitamin K content of foods, target INR goal and significance of current INR result, importance of notifying clinic for changes in medications and importance of notifying clinic for diarrhea, nausea/vomiting, reduced intake and/or illness    Ryan verbalizes understanding and agrees to warfarin dosing plan.    Instructed to call the [facility] for any changes, questions or concerns. (#[phone])   ?   Marcia Angel RN    Subjective/Objective:      Prem Taylor, a 70 y.o. male is on warfarin.     Prem reports:     Home warfarin dose: verbally confirmed home dose with Ryan and updated on anticoagulation calendar     Missed doses: No     Medication changes:  No     S/S of bleeding or thromboembolism:  No     New Injury or illness:  No     Changes in diet or alcohol consumption:  Yes: thanksgiving meal x2 in the last week     Upcoming surgery, procedure or cardioversion:  No    Anticoagulation Episode Summary     Current INR goal:  2.0-3.0   TTR:  84.2 % (1 y)    Next INR check:  12/15/2020   INR from last check:  3.40 (12/1/2020)   Weekly max warfarin dose:     Target end date:     INR check location:     Preferred lab:     Send INR reminders to:  [facility]    Indications    Pulmonary embolism  bilateral (H) [I26.99]           Comments:           Anticoagulation Care Providers     Provider Role Specialty Phone number    Mamadou Baez MD Referring Family Medicine [phone]

## 2021-06-17 NOTE — TELEPHONE ENCOUNTER
Telephone Encounter by Elsie Stringer RN at 2/16/2021  9:11 AM     Author: Elsie Stringer RN Service: -- Author Type: Registered Nurse    Filed: 2/16/2021 11:09 AM Encounter Date: 2/16/2021 Status: Signed    : Elsie Stringer RN (Registered Nurse)       ANTICOAGULATION  MANAGEMENT    Assessment     Today's INR result of 1.9 is Subtherapeutic (goal INR of 2.0-3.0)        Warfarin recently held as instructed which may be affecting INR-- restarted 2/10 as directed    No new diet changes affecting INR    No new medication/supplements affecting INR    Continues to tolerate warfarin with no reported s/s of bleeding or thromboembolism     Previous INR was Therapeutic-- pre-hold for procedure    Plan:     Spoke on phone with Ryan regarding INR result and instructed:      Warfarin Dosing Instructions:  11.25mg today only then continue current warfarin dose 7.5 mg daily  (0 % change)    Continue Lovenox two times a day as previously directed.    Instructed patient to follow up no later than: Friday with hopes of being able to discontinue lovenox-- scheduled for 2/19    Education provided: target INR goal and significance of current INR result and importance of notifying clinic for changes in medications    Ryan verbalizes understanding and agrees to warfarin dosing plan.    Instructed to call the [facility] for any changes, questions or concerns. (#[phone])   ?   Elsie Stringer RN    Subjective/Objective:      Prem Taylor, a 70 y.o. male is on warfarin. Ryan Davis reports:     Home warfarin dose: as updated on anticoagulation calendar per template     Missed doses: No     Medication changes:  No     S/S of bleeding or thromboembolism:  No     New Injury or illness:  No     Changes in diet or alcohol consumption:  No     Upcoming surgery, procedure or cardioversion:  No    Anticoagulation Episode Summary     Current INR goal:  2.0-3.0   TTR:  86.8 % (1 y)   Next INR check:   2/19/2021   INR from last check:  1.90 (2/16/2021)   Weekly max warfarin dose:     Target end date:     INR check location:     Preferred lab:     Send INR reminders to:  [facility]    Indications    Pulmonary embolism  bilateral (H) [I26.99]           Comments:           Anticoagulation Care Providers     Provider Role Specialty Phone number    Mamadou Baez MD Referring Family Medicine [phone]

## 2021-06-17 NOTE — TELEPHONE ENCOUNTER
Telephone Encounter by Morales Tapia RN at 4/1/2020  8:39 AM     Author: Morales Tapia RN Service: -- Author Type: RN, Care Manager    Filed: 4/1/2020  8:43 AM Encounter Date: 4/1/2020 Status: Attested    : Morales Tapia RN (RN, Care Manager) Cosigner: Mamadou Baez MD at 4/1/2020 10:30 AM    Attestation signed by Mamadou Baez MD at 4/1/2020 10:30 AM                     ANTICOAGULATION  MANAGEMENT    Assessment     Today's INR result of 2.5 is Therapeutic (goal INR of 2.0-3.0)        Warfarin taken as previously instructed    No new diet changes affecting INR    No new medication/supplements affecting INR    Continues to tolerate warfarin with no reported s/s of bleeding or thromboembolism     Previous INR was Therapeutic    Plan:     Spoke with Prem regarding INR result and instructed:     Warfarin Dosing Instructions:  Continue current warfarin dose 7.5 mg daily.    Instructed patient to follow up no later than: 6 weeks.    Education provided: importance of taking warfarin as instructed    Ryan verbalizes understanding and agrees to warfarin dosing plan.    Instructed to call the [facility] for any changes, questions or concerns. (#[phone])   ?   Morales Tapia RN    Subjective/Objective:      Prem Taylor, a 69 y.o. male is on warfarin.     Prem reports:     Home warfarin dose: verbally confirmed home dose with pt and updated on anticoagulation calendar     Missed doses: No     Medication changes:  No     S/S of bleeding or thromboembolism:  No     New Injury or illness:  No     Changes in diet or alcohol consumption:  No     Upcoming surgery, procedure or cardioversion:  No    Anticoagulation Episode Summary     Current INR goal:   2.0-3.0   TTR:   74.0 % (1 y)   Next INR check:   5/13/2020   INR from last check:   2.50 (4/1/2020)   Weekly max warfarin dose:      Target end date:      INR check location:      Preferred lab:      [facility]    Indications    Pulmonary embolism  bilateral (H) [I26.99]           Comments:            Anticoagulation Care Providers     Provider Role Specialty Phone number    Mamadou Baez MD Referring Family Medicine [phone]

## 2021-06-17 NOTE — TELEPHONE ENCOUNTER
ANTICOAGULATION  MANAGEMENT    Assessment     Today's INR result of 2.8 is Therapeutic (goal INR of 2.0-3.0)        Warfarin taken as previously instructed    No new diet changes affecting INR    No new medication/supplements affecting INR    Continues to tolerate warfarin with no reported s/s of bleeding or thromboembolism     Previous INR was Therapeutic    Plan:     Spoke on phone with Ryan regarding INR result and instructed:      Warfarin Dosing Instructions:  Continue current warfarin dose 7.5 mg daily  (0 % change)    Instructed patient to follow up no later than: 6 weeks-- scheduled for 6/15    Education provided: target INR goal and significance of current INR result, importance of notifying clinic for changes in medications, when to seek medical attention/emergency care, importance of notifying clinic for diarrhea, nausea/vomiting, reduced intake and/or illness and importance of notifying clinic of upcoming surgeries and procedures 2 weeks in advance    Ryan verbalizes understanding and agrees to warfarin dosing plan.    Instructed to call the [facility] for any changes, questions or concerns. (#[phone])   ?   Elsie Stringer RN    Subjective/Objective:      Prem Taylor, a 70 y.o. male is on warfarin.       Ryan reports:     Home warfarin dose: as updated on anticoagulation calendar per template     Missed doses: No     Medication changes:  No     S/S of bleeding or thromboembolism:  No     New Injury or illness:  No     Changes in diet or alcohol consumption:  No     Upcoming surgery, procedure or cardioversion:  No     Anticoagulation Episode Summary     Current INR goal:  2.0-3.0   TTR:  86.6 % (1 y)   Next INR check:  6/14/2021   INR from last check:  2.80 (5/3/2021)   Weekly max warfarin dose:     Target end date:     INR check location:     Preferred lab:     Send INR reminders to:  [facility]    Indications    Pulmonary embolism  bilateral (H) [I26.99]           Comments:            Anticoagulation Care Providers     Provider Role Specialty Phone number    Mamadou Baez MD Referring Family Medicine [phone]

## 2021-06-18 NOTE — PATIENT INSTRUCTIONS - HE
Patient Instructions by Mamadou Baez MD at 2/11/2020  1:40 PM     Author: Mamadou Baez MD Service: -- Author Type: Physician    Filed: 2/11/2020  1:56 PM Encounter Date: 2/11/2020 Status: Signed    : Mamadou Baez MD (Physician)         Patient Education   Understanding USDA MyPlate  The USDA (US Department of Agriculture) has guidelines to help you make healthy food choices. These are called MyPlate. MyPlate shows the food groups that make up healthy meals using the image of a place setting. Before you eat, think about the healthiest choices for what to put onto your plate or into your cup or bowl. To learn more about building a healthy plate, visit www.choosemyplate.gov.       The Food Groups    Fruits: Any fruit or 100% fruit juice counts as part of the Fruit Group. Fruits may be fresh, canned, frozen, or dried, and may be whole, cut-up, or pureed. Make half your plate fruits and vegetables.    Vegetables: Any vegetable or 100% vegetable juice counts as a member of the Vegetable Group. Vegetables may be fresh, frozen, canned, or dried. They can be served raw or cooked and may be whole, cut-up, or mashed. Make half your plate fruits and vegetables.     Grains: All foods made from grains are part of the Grains Group. These include wheat, rice, oats, cornmeal, and barley such as bread, pasta, oatmeal, cereal, tortillas, and grits. Grains should be no more than a quarter of your plate. At least half of your grains should be whole grains.    Protein: This group includes meat, poultry, seafood, beans and peas, eggs, processed soy products (like tofu), nuts (including nut butters), and seeds. Make protein choices no more than a quarter of your plate. Meat and poultry choices should be lean or low fat.    Dairy: All fluid milk products and foods made from milk that contain calcium, like yogurt and cheese are part of the Dairy Group. (Foods that have little calcium, such as cream, butter, and cream  cheese, are not part of the group.) Most dairy choices should be low-fat or fat-free.    Oils: These are fats that are liquid at room temperature. They include canola, corn, olive, soybean, and sunflower oil. Foods that are mainly oil include mayonnaise, certain salad dressings, and soft margarines. You should have only 5 to 7 teaspoons of oils a day. You probably already get this much from the food you eat.  Use [employer] to Help Build Your Meals  The [employer] can help you plan and track your meals and activity. You can look up individual foods to see or compare their nutritional value. You can get guidelines for what and how much you should eat. You can compare your food choices. And you can assess personal physical activities and see ways you can improve. Go to www.IntellectSpace.gov/Pentahocker/.    2910-8915 The [employer]. [address] [place] [address]. All rights reserved. This information is not intended as a substitute for professional medical care. Always follow your healthcare professional's instructions.           Patient Education   Signs of Hearing Loss  Hearing loss is a problem shared by many people. In fact, it is one of the most common health conditions, particularly as people age. Most people over age 65 have some hearing loss, and by age 80, almost everyone does. Because hearing loss usually occurs slowly over the years, you may not realize your hearing ability has gotten worse.       Have your hearing checked  Contact your [facility] care provider if you:    Have to strain to hear normal conversation.    Have to watch other peoples faces very carefully to follow what theyre saying.    Need to ask people to repeat what theyve said.    Often misunderstand what people are saying.    Turn the volume of the television or radio up so high that others complain.    Feel that people are mumbling when theyre talking to you.    Find that the effort to hear leaves you feeling tired  and irritated.    Notice, when using the phone, that you hear better with 1 ear than the other.    6885-3893 The [employer]. [address] [place] [address], [place] [address]. All rights reserved. This information is not intended as a substitute for professional medical care. Always follow your healthcare professional's instructions.           Advance Directive  Patients advance directive was discussed and I am comfortable with the patients wishes.  Patient Education   Personalized Prevention Plan  You are due for the preventive services outlined below.  Your care team is available to assist you in scheduling these services.  If you have already completed any of these items, please share that information with your care team to update in your medical record.  Health Maintenance   Topic Date Due   ? MEDICARE ANNUAL WELLNESS VISIT  02/08/2020   ? FALL RISK ASSESSMENT  08/02/2020   ? LIPID  02/08/2024   ? ADVANCE CARE PLANNING  11/20/2024   ? TD 18+ HE  01/03/2027   ? COLONOSCOPY  02/11/2029   ? HEPATITIS C SCREENING  Completed   ? PNEUMOCOCCAL IMMUNIZATION 65+ LOW/MEDIUM RISK  Completed   ? INFLUENZA VACCINE RULE BASED  Completed   ? ZOSTER VACCINES  Completed

## 2021-06-19 NOTE — PROGRESS NOTES
Assessment/Plan:    1. Persistent atrial fibrillation (H)  Persistent atrial fibrillation, rate controlled.  Continues warfarin 7.5 mg daily.  Metoprolol tartrate 25 mg using half tablet twice daily.  Will follow up with Dr. Xavier May 2018 with echocardiogram prior to that visit.  - Basic Metabolic Panel    2. Hypercholesterolemia  History of mild cholesterol elevation with protective HDL 66.  Continue dietary and exercise modification for weight goal less than 210 pounds ideally.    3. Anxiety  History of anxiety disorder suboptimal control.  Tolerates escitalopram 5 mg.  Will increase to 10 mg daily.  Reassess at follow-up physical 6 months, sooner if persistent or suboptimal control.  - escitalopram oxalate (LEXAPRO) 10 MG tablet; Take 1 tablet (10 mg total) by mouth daily.  Dispense: 90 tablet; Refill: 3    4. Pulmonary embolism, bilateral (H)  Reviewed history of pulmonary emboli, bilateral, October 2017.  Stable.  No hemoptysis.  Patient was referred to pulmonology however today with persistent cough.  - Ambulatory referral to Pulmonology    5. Cough  As above, referral to pulmonology regarding persistent cough.  Tessalon Perles may be utilized.  Question postnasal drainage and fluticasone nasal spray may be used.  Question of occult reflux and omeprazole 20 mg daily may be utilized.  Unclear if environmental allergies and cetirizine 10 mg daily may be utilized per    6. SOB (shortness of breath)  Intermittent shortness of breath typically with exertion.  Can work out at the club and not have problems however will climb a flight of stairs and occasionally have shortness of breath without chest pain or obvious palpitations.  This is likely related to persistent atrial fibrillation however will have pulmonologist review.    7. Snoring  Wife describes significant snoring.  No daytime somnolence.  Sleep study to be completed with witnessed apneic episodes described by patient's wife.  - Ambulatory referral to  "Sleep Medicine    The following high BMI interventions were performed this visit: encouragement to exercise, weight monitoring, weight loss from baseline weight and lifestyle education regarding diet.  Ensure ongoing efforts to achieve weight goal < 210 pounds initially, < 205 pounds ideally.         Subjective:    Prem Taylor is seen today for follow-up evaluation.  Atrial fibrillation, persistent.  Has had bilateral pulmonary emboli noted historically 2017.  Continues warfarin anticoagulation for both of these concerns currently 7.5 mg daily and will repeat INR next week.  Has had mild cholesterol elevation, diet-controlled currently.  Has gained 7 pounds since retiring in March.  Using escitalopram 5 mg daily for anxiety however this does not seem to be working.  No side effects.  Snores significantly.  Wife feels that he holds his breath at times otherwise feels well rested in the morning and does not have date somnolence.  Comprehensive review of systems as above otherwise all negative.     \"Aubree\" x 44 years   2 daughters - Renee (38) and Susannah (35)  3 grandchildren  No smoke (quit  after 1 and  ppd)   EtOH: weekends \"few beers\"   Surgeries: anterior cervical fusion; inguinal hernia; tonsils   Hospitalizations: [facility] 10/9/17-10/10/17 bilateral pulmonary emboli with a. 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)  Exercise: run 4x/week at 6-6.5 mph; situps and pushups    Past Surgical History:   Procedure Laterality Date     CERVICAL FUSION       HERNIA REPAIR       TONSILLECTOMY      childhood        Family History   Problem Relation Age of Onset     Heart disease Mother      Hypertension Mother      Dementia Mother      Heart attack Father         Past Medical History:   Diagnosis Date     Atrial fibrillation (H) 10/09/2017     Pulmonary embolism, " blood-clot, obstetric 10/09/2017        Social History   Substance Use Topics     Smoking status: Former Smoker     Smokeless tobacco: Never Used     Alcohol use Yes        Current Outpatient Prescriptions   Medication Sig Dispense Refill     escitalopram oxalate (LEXAPRO) 10 MG tablet Take 1 tablet (10 mg total) by mouth daily. 90 tablet 3     metoprolol tartrate (LOPRESSOR) 25 MG tablet Take 0.5 tablets (12.5 mg total) by mouth 2 (two) times a day.  0     MULTIVITAMIN (MULTIPLE VITAMIN ORAL) Take 1 tablet by mouth daily.       sildenafil (VIAGRA) 100 MG tablet Take 50 mg by mouth. One hour prior to intercourse.       warfarin (COUMADIN) 7.5 MG tablet Take 1/2 to 1 tablet (3.75 to 7.5 mg) by mouth daily. Adjust dose per INR results as instructed. 90 tablet 1     benzonatate (TESSALON PERLES) 100 MG capsule Take 1 capsule (100 mg total) by mouth every 6 (six) hours as needed for cough. 30 capsule 0     No current facility-administered medications for this visit.           Objective:    Vitals:    08/07/18 0702   BP: 130/88   Pulse: 69   SpO2: 97%   Weight: (!) 224 lb (101.6 kg)      Body mass index is 28.38 kg/(m^2).    Alert.  No apparent distress.  Chest clear.  Cardiac exam irregular, rate controlled.  No murmur.  Abdomen benign.  Extremities warm and dry.  No peripheral edema.      11/15/17 Echo Summary     No previous study for comparison.    Left ventricle ejection fraction is normal. The estimated left ventricular ejection fraction is 55%.    Moderate mitral regurgitation    No left atrial appendage thrombus seen           This note has been dictated using voice recognition software and as a result may contain minor grammatical errors and unintended word substitutions.

## 2021-06-20 NOTE — PROGRESS NOTES
RESPIRATORY CARE NOTE     Patient Name: Prem Taylor  Today's Date: 9/4/2018     Complete PFT done. Pt performed tests with good effort, 2.5 mg Albuterol neb given. Test results meet ATS criteria, except RAW and TGV results are not within 5% of each other, switch in error not <250 ml. Results scanned into epic. Pt left in no distress.       Lissy Singh

## 2021-06-20 NOTE — PROGRESS NOTES
Dear Dr. Mamadou Baez Md  [address]  [place] [address]    Thank you for the opportunity to participate in the care of Mr. Prem Taylor.    He is a 68 y.o. male who comes to the clinic with a chief complaint of excessive daytime sleepiness is been going on for approximately a year.  Last August the patient was actually diagnosed with bilateral pulmonary embolism as well as atrial fibrillation.  He subsequently underwent cardioversion which failed and is now on Coumadin prophylaxis.  The patient heard of the association with atrial fibrillation and untreated sleep apnea and would like to get evaluated.  While he denies any episodes of witnessed apnea the patient's wife has complained of loud snoring during sleep the patient's wife complains that he falls asleep fairly easily while watching television.  The patient's review of system is otherwise unremarkable.     Ideal Sleep-Wake Cycle(devoid of societal pressure):    Patient would try to initiate sleep at around 10:30 PM with a sleep latency of 5 minutes. The patient would have 3-4 nocturnal awakening. Final wake up time is around 7 AM.    Past Medical History  Past Medical History:   Diagnosis Date     Atrial fibrillation (H) 10/09/2017     Pulmonary embolism, blood-clot, obstetric 10/09/2017        Past Surgical History  Past Surgical History:   Procedure Laterality Date     CERVICAL FUSION  2001     HERNIA REPAIR  2005     TONSILLECTOMY      childhood        Meds  Current Outpatient Prescriptions   Medication Sig Dispense Refill     escitalopram oxalate (LEXAPRO) 10 MG tablet Take 1 tablet (10 mg total) by mouth daily. 90 tablet 3     famotidine (PEPCID) 40 MG tablet Take 1 tablet (40 mg total) by mouth at bedtime. 30 tablet 11     metoprolol tartrate (LOPRESSOR) 25 MG tablet Take 0.5 tablets (12.5 mg total) by mouth 2 (two) times a day.  0     MULTIVITAMIN (MULTIPLE VITAMIN ORAL) Take 1 tablet by mouth daily.       sildenafil (VIAGRA) 100  MG tablet Take 50 mg by mouth. One hour prior to intercourse.       warfarin (COUMADIN) 7.5 MG tablet Take 1/2 to 1 tablet (3.75 to 7.5 mg) by mouth daily. Adjust dose per INR results as instructed. 90 tablet 1     No current facility-administered medications for this visit.         Allergies  Review of patient's allergies indicates no known allergies.     Social History  Social History     Social History     Marital status:      Spouse name: N/A     Number of children: N/A     Years of education: N/A     Occupational History     Not on file.     Social History Main Topics     Smoking status: Former Smoker     Smokeless tobacco: Never Used     Alcohol use Yes     Drug use: No     Sexual activity: Not on file     Other Topics Concern     Not on file     Social History Narrative        Family History  Family History   Problem Relation Age of Onset     Heart disease Mother      Hypertension Mother      Dementia Mother      Heart attack Father         Review of Systems:  Constitutional: Negative except as noted in HPI.   Eyes: Negative except as noted in HPI.   ENT: Negative except as noted in HPI.   Cardiovascular: Negative except as noted in HPI.   Respiratory: Negative except as noted in HPI.   Gastrointestinal: Negative except as noted in HPI.   Genitourinary: Negative except as noted in HPI.   Musculoskeletal: Negative except as noted in HPI.   Integumentary: Negative except as noted in HPI.   Neurological: Negative except as noted in HPI.   Psychiatric: Negative except as noted in HPI.   Endocrine: Negative except as noted in HPI.   Hematologic/Lymphatic: Negative except as noted in HPI.      STOP BANG 9/26/2018   Do you snore loudly (louder than talking or loud enough to be heard through closed doors)? 1   Do you often feel tired, fatigued, or sleepy during daytime? 0   Has anyone observed you stop breathing in your sleep? 0   Do you have or are you being treated for high blood pressure? 0   BMI more than  "35 kg/m2 0   Age over 50 years old? 1   Neck circumference greater than 16 inches? 1   Gender male? 1   Total Score 4   Epworths Sleepiness Scale 9/26/2018   Sitting and reading 3   Watching TV 2   Sitting, inactive in a public place (e.g. a theatre or a meeting) 1   As a passenger in a car for an hour without a break 0   Lying down to rest in the afternoon when circumstances permit 2   Sitting and talking to someone 1   Sitting quietly after a lunch without alcohol 1   In a car, while stopped for a few minutes in traffic 0   Total score 10   Rooming 9/26/2018   Usual bedtime 10:30pm   Sleep Latency 5 minutes   Awakenings 3-4 times   Wake Up Time 6:30am   Weekends same   Energy Drinks 0   Coffee 2 cups    Cola 2 cans   Difficulty falling asleep No   Difficulty staying asleep No   Excessive daytime tiredness No   Excessive daytime sleepiness No   Dozing off while driving No   Shift Worker No   Sleep Walking? No   Sleep Talking? No   Kicking or punching? No   Restless legs symptoms Yes       Physical Exam:  /79  Pulse 63  Ht 6' 2.5\" (1.892 m)  Wt (!) 223 lb (101.2 kg)  SpO2 96%  BMI 28.25 kg/m2  BMI:Body mass index is 28.25 kg/(m^2).   GEN: NAD, appropriate for age  Head: Normocephalic.  EYES: PERRLA, EOMI  ENT: Oropharynx is clear, mallampatti class 4+ airway.  Nasal mucosa is moist without erythema  Neck : Thyroid is within normal limits. Neck size: 16.25 inches  CV: Regular rate and rhythm, S1 & S2 positive.  LUNGS: Bilateral breathsounds heard.   ABDOMEN: Positive bowel sounds in all quadrants, soft, no rebound or guarding  MUSCULOSKELETAL: Bilateral trace leg swelling  SKIN: warm, dry, no rashes  Neurological: Alert, oriented to time, place, and person.  Psych: normal mood, normal affect     Labs/Studies:     Lab Results   Component Value Date    WBC 4.2 02/06/2018    HGB 13.8 (L) 09/04/2018    HCT 42.0 02/06/2018    MCV 90 02/06/2018     02/06/2018         Chemistry        Component Value " Date/Time     08/07/2018 0743    K 4.4 08/07/2018 0743     08/07/2018 0743    CO2 28 08/07/2018 0743    BUN 13 08/07/2018 0743    CREATININE 0.77 08/07/2018 0743    GLU 94 08/07/2018 0743        Component Value Date/Time    CALCIUM 9.5 08/07/2018 0743    ALKPHOS 55 01/03/2017 0753    AST 21 01/03/2017 0753    ALT 19 01/03/2017 0753    BILITOT 1.0 01/03/2017 0753            No results found for: FERRITIN  Lab Results   Component Value Date    TSH 1.41 02/06/2018         Assessment and Plan:  In summary Prem Taylor is a 68 y.o. year old male here for sleep disturbance.  1.  Hypersomnia   Mr. Prem Taylor has high risk for obstructive sleep apnea based on the history of witnessed apnea, snoring and a crowded airway. I educated the patient on the underlying pathophysiology of obstructive sleep apnea. We reviewed the risks associated with sleep apnea, including increased cardiovascular risk and overall death. We talked about treatments briefly. I recommend getting an split-night nocturnal polysomnography. The patient should return to the clinic to discuss results and treatment option in a patient-centered approach.  2.  Snoring  3.  Other sleep disturbance      Patient verbalized understanding of these issues, agrees with the plan and all questions were answered today. Patient was given an opportuntity to voice any other symptoms or concerns not listed above. Patient did not have any other symptoms or concerns.      Patient told to return in one week after the sleep study is interpreted.      Adarsh Titus DO  Board Certified in Internal Medicine and Sleep Medicine  [facility].    (Note created with Dragon voice recognition and unintended spelling errors and word substitutions may occur)

## 2021-06-20 NOTE — PROGRESS NOTES
Assessment and Plan:Prem Taylor is a 67 y.o. with a past medical history significant for bilateral PEs diagnosed in October 2017 along with A. fib for which he was cardioverted and placed on lifelong warfarin, who presents to clinic today for complaints of a nonproductive cough and dyspnea on exertion that have been present since that time.  He continues to exercise regularly as he did before the diagnosis of his PEs, going to the gym and using a treadmill daily.  Despite this continued exercise, however he now notices dyspnea on exertion going upstairs, which he did not before the diagnosis of PEs.  He also reports a chronic dry cough which is worse in the morning.  He has tried Tessalon Perles without any relief.  He has not yet tried decongestants, however denies any history of rhinorrhea or sinus congestion.  He does get occasional heartburn and lies flat at night, and has not yet tried antacids.  He had a transthoracic echocardiogram in October and a transesophageal echocardiogram in November 2017 that showed mild to moderate mitral regurgitation with mildly increased right ventricular size, biatrial enlargement, and increased RA pressure, however a normal ejection fraction.  He is not currently on any diuretic therapy but has not noted any lower extremity swelling or orthopnea.    He has a 30-pack-year smoking history but quit in 1990.  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    1. Trial of nightly H2 blocker and elevating the head of bed for at least 2 months  2. Pulmonary function tests ordered  3. Chest x-ray    Follow-up in our clinic in 2-3 months.  Will consider workup for heart failure or pulmonary hypertension at that time if the above studies and trial are negative.    CCx: Dry cough and shortness of breath    HPI: Mr. Arenas is a pleasant 67-year-old gentleman in overall good health who goes to the gym and uses a treadmill daily.  He was diagnosed  with bilateral pulmonary emboli and atrial fibrillation in October 2017.  He was placed on lifelong warfarin and cardioverted at that time.  However he continues to have a dry cough and dyspnea on exertion that started around the same time but did not improve.  The cough is slightly worse in the morning.   He denies any congestion or rhinorrhea.  He traveled to [place] in April and was seen in the ED there at that time for chest pain with a deep breaths.  It was determined to be non-cardiac and thought secondary to allergies from high levels of pollen present in that state at that time.  The chest pain resolved after he left [place] the following day.  He has tried Tessalon Perles for the cough without relief.  He was seen by his primary care physician about 2 weeks ago, however has not yet tried decongestants or antacids.  He denies any history of sinus congestion or rhinorrhea but does have occasional heartburn.  His bed at home has the ability to recline however he always sleeps on it laying flat on his stomach.    PMH:  Past Medical History:   Diagnosis Date     Atrial fibrillation (H) 10/09/2017     Pulmonary embolism, blood-clot, obstetric 10/09/2017   Bilateral PEs diagnosed in October 2017  Cardioversion for newly diagnosed atrial fibrillation in October 2017, on lifelong Coumadin since then  Mild to moderate mitral regurgitation with right ventricle and biatrial enlargement.  Followed by cardiology but not currently on any diuretics.  Occasional heartburn    PSH:  Past Surgical History:   Procedure Laterality Date     CERVICAL FUSION  2001     HERNIA REPAIR  2005     TONSILLECTOMY      childhood       SH:  Social History     Social History     Marital status:      Spouse name: N/A     Number of children: N/A     Years of education: N/A     Occupational History     Not on file.     Social History Main Topics     Smoking status: Former Smoker     Smokeless tobacco: Never Used     Alcohol use Yes      Drug use: No     Sexual activity: Not on file     Other Topics Concern     Not on file     Social History Narrative   The patient smoked about one half packs per day for 20 years and quit in .  He drinks rare occasional alcohol socially on the weekends and has not noticed any difference in his cough or shortness of breath with it.  Denies illicit drug use.  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  He believes there was asbestosis and some of the older laboratories and had a coworker there who  of mesothelioma.    No recent known sick contacts.    He traveled to [place] in March and [place] in .    Family history:  Family History   Problem Relation Age of Onset     Heart disease Mother      Hypertension Mother      Dementia Mother      Heart attack Father    No family history of lung disease    ROS:  A review of 12 organ systems was performed with pertinent positives and negatives noted in HPI.      Current Meds:  Current Outpatient Prescriptions   Medication Sig Note     benzonatate (TESSALON PERLES) 100 MG capsule Take 1 capsule (100 mg total) by mouth every 6 (six) hours as needed for cough. 5/10/2018: Finished course of this     escitalopram oxalate (LEXAPRO) 10 MG tablet Take 1 tablet (10 mg total) by mouth daily.      metoprolol tartrate (LOPRESSOR) 25 MG tablet Take 0.5 tablets (12.5 mg total) by mouth 2 (two) times a day.      MULTIVITAMIN (MULTIPLE VITAMIN ORAL) Take 1 tablet by mouth daily.      sildenafil (VIAGRA) 100 MG tablet Take 50 mg by mouth. One hour prior to intercourse.      warfarin (COUMADIN) 7.5 MG tablet Take 1/2 to 1 tablet (3.75 to 7.5 mg) by mouth daily. Adjust dose per INR results as instructed.      famotidine (PEPCID) 40 MG tablet Take 1 tablet (40 mg total) by mouth at bedtime.        Labs:  No results found for this or any previous visit (from the past 72 hour(s)).    I have personally reviewed all imaging and PFT data available  pertinent to this visit.    Imaging studies:  No results found.    Physical Exam:  /68 (Patient Site: Right Arm, Patient Position: Sitting, Cuff Size: Adult Regular)  Pulse 97  Resp 20  Wt 219 lb (99.3 kg)  SpO2 97%  BMI 27.74 kg/m2  General - Well nourished  Ears/nose/mouth - OP pink moist, no thrush.  No oral or pharyngeal inflammation  Neck - no cervical lymphadenopathy  Lungs - Clear to ausculation bilaterally, no crackles or wheezes  CVS - regular rhythm with no murmurs, rubs or gallups  Abdomen - soft, NT, ND, NABS  Ext - no cyanosis, clubbing or edema  Skin - no rash  Psychology - alert and oriented, answers appropriate        Electronically signed by:    Cyrus Nunn MD  [facility] Pulmonary and Critical Care Medicine

## 2021-06-21 NOTE — LETTER
Letter by Gerardo Velez MD at      Author: Gerardo Velez MD Service: -- Author Type: --    Filed:  Encounter Date: 4/5/2021 Status: (Other)         Prem Taylor  [address] [place] [id number]      April 5, 2021      Dear Prem,    This letter is to remind you that you will be due for your follow up appointment with Dr. Gerardo Velez in June, 2021. To help ensure you are in the best health possible, a regular follow-up with your cardiologist is essential.     Please call our Patient Scheduling Line at [phone] to schedule your appointment at your earliest convenience.  If you have recently scheduled an appointment, please disregard this letter.    We look forward to seeing you again. As always, we are available at the number  above for any questions or concerns you may have.      Sincerely,     The Physicians and Staff of [facility]

## 2021-06-21 NOTE — PROGRESS NOTES
Assessment and Plan:  Prem Taylor is a 67 y.o. with a past medical history significant for bilateral PEs diagnosed in October 2017 along with A. fib for which he was cardioverted and placed on lifelong warfarin, who presented to our clinic on 8/23/2018 for complaints of a nonproductive cough and dyspnea on exertion that have been present since that time.  He continues to exercise regularly as he did before the diagnosis of his PEs, going to the gym and using a treadmill daily.  Despite this continued exercise, however he now notices dyspnea on exertion primarily when going upstairs, which he did not before the diagnosis of PEs.  He also reports a chronic dry cough which is worse in the morning.  He has tried Tessalon Perles without any relief.  He has not yet tried decongestants, however denies any history of rhinorrhea or sinus congestion.  He does get occasional heartburn and lies flat at night, and has not yet tried antacids.  He had a transthoracic echocardiogram in October and a transesophageal echocardiogram in November 2017 that showed mild to moderate mitral regurgitation with mildly increased right ventricular size, biatrial enlargement, and increased RA pressure, however a normal ejection fraction.  He is not currently on any diuretic therapy but has not noted any lower extremity swelling or orthopnea.     He has a 30-pack-year smoking history but quit in 1990.  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     11/15/2018 f/u pt: Breathing feels better, dyspnea with stairs has been improving with ongoing stairmaster use at the gym. He still has no appetite or weight loss. Pepcid and elevating HOB did not improve cough. Now noted sinus congestion & runny nose. Has been newly diagnosed with ALISE and plans to get CPAP after f/u appt with sleep clinic next week. PFTs were wnl and CXR unremarkable.    - Rx Flonase & Claritin for rhinorrhea & possible post-nasal drip  etiology of cough  - f/u with sleep clinic for new CPAP as planned next week  - CXR and PFTs unremarkable (apical scarring and possible small effusion), images and resuklts reviewed with patient  - f/u plan options discussed with patient: as he is feeling better he prefers to f/u on an as needed basis if symptoms worsen or do not continue to improve      CCx: dyspnea on exertion, cough    HPI: The patient reports his breathing feels better.  The dyspnea with stairs has been improving with ongoing stairmaster use at the gym. He still has no appetite or weight loss. He tried pepcid and elevating the head of his reclining bed but it did not improve his cough, which he describes as mild and not personally bother some but primarily a concern of his wife. He is now however noting frequent sinus congestion & runny nose.    ROS:  A review of 12 organ systems was performed with pertinent positives and negatives noted in the HPI.      Current Meds:  Current Outpatient Medications   Medication Sig     escitalopram oxalate (LEXAPRO) 10 MG tablet Take 1 tablet (10 mg total) by mouth daily.     famotidine (PEPCID) 40 MG tablet Take 1 tablet (40 mg total) by mouth at bedtime.     metoprolol tartrate (LOPRESSOR) 25 MG tablet Take 0.5 tablets (12.5 mg total) by mouth 2 (two) times a day.     MULTIVITAMIN (MULTIPLE VITAMIN ORAL) Take 1 tablet by mouth daily.     sildenafil (VIAGRA) 100 MG tablet Take 50 mg by mouth. One hour prior to intercourse.     warfarin (COUMADIN/JANTOVEN) 7.5 MG tablet Take 1 tablet (7.5 mg) by mouth daily. Adjust dose per INR results as instructed.     fluticasone (FLONASE) 50 mcg/actuation nasal spray 1 spray per nare twice daily.     loratadine (CLARITIN) 10 mg tablet Take 1 tablet (10 mg total) by mouth daily.       Labs:  No results found for this or any previous visit (from the past 72 hour(s)).    I have personally reviewed all pertinent imaging studies and PFT results unless otherwise noted.    Imaging  studies:  Xr Chest 2 Views    Result Date: 9/4/2018  XR CHEST 2 VIEWS 9/4/2018 8:29 AM INDICATION: Shortness of breath. COMPARISON: 4/6/2018. FINDINGS: Biapical scarring stable. Lungs are clear. There may be a trace amount of pleural fluid and/or thickening in the posterior costophrenic sulci. Heart size and pulmonary vascularity within normal limits. Anterior plate and screw lower cervical spine.        Physical Exam:  /72   Pulse 68   Resp 24   Wt (!) 223 lb (101.2 kg)   SpO2 98% Comment: RA  BMI 28.25 kg/m    General - Well nourished  Ears/Mouth -  OP pink moist, no thrush  Neck - no cervical lymphadenopathy  Lungs - Clear to ausculation bilaterally, no crackles or wheezes  CVS - regular rhythm with no murmurs, rubs or gallups  Abdomen - soft, NT, ND, NABS  Ext - no cyanosis, clubbing or edema  Skin - no rash  Psychology - alert and oriented, answers appropriate        Electronically signed by:    Cyrus Nunn MD  [facility] Pulmonary and Critical Care Medicine

## 2021-06-21 NOTE — PROGRESS NOTES
Patient was offered choice of vendor and chose [facility].  Patient Prem Taylor was set up at [facility] on 11/19/18. Patient received a [employer] [id number] Auto. Pressures were set at 6-16.   Patient s ramp is 6 cm H2O for off and FLEX/EPR is EPR 2.  Patient received a [employer] Mask name: Simplus  FFM Size Lg, heated tubing and heated humidifier.    Bridgettee Her

## 2021-06-22 NOTE — TELEPHONE ENCOUNTER
Who is calling:  Patient   Reason for Call:  Patient calling back regarding hold/bridging orders for him.  Patient states he just spoke to [facility] and has his colonoscopy scheduled for 02/11/19 which is within the 1-2 week range of his upcoming INR appointment. Patient is currently scheduled on 01/31/19 for INR. [see related telephone encounter from 01/02/19]  Patient is wondering about the bridge medication and if he should speaking to [facility] staff about this prior to 01/31/19 INR.   Date of last appointment with primary care: 11/15/18  Has the patient been recently seen:  No  Okay to leave a detailed message: Yes

## 2021-06-22 NOTE — TELEPHONE ENCOUNTER
Orders being requested: hold and bridging if needed  Reason service is needed/diagnosis: colonoscopy   When are orders needed by: as available  Where to send Orders: verbal  Okay to leave detailed message?  Yes

## 2021-06-22 NOTE — TELEPHONE ENCOUNTER
[facility] called back and spoke with pt.     Colonoscopy is scheduled for 2/11/19.     Pt is aware that he will need to hold warfarin 4 days before and bridge with Lovenox. Will go into details when patient checks INR on 1/31/19.     No further questions/ concerns at this time.

## 2021-06-22 NOTE — TELEPHONE ENCOUNTER
YUNG-PROCEDURAL ANTICOAGULATION  MANAGEMENT    Assessment     Warfarin interruption plan for Colonoscopy    Hx of PE (10/2017) and Atrial fibrillation      Risk stratifications available primarily for single indications. Limited information on additive risk for multiple indications for anticoagulation.       Afib: [id number] = 1-3 (Age > 65  +/- Pulmonary embolism) + moderate mitral valve insufficiency    Risk stratification low-moderate. Bridge suggested only with hx of stroke/TIA  (2017 ACC yung-procedure guidance)      PE:  Single PE, > 1 year since clot, no identified hypercoagulability    Risk stratification: low  (chest 2012)    No contraindications to warfarin interruption for colonoscopy and 4 day hold reasonable. Will consult with Dr. Baez regarding bridge decision for multiple indications.  ?   Jessica Orosco, PharmD    Subjective/Objective:      Prem Taylor, a 68 y.o. male    Reason for Anticoagulation: Atrial Fibrillation and Hx of PE (10/2017)    Goal INR Range: 2-3    Patient bridged in past: Yes with PE diagnosis 10/2017    Pertinent History:       Mitral valve insufficiency,     Negative prothrombin gene mutation, factor 5 Leiden, Lupus anticoagulant, cardiolipin antibody during 2017 hypercoagulability work up with [employer] (via care everywhere)     Wt Readings from Last 3 Encounters:   11/15/18 (!) 223 lb (101.2 kg)   11/01/18 (!) 223 lb (101.2 kg)   09/26/18 (!) 223 lb (101.2 kg)     Lab Results   Component Value Date    INR 2.90 (H) 12/20/2018    INR 2.80 (H) 11/08/2018    INR 2.90 (H) 10/11/2018     Lab Results   Component Value Date    HGB 13.8 (L) 09/04/2018    HCT 42.0 02/06/2018     02/06/2018     Lab Results   Component Value Date    CREATININE 0.77 08/07/2018    CREATININE 0.77 02/06/2018    CREATININE 0.80 10/26/2017     Estimated CrCl: 114 ml/min (using adjusted weight  90 kg and creatinine rounded to 0.8 for age > 65)

## 2021-06-22 NOTE — PROGRESS NOTES
Dear Dr. Baez, Mamadou MONTEMAYOR MD  [address] [facility] [place] [address] [address],    Thank you for the opportunity to participate in the care of Prem Taylor.     He is a 68 y.o.  male patient who comes to the sleep medicine clinic for review of his sleep study and compliance download data. The study was completed on 11/01/18 which showed the the patient had moderate obstructive sleep apnea with an apnea hypopnea index of 22 events per hour with the lowest O2 sat of 74%.  The patient was informed the results and offer the option to initiate CPAP therapy by phone which he agreed.  Patient states that since starting CPAP therapy he has not noticed any drastic improvement in his sleep quality or energy level.  He has noticed that his episodes of nocturia has dramatically decreased which is a positive thing.  His wife is very happy because he is no longer snoring.  He is willing to continue with CPAP therapy.    Compliance Download data for 30 days:  Pressure setting: APAP 6-16 CWP  Residual AHI: 1.3 events per hour  Leak: Minimal  Compliance: 97%  Mask Tolerance: Good  Skin irritation: None    Current Outpatient Medications   Medication Sig Dispense Refill     escitalopram oxalate (LEXAPRO) 10 MG tablet Take 1 tablet (10 mg total) by mouth daily. 90 tablet 3     famotidine (PEPCID) 40 MG tablet Take 1 tablet (40 mg total) by mouth at bedtime. 30 tablet 11     fluticasone (FLONASE) 50 mcg/actuation nasal spray 1 spray per nare twice daily. 48 g 3     loratadine (CLARITIN) 10 mg tablet Take 1 tablet (10 mg total) by mouth daily. 90 tablet 3     metoprolol tartrate (LOPRESSOR) 25 MG tablet Take 0.5 tablets (12.5 mg total) by mouth 2 (two) times a day.  0     MULTIVITAMIN (MULTIPLE VITAMIN ORAL) Take 1 tablet by mouth daily.       sildenafil (VIAGRA) 100 MG tablet Take 50 mg by mouth. One hour prior to intercourse.       warfarin (COUMADIN/JANTOVEN) 7.5 MG tablet Take 1 tablet (7.5 mg) by mouth daily. Adjust dose per INR  "results as instructed. 90 tablet 1     No current facility-administered medications for this visit.        No Known Allergies    Epworths Sleepiness Scale 9/26/2018 1/3/2019   Sitting and reading 3 3   Watching TV 2 1   Sitting, inactive in a public place (e.g. a theatre or a meeting) 1 0   As a passenger in a car for an hour without a break 0 0   Lying down to rest in the afternoon when circumstances permit 2 2   Sitting and talking to someone 1 0   Sitting quietly after a lunch without alcohol 1 1   In a car, while stopped for a few minutes in traffic 0 0   Total score 10 7       Physical Exam:  /74   Pulse 78   Ht 6' 2.5\" (1.892 m)   Wt (!) 232 lb (105.2 kg)   SpO2 98%   BMI 29.39 kg/m    BMI:Body mass index is 29.39 kg/m .   GEN: NAD, obese  Psych: normal mood, normal affect     Labs/Studies:  - We reviewed the results of the overnight PSG as described on the HPI.     Lab Results   Component Value Date    WBC 4.2 02/06/2018    HGB 13.8 (L) 09/04/2018    HCT 42.0 02/06/2018    MCV 90 02/06/2018     02/06/2018         Chemistry        Component Value Date/Time     08/07/2018 0743    K 4.4 08/07/2018 0743     08/07/2018 0743    CO2 28 08/07/2018 0743    BUN 13 08/07/2018 0743    CREATININE 0.77 08/07/2018 0743    GLU 94 08/07/2018 0743        Component Value Date/Time    CALCIUM 9.5 08/07/2018 0743    ALKPHOS 55 01/03/2017 0753    AST 21 01/03/2017 0753    ALT 19 01/03/2017 0753    BILITOT 1.0 01/03/2017 0753            No results found for: FERRITIN        Assessment and Plan:  In summary Prem Taylor is a 68 y.o. year old male here for follow-up.  1. Obstructive Sleep Apnea  I reviewed the results of sleep study with the patient in detail.  I congratulated him on his excellent usage of the CPAP.  I encouraged him to try to bring his CPAP machine on his next clinical visit with him so I can make adjustments test it with him him in real-time.  2.  Other sleep disturbance     Patient " verbalized understanding of these issues, agrees with the plan and all questions were answered today. Patient was given an opportuntity to voice any other symptoms or concerns not listed above. Patient did not have any other symptoms or concerns.        Adarsh Titus DO  Board Certified in Internal Medicine and Sleep Medicine  [facility].    We spent a total of 10 minutes of face-to-face encounter and more than 50% of the encounter was used for counseling or coordination of care.    (Note created with Dragon voice recognition and unintended spelling errors and word substitutions may occur)

## 2021-06-23 NOTE — TELEPHONE ENCOUNTER
YUNG-PROCEDURAL ANTICOAGULATION  MANAGEMENT    Assessment/Plan     Today's INR result of 3.9 is Supratherapeutic (goal INR of 2.0-3.0)        Warfarin taken as previously instructed    Change in alcohol intake may be affecting INR (long weekend in [place] with increased intake)    No new medication/supplements affecting INR    Continues to tolerate warfarin with no reported s/s of bleeding or thromboembolism     Previous INR was Therapeutic      Warfarin dosing instructions:  3.75 mg x 1 today then continue home regimen of 7.5 mg daily.        Warfarin interruption plan for Colonoscopy on 2/11/19.    Hx of PE/Atrial Fibrillation       Bridging requested by Dr. Baez see 1/2/19 encounter      Pre-Procedure:    Hold warfarin until after procedure starting: Thursday 2/7     Lovenox 100 mg subq Q 12 hrs (1 mg/kg Q 12 hr for CrCl >30)     Start Lovenox: Saturday 2/9 AM    Last dose of Lovenox prior to procedure: Patric 2/10 __       Post-Procedure:    Resume  warfarin if okay with provider doing procedure on night of procedure, 2/11 PM: 7.5 mg daily      Resume Lovenox ~ 24 hrs post procedure if okay with provider doing procedure. Call clinic prior to restarting if Polyps removed (Suggested restart ~ 48-72 hours post procedure) Continue until INR > 2.      Recheck INR 4-5 days after resuming warfarin       Spoke to Ryan and relayed above plan. Ryan verbalized understanding and repeated back instructions.    Enoxaparin (Lovenox) education provided. Reviewed: role of enoxaparin in bridge therapy, prescribed enoxaparin dose and frequency, recommended injection site and site rotation, monitoring for signs and symptoms of bruising and bleeding and monitoring for signs and symptoms of clotting.   ?   Jessica Orosco, PharmD    Subjective/Objective:      Prem Taylor, jessica 68 y.o. male    Wt Readings from Last 3 Encounters:   01/03/19 (!) 232 lb (105.2 kg)   11/15/18 (!) 223 lb (101.2 kg)   11/01/18 (!) 223 lb (101.2 kg)       Lab Results   Component Value Date    INR 3.90 (H) 01/31/2019    INR 2.90 (H) 12/20/2018    INR 2.80 (H) 11/08/2018     Estimated CrCl: 114 ml/min (using adjusted weight  90 kg and creatinine rounded to 0.8 for age > 65)

## 2021-06-23 NOTE — TELEPHONE ENCOUNTER
Who is calling:  patient  Reason for Call:  Patient was calling to give [facility] team on his condition.   Patient states he had his colonoscopy today, 2/11/19.  GI provider only removed one polyp with no issue. And the provider told him there was no bleeding issue.    Also patient states the GI provider did confirm the blood thinner plan previous discussed.    Date of last appointment with primary care: 02/08/19  Has the patient been recently seen:  Yes  Okay to leave a detailed message: Yes

## 2021-06-23 NOTE — PROGRESS NOTES
Assessment and Plan:       1. Encounter for general adult medical examination with abnormal findings  Annual wellness visit completed.  PSA screen completed.  Shingrix immunization booster per patient request after reviewing part D coverage through Medicare and patient stating prior Shingrix immunization covered.  Risks associated with suboptimal diet.  Ensure completion of advance directives.  - PSA (Prostatic-Specific Antigen), Annual Screen  - Varicella Zoster, Recombinant Vaccine IM    2. Overweight (BMI 25.0-29.9)  Overweight status.  Weight goal less than 220 pounds initially, less than 210 pounds ideally through dietary and exercise modifications.    3. Persistent atrial fibrillation (H)  Persistent atrial fibrillation.  Rate controlled.  Continues warfarin 7.5 mg daily typically.  Holding currently due to upcoming colonoscopy February 11, 2019 with bridging Lovenox being utilized.  - Basic Metabolic Panel    4. Hypercholesterolemia  Check lipid cascade today while fasting.  Diet modifications reviewed with weight goal is as noted above.  - Lipid Cascade    5. Pulmonary embolism, bilateral (H)  Pulmonary emboli bilateral diagnosed October 2017 and continues chronic warfarin anticoagulation.    6. BPH without urinary obstruction  Mild BPH without urinary obstruction.    7. Tubular adenoma of colon  History of tubular adenoma on colonoscopy January 30, 2014 and will repeat Monday, February 11, 2019 as noted.    8. Normochromic normocytic anemia  Repeat CBC to ensure stable or improved mild anemia.  - HM2(CBC w/o Differential)    9. Mitral valve insufficiency, unspecified etiology  History of mitral valve insufficiency.  Is scheduled to have repeat echocardiogram May 2019 prior to follow-up office visit with Dr. Xavier.    10. Anxiety  Anxiety, stable.  Continues escitalopram 10 mg daily which had been increased at prior office visit.  PHQ 9 questionnaire 0 out of 27.    11. ALISE on CPAP  Recent diagnosis of  "mild ALISE now on CPAP 6 cm water pressure.    12. Encounter for screening for malignant neoplasm of prostate   PSA screen completed per age criteria.  - PSA (Prostatic-Specific Antigen), Annual Screen    13. Chronic cough  Chronic cough.  Followed by pulmonologist.  Pulmonary function tests and x-ray findings \"good\".  Told to use Claritin and Flonase with some benefit identified.    14.  Left inguinal hernia, small, reducible      The patient's current medical problems were reviewed.    I have had an Advance Directives discussion with the patient.  The following high BMI interventions were performed this visit: encouragement to exercise, weight monitoring, weight loss from baseline weight and lifestyle education regarding diet.  Ensure ongoing efforts to achieve weight goal < 220 pounds initially, < 210 pounds ideally.     The following health maintenance schedule was reviewed with the patient and provided in printed form in the after visit summary:   Health Maintenance   Topic Date Due     ZOSTER VACCINES (3 of 3) 01/29/2019     FALL RISK ASSESSMENT  02/08/2020     ADVANCE DIRECTIVES DISCUSSED WITH PATIENT  02/06/2023     COLONOSCOPY  01/30/2024     TD 18+ HE  01/03/2027     PNEUMOCOCCAL POLYSACCHARIDE VACCINE AGE 65 AND OVER  Completed     INFLUENZA VACCINE RULE BASED  Completed     PNEUMOCOCCAL CONJUGATE VACCINE FOR ADULTS (PCV13 OR PREVNAR)  Completed        Subjective:     Chief Complaint: Prem Taylor is an 68 y.o. male here for an Annual Wellness visit.     HPI: Patient seen today for annual wellness visit.  In general doing well.  History of persistent atrial fibrillation.  Remains on warfarin 7.5 mg daily.  History of bilateral pulmonary emboli diagnosed October 2017 and continues chronic anticoagulation.  Some shortness of breath intermittent with activity question secondary to atrial fibrillation.  Pulmonary workup with chronic cough appeared unremarkable regarding pulmonary function testing, x-ray, " etc. and was told to use Claritin and Flonase for chronic cough.  Will follow with Dr. Xavier on annual basis and will follow up in May 2019 with echocardiogram to be repeated prior to that visit.  Diet controlled cholesterol elevation.  Snoring history.  Completed sleep study.  Mild obstructive sleep apnea.  Started on CPAP 6 cm water pressure.  Sleeping better with this.  History of mild normochromic normocytic anemia.    Review of Systems:  Please see above.  The rest of the review of systems are negative for all systems.    Patient Care Team:  Mamadou Baez MD as PCP - Gerardo Weaver MD as Physician (Cardiology)  Miky Lindsey MD (Hematology and Oncology)     Patient Active Problem List   Diagnosis     Hearing Loss     Hypercholesterolemia     Presbycusis     Sinus bradycardia     BPH without urinary obstruction     Atypical Chest Pain     Blood Pressure Isolated Elevated     Male erectile dysfunction     Basal cell carcinoma, ear     Erectile dysfunction, unspecified erectile dysfunction type     Overweight (BMI 25.0-29.9)     Tubular adenoma of colon     Hyperbilirubinemia     Pulmonary embolism, bilateral (H)     Persistent atrial fibrillation (H)     Mitral valve insufficiency     ALISE on CPAP     Anxiety     Chronic cough     Normochromic normocytic anemia     Left inguinal hernia     Past Medical History:   Diagnosis Date     Atrial fibrillation (H) 10/09/2017     Pulmonary embolism, blood-clot, obstetric 10/09/2017      Past Surgical History:   Procedure Laterality Date     CERVICAL FUSION  2001     HERNIA REPAIR  2005     TONSILLECTOMY      childhood      Family History   Problem Relation Age of Onset     Heart disease Mother      Hypertension Mother      Dementia Mother      Heart attack Father       Social History     Socioeconomic History     Marital status:      Spouse name: Not on file     Number of children: Not on file     Years of education: Not on file     Highest  "education level: Not on file   Social Needs     Financial resource strain: Not on file     Food insecurity - worry: Not on file     Food insecurity - inability: Not on file     Transportation needs - medical: Not on file     Transportation needs - non-medical: Not on file   Occupational History     Not on file   Tobacco Use     Smoking status: Former Smoker     Smokeless tobacco: Never Used   Substance and Sexual Activity     Alcohol use: Yes     Drug use: No     Sexual activity: Not on file   Other Topics Concern     Not on file   Social History Narrative     Not on file      Current Outpatient Medications   Medication Sig Dispense Refill     escitalopram oxalate (LEXAPRO) 10 MG tablet Take 1 tablet (10 mg total) by mouth daily. 90 tablet 3     fluticasone (FLONASE) 50 mcg/actuation nasal spray 1 spray per nare twice daily. 48 g 3     loratadine (CLARITIN) 10 mg tablet Take 1 tablet (10 mg total) by mouth daily. 90 tablet 3     metoprolol tartrate (LOPRESSOR) 25 MG tablet Take 0.5 tablets (12.5 mg total) by mouth 2 (two) times a day.  0     MULTIVITAMIN (MULTIPLE VITAMIN ORAL) Take 1 tablet by mouth daily.       sildenafil (VIAGRA) 100 MG tablet Take 50 mg by mouth. One hour prior to intercourse.       enoxaparin (LOVENOX) 100 mg/mL Syrg Inject 1 mL (100 mg total) under the skin every 12 (twelve) hours. Before and after procedure as directed 16 Syringe 0     famotidine (PEPCID) 40 MG tablet Take 1 tablet (40 mg total) by mouth at bedtime. 30 tablet 11     warfarin (COUMADIN/JANTOVEN) 7.5 MG tablet Take 1 tablet (7.5 mg) by mouth daily. Adjust dose per INR results as instructed. 90 tablet 1     No current facility-administered medications for this visit.       Objective:   Vital Signs:   Visit Vitals  /80   Pulse (!) 58   Ht 6' 2.75\" (1.899 m)   Wt (!) 229 lb (103.9 kg)   SpO2 99%   BMI 28.81 kg/m         VisionScreening:  No exam data present     PHYSICAL EXAM    General Appearance:    Alert, cooperative, no " distress, appears stated age.  Overweight.   Head:    Normocephalic, without obvious abnormality, atraumatic   Eyes:    PERRL, conjunctiva/corneas clear, EOM's intact, fundi     benign, both eyes.  Glasses.        Ears:    Normal TM's and external ear canals, both ears.  Cerumen impaction bilateral removed with curettes.   Nose:   Nares normal, septum midline, mucosa normal, no drainage    or sinus tenderness   Throat:   Lips, mucosa, and tongue normal; teeth and gums normal   Neck:   Supple, symmetrical, trachea midline, no adenopathy;        thyroid:  No enlargement/tenderness/nodules; no carotid    bruit or JVD   Back:     Symmetric, no curvature, ROM normal, no CVA tenderness   Lungs:     Clear to auscultation bilaterally, respirations unlabored   Chest wall:    No tenderness or deformity   Heart:    Regular rate and rhythm, S1 and S2 normal, no murmur, rub   or gallop   Abdomen:     Soft, non-tender, bowel sounds active all four quadrants,     no masses, no organomegaly.     Genitalia:    Normal male without lesion, discharge or tenderness.  Small left inguinal hernia noted, reducible.   Rectal:    Normal tone.  Prostate mildly enlarged/symmetric, no masses or tenderness.   Extremities:   Extremities normal, atraumatic, no cyanosis or edema   Pulses:   2+ and symmetric all extremities   Skin:   Skin color, texture, turgor normal, no rashes or lesions   Lymph nodes:   Cervical, supraclavicular, and axillary nodes normal   Neurologic:   CNII-XII intact. Normal strength, sensation and reflexes       throughout        Assessment Results 2/8/2019   Activities of Daily Living No help needed   Instrumental Activities of Daily Living No help needed   Get Up and Go Score Less than 12 seconds   Mini Cog Total Score 5   Some recent data might be hidden     A Mini-Cog score of 0-2 suggests the possibility of dementia, score of 3-5 suggests no dementia    Identified Health Risks:     The patient reports that he drinks more  than one alcoholic drink per day but denies binge or excessive drinking. He was counseled and given information about possible harmful effects of excessive alcohol intake.  The patient was counseled and encouraged to consider modifying their diet and eating habits. He was provided with information on recommended healthy diet options.  The patient was provided with written information regarding signs of hearing loss.  Patient's advanced directive was discussed and I am comfortable with the patient's wishes.      Echo 11/15/17 Summary       No previous study for comparison.    Left ventricle ejection fraction is normal. The estimated left ventricular ejection fraction is 55%.    Moderate mitral regurgitation    No left atrial appendage thrombus seen

## 2021-06-24 NOTE — TELEPHONE ENCOUNTER
ANTICOAGULATION  MANAGEMENT    Assessment     Today's INR result of 2.1 is Therapeutic (goal INR of 2.0-3.0)        Warfarin taken as previously instructed    No new diet changes affecting INR    No new medication/supplements affecting INR    Continues to tolerate warfarin with no reported s/s of bleeding or thromboembolism     Previous INR was Subtherapeutic    Plan:     Spoke with Prem regarding INR result and instructed:     Warfarin Dosing Instructions:  Continue current warfarin dose 7.5 mg daily.    Discontinue Lovenox today (see orders in 1/2 encounter)    Instructed patient to follow up no later than: 2 weeks.     Education provided: importance of taking warfarin as instructed    Ryan verbalizes understanding and agrees to warfarin dosing plan.    Instructed to call the [facility] for any changes, questions or concerns. (#[phone])   ?   Morales Tapia RN    Subjective/Objective:      Prem Taylor, a 68 y.o. male is on warfarin.     Prem reports:     Home warfarin dose: verbally confirmed home dose with pt and updated on anticoagulation calendar     Missed doses: No     Medication changes:  No     S/S of bleeding or thromboembolism:  No     New Injury or illness:  No     Changes in diet or alcohol consumption:  No     Upcoming surgery, procedure or cardioversion:  No    Anticoagulation Episode Summary     Current INR goal:   2.0-3.0   TTR:   79.9 % (1.3 y)   Next INR check:   3/5/2019   INR from last check:   2.10 (2/19/2019)   Weekly max warfarin dose:      Target end date:      INR check location:      Preferred lab:      Send INR reminders to:   ANTICOAGULATION POOL B (MPW,HUG,STW,RVL,OAK,RLN)    Indications    Pulmonary embolism  bilateral (H) [I26.99]           Comments:            Anticoagulation Care Providers     Provider Role Specialty Phone number    Mamadou Baez MD Referring Family Medicine [phone]

## 2021-06-24 NOTE — TELEPHONE ENCOUNTER
Patient Returning Call  Reason for call:  Patient returned missed call. Attempted to transfer call but went to [facility].  Information relayed to patient:  Please call patient when available.  Patient has additional questions:  No  If YES, what are your questions/concerns:  n/a  Okay to leave a detailed message?: Yes

## 2021-06-24 NOTE — TELEPHONE ENCOUNTER
ANTICOAGULATION  MANAGEMENT    Assessment     Today's INR result of 1.4 is Subtherapeutic (goal INR of 2.0-3.0)        Warfarin recently held as instructed which may be affecting INR. Resumed warfarin on 2/11 after procedure.    No new diet changes affecting INR    No new medication/supplements affecting INR    Continues to tolerate warfarin with no reported s/s of bleeding or thromboembolism     Previous INR was Supratherapeutic    Plan:     Spoke with Prem regarding INR result and instructed:     Warfarin Dosing Instructions:  Take booster dose of 11.25 mg today only then continue current warfarin dose 7.5 mg daily.  Continue Lovenox q12h.    Instructed patient to follow up no later than: Tues 2/19. Appt made.     Education provided: importance of taking warfarin as instructed    Prem verbalizes understanding and agrees to warfarin dosing plan.    Instructed to call the [facility] for any changes, questions or concerns. (#[phone])   ?   Morales Tapia RN    Subjective/Objective:      Prem Taylor, a 68 y.o. male is on warfarin.     Prem reports:     Home warfarin dose: verbally confirmed home dose with pt and updated on anticoagulation calendar     Missed doses: No     Medication changes:  No     S/S of bleeding or thromboembolism:  No     New Injury or illness:  No     Changes in diet or alcohol consumption:  No     Upcoming surgery, procedure or cardioversion:  No    Anticoagulation Episode Summary     Current INR goal:   2.0-3.0   TTR:   80.5 % (1.3 y)   Next INR check:   2/19/2019   INR from last check:   1.40! (2/15/2019)   Weekly max warfarin dose:      Target end date:      INR check location:      Preferred lab:      Send INR reminders to:   ANTICOAGULATION POOL B (MPW,HUG,STW,RVL,OAK,RLN)    Indications    Pulmonary embolism  bilateral (H) [I26.99]           Comments:            Anticoagulation Care Providers     Provider Role Specialty Phone number    Mamadou Baez MD Referring  Family Medicine [phone]

## 2021-06-24 NOTE — TELEPHONE ENCOUNTER
ANTICOAGULATION  MANAGEMENT    Assessment     Today's INR result of 2.3 is Therapeutic (goal INR of 2.0-3.0)        Warfarin taken as previously instructed    No new diet changes affecting INR    No new medication/supplements affecting INR    Continues to tolerate warfarin with no reported s/s of bleeding or thromboembolism     Previous INR was Therapeutic    Plan:     Spoke with Prem regarding INR result and instructed:     Warfarin Dosing Instructions:  Continue current warfarin dose 7.5 mg daily.    Instructed patient to follow up no later than: 4 weeks.    Education provided: importance of taking warfarin as instructed    Ryan verbalizes understanding and agrees to warfarin dosing plan.    Instructed to call the [facility] for any changes, questions or concerns. (#[phone])   ?   Morales Tapia RN    Subjective/Objective:      Prem Taylor, a 68 y.o. male is on warfarin.     Prem reports:     Home warfarin dose: verbally confirmed home dose with pt and updated on anticoagulation calendar     Missed doses: No     Medication changes:  No     S/S of bleeding or thromboembolism:  No     New Injury or illness:  No     Changes in diet or alcohol consumption:  No     Upcoming surgery, procedure or cardioversion:  No    Anticoagulation Episode Summary     Current INR goal:   2.0-3.0   TTR:   80.4 % (1.4 y)   Next INR check:   4/1/2019   INR from last check:   2.30 (3/4/2019)   Weekly max warfarin dose:      Target end date:      INR check location:      Preferred lab:      Send INR reminders to:   ANTICOAGULATION POOL B (MPW,HUG,STW,RVL,OAK,RLN)    Indications    Pulmonary embolism  bilateral (H) [I26.99]           Comments:            Anticoagulation Care Providers     Provider Role Specialty Phone number    Mamadou Baez MD Referring Family Medicine [phone]

## 2021-06-25 NOTE — TELEPHONE ENCOUNTER
ANTICOAGULATION  MANAGEMENT    Assessment     Today's INR result of 2.0 is Therapeutic (goal INR of 2.0-3.0)        Warfarin taken as previously instructed    No new diet changes affecting INR    No new medication/supplements affecting INR    Continues to tolerate warfarin with no reported s/s of bleeding or thromboembolism     Previous INR was Therapeutic    Plan:     Spoke on phone with Ryan regarding INR result and instructed:      Warfarin Dosing Instructions:  Continue current warfarin dose 7.5 mg daily  (0 % change)    Instructed patient to follow up no later than: 6 weeks-- scheduled for     Education provided: importance of consistent vitamin K intake, impact of vitamin K foods on INR, vitamin K content of foods, potential interaction between warfarin and alcohol, target INR goal and significance of current INR result, potential interaction between warfarin and certain medications such as antibiotics, importance of notifying clinic for changes in medications and importance of notifying clinic for diarrhea, nausea/vomiting, reduced intake and/or illness    Ryan verbalizes understanding and agrees to warfarin dosing plan.    Instructed to call the [facility] for any changes, questions or concerns. (#[phone])   ?   Elsie Stringer RN    Subjective/Objective:      Prem Taylor, a 70 y.o. male is on warfarin.       Ryan reports:     Home warfarin dose: as updated on anticoagulation calendar per template     Missed doses: No     Medication changes:  No     S/S of bleeding or thromboembolism:  No     New Injury or illness:  No     Changes in diet or alcohol consumption:  No     Upcoming surgery, procedure or cardioversion:  No    Anticoagulation Episode Summary     Current INR goal:  2.0-3.0   TTR:  86.6 % (1 y)   Next INR check:  7/27/2021   INR from last check:  2.00 (6/15/2021)   Weekly max warfarin dose:     Target end date:     INR check location:     Preferred lab:     [facility]    Indications    Pulmonary embolism  bilateral (H) [I26.99]           Comments:           Anticoagulation Care Providers     Provider Role Specialty Phone number    Mamadou Baez MD Referring Family Medicine [phone]

## 2021-06-26 ENCOUNTER — HEALTH MAINTENANCE LETTER (OUTPATIENT)
Age: 71
End: 2021-06-26

## 2021-06-26 NOTE — PATIENT INSTRUCTIONS - HE
1. We will check a 24-hour Holter monitor to look for excessive heart rate slowing that may warrant pacemaker placement.  Active while you are wearing the monitor, and complete the diary when you are active or for symptoms.  2. A stress test may be ordered after the results of the heart rate monitor  3. No medication changes today.

## 2021-06-28 NOTE — PROGRESS NOTES
Progress Notes by Mauricio Brunson PA-C at 9/10/2019 12:30 PM     Author: Mauricio Brunson PA-C Service: -- Author Type: Physician Assistant    Filed: 9/12/2019  9:27 AM Encounter Date: 9/10/2019 Status: Addendum    : Mauricio Brunson PA-C (Physician Assistant)    Related Notes: Original Note by Mauricio Brunson PA-C (Physician Assistant) filed at 9/10/2019  2:10 PM        Zestar Study    Physical Examination  For abnormal findings, please evaluate if the finding is Clinically Significant (by 'CS') or Not Clinically Significant (by 'NCS')  General Appearance Normal  Head and Neck  Normal  Lungs    Normal  Cardiovascular  Abnormal- irr irr (NCS)  Abdomen   Normal  Musculoskeletal/Extremities Normal   Lymph Nodes  Normal  Skin    Normal  Neurological   Normal   Tremor absent     If present, evaluate severity on 1-10 scale    Mauricio Brunson PA-C

## 2021-06-28 NOTE — PROGRESS NOTES
Progress Notes by Nery Gary RN at 9/10/2019 12:30 PM     Author: Nery Gary RN Service: -- Author Type: Registered Nurse    Filed: 10/3/2019  9:07 AM Encounter Date: 9/10/2019 Status: Addendum    : Nery Gary RN (Registered Nurse)    Related Notes: Original Note by Nery Gary RN (Registered Nurse) filed at 9/10/2019  2:10 PM             Zestar Study Consent Visit    Study description: ECG and PPG Study: Zestar Study      Note time seated: 1235     Prem Taylor a 69 y.o. male , was seen in 2500 today to discuss participation in the Zestar study.   The consent discussion began on  09/10/[id number], by Nery Gary.  The consent form was reviewed with the patient.     The review of the study included:    Study purpose     Conflict of interest    Device description      Study visits    Risks of participation    Benefits (if any)    Alternatives    Voluntary participation    Confidentiality     Compensation/costs of participation    Study stipends    Injury and legal rights    The subject was provided time to review the consent form and consider participation. his questions were answered to his satisfaction.   The patient has voluntarily agreed to participate in the above noted study.      The consent form version 08/06/2019 and HIPPA form version 06/11/2019 was signed 09/10/19 at 1255.    The subject was provided with a copy of the consent form and HIPPA. A copy of the signed forms was forwarded to medical records.    No study procedures were done prior to Prem Taylor providing informed consent.     Nery Gary RN    Subject Restrictions During Study -Confirmed with Subject prior to any study procedures completed    Restrictions on jewelry, recreational drugs, caffeine, and exercise few days prior and during study.   1. Subjects should not consume excessive amount of caffeine (6 or more 8-oz cups of coffee, or more than 570 mg of caffeine from energy drinks, pills or similar substance) during their  participation in the study.   2. Subjects should not consume excessive amount of alcohol for the duration of their participation in the study. A typical moderate amount is allowed during stage 3.   3. Subjects should not take any recreational drugs (including, but not limited to methamphetamines, cocaine, opioids, cannabis, LSD) for the duration of their participation in the study.   4. Subjects should not wear underwire bra or jewelry during the in-lab study (to not interfere with electrode placement and ECG data recordings).   5. Subject will not be permitted to have their cell phone or any electronic recording device on or with them during the in-lab test session(s).   6. Subjects under 22 years old will not be permitted to take ECG recordings through the ECG hugo on the wrist-worn devices.     For study stage 3 only   1. Subjects should only do high intensity exercise (e.g. sprinting, heavy lifting, etc.) in the morning upon awakening or else not at all   2. Subjects should abstain from swimming during the time of the study   3. Subjects should only shower in the morning upon awakening (or else not at all)   4. Female subjects are strongly suggested to wear non-underwire bras throughout this stage of the study     Nery Gary RN      Study Data collections   Vitals  (TPBP)     Vitals:    09/10/19 1304 09/10/19 1307 09/10/19 1308   BP: (!) 121/95 118/84 124/80   Patient Site: Right Arm Right Arm Right Arm   Patient Position: Sitting Sitting Sitting   Cuff Size: Adult Regular Adult Regular Adult Regular   Pulse: 72 83 72   Temp: 97.9  F (36.6  C)        VS taken after 5 min rest     MAP 1    100  MAP 2      89   MAP 3             88        There is no height or weight on file to calculate BMI.  male  1950  69 y.o.  .    Note time patient placed in supine position:1305.     Ethnicity   [x]   or    []  Not  or   Race   []   or    []    []  Black or  "  []   or Other   [x]  White  Physical Activity Level  per subject report:   []  0- Extremely Inactive []  1- Sedentary []  2- Moderately Active  [x]  3- Vigorously Active []  4- Extremely Active  Trained Athlete   [] Yes  [x] No     Rosa's' Skin type   [] Type 1 [] Type 2 [x] Type 3    [] Type 4 [] Type 5 [] Type 6    Subject participated in previous ECG study at [facility]: [] Yes    [x] No    Past Medical History:   Diagnosis Date   ? Anemia    ? Anxiety    ? Atrial fibrillation (H) 10/09/2017   ? Cancer (H)    ? Depression    ? History of blood clots    ? Pulmonary embolism, blood-clot, obstetric 10/09/2017   ? Sleep apnea     Uses CPAP       HISTORY OF HEART RHYTHM ABNORMALITIES   []  None   []  Atrial Flutter  [] Frequent PACs (>3 in 30 secs)  [] AF (permanent)  []  Tachycardia  [] Bigeminy, trigeminy, and/or quadgeminy  [x]  AF (persistent)  []  Heart Block   []  AF (paroxysmal)  [] PVCs    [] AF (other)    [] BBB    []  Other:   How many years? 23 months  Special interest allergies: active allergic skin reactions  No Known Allergies    Current Outpatient Medications:   ?  escitalopram oxalate (LEXAPRO) 10 MG tablet, TAKE ONE TABLET BY MOUTH EVERY DAY, Disp: 90 tablet, Rfl: 2  ?  MULTIVITAMIN (MULTIPLE VITAMIN ORAL), Take 1 tablet by mouth daily., Disp: , Rfl:   ?  warfarin (COUMADIN/JANTOVEN) 7.5 MG tablet, TAKE ONE TABLET BY MOUTH EVERY DAY . ADJUST DOSE PER INR RESULTS AS INSTRUCTED, Disp: 90 tablet, Rfl: 1      10-sec 12-lead ECG & 30-sec 12-lead ECG rhythm strip done; reviewed by & PE done by Mauricio Shukla.  Subject Questionnaire    OCCUPATION: Retired/Dental technician   Predominately works outdoors  [] Yes    [x] No      Hours/week spent outdoors (total, not only for work): 12 hr/wk    Frequently participates in \"hand intensive\" activities [] Yes [x] No  Caffeine  []  Never  [] Occasionally        []  Daily (1 cup/day)     [x]  Daily (>1 " "cup/day)    Alcohol   []  Never  [] Light (drink or 2 occasionally) [x]  Moderate (a drink or 2 almost daily)   []  Occasional-heavy (more than a few drinks <2x / month)  [] Heavy (more than a few drinks >2x / month)    Tobacco/nicotine  [x]  Never  [] Rarely  []  Frequently/ Daily     Mattress Information  [] Subject did not participate in Stage 3  Mattress type:  [x]  Memory foam [] Gel  [] Innerspring (coil)  [] Airbed  [] Waterbed [] Shikibuton  [] Hybrid  [] No mattress  [] Other    Mattress foundation   [] Mattress on floor/ground    [x] Mattress on foundation/box spring on floor/ground  [] Mattress on foundation/box spring on bed frame  [] Mattress on tatami on floor/ground  [] Other    Mattress topper   [x] No mattress topper  [] Pillow top  [] Foam top - flat style  [] Foam top - \"egg crate\" style  [] Other    Co-sleeper    [x]  Yes  []  No    CPAP use   [x] Yes  [] No      Dominant hand [] left  [x] right [] ambidextrous  Preferred Wrist to wear band on   [x]  left   []  right   Were screening day & study day: [x]  same [] different   Same: wrist circumference:      170   mm   Device wearing wrist skin fold thickness:       3.8  mm  Wrist Band Size:     [x]  Flush Fit S/M  []  Non-Flush Fit S/M   []  Flush Fit M/L  []  Non-Flush Fit M/L    Preferred/natural band notch: 5  Secure band notch: 5  Crown orientation:  []  left   [x]  right  Device wearing wrist hairiness:     []  Light [x]  Medium       []  Heavy  Spectophotometer    L  A  B   Reading #1  52.89  11.21  19.38   Reading #2  53.94  11.38  18.42   Reading #3  53.63  12.07  19.24     Pregnancy test  for WOCBP    [x] n/a male or female not child bearing potential  Room Temperature ( C): 24  CS Laptop ID: [id number]  CS Cam ID:[id number]  Device Set ID:[id number]  Wrist Device ID:[id number]  Subject has now completed their in-house participation in the Zestar study. Subject will complete Stage 3 at home for the next 3 days and return the equipment on 09/13/2019.  Nery AUGUSTIN " YOSEF Gary

## 2021-06-28 NOTE — PROGRESS NOTES
Progress Notes by Sera Kothari at 9/13/2019  9:14 AM     Author: Sera Kothari Service: -- Author Type: Research Associate    Filed: 9/13/2019  9:15 AM Encounter Date: 9/13/2019 Status: Signed    : Sera Kothari (Research Associate)         Zestar Study Device return    Prem Taylor returned all the devices for the Zestar study.  Prem FALCON Claudia denies medication changes or adverse events since last visit.    Prem FALCON Claudia has now completed their participation in the Zestar study.   Thank you for your gift of participation.    Sera Kothari

## 2021-06-28 NOTE — PROGRESS NOTES
Progress Notes by Kimberley Mckeon RN at 11/25/2019 12:30 PM     Author: Kimberley Mckeon RN Service: -- Author Type: Registered Nurse    Filed: 11/29/2019 12:35 PM Encounter Date: 11/25/2019 Status: Signed    : Yoni Larson MD (Physician)    Related Notes: Original Note by Kimberley Mckeon RN (Registered Nurse) filed at 11/25/2019  2:08 PM           [facility] Study Inclusion / Exclusion Criteria  Protocol Version 3.0 ([id number])    Inclusion Criteria    Yes No Criteria # Subject must meet all inclusion criteria:      [x]    []  1 At least 18 years old for NSR and 22 years old for Non-NSR. Inclusive for both cohorts, at time of screening, with no upper limit on age.        [x]      []  2 To be enrolled as a non-NSR subject the volunteer must have one of the following conditions: Permanent/Persistent AF, Hx of paroxysmal AF, Hx of High-rate AF, AF + rate control medication, Hx Atrial Flutter, PVC burden >1%, Frequent PACs, Hx BBB, Hx 2nd degree block (any type), Hx Bigeminy/Trigeminy/Quadgeminy, Hx Tachycardia. For subjects with any of the following diagnoses, the condition must be present at the time of screening:   ? Permanent/persistent AF  ? PVC [place]  ? Frequent PACs   Note: If not present at screening, subjects may not be enrolled as a non-NSR subject or NSR subject.         3  [x] N/A HE site For Subjects to be enrolled as NSR they must be in NSR at the time of screening as determined by the investigator. For subjects ?65 years old with PVC burden of ?1% or infrequent PACs (<3 ectopic beats in 30 seconds), they may qualify as individuals in the NSR cohort as long as they don't have a medical history/diagnosis of significant ectopic burden. For subjects ? 66 years old with PVC burden > 1% but ?10%, they may qualify as individuals in the NSR cohort as long as they don't have a medical history/diagnosis of significant ectopic burden.    [x]  []  4 Able to read and understand a written ICF    [x]  []  5 Willing and  able to participate in the study procedures and comply with its restrictions    [x]  []  6 Able to communicate effectively with study staff as well as understand and follow directions    All must be Yes    Exclusion Criteria-all must be no  Yes No Criteria # Subject must not meet any exclusion criteria:    []  [x]  1 Physical disability that prevents safe and adequate testing.   []  [x]  2 Pregnant women or women planning to become pregnant   []  [x]  3 Any acute illness or condition that may interfere with study procedures (e.g. cough, fever, sore throat, headache, sunburn, etc.)   []  [x]  4 Clinically significant hand tremors, as judged by the Investigator   []  [x]  5 Resting hypertension with systolic blood pressure ?161 mmHg or diastolic blood pressure ?101 mmHg (if at least 2 of 3 measurements meet this criteria)   []  [x]  6 Subjects with implanted cardiac devices such as a pacemaker or an automated implantable cardioverter- defibrillator (AICD)   []  [x]  7 Acute myocardial infarction (MI) within 90 days from the screening visit   []  [x]  8 Other cardiovascular disease that increases the risk to the subject or would render the data uninterpretable in the opinion of the Investigator (e.g., recent or ongoing unstable angina, significant valvular heart disease or chronic heart failure, myocarditis or pericarditis)    []  [x]  9 Acute pulmonary embolism, pulmonary infarction, or deep vein thrombosis within 90 days from the screening visit    []  [x]  10 Stroke or transient ischemic attack within 90 days from the screening visit    []  [x]  11 Known untreated medical conditions as determined by the Investigator, such as but not limited to significant anemia, important electrolyte imbalance and untreated or uncontrolled thyroid disease.    []  [x]  12 Any history of wrist surgery with scarring in the area of the sensor location on the wrist where the subject will be wearing the watch;    []  [x]  13 Open  wound(s) on the wrist and forearm where the subject will be wearing the watch    []  [x]  14 Severe symptomatic (or active) overly dry/injured skin, skin disorders, or allergic skin reactions such as eczema, rosacea, impetigo, dermatomyositis or allergic contact dermatitis on wrist and locations where the electrodes will be placed (e.g. chest, forearms, stomach), as determined by the investigator.    []  [x]  15 Tattoos, scars or moles in the area of the sensor location on the wrist where the subject will be wearing the watch    []  [x]  16 Device wearing Wrist circumference ? 129 mm or ? 246 mm    []  [x]  17 Known significant sensitivity to medical adhesives or isopropyl alcohol (for ECG electrode placement)    []  [x]  18 Known allergy or sensitivity to fluorocarbon-based synthetic rubber, such as contact dermatitis with fluoroelastomer bands primarily used in wrist worn fitness devices    []  [x]  19 Subjects with any Medical History, Physical exam, vital sign or any other study procedure finding/assessment that in the opinion of the investigator could compromise subject safety during study participation or interfere with the study integrity and/or the accurate assessment of the study objectives    []  [x]  20 Subject works for a company that develops or sells medical and/or fitness devices (e.g., ECG monitors, wearable fitness bands, sleep monitors, etc.) or are technology journalists (e.g., professional bloggers, TV, magazine, newspaper reporters, etc.)    []  [x]  21 Weight > 181 kg for subjects using the stationary bike and/or treadmill. Weight of ?138 kg for NSR subjects.    []  [x]  22 Subject is employed in shift work, or otherwise does not maintain a reasonably consistent day/night schedule (e.g. Subjects who go to bed after 4am).    []  [x]  23 Overnight travel planned during data collection nights    []  [x]  24 Non-NSR subjects should not have partaken in strenuous physical activity within 12 hours  prior to screening    []  [x]  25 Non-NSR subjects with Atrial fibrillation categories: Subjects taking Class 1 or Class 3 antiarrhythmic agents such as the following may not take part in any stage of the study: amiodarone, sotalol, dronedarone, ibutilide, dofetilide, propafenone, quinidine, procainamide, disopyramide, flecainide (Subjects taking class 2, 4 or 5 antiarrhythmic agents may take part the study).    []  [x]  26 Subjects who have both a history of paroxysmal AF and a Rosa skin type measurement of VI    []  [x]  27 Subjects who have missing index fingers on both hands      Subject has met all inclusion criteria and no exclusion criteria have been met.   Subject is ready to fully enrolled in the Zestar study.    Kimberley Mckeon RN

## 2021-06-28 NOTE — PROGRESS NOTES
Progress Notes by Nery Gary RN at 9/10/2019 12:30 PM     Author: Nery Gary RN Service: -- Author Type: Registered Nurse    Filed: 9/12/2019 10:31 AM Encounter Date: 9/10/2019 Status: Signed    : Yoni Larson MD (Physician)    Related Notes: Original Note by Nery Gary RN (Registered Nurse) filed at 9/10/2019  2:12 PM           [facility] Study Inclusion / Exclusion Criteria  Protocol Version #1    Inclusion Criteria    Yes No Criteria # Subject must meet all inclusion criteria:      [x]    []  1 At least 18 years old for NSR and 22 years old for Non-NSR. Inclusive for both cohorts, at time of screening, with no upper limit on age.        [x]      []  2 To be enrolled as a non-NSR subject the volunteer must have one of the following conditions: Permanent/Persistent AF, Hx of paroxysmal AF, Hx of High-rate AF, AF + rate control medication, Hx Atrial Flutter, PVC burden >1%, Frequent PACs, Hx BBB, Hx 2nd degree block (any type), Hx Bigeminy/Trigeminy/Quadgeminy, Hx Tachycardia. For subjects with any of the following diagnoses, the condition must be present at the time of screening:   ? Permanent/persistent AF  ? AF plus rate control medication  ? PVC [place]  ? Frequent PACs   Note: If not present at screening, subjects may not be enrolled as a non-NSR subject or NSR subject.         3  [x] N/A HE site For Subjects to be enrolled as NSR they must be in NSR at the time of screening as determined by the investigator. For subjects with occasional ectopic beats (<1% burden during screening), they should still qualify as individuals in the NSR cohort as long as they don't have a medical history/diagnosis of significant ectopic burden.    [x]  []  4 Able to read and understand a written ICF    [x]  []  5 Willing and able to participate in the study procedures and comply with its restrictions    [x]  []  6 Able to communicate effectively with study staff as well as understand and follow directions    All must  be Yes    Exclusion Criteria-all must be no  Yes No Criteria # Subject must not meet any exclusion criteria:    []  [x]  1 Physical disability that prevents safe and adequate testing.   []  [x]  2 Pregnant women or women planning to become pregnant   []  [x]  3 Any acute illness or condition that may interfere with study procedures (e.g. cough, fever, sore throat, headache, sunburn, etc.)   []  [x]  4 Clinically significant hand tremors, as judged by the Investigator   []  [x]  5 Resting hypertension with systolic blood pressure ?161 mmHg or diastolic blood pressure ?101 mmHg (if at least 2 of 3 measurements meet this criteria)   []  [x]  6 Subjects with implanted cardiac devices such as a pacemaker or an automated implantable cardioverter- defibrillator (AICD)   []  [x]  7 Acute myocardial infarction (MI) within 90 days from the screening visit   []  [x]  8 Other cardiovascular disease that increases the risk to the subject or would render the data uninterpretable in the opinion of the Investigator (e.g., recent or ongoing unstable angina, significant valvular heart disease or chronic heart failure, myocarditis or pericarditis)    []  [x]  9 Acute pulmonary embolism, pulmonary infarction, or deep vein thrombosis within 90 days from the screening visit    []  [x]  10 Stroke or transient ischemic attack within 90 days from the screening visit    []  [x]  11 Known untreated medical conditions as determined by the Investigator, such as but not limited to significant anemia, important electrolyte imbalance and untreated or uncontrolled thyroid disease.    []  [x]  12 Any history of wrist surgery with scarring in the area of the sensor location on the wrist where the subject will be wearing the watch;    []  [x]  13 Open wound(s) on the wrist and forearm where the subject will be wearing the watch    []  [x]  14 Severe symptomatic (or active) overly dry/injured skin, skin disorders, or allergic skin reactions such as  eczema, rosacea, impetigo, dermatomyositis or allergic contact dermatitis on wrist and locations where the electrodes will be placed (e.g. chest, forearms, stomach), as determined by the investigator.    []  [x]  15 Tattoos, scars or moles in the area of the sensor location on the wrist where the subject will be wearing the watch    []  [x]  16 Device wearing Wrist circumference ? 129 mm or ? 246 mm    []  [x]  17 Known significant sensitivity to medical adhesives or isopropyl alcohol (for ECG electrode placement)    []  [x]  18 Known allergy or sensitivity to fluorocarbon-based synthetic rubber, such as contact dermatitis with fluoroelastomer bands primarily used in wrist worn fitness devices    []  [x]  19 Subjects with any Medical History, Physical exam, vital sign or any other study procedure finding/assessment that in the opinion of the investigator could compromise subject safety during study participation or interfere with the study integrity and/or the accurate assessment of the study objectives    []  [x]  20 Subject works for a company that develops or sells medical and/or fitness devices (e.g., ECG monitors, wearable fitness bands, sleep monitors, etc.) or are technology journalists (e.g., professional bloggers, TV, magazine, newspaper reporters, etc.)    []  [x]  21 Weight > 181 kg for subjects using the stationary bike and/or treadmill. Weight of ?138 kg for NSR subjects.    []  [x]  22 Subject is employed in shift work, or otherwise does not maintain a reasonably consistent day/night schedule (e.g. Subjects who go to bed after 4am).    []  [x]  23 Overnight travel planned during data collection nights    []  [x]  24 Non-NSR subjects should not have partaken in strenuous physical activity within 12 hours prior to screening    []  [x]  25 Non-NSR subjects with Atrial fibrillation categories: Subjects taking Class 1 or Class 3 antiarrhythmic agents such as the following may not take part in any stage of  the study: amiodarone, sotalol, dronedarone, ibutilide, dofetilide, propafenone, quinidine, procainamide, disopyramide, flecainide (Subjects taking class 2, 4 or 5 antiarrhythmic agents may take part the study).       Consent reviewed, patient fulfills inclusion criteria.  No exclusion criteria.    Nery Gary, RN

## 2021-06-28 NOTE — PROGRESS NOTES
Progress Notes by Kimberley Mckeon RN at 11/25/2019 12:30 PM     Author: Kimberley Mckeon RN Service: -- Author Type: Registered Nurse    Filed: 11/25/2019  2:08 PM Encounter Date: 11/25/2019 Status: Signed    : Kimberley Mckeon RN (Registered Nurse)       Zestar Study Consent Visit for returning patients    Study description: ECG and PPG Study: Zestar Study      Note time seated: 1237     Prem Taylor a 69 y.o. male , is here today to repeat Stage 1 and 2 for the Zestar study. Prem Taylor participated in the study previously and has been invited to return to repeat Stages 1 & 2.   The consent form was reviewed with the patient.     The review of the study included:    Study purpose     Conflict of interest    Device description      Study visits    Risks of participation    Benefits (if any)    Alternatives    Voluntary participation    Confidentiality     Compensation/costs of participation    Study stipends    Injury and legal rights    The subject was provided time to review the consent form and consider participation. his questions were answered to his satisfaction.   The patient has voluntarily agreed to participate in the above noted study.     The consent form version  was signed 11/25/19 at 1240    The subject was provided with a copy of the consent form for their records. A copy of the signed forms was forwarded to medical records.    No study procedures were done prior to Prem Taylor providing informed consent.     Kimberley Mckeon RN      Study Data collections   Vitals  (TPBP)     Vitals:    11/25/19 1247 11/25/19 1248   BP: 134/88    Pulse: 74    Resp: 20    Temp:  97.8  F (36.6  C)      VS taken after 5 min rest           MAP 1    97       Note time patient placed in supine position: 1250    Special interest allergies: active allergic skin reactions  No Known Allergies    Current Outpatient Medications:   ?  escitalopram oxalate (LEXAPRO) 10 MG tablet, TAKE ONE TABLET BY MOUTH EVERY DAY, Disp: 90  tablet, Rfl: 2  ?  MULTIVITAMIN (MULTIPLE VITAMIN ORAL), Take 1 tablet by mouth daily., Disp: , Rfl:   ?  predniSONE (DELTASONE) 10 mg tablet, Take 10 mg by mouth daily., Disp: 5 tablet, Rfl: 0  ?  warfarin (COUMADIN/JANTOVEN) 7.5 MG tablet, TAKE ONE TABLET BY MOUTH EVERY DAY . ADJUST DOSE PER INR RESULTS AS INSTRUCTED, Disp: 90 tablet, Rfl: 1      10-sec 12-lead ECG & 30-sec 12-lead ECG rhythm strip done; reviewed by & PE done by Aubree Méndez  Pregnancy test    [] WOCBP (age <55 yrs, no tubal ligation, no hysterectomy)    [x] n/a male or female not child bearing potential    For Stage 2: subject will use exercise bike for exercise portion of the study  Room Temperature: 23C  CS Laptop ID: [id number]  CS Cam ID: [id number]  Device Set ID: [id number]  Wrist Device ID: [id number]  Details of Stage 1 & 2 recorded on paper worksheet & available upon request.  Subject has now completed their participation in the Zestar study.   Kimberley Mckeon RN

## 2021-06-28 NOTE — PROGRESS NOTES
Progress Notes by Ursula Méndez CNP at 11/25/2019 12:30 PM     Author: Ursula Méndez CNP Service: -- Author Type: Nurse Practitioner    Filed: 11/25/2019  2:08 PM Encounter Date: 11/25/2019 Status: Signed    : Ursula Méndez CNP (Nurse Practitioner)        Zestar Study    Physical Examination  For abnormal findings, please evaluate if the finding is Clinically Significant (by 'CS') or Not Clinically Significant (by 'NCS')  General Appearance Normal  Head and Neck  Normal  Lungs    Normal  Cardiovascular  Abnormal- NCS irregular  Abdomen   Normal  Musculoskeletal/Extremities Normal   Lymph Nodes  Normal  Skin    Normal  Neurological   Normal   Tremor present NCS fine tremor +1/+1 hand     If present, evaluate severity on 1-10 scale    Ursula Méndez CNP

## 2021-06-29 NOTE — PROGRESS NOTES
"Progress Notes by Gerardo Velez MD at 6/5/2020  8:30 AM     Author: Gerardo Velez MD Service: -- Author Type: Physician    Filed: 6/5/2020  9:51 AM Encounter Date: 6/5/2020 Status: Signed    : Gerardo Velez MD (Physician)       The patient has been notified of following:     \"This telephone visit will be conducted via a call between you and your physician/provider. We have found that certain health care needs can be provided without the need for a physical exam.  This service lets us provide the care you need with a phone conversation.  If a prescription is necessary we can send it directly to your pharmacy.  If lab work is needed we can place an order for that and you can then stop by our lab to have the test done at a later time. If during the course of the call the physician/provider feels a telephone visit is not appropriate, you will not be charged for this service.\"     \"The patient has chosen to have the visit conducted as a telephone visit, to reduce risk of exposure given the current status of Coronavirus in our community. This telephone visit is being conducted via a call between the patient and physician/provider. Health care needs are being provided without a physical exam.\"       HEART CARE PHONE ENCOUNTER        Appointment is being conducted as a telephone visit, to reduce risk of exposure given the current status of Coronovirus in our community. This telephone visit is being conducted via a call between the patient and physician/provider. Health care needs are being provided without a physical exam.     Assessment/Recommendations   Assessment/Plan:    1. Permanent atrial fibrillation on no rate controlling agents.  Rate control adequate with improved activity tolerance off of metoprolol.  2. Mild to moderate mitral insufficiency by echo.  3. History of bilateral pulmonary emboli, on long-term warfarin.  4. Obstructive sleep apnea well compensated on CPAP      Follow Up Plan: " "Follow up in 1 year, or as needed.  Patient wishes to continue to follow-up here, as well as with Dr. Baez  I have reviewed the note as documented.  This accurately captures the substance of my conversation with the patient.    Total time of call between patient and provider was 12  minutes        History of Present Illness/Subjective    Prem Taylor is a 69 y.o. male who is being evaluated via a billable telephone visit.      He has a history of  hyperlipidemia, who presented 10/2017 with exertional shortness of breath.  He was found to have bilateral pulmonary emboli and new onset atrial fibrillation.  He was initiated on warfarin, along with metoprolol for rate control.  No DVT was identified.  Echocardiogram showed normal left and right ventricular function.  He subsequently underwent cardioversion, but had asymptomatic return to atrial fibrillation.  Since that time he has been maintained on a rate control strategy.    He was diagnosed with obstructive sleep apnea and uses CPAP on a nightly basis with good results.  He was scheduled for routine follow-up today.    He reports no alcohol use during the week, but \"makes up for it\" drinking about 8 alcoholic beverages on the weekend.  He denies any difficulties with alcohol abstinence.  He has had no awareness of palpitations, but when he checks his pulse he realizes that it is irregular.  His blood pressure cuff has not alerted him to irregularities in his pulse.  He monitors his blood pressure and it has been well controlled.    He was exercising at the [facility] regularly but with the COVID restrictions he is now been reduced to a daily walk.  His weight is down 2 pounds from when I saw him a year ago.     Echocardiogram 7/2019:    When compared to the previous study dated 11/15/2017, no significant change.    Left ventricle ejection fraction is normal. The calculated left ventricular ejection fraction is 54%. Mild concentric hypertrophy noted.    Normal left " ventricular size and systolic function.    Normal right ventricular size and systolic function.    Mild to moderate mitral regurgitation.    I have reviewed and updated the patient's Past Medical History, Social History, Family History and Medication List.     Physical Examination not perform given phone encounter Review of Systems                                                Medical History  Surgical History Family History Social History   Past Medical History:   Diagnosis Date   ? Anemia    ? Anxiety    ? Atrial fibrillation (H) 10/09/2017   ? Cancer (H)    ? CPAP (continuous positive airway pressure) dependence 10/2019    per patient   ? Depression    ? History of blood clots    ? Pulmonary embolism, blood-clot, obstetric 10/09/2017   ? Sleep apnea     Uses CPAP    Past Surgical History:   Procedure Laterality Date   ? BACK SURGERY     ? CERVICAL FUSION  2001   ? HERNIA REPAIR  2005   ? MD LAP,INGUINAL HERNIA REPR,INITIAL Left 8/15/2019    Procedure: HERNIORRHAPHY, INGUINAL, LAPAROSCOPIC;  Surgeon: Yan Leiva MD;  Location: [facility];  Service: General   ? TONSILLECTOMY      childhood    Family History   Problem Relation Age of Onset   ? Heart disease Mother    ? Hypertension Mother    ? Dementia Mother    ? Heart attack Father     Social History     Socioeconomic History   ? Marital status:      Spouse name: Not on file   ? Number of children: Not on file   ? Years of education: Not on file   ? Highest education level: Not on file   Occupational History   ? Not on file   Social Needs   ? Financial resource strain: Not on file   ? Food insecurity     Worry: Not on file     Inability: Not on file   ? Transportation needs     Medical: Not on file     Non-medical: Not on file   Tobacco Use   ? Smoking status: Former Smoker   ? Smokeless tobacco: Never Used   Substance and Sexual Activity   ? Alcohol use: Yes   ? Drug use: No   ? Sexual activity: Not on file   Lifestyle   ? Physical activity      Days per week: Not on file     Minutes per session: Not on file   ? Stress: Not on file   Relationships   ? Social connections     Talks on phone: Not on file     Gets together: Not on file     Attends Orthodox service: Not on file     Active member of club or organization: Not on file     Attends meetings of clubs or organizations: Not on file     Relationship status: Not on file   ? Intimate partner violence     Fear of current or ex partner: Not on file     Emotionally abused: Not on file     Physically abused: Not on file     Forced sexual activity: Not on file   Other Topics Concern   ? Not on file   Social History Narrative   ? Not on file          Medications  Allergies   Current Outpatient Medications   Medication Sig Dispense Refill   ? escitalopram oxalate (LEXAPRO) 10 MG tablet TAKE ONE TABLET BY MOUTH EVERY DAY 90 tablet 2   ? MULTIVITAMIN (MULTIPLE VITAMIN ORAL) Take 1 tablet by mouth daily.     ? warfarin ANTICOAGULANT (COUMADIN/JANTOVEN) 7.5 MG tablet Take 1 tablet (7.5 mg) by mouth daily. Adjust dose based on INR results as directed. 90 tablet 1     No current facility-administered medications for this visit.     No Known Allergies      Lab Results    Chemistry/lipid CBC Cardiac Enzymes/BNP/TSH/INR   Lab Results   Component Value Date    CHOL 215 (H) 02/11/2020    HDL 72 02/11/2020    LDLCALC 127 02/11/2020    TRIG 82 02/11/2020    CREATININE 0.81 02/11/2020    BUN 10 02/11/2020    K 4.2 02/11/2020     02/11/2020     02/11/2020    CO2 23 02/11/2020    Lab Results   Component Value Date    WBC 4.7 02/11/2020    HGB 13.8 (L) 02/11/2020    HCT 40.2 02/11/2020    MCV 90 02/11/2020     02/11/2020    Lab Results   Component Value Date    TSH 1.41 02/06/2018    INR 2.40 (H) 05/13/2020        Gerardo Velez MD, FACC

## 2021-07-03 NOTE — ADDENDUM NOTE
Addendum Note by Kaykay Stein RN at 11/15/2018  8:00 AM     Author: Kaykay Stein RN Service: -- Author Type: Registered Nurse    Filed: 11/15/2018 11:40 AM Encounter Date: 11/15/2018 Status: Signed    : Kaykay Stein RN (Registered Nurse)    Addended by: KAYKAY STEIN on: 11/15/2018 11:40 AM        Modules accepted: Orders

## 2021-07-03 NOTE — ADDENDUM NOTE
Addendum Note by Shade Whitt, PharmNICOLE at 1/31/2019  3:47 PM     Author: Shade Whitt, PharmNICOLE Service: -- Author Type: Pharmacist    Filed: 1/31/2019  3:47 PM Encounter Date: 1/31/2019 Status: Signed    : Shade Whitt PharmD (Pharmacist)    Addended by: SHADE WHITT on: 1/31/2019 03:47 PM        Modules accepted: Orders

## 2021-07-04 NOTE — PROGRESS NOTES
Progress Notes by Gerardo Velez MD at 6/8/2021  7:50 AM     Author: Gerardo Velez MD Service: -- Author Type: Physician    Filed: 6/8/2021  9:49 AM Encounter Date: 6/8/2021 Status: Signed    : Gerardo Velez MD (Physician)         Cardiology Clinic Office Note    Assessment / Plan:    1.  Permanent atrial fibrillation with controlled ventricular response.  Trend towards slow ventricular response, will check 24-hour Holter monitor to look for pacemaker indications.  If marginal may warrant graded exercise test to assess for chronotropic incompetence.  2.  Unprovoked pulmonary embolism on chronic warfarin    pLan follow-up 1 year unless sooner based on testing    ______________________________________________________________________    Subjective:    I had the opportunity to see Prem Taylor at the [facility]. Prem Taylor is a 70 y.o. male with a history of hyperlipidemia presented 2017 with exertional shortness of breath found to have bilateral pulmonary emboli new onset atrial fibrillation.  He was initiated on warfarin and metoprolol and underwent cardioversion but had asymptomatic return to atrial fibrillation, now suspected to be permanent.  He has been diagnosed with obstructive sleep apnea and prescribed a CPAP unit which his wife noted to improve his snoring.  No etiology for his pulmonary embolism was determined, he remains on long-term warfarin anticoagulation.  He returns for routine follow-up after a virtual visit 1 year ago.    He has noticed significant improvement in his activity tolerance as he is returned to the gym.  He now works out on a stair climber or treadmill 5 days a week and breaks a sweat profusely but feels no chest discomfort.  He has had no lightheadedness, syncope or near syncopal spells.  He does note that when he carries things up steps at home, however he feels winded more than he feels is appropriate.  He has had no  orthopnea.  He uses CPAP nightly though is still at times sleepy during the daytime.    Alcohol use is limited to 3-5 drinks on weekends only.      ______________________________________________________________________    Problem List:  Patient Active Problem List   Diagnosis   ? Hearing Loss   ? Hypercholesterolemia   ? Presbycusis   ? Sinus bradycardia   ? BPH without urinary obstruction   ? Atypical Chest Pain   ? Blood Pressure Isolated Elevated   ? Male erectile dysfunction   ? Basal cell carcinoma, ear   ? Erectile dysfunction, unspecified erectile dysfunction type   ? Overweight (BMI 25.0-29.9)   ? Tubular adenoma of colon   ? Hyperbilirubinemia   ? Pulmonary embolism, bilateral (H)   ? Persistent atrial fibrillation (H)   ? Mitral valve insufficiency   ? ALISE on CPAP   ? Anxiety   ? Chronic cough   ? Normochromic normocytic anemia   ? Left inguinal hernia     Medical History:  Past Medical History:   Diagnosis Date   ? Anemia    ? Anxiety    ? Atrial fibrillation (H) 10/09/2017   ? Cancer (H)    ? CPAP (continuous positive airway pressure) dependence 10/2019    per patient   ? Depression    ? History of blood clots    ? Pulmonary embolism, blood-clot, obstetric 10/09/2017   ? Sleep apnea     Uses CPAP     Surgical History:  Past Surgical History:   Procedure Laterality Date   ? BACK SURGERY     ? CERVICAL FUSION  2001   ? HERNIA REPAIR  2005   ? [place] LAP,INGUINAL HERNIA REPR,INITIAL Left 8/15/2019    Procedure: HERNIORRHAPHY, INGUINAL, LAPAROSCOPIC;  Surgeon: Yan Leiva MD;  Location: [facility];  Service: General   ? TONSILLECTOMY      childhood     Social History:  Social History     Socioeconomic History   ? Marital status:      Spouse name: Not on file   ? Number of children: Not on file   ? Years of education: Not on file   ? Highest education level: Not on file   Occupational History   ? Not on file   Social Needs   ? Financial resource strain: Not on file   ? Food insecurity      Worry: Not on file     Inability: Not on file   ? Transportation needs     Medical: Not on file     Non-medical: Not on file   Tobacco Use   ? Smoking status: Former Smoker   ? Smokeless tobacco: Never Used   Substance and Sexual Activity   ? Alcohol use: Yes   ? Drug use: No   ? Sexual activity: Not on file   Lifestyle   ? Physical activity     Days per week: Not on file     Minutes per session: Not on file   ? Stress: Not on file   Relationships   ? Social connections     Talks on phone: Not on file     Gets together: Not on file     Attends Roman Catholic service: Not on file     Active member of club or organization: Not on file     Attends meetings of clubs or organizations: Not on file     Relationship status: Not on file   ? Intimate partner violence     Fear of current or ex partner: Not on file     Emotionally abused: Not on file     Physically abused: Not on file     Forced sexual activity: Not on file   Other Topics Concern   ? Not on file   Social History Narrative   ? Not on file     Sleep History:  Restorative on CPAP  Exercise History:  Aerobic exercise 5 days a week at the gym treadmill, stair climber    Review of Systems:   General: WNL  Eyes: Visual Distubance  Ears/Nose/Throat: WNL  Lungs: Shortness of Breath  Heart: Shortness of Breath with activity, Irregular Heartbeat  Stomach: WNL  Bladder: WNL  Muscle/Joints: WNL  Skin: WNL  Nervous System: Daytime Sleepiness  Mental Health: WNL     Blood: WNL          Family History:  Family History   Problem Relation Age of Onset   ? Heart disease Mother    ? Hypertension Mother    ? Dementia Mother    ? Heart attack Father    Brother with atrial fibrillation underwent ablation      Allergies:  No Known Allergies  Medications:  Current Outpatient Medications   Medication Sig Dispense Refill   ? escitalopram oxalate (LEXAPRO) 10 MG tablet Take 1 tablet (10 mg total) by mouth daily. 90 tablet 3   ? MULTIVITAMIN (MULTIPLE VITAMIN ORAL) Take 1 tablet by mouth daily.  "    ? warfarin ANTICOAGULANT (COUMADIN/JANTOVEN) 7.5 MG tablet Take 1 tablet (7.5 mg) by mouth daily. Adjust dose based on INR results as directed. 90 tablet 3     No current facility-administered medications for this visit.        Objective:   Wt Readings from Last 3 Encounters:   06/08/21 220 lb (99.8 kg)   02/05/21 216 lb (98 kg)   09/09/20 219 lb (99.3 kg)     Vital signs:  /86 (Patient Site: Right Arm, Patient Position: Sitting, Cuff Size: Adult Large)   Pulse (!) 52   Resp 16   Ht 6' 3\" (1.905 m)   Wt 220 lb (99.8 kg)   BMI 27.50 kg/m        Physical Exam:    General Appearance : Awake, Alert, No acute distress  HEENT: No Scleral icterus; the mucous membranes were pink and moist.  Conjunctivae not injected  Neck:  No cervical bruits, jugular venous distention, or thyromegaly   Chest: The spine was straight. Chest wall symmetric  Lungs: Respirations unlabored; the lungs are clear to auscultation.  No wheezing   Cardiovascular:   Normal point of maximal impulse.  Auscultation reveals irregularly irregular first and second heart sounds with no murmurs, rubs, or gallops.  Carotid, radial, and dorsalis pedal pulses are intact and symmetric.  Resting heart rate 64, increases to 80 beats a minute after climbing 10 steps.  He is somewhat winded at this point.  Abdomen: No organomegaly, masses, bruits, or tenderness. Bowels sounds are present  Extremities:  No clubbing, cyanosis.  No edema  Skin: No rash, bruising  Musculoskeletal: No tenderness.  Neurologic: Alert and oriented ×3. Speech is fluent.    Lab Results:  LIPIDS:  Lab Results   Component Value Date    CHOL 215 (H) 02/11/2020    CHOL 231 (H) 02/08/2019    CHOL 214 (H) 02/06/2018     Lab Results   Component Value Date    HDL 72 02/11/2020    HDL 67 02/08/2019    HDL 66 02/06/2018     Lab Results   Component Value Date    LDLCALC 127 02/11/2020    LDLCALC 148 (H) 02/08/2019    LDLCALC 130 (H) 02/06/2018     Lab Results   Component Value Date    TRIG " 82 02/11/2020    TRIG 82 02/08/2019    TRIG 88 02/06/2018     ECG 2/5/2021: Personally reviewed.  Atrial fibrillation 66 bpm.  Right bundle branch block.  Left axis deviation.  Unchanged versus 9/2020      Echocardiogram 5/2019:    When compared to the previous study dated 11/15/2017, no significant change.    Left ventricle ejection fraction is normal. The calculated left ventricular ejection fraction is 54%. Mild concentric hypertrophy noted.    Normal left ventricular size and systolic function.    Normal right ventricular size and systolic function.    Mild to moderate mitral regurgitation.        HOLLY GARCIA MD [facility]  [phone]    This note created using Dragon voice recognition software.  Sound alike errors may have escaped editing.

## 2021-07-06 VITALS
RESPIRATION RATE: 16 BRPM | BODY MASS INDEX: 27.35 KG/M2 | DIASTOLIC BLOOD PRESSURE: 86 MMHG | HEART RATE: 52 BPM | SYSTOLIC BLOOD PRESSURE: 130 MMHG | HEIGHT: 75 IN | WEIGHT: 220 LBS

## 2021-07-26 DIAGNOSIS — I26.99 PULMONARY EMBOLISM, BILATERAL (H): Primary | ICD-10-CM

## 2021-07-26 DIAGNOSIS — I48.19 PERSISTENT ATRIAL FIBRILLATION (H): ICD-10-CM

## 2021-07-27 ENCOUNTER — ANTICOAGULATION THERAPY VISIT (OUTPATIENT)
Dept: ANTICOAGULATION | Facility: CLINIC | Age: 71
End: 2021-07-27

## 2021-07-27 ENCOUNTER — LAB (OUTPATIENT)
Dept: LAB | Facility: CLINIC | Age: 71
End: 2021-07-27
Payer: COMMERCIAL

## 2021-07-27 DIAGNOSIS — I48.19 PERSISTENT ATRIAL FIBRILLATION (H): ICD-10-CM

## 2021-07-27 DIAGNOSIS — I26.99 PULMONARY EMBOLISM, BILATERAL (H): Primary | ICD-10-CM

## 2021-07-27 DIAGNOSIS — I26.99 PULMONARY EMBOLISM, BILATERAL (H): ICD-10-CM

## 2021-07-27 LAB — INR BLD: 2.7 (ref 0.9–1.1)

## 2021-07-27 PROCEDURE — 36416 COLLJ CAPILLARY BLOOD SPEC: CPT

## 2021-07-27 PROCEDURE — 85610 PROTHROMBIN TIME: CPT

## 2021-07-27 NOTE — PROGRESS NOTES
Anticoagulation-Extended Recheck Request    Under [facility] Anticoagulation protocol, Anticoagulation team may extend INR recheck interval + 1 week for each stable in range INR to max of 6 weeks. Extended interval rechecks between 8-12 weeks for patients on stable maintenance therapy require an approval (one time) from referring provider.    Anticoagulation clinic suggests consideration of extended intervals in medically stable patients, with standard INR goals, and no change in warfarin dose for last 4-6 months    Prem Taylor, 70 year old, male has been on:    Current recheck interval: 6 week  Compliant: Yes  Stable warfarin dose since: 2/19/21  INR Goal: 2.0-3.0  Time in Therapeutic range: 86.6%    Lab Results   Component Value Date    INR 2.7 07/27/2021    INR 2.00 06/15/2021    INR 2.80 05/03/2021    INR 2.50 04/05/2021    INR 2.40 03/08/2021    INR 2.30 02/19/2021    INR 1.90 02/16/2021    INR 2.20 02/05/2021    INR 2.40 02/02/2021         Please advise if Prem is approved to have extended interval rechecks every 8 weeks while on stable maintenance therapy    Thank you,    Elsie Stringer RN

## 2021-07-27 NOTE — PROGRESS NOTES
ANTICOAGULATION MANAGEMENT     Prem Taylor 70 year old male is on warfarin with therapeutic INR result. (Goal INR 2.0-3.0)    Recent labs: (last 7 days)     07/27/21  0730   INR 2.7*       ASSESSMENT     Source(s): Chart Review, Patient/Caregiver Call and Template       Warfarin doses taken: Warfarin taken as instructed    Diet: No new diet changes identified    New illness, injury, or hospitalization: No    Medication/supplement changes: None noted    Signs or symptoms of bleeding or clotting: No    Previous INR: Therapeutic last 2(+) visits    Additional findings: None     PLAN     Recommended plan for no diet, medication or health factor changes affecting INR     Dosing Instructions: Continue your current warfarin dose with next INR in 6 weeks unless approved for extended check interval       Summary  As of 7/27/2021    Full warfarin instructions:  7.5 mg every day   Next INR check:               Telephone call with Prem who verbalizes understanding and agrees to plan    ACN to call back once PCP responds regarding INR check extension    Education provided: Target INR goal and significance of current INR result and Contact [phone] with any changes, questions or concerns.     Plan made per [facility] anticoagulation protocol    Elsie Stringer RN  Anticoagulation Clinic  7/27/2021    _______________________________________________________________________     Anticoagulation Episode Summary     Current INR goal:  2.0-3.0   TTR:  86.6 % (1 y)   Target end date:     Send INR reminders to:  RUBY MAURICIO    Indications    Pulmonary embolism  bilateral (H) [I26.99]           Comments:           Anticoagulation Care Providers     Provider Role Specialty Phone number    Mamadou Baez MD Referring Family Medicine [phone]

## 2021-07-30 NOTE — PROGRESS NOTES
Mamadou Baez MD  You 21 hours ago (5:08 PM)     Okay for extended INR check interval.     Mamadou Baez MD     Message text          Called and spoke with Ryan. Scheduled for 8 weeks from last check.    Elsie Stringer RN  [facility]  [phone]

## 2021-08-05 ASSESSMENT — ACTIVITIES OF DAILY LIVING (ADL): CURRENT_FUNCTION: NO ASSISTANCE NEEDED

## 2021-08-10 ENCOUNTER — OFFICE VISIT (OUTPATIENT)
Dept: FAMILY MEDICINE | Facility: CLINIC | Age: 71
End: 2021-08-10
Payer: COMMERCIAL

## 2021-08-10 VITALS
HEIGHT: 75 IN | DIASTOLIC BLOOD PRESSURE: 80 MMHG | WEIGHT: 223 LBS | SYSTOLIC BLOOD PRESSURE: 130 MMHG | BODY MASS INDEX: 27.73 KG/M2 | HEART RATE: 56 BPM | OXYGEN SATURATION: 99 %

## 2021-08-10 DIAGNOSIS — D12.6 TUBULAR ADENOMA OF COLON: ICD-10-CM

## 2021-08-10 DIAGNOSIS — I48.19 PERSISTENT ATRIAL FIBRILLATION (H): ICD-10-CM

## 2021-08-10 DIAGNOSIS — F41.9 ANXIETY: ICD-10-CM

## 2021-08-10 DIAGNOSIS — Z00.01 ENCOUNTER FOR GENERAL ADULT MEDICAL EXAMINATION WITH ABNORMAL FINDINGS: Primary | ICD-10-CM

## 2021-08-10 DIAGNOSIS — E78.00 PURE HYPERCHOLESTEROLEMIA: ICD-10-CM

## 2021-08-10 DIAGNOSIS — G47.33 OSA ON CPAP: ICD-10-CM

## 2021-08-10 DIAGNOSIS — E66.3 OVERWEIGHT: ICD-10-CM

## 2021-08-10 DIAGNOSIS — H91.13 PRESBYCUSIS OF BOTH EARS: ICD-10-CM

## 2021-08-10 DIAGNOSIS — Z86.711 HISTORY OF PULMONARY EMBOLISM: ICD-10-CM

## 2021-08-10 DIAGNOSIS — Z12.5 SCREENING FOR PROSTATE CANCER: ICD-10-CM

## 2021-08-10 DIAGNOSIS — D64.9 NORMOCHROMIC NORMOCYTIC ANEMIA: ICD-10-CM

## 2021-08-10 LAB
ALBUMIN SERPL-MCNC: 4 G/DL (ref 3.5–5)
ALP SERPL-CCNC: 63 U/L (ref 45–120)
ALT SERPL W P-5'-P-CCNC: 16 U/L (ref 0–45)
ANION GAP SERPL CALCULATED.3IONS-SCNC: 11 MMOL/L (ref 5–18)
AST SERPL W P-5'-P-CCNC: 25 U/L (ref 0–40)
BILIRUB SERPL-MCNC: 0.9 MG/DL (ref 0–1)
BUN SERPL-MCNC: 15 MG/DL (ref 8–22)
CALCIUM SERPL-MCNC: 9.6 MG/DL (ref 8.5–10.5)
CHLORIDE BLD-SCNC: 105 MMOL/L (ref 98–107)
CHOLEST SERPL-MCNC: 211 MG/DL
CO2 SERPL-SCNC: 25 MMOL/L (ref 22–31)
CREAT SERPL-MCNC: 0.83 MG/DL (ref 0.7–1.3)
ERYTHROCYTE [DISTWIDTH] IN BLOOD BY AUTOMATED COUNT: 12.7 % (ref 10–15)
FASTING STATUS PATIENT QL REPORTED: YES
GFR SERPL CREATININE-BSD FRML MDRD: 90 ML/MIN/1.73M2
GLUCOSE BLD-MCNC: 94 MG/DL (ref 70–125)
HCT VFR BLD AUTO: 40.1 % (ref 40–53)
HDLC SERPL-MCNC: 67 MG/DL
HGB BLD-MCNC: 13.5 G/DL (ref 13.3–17.7)
LDLC SERPL CALC-MCNC: 122 MG/DL
MCH RBC QN AUTO: 31 PG (ref 26.5–33)
MCHC RBC AUTO-ENTMCNC: 33.7 G/DL (ref 31.5–36.5)
MCV RBC AUTO: 92 FL (ref 78–100)
PLATELET # BLD AUTO: 162 10E3/UL (ref 150–450)
POTASSIUM BLD-SCNC: 4.2 MMOL/L (ref 3.5–5)
PROT SERPL-MCNC: 6.9 G/DL (ref 6–8)
PSA SERPL-MCNC: 1.91 UG/L (ref 0–4.5)
RBC # BLD AUTO: 4.35 10E6/UL (ref 4.4–5.9)
SODIUM SERPL-SCNC: 141 MMOL/L (ref 136–145)
TRIGL SERPL-MCNC: 109 MG/DL
WBC # BLD AUTO: 4.8 10E3/UL (ref 4–11)

## 2021-08-10 PROCEDURE — 85027 COMPLETE CBC AUTOMATED: CPT | Performed by: FAMILY MEDICINE

## 2021-08-10 PROCEDURE — 36415 COLL VENOUS BLD VENIPUNCTURE: CPT | Performed by: FAMILY MEDICINE

## 2021-08-10 PROCEDURE — 96127 BRIEF EMOTIONAL/BEHAV ASSMT: CPT | Performed by: FAMILY MEDICINE

## 2021-08-10 PROCEDURE — 99397 PER PM REEVAL EST PAT 65+ YR: CPT | Performed by: FAMILY MEDICINE

## 2021-08-10 PROCEDURE — 80053 COMPREHEN METABOLIC PANEL: CPT | Performed by: FAMILY MEDICINE

## 2021-08-10 PROCEDURE — 99214 OFFICE O/P EST MOD 30 MIN: CPT | Mod: 25 | Performed by: FAMILY MEDICINE

## 2021-08-10 PROCEDURE — 80061 LIPID PANEL: CPT | Performed by: FAMILY MEDICINE

## 2021-08-10 PROCEDURE — G0103 PSA SCREENING: HCPCS | Performed by: FAMILY MEDICINE

## 2021-08-10 ASSESSMENT — ACTIVITIES OF DAILY LIVING (ADL): CURRENT_FUNCTION: NO ASSISTANCE NEEDED

## 2021-08-10 ASSESSMENT — ANXIETY QUESTIONNAIRES
6. BECOMING EASILY ANNOYED OR IRRITABLE: NOT AT ALL
3. WORRYING TOO MUCH ABOUT DIFFERENT THINGS: NOT AT ALL
5. BEING SO RESTLESS THAT IT IS HARD TO SIT STILL: NOT AT ALL
4. TROUBLE RELAXING: NOT AT ALL
GAD7 TOTAL SCORE: 0
7. FEELING AFRAID AS IF SOMETHING AWFUL MIGHT HAPPEN: NOT AT ALL
1. FEELING NERVOUS, ANXIOUS, OR ON EDGE: NOT AT ALL
7. FEELING AFRAID AS IF SOMETHING AWFUL MIGHT HAPPEN: NOT AT ALL
GAD7 TOTAL SCORE: 0
8. IF YOU CHECKED OFF ANY PROBLEMS, HOW DIFFICULT HAVE THESE MADE IT FOR YOU TO DO YOUR WORK, TAKE CARE OF THINGS AT HOME, OR GET ALONG WITH OTHER PEOPLE?: NOT DIFFICULT AT ALL
GAD7 TOTAL SCORE: 0
2. NOT BEING ABLE TO STOP OR CONTROL WORRYING: NOT AT ALL

## 2021-08-10 ASSESSMENT — MIFFLIN-ST. JEOR: SCORE: 1854.21

## 2021-08-10 NOTE — PROGRESS NOTES
"Answers for HPI/ROS submitted by the patient on 8/5/2021  KALPANA 7 TOTAL SCORE: 0    SUBJECTIVE:   Prem Taylor is a 70 year old male who presents for Preventive Visit.      In general doing well.  Had followed up with Dr. Velez June 8, 2021 with history of persistent atrial fibrillation.  Holter monitor completed due to slowed ventricular response otherwise did not require stress testing.  Denies chest pain or noted palpitations.  No presyncopal symptoms..  History of hyperlipidemia presented 2017 with exertional shortness of breath found to have bilateral pulmonary emboli new onset atrial fibrillation.  He was initiated on warfarin and metoprolol and underwent cardioversion but had asymptomatic return to atrial fibrillation, now suspected to be permanent.  He has been diagnosed with obstructive sleep apnea and prescribed a CPAP unit which his wife noted to improve his snoring.  No etiology for his pulmonary embolism was determined, he remains on long-term warfarin anticoagulation.  Had described some decrease in strength likely associated more with right shoulder etiology now with slow improvement again.  Wondering if possible tear involving right shoulder tendon.  Had fallen in a parking lot accidentally recently outside restaurant.  Left knee abrasion otherwise no residual issues described.  Prior colonoscopy February 11, 2019 with tubular adenoma history and will repeat at 5-year interval.  Tolerated CPAP for ALISE management.  History normochromic normocytic anemia.  Bilateral hearing loss however declines audiology referral for potential hearing aids currently.  No nocturia.  Comprehensive review of systems as above otherwise all negative.       \"Aubree\" x 44 years   2 daughters - Renee (38) and Susannah (35)   3 grandchildren   No smoke (quit 1990 after 1 and 1/2 ppd)   EtOH: weekends \"few beers\"   Surgeries: anterior cervical fusion; inguinal hernia; tonsils; right eye cataract 3/4/20 and left eye " "cataract 3/11/20; \"right eye vitrectomy scheduled for 20 due to floaters\"   Hospitalizations: [facility] 10/9/17-10/10/17 bilateral pulmonary emboli with a. 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  No    Healthy Habits:     In general, how would you rate your overall health?  Excellent    Frequency of exercise:  4-5 days/week    Duration of exercise:  45-60 minutes    Do you usually eat at least 4 servings of fruit and vegetables a day, include whole grains    & fiber and avoid regularly eating high fat or \"junk\" foods?  No    Taking medications regularly:  Yes    Medication side effects:  None    Ability to successfully perform activities of daily living:  No assistance needed    Home Safety:  No safety concerns identified    Hearing Impairment:  Difficulty understanding soft or whispered speech    In the past 6 months, have you been bothered by leaking of urine?  No    In general, how would you rate your overall mental or emotional health?  Excellent      PHQ-2 Total Score: 0    Additional concerns today:  No    Do you feel safe in your environment? Yes    Have you ever done Advance Care Planning? (For example, a Health Directive, POLST, or a discussion with a medical provider or your loved ones about your wishes): Yes, advance care planning is on file.       Fall risk  Fallen 2 or more times in the past year?: No  Any fall with injury in the past year?: No    Cognitive Screening   1) Repeat 3 items (Leader, Season, Table)    2) Clock draw: NORMAL  3) 3 item recall: Recalls 3 objects  Results: 3 items recalled: COGNITIVE IMPAIRMENT LESS LIKELY    Mini-CogTM Copyright S Mariam. " Licensed by the author for use in [facility]; reprinted with permission (edith@[facility]). All rights reserved.      Do you have sleep apnea, excessive snoring or daytime drowsiness?: yes    Reviewed and updated as needed this visit by clinical staff  Tobacco  Allergies  Meds  Problems             Reviewed and updated as needed this visit by Provider   Allergies  Meds  Problems            Social History     Tobacco Use     Smoking status: Former Smoker     Smokeless tobacco: Never Used   Substance Use Topics     Alcohol use: Yes         Alcohol Use 8/5/2021   Prescreen: >3 drinks/day or >7 drinks/week? No           Current providers sharing in care for this patient include:   Patient Care Team:  Mamadou Baez MD as PCP - General  Judit Young PharmD as Pharmacist (Pharmacist)  Mamadou Baez MD as Assigned PCP  Gerardo Velez MD as Assigned Heart and Vascular Provider    The following health maintenance items are reviewed in Epic and correct as of today:  Health Maintenance Due   Topic Date Due     ANNUAL REVIEW OF HM ORDERS  Never done     AORTIC ANEURYSM SCREENING (SYSTEM ASSIGNED)  Never done     FALL RISK ASSESSMENT  02/11/2021     Lab work is in process  Labs reviewed in EPIC  BP Readings from Last 3 Encounters:   08/10/21 130/80   06/24/21 (!) 142/98   06/08/21 130/86    Wt Readings from Last 3 Encounters:   08/10/21 101.2 kg (223 lb)   06/24/21 97.5 kg (215 lb)   06/08/21 99.8 kg (220 lb)                  Patient Active Problem List   Diagnosis     Hearing Loss     Hypercholesterolemia     Presbycusis     Sinus bradycardia     BPH without urinary obstruction     Atypical Chest Pain     Blood Pressure Isolated Elevated     Male erectile dysfunction     Basal cell carcinoma, ear     Erectile dysfunction, unspecified erectile dysfunction type     Overweight (BMI 25.0-29.9)     Tubular adenoma of colon     Hyperbilirubinemia     Pulmonary embolism, bilateral (H)      "Persistent atrial fibrillation (H)     Mitral valve insufficiency     ALISE on CPAP     Anxiety     Chronic cough     Normochromic normocytic anemia     Left inguinal hernia     Blepharitis of both eyes     History of pulmonary embolism     Past Surgical History:   Procedure Laterality Date     BACK SURGERY       CERVICAL FUSION  2001     HERNIA REPAIR  2005     IN LAP,INGUINAL HERNIA REPR,INITIAL Left 8/15/2019    Procedure: HERNIORRHAPHY, INGUINAL, LAPAROSCOPIC;  Surgeon: Yan Leiva MD;  Location: [facility];  Service: General     TONSILLECTOMY      childhood       Social History     Tobacco Use     Smoking status: Former Smoker     Smokeless tobacco: Never Used   Substance Use Topics     Alcohol use: Yes     Family History   Problem Relation Age of Onset     Heart Disease Mother      Hypertension Mother      Dementia Mother      Coronary Artery Disease Father          Current Outpatient Medications   Medication Sig Dispense Refill     escitalopram oxalate (LEXAPRO) 10 MG tablet [ESCITALOPRAM OXALATE (LEXAPRO) 10 MG TABLET] Take 1 tablet (10 mg total) by mouth daily. 90 tablet 3     MULTIVITAMIN (MULTIPLE VITAMIN ORAL) [MULTIVITAMIN (MULTIPLE VITAMIN ORAL)] Take 1 tablet by mouth daily.       warfarin ANTICOAGULANT (COUMADIN/JANTOVEN) 7.5 MG tablet [WARFARIN ANTICOAGULANT (COUMADIN/JANTOVEN) 7.5 MG TABLET] Take 1 tablet (7.5 mg) by mouth daily. Adjust dose based on INR results as directed. 90 tablet 3     No Known Allergies  Immunizations up-to-date.        Review of Systems  Constitutional, HEENT, cardiovascular, pulmonary, GI, , musculoskeletal, neuro, skin, endocrine and psych systems are negative, except as otherwise noted.    OBJECTIVE:   /80   Pulse 56   Ht 1.892 m (6' 2.5\")   Wt 101.2 kg (223 lb)   SpO2 99%   BMI 28.25 kg/m   Estimated body mass index is 28.25 kg/m  as calculated from the following:    Height as of this encounter: 1.892 m (6' 2.5\").    Weight as of this encounter: " 101.2 kg (223 lb).     Physical Exam  GENERAL: healthy, alert and no distress  EYES: Eyes grossly normal to inspection, PERRL and conjunctivae and sclerae normal.  Glasses.  HENT: ear canals and TM's normal, nose and mouth without ulcers or lesions  NECK: no adenopathy, no asymmetry, masses, or scars and thyroid normal to palpation  RESP: lungs clear to auscultation - no rales, rhonchi or wheezes  CV: regular rate and rhythm, normal S1 S2, no S3 or S4, no murmur, click or rub, no peripheral edema and peripheral pulses strong  ABDOMEN: soft, nontender, no hepatosplenomegaly, no masses and bowel sounds normal  No inguinal hernia.  Prostate with mild right greater than left lobe enlargement without induration or nodularity.  MS: no gross musculoskeletal defects noted, no edema  SKIN: no suspicious lesions or rashes  NEURO: Normal strength and tone, mentation intact and speech normal  PSYCH: mentation appears normal, affect normal/bright    Diagnostic Test Results:  Labs reviewed in [facility]  HOLTER REPORT 6/14/21     Results:    Indication for study: Evaluate for atrial fibrillation    A 24-hour Holter monitor was applied June 14, 2021 with date of interpretation June 16, 2021    Atrial fibrillation with right bundle branch block pattern is seen throughout the recording    Average ventricular rate is 72 bpm with heart rate range between  bpm    Modest pauses noted- longest pause of 2.6 seconds; but no pauses in excess of 3 seconds    Otherwise no major bradycardia identified    Infrequent ventricular ectopy 493 PVCs which is 0.5% burden ; one triplet at a modest rate otherwise no complexly identified    Impression    Persistent atrial fibrillation with controlled ventricular response    Patient activity diary was reviewed;  he was very active during the day and had no symptoms        Comment: The recording was for 23 hours and 59 minutes.  The recording quality was  adequate.      ASSESSMENT / PLAN:   Encounter for general adult medical examination with abnormal findings  Annual wellness visit completed.  Risk associate with hearing decreased.  Recent fall in parking lot outside a restaurant without other concerns for falls.  Falls risk prevention reviewed.  Annual wellness visit to continue.    Overweight  Overweight status.  Weight goal remains less than 220 pounds initially, less than 210 pounds ideally.    Persistent atrial fibrillation (H)  Persistent atrial fibrillation.  Will follow up with Dr. Velez in June 2022.  Recent Holter monitor as noted below reviewed with somewhat slowed ventricular response otherwise doing well currently and remains asymptomatic.  Continues warfarin anticoagulation 7.5 mg daily.  - Comprehensive metabolic panel  - Comprehensive metabolic panel    History of pulmonary embolism  History of unprovoked pulmonary emboli and remains on chronic warfarin anticoagulation.    Pure hypercholesterolemia  Check lipid cascade today while fasting.  - Lipid panel reflex to direct LDL Fasting  - Lipid panel reflex to direct LDL Fasting    Tubular adenoma of colon  History of tubular adenoma of colon and will repeat colonoscopy at 5-year interval with follow-up colonoscopy February 2020 for anticipated.    ALISE on CPAP  Tolerating CPAP for ALISE management.    Presbycusis of both ears  Presbycusis bilateral.  Declines audiology referral for potential hearing aid utilization.    Anxiety  Continues Escitalopram 10 mg daily with anxiety benefits noted.  KALPANA-7 questionnaire 0 out of 21 with PHQ-9 questionnaire 0 out of 27.    Normochromic normocytic anemia  History of mild normochromic normocytic anemia only.  Med monitoring completed today while on warfarin anticoagulation.  - Comprehensive metabolic panel  - CBC with platelets  - Comprehensive metabolic panel  - CBC with platelets    Screening for prostate cancer  PSA screen completed for prostate cancer  "screening.  - Prostate Specific Antigen Screen  - Prostate Specific Antigen Screen       Patient has been advised of split billing requirements and indicates understanding: No  COUNSELING:  Reviewed preventive health counseling, as reflected in patient instructions       Regular exercise       Healthy diet/nutrition       Vision screening       Hearing screening       Dental care       Colon cancer screening       Prostate cancer screening    Estimated body mass index is 28.25 kg/m  as calculated from the following:    Height as of this encounter: 1.892 m (6' 2.5\").    Weight as of this encounter: 101.2 kg (223 lb).        He reports that he has quit smoking. He has never used smokeless tobacco.      Appropriate preventive services were discussed with this patient, including applicable screening as appropriate for cardiovascular disease, diabetes, osteopenia/osteoporosis, and glaucoma.  As appropriate for age/gender, discussed screening for colorectal cancer, prostate cancer, breast cancer, and cervical cancer. Checklist reviewing preventive services available has been given to the patient.    Reviewed patients plan of care and provided an AVS. The Intermediate Care Plan ( asthma action plan, low back pain action plan, and migraine action plan) for Prem meets the Care Plan requirement. This Care Plan has been established and reviewed with the Patient.    Counseling Resources:  ATP IV Guidelines  Pooled Cohorts Equation Calculator  Breast Cancer Risk Calculator  Breast Cancer: Medication to Reduce Risk  FRAX Risk Assessment  ICSI Preventive Guidelines  Dietary Guidelines for Americans, 2010  [employer]'s MyPlate  ASA Prophylaxis  Lung CA Screening    Mamadou Baez MD  [facility]    Identified Health Risks:  Answers for HPI/ROS submitted by the patient on 8/5/2021  KALPANA 7 TOTAL SCORE: 0        The patient was counseled and encouraged to consider modifying their diet and eating habits. He was " provided with information on recommended healthy diet options.  The patient was provided with written information regarding signs of hearing loss.

## 2021-08-10 NOTE — PATIENT INSTRUCTIONS
Patient Education   Personalized Prevention Plan  You are due for the preventive services outlined below.  Your care team is available to assist you in scheduling these services.  If you have already completed any of these items, please share that information with your care team to update in your medical record.  Health Maintenance Due   Topic Date Due     ANNUAL REVIEW OF HM ORDERS  Never done     AORTIC ANEURYSM SCREENING (SYSTEM ASSIGNED)  Never done     FALL RISK ASSESSMENT  02/11/2021       Understanding USDA MyPlate  The USDA has guidelines to help you make healthy food choices. These are called MyPlate. MyPlate shows the food groups that make up healthy meals using the image of a place setting. Before you eat, think about the healthiest choices for what to put on your plate or in your cup or bowl. To learn more about building a healthy plate, visit www.choosemyplate.gov.    The food groups    Fruits. Any fruit or 100% fruit juice counts as part of the Fruit Group. Fruits may be fresh, canned, frozen, or dried, and may be whole, cut-up, or pureed. Make 1/2 of your plate fruits and vegetables.    Vegetables. Any vegetable or 100% vegetable juice counts as a member of the Vegetable Group. Vegetables may be fresh, frozen, canned, or dried. They can be served raw or cooked and may be whole, cut-up, or mashed. Make 1/2 of your plate fruits and vegetables.    Grains. All foods made from grains are part of the Grains Group. These include wheat, rice, oats, cornmeal, and barley. Grains are often used to make foods such as bread, pasta, oatmeal, cereal, tortillas, and grits. Grains should be no more than 1/4 of your plate. At least half of your grains should be whole grains.    Protein. This group includes meat, poultry, seafood, beans and peas, eggs, processed soy products (such as tofu), nuts (including nut butters), and seeds. Make protein choices no more than 1/4 of your plate. Meat and poultry choices should be  lean or low fat.    Dairy. The Dairy Group includes all fluid milk products and foods made from milk that contain calcium, such as yogurt and cheese. (Foods that have little calcium, such as cream, butter, and cream cheese, are not part of this group.) Most dairy choices should be low-fat or fat-free.    Oils. Oils aren't a food group, but they do contain essential nutrients. However it's important to watch your intake of oils. These are fats that are liquid at room temperature. They include canola, corn, olive, soybean, vegetable, and sunflower oil. Foods that are mainly oil include mayonnaise, certain salad dressings, and soft margarines. You likely already get your daily oil allowance from the foods you eat.  Things to limit  Eating healthy also means limiting these things in your diet:       Salt (sodium). Many processed foods have a lot of sodium. To keep sodium intake down, eat fresh vegetables, meats, poultry, and seafood when possible. Purchase low-sodium, reduced-sodium, or no-salt-added food products at the store. And don't add salt to your meals at home. Instead, season them with herbs and spices such as dill, oregano, cumin, and paprika. Or try adding flavor with lemon or lime zest and juice.    Saturated fat. Saturated fats are most often found in animal products such as beef, pork, and chicken. They are often solid at room temperature, such as butter. To reduce your saturated fat intake, choose leaner cuts of meat and poultry. And try healthier cooking methods such as grilling, broiling, roasting, or baking. For a simple lower-fat swap, use plain nonfat yogurt instead of mayonnaise when making potato salad or macaroni salad.    Added sugars. These are sugars added to foods. They are in foods such as ice cream, candy, soda, fruit drinks, sports drinks, energy drinks, cookies, pastries, jams, and syrups. Cut down on added sugars by sharing sweet treats with a family member or friend. You can also choose  fruit for dessert, and drink water or other unsweetened beverages.     StayWell last reviewed this educational content on 6/1/2020 2000-2021 [employer]. All rights reserved. This information is not intended as a substitute for professional medical care. Always follow your healthcare professional's instructions.          Signs of Hearing Loss      Hearing much better with one ear can be a sign of hearing loss.   Hearing loss is a problem shared by many people. In fact, it is one of the most common health problems, particularly as people age. Most people age 65 and older have some hearing loss. By age 80, almost everyone does. Hearing loss often occurs slowly over the years. So you may not realize your hearing has gotten worse.  Have your hearing checked  Call your healthcare provider if you:    Have to strain to hear normal conversation    Have to watch other people s faces very carefully to follow what they re saying    Need to ask people to repeat what they ve said    Often misunderstand what people are saying    Turn the volume of the television or radio up so high that others complain    Feel that people are mumbling when they re talking to you    Find that the effort to hear leaves you feeling tired and irritated    Notice, when using the phone, that you hear better with one ear than the other  StayWell last reviewed this educational content on 1/1/2020 2000-2021 [employer]. All rights reserved. This information is not intended as a substitute for professional medical care. Always follow your healthcare professional's instructions.

## 2021-08-11 ASSESSMENT — ANXIETY QUESTIONNAIRES: GAD7 TOTAL SCORE: 0

## 2021-09-21 ENCOUNTER — ANTICOAGULATION THERAPY VISIT (OUTPATIENT)
Dept: ANTICOAGULATION | Facility: CLINIC | Age: 71
End: 2021-09-21

## 2021-09-21 ENCOUNTER — LAB (OUTPATIENT)
Dept: LAB | Facility: CLINIC | Age: 71
End: 2021-09-21
Payer: COMMERCIAL

## 2021-09-21 DIAGNOSIS — I26.99 PULMONARY EMBOLISM, BILATERAL (H): ICD-10-CM

## 2021-09-21 DIAGNOSIS — I26.99 PULMONARY EMBOLISM, BILATERAL (H): Primary | ICD-10-CM

## 2021-09-21 DIAGNOSIS — I48.19 PERSISTENT ATRIAL FIBRILLATION (H): ICD-10-CM

## 2021-09-21 LAB — INR BLD: 2.6 (ref 0.9–1.1)

## 2021-09-21 PROCEDURE — 85610 PROTHROMBIN TIME: CPT | Performed by: FAMILY MEDICINE

## 2021-09-21 NOTE — PROGRESS NOTES
ANTICOAGULATION MANAGEMENT     Prem Taylor 71 year old male is on warfarin with therapeutic INR result. (Goal INR 2.0-3.0)    Recent labs: (last 7 days)     09/21/21  0731   INR 2.6*       ASSESSMENT     Source(s): Patient/Caregiver Call and Template       Warfarin doses taken: Warfarin taken as instructed    Diet: No new diet changes identified    New illness, injury, or hospitalization: No    Medication/supplement changes: None noted    Signs or symptoms of bleeding or clotting: No    Previous INR: Therapeutic last 2(+) visits    Additional findings: None     PLAN     Recommended plan for no diet, medication or health factor changes affecting INR     Dosing Instructions: Continue your current warfarin dose with next INR in 8 weeks       Summary  As of 9/21/2021    Full warfarin instructions:  7.5 mg every day   Next INR check:  11/16/2021             Telephone call with Prem who verbalizes understanding and agrees to plan    Lab visit scheduled    Education provided: Goal range and significance of current result and Contact [phone] with any changes, questions or concerns.     Plan made per [facility] anticoagulation protocol    Elsie Stringer RN  Anticoagulation Clinic  9/21/2021    _______________________________________________________________________     Anticoagulation Episode Summary     Current INR goal:  2.0-3.0   TTR:  86.6 % (1 y)   Target end date:     Send INR reminders to:  RUBY MAURICIO    Indications    Pulmonary embolism  bilateral (H) [I26.99]           Comments:           Anticoagulation Care Providers     Provider Role Specialty Phone number    Mamadou Baez MD Referring Family Medicine [phone]

## 2021-09-30 ENCOUNTER — TELEPHONE (OUTPATIENT)
Dept: FAMILY MEDICINE | Facility: CLINIC | Age: 71
End: 2021-09-30

## 2021-09-30 DIAGNOSIS — H90.3 BILATERAL SENSORINEURAL HEARING LOSS: Primary | ICD-10-CM

## 2021-09-30 NOTE — TELEPHONE ENCOUNTER
Patient would like a referral to an otolaryngologist or ENT to be evaluated for hearing loss.     Please advise.

## 2021-10-13 ENCOUNTER — OFFICE VISIT (OUTPATIENT)
Dept: AUDIOLOGY | Facility: CLINIC | Age: 71
End: 2021-10-13
Attending: FAMILY MEDICINE
Payer: COMMERCIAL

## 2021-10-13 DIAGNOSIS — H90.3 SENSORINEURAL HEARING LOSS, BILATERAL: Primary | ICD-10-CM

## 2021-10-13 DIAGNOSIS — H90.3 BILATERAL SENSORINEURAL HEARING LOSS: ICD-10-CM

## 2021-10-13 PROCEDURE — 99207 PR NO CHARGE LOS: CPT | Performed by: AUDIOLOGIST

## 2021-10-13 PROCEDURE — 92557 COMPREHENSIVE HEARING TEST: CPT | Performed by: AUDIOLOGIST

## 2021-10-13 PROCEDURE — 92567 TYMPANOMETRY: CPT | Performed by: AUDIOLOGIST

## 2021-10-13 NOTE — PROGRESS NOTES
"AUDIOLOGY REPORT    SUBJECTIVE:  Prem Taylor is a 71 year old male who was seen in the Audiology Clinic at the [facility] for audiologic evaluation, referred by Mamadou Baez M.D. The patient reports decreased ability to hear over the past two years, with gradual onset. Patient's wife and daughter reportedly noted some hearing difficulty prior to that. 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 He endorsed bilateral, intermittent, nonpulsatile tinnitus, which has also become more noticeable in the past two years but is not debilitating. He denied current vertigo, otalgia, otorrhea, recent illness, or aural fullness.     OBJECTIVE:  Otoscopic exam indicates ears are clear of cerumen bilaterally.     Pure Tone Thresholds assessed using conventional audiometry with good  reliability from 250-8000 Hz bilaterally using insert earphones and circumaural headphones.     RIGHT:  Borderline normal to mild sensorineural hearing loss, sloping to moderate-severe sensorineural hearing loss    LEFT:    normal sloping to moderate sensorineural hearing loss    Tympanogram:    RIGHT: negative pressure and shallow tracing    LEFT:   normal eardrum mobility    Reflexes were not assessed today, due to equipment malfunction.    Speech Reception Threshold:    RIGHT: 30 dB HL    LEFT:   20 dB HL  Word Recognition Score:     RIGHT: 96% at 70 dB HL using NU-6 recorded word list.    LEFT:   100% at 65 dB HL using NU-6 recorded word list.      ASSESSMENT:     ICD-10-CM    1. Sensorineural hearing loss, bilateral  H90.3    2. Bilateral sensorineural hearing loss  H90.3 Adult Audiology Referral       Today s results were discussed with the patient in detail.     PLAN:  Patient was counseled regarding hearing loss and impact on communication. Patient is a good candidate for amplification at this time. Appropriate communication strategies were discussed at " length, as were reasonable expectations regarding hearing at a distance, in noisy environments, or when attention is diverted elsewhere.    It is recommended that the patient have hearing re-evaluated annually to monitor current hearing thresholds, and to be consistent with the use of hearing protection when engaging in noisy activities (to preserve residual hearing sensitivity and to minimize the effects of tinnitus). Mr. Taylor elected to wait and consider his options with regard to amplification.  Please call this clinic with questions regarding these results or recommendations.        Vinicius Castellano., [facility]  [place] Licensed Audiologist [id number]

## 2021-11-16 ENCOUNTER — DOCUMENTATION ONLY (OUTPATIENT)
Dept: ANTICOAGULATION | Facility: CLINIC | Age: 71
End: 2021-11-16

## 2021-11-16 ENCOUNTER — LAB (OUTPATIENT)
Dept: LAB | Facility: CLINIC | Age: 71
End: 2021-11-16
Payer: COMMERCIAL

## 2021-11-16 ENCOUNTER — ANTICOAGULATION THERAPY VISIT (OUTPATIENT)
Dept: ANTICOAGULATION | Facility: CLINIC | Age: 71
End: 2021-11-16

## 2021-11-16 DIAGNOSIS — I48.19 PERSISTENT ATRIAL FIBRILLATION (H): ICD-10-CM

## 2021-11-16 DIAGNOSIS — I26.99 PULMONARY EMBOLISM, BILATERAL (H): ICD-10-CM

## 2021-11-16 DIAGNOSIS — I26.99 PULMONARY EMBOLISM, BILATERAL (H): Primary | ICD-10-CM

## 2021-11-16 LAB — INR BLD: 2.3 (ref 0.9–1.1)

## 2021-11-16 PROCEDURE — 36416 COLLJ CAPILLARY BLOOD SPEC: CPT

## 2021-11-16 PROCEDURE — 85610 PROTHROMBIN TIME: CPT

## 2021-11-16 NOTE — PROGRESS NOTES
ANTICOAGULATION MANAGEMENT      Prem Taylor due for annual renewal of referral to anticoagulation monitoring. Order pended for your review and signature.      ANTICOAGULATION SUMMARY      Warfarin indication(s)     Atrial fibrillation  PE    Heart valve present?  NO       Current goal range   INR: 2.0-3.0     Goal appropriate for indication? Yes, INR 2-3 appropriate for hx of DVT, PE, hypercoagulable state, Afib, LVAD, or bileaflet AVR without risk factors     Current duration of therapy not indicated   Time in Therapeutic Range (TTR)  (Goal > 60%) 93.2%       Office visit with referring provider's group within last year Yes, last seen on 8/10       Elsie Stringer RN

## 2021-11-16 NOTE — PROGRESS NOTES
ANTICOAGULATION MANAGEMENT     Prem Taylor 71 year old male is on warfarin with therapeutic INR result. (Goal INR 2.0-3.0)    Recent labs: (last 7 days)     11/16/21  0731   INR 2.3*       ASSESSMENT     Source(s): Chart Review, Patient/Caregiver Call and Template       Warfarin doses taken: Warfarin taken as instructed    Diet: No new diet changes identified    New illness, injury, or hospitalization: No    Medication/supplement changes: None noted    Signs or symptoms of bleeding or clotting: No    Previous INR: Therapeutic last 2(+) visits    Additional findings: will be going out of the [place] 12[place]29 through February     PLAN     Recommended plan for no diet, medication or health factor changes affecting INR     Dosing Instructions: Continue your current warfarin dose with next INR in 8 weeks       Summary  As of 11/16/2021    Full warfarin instructions:  7.5 mg every day   Next INR check:  12/29/2021             Telephone call with Prem who verbalized understanding-- given him leaving on 12/29 for 2 months, will check INR prior to leaving.    Lab visit scheduled    Education provided: Goal range and significance of current result and Contact [phone] with any changes, questions or concerns.     Plan made per [facility] anticoagulation protocol    Elsie Stringer RN  Anticoagulation Clinic  11/16/2021    _______________________________________________________________________     Anticoagulation Episode Summary     Current INR goal:  2.0-3.0   TTR:  93.2 % (1 y)   Target end date:     Send INR reminders to:  RUBY MAURICIO    Indications    Pulmonary embolism  bilateral (H) [I26.99]           Comments:           Anticoagulation Care Providers     Provider Role Specialty Phone number    Mamadou Baez MD Referring Family Medicine [phone]

## 2021-12-28 ENCOUNTER — ANTICOAGULATION THERAPY VISIT (OUTPATIENT)
Dept: ANTICOAGULATION | Facility: CLINIC | Age: 71
End: 2021-12-28

## 2021-12-28 ENCOUNTER — LAB (OUTPATIENT)
Dept: LAB | Facility: CLINIC | Age: 71
End: 2021-12-28
Payer: COMMERCIAL

## 2021-12-28 DIAGNOSIS — I26.99 PULMONARY EMBOLISM, BILATERAL (H): Primary | ICD-10-CM

## 2021-12-28 DIAGNOSIS — I48.19 PERSISTENT ATRIAL FIBRILLATION (H): ICD-10-CM

## 2021-12-28 DIAGNOSIS — I26.99 PULMONARY EMBOLISM, BILATERAL (H): ICD-10-CM

## 2021-12-28 LAB — INR BLD: 2.5 (ref 0.9–1.1)

## 2021-12-28 PROCEDURE — 36416 COLLJ CAPILLARY BLOOD SPEC: CPT

## 2021-12-28 PROCEDURE — 85610 PROTHROMBIN TIME: CPT

## 2021-12-28 NOTE — PROGRESS NOTES
ANTICOAGULATION MANAGEMENT     Prem Taylor 71 year old male is on warfarin with therapeutic INR result. (Goal INR 2.0-3.0)    Recent labs: (last 7 days)     12/28/21  0726   INR 2.5*       ASSESSMENT     Source(s): Chart Review and Template       Warfarin doses taken: Warfarin taken as instructed    Diet: No new diet changes identified    New illness, injury, or hospitalization: No    Medication/supplement changes: None noted    Signs or symptoms of bleeding or clotting: No    Previous INR: Therapeutic last 2(+) visits    Additional findings: None     PLAN     Recommended plan for no diet, medication or health factor changes affecting INR     Dosing Instructions: Continue your current warfarin dose with next INR in 8 weeks       Summary  As of 12/28/2021    Full warfarin instructions:  7.5 mg every day   Next INR check:               Detailed voice message left for Prem with dosing instructions and follow up date.     Contact [phone] to schedule and with any changes, questions or concerns.     Education provided: Please call back if any changes to your diet, medications or how you've been taking warfarin, Goal range and significance of current result and Travel related clotting risk and prevention    Plan made per [facility] anticoagulation protocol    Elsie Stringer RN  Anticoagulation Clinic  12/28/2021    _______________________________________________________________________     Anticoagulation Episode Summary     Current INR goal:  2.0-3.0   TTR:  98.8 % (1 y)   Target end date:  Indefinite   Send INR reminders to:  RUBY MAURICIO    Indications    Pulmonary embolism  bilateral (H) [I26.99]  Persistent atrial fibrillation (H) [I48.19]           Comments:           Anticoagulation Care Providers     Provider Role Specialty Phone number    Mamadou Baez MD Referring Family Medicine [phone]

## 2022-03-08 ENCOUNTER — ANTICOAGULATION THERAPY VISIT (OUTPATIENT)
Dept: ANTICOAGULATION | Facility: CLINIC | Age: 72
End: 2022-03-08

## 2022-03-08 ENCOUNTER — OFFICE VISIT (OUTPATIENT)
Dept: FAMILY MEDICINE | Facility: CLINIC | Age: 72
End: 2022-03-08
Payer: COMMERCIAL

## 2022-03-08 VITALS
WEIGHT: 237 LBS | BODY MASS INDEX: 30.02 KG/M2 | DIASTOLIC BLOOD PRESSURE: 80 MMHG | OXYGEN SATURATION: 98 % | SYSTOLIC BLOOD PRESSURE: 148 MMHG | HEART RATE: 68 BPM

## 2022-03-08 DIAGNOSIS — F41.9 ANXIETY: ICD-10-CM

## 2022-03-08 DIAGNOSIS — D64.9 NORMOCHROMIC NORMOCYTIC ANEMIA: ICD-10-CM

## 2022-03-08 DIAGNOSIS — H90.3 BILATERAL SENSORINEURAL HEARING LOSS: ICD-10-CM

## 2022-03-08 DIAGNOSIS — Z86.711 HISTORY OF PULMONARY EMBOLISM: ICD-10-CM

## 2022-03-08 DIAGNOSIS — I48.19 PERSISTENT ATRIAL FIBRILLATION (H): Primary | ICD-10-CM

## 2022-03-08 DIAGNOSIS — E78.00 PURE HYPERCHOLESTEROLEMIA: ICD-10-CM

## 2022-03-08 DIAGNOSIS — I26.99 PULMONARY EMBOLISM, BILATERAL (H): ICD-10-CM

## 2022-03-08 DIAGNOSIS — E66.3 OVERWEIGHT: ICD-10-CM

## 2022-03-08 DIAGNOSIS — R09.89 THROAT CLEARING: ICD-10-CM

## 2022-03-08 LAB
ANION GAP SERPL CALCULATED.3IONS-SCNC: 8 MMOL/L (ref 5–18)
BUN SERPL-MCNC: 11 MG/DL (ref 8–28)
CALCIUM SERPL-MCNC: 9.5 MG/DL (ref 8.5–10.5)
CHLORIDE BLD-SCNC: 105 MMOL/L (ref 98–107)
CHOLEST SERPL-MCNC: 239 MG/DL
CO2 SERPL-SCNC: 28 MMOL/L (ref 22–31)
CREAT SERPL-MCNC: 0.81 MG/DL (ref 0.7–1.3)
ERYTHROCYTE [DISTWIDTH] IN BLOOD BY AUTOMATED COUNT: 12.7 % (ref 10–15)
FASTING STATUS PATIENT QL REPORTED: YES
GFR SERPL CREATININE-BSD FRML MDRD: >90 ML/MIN/1.73M2
GLUCOSE BLD-MCNC: 92 MG/DL (ref 70–125)
HCT VFR BLD AUTO: 43.9 % (ref 40–53)
HDLC SERPL-MCNC: 70 MG/DL
HGB BLD-MCNC: 14.7 G/DL (ref 13.3–17.7)
INR BLD: 2.5 (ref 0.9–1.1)
LDLC SERPL CALC-MCNC: 150 MG/DL
MCH RBC QN AUTO: 30.6 PG (ref 26.5–33)
MCHC RBC AUTO-ENTMCNC: 33.5 G/DL (ref 31.5–36.5)
MCV RBC AUTO: 92 FL (ref 78–100)
PLATELET # BLD AUTO: 173 10E3/UL (ref 150–450)
POTASSIUM BLD-SCNC: 4.3 MMOL/L (ref 3.5–5)
RBC # BLD AUTO: 4.8 10E6/UL (ref 4.4–5.9)
SODIUM SERPL-SCNC: 141 MMOL/L (ref 136–145)
TRIGL SERPL-MCNC: 96 MG/DL
WBC # BLD AUTO: 4.4 10E3/UL (ref 4–11)

## 2022-03-08 PROCEDURE — 85027 COMPLETE CBC AUTOMATED: CPT | Performed by: FAMILY MEDICINE

## 2022-03-08 PROCEDURE — 85610 PROTHROMBIN TIME: CPT | Performed by: FAMILY MEDICINE

## 2022-03-08 PROCEDURE — 36416 COLLJ CAPILLARY BLOOD SPEC: CPT | Performed by: FAMILY MEDICINE

## 2022-03-08 PROCEDURE — 80061 LIPID PANEL: CPT | Performed by: FAMILY MEDICINE

## 2022-03-08 PROCEDURE — 99214 OFFICE O/P EST MOD 30 MIN: CPT | Performed by: FAMILY MEDICINE

## 2022-03-08 PROCEDURE — 36415 COLL VENOUS BLD VENIPUNCTURE: CPT | Performed by: FAMILY MEDICINE

## 2022-03-08 PROCEDURE — 80048 BASIC METABOLIC PNL TOTAL CA: CPT | Performed by: FAMILY MEDICINE

## 2022-03-08 RX ORDER — ESCITALOPRAM OXALATE 20 MG/1
20 TABLET ORAL DAILY
Qty: 90 TABLET | Refills: 3 | Status: SHIPPED | OUTPATIENT
Start: 2022-03-08 | End: 2023-04-11

## 2022-03-08 NOTE — LETTER
"March 8, 2022      Ryan Taylor  [address] [place] [id number]        Dear ,    We are writing to inform you of your test results.    Your complete blood count results were normal.  No evidence for anemia, etc.     Your cholesterol results remain mildlyelevated.  You have a lot of protective \"good cholesterol\" (HDL cholesterol).  Ensure regular exercise, healthy diet, and weight loss modifications in order to further improve.  Weight goal < 230 pounds initially, < 220 pounds ideally.  We will plan to recheck your labs while fasting in the next 6-12 months to ensure desired improvement.       Your kidney function tests (i.e. Basic Metabolic Panel) were normal.     Resulted Orders   CBC with platelets   Result Value Ref Range    WBC Count 4.4 4.0 - 11.0 10e3/uL    RBC Count 4.80 4.40 - 5.90 10e6/uL    Hemoglobin 14.7 13.3 - 17.7 g/dL    Hematocrit 43.9 40.0 - 53.0 %    MCV 92 78 - 100 fL    MCH 30.6 26.5 - 33.0 pg    MCHC 33.5 31.5 - 36.5 g/dL    RDW 12.7 10.0 - 15.0 %    Platelet Count 173 150 - 450 10e3/uL   Lipid panel reflex to direct LDL Fasting   Result Value Ref Range    Cholesterol 239 (H) <=199 mg/dL    Triglycerides 96 <=149 mg/dL    Direct Measure HDL 70 >=40 mg/dL      Comment:      HDL Cholesterol Reference Range:     0-2 years:   No reference ranges established for patients under 2 years old  at [facility] for lipid analytes.    2-8 years:  Greater than 45 mg/dL     18 years and older:   Female: Greater than or equal to 50 mg/dL   Male:   Greater than or equal to 40 mg/dL    LDL Cholesterol Calculated 150 (H) <=129 mg/dL    Patient Fasting > 8hrs? Yes    Basic metabolic panel   Result Value Ref Range    Sodium 141 136 - 145 mmol/L    Potassium 4.3 3.5 - 5.0 mmol/L    Chloride 105 98 - 107 mmol/L    Carbon Dioxide (CO2) 28 22 - 31 mmol/L    Anion Gap 8 5 - 18 mmol/L    Urea Nitrogen 11 8 - 28 mg/dL    Creatinine 0.81 0.70 - 1.30 mg/dL    Calcium 9.5 8.5 - 10.5 mg/dL    Glucose 92 " 70 - 125 mg/dL    GFR Estimate >90 >60 mL/min/1.73m2      Comment:      Effective December 21, 2021 eGFRcr in adults is calculated using the 2021 CKD-EPI creatinine equation which includes age and gender (Court et al., NEJM, DOI: 10.1056/IDPJiz3808933)       If you have any questions or concerns, please call the clinic at the number listed above.       Sincerely,      Mamadou Baez MD

## 2022-03-08 NOTE — PROGRESS NOTES
Assessment/Plan:    Persistent atrial fibrillation (H)  Persistent atrial fibrillation rate controlled.  Follows with Dr. Velez historically and continues warfarin anticoagulation 7.5 mg daily.  Check INR.  Reassess at follow-up annual wellness visit in 6 months.  - REVIEW OF HEALTH MAINTENANCE PROTOCOL ORDERS  - Basic metabolic panel    History of pulmonary embolism  History of pulmonary embolism.  Continues warfarin 7.5 mg daily.  Check INR today.  Asymptomatic currently.    Anxiety  Worsening symptoms of anxiety.  Instructed patient to avoid significant consumption of news media etc.  Increase escitalopram from 10 mg up to 20 mg daily.  Reassess at follow-up evaluation in 6 months, sooner if issues.  - escitalopram (LEXAPRO) 20 MG tablet  Dispense: 90 tablet; Refill: 3    Throat clearing  Throat clearing.  Potential postnasal versus reflux etiology I did discuss OTC use of omeprazole 20 mg daily x14 to 30 days or fluticasone nasal spray using 2 sprays per nostril daily x14 to 30 days to see if desired improvement.    Bilateral sensorineural hearing loss  No current cerumen impaction findings.  Utilizing hearing aids for moderate hearing loss described.    Overweight  Overweight status.  14 pound weight gain since August 10, 2021 having just returned from 2-month vacation in [place] over the winter months.  Weight goal less than 230 pounds initially, less than 220 pounds ideally.    Pure hypercholesterolemia  Mild cholesterol elevation historically with protective HDL cholesterol however excellent.  Update lipid cascade today with therapeutic lifestyle changes reviewed as noted above.  - Lipid panel reflex to direct LDL Fasting    Normochromic normocytic anemia  Normochromic normocytic anemia noted historically and will update CBC today while on warfarin anticoagulation  - CBC with platelets          Subjective:    Prem Taylor is seen today for follow-up assessment.  Persistent atrial fibrillation  "asymptomatic.  History of pulmonary embolism.  Chronic warfarin anticoagulation 7.5 mg daily without bleeding concerns.  Diet controlled cholesterol elevation with prior cholesterol 211 with HDL 67 however.  14 pound weight gain over past couple months while on vacation in [place] over winter months.  Normochromic normocytic anemia history.  Tubular adenoma told to repeat at 5-year interval with colonoscopy 2019 reviewed.  Moderate hearing loss sensorineural now with hearing aids and wants to make sure he does not have cerumen impaction.  Throat clearing typically worse in the morning upon awakening however wife also suggest that this may be happening during the day.  Worsening anxiety as he consumes more news media etc.  Comprehensive review of systems as above otherwise all negative.     \"Aubree\" x 44 years   2 daughters - Renee (38) and Susannah (35)  3 grandchildren  No smoke (quit  after 1 and 1/2 ppd)   EtOH: weekends \"few beers\"   Surgeries: anterior cervical fusion; inguinal hernia; tonsils; right eye cataract 3/4/20 and left eye cataract 3/11/20; \"right eye vitrectomy scheduled for 20 due to floaters\"  Hospitalizations: [facility] 10/9/17-10/10/17 bilateral pulmonary emboli with a. 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)  Exercise: run 4x/week at 6-6.5 mph; situps and pushups      Past Surgical History:   Procedure Laterality Date     BACK SURGERY       CERVICAL FUSION  2001     HERNIA REPAIR  2005     LA LAP,INGUINAL HERNIA REPR,INITIAL Left 8/15/2019    Procedure: HERNIORRHAPHY, INGUINAL, LAPAROSCOPIC;  Surgeon: Yan Leiva MD;  Location: [facility];  Service: General     TONSILLECTOMY      childhood        Family History   Problem Relation Age of Onset     Heart Disease Mother      Hypertension Mother      Dementia Mother      Coronary " Artery Disease Father         Past Medical History:   Diagnosis Date     Abnormal ECG      Anemia      Anxiety      Arrhythmia      Atrial fibrillation (H) 10/09/2017     Cancer (H) 2015     CPAP (continuous positive airway pressure) dependence 10/2019    per patient     Depression      Heart valve disease 2017     History of blood clots      Hyperlipidemia 2006     Pulmonary embolism, blood-clot, obstetric 10/09/2017     Sleep apnea 2017    Uses CPAP        Social History     Tobacco Use     Smoking status: Former Smoker     Smokeless tobacco: Never Used   Substance Use Topics     Alcohol use: Yes     Drug use: No        Current Outpatient Medications   Medication Sig Dispense Refill     escitalopram (LEXAPRO) 20 MG tablet Take 1 tablet (20 mg) by mouth daily 90 tablet 3     MULTIVITAMIN (MULTIPLE VITAMIN ORAL) [MULTIVITAMIN (MULTIPLE VITAMIN ORAL)] Take 1 tablet by mouth daily.       warfarin ANTICOAGULANT (COUMADIN/JANTOVEN) 7.5 MG tablet [WARFARIN ANTICOAGULANT (COUMADIN/JANTOVEN) 7.5 MG TABLET] Take 1 tablet (7.5 mg) by mouth daily. Adjust dose based on INR results as directed. 90 tablet 3          Objective:    Vitals:    03/08/22 0654   BP: (!) 148/80   Pulse: 68   SpO2: 98%   Weight: 107.5 kg (237 lb)      Body mass index is 30.02 kg/m .    Alert.  No apparent distress.  Chest clear.  Cardiac exam irregular, rate controlled.  BMI today 30.02.  Extremities warm and dry.  External auditory canals and tympanic membranes normal.      This note has been dictated using voice recognition software and as a result may contain minor grammatical errors and unintended word substitutions.   Answers for HPI/ROS submitted by the patient on 3/5/2022  How many servings of fruits and vegetables do you eat daily?: 0-1  On average, how many sweetened beverages do you drink each day (Examples: soda, juice, sweet tea, etc.  Do NOT count diet or artificially sweetened beverages)?: 0  How many minutes a day do you exercise enough  to make your heart beat faster?: 30 to 60  How many days a week do you exercise enough to make your heart beat faster?: 5  How many days per week do you miss taking your medication?: 0  What is the reason for your visit today?: Wellness visit

## 2022-03-08 NOTE — PROGRESS NOTES
Answers for HPI/ROS submitted by the patient on 3/5/2022  How many servings of fruits and vegetables do you eat daily?: 0-1  On average, how many sweetened beverages do you drink each day (Examples: soda, juice, sweet tea, etc.  Do NOT count diet or artificially sweetened beverages)?: 0  How many minutes a day do you exercise enough to make your heart beat faster?: 30 to 60  How many days a week do you exercise enough to make your heart beat faster?: 5  How many days per week do you miss taking your medication?: 0  What is the reason for your visit today?: Wellness visit

## 2022-03-08 NOTE — PROGRESS NOTES
Spoke with Ryan and he did get message and understands instructions and possible increased risks with the increased dose of his Lexapro. He will call if any questions or concerns.

## 2022-03-08 NOTE — PROGRESS NOTES
ANTICOAGULATION MANAGEMENT     Prem Taylor 71 year old male is on warfarin with therapeutic INR result. (Goal INR 2.0-3.0)    Recent labs: (last 7 days)     03/08/22  0735   INR 2.5*       ASSESSMENT       Source(s): Chart Review and Template       Warfarin doses taken: Warfarin taken as instructed    Diet: No new diet changes identified    New illness, injury, or hospitalization: No    Medication/supplement changes: Lexapro dose increased on today may increase risk of bleeding, but not expected to affect INR    Signs or symptoms of bleeding or clotting: No    Previous INR: Therapeutic last 2(+) visits    Additional findings: None       PLAN     Recommended plan for no diet, medication or health factor changes affecting INR     Dosing Instructions: Continue your current warfarin dose with next INR in 8 weeks       Summary  As of 3/8/2022    Full warfarin instructions:  7.5 mg every day   Next INR check:  5/3/2022             Detailed voice message left for Prem with dosing instructions and follow up date.     Contact [phone] to schedule and with any changes, questions or concerns.     Education provided: Please call back if any changes to your diet, medications or how you've been taking warfarin and No interaction anticipated between warfarin and Lexapro but can cause increased risk of bleeding.    Plan made per [facility] anticoagulation protocol    Mee Maguire, RN  Anticoagulation Clinic  3/8/2022    _______________________________________________________________________     Anticoagulation Episode Summary     Current INR goal:  2.0-3.0   TTR:  100.0 % (1 y)   Target end date:  Indefinite   Send INR reminders to:  RUBY MAURICIO    Indications    Pulmonary embolism  bilateral (H) (Resolved) [I26.99]  Persistent atrial fibrillation (H) [I48.19]           Comments:           Anticoagulation Care Providers     Provider Role Specialty Phone number    Mamadou Baez MD Referring Family Medicine  [phone]

## 2022-03-14 ENCOUNTER — MYC MEDICAL ADVICE (OUTPATIENT)
Dept: CARDIOLOGY | Facility: CLINIC | Age: 72
End: 2022-03-14
Payer: COMMERCIAL

## 2022-04-11 ENCOUNTER — TELEPHONE (OUTPATIENT)
Dept: CARDIOLOGY | Facility: CLINIC | Age: 72
End: 2022-04-11
Payer: COMMERCIAL

## 2022-04-11 PROCEDURE — 99207 PR NO CHARGE NURSE ONLY: CPT

## 2022-04-11 NOTE — TELEPHONE ENCOUNTER
"  [facility] Study Pre-Visit Call      Study description:   Multic[employer]itions PPG Study. The purpose of this research study is to collect data related to health for the development of mobile technologies. This data will include physiological signal recordings from medical devices and data collection software on Apple Watches (\"study watches\"). This study is not to provide any treatment nor assess safety and effectiveness, but rather to collect information for research and  purposes.     Prem Taylor a 71 year old male, was called today to discuss participation in the [facility] study. The following was reviewed with the patient.       You agree to comply with COVID precautionary measures required by local public health ordinances, workplace health and safety protocols, and/or the Study Team. Such measure may include, for example, wearing a mask, complying with social distancing guidelines, and/ or providing evidence of negative COVID-19 test result Yes       You are fully vaccinated per the most recent CDC guidelines Yes      You do not have any of the following symptoms: fever or chills; difficulty breathing; sustained loss of taste smell, or appetite. Yes      You do not have any of the following unexplained symptoms: fatigue or nausea; whole body muscle aches; new or unexpected headache; sore throat; congestion or runny nose; diarrhea or vomiting Yes      You have not had close contact with someone who is suspected or confirmed to have active COVID-19 in the last 14 days.Yes      Reminders    Please come 10 minutes early for your scheduled appointment time.    Bring your vaccination card with you to your scheduled appointment.     No smoking 2 hours prior to your appointment time.    Wear loose shirt.    Eat before you arrive.       Falguni Crabtree RN       "

## 2022-04-12 ENCOUNTER — OFFICE VISIT (OUTPATIENT)
Dept: CARDIOLOGY | Facility: CLINIC | Age: 72
End: 2022-04-12
Payer: COMMERCIAL

## 2022-04-12 ENCOUNTER — ALLIED HEALTH/NURSE VISIT (OUTPATIENT)
Dept: CARDIOLOGY | Facility: CLINIC | Age: 72
End: 2022-04-12
Payer: COMMERCIAL

## 2022-04-12 VITALS
HEART RATE: 54 BPM | DIASTOLIC BLOOD PRESSURE: 87 MMHG | WEIGHT: 232 LBS | BODY MASS INDEX: 28.85 KG/M2 | OXYGEN SATURATION: 97 % | HEIGHT: 75 IN | RESPIRATION RATE: 16 BRPM | SYSTOLIC BLOOD PRESSURE: 138 MMHG | TEMPERATURE: 97.7 F

## 2022-04-12 DIAGNOSIS — I48.91 ATRIAL FIBRILLATION, UNSPECIFIED TYPE (H): Primary | ICD-10-CM

## 2022-04-12 DIAGNOSIS — Z00.6 EXAMINATION OF PARTICIPANT OR CONTROL IN CLINICAL RESEARCH: Primary | ICD-10-CM

## 2022-04-12 DIAGNOSIS — Z00.6 EXAMINATION OF PARTICIPANT IN CLINICAL TRIAL: ICD-10-CM

## 2022-04-12 LAB
ATRIAL RATE - MUSE: 357 BPM
DIASTOLIC BLOOD PRESSURE - MUSE: NORMAL MMHG
INTERPRETATION ECG - MUSE: NORMAL
P AXIS - MUSE: NORMAL DEGREES
PR INTERVAL - MUSE: NORMAL MS
QRS DURATION - MUSE: 154 MS
QT - MUSE: 464 MS
QTC - MUSE: 464 MS
R AXIS - MUSE: -31 DEGREES
SYSTOLIC BLOOD PRESSURE - MUSE: NORMAL MMHG
T AXIS - MUSE: 2 DEGREES
VENTRICULAR RATE- MUSE: 60 BPM

## 2022-04-12 PROCEDURE — 99207 PR NO CHARGE NURSE ONLY: CPT

## 2022-04-12 PROCEDURE — 99207 PR NO CHARGE-RESEARCH SERVICE: CPT

## 2022-04-12 PROCEDURE — 93005 ELECTROCARDIOGRAM TRACING: CPT

## 2022-04-12 PROCEDURE — 93000 ELECTROCARDIOGRAM COMPLETE: CPT | Performed by: INTERNAL MEDICINE

## 2022-04-12 NOTE — PROGRESS NOTES
"[facility] Study End Note    Study Description:   Multiconditions PPG Study. The purpose of this research study is to collect data related to health for the development of mobile technologies. This data will include physiological signal recordings from medical devices and data collection software on Apple Watches (\"study watches\"). This study is not to provide any treatment nor assess safety and effectiveness, but rather to collect information for research and  purposes.     Adverse Events & Con Med Assessment Performed?   [x]     Did the Subject Complete the Study? Yes    If no, Termination Reason: N/A    Study Termination/Completion Date: 12-APR-2022    Christie Tao      "

## 2022-04-12 NOTE — PROGRESS NOTES
[facility] Study Physical Exam      Medical History Reviewed? Yes    Physical Examination  For abnormal findings, please evaluate if the finding is Clinically Significant (by 'CS') or Not Clinically Significant (by 'NCS')  General Appearance   Normal  Head and Neck   Normal  Lungs     Normal  Cardiovascular   Abnormal; NCS irregular irregular   Abdomen    Normal  Musculoskeletal/Extremities  Normal   Lymph Nodes    Normal  Skin     Normal  Neurological    Normal    Tremor (If present document)  Absent    COVID: No symptoms, chills, shortness of breath, or difficulty breathing, muscle or body aches, headache, loss of taste or smell, sore throat, runny nose, congestion, nausea, vomiting or diarrhea.according to the [place] Department of Health and Human Services based on the CARES Act.     COVID Vaccinations:   Immunization History   Administered Date(s) Administered     COVID-19,PF,Pfizer (12+ Yrs) 03/13/2021, 04/03/2021, 10/06/2021     DT (PEDS <7y) 09/09/1997     Flu, Unspecified 10/30/2014, 10/10/2017     Influenza (High Dose) 3 valent vaccine 10/03/2016, 10/11/2018, 09/24/2019     Influenza Vaccine, 6+MO IM (QUADRIVALENT W/PRESERVATIVES) 10/26/2010, 11/16/2011, 12/06/2012, 09/22/2015     Influenza, Quad, High Dose, Pf, 65yr+ (Fluzone HD) 10/01/2020     Pneumo Conj 13-V (2010&after) 01/03/2017     Pneumococcal 23 valent 02/06/2018     TD (ADULT, 7+) 01/03/2017     Td,adult,historic,unspecified 09/09/1997     Tdap (Adacel,Boostrix) 09/27/2007     Zoster vaccine recombinant adjuvanted (SHINGRIX) 12/04/2018, 02/08/2019     Zoster vaccine, live 11/16/2011       Smoking History  How Many Years Have You Smoked or Vaped? Patient does not currently smoke now. Patient smoked from age 19 years old until 1990 smoked cigarettes 1.5 pack then switched to smoking a pipe but quite in 1990     Packs or E-Cigs Per Day: Patient does not currently smoke now. Patient smoked from age 19 years old until 1990 smoked cigarettes 1.5 pack then  switched to smoking a pipe but quite in 1990     Electrocardiogram   12 Lead Interpretation: Atrial Fibrillation   Rhythm Interpretation: 60's      Respiratory Conditions:   N/A    Spirometer Test Results (FEV%): N/A  Condition Severity: N/A      Ursula Méndez NP

## 2022-04-12 NOTE — PROGRESS NOTES
Florentino Inclusion/Exclusion Criteria:     Study Name: Florentino  : Yoni Larson MD    Protocol version: 3.0 19-JAN-2022     Criteria #  Inclusion Criterita (ALL MUST BE YES)  YES/NO/N/A   1   Male and female subjects at least 18 years old at the time of the screening visit.  Yes   2   Wrist circumference and 120mm-245mm (inclusive).  Yes   3   Ability to understand and provide written informed consent.  Yes   4   Willing and able to comply with study procedures, activities, and duration as described in the ICF. Yes   5  Documentation provided demonstrating COVID-19 up to date vaccinated status as per the current CDC guidelines.  Yes   6   Didn't smoke at least 2 hours before screening (or study procedures).  Yes   7   Neither subject, nor any individuals living with subject, have had new development in the following within the last 14 days prior to study screening:        a. Have failed to comply with any country, state, and local travel restrictions.         b. Have had any unexpected flu-like symptoms (such as fever, chills, cough, shortness of breath, diarrhea, sore throat, runny nose, or trouble breathing).        c. Have had any contact with people confirmed COVID-19.         d. Have been confirmed to have COVID-19 and have not subsequently received a negative COVID-19 test result.    Yes   8   If Cohort 1 (In-Lab Cardiac Conditions):         a. Indication of a rhythm disorder (dated up to 5 years ago) as outlined in Table A (see Protocol page 9), and be present at the time of screening.     Yes   9   If Cohort 2 (In-Lab Respiratory Conditions):          a. Prior diagnosis of one of the following conditions, within 5 years: 1) Moderate (GOLD Stage 2) COPD, 2) Severe or Very Severe (GOLD Stage 3 or 4) COPD, 3) Idiopathic pulmonary fibrosis.          b. Record of spirometry FEV% result (within 5 years) are available.    NA   10   If Cohort 3 (At-Home respiratory Conditions):         a. Prior  diagnosis of one of the following conditions, within 5 years: 1) Moderate (GOLD Stage 2) COPD, 2) Severe or Very Severe (GOLD Stage 3 or 4) COPD, 3) Idiopathic pulmonary fibrosis.          b. Record of spirometry FEV% result (within 5 years) are available.          c. Willing and able to use a study provided iPhone and navigate study David flow.          D. Stable WIFI at home and are able to connect it to study iPhones   NA       Criteria # Exclusion Criteria (ALL MUST BE NO) YES/NO/N/A   1   Individuals with severe contact allergies to standard adhesives, or other materials found in pulse oximetry sensors, ECG electrodes, respiration monitor electrodes, wearables device bands and watch surfaces.  No   2   Individuals that do not have at least 2 intact fingers (excluding thumb, *pinky will be excluded only for cohort 1 and cohort 2) on non-preferred hand to wear a watch.  No   3   Open wound(s) or active infections on wrists at study watch wear locations or where the ECG electrodes may be placed.  No   4   Physical disability that prevents safe and adequate testing.  No   5   Individuals with a pacemaker or an automated implantable cardioverter-defibrillator (AICD).  No   6   Individuals with physical scars, tattoos, or other skin markings on wrists where sensors or finger sensor are to be worn.  No   7   Individuals with clinically significant hand tremors, as judged by a Study Investigator.  No   8   Pregnant women.     No   9   Subjects with any medical history, physical exam, vital sign or any other study procedure finding/assessment that in the opinion of the Investigator could compromise subject safety during study participation or interfere with the study integrity and/or the accurate assessment of the study objectives.  No   10   Presence of skin conditions or disease at the fingers of SpO2% application sites that could interfere with SpO2% sensor placement or the accuracy of measurement. Such conditions  include, but are not limited to: extensive scarring, skin lesions, redness, infection or edema at target measurement sites.   No   11   Presence of long fingernails that interfere with the placement of the SpO2% sensor or nail polish at the fingers of SpO2% application sites.  No   12   Medical history or physical assessment finding that makes the subject inappropriate for participation, according to the investigator.  No     Patient does fulfill study inclusion criteria and no exclusion criteria are found.     Yoni Larson MD    12-APR-2022    Christie Tao

## 2022-04-12 NOTE — PROGRESS NOTES
"      Oban Study In-Lab Note      Study description: Multiconditions PPG Study. The purpose of this research study is to collect data related to health for the development of mobile technologies. This data will include physiological signal recordings from medical devices and data collection software on Apple Watches (\"study watches\"). This study is not to provide any treatment nor assess safety and effectiveness, but rather to collect information for research and  purposes.    Subject ID:  [id number]       SCREENING        Prem Taylor   1950          71 year old  male    Time Subject Sat: 1305     Past Medical History:   Diagnosis Date     Abnormal ECG      Anemia      Anxiety      Arrhythmia      Atrial fibrillation (H) 10/09/2017     Cancer (H) 2015     CPAP (continuous positive airway pressure) dependence 10/2019    per patient     Depression      Heart valve disease 2017     History of blood clots      Hyperlipidemia 2006     Pulmonary embolism, blood-clot, obstetric 10/09/2017     Sleep apnea 2017    Uses CPAP       Current Outpatient Medications:      escitalopram (LEXAPRO) 20 MG tablet, Take 1 tablet (20 mg) by mouth daily, Disp: 90 tablet, Rfl: 3     MULTIVITAMIN (MULTIPLE VITAMIN ORAL), [MULTIVITAMIN (MULTIPLE VITAMIN ORAL)] Take 1 tablet by mouth daily., Disp: , Rfl:      warfarin ANTICOAGULANT (COUMADIN/JANTOVEN) 7.5 MG tablet, [WARFARIN ANTICOAGULANT (COUMADIN/JANTOVEN) 7.5 MG TABLET] Take 1 tablet (7.5 mg) by mouth daily. Adjust dose based on INR results as directed., Disp: 90 tablet, Rfl: 3    No Known Allergies     Past Surgical History:   Procedure Laterality Date     BACK SURGERY       CERVICAL FUSION  2001     HERNIA REPAIR  2005     NE LAP,INGUINAL HERNIA REPR,INITIAL Left 8/15/2019     TONSILLECTOMY          Child-Bearing Potential?: N/A (Male)    Race: White  Race (Secondary): N/A    : No    Ethnicity: Non-/     Vitals:  /87 (BP Location: " "Right arm, Patient Position: Sitting, Cuff Size: Adult Regular)   Pulse 54   Temp 97.7  F (36.5  C) (Oral)   Resp 16   Ht 1.905 m (6' 3\")   Wt 105.2 kg (232 lb)   SpO2 97%   BMI 29.00 kg/m       Sponsor Expected Values   Blood Pressure: SBP: ; DBP: 40-90  Pulse:  bpm  Temp: 35.5-37.5  C  Respiration: 10-23  Ht: in cm  Wt: in kg  SpO2%: %  BMI: Rounded to nearest whole number    Repeated Measurements: (enter as needed)  n/a     Respiratory Conditions: N/A    Spirometer Test Results (FEV%): n/a    Condition Severity: Not Applicable      Sleep Conditions:  Sleep Apnea Diagnosis: Yes  Use of CPAP at Night: Yes    Oxygen Therapy: No      Minutes of Exercise per Week: >60  Type of Activity: Both      Measurements & Preferences:  Dominant Hand: Right   Preferred Watch Hand: Left    Volunteer-Reported Rosa Scale: 4  Staff-Recorded Rosa Scale: 4    Hairiness Level: A: Thin Hair, Low Density     Wrist Circumference:  Left: 180 mm       Right: 178 mm          ECG:  Rhythm interpretation can be found in the Physical Exam note.   Heart Rate: 60   Total Seconds: 30         STUDY PROCEDURE DATA     Spectrometer Values:            Left:   L*: 55.68    A*: 9.63   B*: 18.31      Right: L*: 58.69    A*: 9.03   B*: 18.82                 Environmental Conditions:   Temperature: 22.9  C  Barometric Pressure: 29.86 in (from weather.com)   Humidity: 21 %    Lux Level (Near Wrist):  Left: 593.3  Right: 623.1  Pre-Procedure Temperatures:  Left Wrist Skin: 33.7  C  Right Wrist Skin: 33.8  C  Finger Nellcor #1: 28.9  C  Finger Nellcor #2: 31.5  C    Study Date: 04/12/22  Device Placement Time ( Time): 14:10:50    Device IDs  Left Watch ID (Size): [id number] (40)  Right Watch ID (Size): [id number] (40)   Band Size  Left: M/L    Right: M/L  Secure Setting Notch  Left: 4   Right: 4  Watch Enclosure: Aluminum     Nox ID: [id number]     Sync Box ID: [id number]     Nellcor #1 ID: [id number]  Darci #1 Location: " Right Index Finger    Nellcor #2 ID: [id number]  Nellcor #2 Location: Right Ring Finger    Subject Transgressions: (time stamp and identify all extraneous subject movements, coughs, etc)   n/a    Time Macbook based Picture 1: 14:10:50  Time Watch Left Based Picture 1: 14:10:50   Time Watch Right Based Picture 1: 14:10:49     Time Nellcor #1 Based Picture 2: 14:07:26   Time Nellcor #2 Based Picture 2: 14:07:25   Time Macbook Based Picture 2: 14:07:25     POST-PROCEDURE      Temperatures:  Left Wrist Skin (post): 32.5  C   Right Wrist Skin (post): 31.5  C  Finger Nellcor #1 (post): 26.0  C Finger Nellcor #2 (post): 28.8  C  Subject Performed Secure-1 Measurement? Yes  Moisturizing Cream/Lotion applied at wrist? No  Apple Watch Band Tightness During In-Lab Study: Snug but comfortable  Additional Comments (Device/participant issues):   15:06 Subject only performed 15 seconds of the watch check during the Secure +1 set due to RA error.      12-APR-2022  Christie Tao

## 2022-04-12 NOTE — PROGRESS NOTES
"   Oban Study Consent On-Site Visit      Study description:   Multiconditions PPG Study. The purpose of this research study is to collect data related to health for the development of mobile technologies. This data will include physiological signal recordings from medical devices and data collection software on Apple Watches (\"study watches\"). This study is not to provide any treatment nor assess safety and effectiveness, but rather to collect information for research and  purposes.     Prem Taylor a 71 year old male, was onsite today to discuss participation in the Oban study.   The consent form was reviewed with the patient.     The review of the study included:    Study Purpose     COVID-19 Criteria     On-Site Study Participation    Participant Responsibilities      Study Data and Devices    Benefits and Risks of Participation    Compensation and Costs of Participation    Voluntary Participation    Confidentiality     Injury and Legal Rights    The subject was queried in regards to his willingness to continue and his questions were answered to his satisfaction.     The patient has given his agreement to volunteer to participate in the above noted study.     The In-Lab consent form and HIPPA form version 28-Mar-2022 was signed 12-APR-2022 onsite in the Clinic Research Unit.     A copy of the Oban consent will be placed in subject's medical record.  A copy of the consent form was given to the subject today.    Study data is directly entered into Epic per protocol.     No study procedures were done prior to Prem Taylor providing informed consent.       Christie Tao"

## 2022-04-19 DIAGNOSIS — I48.19 PERSISTENT ATRIAL FIBRILLATION (H): ICD-10-CM

## 2022-04-19 RX ORDER — WARFARIN SODIUM 7.5 MG/1
TABLET ORAL
Qty: 90 TABLET | Refills: 3 | Status: SHIPPED | OUTPATIENT
Start: 2022-04-19 | End: 2023-02-07

## 2022-04-19 NOTE — TELEPHONE ENCOUNTER
Patient Information     Patient Name MRN Sex Christiano Mccarthy [id number] Male 1969      Telephone Encounter by Ivania Maldonado at 2017  1:53 PM     Author:  Ivania Maldonado Service:  (none) Author Type:  (none)     Filed:  2017  1:57 PM Encounter Date:  2017 Status:  Signed     :  Ivania Maldonado            DOSAGE ON ALLOPURINOL IS DOUBLE WHAT IT USED TO BE. USED TO BE 300MG AND NOW ITS 600MG. PATIENT IS CONFUSED.  PLEASE CALL.  THANK YOU    Ivania Maldonado ....................  2017   1:56 PM           Pt is calling to get a refill.    Requested Medication:   Pending Prescriptions:                       Disp   Refills    warfarin ANTICOAGULANT (COUMADIN) 7.5 MG *90 tab*3            Sig: [WARFARIN ANTICOAGULANT (COUMADIN/JANTOVEN) 7.5           MG TABLET] Take 1 tablet (7.5 mg) by mouth daily.           Adjust dose based on INR results as directed.       Last Refill:  4/5/21  Last Office Visit:  3/8/2022   Next Appointment with Provider:  Visit date not found

## 2022-04-26 ENCOUNTER — ANTICOAGULATION THERAPY VISIT (OUTPATIENT)
Dept: ANTICOAGULATION | Facility: CLINIC | Age: 72
End: 2022-04-26

## 2022-04-26 ENCOUNTER — LAB (OUTPATIENT)
Dept: LAB | Facility: CLINIC | Age: 72
End: 2022-04-26
Payer: COMMERCIAL

## 2022-04-26 DIAGNOSIS — I26.99 PULMONARY EMBOLISM, BILATERAL (H): ICD-10-CM

## 2022-04-26 DIAGNOSIS — I48.19 PERSISTENT ATRIAL FIBRILLATION (H): ICD-10-CM

## 2022-04-26 DIAGNOSIS — I48.19 PERSISTENT ATRIAL FIBRILLATION (H): Primary | ICD-10-CM

## 2022-04-26 LAB — INR BLD: 2.5 (ref 0.9–1.1)

## 2022-04-26 PROCEDURE — 36415 COLL VENOUS BLD VENIPUNCTURE: CPT

## 2022-04-26 PROCEDURE — 85610 PROTHROMBIN TIME: CPT

## 2022-04-26 NOTE — PROGRESS NOTES
ANTICOAGULATION MANAGEMENT     Prem Taylor 71 year old male is on warfarin with therapeutic INR result. (Goal INR 2.0-3.0)    Recent labs: (last 7 days)     04/26/22  0730   INR 2.5*       ASSESSMENT       Source(s): Chart Review, Patient/Caregiver Call and Template       Warfarin doses taken: Warfarin taken as instructed    Diet: No new diet changes identified    New illness, injury, or hospitalization: No    Medication/supplement changes: None noted    Signs or symptoms of bleeding or clotting: No    Previous INR: Therapeutic last 2(+) visits    Additional findings: None       PLAN     Recommended plan for no diet, medication or health factor changes affecting INR     Dosing Instructions: continue your current warfarin dose with next INR in 8 weeks       Summary  As of 4/26/2022    Full warfarin instructions:  7.5 mg every day   Next INR check:  6/21/2022             Telephone call with Ryan who verbalizes understanding and agrees to plan    Lab visit scheduled    Education provided: Goal range and significance of current result and Contact [phone] with any changes, questions or concerns.     Plan made per [facility] anticoagulation protocol    Elsie Stringer RN  Anticoagulation Clinic  4/26/2022    _______________________________________________________________________     Anticoagulation Episode Summary     Current INR goal:  2.0-3.0   TTR:  100.0 % (1 y)   Target end date:  Indefinite   Send INR reminders to:  RUBY MAURICIO    Indications    Pulmonary embolism  bilateral (H) (Resolved) [I26.99]  Persistent atrial fibrillation (H) [I48.19]           Comments:           Anticoagulation Care Providers     Provider Role Specialty Phone number    Mamadou Baez MD Referring Family Medicine [phone]

## 2022-06-21 ENCOUNTER — ANTICOAGULATION THERAPY VISIT (OUTPATIENT)
Dept: ANTICOAGULATION | Facility: CLINIC | Age: 72
End: 2022-06-21

## 2022-06-21 ENCOUNTER — LAB (OUTPATIENT)
Dept: LAB | Facility: CLINIC | Age: 72
End: 2022-06-21
Payer: COMMERCIAL

## 2022-06-21 DIAGNOSIS — I26.99 PULMONARY EMBOLISM, BILATERAL (H): ICD-10-CM

## 2022-06-21 DIAGNOSIS — I48.19 PERSISTENT ATRIAL FIBRILLATION (H): Primary | ICD-10-CM

## 2022-06-21 DIAGNOSIS — I48.19 PERSISTENT ATRIAL FIBRILLATION (H): ICD-10-CM

## 2022-06-21 LAB — INR BLD: 2.6 (ref 0.9–1.1)

## 2022-06-21 PROCEDURE — 36416 COLLJ CAPILLARY BLOOD SPEC: CPT

## 2022-06-21 PROCEDURE — 85610 PROTHROMBIN TIME: CPT

## 2022-08-08 ENCOUNTER — OFFICE VISIT (OUTPATIENT)
Dept: CARDIOLOGY | Facility: CLINIC | Age: 72
End: 2022-08-08
Payer: COMMERCIAL

## 2022-08-08 VITALS
DIASTOLIC BLOOD PRESSURE: 76 MMHG | SYSTOLIC BLOOD PRESSURE: 126 MMHG | HEIGHT: 75 IN | HEART RATE: 72 BPM | RESPIRATION RATE: 14 BRPM | BODY MASS INDEX: 28.47 KG/M2 | WEIGHT: 229 LBS

## 2022-08-08 DIAGNOSIS — I48.21 PERMANENT ATRIAL FIBRILLATION (H): ICD-10-CM

## 2022-08-08 DIAGNOSIS — G47.33 OSA ON CPAP: Primary | ICD-10-CM

## 2022-08-08 PROCEDURE — 99214 OFFICE O/P EST MOD 30 MIN: CPT | Performed by: INTERNAL MEDICINE

## 2022-08-08 NOTE — PROGRESS NOTES
Cardiology Clinic Office Note    Assessment / Plan:    1.  Chronic atrial fibrillation.  Rate control adequate and not excessively slow based on pacemaker assessment.  Anticoagulated for stroke prophylaxis.  No changes warranted.  2.  Recurrent unprovoked pulmonary emboli on chronic warfarin.  3.  Fatigue with regular exercise.  Discussed getting his CPAP mask checked, or possibly cutting back on his exercise to help with his daily fatigue level.      Follow-up as needed  ______________________________________________________________________    Subjective:    I had the opportunity to see Prem Taylor at the [facility]. Prem Taylor is a 71 year old male with a history of hyperlipidemia who presented 2017 with exertional shortness of breath found to have bilateral pulmonary emboli and new onset atrial fibrillation.  He was initiated on warfarin and metoprolol and underwent cardioversion but had asymptomatic return to atrial fibrillation, now suspected to be permanent.  He has been diagnosed with obstructive sleep apnea and prescribed a CPAP unit which his wife noted to improve his snoring.  No etiology for his pulmonary embolism was determined, he remains on long-term warfarin anticoagulation.    Holter monitor 1 year ago suggested normal chronotropic response to activities with no significant pauses.  He returns for routine follow-up accompanied by his wife    Over the last year, he has generally felt well.  His weight is up 9 pounds, despite him doing aerobic exercises for an hour and a half 5 days a week at the gym.  His stamina has been stable, his target heart rate with exercise about 110 bpm.  He notes that he still has some daytime sleepiness, and can fall asleep easily when in active.  He reports this does not necessarily occur right after his daily exercise regimen but can be later in the day.  He has not had his CPAP device checked  recently.    ______________________________________________________________________    Problem List:  Patient Active Problem List   Diagnosis     Hearing Loss     Hypercholesterolemia     Presbycusis     Sinus bradycardia     BPH without urinary obstruction     Atypical Chest Pain     Blood Pressure Isolated Elevated     Male erectile dysfunction     Basal cell carcinoma, ear     Erectile dysfunction, unspecified erectile dysfunction type     Overweight (BMI 25.0-29.9)     Tubular adenoma of colon     Hyperbilirubinemia     Persistent atrial fibrillation (H)     Mitral valve insufficiency     ALISE on CPAP     Anxiety     Chronic cough     Normochromic normocytic anemia     Left inguinal hernia     Blepharitis of both eyes     History of pulmonary embolism     Medical History:  Past Medical History:   Diagnosis Date     Abnormal ECG      Anemia      Anxiety      Arrhythmia      Atrial fibrillation (H) 10/09/2017     Cancer (H) 2015     CPAP (continuous positive airway pressure) dependence 10/2019    per patient     Depression      Heart valve disease 2017     History of blood clots      Hyperlipidemia 2006     Pulmonary embolism, blood-clot, obstetric 10/09/2017     Sleep apnea 2017    Uses CPAP     Surgical History:  Past Surgical History:   Procedure Laterality Date     BACK SURGERY       CERVICAL FUSION  2001     HERNIA REPAIR  2005     ND LAP,INGUINAL HERNIA REPR,INITIAL Left 8/15/2019    Procedure: HERNIORRHAPHY, INGUINAL, LAPAROSCOPIC;  Surgeon: Yan Leiva MD;  Location: [facility];  Service: General     TONSILLECTOMY      childhood     Social History:  Social History     Socioeconomic History     Marital status:      Spouse name: Not on file     Number of children: Not on file     Years of education: Not on file     Highest education level: Not on file   Occupational History     Not on file   Tobacco Use     Smoking status: Former Smoker     Smokeless tobacco: Never Used   Substance and  Sexual Activity     Alcohol use: Yes     Drug use: No     Sexual activity: Not on file   Other Topics Concern     Not on file   Social History Narrative     Not on file     Social Determinants of Health     Financial Resource Strain: Not on file   Food Insecurity: Not on file   Transportation Needs: Not on file   Physical Activity: Not on file   Stress: Not on file   Social Connections: Not on file   Intimate Partner Violence: Not on file   Housing Stability: Not on file     Sleep History:  Has a new CPAP machine but has not had it checked regularly  Exercise History:  Sleep History:  Restorative on CPAP  Exercise History:  Aerobic exercise 5 days a week at the gym treadmill, stair climber for 1.5 hours daily    Review of Systems:      Positive for shortness of breath with activity, irregular heartbeat, hearing loss, cough, joint pains, daytime sleepiness, anxiety.  12 point review of systems otherwise negative     Please refer above for cardiac ROS details.         Family History:  Family History   Problem Relation Age of Onset     Heart Disease Mother      Hypertension Mother      Dementia Mother      Coronary Artery Disease Father          Allergies:  No Known Allergies  Medications:  Current Outpatient Medications   Medication Sig Dispense Refill     escitalopram (LEXAPRO) 20 MG tablet Take 1 tablet (20 mg) by mouth daily 90 tablet 3     MULTIVITAMIN (MULTIPLE VITAMIN ORAL) [MULTIVITAMIN (MULTIPLE VITAMIN ORAL)] Take 1 tablet by mouth daily.       warfarin ANTICOAGULANT (COUMADIN) 7.5 MG tablet [WARFARIN ANTICOAGULANT (COUMADIN/JANTOVEN) 7.5 MG TABLET] Take 1 tablet (7.5 mg) by mouth daily. Adjust dose based on INR results as directed. 90 tablet 3       Objective:   Wt Readings from Last 3 Encounters:   08/08/22 103.9 kg (229 lb)   04/12/22 105.2 kg (232 lb)   03/08/22 107.5 kg (237 lb)     Vital signs:  /76 (BP Location: Left arm, Patient Position: Sitting, Cuff Size: Adult Large)   Pulse 72   Resp 14   " Ht 1.905 m (6' 3\")   Wt 103.9 kg (229 lb)   BMI 28.62 kg/m        Physical Exam:    General Appearance : Awake, Alert, No acute distress  HEENT: No Scleral icterus; the mucous membranes were pink and moist.  Conjunctivae not injected  Neck:  No cervical bruits, jugular venous distention, or thyromegaly   Chest: The spine was straight. Chest wall symmetric  Lungs: Respirations unlabored; the lungs are clear to auscultation.  No wheezing   Cardiovascular:   Normal point of maximal impulse.  Auscultation reveals irregularly irregular first and second heart sounds with no murmurs, rubs, or gallops.  Carotid, radial, and dorsalis pedal pulses are intact and symmetric.  Abdomen: Rounded.  No organomegaly, masses, bruits, or tenderness. Bowels sounds are present  Extremities:  No clubbing, cyanosis.  No edema  Skin: No rash, bruising  Musculoskeletal: No tenderness.  Neurologic: Alert and oriented ×3. Speech is fluent.    Lab Results:  LIPIDS:  Lab Results   Component Value Date    CHOL 239 (H) 03/08/2022    CHOL 211 (H) 08/10/2021    CHOL 215 (H) 02/11/2020     Lab Results   Component Value Date    HDL 70 03/08/2022    HDL 67 08/10/2021    HDL 72 02/11/2020     Lab Results   Component Value Date     (H) 03/08/2022     08/10/2021     02/11/2020     Lab Results   Component Value Date    TRIG 96 03/08/2022    TRIG 109 08/10/2021    TRIG 82 02/11/2020       Holter monitor 6/2021:    A 24-hour Holter monitor was applied June 14, 2021 with date of interpretation June 16, 2021    Atrial fibrillation with right bundle branch block pattern is seen throughout the recording    Average ventricular rate is 72 bpm with heart rate range between  bpm    Modest pauses noted- longest pause of 2.6 seconds; but no pauses in excess of 3 seconds    Otherwise no major bradycardia identified    Infrequent ventricular ectopy 493 PVCs which is 0.5% burden ; one triplet at a modest rate otherwise no complexly " identified    Impression    Persistent atrial fibrillation with controlled ventricular response    Patient activity diary was reviewed;  he was very active during the day and had no symptoms              HOLLY GARCIA MD [facility]  [phone]    This note created using Dragon voice recognition software.  Sound alike errors may have escaped editing.

## 2022-08-08 NOTE — LETTER
8/8/2022    Mamadou Baez MD  [address]  [facility] [address]    RE: Prem Taylor       Dear Colleague,     I had the pleasure of seeing Prem Taylor in the [facility].    Cardiology Clinic Office Note    Assessment / Plan:    1.  Chronic atrial fibrillation.  Rate control adequate and not excessively slow based on pacemaker assessment.  Anticoagulated for stroke prophylaxis.  No changes warranted.  2.  Recurrent unprovoked pulmonary emboli on chronic warfarin.  3.  Fatigue with regular exercise.  Discussed getting his CPAP mask checked, or possibly cutting back on his exercise to help with his daily fatigue level.      Follow-up as needed  ______________________________________________________________________    Subjective:    I had the opportunity to see Prem Taylor at the [facility]. Prem Taylor is a 71 year old male with a history of hyperlipidemia who presented 2017 with exertional shortness of breath found to have bilateral pulmonary emboli and new onset atrial fibrillation.  He was initiated on warfarin and metoprolol and underwent cardioversion but had asymptomatic return to atrial fibrillation, now suspected to be permanent.  He has been diagnosed with obstructive sleep apnea and prescribed a CPAP unit which his wife noted to improve his snoring.  No etiology for his pulmonary embolism was determined, he remains on long-term warfarin anticoagulation.    Holter monitor 1 year ago suggested normal chronotropic response to activities with no significant pauses.  He returns for routine follow-up accompanied by his wife    Over the last year, he has generally felt well.  His weight is up 9 pounds, despite him doing aerobic exercises for an hour and a half 5 days a week at the gym.  His stamina has been stable, his target heart rate with exercise about 110 bpm.  He notes that he still has some daytime sleepiness, and can fall  asleep easily when in active.  He reports this does not necessarily occur right after his daily exercise regimen but can be later in the day.  He has not had his CPAP device checked recently.    ______________________________________________________________________    Problem List:  Patient Active Problem List   Diagnosis     Hearing Loss     Hypercholesterolemia     Presbycusis     Sinus bradycardia     BPH without urinary obstruction     Atypical Chest Pain     Blood Pressure Isolated Elevated     Male erectile dysfunction     Basal cell carcinoma, ear     Erectile dysfunction, unspecified erectile dysfunction type     Overweight (BMI 25.0-29.9)     Tubular adenoma of colon     Hyperbilirubinemia     Persistent atrial fibrillation (H)     Mitral valve insufficiency     ALISE on CPAP     Anxiety     Chronic cough     Normochromic normocytic anemia     Left inguinal hernia     Blepharitis of both eyes     History of pulmonary embolism     Medical History:  Past Medical History:   Diagnosis Date     Abnormal ECG      Anemia      Anxiety      Arrhythmia      Atrial fibrillation (H) 10/09/2017     Cancer (H) 2015     CPAP (continuous positive airway pressure) dependence 10/2019    per patient     Depression      Heart valve disease 2017     History of blood clots      Hyperlipidemia 2006     Pulmonary embolism, blood-clot, obstetric 10/09/2017     Sleep apnea 2017    Uses CPAP     Surgical History:  Past Surgical History:   Procedure Laterality Date     BACK SURGERY       CERVICAL FUSION  2001     HERNIA REPAIR  2005     [place] LAP,INGUINAL HERNIA REPR,INITIAL Left 8/15/2019    Procedure: HERNIORRHAPHY, INGUINAL, LAPAROSCOPIC;  Surgeon: Yan Leiva MD;  Location: [facility];  Service: General     TONSILLECTOMY      childhood     Social History:  Social History     Socioeconomic History     Marital status:      Spouse name: Not on file     Number of children: Not on file     Years of education: Not on  file     Highest education level: Not on file   Occupational History     Not on file   Tobacco Use     Smoking status: Former Smoker     Smokeless tobacco: Never Used   Substance and Sexual Activity     Alcohol use: Yes     Drug use: No     Sexual activity: Not on file   Other Topics Concern     Not on file   Social History Narrative     Not on file     Social Determinants of Health     Financial Resource Strain: Not on file   Food Insecurity: Not on file   Transportation Needs: Not on file   Physical Activity: Not on file   Stress: Not on file   Social Connections: Not on file   Intimate Partner Violence: Not on file   Housing Stability: Not on file     Sleep History:  Has a new CPAP machine but has not had it checked regularly  Exercise History:  Sleep History:  Restorative on CPAP  Exercise History:  Aerobic exercise 5 days a week at the gym treadmill, stair climber for 1.5 hours daily    Review of Systems:      Positive for shortness of breath with activity, irregular heartbeat, hearing loss, cough, joint pains, daytime sleepiness, anxiety.  12 point review of systems otherwise negative     Please refer above for cardiac ROS details.         Family History:  Family History   Problem Relation Age of Onset     Heart Disease Mother      Hypertension Mother      Dementia Mother      Coronary Artery Disease Father          Allergies:  No Known Allergies  Medications:  Current Outpatient Medications   Medication Sig Dispense Refill     escitalopram (LEXAPRO) 20 MG tablet Take 1 tablet (20 mg) by mouth daily 90 tablet 3     MULTIVITAMIN (MULTIPLE VITAMIN ORAL) [MULTIVITAMIN (MULTIPLE VITAMIN ORAL)] Take 1 tablet by mouth daily.       warfarin ANTICOAGULANT (COUMADIN) 7.5 MG tablet [WARFARIN ANTICOAGULANT (COUMADIN/JANTOVEN) 7.5 MG TABLET] Take 1 tablet (7.5 mg) by mouth daily. Adjust dose based on INR results as directed. 90 tablet 3       Objective:   Wt Readings from Last 3 Encounters:   08/08/22 103.9 kg (229 lb)  "  04/12/22 105.2 kg (232 lb)   03/08/22 107.5 kg (237 lb)     Vital signs:  /76 (BP Location: Left arm, Patient Position: Sitting, Cuff Size: Adult Large)   Pulse 72   Resp 14   Ht 1.905 m (6' 3\")   Wt 103.9 kg (229 lb)   BMI 28.62 kg/m        Physical Exam:    General Appearance : Awake, Alert, No acute distress  HEENT: No Scleral icterus; the mucous membranes were pink and moist.  Conjunctivae not injected  Neck:  No cervical bruits, jugular venous distention, or thyromegaly   Chest: The spine was straight. Chest wall symmetric  Lungs: Respirations unlabored; the lungs are clear to auscultation.  No wheezing   Cardiovascular:   Normal point of maximal impulse.  Auscultation reveals irregularly irregular first and second heart sounds with no murmurs, rubs, or gallops.  Carotid, radial, and dorsalis pedal pulses are intact and symmetric.  Abdomen: Rounded.  No organomegaly, masses, bruits, or tenderness. Bowels sounds are present  Extremities:  No clubbing, cyanosis.  No edema  Skin: No rash, bruising  Musculoskeletal: No tenderness.  Neurologic: Alert and oriented ×3. Speech is fluent.    Lab Results:  LIPIDS:  Lab Results   Component Value Date    CHOL 239 (H) 03/08/2022    CHOL 211 (H) 08/10/2021    CHOL 215 (H) 02/11/2020     Lab Results   Component Value Date    HDL 70 03/08/2022    HDL 67 08/10/2021    HDL 72 02/11/2020     Lab Results   Component Value Date     (H) 03/08/2022     08/10/2021     02/11/2020     Lab Results   Component Value Date    TRIG 96 03/08/2022    TRIG 109 08/10/2021    TRIG 82 02/11/2020       Holter monitor 6/2021:    A 24-hour Holter monitor was applied June 14, 2021 with date of interpretation June 16, 2021    Atrial fibrillation with right bundle branch block pattern is seen throughout the recording    Average ventricular rate is 72 bpm with heart rate range between  bpm    Modest pauses noted- longest pause of 2.6 seconds; but no pauses in " excess of 3 seconds    Otherwise no major bradycardia identified    Infrequent ventricular ectopy 493 PVCs which is 0.5% burden ; one triplet at a modest rate otherwise no complexly identified    Impression    Persistent atrial fibrillation with controlled ventricular response    Patient activity diary was reviewed;  he was very active during the day and had no symptoms      HOLLY GARCIA MD [facility]  [phone]    This note created using Dragon voice recognition software.  Sound alike errors may have escaped editing.      Thank you for allowing me to participate in the care of your patient.      Sincerely,     Holly Garcia MD     [facility]  cc:   Referred Self,

## 2022-08-08 NOTE — PATIENT INSTRUCTIONS
"No medication changes today  Target at least 150 minutes of moderate aerobic activities each week  Consider decreasing alcohol intake or decreasing total caloric intake to help achieve weight loss.  A Mediterranean style diet or a carbohydrate restricted diet are likely better options than a \"keto diet\"  Get your CPAP machine checked regularly to make sure it is working effectively for you.  "

## 2022-08-09 ENCOUNTER — TRANSFERRED RECORDS (OUTPATIENT)
Dept: HEALTH INFORMATION MANAGEMENT | Facility: CLINIC | Age: 72
End: 2022-08-09

## 2022-08-15 ENCOUNTER — VIRTUAL VISIT (OUTPATIENT)
Dept: URGENT CARE | Facility: CLINIC | Age: 72
End: 2022-08-15
Payer: COMMERCIAL

## 2022-08-15 ENCOUNTER — TELEPHONE (OUTPATIENT)
Dept: FAMILY MEDICINE | Facility: CLINIC | Age: 72
End: 2022-08-15

## 2022-08-15 DIAGNOSIS — U07.1 CLINICAL DIAGNOSIS OF COVID-19: Primary | ICD-10-CM

## 2022-08-15 PROCEDURE — 99213 OFFICE O/P EST LOW 20 MIN: CPT | Mod: CS

## 2022-08-15 NOTE — TELEPHONE ENCOUNTER
Reason for Call:  Other call back    Detailed comments: Pt called and stated tested positive for covid, symptoms started 8/10/22. Pt has INR appointment scheduled for tomorrow 8/16/22. Pt is wondering if it would be okay to cancel that appointment and reschedule or if INR team still wants pt to get lab draw. Please call pt back to discuss.    Phone Number Patient can be reached at: Home number on file [phone] (home)    Best Time: na    Can we leave a detailed message on this number? Not Applicable    Call taken on 8/15/2022 at 8:56 AM by Rafaela Prieto

## 2022-08-15 NOTE — PROGRESS NOTES
"Ryan is a 71 year old who is being evaluated via a billable telephone visit.          Assessment & Plan     Clinical diagnosis of COVID-19    Treat with Paxlovid.    - nirmatrelvir and ritonavir (PAXLOVID) therapy pack; Take 3 tablets by mouth 2 times daily for 5 days (Take 2 Nirmatrelvir tablets and 1 Ritonavir tablet twice daily for 5 days)               COVID-19 positive patient.  Encounter for consideration of medication intervention. Patient does qualify for a prescription. Full discussion with patient including medication options, risks and benefits. Potential drug interactions reviewed with patient.     Treatment Planned Paxlovid, Rx sent to [place] pharmacy    Temporary change to home medications:  None     Estimated body mass index is 28.62 kg/m  as calculated from the following:    Height as of 8/8/22: 1.905 m (6' 3\").    Weight as of 8/8/22: 103.9 kg (229 lb).  GFR Estimate   Date Value Ref Range Status   03/08/2022 >90 >60 mL/min/1.73m2 Final     Comment:     Effective December 21, 2021 eGFRcr in adults is calculated using the 2021 CKD-EPI creatinine equation which includes age and gender (Court et al., NEJM, DOI: 10.1056/JHZCtx5887127)   02/05/2021 >60 >60 mL/min/1.73m2 Final     No results found for: EKQVI81ZZH    No follow-ups on file.    Virtual Urgent Care  [facility]    Subjective   Ryan is a 71 year old, presenting for the following health issues:  No chief complaint on file.      HPI       COVID-19 Symptom Review  How many days ago did these symptoms start? 8/11    Are any of the following symptoms significant for you?    New or worsening difficulty breathing? No    Worsening cough? Yes, it's a dry cough.     Fever or chills? Yes, I felt feverish or had chills.    Headache: YES    Sore throat: No    Chest pain: No    Diarrhea: No    Body aches? YES- and fatigue    What treatments has patient tried? Acetaminophen and Nonsteroidals   Does patient live in a nursing home, " group home, or shelter? No  Does patient have a way to get food/medications during quarantined? Yes, I have a friend or family member who can help me.            Review of Systems   Constitutional, HEENT, cardiovascular, pulmonary, gi and gu systems are negative, except as otherwise noted.      Objective           Vitals:  No vitals were obtained today due to virtual visit.    Physical Exam   healthy, alert and no distress  PSYCH: Alert and oriented times 3; coherent speech, normal   rate and volume, able to articulate logical thoughts, able   to abstract reason, no tangential thoughts, no hallucinations   or delusions  His affect is normal and pleasant  RESP: No cough, no audible wheezing, able to talk in full sentences  Remainder of exam unable to be completed due to telephone visits                Phone call duration: 10 minutes    .  ..

## 2022-08-15 NOTE — TELEPHONE ENCOUNTER
"Called and spoke with Ryan-- he felt like he had a cold on 8/10 and then the following day it felt like \"a bit more than a cold\" so he tested and was positive. Symptoms have remained fairly minor; on phone call he does not sound severely ill or that he is struggling in any way. He does not feel that he will be started on anything for COVID treatment given he is right at, or possibly past, the 5-day after symptoms neto, and how minimal his symptoms are.   ACN did discuss that the COVID treatments can impact INR and if he is started on one, we would need his INR around Thursday-- Ryan verbalized understanding of this. TTR for patient is 100%. Agreed that pushing out INR is appropriate and that he will call back to schedule an appointment sooner if he is started on a COVID treatment. Appointment rescheduled to 8/23.    Elsie Stringer RN  [facility]  [phone]    "

## 2022-08-15 NOTE — PATIENT INSTRUCTIONS

## 2022-08-16 ENCOUNTER — TELEPHONE (OUTPATIENT)
Dept: FAMILY MEDICINE | Facility: CLINIC | Age: 72
End: 2022-08-16

## 2022-08-16 ENCOUNTER — TELEPHONE (OUTPATIENT)
Dept: ANTICOAGULATION | Facility: CLINIC | Age: 72
End: 2022-08-16

## 2022-08-16 DIAGNOSIS — I48.21 PERMANENT ATRIAL FIBRILLATION (H): Primary | ICD-10-CM

## 2022-08-16 NOTE — TELEPHONE ENCOUNTER
Pt returned call. Saw in chart when couldn't reach that vm was left t schedule an appointment for Thursday or Friday. PT scheduled INR for Friday at 7:45 and still has the one for Tuesday the 23rd of August as well.    Thanks~ aap

## 2022-08-16 NOTE — TELEPHONE ENCOUNTER
AC tracking calendar priority updated per protocol for interacting medication. Recheck date of 8/19 added. Note made of Paxlovid start as well.     LVM for patient to have INR checked Thursday or Friday.     Elsie Stringer RN  [facility]  [phone]

## 2022-08-16 NOTE — TELEPHONE ENCOUNTER
Noted.   Will follow-up with patient on Friday.    Elsie Stringer RN  [facility]  [phone]

## 2022-08-16 NOTE — TELEPHONE ENCOUNTER
Reason for Call:  Anticoagulation call back    Detailed comments: Ryan called he was started on Paxlovid for covid and he needs to know when he needs to do his next INR. Please call and discuss with patient    Phone Number Patient can be reached at: Cell number on file:    Telephone Information:   Mobile [phone]       Best Time: Anytime    Can we leave a detailed message on this number? YES    Call taken on 8/16/2022 at 8:49 AM by Maria Dolores Lugo

## 2022-08-19 ENCOUNTER — ANTICOAGULATION THERAPY VISIT (OUTPATIENT)
Dept: ANTICOAGULATION | Facility: CLINIC | Age: 72
End: 2022-08-19

## 2022-08-19 ENCOUNTER — LAB (OUTPATIENT)
Dept: LAB | Facility: CLINIC | Age: 72
End: 2022-08-19
Payer: COMMERCIAL

## 2022-08-19 DIAGNOSIS — I48.19 PERSISTENT ATRIAL FIBRILLATION (H): ICD-10-CM

## 2022-08-19 DIAGNOSIS — I48.21 PERMANENT ATRIAL FIBRILLATION (H): Primary | ICD-10-CM

## 2022-08-19 DIAGNOSIS — I26.99 PULMONARY EMBOLISM, BILATERAL (H): ICD-10-CM

## 2022-08-19 LAB — INR BLD: 2.5 (ref 0.9–1.1)

## 2022-08-19 PROCEDURE — 85610 PROTHROMBIN TIME: CPT

## 2022-08-19 PROCEDURE — 36416 COLLJ CAPILLARY BLOOD SPEC: CPT

## 2022-08-19 NOTE — PROGRESS NOTES
ANTICOAGULATION MANAGEMENT     Prem Taylor 71 year old male is on warfarin with therapeutic INR result. (Goal INR 2.0-3.0)    Recent labs: (last 7 days)     08/19/22  0746   INR 2.5*       ASSESSMENT       Source(s): Chart Review and Template       Warfarin doses taken: Warfarin taken as instructed    Diet: No new diet changes identified    New illness, injury, or hospitalization: Yes: COVID+ on 8/11    Medication/supplement changes: paxlovid 5 day course (dates: 8/16-8/20) which may be affecting INR today    Signs or symptoms of bleeding or clotting: No    Previous INR: Therapeutic last 2(+) visits    Additional findings: None       PLAN     Recommended plan for temporary change(s) affecting INR     Dosing Instructions: Continue your current warfarin dose with next INR in 2 weeks       Summary  As of 8/19/2022    Full warfarin instructions:  7.5 mg every day   Next INR check:  9/2/2022             Detailed voice message left for Ryan with dosing instructions and follow up date.     Contact [phone] to schedule and with any changes, questions or concerns.     Education provided: Please call back if any changes to your diet, medications or how you've been taking warfarin and Goal range and significance of current result    Plan made per [facility] anticoagulation protocol    Elsie Stringer RN  Anticoagulation Clinic  8/19/2022    _______________________________________________________________________     Anticoagulation Episode Summary     Current INR goal:  2.0-3.0   TTR:  100.0 % (1 y)   Target end date:  Indefinite   Send INR reminders to:  [facility]    Indications    Pulmonary embolism  bilateral (H) (Resolved) [I26.99]  Permanent atrial fibrillation (H) [I48.21]           Comments:  Approved for 8 week checks per -- see encounter from 07/27/21         Anticoagulation Care Providers     Provider Role Specialty Phone number    Mamadou Baez MD Referring Family Medicine [phone]

## 2022-08-23 ENCOUNTER — LAB (OUTPATIENT)
Dept: LAB | Facility: CLINIC | Age: 72
End: 2022-08-23
Payer: COMMERCIAL

## 2022-08-23 ENCOUNTER — ANTICOAGULATION THERAPY VISIT (OUTPATIENT)
Dept: ANTICOAGULATION | Facility: CLINIC | Age: 72
End: 2022-08-23

## 2022-08-23 DIAGNOSIS — I26.99 PULMONARY EMBOLISM, BILATERAL (H): ICD-10-CM

## 2022-08-23 DIAGNOSIS — I48.19 PERSISTENT ATRIAL FIBRILLATION (H): ICD-10-CM

## 2022-08-23 DIAGNOSIS — I48.21 PERMANENT ATRIAL FIBRILLATION (H): Primary | ICD-10-CM

## 2022-08-23 LAB — INR BLD: 1.9 (ref 0.9–1.1)

## 2022-08-23 PROCEDURE — 36416 COLLJ CAPILLARY BLOOD SPEC: CPT

## 2022-08-23 PROCEDURE — 85610 PROTHROMBIN TIME: CPT

## 2022-08-23 NOTE — PROGRESS NOTES
ANTICOAGULATION MANAGEMENT     Prem Taylor 71 year old male is on warfarin with subtherapeutic INR result. (Goal INR 2.0-3.0)    Recent labs: (last 7 days)     08/23/22  0725   INR 1.9*       ASSESSMENT       Source(s): Chart Review, Patient/Caregiver Call and Template       Warfarin doses taken: Warfarin taken as instructed    Diet: No new diet changes identified    New illness, injury, or hospitalization: Yes: recent covid with paxlovid treatment.    Medication/supplement changes: None noted    Signs or symptoms of bleeding or clotting: No    Previous INR: Therapeutic last 2(+) visits    Additional findings: None       PLAN     Recommended plan for temporary change(s) affecting INR     Dosing Instructions: booster dose then continue your current warfarin dose with next INR in 2 weeks       Summary  As of 8/23/2022    Full warfarin instructions:  8/23: 11.25 mg; Otherwise 7.5 mg every day   Next INR check:  9/6/2022             Telephone call with Ryan who verbalizes understanding and agrees to plan    Lab visit scheduled    Education provided: Please call back if any changes to your diet, medications or how you've been taking warfarin, Goal range and significance of current result and Importance of therapeutic range    Plan made per [facility] anticoagulation protocol    Mee Maguire, RN  Anticoagulation Clinic  8/23/2022    _______________________________________________________________________     Anticoagulation Episode Summary     Current INR goal:  2.0-3.0   TTR:  99.8 % (1 y)   Target end date:  Indefinite   Send INR reminders to:  [facility]    Indications    Pulmonary embolism  bilateral (H) (Resolved) [I26.99]  Permanent atrial fibrillation (H) [I48.21]           Comments:  Approved for 8 week checks per -- see encounter from 07/27/21         Anticoagulation Care Providers     Provider Role Specialty Phone number    Mamadou Baez MD Referring Family Medicine [phone]

## 2022-09-07 ASSESSMENT — ENCOUNTER SYMPTOMS
NERVOUS/ANXIOUS: 0
HEMATURIA: 0
WEAKNESS: 0
HEADACHES: 0
NAUSEA: 0
SORE THROAT: 0
SHORTNESS OF BREATH: 0
MYALGIAS: 0
EYE PAIN: 0
CHILLS: 0
COUGH: 0
CONSTIPATION: 0
DYSURIA: 0
JOINT SWELLING: 0
PALPITATIONS: 0
PARESTHESIAS: 0
HEMATOCHEZIA: 0
ARTHRALGIAS: 1
FREQUENCY: 0
FEVER: 0
DIARRHEA: 0
HEARTBURN: 0
ABDOMINAL PAIN: 0
DIZZINESS: 0

## 2022-09-07 ASSESSMENT — ACTIVITIES OF DAILY LIVING (ADL): CURRENT_FUNCTION: NO ASSISTANCE NEEDED

## 2022-09-13 ENCOUNTER — OFFICE VISIT (OUTPATIENT)
Dept: FAMILY MEDICINE | Facility: CLINIC | Age: 72
End: 2022-09-13
Payer: COMMERCIAL

## 2022-09-13 ENCOUNTER — ANTICOAGULATION THERAPY VISIT (OUTPATIENT)
Dept: ANTICOAGULATION | Facility: CLINIC | Age: 72
End: 2022-09-13

## 2022-09-13 VITALS
OXYGEN SATURATION: 98 % | WEIGHT: 231 LBS | BODY MASS INDEX: 28.72 KG/M2 | SYSTOLIC BLOOD PRESSURE: 128 MMHG | HEIGHT: 75 IN | HEART RATE: 84 BPM | DIASTOLIC BLOOD PRESSURE: 86 MMHG

## 2022-09-13 DIAGNOSIS — F41.9 ANXIETY: ICD-10-CM

## 2022-09-13 DIAGNOSIS — Z86.711 HISTORY OF PULMONARY EMBOLISM: ICD-10-CM

## 2022-09-13 DIAGNOSIS — Z23 ENCOUNTER FOR IMMUNIZATION: ICD-10-CM

## 2022-09-13 DIAGNOSIS — E78.00 PURE HYPERCHOLESTEROLEMIA: ICD-10-CM

## 2022-09-13 DIAGNOSIS — E66.3 OVERWEIGHT: ICD-10-CM

## 2022-09-13 DIAGNOSIS — G47.33 OSA ON CPAP: ICD-10-CM

## 2022-09-13 DIAGNOSIS — D12.6 TUBULAR ADENOMA OF COLON: ICD-10-CM

## 2022-09-13 DIAGNOSIS — Z00.00 ENCOUNTER FOR MEDICARE ANNUAL WELLNESS EXAM: Primary | ICD-10-CM

## 2022-09-13 DIAGNOSIS — I48.19 PERSISTENT ATRIAL FIBRILLATION (H): ICD-10-CM

## 2022-09-13 DIAGNOSIS — I48.21 PERMANENT ATRIAL FIBRILLATION (H): Primary | ICD-10-CM

## 2022-09-13 DIAGNOSIS — H90.3 BILATERAL SENSORINEURAL HEARING LOSS: ICD-10-CM

## 2022-09-13 DIAGNOSIS — I26.99 PULMONARY EMBOLISM, BILATERAL (H): ICD-10-CM

## 2022-09-13 DIAGNOSIS — D64.9 NORMOCHROMIC NORMOCYTIC ANEMIA: ICD-10-CM

## 2022-09-13 LAB
ANION GAP SERPL CALCULATED.3IONS-SCNC: 10 MMOL/L (ref 7–15)
BUN SERPL-MCNC: 19.3 MG/DL (ref 8–23)
CALCIUM SERPL-MCNC: 9.1 MG/DL (ref 8.8–10.2)
CHLORIDE SERPL-SCNC: 106 MMOL/L (ref 98–107)
CHOLEST SERPL-MCNC: 250 MG/DL
CREAT SERPL-MCNC: 0.76 MG/DL (ref 0.67–1.17)
DEPRECATED HCO3 PLAS-SCNC: 23 MMOL/L (ref 22–29)
ERYTHROCYTE [DISTWIDTH] IN BLOOD BY AUTOMATED COUNT: 13.1 % (ref 10–15)
GFR SERPL CREATININE-BSD FRML MDRD: >90 ML/MIN/1.73M2
GLUCOSE SERPL-MCNC: 94 MG/DL (ref 70–99)
HCT VFR BLD AUTO: 40.5 % (ref 40–53)
HDLC SERPL-MCNC: 74 MG/DL
HGB BLD-MCNC: 13.5 G/DL (ref 13.3–17.7)
INR BLD: 2.8 (ref 0.9–1.1)
LDLC SERPL CALC-MCNC: 159 MG/DL
MCH RBC QN AUTO: 30.5 PG (ref 26.5–33)
MCHC RBC AUTO-ENTMCNC: 33.3 G/DL (ref 31.5–36.5)
MCV RBC AUTO: 92 FL (ref 78–100)
NONHDLC SERPL-MCNC: 176 MG/DL
PLATELET # BLD AUTO: 162 10E3/UL (ref 150–450)
POTASSIUM SERPL-SCNC: 4.2 MMOL/L (ref 3.4–5.3)
RBC # BLD AUTO: 4.42 10E6/UL (ref 4.4–5.9)
SODIUM SERPL-SCNC: 139 MMOL/L (ref 136–145)
TRIGL SERPL-MCNC: 84 MG/DL
WBC # BLD AUTO: 4.5 10E3/UL (ref 4–11)

## 2022-09-13 PROCEDURE — 99214 OFFICE O/P EST MOD 30 MIN: CPT | Mod: 25 | Performed by: FAMILY MEDICINE

## 2022-09-13 PROCEDURE — 80061 LIPID PANEL: CPT | Performed by: FAMILY MEDICINE

## 2022-09-13 PROCEDURE — 90662 IIV NO PRSV INCREASED AG IM: CPT | Performed by: FAMILY MEDICINE

## 2022-09-13 PROCEDURE — 36416 COLLJ CAPILLARY BLOOD SPEC: CPT | Performed by: FAMILY MEDICINE

## 2022-09-13 PROCEDURE — 85610 PROTHROMBIN TIME: CPT | Performed by: FAMILY MEDICINE

## 2022-09-13 PROCEDURE — G0439 PPPS, SUBSEQ VISIT: HCPCS | Performed by: FAMILY MEDICINE

## 2022-09-13 PROCEDURE — G0008 ADMIN INFLUENZA VIRUS VAC: HCPCS | Performed by: FAMILY MEDICINE

## 2022-09-13 PROCEDURE — 85027 COMPLETE CBC AUTOMATED: CPT | Performed by: FAMILY MEDICINE

## 2022-09-13 PROCEDURE — 36415 COLL VENOUS BLD VENIPUNCTURE: CPT | Performed by: FAMILY MEDICINE

## 2022-09-13 PROCEDURE — 80048 BASIC METABOLIC PNL TOTAL CA: CPT | Performed by: FAMILY MEDICINE

## 2022-09-13 RX ORDER — HYDROXYZINE PAMOATE 50 MG/1
50 CAPSULE ORAL 3 TIMES DAILY PRN
Qty: 90 CAPSULE | Refills: 3 | Status: SHIPPED | OUTPATIENT
Start: 2022-09-13 | End: 2023-04-11

## 2022-09-13 ASSESSMENT — ENCOUNTER SYMPTOMS
SHORTNESS OF BREATH: 0
DIZZINESS: 0
JOINT SWELLING: 0
EYE PAIN: 0
NERVOUS/ANXIOUS: 0
HEADACHES: 0
NAUSEA: 0
FREQUENCY: 0
COUGH: 0
SORE THROAT: 0
HEMATOCHEZIA: 0
MYALGIAS: 0
WEAKNESS: 0
DIARRHEA: 0
HEARTBURN: 0
FEVER: 0
HEMATURIA: 0
CHILLS: 0
PALPITATIONS: 0
DYSURIA: 0
ABDOMINAL PAIN: 0
PARESTHESIAS: 0
ARTHRALGIAS: 1
CONSTIPATION: 0

## 2022-09-13 ASSESSMENT — PAIN SCALES - GENERAL: PAINLEVEL: NO PAIN (0)

## 2022-09-13 ASSESSMENT — ACTIVITIES OF DAILY LIVING (ADL): CURRENT_FUNCTION: NO ASSISTANCE NEEDED

## 2022-09-13 NOTE — PROGRESS NOTES
ANTICOAGULATION MANAGEMENT     Prem Taylor 72 year old male is on warfarin with therapeutic INR result. (Goal INR 2.0-3.0)    Recent labs: (last 7 days)     09/13/22  0742   INR 2.8*       ASSESSMENT       Source(s): Chart Review and Template       Warfarin doses taken: Warfarin taken as instructed    Diet: No new diet changes identified    New illness, injury, or hospitalization: No    Medication/supplement changes: None noted    Signs or symptoms of bleeding or clotting: No    Previous INR: Subtherapeutic    Additional findings: None       PLAN     Recommended plan for no diet, medication or health factor changes affecting INR     Dosing Instructions: Continue your current warfarin dose with next INR in 3-4 weeks   It has been 3 weeks since last check    Summary  As of 9/13/2022    Full warfarin instructions:  7.5 mg every day   Next INR check:  10/11/2022             Detailed voice message left for Ryan with dosing instructions and follow up date.     Contact [phone] to schedule and with any changes, questions or concerns.     Education provided: Please call back if any changes to your diet, medications or how you've been taking warfarin    Plan made per [facility] anticoagulation protocol    Mee Maguire RN  Anticoagulation Clinic  9/13/2022    _______________________________________________________________________     Anticoagulation Episode Summary     Current INR goal:  2.0-3.0   TTR:  99.2 % (1 y)   Target end date:  Indefinite   Send INR reminders to:  [facility]    Indications    Pulmonary embolism  bilateral (H) (Resolved) [I26.99]  Permanent atrial fibrillation (H) [I48.21]           Comments:  Approved for 8 week checks per -- see encounter from 07/27/21         Anticoagulation Care Providers     Provider Role Specialty Phone number    Mamadou Baez MD Referring Family Medicine [phone]

## 2022-09-13 NOTE — PROGRESS NOTES
"SUBJECTIVE:     Prem Taylor is a 72 year old male who presents for Preventive Visit.      Annual wellness visit completed.  Risk questionnaire reviewed.  Suboptimal diet historically.  Known history of shortness of breath with exertion in 2017 with noted bilateral pulmonary emboli new onset atrial fibrillation.  He was initiated on warfarin and metoprolol and underwent cardioversion but had asymptomatic return to atrial fibrillation, now suspected to be permanent.  Follows with Dr. King.  Was seen August 8, 2022.  We will follow-up on as-needed basis in future.  He has been diagnosed with obstructive sleep apnea and prescribed a CPAP unit which his wife noted to improve his snoring.  No etiology for his pulmonary embolism was determined, he remains on long-term warfarin anticoagulation.   Holter monitor 1 year ago suggested normal chronotropic response to activities with no significant pauses.    Exercises at [facility] 5 days a week.  Underlying anxiety.  Had increased escitalopram from 10 mg up to 20 mg daily March 8, 2022.  Uncertain if significant benefit.  Still has some anxiety symptoms at times and would be interested in trying short acting medication on as-needed basis if necessary.  Tubular adenoma: Anoscopy February 11, 2019 and told to repeat at 5-year interval.  Bilateral hearing aids for sensorineural hearing loss.  Diet controlled cholesterol elevation with protective HDL 70 March 8, 2022 with total cholesterol 239 and .  Fasting glucose at that time was 92.  Needs high-dose flu shot.  Had COVID infection perhaps 2 months ago.  Had second Moderna booster May 10, 2022 and will receive COVID-19 vaccine this fall once available.  Comprehensive review of systems as above otherwise all negative.       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  No    Healthy Habits:     In general, how would you rate your overall health?  Good    Frequency of exercise:  4-5 days/week    Duration of exercise:  Greater than 60 minutes    Do you usually eat at least 4 servings of fruit and vegetables a day, include whole grains    & fiber and avoid regularly eating high fat or \"junk\" foods?  No    Taking medications regularly:  Yes    Medication side effects:  None    Ability to successfully perform activities of daily living:  No assistance needed    Home Safety:  Lack of grab bars in the bathroom    Hearing Impairment:  No hearing concerns    In the past 6 months, have you been bothered by leaking of urine?  No    In general, how would you rate your overall mental or emotional health?  Excellent      PHQ-2 Total Score: 0    Additional concerns today:  No    Do you feel safe in your environment? Yes    Have you ever done Advance Care Planning? (For example, a Health Directive, POLST, or a discussion with a medical provider or your loved ones about your wishes): Yes, advance care planning is on file.       Fall risk  Fallen 2 or more times in the past year?: No  Any fall with injury in the past year?: No    Cognitive Screening   1) Repeat 3 items (Leader, Season, Table)    2) Clock draw: NORMAL  3) 3 item recall: Recalls 3 objects  Results: 3 items recalled: COGNITIVE IMPAIRMENT LESS " LIKELY    Mini-CogTM Copyright S Mariam. Licensed by the author for use in [facility]; reprinted with permission (edith@[facility]). All rights reserved.      Do you have sleep apnea, excessive snoring or daytime drowsiness?: yes    Reviewed and updated as needed this visit by clinical staff   Tobacco  Allergies  Meds  Problems  Med Hx  Surg Hx  Fam Hx            Reviewed and updated as needed this visit by Provider   Tobacco  Allergies  Meds  Problems  Med Hx  Surg Hx  Fam Hx           Social History     Tobacco Use     Smoking status: Former Smoker     Smokeless tobacco: Never Used   Substance Use Topics     Alcohol use: Yes     If you drink alcohol do you typically have >3 drinks per day or >7 drinks per week? No    Alcohol Use 9/13/2022   Prescreen: >3 drinks/day or >7 drinks/week? -   Prescreen: >3 drinks/day or >7 drinks/week? No               Current providers sharing in care for this patient include:   Patient Care Team:  Mamadou Baez MD as PCP - Judit Fernández PharmD as Pharmacist (Pharmacist)  Mamadou Baez MD as Assigned PCP  Gerardo Velez MD as Assigned Heart and Vascular Provider    The following health maintenance items are reviewed in Epic and correct as of today:  Health Maintenance Due   Topic Date Due     LUNG CANCER SCREENING  Never done     AORTIC ANEURYSM SCREENING (SYSTEM ASSIGNED)  Never done     Lab work is in process  Labs reviewed in EPIC  BP Readings from Last 3 Encounters:   09/13/22 128/86   08/08/22 126/76   04/12/22 138/87    Wt Readings from Last 3 Encounters:   09/13/22 104.8 kg (231 lb)   08/08/22 103.9 kg (229 lb)   04/12/22 105.2 kg (232 lb)                  Patient Active Problem List   Diagnosis     Hearing Loss     Hypercholesterolemia     Presbycusis     Sinus bradycardia     BPH without urinary obstruction     Atypical Chest Pain     Blood Pressure Isolated Elevated     Male erectile dysfunction     Basal cell carcinoma,  ear     Erectile dysfunction, unspecified erectile dysfunction type     Overweight (BMI 25.0-29.9)     Tubular adenoma of colon     Hyperbilirubinemia     Permanent atrial fibrillation (H)     Mitral valve insufficiency     ALISE on CPAP     Anxiety     Chronic cough     Normochromic normocytic anemia     Left inguinal hernia     Blepharitis of both eyes     History of pulmonary embolism     Past Surgical History:   Procedure Laterality Date     BACK SURGERY       CERVICAL FUSION  2001     HERNIA REPAIR  2005     [facility] LAP,INGUINAL HERNIA REPR,INITIAL Left 8/15/2019    Procedure: HERNIORRHAPHY, INGUINAL, LAPAROSCOPIC;  Surgeon: Yan Leiva MD;  Location: [facility];  Service: General     TONSILLECTOMY      childhood       Social History     Tobacco Use     Smoking status: Former Smoker     Smokeless tobacco: Never Used   Substance Use Topics     Alcohol use: Yes     Family History   Problem Relation Age of Onset     Heart Disease Mother      Hypertension Mother      Dementia Mother      Coronary Artery Disease Father          Current Outpatient Medications   Medication Sig Dispense Refill     escitalopram (LEXAPRO) 20 MG tablet Take 1 tablet (20 mg) by mouth daily 90 tablet 3     hydrOXYzine (VISTARIL) 50 MG capsule Take 1 capsule (50 mg) by mouth 3 times daily as needed for anxiety 90 capsule 3     MULTIVITAMIN (MULTIPLE VITAMIN ORAL) [MULTIVITAMIN (MULTIPLE VITAMIN ORAL)] Take 1 tablet by mouth daily.       warfarin ANTICOAGULANT (COUMADIN) 7.5 MG tablet [WARFARIN ANTICOAGULANT (COUMADIN/JANTOVEN) 7.5 MG TABLET] Take 1 tablet (7.5 mg) by mouth daily. Adjust dose based on INR results as directed. 90 tablet 3     No Known Allergies  Requires high-dose flu shot.  Will receive COVID-19 updated booster this fall once available.  Immunizations reviewed and otherwise up-to-date.        Review of Systems   Constitutional: Negative for chills and fever.   HENT: Positive for hearing loss. Negative for congestion,  "ear pain and sore throat.    Eyes: Negative for pain and visual disturbance.   Respiratory: Negative for cough and shortness of breath.    Cardiovascular: Negative for chest pain, palpitations and peripheral edema.   Gastrointestinal: Negative for abdominal pain, constipation, diarrhea, heartburn, hematochezia and nausea.   Genitourinary: Negative for dysuria, frequency, genital sores, hematuria, impotence, penile discharge and urgency.   Musculoskeletal: Positive for arthralgias. Negative for joint swelling and myalgias.   Skin: Negative for rash.   Neurological: Negative for dizziness, weakness, headaches and paresthesias.   Psychiatric/Behavioral: Negative for mood changes. The patient is not nervous/anxious.      Constitutional, HEENT, cardiovascular, pulmonary, GI, , musculoskeletal, neuro, skin, endocrine and psych systems are negative, except as otherwise noted.    OBJECTIVE:   /86   Pulse 84   Ht 1.892 m (6' 2.5\")   Wt 104.8 kg (231 lb)   SpO2 98%   BMI 29.26 kg/m   Estimated body mass index is 29.26 kg/m  as calculated from the following:    Height as of this encounter: 1.892 m (6' 2.5\").    Weight as of this encounter: 104.8 kg (231 lb).  Physical Exam  GENERAL: healthy, alert and no distress  EYES: Eyes grossly normal to inspection, PERRL and conjunctivae and sclerae normal  HENT: ear canals and TM's normal, nose and mouth without ulcers or lesions  NECK: no adenopathy, no asymmetry, masses, or scars and thyroid normal to palpation  RESP: lungs clear to auscultation - no rales, rhonchi or wheezes  CV: regular rate and rhythm, normal S1 S2, no S3 or S4, no murmur, click or rub, no peripheral edema and peripheral pulses strong  ABDOMEN: soft, nontender, no hepatosplenomegaly, no masses and bowel sounds normal   (male): normal male genitalia without lesions or urethral discharge, no hernia  RECTAL: normal sphincter tone, no rectal masses, prostate normal size, smooth, nontender without nodules " or masses  MS: no gross musculoskeletal defects noted, no edema  SKIN: no suspicious lesions or rashes  NEURO: Normal strength and tone, mentation intact and speech normal  PSYCH: mentation appears normal, affect normal/bright    Diagnostic Test Results:  Labs reviewed in Epic  No results found for this or any previous visit (from the past 24 hour(s)).    ASSESSMENT / PLAN:     Encounter for Medicare annual wellness exam  Annual wellness visit completed.  Risk questionnaire reviewed.  Suboptimal diet.  Weight goal remains less than 220 pounds ideally.  Has lost 6 pounds since prior office visit March 8, 2022.  Annual wellness visits to continue.    History of pulmonary embolism  Continues chronic warfarin anticoagulation with warfarin 7.5 mg daily.    Persistent atrial fibrillation (H)  Continues chronic warfarin anticoagulation with warfarin 7.5 mg daily.  Excellent rate controlled.  - Basic metabolic panel    Anxiety  Continues escitalopram 20 mg daily.  Addition of hydroxyzine 50 mg capsule 3 times daily as needed.  - hydrOXYzine (VISTARIL) 50 MG capsule  Dispense: 90 capsule; Refill: 3    Pure hypercholesterolemia  Check lipid cascade today while fasting.  Weight goal less than 220 pounds ideally.  - Lipid panel reflex to direct LDL Fasting    Normochromic normocytic anemia  Update CBC to ensure stable or resolved normochromic normocytic anemia.  - CBC with platelets    Overweight  Weight goal remains less than 220 pounds ideally.    Bilateral sensorineural hearing loss  Bilateral sensorineural hearing loss noted.  Bilateral hearing aids utilized    Tubular adenoma of colon  Tubular adenoma of colon with prior colonoscopy 5/11/2019 and will repeat at 5-year interval.    ALISE on CPAP  CPAP for ALISE management.    Encounter for immunization  High-dose flu shot provided.  Will receive COVID updated booster once available this fall.  - INFLUENZA, QUAD, HIGH DOSE, PF, 65YR + (FLUZONE HD)         Patient has been  "advised of split billing requirements and indicates understanding: No    COUNSELING:  Reviewed preventive health counseling, as reflected in patient instructions       Regular exercise       Healthy diet/nutrition       Vision screening       Hearing screening       Dental care       Bladder control       Fall risk prevention       Colon cancer screening    Estimated body mass index is 29.26 kg/m  as calculated from the following:    Height as of this encounter: 1.892 m (6' 2.5\").    Weight as of this encounter: 104.8 kg (231 lb).        He reports that he has quit smoking. He has never used smokeless tobacco.      Appropriate preventive services were discussed with this patient, including applicable screening as appropriate for cardiovascular disease, diabetes, osteopenia/osteoporosis, and glaucoma.  As appropriate for age/gender, discussed screening for colorectal cancer, prostate cancer, breast cancer, and cervical cancer. Checklist reviewing preventive services available has been given to the patient.    Reviewed patients plan of care and provided an AVS. The Intermediate Care Plan ( asthma action plan, low back pain action plan, and migraine action plan) for Prem meets the Care Plan requirement. This Care Plan has been established and reviewed with the Patient.    Counseling Resources:  ATP IV Guidelines  Pooled Cohorts Equation Calculator  Breast Cancer Risk Calculator  Breast Cancer: Medication to Reduce Risk  FRAX Risk Assessment  ICSI Preventive Guidelines  Dietary Guidelines for Americans, 2010  [employer] MyPlate  ASA Prophylaxis  Lung CA Screening    Mamadou Baez MD  [facility]    Identified Health Risks:    The patient was counseled and encouraged to consider modifying their diet and eating habits. He was provided with information on recommended healthy diet options.  "

## 2022-09-13 NOTE — PATIENT INSTRUCTIONS
Patient Education   Personalized Prevention Plan  You are due for the preventive services outlined below.  Your care team is available to assist you in scheduling these services.  If you have already completed any of these items, please share that information with your care team to update in your medical record.  Health Maintenance Due   Topic Date Due     LUNG CANCER SCREENING  Never done     AORTIC ANEURYSM SCREENING (SYSTEM ASSIGNED)  Never done     Flu Vaccine (1) 09/01/2022       Understanding USDA MyPlate  The USDA has guidelines to help you make healthy food choices. These are called MyPlate. MyPlate shows the food groups that make up healthy meals using the image of a place setting. Before you eat, think about the healthiest choices for what to put on your plate or in your cup or bowl. To learn more about building a healthy plate, visit www.choosemyplate.gov.    The food groups    Fruits. Any fruit or 100% fruit juice counts as part of the Fruit Group. Fruits may be fresh, canned, frozen, or dried, and may be whole, cut-up, or pureed. Make 1/2 of your plate fruits and vegetables.    Vegetables. Any vegetable or 100% vegetable juice counts as a member of the Vegetable Group. Vegetables may be fresh, frozen, canned, or dried. They can be served raw or cooked and may be whole, cut-up, or mashed. Make 1/2 of your plate fruits and vegetables.    Grains. All foods made from grains are part of the Grains Group. These include wheat, rice, oats, cornmeal, and barley. Grains are often used to make foods such as bread, pasta, oatmeal, cereal, tortillas, and grits. Grains should be no more than 1/4 of your plate. At least half of your grains should be whole grains.    Protein. This group includes meat, poultry, seafood, beans and peas, eggs, processed soy products (such as tofu), nuts (including nut butters), and seeds. Make protein choices no more than 1/4 of your plate. Meat and poultry choices should be lean or low  fat.    Dairy. The Dairy Group includes all fluid milk products and foods made from milk that contain calcium, such as yogurt and cheese. (Foods that have little calcium, such as cream, butter, and cream cheese, are not part of this group.) Most dairy choices should be low-fat or fat-free.    Oils. Oils aren't a food group, but they do contain essential nutrients. However it's important to watch your intake of oils. These are fats that are liquid at room temperature. They include canola, corn, olive, soybean, vegetable, and sunflower oil. Foods that are mainly oil include mayonnaise, certain salad dressings, and soft margarines. You likely already get your daily oil allowance from the foods you eat.  Things to limit  Eating healthy also means limiting these things in your diet:       Salt (sodium). Many processed foods have a lot of sodium. To keep sodium intake down, eat fresh vegetables, meats, poultry, and seafood when possible. Purchase low-sodium, reduced-sodium, or no-salt-added food products at the store. And don't add salt to your meals at home. Instead, season them with herbs and spices such as dill, oregano, cumin, and paprika. Or try adding flavor with lemon or lime zest and juice.    Saturated fat. Saturated fats are most often found in animal products such as beef, pork, and chicken. They are often solid at room temperature, such as butter. To reduce your saturated fat intake, choose leaner cuts of meat and poultry. And try healthier cooking methods such as grilling, broiling, roasting, or baking. For a simple lower-fat swap, use plain nonfat yogurt instead of mayonnaise when making potato salad or macaroni salad.    Added sugars. These are sugars added to foods. They are in foods such as ice cream, candy, soda, fruit drinks, sports drinks, energy drinks, cookies, pastries, jams, and syrups. Cut down on added sugars by sharing sweet treats with a family member or friend. You can also choose fruit for  dessert, and drink water or other unsweetened beverages.     [employer] last reviewed this educational content on 6/1/2020 2000-2021 [employer]. All rights reserved. This information is not intended as a substitute for professional medical care. Always follow your healthcare professional's instructions.

## 2022-09-20 ENCOUNTER — TELEPHONE (OUTPATIENT)
Dept: FAMILY MEDICINE | Facility: CLINIC | Age: 72
End: 2022-09-20

## 2022-09-20 NOTE — TELEPHONE ENCOUNTER
Pt looking at getting an Inspire implanted apnea device. Was told he would need a new sleep study. Requesting a referral for a new study. Please review and place referral if one is needed.

## 2022-09-21 DIAGNOSIS — G47.33 OSA ON CPAP: Primary | ICD-10-CM

## 2022-09-21 NOTE — TELEPHONE ENCOUNTER
Please notify patient.  Order placed for updated sleep study.  Please ensure that patient knows if they contact him or if he needs to call.  Thank you.

## 2022-10-11 ENCOUNTER — DOCUMENTATION ONLY (OUTPATIENT)
Dept: ANTICOAGULATION | Facility: CLINIC | Age: 72
End: 2022-10-11

## 2022-10-11 ENCOUNTER — ANTICOAGULATION THERAPY VISIT (OUTPATIENT)
Dept: ANTICOAGULATION | Facility: CLINIC | Age: 72
End: 2022-10-11

## 2022-10-11 ENCOUNTER — LAB (OUTPATIENT)
Dept: LAB | Facility: CLINIC | Age: 72
End: 2022-10-11
Payer: COMMERCIAL

## 2022-10-11 DIAGNOSIS — I26.99 PULMONARY EMBOLISM, BILATERAL (H): ICD-10-CM

## 2022-10-11 DIAGNOSIS — I48.21 PERMANENT ATRIAL FIBRILLATION (H): Primary | ICD-10-CM

## 2022-10-11 DIAGNOSIS — I48.19 PERSISTENT ATRIAL FIBRILLATION (H): ICD-10-CM

## 2022-10-11 LAB — INR BLD: 2.8 (ref 0.9–1.1)

## 2022-10-11 PROCEDURE — 36416 COLLJ CAPILLARY BLOOD SPEC: CPT

## 2022-10-11 PROCEDURE — 85610 PROTHROMBIN TIME: CPT

## 2022-10-11 NOTE — PROGRESS NOTES
ANTICOAGULATION CLINIC REFERRAL RENEWAL REQUEST       An annual renewal order is required for all patients referred to [facility].?  Please review and sign the pended referral order for Prem Taylor.       ANTICOAGULATION SUMMARY      Warfarin indication(s)   Atrial Fibrillation and PE    Mechanical heart valve present?  NO       Current goal range   INR: 2.0-3.0     Goal appropriate for indication? Goal INR 2-3, standard for indication(s) above     Time in Therapeutic Range (TTR)  (Goal > 60%) 99.2%       Office visit with referring provider's group within last year yes on 9/13/22       Mee Maguire RN  [facility]

## 2022-10-11 NOTE — PROGRESS NOTES
ANTICOAGULATION MANAGEMENT     Prem Taylor 72 year old male is on warfarin with therapeutic INR result. (Goal INR 2.0-3.0)    Recent labs: (last 7 days)     10/11/22  0724   INR 2.8*       ASSESSMENT       Source(s): Chart Review and Template       Warfarin doses taken: Warfarin taken as instructed    Diet: No new diet changes identified    New illness, injury, or hospitalization: No    Medication/supplement changes: None noted    Signs or symptoms of bleeding or clotting: No    Previous INR: Therapeutic last visit; previously outside of goal range    Additional findings: None       PLAN     Recommended plan for no diet, medication or health factor changes affecting INR     Dosing Instructions: Continue your current warfarin dose with next INR in 4-5 weeks       Summary  As of 10/11/2022    Full warfarin instructions:  7.5 mg every day   Next INR check:  11/15/2022             Detailed voice message left for Ryan with dosing instructions and follow up date.     Contact [phone] to schedule and with any changes, questions or concerns.     Education provided: Please call back if any changes to your diet, medications or how you've been taking warfarin    Plan made per [facility] anticoagulation protocol    Mee Maguire RN  Anticoagulation Clinic  10/11/2022    _______________________________________________________________________     Anticoagulation Episode Summary     Current INR goal:  2.0-3.0   TTR:  99.2 % (1 y)   Target end date:  Indefinite   Send INR reminders to:  RUBY MAURICIO    Indications    Pulmonary embolism  bilateral (H) (Resolved) [I26.99]  Permanent atrial fibrillation (H) [I48.21]           Comments:  Approved for 8 week checks per -- see encounter from 07/27/21         Anticoagulation Care Providers     Provider Role Specialty Phone number    Mamadou Baez MD Referring Family Medicine [phone]

## 2022-10-26 ENCOUNTER — IMMUNIZATION (OUTPATIENT)
Dept: NURSING | Facility: CLINIC | Age: 72
End: 2022-10-26
Payer: COMMERCIAL

## 2022-10-26 PROCEDURE — 0134A COVID-19,PF,MODERNA BIVALENT: CPT

## 2022-10-26 PROCEDURE — 91313 COVID-19,PF,MODERNA BIVALENT: CPT

## 2022-11-15 ENCOUNTER — ANTICOAGULATION THERAPY VISIT (OUTPATIENT)
Dept: ANTICOAGULATION | Facility: CLINIC | Age: 72
End: 2022-11-15

## 2022-11-15 ENCOUNTER — LAB (OUTPATIENT)
Dept: LAB | Facility: CLINIC | Age: 72
End: 2022-11-15
Payer: COMMERCIAL

## 2022-11-15 DIAGNOSIS — I48.21 PERMANENT ATRIAL FIBRILLATION (H): Primary | ICD-10-CM

## 2022-11-15 DIAGNOSIS — I26.99 PULMONARY EMBOLISM, BILATERAL (H): ICD-10-CM

## 2022-11-15 DIAGNOSIS — I48.21 PERMANENT ATRIAL FIBRILLATION (H): ICD-10-CM

## 2022-11-15 LAB — INR BLD: 2.3 (ref 0.9–1.1)

## 2022-11-15 PROCEDURE — 85610 PROTHROMBIN TIME: CPT

## 2022-11-15 PROCEDURE — 36416 COLLJ CAPILLARY BLOOD SPEC: CPT

## 2022-11-15 NOTE — PROGRESS NOTES
ANTICOAGULATION MANAGEMENT     Prem Taylor 72 year old male is on warfarin with therapeutic INR result. (Goal INR 2.0-3.0)    Recent labs: (last 7 days)     11/15/22  0726   INR 2.3*       ASSESSMENT       Source(s): Chart Review and Template       Warfarin doses taken: Warfarin taken as instructed    Diet: No new diet changes identified    New illness, injury, or hospitalization: No    Medication/supplement changes: None noted    Signs or symptoms of bleeding or clotting: No    Previous INR: Therapeutic last 2(+) visits    Additional findings: None       PLAN     Recommended plan for no diet, medication or health factor changes affecting INR     Dosing Instructions: Continue your current warfarin dose with next INR in 6 weeks       Summary  As of 11/15/2022    Full warfarin instructions:  7.5 mg every day; Starting 11/15/2022   Next INR check:  12/27/2022             Detailed voice message left for Ryan with dosing instructions and follow up date.     Contact [phone] to schedule and with any changes, questions or concerns.     Education provided:     Please call back if any changes to your diet, medications or how you've been taking warfarin    Plan made per [facility] anticoagulation protocol    Mee Maguire RN  Anticoagulation Clinic  11/15/2022    _______________________________________________________________________     Anticoagulation Episode Summary     Current INR goal:  2.0-3.0   TTR:  99.2 % (1 y)   Target end date:  Indefinite   Send INR reminders to:  RUBY MAURICIO    Indications    Pulmonary embolism  bilateral (H) (Resolved) [I26.99]  Permanent atrial fibrillation (H) [I48.21]  Pulmonary embolism  bilateral (H) [I26.99]           Comments:  Approved for 8 week checks per -- see encounter from 07/27/21         Anticoagulation Care Providers     Provider Role Specialty Phone number    Mamadou Baez MD Referring Family Medicine [phone]

## 2022-12-28 ENCOUNTER — LAB (OUTPATIENT)
Dept: LAB | Facility: CLINIC | Age: 72
End: 2022-12-28
Payer: COMMERCIAL

## 2022-12-28 ENCOUNTER — ANTICOAGULATION THERAPY VISIT (OUTPATIENT)
Dept: ANTICOAGULATION | Facility: CLINIC | Age: 72
End: 2022-12-28

## 2022-12-28 DIAGNOSIS — I48.21 PERMANENT ATRIAL FIBRILLATION (H): Primary | ICD-10-CM

## 2022-12-28 DIAGNOSIS — I48.21 PERMANENT ATRIAL FIBRILLATION (H): ICD-10-CM

## 2022-12-28 DIAGNOSIS — I26.99 PULMONARY EMBOLISM, BILATERAL (H): ICD-10-CM

## 2022-12-28 LAB — INR BLD: 4.7 (ref 0.9–1.1)

## 2022-12-28 PROCEDURE — 36416 COLLJ CAPILLARY BLOOD SPEC: CPT

## 2022-12-28 PROCEDURE — 85610 PROTHROMBIN TIME: CPT

## 2022-12-28 NOTE — PROGRESS NOTES
ANTICOAGULATION MANAGEMENT     Prem Taylor 72 year old male is on warfarin with supratherapeutic INR result. (Goal INR 2.0-3.0)    Recent labs: (last 7 days)     12/28/22  0741   INR 4.7*       ASSESSMENT       Source(s): Chart Review, Patient/Caregiver Call and Template       Warfarin doses taken: More warfarin taken than planned which may be affecting INR    Diet: Change in alcohol intake may be affecting INR. more with the Holiday.     New illness, injury, or hospitalization: No    Medication/supplement changes: None noted    Signs or symptoms of bleeding or clotting: No    Previous INR: Therapeutic last 2(+) visits    Additional findings: He is traveling to [place] on Friday for the next 3 months. He will call with what lab he can find to get INR done at once he is there.        PLAN     Recommended plan for temporary change(s) affecting INR     Dosing Instructions: hold 1.5 doses then continue your current warfarin dose with next INR in 7-10 days       Summary  As of 12/28/2022    Full warfarin instructions:  12/28: Hold; 12/29: 3.75 mg; Otherwise 7.5 mg every day   Next INR check:  1/9/2023             Telephone call with Ryan who verbalizes understanding and agrees to plan and who agrees to plan and repeated back plan correctly    Patient using outside facility for labs    Education provided:     Please call back if any changes to your diet, medications or how you've been taking warfarin    Goal range and lab monitoring: goal range and significance of current result and Importance of therapeutic range    Symptom monitoring: monitoring for bleeding signs and symptoms and travel related clotting risk and prevention    Plan made per [facility] anticoagulation protocol    Mee Maguire, RN  Anticoagulation Clinic  12/28/2022    _______________________________________________________________________     Anticoagulation Episode Summary     Current INR goal:  2.0-3.0   TTR:  90.8 % (1 y)   Target end date:   Indefinite   Send INR reminders to:  RUBY MAURICIO    Indications    Pulmonary embolism  bilateral (H) (Resolved) [I26.99]  Permanent atrial fibrillation (H) [I48.21]  Pulmonary embolism  bilateral (H) [I26.99]           Comments:  Approved for 8 week checks per -- see encounter from 07/27/21         Anticoagulation Care Providers     Provider Role Specialty Phone number    Mamadou Baez MD Referring Family Medicine [phone]

## 2022-12-28 NOTE — PROGRESS NOTES
ANTICOAGULATION  MANAGEMENT- Travel planning    Prem Taylor reports upcoming travel plans to [place].    Departure date: 12/30  Anticipated return date: 4/3/23  Alternate contact information (if applicable): n/a      INR monitoring plan:     [facility] to monitor and dose while traveling. He will call once he finds a lab to go to .. Faxed results to be routed to [facility]     Anticoagulation calendar updated with date of next INR     Instructed to call the Anticoauglation Clinic for any changes, questions or concerns. [phone]  ?   Mee Maguire RN

## 2023-01-03 ENCOUNTER — TELEPHONE (OUTPATIENT)
Dept: FAMILY MEDICINE | Facility: CLINIC | Age: 73
End: 2023-01-03

## 2023-01-03 NOTE — TELEPHONE ENCOUNTER
Reason for Call:  Other Fax #    Detailed comments: Pt was told to call back with the fax# that his order could be sent to in [place] while he is there.  [facility] fax# [phone].    Phone Number Patient can be reached at: Cell number on file:    Telephone Information:   Mobile [phone]       Best Time: any    Can we leave a detailed message on this number? YES    Call taken on 1/3/2023 at 12:02 PM by Taisha Lopez

## 2023-01-03 NOTE — TELEPHONE ENCOUNTER
"Standing order faxed via [employer].    ADDENDUM:  3:33 PM: attempted x2, fax did not go through \"no answer at fax number\".         Called and LVM for Ryan-- requested call back to confirm fax number.     3:51 PM: patient returned call to writer-- he called [facility] again to confirm the fax number; also confirmed their fax machine was working. Fax number previously given by patient matches. Will try again to fax order. If we are unable to get the order to go through, we will send via [employer] and/or mail.    Sent via [employer] again.        Closing encounter and will await results.      Elsie Stringer RN  [facility] Anticoagulation  [phone]    "

## 2023-01-04 ENCOUNTER — TELEPHONE (OUTPATIENT)
Dept: NURSING | Facility: CLINIC | Age: 73
End: 2023-01-04

## 2023-01-04 ENCOUNTER — TRANSFERRED RECORDS (OUTPATIENT)
Dept: HEALTH INFORMATION MANAGEMENT | Facility: CLINIC | Age: 73
End: 2023-01-04

## 2023-01-04 ENCOUNTER — ANTICOAGULATION THERAPY VISIT (OUTPATIENT)
Dept: ANTICOAGULATION | Facility: CLINIC | Age: 73
End: 2023-01-04

## 2023-01-04 DIAGNOSIS — I48.21 PERMANENT ATRIAL FIBRILLATION (H): Primary | ICD-10-CM

## 2023-01-04 DIAGNOSIS — I26.99 PULMONARY EMBOLISM, BILATERAL (H): ICD-10-CM

## 2023-01-04 LAB — INR (EXTERNAL): 2.86 (ref 0.9–1.1)

## 2023-01-04 NOTE — PROGRESS NOTES
ANTICOAGULATION MANAGEMENT     Prem Taylor 72 year old male is on warfarin with therapeutic INR result. (Goal INR 2.0-3.0)    Recent labs: (last 7 days)     01/04/23  0000   INR 2.86*       ASSESSMENT       Source(s): Chart Review and Patient/Caregiver Call       Warfarin doses taken: Warfarin taken as instructed    Diet: No new diet changes identified    New illness, injury, or hospitalization: No    Medication/supplement changes: None noted    Signs or symptoms of bleeding or clotting: No    Previous INR: Supratherapeutic    Additional findings: None       PLAN     Recommended plan for no diet, medication or health factor changes affecting INR     Dosing Instructions: Continue your current warfarin dose with next INR in 2 weeks       Summary  As of 1/4/2023    Full warfarin instructions:  7.5 mg every day   Next INR check:  1/18/2023             Telephone call with Ryan who agrees to plan and repeated back plan correctly    Patient using outside facility for labs    Education provided:     Goal range and lab monitoring: goal range and significance of current result and Importance of following up at instructed interval    Plan made per [facility] anticoagulation protocol    Cari Kay RN  Anticoagulation Clinic  1/4/2023    _______________________________________________________________________     Anticoagulation Episode Summary     Current INR goal:  2.0-3.0   TTR:  89.1 % (1 y)   Target end date:  Indefinite   Send INR reminders to:  RUBY MAURICIO    Indications    Pulmonary embolism  bilateral (H) (Resolved) [I26.99]  Permanent atrial fibrillation (H) [I48.21]  Pulmonary embolism  bilateral (H) [I26.99]           Comments:  Approved for 8 week checks per -- see encounter from 07/27/21         Anticoagulation Care Providers     Provider Role Specialty Phone number    Mamadou Baez MD Referring Family Medicine [phone]

## 2023-01-04 NOTE — PROGRESS NOTES
Received a fax INR result for Ryan from [facility]    INR result dated 1/4/2023 is 2.86    Priscilla BOJORUQEZ RN, BSN  Anticoagulation Clinic

## 2023-01-04 NOTE — TELEPHONE ENCOUNTER
Anticoagulation clinic from [place] calling with critical INR - connected with PCP facility at this time.     Agnie Fitch RN  [facility] Triage Nurse Advisor   1/4/2023 3:22 PM

## 2023-01-17 ENCOUNTER — TRANSFERRED RECORDS (OUTPATIENT)
Dept: HEALTH INFORMATION MANAGEMENT | Facility: CLINIC | Age: 73
End: 2023-01-17
Payer: COMMERCIAL

## 2023-01-18 ENCOUNTER — ANTICOAGULATION THERAPY VISIT (OUTPATIENT)
Dept: ANTICOAGULATION | Facility: CLINIC | Age: 73
End: 2023-01-18
Payer: COMMERCIAL

## 2023-01-18 DIAGNOSIS — I48.21 PERMANENT ATRIAL FIBRILLATION (H): Primary | ICD-10-CM

## 2023-01-18 DIAGNOSIS — I26.99 PULMONARY EMBOLISM, BILATERAL (H): ICD-10-CM

## 2023-01-18 LAB — INR (EXTERNAL): 2.54 (ref 0.9–1.1)

## 2023-01-18 NOTE — PROGRESS NOTES
ANTICOAGULATION MANAGEMENT     Prem Taylor 72 year old male is on warfarin with therapeutic INR result. (Goal INR 2.0-3.0)    Recent labs: (last 7 days)     01/17/23  0000   INR 2.54*       ASSESSMENT       Source(s): Chart Review and Patient/Caregiver Call       Warfarin doses taken: Warfarin taken as instructed    Diet: No new diet changes identified    New illness, injury, or hospitalization: No    Medication/supplement changes: None noted    Signs or symptoms of bleeding or clotting: No    Previous INR: Therapeutic last visit; previously outside of goal range    Additional findings: None       PLAN     Recommended plan for no diet, medication or health factor changes affecting INR     Dosing Instructions: Continue your current warfarin dose with next INR in 3 weeks       Summary  As of 1/18/2023    Full warfarin instructions:  7.5 mg every day   Next INR check:  2/8/2023             Telephone call with Ryan who verbalizes understanding and agrees to plan    Patient using outside facility for labs    Education provided:     Contact [phone] with any changes, questions or concerns.     Plan made per [facility] anticoagulation protocol    Elsie Stirnger RN  Anticoagulation Clinic  1/18/2023    _______________________________________________________________________     Anticoagulation Episode Summary     Current INR goal:  2.0-3.0   TTR:  89.1 % (1 y)   Target end date:  Indefinite   Send INR reminders to:  RUBY MAURICIO    Indications    Pulmonary embolism  bilateral (H) (Resolved) [I26.99]  Permanent atrial fibrillation (H) [I48.21]  Pulmonary embolism  bilateral (H) [I26.99]           Comments:  Approved for 8 week checks per -- see encounter from 07/27/21         Anticoagulation Care Providers     Provider Role Specialty Phone number    Mamadou Baez MD Referring Family Medicine [phone]

## 2023-02-07 DIAGNOSIS — I48.19 PERSISTENT ATRIAL FIBRILLATION (H): ICD-10-CM

## 2023-02-07 RX ORDER — WARFARIN SODIUM 7.5 MG/1
TABLET ORAL
Qty: 90 TABLET | Refills: 1 | Status: SHIPPED | OUTPATIENT
Start: 2023-02-07 | End: 2023-07-31

## 2023-02-07 NOTE — TELEPHONE ENCOUNTER
ANTICOAGULATION MANAGEMENT:  Medication Refill    Anticoagulation Summary  As of 1/18/2023    Warfarin maintenance plan:  7.5 mg (7.5 mg x 1) every day   Next INR check:  2/8/2023   Target end date:  Indefinite    Indications    Pulmonary embolism  bilateral (H) (Resolved) [I26.99]  Permanent atrial fibrillation (H) [I48.21]  Pulmonary embolism  bilateral (H) [I26.99]             Anticoagulation Care Providers     Provider Role Specialty Phone number    Mamadou Baez MD Referring Family Medicine [phone]          Visit with referring provider/group within last year: Yes    [facility] referral signed within last year: Yes    Prem meets all criteria for refill (current [facility] referral, office visit with referring provider/group in last year, lab monitoring up to date or not exceeding 2 weeks overdue). Rx instructions and quantity supplied updated to match patient's current dosing plan. Warfarin 90 day supply with 1 refill granted per [facility] protocol     Josy Moe RN  Anticoagulation Clinic

## 2023-02-08 ENCOUNTER — TRANSFERRED RECORDS (OUTPATIENT)
Dept: HEALTH INFORMATION MANAGEMENT | Facility: CLINIC | Age: 73
End: 2023-02-08

## 2023-02-08 ENCOUNTER — ANTICOAGULATION THERAPY VISIT (OUTPATIENT)
Dept: ANTICOAGULATION | Facility: CLINIC | Age: 73
End: 2023-02-08
Payer: COMMERCIAL

## 2023-02-08 ENCOUNTER — TELEPHONE (OUTPATIENT)
Dept: FAMILY MEDICINE | Facility: CLINIC | Age: 73
End: 2023-02-08
Payer: COMMERCIAL

## 2023-02-08 DIAGNOSIS — I26.99 PULMONARY EMBOLISM, BILATERAL (H): ICD-10-CM

## 2023-02-08 DIAGNOSIS — I48.21 PERMANENT ATRIAL FIBRILLATION (H): ICD-10-CM

## 2023-02-08 DIAGNOSIS — I48.21 PERMANENT ATRIAL FIBRILLATION (H): Primary | ICD-10-CM

## 2023-02-08 DIAGNOSIS — I48.19 PERSISTENT ATRIAL FIBRILLATION (H): Primary | ICD-10-CM

## 2023-02-08 LAB — INR (EXTERNAL): 2.56 (ref 0.9–1.1)

## 2023-02-08 NOTE — TELEPHONE ENCOUNTER
Lab order faxed to [facility].  Michelle Munoz RN, BSN  Anticoagulation Clinic

## 2023-02-08 NOTE — TELEPHONE ENCOUNTER
Mckenzie from [facility] calls regarding patient stating that patient comes to them to get his INR drawn with a standing order, however, they can only complete venous INRs at their facility. Mckenzie is requesting a new standing order for a venous INR be faxed to their facility.    Fax #[phone]

## 2023-02-08 NOTE — PROGRESS NOTES
ANTICOAGULATION MANAGEMENT     Prem Taylor 72 year old male is on warfarin with therapeutic INR result. (Goal INR 2.0-3.0)    Recent labs: (last 7 days)     02/08/23  1155   INR 2.56*       ASSESSMENT       Source(s): Chart Review and Patient/Caregiver Call       Warfarin doses taken: Warfarin taken as instructed    Diet: No new diet changes identified    New illness, injury, or hospitalization: No    Medication/supplement changes: None noted    Signs or symptoms of bleeding or clotting: No    Previous INR: Therapeutic last 2(+) visits    Additional findings: None       PLAN     Recommended plan for no diet, medication or health factor changes affecting INR     Dosing Instructions: Continue your current warfarin dose with next INR in 4-5 weeks       Summary  As of 2/8/2023    Full warfarin instructions:  7.5 mg every day   Next INR check:  3/8/2023             Telephone call with Ryan who verbalizes understanding and agrees to plan    Patient using outside facility for labs while in [place]    Education provided:     Goal range and lab monitoring: goal range and significance of current result    Contact [phone] with any changes, questions or concerns.     Plan made per [facility] anticoagulation protocol    Josy Moe RN  Anticoagulation Clinic  2/8/2023    _______________________________________________________________________     Anticoagulation Episode Summary     Current INR goal:  2.0-3.0   TTR:  89.2 % (1 y)   Target end date:  Indefinite   Send INR reminders to:  RUBY MAURICIO    Indications    Pulmonary embolism  bilateral (H) (Resolved) [I26.99]  Permanent atrial fibrillation (H) [I48.21]  Pulmonary embolism  bilateral (H) [I26.99]           Comments:  Approved for 8 week checks per -- see encounter from 07/27/21         Anticoagulation Care Providers     Provider Role Specialty Phone number    Mamadou Baez MD Referring Family Medicine [phone]

## 2023-02-08 NOTE — PROGRESS NOTES
Incoming fax from [facility] in [place], [place]    Result date: 02/08/2023 4531 CST  INR: 2.56

## 2023-03-07 ENCOUNTER — TRANSFERRED RECORDS (OUTPATIENT)
Dept: HEALTH INFORMATION MANAGEMENT | Facility: CLINIC | Age: 73
End: 2023-03-07
Payer: COMMERCIAL

## 2023-03-08 ENCOUNTER — ANTICOAGULATION THERAPY VISIT (OUTPATIENT)
Dept: ANTICOAGULATION | Facility: CLINIC | Age: 73
End: 2023-03-08
Payer: COMMERCIAL

## 2023-03-08 DIAGNOSIS — I48.21 PERMANENT ATRIAL FIBRILLATION (H): Primary | ICD-10-CM

## 2023-03-08 DIAGNOSIS — I26.99 PULMONARY EMBOLISM, BILATERAL (H): ICD-10-CM

## 2023-03-08 LAB — INR (EXTERNAL): 3.19 (ref 0.9–1.1)

## 2023-03-08 NOTE — PROGRESS NOTES
ANTICOAGULATION MANAGEMENT     Prem Taylor 72 year old male is on warfarin with supratherapeutic INR result. (Goal INR 2.0-3.0)    Recent labs: (last 7 days)     03/07/23  1353   INR 3.19*       ASSESSMENT       Source(s): Chart Review and Patient/Caregiver Call       Warfarin doses taken: Warfarin taken as instructed    Diet: No new diet changes identified    New illness, injury, or hospitalization: No    Medication/supplement changes: None noted    Signs or symptoms of bleeding or clotting: No    Previous INR: Therapeutic last 2(+) visits    Additional findings: None         PLAN     Recommended plan for no diet, medication or health factor changes affecting INR     Dosing Instructions: Continue your current warfarin dose with next INR in 2 weeks       Summary  As of 3/8/2023    Full warfarin instructions:  7.5 mg every day   Next INR check:  3/22/2023             Telephone call with Ryan who verbalizes understanding and agrees to plan    Patient using outside facility for labs    Education provided:     Contact [phone] with any changes, questions or concerns.     Plan made per [facility] anticoagulation protocol    Elsie Stringer RN  Anticoagulation Clinic  3/8/2023    _______________________________________________________________________     Anticoagulation Episode Summary     Current INR goal:  2.0-3.0   TTR:  86.9 % (1 y)   Target end date:  Indefinite   Send INR reminders to:  RUBY MAURICIO    Indications    Pulmonary embolism  bilateral (H) (Resolved) [I26.99]  Permanent atrial fibrillation (H) [I48.21]  Pulmonary embolism  bilateral (H) [I26.99]           Comments:  Approved for 8 week checks per -- see encounter from 07/27/21         Anticoagulation Care Providers     Provider Role Specialty Phone number    Mamadou aBez MD Referring Family Medicine [phone]

## 2023-03-08 NOTE — PROGRESS NOTES
Incoming fax from [facility] [place]    Date of result 3/7/23    INR result 3.19

## 2023-03-21 ENCOUNTER — ANTICOAGULATION THERAPY VISIT (OUTPATIENT)
Dept: ANTICOAGULATION | Facility: CLINIC | Age: 73
End: 2023-03-21
Payer: COMMERCIAL

## 2023-03-21 DIAGNOSIS — I48.21 PERMANENT ATRIAL FIBRILLATION (H): Primary | ICD-10-CM

## 2023-03-21 DIAGNOSIS — I26.99 PULMONARY EMBOLISM, BILATERAL (H): ICD-10-CM

## 2023-03-21 LAB — INR (EXTERNAL): 2.59 (ref 0.9–1.1)

## 2023-03-21 NOTE — PROGRESS NOTES
Incoming fax from [facility] in [place], [place] 3/21/23    INr result: 2.59

## 2023-03-21 NOTE — PROGRESS NOTES
ANTICOAGULATION MANAGEMENT     Prem Taylor 72 year old male is on warfarin with therapeutic INR result. (Goal INR 2.0-3.0)    Recent labs: (last 7 days)     03/21/23  1608   INR 2.59*       ASSESSMENT       Source(s): Chart Review and Patient/Caregiver Call       Warfarin doses taken: Warfarin taken as instructed    Diet: No new diet changes identified    New illness, injury, or hospitalization: No    Medication/supplement changes: None noted    Signs or symptoms of bleeding or clotting: No    Previous INR: Supratherapeutic    Additional findings: None         PLAN     Recommended plan for no diet, medication or health factor changes affecting INR     Dosing Instructions: Continue your current warfarin dose with next INR in 3 weeks       Summary  As of 3/21/2023    Full warfarin instructions:  7.5 mg every day   Next INR check:  4/11/2023             Telephone call with Ryan who verbalizes understanding and agrees to plan    Check at provider office visit 4/11    Education provided:     Goal range and lab monitoring: goal range and significance of current result    Contact [phone] with any changes, questions or concerns.     Plan made per [facility] anticoagulation protocol    Yesenia Vázquez RN  Anticoagulation Clinic  3/21/2023    _______________________________________________________________________     Anticoagulation Episode Summary     Current INR goal:  2.0-3.0   TTR:  85.7 % (1 y)   Target end date:  Indefinite   Send INR reminders to:  RUBY MAURICIO    Indications    Pulmonary embolism  bilateral (H) (Resolved) [I26.99]  Permanent atrial fibrillation (H) [I48.21]  Pulmonary embolism  bilateral (H) [I26.99]           Comments:  Approved for 8 week checks per -- see encounter from 07/27/21         Anticoagulation Care Providers     Provider Role Specialty Phone number    Mamadou Baez MD Referring Family Medicine [phone]

## 2023-04-06 ENCOUNTER — TELEPHONE (OUTPATIENT)
Dept: SLEEP MEDICINE | Facility: CLINIC | Age: 73
End: 2023-04-06
Payer: COMMERCIAL

## 2023-04-06 DIAGNOSIS — G47.33 OBSTRUCTIVE SLEEP APNEA (ADULT) (PEDIATRIC): Primary | ICD-10-CM

## 2023-04-06 NOTE — TELEPHONE ENCOUNTER
Last ov 4/1/2019  Next ov 8/8/23    Patient requesting updated order for CPAP supplies prior to appointment. Order pended and routed to provider for consideration.    Marcia SANDOVAL RN  [facility]

## 2023-04-06 NOTE — TELEPHONE ENCOUNTER
Reason for Call:  Other order    Detailed comments: patient called and scheduled an appointment in August with [facility].    Wondering if Ariela Wagoner can order new cpap supplies prior to this appointment.    Please contact patient.  Thank you.    Phone Number Patient can be reached at: Cell number on file:    Telephone Information:   Mobile [phone]       Best Time: any    Can we leave a detailed message on this number? YES    Call taken on 4/6/2023 at 2:56 PM by Erma De La Garza

## 2023-04-10 DIAGNOSIS — F41.9 ANXIETY: ICD-10-CM

## 2023-04-11 ENCOUNTER — ANTICOAGULATION THERAPY VISIT (OUTPATIENT)
Dept: ANTICOAGULATION | Facility: CLINIC | Age: 73
End: 2023-04-11

## 2023-04-11 ENCOUNTER — OFFICE VISIT (OUTPATIENT)
Dept: FAMILY MEDICINE | Facility: CLINIC | Age: 73
End: 2023-04-11
Payer: COMMERCIAL

## 2023-04-11 ENCOUNTER — APPOINTMENT (OUTPATIENT)
Dept: LAB | Facility: CLINIC | Age: 73
End: 2023-04-11
Payer: COMMERCIAL

## 2023-04-11 VITALS
RESPIRATION RATE: 18 BRPM | OXYGEN SATURATION: 99 % | HEIGHT: 74 IN | WEIGHT: 237 LBS | HEART RATE: 62 BPM | SYSTOLIC BLOOD PRESSURE: 138 MMHG | DIASTOLIC BLOOD PRESSURE: 80 MMHG | TEMPERATURE: 98.2 F | BODY MASS INDEX: 30.42 KG/M2

## 2023-04-11 DIAGNOSIS — I48.21 PERMANENT ATRIAL FIBRILLATION (H): Primary | ICD-10-CM

## 2023-04-11 DIAGNOSIS — F41.9 ANXIETY: ICD-10-CM

## 2023-04-11 DIAGNOSIS — I26.99 PULMONARY EMBOLISM, BILATERAL (H): ICD-10-CM

## 2023-04-11 DIAGNOSIS — G89.29 CHRONIC RIGHT-SIDED LOW BACK PAIN WITHOUT SCIATICA: ICD-10-CM

## 2023-04-11 DIAGNOSIS — D12.6 TUBULAR ADENOMA OF COLON: ICD-10-CM

## 2023-04-11 DIAGNOSIS — I48.19 PERSISTENT ATRIAL FIBRILLATION (H): Primary | ICD-10-CM

## 2023-04-11 DIAGNOSIS — M25.552 BILATERAL HIP PAIN: ICD-10-CM

## 2023-04-11 DIAGNOSIS — G47.33 OSA ON CPAP: ICD-10-CM

## 2023-04-11 DIAGNOSIS — M54.50 CHRONIC RIGHT-SIDED LOW BACK PAIN WITHOUT SCIATICA: ICD-10-CM

## 2023-04-11 DIAGNOSIS — M25.551 BILATERAL HIP PAIN: ICD-10-CM

## 2023-04-11 DIAGNOSIS — I48.21 PERMANENT ATRIAL FIBRILLATION (H): ICD-10-CM

## 2023-04-11 DIAGNOSIS — E78.00 PURE HYPERCHOLESTEROLEMIA: ICD-10-CM

## 2023-04-11 LAB
ANION GAP SERPL CALCULATED.3IONS-SCNC: 11 MMOL/L (ref 7–15)
BUN SERPL-MCNC: 15.1 MG/DL (ref 8–23)
CALCIUM SERPL-MCNC: 9.6 MG/DL (ref 8.8–10.2)
CHLORIDE SERPL-SCNC: 105 MMOL/L (ref 98–107)
CHOLEST SERPL-MCNC: 225 MG/DL
CREAT SERPL-MCNC: 0.87 MG/DL (ref 0.67–1.17)
DEPRECATED HCO3 PLAS-SCNC: 25 MMOL/L (ref 22–29)
ERYTHROCYTE [DISTWIDTH] IN BLOOD BY AUTOMATED COUNT: 12.9 % (ref 10–15)
ERYTHROCYTE [SEDIMENTATION RATE] IN BLOOD BY WESTERGREN METHOD: 12 MM/HR (ref 0–20)
GFR SERPL CREATININE-BSD FRML MDRD: >90 ML/MIN/1.73M2
GLUCOSE SERPL-MCNC: 97 MG/DL (ref 70–99)
HCT VFR BLD AUTO: 41.5 % (ref 40–53)
HDLC SERPL-MCNC: 75 MG/DL
HGB BLD-MCNC: 14 G/DL (ref 13.3–17.7)
INR BLD: 2.9 (ref 0.9–1.1)
LDLC SERPL CALC-MCNC: 136 MG/DL
MCH RBC QN AUTO: 30.8 PG (ref 26.5–33)
MCHC RBC AUTO-ENTMCNC: 33.7 G/DL (ref 31.5–36.5)
MCV RBC AUTO: 91 FL (ref 78–100)
NONHDLC SERPL-MCNC: 150 MG/DL
PLATELET # BLD AUTO: 170 10E3/UL (ref 150–450)
POTASSIUM SERPL-SCNC: 4.6 MMOL/L (ref 3.4–5.3)
RBC # BLD AUTO: 4.55 10E6/UL (ref 4.4–5.9)
SODIUM SERPL-SCNC: 141 MMOL/L (ref 136–145)
TRIGL SERPL-MCNC: 71 MG/DL
WBC # BLD AUTO: 3.8 10E3/UL (ref 4–11)

## 2023-04-11 PROCEDURE — 99214 OFFICE O/P EST MOD 30 MIN: CPT | Performed by: FAMILY MEDICINE

## 2023-04-11 PROCEDURE — 85027 COMPLETE CBC AUTOMATED: CPT | Performed by: FAMILY MEDICINE

## 2023-04-11 PROCEDURE — 85652 RBC SED RATE AUTOMATED: CPT | Performed by: FAMILY MEDICINE

## 2023-04-11 PROCEDURE — 36415 COLL VENOUS BLD VENIPUNCTURE: CPT | Performed by: FAMILY MEDICINE

## 2023-04-11 PROCEDURE — 80048 BASIC METABOLIC PNL TOTAL CA: CPT | Performed by: FAMILY MEDICINE

## 2023-04-11 PROCEDURE — 80061 LIPID PANEL: CPT | Performed by: FAMILY MEDICINE

## 2023-04-11 PROCEDURE — 85610 PROTHROMBIN TIME: CPT | Performed by: FAMILY MEDICINE

## 2023-04-11 PROCEDURE — 36416 COLLJ CAPILLARY BLOOD SPEC: CPT | Performed by: FAMILY MEDICINE

## 2023-04-11 RX ORDER — ESCITALOPRAM OXALATE 20 MG
TABLET ORAL
Qty: 90 TABLET | Refills: 0 | Status: SHIPPED | OUTPATIENT
Start: 2023-04-11 | End: 2023-07-05

## 2023-04-11 ASSESSMENT — PAIN SCALES - GENERAL: PAINLEVEL: MODERATE PAIN (4)

## 2023-04-11 NOTE — TELEPHONE ENCOUNTER
Prescription approved per [facility] Refill Protocol.    FAISAL SuarezN, RN  [facility]

## 2023-04-11 NOTE — PROGRESS NOTES
ANTICOAGULATION MANAGEMENT     Prem Taylor 72 year old male is on warfarin with therapeutic INR result. (Goal INR 2.0-3.0)    Recent labs: (last 7 days)     04/11/23  0804   INR 2.9*       ASSESSMENT     Warfarin Lab Questionnaire        4/11/2023     7:09 AM   Dose in Tablet or Mg   TAB or MG? milligram (mg)     Pt Rptd Dose SUNDAY MONDAY TUESDAY WED THURS FRIDAY SATURDAY 4/11/2023   7:09 AM 7.5 7.5 7.5 7.5 7.5 7.5 7.5         4/11/2023     7:09 AM   Warfarin Lab Questionnaire   Missed doses? No   Medication changes? No   Abnormal bleeding? No   Shortness of breath? No   Injuries or illness since last INR? No   Changes in diet or alcohol? No   Upcoming surgery, procedure? No   Best number to call with results? [phone]       Additional findings: Last INR thera, previous supra       PLAN     Recommended plan for no diet, medication or health factor changes affecting INR     Dosing Instructions: Continue your current warfarin dose with next INR in 4 weeks       Summary  As of 4/11/2023    Full warfarin instructions:  7.5 mg every day   Next INR check:  5/9/2023             Detailed voice message left for Ryan with dosing instructions and follow up date.     Contact [phone] to schedule and with any changes, questions or concerns.     Education provided:     Please call back if any changes to your diet, medications or how you've been taking warfarin    Goal range and lab monitoring: goal range and significance of current result    Contact [phone] with any changes, questions or concerns.     Plan made per [facility] anticoagulation protocol    Josy Moe RN  Anticoagulation Clinic  4/11/2023    _______________________________________________________________________     Anticoagulation Episode Summary     Current INR goal:  2.0-3.0   TTR:  85.7 % (1 y)   Target end date:  Indefinite   Send INR reminders to:  RUBY MAURICIO    Indications    Pulmonary embolism  bilateral (H) (Resolved) [I26.99]  Permanent  atrial fibrillation (H) [I48.21]  Pulmonary embolism  bilateral (H) [I26.99]           Comments:  Approved for 8 week checks per -- see encounter from 07/27/21         Anticoagulation Care Providers     Provider Role Specialty Phone number    Mamadou Baez MD Referring Family Medicine [phone]

## 2023-04-11 NOTE — PROGRESS NOTES
Assessment/Plan:    Persistent atrial fibrillation (H)  Persistent atrial fibrillation, rate controlled.  Medication monitoring reviewed.  Not requiring rate control currently.  Remains on warfarin anticoagulation 7.5 mg daily.  - Basic metabolic panel    Pulmonary embolism, bilateral (H)  History of pulmonary embolism, bilateral.  Asymptomatic.  Update CBC while on chronic warfarin anticoagulation 7.5 mg daily.  - CBC with platelets    Anxiety  Anxiety history.  Escitalopram 20 mg daily.  Not utilizing hydroxyzine apparently.    Pure hypercholesterolemia  Update lipid cascade today while fasting.  Historic protective HDL cholesterol elevated.  Continue attempts at dietary and exercise modification for weight goal less than 220 pounds ideally.  - Lipid panel reflex to direct LDL Fasting    ALISE on CPAP  CPAP for ALISE management.    Tubular adenoma of colon  Tubular adenoma on colonoscopy February 11, 2019 with 5-year follow-up recommendation noted.    Bilateral hip pain  Bilateral hip pain as well as right low back pain worse in the morning when he gets up and better after activity.  Hip range of motion appears relatively symmetric and good at this point.  Check sedimentation rate to ensure no evidence for polymyalgia rheumatica.  - Erythrocyte sedimentation rate auto    Chronic right-sided low back pain without sciatica  Declines further evaluation.  Lower back pain worse after first getting up then better without sciatica currently.               Subjective:    Prem Taylor is seen today for follow-up assessment.  Persistent atrial fibrillation.  Asymptomatic.  History of bilateral PE.  Chronic warfarin anticoagulation 7.5 mg daily.  Escitalopram 20 mg daily.  Prior use of hydroxyzine 50 mg 3 times daily as needed for anxiety but no longer requiring.  Has had elevated cholesterol historically.  Protective HDL cholesterol normally elevated.  Has had mild normochromic normocytic anemia previously however subsequent  "testing has returned to normal.  CPAP for ALISE management.  Tubular adenoma of colon and had colonoscopy May 11, 2019 with 5-year follow-up recommendation.  Sensorineural hearing loss.  Bilateral hip pain worse in the morning when he first gets up and better with activity.  Right low back pain without sciatica also worse in the morning better once he gets up and active.  Declines physical therapy referral at this time.  Comprehensive review of systems as above otherwise all negative.     \"Aubree\" x 44 years   2 daughters - Renee (38) and Susannah (35)   3 grandchildren   No smoke (quit  after 1 and  ppd)   EtOH: weekends \"few beers\"   Surgeries: anterior cervical fusion; inguinal hernia; tonsils; right eye cataract 3/4/20 and left eye cataract 3/11/20; \"right eye vitrectomy scheduled for 20 due to floaters\"   Hospitalizations: [facility] 10/9/17-10/10/17 bilateral pulmonary emboli with a. 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)   Exercise: run 4x/week at 6-6.5 mph; situps and pushups    Past Surgical History:   Procedure Laterality Date     BACK SURGERY       CERVICAL FUSION       HERNIA REPAIR       MT LAP,INGUINAL HERNIA REPR,INITIAL Left 8/15/2019    Procedure: HERNIORRHAPHY, INGUINAL, LAPAROSCOPIC;  Surgeon: Yan Leiva MD;  Location: [facility];  Service: General     TONSILLECTOMY      childhood        Family History   Problem Relation Age of Onset     Heart Disease Mother      Hypertension Mother      Dementia Mother      Coronary Artery Disease Father         Past Medical History:   Diagnosis Date     Abnormal ECG      Anemia      Anxiety      Arrhythmia      Atrial fibrillation (H) 10/09/2017     Cancer (H)      CPAP (continuous positive airway pressure) dependence 10/2019    per patient     Depression      Heart valve disease      " "History of blood clots      Hyperlipidemia 2006     Pulmonary embolism, blood-clot, obstetric 10/09/2017     Sleep apnea 2017    Uses CPAP        Social History     Tobacco Use     Smoking status: Former     Smokeless tobacco: Never   Substance Use Topics     Alcohol use: Yes     Drug use: No        Current Outpatient Medications   Medication Sig Dispense Refill     escitalopram (LEXAPRO) 20 MG tablet Take 1 tablet (20 mg) by mouth daily 90 tablet 3     MULTIVITAMIN (MULTIPLE VITAMIN ORAL) [MULTIVITAMIN (MULTIPLE VITAMIN ORAL)] Take 1 tablet by mouth daily.       warfarin ANTICOAGULANT (COUMADIN) 7.5 MG tablet Take 7.5 mg daily. Or as directed. 90 tablet 1     hydrOXYzine (VISTARIL) 50 MG capsule Take 1 capsule (50 mg) by mouth 3 times daily as needed for anxiety 90 capsule 3          Objective:    Vitals:    04/11/23 0726 04/11/23 0728   BP: (!) 140/70 138/80   Pulse: 62    Resp: 18    Temp: 98.2  F (36.8  C)    SpO2: 99%    Weight: 107.5 kg (237 lb)    Height: 1.886 m (6' 2.25\")       Body mass index is 30.22 kg/m .    Alert.  No apparent distress.  Chest clear.  Cardiac exam irregular, rate controlled.  Extremities warm and dry.  Bilateral hip range of motion with hip flexion as well as external and internal range of motion appearing intact.  Extremities warm and dry.      This note has been dictated using voice recognition software and as a result may contain minor grammatical errors and unintended word substitutions. Answers for HPI/ROS submitted by the patient on 4/4/2023  How many servings of fruits and vegetables do you eat daily?: 0-1  On average, how many sweetened beverages do you drink each day (Examples: soda, juice, sweet tea, etc.  Do NOT count diet or artificially sweetened beverages)?: 0  How many minutes a day do you exercise enough to make your heart beat faster?: 60 or more  How many days a week do you exercise enough to make your heart beat faster?: 4  How many days per week do you miss taking " your medication?: 0

## 2023-04-19 ENCOUNTER — TRANSFERRED RECORDS (OUTPATIENT)
Dept: HEALTH INFORMATION MANAGEMENT | Facility: CLINIC | Age: 73
End: 2023-04-19
Payer: COMMERCIAL

## 2023-05-09 ENCOUNTER — LAB (OUTPATIENT)
Dept: LAB | Facility: CLINIC | Age: 73
End: 2023-05-09
Payer: COMMERCIAL

## 2023-05-09 ENCOUNTER — ANTICOAGULATION THERAPY VISIT (OUTPATIENT)
Dept: ANTICOAGULATION | Facility: CLINIC | Age: 73
End: 2023-05-09

## 2023-05-09 DIAGNOSIS — I48.21 PERMANENT ATRIAL FIBRILLATION (H): Primary | ICD-10-CM

## 2023-05-09 DIAGNOSIS — I48.21 PERMANENT ATRIAL FIBRILLATION (H): ICD-10-CM

## 2023-05-09 DIAGNOSIS — I26.99 PULMONARY EMBOLISM, BILATERAL (H): ICD-10-CM

## 2023-05-09 LAB — INR BLD: 3.1 (ref 0.9–1.1)

## 2023-05-09 PROCEDURE — 36415 COLL VENOUS BLD VENIPUNCTURE: CPT

## 2023-05-09 PROCEDURE — 85610 PROTHROMBIN TIME: CPT

## 2023-05-09 NOTE — PROGRESS NOTES
ANTICOAGULATION MANAGEMENT     Prem Taylor 72 year old male is on warfarin with supratherapeutic INR result. (Goal INR 2.0-3.0)    Recent labs: (last 7 days)     05/09/23  0700   INR 3.1*       ASSESSMENT     Warfarin Lab Questionnaire    Warfarin Doses Last 7 Days      5/8/2023     9:55 AM   Dose in Tablet or Mg   TAB or MG? tablet (tab)     Pt Rptd Dose SUNDAY MONDAY TUESDAY WED THURS FRIDAY SATURDAY 5/8/2023   9:55 AM 7.5 7.5 7.5 7.5 7.5 7.5 7.5         5/8/2023   Warfarin Lab Questionnaire   Missed doses within past 14 days? No   Changes in diet or alcohol within past 14 days? No   Medication changes since last result? No   Injuries or illness since last result? No   New shortness of breath, severe headaches or sudden changes in vision since last result? No   Abnormal bleeding since last result? No   Upcoming surgery, procedure? No   Best number to call with results? [phone]        Previous result: Therapeutic last 2(+) visits  Additional findings: None       PLAN     Recommended plan for no diet, medication or health factor changes affecting INR     Dosing Instructions: Continue your current warfarin dose with next INR in 2 weeks       Summary  As of 5/9/2023    Full warfarin instructions:  7.5 mg every day   Next INR check:  5/23/2023             Telephone call with Ryan who verbalizes understanding and agrees to plan    Lab visit scheduled    Education provided:     Goal range and lab monitoring: goal range and significance of current result    Contact [phone] with any changes, questions or concerns.     Plan made per [facility] anticoagulation protocol    Yesenia Vázquez, RN  Anticoagulation Clinic  5/9/2023    _______________________________________________________________________     Anticoagulation Episode Summary     Current INR goal:  2.0-3.0   TTR:  81.9 % (1 y)   Target end date:  Indefinite   Send INR reminders to:  RUBY MAURICIO    Indications    Pulmonary embolism  bilateral (H)  (Resolved) [I26.99]  Permanent atrial fibrillation (H) [I48.21]  Pulmonary embolism  bilateral (H) [I26.99]           Comments:  Approved for 8 week checks per -- see encounter from 07/27/21         Anticoagulation Care Providers     Provider Role Specialty Phone number    Mamadou Baez MD Referring Family Medicine [phone]

## 2023-05-23 ENCOUNTER — LAB (OUTPATIENT)
Dept: LAB | Facility: CLINIC | Age: 73
End: 2023-05-23
Payer: COMMERCIAL

## 2023-05-23 ENCOUNTER — ANTICOAGULATION THERAPY VISIT (OUTPATIENT)
Dept: ANTICOAGULATION | Facility: CLINIC | Age: 73
End: 2023-05-23

## 2023-05-23 DIAGNOSIS — I26.99 PULMONARY EMBOLISM, BILATERAL (H): ICD-10-CM

## 2023-05-23 DIAGNOSIS — I48.21 PERMANENT ATRIAL FIBRILLATION (H): Primary | ICD-10-CM

## 2023-05-23 DIAGNOSIS — I48.21 PERMANENT ATRIAL FIBRILLATION (H): ICD-10-CM

## 2023-05-23 LAB — INR BLD: 3.3 (ref 0.9–1.1)

## 2023-05-23 PROCEDURE — 36416 COLLJ CAPILLARY BLOOD SPEC: CPT

## 2023-05-23 PROCEDURE — 85610 PROTHROMBIN TIME: CPT

## 2023-05-23 NOTE — PROGRESS NOTES
ANTICOAGULATION MANAGEMENT     Prem Taylor 72 year old male is on warfarin with supratherapeutic INR result. (Goal INR 2.0-3.0)    Recent labs: (last 7 days)     05/23/23  0659   INR 3.3*       ASSESSMENT     Warfarin Lab Questionnaire    Warfarin Doses Last 7 Days      5/22/2023     7:05 PM   Dose in Tablet or Mg   TAB or MG? milligram (mg)     Pt Rptd Dose SUNDAY MONDAY TUESDAY WED THURS FRIDAY SATURDAY 5/22/2023   7:05 PM 7.5 7.5 7.5 7.5 7.5 7.5 7.5         5/22/2023   Warfarin Lab Questionnaire   Missed doses within past 14 days? No   Changes in diet or alcohol within past 14 days? No-- reports that for the past 2 months he has been having a protein bar every morning with 21g of protein, but nothing other than that   Medication changes since last result? No   Injuries or illness since last result? No   New shortness of breath, severe headaches or sudden changes in vision since last result? No   Abnormal bleeding since last result? No   Upcoming surgery, procedure? No        Previous result: Supratherapeutic  Additional findings: None       PLAN     Recommended plan for no diet, medication or health factor changes affecting INR     Dosing Instructions: decrease your warfarin dose (7.1% change) with next INR in 2 weeks       Summary  As of 5/23/2023    Full warfarin instructions:  3.75 mg every Tue; 7.5 mg all other days   Next INR check:  6/6/2023             Telephone call with Ryan who agrees to plan and repeated back plan correctly    Lab visit scheduled    Education provided:     Contact [phone] with any changes, questions or concerns.     Plan made per [facility] anticoagulation protocol    Elsie Stringer, RN  Anticoagulation Clinic  5/23/2023    _______________________________________________________________________     Anticoagulation Episode Summary     Current INR goal:  2.0-3.0   TTR:  78.0 % (1 y)   Target end date:  Indefinite   Send INR reminders to:  RUBY MAURICIO    Indications     Pulmonary embolism  bilateral (H) (Resolved) [I26.99]  Permanent atrial fibrillation (H) [I48.21]  Pulmonary embolism  bilateral (H) [I26.99]           Comments:  Approved for 8 week checks per -- see encounter from 07/27/21         Anticoagulation Care Providers     Provider Role Specialty Phone number    Mamadou Baez MD Referring Family Medicine [phone]

## 2023-06-06 ENCOUNTER — ANTICOAGULATION THERAPY VISIT (OUTPATIENT)
Dept: ANTICOAGULATION | Facility: CLINIC | Age: 73
End: 2023-06-06

## 2023-06-06 ENCOUNTER — LAB (OUTPATIENT)
Dept: LAB | Facility: CLINIC | Age: 73
End: 2023-06-06
Payer: COMMERCIAL

## 2023-06-06 DIAGNOSIS — I26.99 PULMONARY EMBOLISM, BILATERAL (H): ICD-10-CM

## 2023-06-06 DIAGNOSIS — I48.21 PERMANENT ATRIAL FIBRILLATION (H): ICD-10-CM

## 2023-06-06 DIAGNOSIS — I48.21 PERMANENT ATRIAL FIBRILLATION (H): Primary | ICD-10-CM

## 2023-06-06 LAB — INR BLD: 2.7 (ref 0.9–1.1)

## 2023-06-06 PROCEDURE — 36416 COLLJ CAPILLARY BLOOD SPEC: CPT

## 2023-06-06 PROCEDURE — 85610 PROTHROMBIN TIME: CPT

## 2023-06-06 NOTE — PROGRESS NOTES
ANTICOAGULATION MANAGEMENT     Prem Taylor 72 year old male is on warfarin with therapeutic INR result. (Goal INR 2.0-3.0)    Recent labs: (last 7 days)     06/06/23  0649   INR 2.7*       ASSESSMENT     Warfarin Lab Questionnaire    Warfarin Doses Last 7 Days      6/5/2023    11:45 AM   Dose in Tablet or Mg   TAB or MG? milligram (mg)     Pt Rptd Dose SUNDAY MONDAY TUESDAY WED THURS FRIDAY SATURDAY 6/5/2023  11:45 AM 7.5 7.5 3.75 7.5 7.5 7.5 7.5         6/5/2023   Warfarin Lab Questionnaire   Missed doses within past 14 days? No   Changes in diet or alcohol within past 14 days? No   Medication changes since last result? No   Injuries or illness since last result? No   New shortness of breath, severe headaches or sudden changes in vision since last result? No   Abnormal bleeding since last result? No   Upcoming surgery, procedure? No   Best number to call with results? [phone]        Previous result: Supratherapeutic  Additional findings: None       PLAN     Recommended plan for no diet, medication or health factor changes affecting INR     Dosing Instructions: Continue your current warfarin dose with next INR in 3-4 weeks       Summary  As of 6/6/2023    Full warfarin instructions:  3.75 mg every Tue; 7.5 mg all other days   Next INR check:  7/11/2023             Telephone call with Ryan who verbalizes understanding and agrees to plan    Lab visit scheduled    Education provided:     Contact [phone] with any changes, questions or concerns.     Plan made per [facility] anticoagulation protocol    Elsie Stringer, RN  Anticoagulation Clinic  6/6/2023    _______________________________________________________________________     Anticoagulation Episode Summary     Current INR goal:  2.0-3.0   TTR:  76.1 % (1 y)   Target end date:  Indefinite   Send INR reminders to:  RUBY MAURICIO    Indications    Pulmonary embolism  bilateral (H) (Resolved) [I26.99]  Permanent atrial fibrillation (H) [I48.21]  Pulmonary  embolism  bilateral (H) [I26.99]           Comments:  Approved for 8 week checks per -- see encounter from 07/27/21         Anticoagulation Care Providers     Provider Role Specialty Phone number    Mamadou Baez MD Referring Family Medicine [phone]

## 2023-06-21 ENCOUNTER — TRANSFERRED RECORDS (OUTPATIENT)
Dept: HEALTH INFORMATION MANAGEMENT | Facility: CLINIC | Age: 73
End: 2023-06-21
Payer: COMMERCIAL

## 2023-07-05 DIAGNOSIS — F41.9 ANXIETY: ICD-10-CM

## 2023-07-05 RX ORDER — ESCITALOPRAM OXALATE 20 MG/1
20 TABLET ORAL DAILY
Qty: 90 TABLET | Refills: 3 | Status: SHIPPED | OUTPATIENT
Start: 2023-07-05 | End: 2024-01-10

## 2023-07-05 NOTE — TELEPHONE ENCOUNTER
"Last Written Prescription Date:  4/11/23  Last Fill Quantity: 90,  # refills: 0   Last office visit provider:  4/11/23     Requested Prescriptions   Pending Prescriptions Disp Refills     LEXAPRO 20 MG tablet [Pharmacy Med Name: LEXAPRO 20MG TABLET] 90 tablet 0     Sig: TAKE 1 TABLET DAILY       SSRIs Protocol Passed - 7/5/2023 11:52 AM        Passed - Recent (12 mo) or future (30 days) visit within the authorizing provider's specialty     Patient has had an office visit with the authorizing provider or a provider within the authorizing providers department within the previous 12 mos or has a future within next 30 days. See \"Patient Info\" tab in inbasket, or \"Choose Columns\" in Meds & Orders section of the refill encounter.              Passed - Medication is active on med list        Passed - Patient is age 18 or older             Jasvir Cavazos RN 07/05/23 11:52 AM  "

## 2023-07-11 ENCOUNTER — ANTICOAGULATION THERAPY VISIT (OUTPATIENT)
Dept: ANTICOAGULATION | Facility: CLINIC | Age: 73
End: 2023-07-11

## 2023-07-11 ENCOUNTER — LAB (OUTPATIENT)
Dept: LAB | Facility: CLINIC | Age: 73
End: 2023-07-11
Payer: COMMERCIAL

## 2023-07-11 DIAGNOSIS — I26.99 PULMONARY EMBOLISM, BILATERAL (H): ICD-10-CM

## 2023-07-11 DIAGNOSIS — I48.21 PERMANENT ATRIAL FIBRILLATION (H): ICD-10-CM

## 2023-07-11 DIAGNOSIS — I48.21 PERMANENT ATRIAL FIBRILLATION (H): Primary | ICD-10-CM

## 2023-07-11 LAB — INR BLD: 2.5 (ref 0.9–1.1)

## 2023-07-11 PROCEDURE — 36416 COLLJ CAPILLARY BLOOD SPEC: CPT

## 2023-07-11 PROCEDURE — 85610 PROTHROMBIN TIME: CPT

## 2023-07-11 NOTE — PROGRESS NOTES
ANTICOAGULATION MANAGEMENT     Prem Taylor 72 year old male is on warfarin with therapeutic INR result. (Goal INR 2.0-3.0)    Recent labs: (last 7 days)     07/11/23  0703   INR 2.5*       ASSESSMENT     Warfarin Lab Questionnaire    Warfarin Doses Last 7 Days      7/10/2023     4:24 PM   Dose in Tablet or Mg   TAB or MG? milligram (mg)     Pt Rptd Dose SINDHU MONDAY TUESDAY WED THURS FRIDAY SATURDAY   7/10/2023   4:24 PM 7.5 7.5 3.75 7.5 7.5 7.5 7.5         7/10/2023   Warfarin Lab Questionnaire   Missed doses within past 14 days? No   Changes in diet or alcohol within past 14 days? No   Medication changes since last result? No   Injuries or illness since last result? No   New shortness of breath, severe headaches or sudden changes in vision since last result? No   Abnormal bleeding since last result? No   Upcoming surgery, procedure? No   Best number to call with results? [phone]     Previous result: Therapeutic last visit; previously outside of goal range  Additional findings: None       PLAN     Recommended plan for no diet, medication or health factor changes affecting INR     Dosing Instructions: Continue your current warfarin dose with next INR in 4-6 weeks, has been 5 weeks since last check.       Summary  As of 7/11/2023    Full warfarin instructions:  3.75 mg every Tue; 7.5 mg all other days   Next INR check:  8/22/2023             Detailed voice message left for Ryan with dosing instructions and follow up date.     Contact [phone] to schedule and with any changes, questions or concerns.     Education provided:     Goal range and lab monitoring: goal range and significance of current result    Contact [phone] with any changes, questions or concerns.     Plan made per [facility] anticoagulation protocol    Yesenia Vázquez, RN  Anticoagulation Clinic  7/11/2023    _______________________________________________________________________     Anticoagulation Episode Summary     Current INR goal:   2.0-3.0   TTR:  76.1 % (1 y)   Target end date:  Indefinite   Send INR reminders to:  RUBY MAURICIO    Indications    Pulmonary embolism  bilateral (H) (Resolved) [I26.99]  Permanent atrial fibrillation (H) [I48.21]  Pulmonary embolism  bilateral (H) [I26.99]           Comments:  Approved for 8 week checks per -- see encounter from 07/27/21         Anticoagulation Care Providers     Provider Role Specialty Phone number    Mamadou Baez MD Referring Family Medicine [phone]

## 2023-07-19 ENCOUNTER — TRANSFERRED RECORDS (OUTPATIENT)
Dept: HEALTH INFORMATION MANAGEMENT | Facility: CLINIC | Age: 73
End: 2023-07-19
Payer: COMMERCIAL

## 2023-07-29 DIAGNOSIS — I48.19 PERSISTENT ATRIAL FIBRILLATION (H): ICD-10-CM

## 2023-07-29 NOTE — TELEPHONE ENCOUNTER
"Routing refill request to provider for review/approval because:  Labs out of range:  INR    Last Written Prescription Date:  2/7/2023  Last Fill Quantity: 90,  # refills: 1   Last office visit provider:  4/11/2023     Requested Prescriptions   Pending Prescriptions Disp Refills    warfarin ANTICOAGULANT (COUMADIN) 7.5 MG tablet [Pharmacy Med Name: WARFARIN     TAB 7.5MG] 90 tablet 1     Sig: TAKE 1 TABLET DAILY OR AS DIRECTED       Vitamin K Antagonists Failed - 7/29/2023 12:03 PM        Failed - INR is within goal in the past 6 weeks     Confirm INR is within goal in the past 6 weeks.     Recent Labs   Lab Test 07/11/23  0703   INR 2.5*                       Passed - Recent (12 mo) or future (30 days) visit within the authorizing provider's specialty     Patient has had an office visit with the authorizing provider or a provider within the authorizing providers department within the previous 12 mos or has a future within next 30 days. See \"Patient Info\" tab in inbasket, or \"Choose Columns\" in Meds & Orders section of the refill encounter.              Passed - Medication is active on med list        Passed - Patient is 18 years of age or older             SHERIDAN MINOR RN 07/29/23 12:13 PM  "

## 2023-07-31 RX ORDER — WARFARIN SODIUM 7.5 MG/1
TABLET ORAL
Qty: 90 TABLET | Refills: 1 | Status: SHIPPED | OUTPATIENT
Start: 2023-07-31 | End: 2024-01-10

## 2023-07-31 NOTE — TELEPHONE ENCOUNTER
ANTICOAGULATION MANAGEMENT:  Medication Refill    Anticoagulation Summary  As of 7/11/2023      Warfarin maintenance plan:  3.75 mg (7.5 mg x 0.5) every Tue; 7.5 mg (7.5 mg x 1) all other days   Next INR check:  8/22/2023   Target end date:  Indefinite    Indications    Pulmonary embolism  bilateral (H) (Resolved) [I26.99]  Permanent atrial fibrillation (H) [I48.21]  Pulmonary embolism  bilateral (H) [I26.99]                 Anticoagulation Care Providers       Provider Role Specialty Phone number    Mamadou Baez MD Referring Family Medicine [phone]            Refill Criteria    Visit with referring provider/group: Meets criteria: office visit within referring provider group in the last 1 year on 4/11/23    [facility] referral signed last signed: 10/11/2022; within last year: Yes    Lab monitoring not exceeding 2 weeks overdue:  Yes    Prem meets all criteria for refill. Rx instructions and quantity supplied updated to match patient's current dosing plan. Warfarin 90 day supply with 1 refill granted per [facility] protocol     Josy Moe RN  Anticoagulation Clinic

## 2023-08-01 ENCOUNTER — TRANSFERRED RECORDS (OUTPATIENT)
Dept: HEALTH INFORMATION MANAGEMENT | Facility: CLINIC | Age: 73
End: 2023-08-01
Payer: COMMERCIAL

## 2023-08-01 ASSESSMENT — SLEEP AND FATIGUE QUESTIONNAIRES
HOW LIKELY ARE YOU TO NOD OFF OR FALL ASLEEP WHILE SITTING AND READING: HIGH CHANCE OF DOZING
HOW LIKELY ARE YOU TO NOD OFF OR FALL ASLEEP WHILE SITTING INACTIVE IN A PUBLIC PLACE: SLIGHT CHANCE OF DOZING
HOW LIKELY ARE YOU TO NOD OFF OR FALL ASLEEP WHILE LYING DOWN TO REST IN THE AFTERNOON WHEN CIRCUMSTANCES PERMIT: HIGH CHANCE OF DOZING
HOW LIKELY ARE YOU TO NOD OFF OR FALL ASLEEP WHILE WATCHING TV: MODERATE CHANCE OF DOZING
HOW LIKELY ARE YOU TO NOD OFF OR FALL ASLEEP WHILE SITTING QUIETLY AFTER LUNCH WITHOUT ALCOHOL: SLIGHT CHANCE OF DOZING
HOW LIKELY ARE YOU TO NOD OFF OR FALL ASLEEP WHILE SITTING AND TALKING TO SOMEONE: SLIGHT CHANCE OF DOZING
HOW LIKELY ARE YOU TO NOD OFF OR FALL ASLEEP IN A CAR, WHILE STOPPED FOR A FEW MINUTES IN TRAFFIC: WOULD NEVER DOZE
HOW LIKELY ARE YOU TO NOD OFF OR FALL ASLEEP WHEN YOU ARE A PASSENGER IN A CAR FOR AN HOUR WITHOUT A BREAK: SLIGHT CHANCE OF DOZING

## 2023-08-02 ENCOUNTER — TRANSFERRED RECORDS (OUTPATIENT)
Dept: HEALTH INFORMATION MANAGEMENT | Facility: CLINIC | Age: 73
End: 2023-08-02

## 2023-08-03 ENCOUNTER — ALLIED HEALTH/NURSE VISIT (OUTPATIENT)
Dept: RESEARCH | Facility: CLINIC | Age: 73
End: 2023-08-03
Payer: COMMERCIAL

## 2023-08-03 VITALS
WEIGHT: 231.2 LBS | HEART RATE: 56 BPM | HEIGHT: 74 IN | SYSTOLIC BLOOD PRESSURE: 126 MMHG | OXYGEN SATURATION: 97 % | BODY MASS INDEX: 29.67 KG/M2 | RESPIRATION RATE: 18 BRPM | DIASTOLIC BLOOD PRESSURE: 80 MMHG

## 2023-08-03 DIAGNOSIS — Z00.6 EXAMINATION OF PARTICIPANT OR CONTROL IN CLINICAL RESEARCH: Primary | ICD-10-CM

## 2023-08-03 PROCEDURE — 99207 PR NO CHARGE-RESEARCH SERVICE: CPT

## 2023-08-03 NOTE — PROGRESS NOTES
Thunderdome Study    Study Description: The objective of this study is to demonstrate that the Device Under Testing (DUT) produced an ECG input signal of sufficient quality for the [employer] ECG David to produce a waveform with clinically-equivalent information the the Clinical Reference Device.       CONSENT     Prem Taylor a 72 year old male, was on-site today to discuss participation in the underdome.       The consent form was reviewed with the patient.     The review of the study included:  Study Purpose    Participant Responsibilities    Study Data and Devices    Benefits and Risks of Participation    Compensation and Costs of Participation    Voluntary Participation    Confidentiality    Injury and Legal Rights      Protocol Version: 1.0     Subject Number:       The subject was queried in regards to his willingness to continue and his questions were answered to his satisfaction.   The patient has given his agreement to volunteer and participate in the above noted study.   The eConsent and HIPAA form version 1.0 (Version Date 28-Jun-2023) was signed  on  3-AUG-2023 with the Clinical Research Unit of [facility].     A copy of the underdome consent will be placed in subject's medical record. A copy of the consent form was given to the subject today.    Study data is directly entered into Epic and [employer]  per protocol.     No study procedures were done prior to Prem Taylor providing informed consent.       SCREENING       Demographic Info  Prem Taylor   1950          72 year old  male    Race: White  Ethnicity: Non-/     Woman of Child Bearing Potential?  N/A (Male)    Past Medical History:   Diagnosis Date    Anemia 2/6/2018, Ongoing    Anxiety 12/29/2015, Ongoing    Atrial fibrillation (H) 10/09/2017     10/2019    Heart valve disease 2017    Hyperlipidemia UN/UN/2006,Ongoing    Pulmonary embolism, blood-clot, obstetric Start and End Date: 10/09/2017    Sleep  "apnea UN/UN/2017, Ongoing    Uses CPAP       Specify Type of Atrial Fibrillation: Long-standing persistent atrial fibrillation       Current Outpatient Medications:     warfarin ANTICOAGULANT (COUMADIN) 7.5 MG tablet, Take 3.75 mg every Tuesday, Take 7.5 mg all other days. Or as directed., Disp: 90 tablet, Rfl: 1    Medications were reviewed with the subject. Subject is not taking any exclusionary medications.     Rhythm Control (EXCLUSIONARY): disopyramide (Norpace), quinidine (cardioquin), flecainide (Tambocor), propafenone, amiodarone (Pacerone), dofetilide (Tokisyn), sotalol (Betapace), ibutilide (Corvert), moricizine (Ethmozine), or procainamide (Pronestyl)    Rate Control/Anticoagulant (PERMITTED): metoprolol (Lopressor), atenolol (Tenormin), diltiazem (Cardizem), coumadin (Warfarin), clopidogrel (Plavix), or aspirin    No Known Allergies     Time Seated: 1445   /80   Pulse 56   Resp 18   Ht 1.88 m (6' 2\")   Wt 104.9 kg (231 lb 3.2 oz)   SpO2 97%   BMI 29.68 kg/m      Lifestyle Habits  Alcohol: Do not consume alcohol  Recreational Drug Use: Have not used recreational drug(s) in the last year   Will the recreational drug use impact the ECG result? N/A    Please see Physical Examination note for Screening ECG interpretation.      ENROLLMENT     Subject has met all requirements and is Enrolled in the Study?: Yes    Subject Cohort Assignment: Atrial Fibrillation     DATA COLLECTION       Wrist Measurements  Wrist the DUT will be Worn on: Left  Left Wrist Circumference: 170 mm    DUT wearing Wrist Skin Thickness: 3.5 mm   Wrist Hairiness: Sparse hair on wrist      Device Information  Equipment Bundle/Device Identifier: A:  (L)  Number of Practice Trials: 1   Date ECG Obtained: 3-AUG-2023    30-second samples must be taken simultaneously. 1 minute rest between samples.     Samples: 1, 2, & 3  Taken Simultaneously? Yes  30-Sec ECG recording from DUT? Yes  30-Sec ECG recording from 12-Lead ECG? " Yes    All samples (FIT Files and XML/PDF) were provided to [facility].      Any Adverse Events? No  Any Protocol Deviations? No    The subject has now completed their participation in the [employer] Study.       END OF STUDY     Did the subject complete all screening and study procedures per the protocol? Yes   If no, Why: N/A    Date Subject Exited the Study: 3-AUG-2023    Reason Subject Exited Study: Study Activities Complete   Reason for Withdrawal: N/A      3-AUG-2023    Falguni Crabtree RN

## 2023-08-03 NOTE — PROGRESS NOTES
[facility] Physical Exam      Medical History Reviewed? Yes    Physical Examination  For abnormal findings, please evaluate if the finding is Clinically Significant (by 'CS') or Not Clinically Significant (by 'NCS')  General Appearance   Normal  Head and Neck   Normal  Eyes     Abnormal; NCS wears glasses  Ears, Nose Mouth and Throat  Abnormal; NCS hearing aids bilateral  Cardiovascular   Abnormal; NCS irregular  Respiratory    Normal  Skin and Hair of Wrists  Normal    Tremor (If present document)  Absent    Vitals:    08/03/23 1454   BP: 126/80   Pulse: 56   Resp: 18   SpO2: 97%            Electrocardiogram Interpretation:   12 Lead Interpretation: Atrial Fibrillation       03-AUG-2023    Ursula Méndez NP

## 2023-08-03 NOTE — PROGRESS NOTES
Debbi Inclusion/Exclusion Criteria:    Study Name: Debbi   : Yoni Larson MD and Elissa Prater MD    Study Description: The objective of this study is to demonstrate that the Device Under Testing (DUT) produced an ECG input signal of sufficient quality for the [employer] ECG David to produce a waveform with clinically-equivalent information the Clinical Reference Device.      Protocol Version: 1.0 (6-FEB-2023)  Consent Version: 1.0 (28- Jun-2023)    Criteria #  Inclusion Criteria (ALL MUST BE YES)  YES/NO/N/A   1  Able to read, understand, and provide written informed consent Yes   2  Willing and able to participate in the study procedures as described in the consent form   Yes   3  Individuals who are 22 years of age and older  Yes   4  Able to communicate effectively with and follow instructions from the study staff    Yes   5    Subjects must be able to wear the DUT on their wrist such that  DUT band can be fastened  DUT fits snuggly on the wrist without any gapping or rotation  Yes   6    For subjects enrolled into the AF population, subjects must have a known diagnosis of AF and be in AF at the time of screening. Subjects may have any type of AF including paroxysmal, persistent, or permanent AF. Yes             Criteria # Exclusion Criteria (ALL MUST BE NO) YES/NO/N/A   1  Physical disability that precludes safe and adequate testing  No   2 Mental impairment resulting in limited ability to cooperate   No   3 Subjects with a pacemaker or implantable cardioverter-defibrillator (ICD)   No   4 Acute myocardial infarction within 90 days of screening or other cardiovascular disease that, in the opinion of the Investigator, increases the risk to the subject or renders data uninterpretable *  Date of MI: N/A  No   5  Acute pulmonary embolism, pulmonary infarction, or deep vein thrombosis within 90 days of screening       Date of Pulmonary embolism:10/2017    No   6  Stroke or  transient ischemic attack within 90 days of screening                        Date of Stroke/TIA: N/A  No   7 Subjects taking rhythm control drugs. For clarity and in contrast, rate control, anticoagulants, and anti-platelet medications are permitted.             EXCEPTION: Subjects who are undergoing scheduled cardioversion for               known AF within 30 days after study participation are allowed to participate             in the study even when on rhythm control drugs                         Date of Cardioversion: N/A  No   8  Symptomatic (or active) allergic skin reactions such as eczema, rosacea, impetigo, dermatomyositis, or allergic contact dermatitis on both wrists or over electrode attachment sites, including known allergy or sensitivity  No   9  Known sensitivity to medical adhesives, isopropyl alcohol, watch bands, or ECG electrodes No   10  A history of abnormal life-threatening rhythms as determined by the Investigator **    No   11  Significant tremor that prevents subject from being able to hold still  No   12  Women who are pregnant at the time of study participation  No   13   Subjects enrolled into the SR population must not have any diagnosis of AF. For example, subjects cannot be enrolled into the SR population if they have a diagnosis of paroxysmal AF but during screening are in SR.  N/A: AF subject    *Ex. - Recent or ongoing unstable angina, significant valvular heart disease or heart failure, myocarditis, or pericarditis   **Ex. - Ventricular tachycardia, ventricular fibrillation, 3rd-degree heart block, resting heart rate <50 bpm, or resting heart rate >120 bpm      Patient does fulfill study inclusion criteria and no exclusion criteria are found.     Yoni Larson MD and Elissa Prater MD    3-AUG-2023    Falguni Crabtree RN

## 2023-08-07 ENCOUNTER — TELEPHONE (OUTPATIENT)
Dept: FAMILY MEDICINE | Facility: CLINIC | Age: 73
End: 2023-08-07
Payer: COMMERCIAL

## 2023-08-07 DIAGNOSIS — I48.21 PERMANENT ATRIAL FIBRILLATION (H): Primary | ICD-10-CM

## 2023-08-07 DIAGNOSIS — I26.99 PULMONARY EMBOLISM, BILATERAL (H): ICD-10-CM

## 2023-08-07 NOTE — TELEPHONE ENCOUNTER
Spoke with Ryan. No injection is scheduled yet. He says he needs hold/bridge directions BEFORE they will schedule the injections for the spine.    Going to [facility]. Phone: [phone]. Fax: [phone].    He needs a plan made and then [facility] needs to notify [facility] so they can call Ryan to make the appointment.    Estimated Creatinine Clearance: 100.6 mL/min (based on SCr of 0.87 mg/dL).    He has typically bridged with Lovenox in the past.    [facility], please review and advise.    Josy Moe RN

## 2023-08-07 NOTE — TELEPHONE ENCOUNTER
Patient Returning Call    Reason for call:    Ryan is being addressed by another clinic for his back pain. They want to do steroid injections and sent a request to Dr. Baez via email on authorization to possibly withhold his warfarin medications in order to safely give these steroid injections. Ryan would like to know whether or not communication has been made to proceed with his treatment for his back pain.     Information relayed to patient:  N/A    Patient has additional questions:  No      Could we send this information to you in [employer] or would you prefer to receive a phone call?:   Patient would prefer a phone call   Okay to leave a detailed message?: Yes at Cell number on file:    Telephone Information:   Mobile [phone]

## 2023-08-08 ENCOUNTER — OFFICE VISIT (OUTPATIENT)
Dept: SLEEP MEDICINE | Facility: CLINIC | Age: 73
End: 2023-08-08
Payer: COMMERCIAL

## 2023-08-08 VITALS
HEART RATE: 65 BPM | DIASTOLIC BLOOD PRESSURE: 72 MMHG | BODY MASS INDEX: 30.13 KG/M2 | OXYGEN SATURATION: 96 % | SYSTOLIC BLOOD PRESSURE: 122 MMHG | HEIGHT: 74 IN | WEIGHT: 234.8 LBS

## 2023-08-08 DIAGNOSIS — G47.33 OBSTRUCTIVE SLEEP APNEA: Primary | ICD-10-CM

## 2023-08-08 PROCEDURE — 99203 OFFICE O/P NEW LOW 30 MIN: CPT | Performed by: INTERNAL MEDICINE

## 2023-08-08 RX ORDER — GABAPENTIN 300 MG/1
CAPSULE ORAL
COMMUNITY
Start: 2023-08-02 | End: 2023-11-21

## 2023-08-08 NOTE — TELEPHONE ENCOUNTER
YUNG-PROCEDURAL ANTICOAGULATION  MANAGEMENT    ASSESSMENT     Warfarin interruption plan for Steroid Injection-Back on TBD-pending orders.    Indication for Anticoagulation: Atrial Fibrillation and PE    [id number] = 1-3 (Age 65-74; Thromboembolism-Afib present at time of PE-2017)    Hx of hold with bridge per Dr. Baez  2/2021 for Vitrectomy  9/2020-Vitrectomy  2/2019 colonoscopy    Yung-Procedure Risk stratification for thromboembolism: at least low- moderate (2022 Chest guidelines); unable to stratify possible additive risk of duel indications    VTE: 2022 CHEST Perioperative Management guidelines suggest against bridging for patients with Hx of VTE as sole clinical indication for warfarin except in high risk stratification patients.    VTE: 2022 CHEST Perioperative Management guidelines note, suggesting against bridging with a therapeutic-dose regimen does not preclude the use of a low-dose heparin regimen, typically started within 24 hours after surgery and continued for up to 5 days while VKA therapy is resumed, to decrease the risk for postoperative VTE.    AFIB: 2022 CHEST Perioperative Management guidelines recommends against bridging for patients with atrial fibrillation except in high risk stratification patients.     RECOMMENDATION       Plan routed to referring provider input on bridging decision with duel indication  ?   Jessica Orosco, [facility]    SUBJECTIVE/OBJECTIVE     Prem Taylor, a 72 year old male    Goal INR Range: 2.0-3.0     Pertinent History:  3/8/18 hematology visit with Dr. Lindsey reviewed via care everywhere:  Prothrombin Gene negative, Factor V Leiden Negative; Lupus anticoagulant negative    Wt Readings from Last 3 Encounters:   08/08/23 106.5 kg (234 lb 12.8 oz)   08/03/23 104.9 kg (231 lb 3.2 oz)   04/11/23 107.5 kg (237 lb)      Ideal body weight: 82.2 kg (181 lb 3.5 oz)  Adjusted ideal body weight: 91.9 kg (202 lb 10.4 oz)     Estimated body mass index is 30.15 kg/m  as calculated  "from the following:    Height as of 8/8/23: 1.88 m (6' 2\").    Weight as of 8/8/23: 106.5 kg (234 lb 12.8 oz).    Lab Results   Component Value Date    INR 2.5 (H) 07/11/2023    INR 2.7 (H) 06/06/2023    INR 3.3 (H) 05/23/2023     Lab Results   Component Value Date    HGB 14.0 04/11/2023    HCT 41.5 04/11/2023     04/11/2023     Lab Results   Component Value Date    CR 0.87 04/11/2023    CR 0.76 09/13/2022    CR 0.81 03/08/2022     Estimated Creatinine Clearance: 101.2 mL/min (based on SCr of 0.87 mg/dL).    "

## 2023-08-08 NOTE — PROGRESS NOTES
Additional 15 minutes on the date of service was spent performing the following:    -Preparing to see the patient  -Obtaining and/or reviewing separately obtained history   -Ordering medications, tests, or procedures   -Documenting clinical information in the electronic or other health record     Thank you for the opportunity to participate in the care of Prem Taylor.     He is a 72 year old y/o male patient who comes to the sleep medicine clinic for follow up. The patient was diagnosed with ALISE on 11/01/2018 (AHI=22).  Patient would like to reestablish care with us so that he can get a prescription to continue to get supplies for his CPAP machine.  He also would like to learn more about inspire implant.     Assessment and Plan:  In summary Prem Taylor is a 72 year old year old male who is here for follow up.    1. Obstructive sleep apnea  I educated the patient on the inspire implant in detail.  I congratulated the patient on his excellent CPAP usage.  He would like to continue using his CPAP and declined further referrals for the inspire implant.  - COMPREHENSIVE DME    Compliance Download data for 30 days:  Compliance: 97%  Pressure setting: APAP 9-13.2 CWP  95% pressure: 12.1 CWP  Leak: Minimal  Residual AHI: 2.6 events per hour  Mask Tolerance: Good  Skin irritation: None  DME: [facility]    Lab reviewed: Discussed with patient.    TIME IN BED:    1) Work/School Days:    Do you work or go to school? No   What time do you usually get into bed? 10:00pm   About how long does it take you to fall asleep? 15 mins.   How often do you have trouble falling asleep? 0   How often do you wake up during the night? 5   Do you work days/evenings/nights/rotating shifts?    What wakes you up at night? Pain    Use the bathroom   How often do you have trouble falling back to sleep? 1-2   About how long does it take to fall back to sleep? 20mins.   What do you usually do if you have trouble getting back to sleep?  Just get up   What time do you usually get out of bed to start your day? 5:30   Do you use an alarm? No   2) Weekends/Non-work Days/All Other Days    What time do you usually get into bed? 10:30   About how long does it take you to fall asleep? 15mins   What time do you usually get out of bed to start your day? 5:30   Do you use an alarm? No   SLEEP NEED    On average, about how much sleep do you think you get? 7 hours   About how much sleep do you think you need? 7-8 hours   SLEEP POSITION    Which sleep positions do you prefer? Side    Stomach   Do you do any of the following activities in bed? Watch TV    Use phone, computer, or tablet   How often do you take a nap on purpose? 0   About how long are your naps? 20 min   Do you feel better after naps? No   How often do you doze off unintentionally? 5-6   Have you ever had a driving accident or near-miss due to sleepiness/drowsiness? No       NEYDA:  NEYDA Total Score: 15  Total score - [place]: 12 (8/1/2023  6:33 PM)       Patient Active Problem List   Diagnosis    Hearing Loss    Hypercholesterolemia    Presbycusis    Sinus bradycardia    BPH without urinary obstruction    Atypical Chest Pain    Blood Pressure Isolated Elevated    Male erectile dysfunction    Basal cell carcinoma, ear    Erectile dysfunction, unspecified erectile dysfunction type    Overweight (BMI 25.0-29.9)    Tubular adenoma of colon    Hyperbilirubinemia    Permanent atrial fibrillation (H)    Mitral valve insufficiency    ALISE on CPAP    Anxiety    Chronic cough    Normochromic normocytic anemia    Left inguinal hernia    Blepharitis of both eyes    History of pulmonary embolism    Pulmonary embolism, bilateral (H)       Past Medical History:   Diagnosis Date    Abnormal ECG     Anemia     Anxiety     Arrhythmia     Atrial fibrillation (H) 10/09/2017    Cancer (H) 2015    CPAP (continuous positive airway pressure) dependence 10/2019    per patient    Depression     Heart valve disease 2017     "History of blood clots     Hyperlipidemia 2006    Pulmonary embolism, blood-clot, obstetric 10/09/2017    Sleep apnea 2017    Uses CPAP       Past Surgical History:   Procedure Laterality Date    BACK SURGERY      CERVICAL FUSION  2001    HERNIA REPAIR  2005    UT LAP,INGUINAL HERNIA REPR,INITIAL Left 8/15/2019    Procedure: HERNIORRHAPHY, INGUINAL, LAPAROSCOPIC;  Surgeon: Yan Leiva MD;  Location: [facility];  Service: General    TONSILLECTOMY      childhood       Current Outpatient Medications   Medication Sig Dispense Refill    escitalopram (LEXAPRO) 20 MG tablet Take 1 tablet (20 mg) by mouth daily 90 tablet 3    gabapentin (NEURONTIN) 300 MG capsule take 1 capsule by mouth every day at bedtime      MULTIVITAMIN (MULTIPLE VITAMIN ORAL) [MULTIVITAMIN (MULTIPLE VITAMIN ORAL)] Take 1 tablet by mouth daily.      warfarin ANTICOAGULANT (COUMADIN) 7.5 MG tablet Take 3.75 mg every Tuesday, Take 7.5 mg all other days. Or as directed. 90 tablet 1       No Known Allergies    Physical Exam:  /72   Pulse 65   Ht 1.88 m (6' 2\")   Wt 106.5 kg (234 lb 12.8 oz)   SpO2 96%   BMI 30.15 kg/m    BMI:Body mass index is 30.15 kg/m .   GEN: NAD,   Head: Normocephalic.  EYES: EOMI  Psych: normal mood, normal affect    Labs/Studies:      No results found for: PH, PHARTERIAL, PO2, [id number], SAT, PCO2, HCO3, BASEEXCESS, ANA MARIA, BEB  Lab Results   Component Value Date    TSH 1.41 02/06/2018     Lab Results   Component Value Date    GLC 97 04/11/2023    GLC 94 09/13/2022     Lab Results   Component Value Date    HGB 14.0 04/11/2023    HGB 13.5 09/13/2022     Lab Results   Component Value Date    BUN 15.1 04/11/2023    BUN 19.3 09/13/2022    CR 0.87 04/11/2023    CR 0.76 09/13/2022     Lab Results   Component Value Date    AST 25 08/10/2021    ALT 16 08/10/2021    ALKPHOS 63 08/10/2021    BILITOTAL 0.9 08/10/2021     No results found for: UAMP, UBARB, BENZODIAZEUR, UCANN, UCOC, OPIT, UPCP    Recent Labs   Lab Test " 04/11/23  0804 09/13/22  0741    139   POTASSIUM 4.6 4.2   CHLORIDE 105 106   CO2 25 23   ANIONGAP 11 10   GLC 97 94   BUN 15.1 19.3   CR 0.87 0.76   DANDY 9.6 9.1       No results found for: MARK    I reviewed the efficacy and compliance report from his device. Data summarized on the HPI and the PAP compliance flow sheet.     Patient verbalized understanding of these issues, agrees with the plan and all questions were answered today. Patient was given an opportuntity to voice any other symptoms or concerns not listed above. Patient did not have any other symptoms or concerns.      Adarsh Titus DO  Board Certified in Internal Medicine and Sleep Medicine    (Note created with Dragon voice recognition and unintended spelling errors and word substitutions may occur)     Audio and visual devices were used for this virtual clinic visit with permission from patient.

## 2023-08-08 NOTE — NURSING NOTE
"Chief Complaint   Patient presents with    Sleep Apnea       Initial /72   Pulse 65   Ht 1.88 m (6' 2\")   Wt 106.5 kg (234 lb 12.8 oz)   SpO2 96%   BMI 30.15 kg/m   Estimated body mass index is 30.15 kg/m  as calculated from the following:    Height as of this encounter: 1.88 m (6' 2\").    Weight as of this encounter: 106.5 kg (234 lb 12.8 oz).    Medication Reconciliation: complete    Neck circumference:     DME: [facility]    Future MyChart message sent reminding patient of 1 year follow up appointment.     Deidra Lees MA     "

## 2023-08-09 ENCOUNTER — TELEPHONE (OUTPATIENT)
Dept: FAMILY MEDICINE | Facility: CLINIC | Age: 73
End: 2023-08-09
Payer: COMMERCIAL

## 2023-08-09 DIAGNOSIS — Z79.01 LONG TERM (CURRENT) USE OF ANTICOAGULANTS: Primary | ICD-10-CM

## 2023-08-09 NOTE — TELEPHONE ENCOUNTER
General Call    Contacts         Type Contact Phone/Fax    08/09/2023 03:58 PM CDT Phone (Incoming) Claudia Ryan S (Self) [phone] (M)          Reason for Call:  Speak to INR nurse    What are your questions or concerns:  Pt has surgery coming up at 8/17/23 and need instructions on his warfarin dose and Lovenox injections    Date of last appointment with provider: n/a     Could we send this information to you in [facility] or would you prefer to receive a phone call?:   Patient would prefer a phone call   Okay to leave a detailed message?: Yes at Cell number on file:    Telephone Information:   Mobile [phone]

## 2023-08-11 ENCOUNTER — TELEPHONE (OUTPATIENT)
Dept: FAMILY MEDICINE | Facility: CLINIC | Age: 73
End: 2023-08-11
Payer: COMMERCIAL

## 2023-08-11 RX ORDER — ENOXAPARIN SODIUM 100 MG/ML
40 INJECTION SUBCUTANEOUS DAILY
Qty: 4 ML | Refills: 0 | Status: SHIPPED | OUTPATIENT
Start: 2023-08-11 | End: 2023-09-15

## 2023-08-11 NOTE — TELEPHONE ENCOUNTER
"Mamadou Baez MD  You2 days ago     I am okay with \"no bridge approach pre-procedure with enoxaparin prophylaxis after either just surgeries or all procedures\".    Thank you.    Mamadou Baez MD     "

## 2023-08-11 NOTE — TELEPHONE ENCOUNTER
Reason for Call:  Other call back    Detailed comments: pt wanting call back regarding upcoming surg and Lovenox  meds.  Please contact pt    Phone Number Patient can be reached at: Home number on file [phone]    Best Time: any    Can we leave a detailed message on this number? Not Applicable    Call taken on 8/11/2023 at 12:57 PM by Kiley Vizcaino

## 2023-08-11 NOTE — TELEPHONE ENCOUNTER
Anticoagulation    Patient called (separate encounter) with spine injection date of 8/17 and request for enoxaparin instructions    Note Dr. Baez reviewed thrombotic history and recent procedure management guidelines with our team and type of procedure and recommends lovenox at prophylaxis dose post procedure only as a balance of bleeding and clotting risk.     Pre-Procedure:  Hold warfarin for 5 days, until after procedure starting: Sat 8/17/23   No pre-procedure bridge    Post-Procedure:  Resume warfarin dose if okay with provider doing procedure on night of procedure, 8/17 PM: 15 mg x 1 day, then 11.25 mg x 1 day then resume current dose  Start enoxaparin (Lovenox) 40 mg subq Q 24 hrs (prophylaxis dose) ~ 24 hours post procedure when okay with provider doing procedure. Continue until INR >= 2.0  Recheck INR 5-7 days after resuming warfarin       Jessica Orosco, [facility]

## 2023-08-11 NOTE — TELEPHONE ENCOUNTER
Called and spoke to patient.  Reviewed Warfarin hold and bridge plan as detailed by [facility].  Ran NAVAS

## 2023-08-17 ENCOUNTER — ANTICOAGULATION THERAPY VISIT (OUTPATIENT)
Dept: ANTICOAGULATION | Facility: CLINIC | Age: 73
End: 2023-08-17

## 2023-08-17 ENCOUNTER — LAB (OUTPATIENT)
Dept: LAB | Facility: CLINIC | Age: 73
End: 2023-08-17
Payer: COMMERCIAL

## 2023-08-17 DIAGNOSIS — I26.99 PULMONARY EMBOLISM, BILATERAL (H): ICD-10-CM

## 2023-08-17 DIAGNOSIS — I48.21 PERMANENT ATRIAL FIBRILLATION (H): ICD-10-CM

## 2023-08-17 DIAGNOSIS — I48.21 PERMANENT ATRIAL FIBRILLATION (H): Primary | ICD-10-CM

## 2023-08-17 LAB — INR BLD: 1.2 (ref 0.9–1.1)

## 2023-08-17 PROCEDURE — 36416 COLLJ CAPILLARY BLOOD SPEC: CPT

## 2023-08-17 PROCEDURE — 85610 PROTHROMBIN TIME: CPT

## 2023-08-17 NOTE — PROGRESS NOTES
ANTICOAGULATION MANAGEMENT     Prem Taylor 72 year old male is on warfarin with subtherapeutic INR result. (Goal INR 2.0-3.0)    Recent labs: (last 7 days)     08/17/23  0655   INR 1.2*       ASSESSMENT     Warfarin Lab Questionnaire    Warfarin Doses Last 7 Days      8/16/2023    11:12 AM   Dose in Tablet or Mg   TAB or MG? tablet (tab)     Pt Rptd Dose SUNDAY MONDAY TUESDAY WED THURS FRIDAY SATURDAY 8/16/2023  11:12 AM 7.5 7.5 3.75 7.5 7.5 7.5 7.5         8/16/2023   Warfarin Lab Questionnaire   Missed doses within past 14 days? Yes   If yes; please list when: I stopped taking 08/12/2023   Changes in diet or alcohol within past 14 days? No   Medication changes since last result? Yes   Please list: i am now taking Gabapentin 300mg at bedtime.   Injuries or illness since last result? No   New shortness of breath, severe headaches or sudden changes in vision since last result? No   Abnormal bleeding since last result? No   Upcoming surgery, procedure? Yes   Please explain, date scheduled? I'm having steroid shots 8/17/23 at 8:am   Best number to call with results? [phone]     Previous result: Therapeutic last 2(+) visits  Additional findings: None and Bridging with Enoxaparin until INR >= 2.0  Procedure was today so he will start tomorrow.       PLAN     Recommended plan for temporary change(s) affecting INR     Dosing Instructions: Continue your current warfarin dose with next INR in 5-7 days       Summary  As of 8/17/2023      Full warfarin instructions:  8/17: 15 mg; 8/18: 11.25 mg; Otherwise 3.75 mg every Tue; 7.5 mg all other days   Next INR check:  8/24/2023               Telephone call with Ryan who verbalizes understanding and agrees to plan and who agrees to plan and repeated back plan correctly    Already scheduled    Education provided:   Please call back if any changes to your diet, medications or how you've been taking warfarin  Goal range and lab monitoring: goal range and significance of  current result and Importance of therapeutic range    Plan made per [facility] anticoagulation protocol    Mee Maguire, RN  Anticoagulation Clinic  8/17/2023    _______________________________________________________________________     Anticoagulation Episode Summary       Current INR goal:  2.0-3.0   TTR:  69.9 % (1 y)   Target end date:  Indefinite   Send INR reminders to:  [facility] [place]    Indications    Pulmonary embolism  bilateral (H) (Resolved) [I26.99]  Permanent atrial fibrillation (H) [I48.21]  Pulmonary embolism  bilateral (H) [I26.99]             Comments:  Approved for 8 week checks per -- see encounter from 07/27/21             Anticoagulation Care Providers       Provider Role Specialty Phone number    Mamadou Baez MD Referring Family Medicine [phone]

## 2023-08-22 ENCOUNTER — LAB (OUTPATIENT)
Dept: LAB | Facility: CLINIC | Age: 73
End: 2023-08-22
Payer: COMMERCIAL

## 2023-08-22 ENCOUNTER — ANTICOAGULATION THERAPY VISIT (OUTPATIENT)
Dept: ANTICOAGULATION | Facility: CLINIC | Age: 73
End: 2023-08-22

## 2023-08-22 DIAGNOSIS — I48.21 PERMANENT ATRIAL FIBRILLATION (H): Primary | ICD-10-CM

## 2023-08-22 DIAGNOSIS — I26.99 PULMONARY EMBOLISM, BILATERAL (H): ICD-10-CM

## 2023-08-22 DIAGNOSIS — I48.21 PERMANENT ATRIAL FIBRILLATION (H): ICD-10-CM

## 2023-08-22 LAB — INR BLD: 1.7 (ref 0.9–1.1)

## 2023-08-22 PROCEDURE — 36416 COLLJ CAPILLARY BLOOD SPEC: CPT

## 2023-08-22 PROCEDURE — 85610 PROTHROMBIN TIME: CPT

## 2023-08-22 NOTE — PROGRESS NOTES
ANTICOAGULATION MANAGEMENT     Prem Taylor 72 year old male is on warfarin with subtherapeutic INR result. (Goal INR 2.0-3.0)    Recent labs: (last 7 days)     08/22/23  0703   INR 1.7*       ASSESSMENT     Warfarin Lab Questionnaire    Warfarin Doses Last 7 Days      8/21/2023    11:42 AM   Dose in Tablet or Mg   TAB or MG? milligram (mg)     Pt Rptd Dose SUNDAY MONDAY TUESDAY WED THURS FRIDAY SATURDAY 8/21/2023  11:42 AM 0 0 0 0 15 11.25 7.5         8/21/2023   Warfarin Lab Questionnaire   Missed doses within past 14 days? Yes-held 5 days for spinal injection on 8/17   If yes; please list when: 8/12 - 8/16, resumed on 8/17 with booster x 2 days   Changes in diet or alcohol within past 14 days? No   Medication changes since last result? Yes   Please list: Lovenox started 8/18 every 24 hours until 2.0 or above   Injuries or illness since last result? No   New shortness of breath, severe headaches or sudden changes in vision since last result? No   Abnormal bleeding since last result? No   Upcoming surgery, procedure? No   Best number to call with results? [phone]     Previous result: Subtherapeutic  Additional findings: Bridging with Enoxaparin until INR >= 2.0       PLAN     Recommended plan for temporary change(s) affecting INR     Dosing Instructions: booster dose then continue your current warfarin dose with next INR in 2 days       Summary  As of 8/22/2023      Full warfarin instructions:  8/22: 7.5 mg; Otherwise 3.75 mg every Tue; 7.5 mg all other days   Next INR check:  8/24/2023               Telephone call with Ryan who verbalizes understanding and agrees to plan    Lab visit scheduled    Education provided:   Goal range and lab monitoring: goal range and significance of current result  Contact [phone] with any changes, questions or concerns.     Plan made per [facility] anticoagulation protocol    Yesenia Vázquez RN  Anticoagulation  Clinic  8/22/2023    _______________________________________________________________________     Anticoagulation Episode Summary       Current INR goal:  2.0-3.0   TTR:  68.5 % (1 y)   Target end date:  Indefinite   Send INR reminders to:  [facility]    Indications    Pulmonary embolism  bilateral (H) (Resolved) [I26.99]  Permanent atrial fibrillation (H) [I48.21]  Pulmonary embolism  bilateral (H) [I26.99]             Comments:  Approved for 8 week checks per -- see encounter from 07/27/21             Anticoagulation Care Providers       Provider Role Specialty Phone number    Mamadou Baez MD Referring Family Medicine [phone]

## 2023-08-24 ENCOUNTER — LAB (OUTPATIENT)
Dept: LAB | Facility: CLINIC | Age: 73
End: 2023-08-24
Payer: COMMERCIAL

## 2023-08-24 ENCOUNTER — ANTICOAGULATION THERAPY VISIT (OUTPATIENT)
Dept: ANTICOAGULATION | Facility: CLINIC | Age: 73
End: 2023-08-24

## 2023-08-24 DIAGNOSIS — I26.99 PULMONARY EMBOLISM, BILATERAL (H): ICD-10-CM

## 2023-08-24 DIAGNOSIS — I48.21 PERMANENT ATRIAL FIBRILLATION (H): Primary | ICD-10-CM

## 2023-08-24 DIAGNOSIS — I48.21 PERMANENT ATRIAL FIBRILLATION (H): ICD-10-CM

## 2023-08-24 LAB — INR BLD: 2 (ref 0.9–1.1)

## 2023-08-24 PROCEDURE — 36416 COLLJ CAPILLARY BLOOD SPEC: CPT

## 2023-08-24 PROCEDURE — 85610 PROTHROMBIN TIME: CPT

## 2023-08-24 NOTE — PROGRESS NOTES
ANTICOAGULATION MANAGEMENT     Prem Taylor 72 year old male is on warfarin with therapeutic INR result. (Goal INR 2.0-3.0)    Recent labs: (last 7 days)     08/24/23  0709   INR 2.0*       ASSESSMENT     Warfarin Lab Questionnaire    Warfarin Doses Last 7 Days      8/21/2023    11:42 AM   Dose in Tablet or Mg   TAB or MG? milligram (mg)     Pt Rptd Dose SUNDAY MONDAY TUESDAY WED THURS FRIDAY SATURDAY 8/21/2023  11:42 AM 0 0 0 0 15 11.25 7.5         8/21/2023   Warfarin Lab Questionnaire   Missed doses within past 14 days? Yes   If yes; please list when: 8/12 - 8/16   Changes in diet or alcohol within past 14 days? No   Medication changes since last result? Yes   Please list: Lovenox 8/18 - 8/21   Injuries or illness since last result? No   New shortness of breath, severe headaches or sudden changes in vision since last result? No   Abnormal bleeding since last result? No   Upcoming surgery, procedure? No   Best number to call with results? [phone]     Previous result: Subtherapeutic  Additional findings: None and Bridging with Enoxaparin until INR >= 2.0       PLAN     Recommended plan for temporary change(s) affecting INR     Dosing Instructions: Continue your current warfarin dose Stop bridging with Enoxaparin with next INR in 1-2 weeks       Summary  As of 8/24/2023      Full warfarin instructions:  3.75 mg every Tue; 7.5 mg all other days   Next INR check:  9/5/2023               Telephone call with Ryan who verbalizes understanding and agrees to plan    Lab visit scheduled    Education provided:   Contact [phone] with any changes, questions or concerns.     Plan made per [facility] anticoagulation protocol    Elsie Stringer, RN  Anticoagulation Clinic  8/24/2023    _______________________________________________________________________     Anticoagulation Episode Summary       Current INR goal:  2.0-3.0   TTR:  68.3 % (1 y)   Target end date:  Indefinite   Send INR reminders to:  [facility]     Indications    Pulmonary embolism  bilateral (H) (Resolved) [I26.99]  Permanent atrial fibrillation (H) [I48.21]  Pulmonary embolism  bilateral (H) [I26.99]             Comments:  Approved for 8 week checks per -- see encounter from 07/27/21             Anticoagulation Care Providers       Provider Role Specialty Phone number    Mamadou Baez MD Referring Family Medicine [phone]

## 2023-09-05 ENCOUNTER — LAB (OUTPATIENT)
Dept: LAB | Facility: CLINIC | Age: 73
End: 2023-09-05
Payer: COMMERCIAL

## 2023-09-05 ENCOUNTER — ANTICOAGULATION THERAPY VISIT (OUTPATIENT)
Dept: ANTICOAGULATION | Facility: CLINIC | Age: 73
End: 2023-09-05

## 2023-09-05 DIAGNOSIS — I26.99 PULMONARY EMBOLISM, BILATERAL (H): ICD-10-CM

## 2023-09-05 DIAGNOSIS — I48.21 PERMANENT ATRIAL FIBRILLATION (H): Primary | ICD-10-CM

## 2023-09-05 DIAGNOSIS — I48.21 PERMANENT ATRIAL FIBRILLATION (H): ICD-10-CM

## 2023-09-05 LAB — INR BLD: 2.6 (ref 0.9–1.1)

## 2023-09-05 PROCEDURE — 85610 PROTHROMBIN TIME: CPT

## 2023-09-05 PROCEDURE — 36416 COLLJ CAPILLARY BLOOD SPEC: CPT

## 2023-09-05 NOTE — PROGRESS NOTES
ANTICOAGULATION MANAGEMENT     Prem Taylor 72 year old male is on warfarin with therapeutic INR result. (Goal INR 2.0-3.0)    Recent labs: (last 7 days)     09/05/23  0722   INR 2.6*       ASSESSMENT     Warfarin Lab Questionnaire    Warfarin Doses Last 7 Days      9/4/2023    11:08 AM   Dose in Tablet or Mg   TAB or MG? tablet (tab)     Pt Rptd Dose SUNDAY MONDAY TUESDAY WED THURS FRIDAY SATURDAY 9/4/2023  11:08 AM 7.5 7.5 3.75 7.5 7.5 7.5 7.5         9/4/2023   Warfarin Lab Questionnaire   Missed doses within past 14 days? No   Changes in diet or alcohol within past 14 days? No   Medication changes since last result? Yes   Please list: I am now off of Lovenox.   Injuries or illness since last result? No   New shortness of breath, severe headaches or sudden changes in vision since last result? No   Abnormal bleeding since last result? No   Upcoming surgery, procedure? No   Best number to call with results? [phone]     Previous result: Therapeutic last visit; previously outside of goal range  Additional findings: None       PLAN     Recommended plan for no diet, medication or health factor changes affecting INR     Dosing Instructions: Continue your current warfarin dose with next INR in 3-4 weeks       Summary  As of 9/5/2023      Full warfarin instructions:  3.75 mg every Tue; 7.5 mg all other days   Next INR check:  10/3/2023               Detailed voice message left for Ryan with dosing instructions and follow up date.     Contact [phone] to schedule and with any changes, questions or concerns.     Education provided:   Please call back if any changes to your diet, medications or how you've been taking warfarin  Goal range and lab monitoring: goal range and significance of current result  Contact [phone] with any changes, questions or concerns.     Plan made per [facility] anticoagulation protocol    Josy Moe RN  Anticoagulation  Clinic  9/5/2023    _______________________________________________________________________     Anticoagulation Episode Summary       Current INR goal:  2.0-3.0   TTR:  68.8 % (1 y)   Target end date:  Indefinite   Send INR reminders to:  [facility]    Indications    Pulmonary embolism  bilateral (H) (Resolved) [I26.99]  Permanent atrial fibrillation (H) [I48.21]  Pulmonary embolism  bilateral (H) [I26.99]             Comments:  Approved for 8 week checks per -- see encounter from 07/27/21             Anticoagulation Care Providers       Provider Role Specialty Phone number    Mamadou Baez MD Referring Family Medicine [phone]

## 2023-09-06 ENCOUNTER — DOCUMENTATION ONLY (OUTPATIENT)
Dept: ANTICOAGULATION | Facility: CLINIC | Age: 73
End: 2023-09-06
Payer: COMMERCIAL

## 2023-09-06 DIAGNOSIS — I26.99 PULMONARY EMBOLISM, BILATERAL (H): ICD-10-CM

## 2023-09-06 DIAGNOSIS — I48.21 PERMANENT ATRIAL FIBRILLATION (H): Primary | ICD-10-CM

## 2023-09-06 DIAGNOSIS — Z86.711 HISTORY OF PULMONARY EMBOLISM: ICD-10-CM

## 2023-09-06 NOTE — PROGRESS NOTES
ANTICOAGULATION CLINIC REFERRAL RENEWAL REQUEST       An annual renewal order is required for all patients referred to [facility].?  Please review and sign the pended referral order for Prem Taylor.       ANTICOAGULATION SUMMARY      Warfarin indication(s)   Atrial Fibrillation and PE    Mechanical heart valve present?  NO       Current goal range   INR: 2.0-3.0     Goal appropriate for indication? Goal INR 2-3, standard for indication(s) above     Time in Therapeutic Range (TTR)  (Goal > 60%) 68.8%       Office visit with referring provider's group within last year yes on 4/11/23       Josy Moe RN  [facility]

## 2023-09-08 ASSESSMENT — ENCOUNTER SYMPTOMS
COUGH: 0
HEADACHES: 0
FREQUENCY: 0
DIZZINESS: 0
ARTHRALGIAS: 1
DYSURIA: 0
HEARTBURN: 0
ABDOMINAL PAIN: 0
MYALGIAS: 0
SHORTNESS OF BREATH: 0
PARESTHESIAS: 0
SORE THROAT: 0
CHILLS: 0
EYE PAIN: 0
CONSTIPATION: 0
HEMATURIA: 0
NAUSEA: 0
HEMATOCHEZIA: 0
PALPITATIONS: 0
WEAKNESS: 0
NERVOUS/ANXIOUS: 0
FEVER: 0
DIARRHEA: 0
JOINT SWELLING: 0

## 2023-09-08 ASSESSMENT — ACTIVITIES OF DAILY LIVING (ADL): CURRENT_FUNCTION: NO ASSISTANCE NEEDED

## 2023-09-14 ASSESSMENT — PATIENT HEALTH QUESTIONNAIRE - PHQ9
SUM OF ALL RESPONSES TO PHQ QUESTIONS 1-9: 0
SUM OF ALL RESPONSES TO PHQ QUESTIONS 1-9: 0
10. IF YOU CHECKED OFF ANY PROBLEMS, HOW DIFFICULT HAVE THESE PROBLEMS MADE IT FOR YOU TO DO YOUR WORK, TAKE CARE OF THINGS AT HOME, OR GET ALONG WITH OTHER PEOPLE: NOT DIFFICULT AT ALL

## 2023-09-14 ASSESSMENT — ANXIETY QUESTIONNAIRES
7. FEELING AFRAID AS IF SOMETHING AWFUL MIGHT HAPPEN: NOT AT ALL
4. TROUBLE RELAXING: NOT AT ALL
GAD7 TOTAL SCORE: 0
2. NOT BEING ABLE TO STOP OR CONTROL WORRYING: NOT AT ALL
6. BECOMING EASILY ANNOYED OR IRRITABLE: NOT AT ALL
1. FEELING NERVOUS, ANXIOUS, OR ON EDGE: NOT AT ALL
3. WORRYING TOO MUCH ABOUT DIFFERENT THINGS: NOT AT ALL
5. BEING SO RESTLESS THAT IT IS HARD TO SIT STILL: NOT AT ALL
GAD7 TOTAL SCORE: 0

## 2023-09-15 ENCOUNTER — OFFICE VISIT (OUTPATIENT)
Dept: FAMILY MEDICINE | Facility: CLINIC | Age: 73
End: 2023-09-15
Payer: COMMERCIAL

## 2023-09-15 VITALS
BODY MASS INDEX: 30.29 KG/M2 | HEIGHT: 74 IN | RESPIRATION RATE: 18 BRPM | WEIGHT: 236 LBS | OXYGEN SATURATION: 99 % | SYSTOLIC BLOOD PRESSURE: 140 MMHG | HEART RATE: 70 BPM | TEMPERATURE: 97.5 F | DIASTOLIC BLOOD PRESSURE: 80 MMHG

## 2023-09-15 DIAGNOSIS — Z00.00 ENCOUNTER FOR MEDICARE ANNUAL WELLNESS EXAM: Primary | ICD-10-CM

## 2023-09-15 DIAGNOSIS — Z23 ENCOUNTER FOR IMMUNIZATION: ICD-10-CM

## 2023-09-15 DIAGNOSIS — G89.29 CHRONIC RIGHT-SIDED LOW BACK PAIN WITHOUT SCIATICA: ICD-10-CM

## 2023-09-15 DIAGNOSIS — I48.21 PERMANENT ATRIAL FIBRILLATION (H): ICD-10-CM

## 2023-09-15 DIAGNOSIS — G47.33 OSA ON CPAP: ICD-10-CM

## 2023-09-15 DIAGNOSIS — Z86.711 HISTORY OF PULMONARY EMBOLISM: ICD-10-CM

## 2023-09-15 DIAGNOSIS — H90.3 SENSORINEURAL HEARING LOSS (SNHL) OF BOTH EARS: ICD-10-CM

## 2023-09-15 DIAGNOSIS — D12.6 TUBULAR ADENOMA OF COLON: ICD-10-CM

## 2023-09-15 DIAGNOSIS — M54.50 CHRONIC RIGHT-SIDED LOW BACK PAIN WITHOUT SCIATICA: ICD-10-CM

## 2023-09-15 DIAGNOSIS — N40.0 BPH WITHOUT URINARY OBSTRUCTION: ICD-10-CM

## 2023-09-15 DIAGNOSIS — F41.9 ANXIETY: ICD-10-CM

## 2023-09-15 LAB
ANION GAP SERPL CALCULATED.3IONS-SCNC: 10 MMOL/L (ref 7–15)
BUN SERPL-MCNC: 16 MG/DL (ref 8–23)
CALCIUM SERPL-MCNC: 9.3 MG/DL (ref 8.8–10.2)
CHLORIDE SERPL-SCNC: 106 MMOL/L (ref 98–107)
CREAT SERPL-MCNC: 0.92 MG/DL (ref 0.67–1.17)
DEPRECATED HCO3 PLAS-SCNC: 25 MMOL/L (ref 22–29)
EGFRCR SERPLBLD CKD-EPI 2021: 88 ML/MIN/1.73M2
ERYTHROCYTE [DISTWIDTH] IN BLOOD BY AUTOMATED COUNT: 13 % (ref 10–15)
GLUCOSE SERPL-MCNC: 98 MG/DL (ref 70–99)
HCT VFR BLD AUTO: 39.7 % (ref 40–53)
HGB BLD-MCNC: 13.4 G/DL (ref 13.3–17.7)
MCH RBC QN AUTO: 31.3 PG (ref 26.5–33)
MCHC RBC AUTO-ENTMCNC: 33.8 G/DL (ref 31.5–36.5)
MCV RBC AUTO: 93 FL (ref 78–100)
PLATELET # BLD AUTO: 157 10E3/UL (ref 150–450)
POTASSIUM SERPL-SCNC: 4.6 MMOL/L (ref 3.4–5.3)
RBC # BLD AUTO: 4.28 10E6/UL (ref 4.4–5.9)
SODIUM SERPL-SCNC: 141 MMOL/L (ref 136–145)
WBC # BLD AUTO: 6.1 10E3/UL (ref 4–11)

## 2023-09-15 PROCEDURE — 80048 BASIC METABOLIC PNL TOTAL CA: CPT | Performed by: FAMILY MEDICINE

## 2023-09-15 PROCEDURE — 36415 COLL VENOUS BLD VENIPUNCTURE: CPT | Performed by: FAMILY MEDICINE

## 2023-09-15 PROCEDURE — 85027 COMPLETE CBC AUTOMATED: CPT | Performed by: FAMILY MEDICINE

## 2023-09-15 PROCEDURE — G0439 PPPS, SUBSEQ VISIT: HCPCS | Performed by: FAMILY MEDICINE

## 2023-09-15 PROCEDURE — 90662 IIV NO PRSV INCREASED AG IM: CPT | Performed by: FAMILY MEDICINE

## 2023-09-15 PROCEDURE — G0008 ADMIN INFLUENZA VIRUS VAC: HCPCS | Performed by: FAMILY MEDICINE

## 2023-09-15 PROCEDURE — 99213 OFFICE O/P EST LOW 20 MIN: CPT | Mod: 25 | Performed by: FAMILY MEDICINE

## 2023-09-15 ASSESSMENT — ACTIVITIES OF DAILY LIVING (ADL): CURRENT_FUNCTION: NO ASSISTANCE NEEDED

## 2023-09-15 ASSESSMENT — ENCOUNTER SYMPTOMS
MYALGIAS: 0
JOINT SWELLING: 0
NAUSEA: 0
DIARRHEA: 0
SORE THROAT: 0
NERVOUS/ANXIOUS: 0
SHORTNESS OF BREATH: 0
DYSURIA: 0
DIZZINESS: 0
CHILLS: 0
FEVER: 0
HEADACHES: 0
ARTHRALGIAS: 1
CONSTIPATION: 0
HEARTBURN: 0
FREQUENCY: 0
COUGH: 0
WEAKNESS: 0
PARESTHESIAS: 0
ABDOMINAL PAIN: 0
HEMATURIA: 0
EYE PAIN: 0
PALPITATIONS: 0
HEMATOCHEZIA: 0

## 2023-09-15 ASSESSMENT — PAIN SCALES - GENERAL: PAINLEVEL: MILD PAIN (2)

## 2023-09-15 NOTE — PROGRESS NOTES
"SUBJECTIVE:   Ryan is a 73 year old who presents for Preventive Visit.      8/10/2021     6:58 AM   Additional Questions   Roomed by        Are you in the first 12 months of your Medicare coverage?  No    Annual wellness visit completed.  Risk questionnaire reviewed.  Suboptimal hearing with bilateral sensory neural hearing loss and need for hearing aid utilization.  Suboptimal diet and difficulty losing additional weight.  History of permanent atrial fibrillation.  Has remained rate controlled without medication.  Warfarin 7.5 mg daily except 3.75 mg on Tuesday.  History of bilateral PE as well in the past without recurrent concerns.  Escitalopram 20 mg daily for anxiety management.  Has used hydroxyzine in the past without need for further use.  Mild hypercholesterolemia diet controlled.  CPAP for ALISE management.  Chronic right low back pain and right hip pain.  Has used gabapentin 300 mg at bedtime in the past but remains off it.  Did have injection in the past that helped immediately.  Some recurrence and is considering repeat injection.  Tubular adenoma and prior colonoscopy 2019 with 5-year follow-up recommendation.  Comprehensive review of systems as above otherwise all negative.       \"Aubree\" x 44 years   2 daughters - Renee (38) and Susannah (35)   3 grandchildren   No smoke (quit  after 1 and  ppd)   EtOH: weekends \"few beers\"   Surgeries: anterior cervical fusion; inguinal hernia; tonsils; right eye cataract 3/4/20 and left eye cataract 3/11/20; \"right eye vitrectomy scheduled for 20 due to floaters\"   Hospitalizations: [facility] 10/9/17-10/10/17 bilateral pulmonary emboli with a. 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  Good    Frequency of exercise:  4-5 days/week    Duration of exercise:  Greater than 60 minutes    Do you usually eat at least 4 servings of fruit and vegetables a day, include whole grains    & fiber and avoid regularly eating high fat or \"junk\" foods?  No    Taking medications regularly:  Yes    Medication side effects:  None    Ability to successfully perform activities of daily living:  No assistance needed    Home Safety:  Throw rugs in the hallway    Hearing Impairment:  Difficulty following a conversation in a noisy restaurant or crowded room and no hearing concerns    In the past 6 months, have you been bothered by leaking of urine?  No    In general, how would you rate your overall mental or emotional health?  Good    Additional concerns today:  No        Have you ever done Advance Care Planning? (For example, a Health Directive, POLST, or a discussion with a medical provider or your loved ones about your wishes): Yes, advance care planning is on file.       Fall risk  Fallen 2 or more times in the past year?: No  Any fall with injury in the past year?: No    Cognitive Screening   1) Repeat 3 items (Leader, Season, Table)    2) Clock draw: NORMAL  3) 3 item recall: Recalls 3 objects  Results: 3 items recalled: COGNITIVE IMPAIRMENT LESS LIKELY    Mini-CogTM Copyright S Mariam. Licensed by the author for use in [facility]; reprinted with permission (edith@.[facility]). All rights reserved.      Do you have sleep apnea, excessive snoring or daytime drowsiness? : yes    Reviewed and updated as needed this visit by clinical staff   Tobacco  Allergies  Meds  Problems             Reviewed and updated as needed this visit by Provider    Allergies  Meds  Problems            Social History     Tobacco Use    Smoking status: Former     Types: Cigarettes    Smokeless tobacco: Never   Substance Use Topics    Alcohol use: Yes             9/8/2023     3:46 PM   Alcohol Use   Prescreen: >3 " drinks/day or >7 drinks/week? No     Do you have a current opioid prescription? No  Do you use any other controlled substances or medications that are not prescribed by a provider? None              Current providers sharing in care for this patient include:   Patient Care Team:  Mamadou Baez MD as PCP - General  Judit Licona PharmD as Pharmacist (Pharmacist)  Mamadou Baez MD as Assigned PCP  Gerardo Velez MD as Assigned Heart and Vascular Provider  Adarsh Titus DO as Assigned Sleep Provider    The following health maintenance items are reviewed in Epic and correct as of today:  Health Maintenance   Topic Date Due    LUNG CANCER SCREENING  Never done    AORTIC ANEURYSM SCREENING (SYSTEM ASSIGNED)  Never done    COVID-19 Vaccine (6 - Pfizer series) 02/26/2023    INFLUENZA VACCINE (1) 09/01/2023    MEDICARE ANNUAL WELLNESS VISIT  09/15/2024    ANNUAL REVIEW OF HM ORDERS  09/15/2024    FALL RISK ASSESSMENT  09/15/2024    DTAP/TDAP/TD IMMUNIZATION (3 - Td or Tdap) 01/03/2027    LIPID  04/11/2028    ADVANCE CARE PLANNING  09/15/2028    COLORECTAL CANCER SCREENING  02/11/2029    HEPATITIS C SCREENING  Completed    PHQ-2 (once per calendar year)  Completed    Pneumococcal Vaccine: 65+ Years  Completed    ZOSTER IMMUNIZATION  Completed    IPV IMMUNIZATION  Aged Out    HPV IMMUNIZATION  Aged Out    MENINGITIS IMMUNIZATION  Aged Out     Lab work is in process  Labs reviewed in EPIC  BP Readings from Last 3 Encounters:   09/15/23 (!) 140/80   08/08/23 122/72   08/03/23 126/80    Wt Readings from Last 3 Encounters:   09/15/23 107 kg (236 lb)   08/08/23 106.5 kg (234 lb 12.8 oz)   08/03/23 104.9 kg (231 lb 3.2 oz)                  Patient Active Problem List   Diagnosis    Hearing Loss    Hypercholesterolemia    Presbycusis    Sinus bradycardia    BPH without urinary obstruction    Atypical Chest Pain    Blood Pressure Isolated Elevated    Male erectile dysfunction    Basal cell carcinoma, ear    Erectile  dysfunction, unspecified erectile dysfunction type    Overweight (BMI 25.0-29.9)    Tubular adenoma of colon    Hyperbilirubinemia    Permanent atrial fibrillation (H)    Mitral valve insufficiency    ALISE on CPAP    Anxiety    Chronic cough    Normochromic normocytic anemia    Left inguinal hernia    Blepharitis of both eyes    History of pulmonary embolism    Pulmonary embolism, bilateral (H)     Past Surgical History:   Procedure Laterality Date    BACK SURGERY      CERVICAL FUSION  2001    HERNIA REPAIR  2005    CO LAP,INGUINAL HERNIA REPR,INITIAL Left 8/15/2019    Procedure: HERNIORRHAPHY, INGUINAL, LAPAROSCOPIC;  Surgeon: Yan Leiva MD;  Location: [facility];  Service: General    TONSILLECTOMY      childhood       Social History     Tobacco Use    Smoking status: Former     Types: Cigarettes    Smokeless tobacco: Never   Substance Use Topics    Alcohol use: Yes     Family History   Problem Relation Age of Onset    Heart Disease Mother     Hypertension Mother     Dementia Mother     Coronary Artery Disease Father          Current Outpatient Medications   Medication Sig Dispense Refill    escitalopram (LEXAPRO) 20 MG tablet Take 1 tablet (20 mg) by mouth daily 90 tablet 3    gabapentin (NEURONTIN) 300 MG capsule take 1 capsule by mouth every day at bedtime      MULTIVITAMIN (MULTIPLE VITAMIN ORAL) [MULTIVITAMIN (MULTIPLE VITAMIN ORAL)] Take 1 tablet by mouth daily.      warfarin ANTICOAGULANT (COUMADIN) 7.5 MG tablet Take 3.75 mg every Tuesday, Take 7.5 mg all other days. Or as directed. 90 tablet 1    enoxaparin ANTICOAGULANT (LOVENOX) 40 MG/0.4ML syringe Inject 0.4 mLs (40 mg) Subcutaneous daily After procedure until INR >= 2 (Patient not taking: Reported on 9/15/2023) 4 mL 0     No Known Allergies    Needs high-dose flu shot today.  Will receive COVID updated vaccination as well as RSV vaccination this fall once available.        Review of Systems   Constitutional:  Negative for chills and  "fever.   HENT:  Positive for hearing loss. Negative for congestion, ear pain and sore throat.    Eyes:  Negative for pain and visual disturbance.   Respiratory:  Negative for cough and shortness of breath.    Cardiovascular:  Negative for chest pain, palpitations and peripheral edema.   Gastrointestinal:  Negative for abdominal pain, constipation, diarrhea, heartburn, hematochezia and nausea.   Genitourinary:  Negative for dysuria, frequency, genital sores, hematuria, impotence, penile discharge and urgency.   Musculoskeletal:  Positive for arthralgias. Negative for joint swelling and myalgias.   Skin:  Negative for rash.   Neurological:  Negative for dizziness, weakness, headaches and paresthesias.   Psychiatric/Behavioral:  Negative for mood changes. The patient is not nervous/anxious.      Constitutional, HEENT, cardiovascular, pulmonary, GI, , musculoskeletal, neuro, skin, endocrine and psych systems are negative, except as otherwise noted.    OBJECTIVE:   BP (!) 140/80   Pulse 70   Temp 97.5  F (36.4  C)   Resp 18   Ht 1.88 m (6' 2\")   Wt 107 kg (236 lb)   SpO2 99%   BMI 30.30 kg/m   Estimated body mass index is 30.3 kg/m  as calculated from the following:    Height as of this encounter: 1.88 m (6' 2\").    Weight as of this encounter: 107 kg (236 lb).  Physical Exam  GENERAL: healthy, alert and no distress  EYES: Eyes grossly normal to inspection, PERRL and conjunctivae and sclerae normal  HENT: ear canals and TM's normal, nose and mouth without ulcers or lesions  NECK: no adenopathy, no asymmetry, masses, or scars and thyroid normal to palpation  RESP: lungs clear to auscultation - no rales, rhonchi or wheezes  CV: regular rate and rhythm, normal S1 S2, no S3 or S4, no murmur, click or rub, no peripheral edema and peripheral pulses strong  ABDOMEN: soft, nontender, no hepatosplenomegaly, no masses and bowel sounds normal   (male): normal male genitalia without lesions or urethral discharge, no " hernia  RECTAL: normal sphincter tone, no rectal masses, prostate normal size, smooth, nontender without nodules or masses  MS: no gross musculoskeletal defects noted, no edema  SKIN: no suspicious lesions or rashes  NEURO: Normal strength and tone, mentation intact and speech normal  PSYCH: mentation appears normal, affect normal/bright    Diagnostic Test Results:  Labs reviewed in Epic  No results found for this or any previous visit (from the past 24 hour(s)).    ASSESSMENT / PLAN:     Encounter for Medicare annual wellness exam  Annual wellness visit completed.  Risk questionnaire reviewed with suboptimal hearing with sensorineural hearing loss requiring bilateral hearing aids.  Suboptimal diet.  Annual wellness visits to continue.    Permanent atrial fibrillation (H)  Permanent atrial fibrillation, rate controlled.  Continues chronic warfarin anticoagulation 7.5 mg daily except 3.75 mg on Tuesday.  Asymptomatic otherwise.  Med monitoring completed.  Reassess at follow-up in 6 months.  - Basic metabolic panel  - CBC with platelets    BPH without urinary obstruction  Mild BPH on exam.  No significant urinary obstruction.  Nocturia once per night.  Digital rectal exam otherwise normal.    Tubular adenoma of colon  Tubular adenoma of colon on colonoscopy February 11, 2019 and will repeat at 5-year interval (February 2024 ideally) otherwise we will complete once returns from wintering in the [facility].    ALISE on CPAP  CPAP for ALISE management tolerated well.    Anxiety  Escitalopram 20 mg daily.  No concerns.    History of pulmonary embolism  History of pulmonary emboli bilateral in the past and continues chronic warfarin anticoagulation.    Sensorineural hearing loss (SNHL) of both ears  Lateral hearing aids utilized with benefit.    Chronic right-sided low back pain without sciatica  Has had corticosteroid injection in the past with benefits and will follow-up with orthopedic specialist to consider repeat treatment if  "persistent or recurrent issues.    Encounter for immunization  High-dose flu shot provided today.  Will consider COVID updated vaccine and RSV vaccine this fall once available.  - INFLUENZA VACCINE 65+ (FLUZONE HD)       Patient has been advised of split billing requirements and indicates understanding: Yes      COUNSELING:  Reviewed preventive health counseling, as reflected in patient instructions       Regular exercise       Healthy diet/nutrition       Vision screening       Hearing screening       Dental care       Bladder control       Fall risk prevention       Colon cancer screening       Prostate cancer screening      BMI:   Estimated body mass index is 30.3 kg/m  as calculated from the following:    Height as of this encounter: 1.88 m (6' 2\").    Weight as of this encounter: 107 kg (236 lb).   Weight management plan: Discussed healthy diet and exercise guidelines      He reports that he has quit smoking. His smoking use included cigarettes. He has never used smokeless tobacco.      Appropriate preventive services were discussed with this patient, including applicable screening as appropriate for cardiovascular disease, diabetes, osteopenia/osteoporosis, and glaucoma.  As appropriate for age/gender, discussed screening for colorectal cancer, prostate cancer, breast cancer, and cervical cancer. Checklist reviewing preventive services available has been given to the patient.    Reviewed patients plan of care and provided an AVS. The Basic Care Plan (routine screening as documented in Health Maintenance) for Prem meets the Care Plan requirement. This Care Plan has been established and reviewed with the Patient.          Mamadou Baez MD  [facility]    Identified Health Risks:  I have reviewed Opioid Use Disorder and Substance Use Disorder risk factors and made any needed referrals. Answers submitted by the patient for this visit:  Patient Health Questionnaire (Submitted on " 9/14/2023)  If you checked off any problems, how difficult have these problems made it for you to do your work, take care of things at home, or get along with other people?: Not difficult at all  PHQ9 TOTAL SCORE: 0  KALPANA-7 (Submitted on 9/14/2023)  KALPANA 7 TOTAL SCORE: 0  The patient was counseled and encouraged to consider modifying their diet and eating habits. He was provided with information on recommended healthy diet options.  The patient was provided with written information regarding signs of hearing loss.

## 2023-09-15 NOTE — PATIENT INSTRUCTIONS
Patient Education   Personalized Prevention Plan  You are due for the preventive services outlined below.  Your care team is available to assist you in scheduling these services.  If you have already completed any of these items, please share that information with your care team to update in your medical record.  Health Maintenance Due   Topic Date Due     LUNG CANCER SCREENING  Never done     AORTIC ANEURYSM SCREENING (SYSTEM ASSIGNED)  Never done     COVID-19 Vaccine (6 - Pfizer series) 02/26/2023     ANNUAL REVIEW OF HM ORDERS  03/08/2023     Flu Vaccine (1) 09/01/2023     Learning About Dietary Guidelines  What are the Dietary Guidelines for Americans?     Dietary Guidelines for Americans provide tips for eating well and staying healthy. This helps reduce the risk for long-term (chronic) diseases.  These guidelines recommend that you:  Eat and drink the right amount for you. The U.S. government's food guide is called MyPlate. It can help you make your own well-balanced eating plan.  Try to balance your eating with your activity. This helps you stay at a healthy weight.  Drink alcohol in moderation, if at all.  Limit foods high in salt, saturated fat, trans fat, and added sugar.  These guidelines are from the U.S. Department of Agriculture and the U.S. Department of Health and Human Services. They are updated every 5 years.  What is MyPlate?  MyPlate is the U.S. government's food guide. It can help you make your own well-balanced eating plan. A balanced eating plan means that you eat enough, but not too much, and that your food gives you the nutrients you need to stay healthy.  MyPlate focuses on eating plenty of whole grains, fruits, and vegetables, and on limiting fat and sugar. It is available online at www.ChooseMyPlate.gov.  How can you get started?  If you're trying to eat healthier, you can slowly change your eating habits over time. You don't have to make big changes all at once. Start by adding one or  "two healthy foods to your meals each day.  Grains  Choose whole-grain breads and cereals and whole-wheat pasta and whole-grain crackers.  Vegetables  Eat a variety of vegetables every day. They have lots of nutrients and are part of a heart-healthy diet.  Fruits  Eat a variety of fruits every day. Fruits contain lots of nutrients. Choose fresh fruit instead of fruit juice.  Protein foods  Choose fish and lean poultry more often. Eat red meat and fried meats less often. Dried beans, tofu, and nuts are also good sources of protein.  Dairy  Choose low-fat or fat-free products from this food group. If you have problems digesting milk, try eating cheese or yogurt instead.  Fats and oils  Limit fats and oils if you're trying to cut calories. Choose healthy fats when you cook. These include canola oil and olive oil.  Where can you learn more?  Go to https://www.Chanyouji.net/patiented  Enter D676 in the search box to learn more about \"Learning About Dietary Guidelines.\"  Current as of: March 1, 2023               Content Version: 13.7    8154-3641 [employer].   Care instructions adapted under license by your healthcare professional. If you have questions about a medical condition or this instruction, always ask your healthcare professional. [employer] disclaims any warranty or liability for your use of this information.      Hearing Loss: Care Instructions  Overview     Hearing loss is a sudden or slow decrease in how well you hear. It can range from slight to profound. Permanent hearing loss can occur with aging. It also can happen when you are exposed long-term to loud noise. Examples include listening to loud music, riding motorcycles, or being around other loud machines.  Hearing loss can affect your work and home life. It can make you feel lonely or depressed. You may feel that you have lost your independence. But hearing aids and other devices can help you hear better and feel connected " to others.  Follow-up care is a key part of your treatment and safety. Be sure to make and go to all appointments, and call your doctor if you are having problems. It's also a good idea to know your test results and keep a list of the medicines you take.  How can you care for yourself at home?  Avoid loud noises whenever possible. This helps keep your hearing from getting worse.  Always wear hearing protection around loud noises.  Wear a hearing aid as directed.  A professional can help you pick a hearing aid that will work best for you.  You can also get hearing aids over the counter for mild to moderate hearing loss.  Have hearing tests as your doctor suggests. They can show whether your hearing has changed. Your hearing aid may need to be adjusted.  Use other devices as needed. These may include:  Telephone amplifiers and hearing aids that can connect to a television, stereo, radio, or microphone.  Devices that use lights or vibrations. These alert you to the doorbell, a ringing telephone, or a baby monitor.  Television closed-captioning. This shows the words at the bottom of the screen. Most new TVs can do this.  TTY (text telephone). This lets you type messages back and forth on the telephone instead of talking or listening. These devices are also called TDD. When messages are typed on the keyboard, they are sent over the phone line to a receiving TTY. The message is shown on a monitor.  Use text messaging, social media, and email if it is hard for you to communicate by telephone.  Try to learn a listening technique called speechreading. It is not lipreading. You pay attention to people's gestures, expressions, posture, and tone of voice. These clues can help you understand what a person is saying. Face the person you are talking to, and have them face you. Make sure the lighting is good. You need to see the other person's face clearly.  Think about counseling if you need help to adjust to your hearing  "loss.  When should you call for help?  Watch closely for changes in your health, and be sure to contact your doctor if:    You think your hearing is getting worse.     You have new symptoms, such as dizziness or nausea.   Where can you learn more?  Go to https://www.[employer]henai.net/patiented  Enter R798 in the search box to learn more about \"Hearing Loss: Care Instructions.\"  Current as of: March 1, 2023               Content Version: 13.7    0049-1632 [employer].   Care instructions adapted under license by your healthcare professional. If you have questions about a medical condition or this instruction, always ask your healthcare professional. [employer] disclaims any warranty or liability for your use of this information.         " Did You Provide Opioid Counseling: No

## 2023-10-03 ENCOUNTER — TELEPHONE (OUTPATIENT)
Dept: FAMILY MEDICINE | Facility: CLINIC | Age: 73
End: 2023-10-03

## 2023-10-03 ENCOUNTER — ANTICOAGULATION THERAPY VISIT (OUTPATIENT)
Dept: ANTICOAGULATION | Facility: CLINIC | Age: 73
End: 2023-10-03

## 2023-10-03 ENCOUNTER — LAB (OUTPATIENT)
Dept: LAB | Facility: CLINIC | Age: 73
End: 2023-10-03
Payer: COMMERCIAL

## 2023-10-03 DIAGNOSIS — I26.99 PULMONARY EMBOLISM, BILATERAL (H): ICD-10-CM

## 2023-10-03 DIAGNOSIS — I48.21 PERMANENT ATRIAL FIBRILLATION (H): Primary | ICD-10-CM

## 2023-10-03 DIAGNOSIS — Z86.711 HISTORY OF PULMONARY EMBOLISM: ICD-10-CM

## 2023-10-03 DIAGNOSIS — I48.21 PERMANENT ATRIAL FIBRILLATION (H): ICD-10-CM

## 2023-10-03 LAB — INR BLD: 2.1 (ref 0.9–1.1)

## 2023-10-03 PROCEDURE — 85610 PROTHROMBIN TIME: CPT

## 2023-10-03 PROCEDURE — 36416 COLLJ CAPILLARY BLOOD SPEC: CPT

## 2023-10-03 NOTE — TELEPHONE ENCOUNTER
General Call    Contacts         Type Contact Phone/Fax    10/03/2023 03:16 PM CDT Phone (Incoming) Ryan Taylor (Self) [phone] (M)          Reason for Call:  INR Bridge    What are your questions or concerns:  Patient is having an Lumbar procedure on 10/10 at 10 am and is wondering when he should stop taking his Warfarin and start taking his the other medication. As well as when he should start back up again after the procedure    Date of last appointment with provider: 10/02/23    Could we send this information to you in [place] or would you prefer to receive a phone call?:   Patient would prefer a phone call   Okay to leave a detailed message?: Yes at Cell number on file:    Telephone Information:   Mobile [phone]

## 2023-10-03 NOTE — TELEPHONE ENCOUNTER
Patient has L4-L5 ANDREW scheduled on 10/10/23 at [facility].    Per chart review, it appears patient has bridged with enoxaparin post-ANDREW in August.  See below for copied plan from 8/8/23 TE:  Pre-Procedure:  Hold warfarin for 5 days, until after procedure starting: Sat 8/17/23   No pre-procedure bridge     Post-Procedure:  Resume warfarin dose if okay with provider doing procedure on night of procedure, 8/17 PM: 15 mg x 1 day, then 11.25 mg x 1 day then resume current dose  Start enoxaparin (Lovenox) 40 mg subq Q 24 hrs (prophylaxis dose) ~ 24 hours post procedure when okay with provider doing procedure. Continue until INR >= 2.0  Recheck INR 5-7 days after resuming warfarin       Routing to [facility] for review.  Please route finalized plans to CHARLI MAURICIO  for [facility] to contact patient.      Elsie Stringer RN  [facility] Anticoagulation  [phone]

## 2023-10-03 NOTE — PROGRESS NOTES
ANTICOAGULATION MANAGEMENT     Prem Taylor 73 year old male is on warfarin with therapeutic INR result. (Goal INR 2.0-3.0)    Recent labs: (last 7 days)     10/03/23  0723   INR 2.1*       ASSESSMENT     Warfarin Lab Questionnaire    Warfarin Doses Last 7 Days      10/2/2023    10:12 AM   Dose in Tablet or Mg   TAB or MG? milligram (mg)     Pt Rptd Dose SINDHU MONDAY TUESDAY WED THURS FRIDAY SATURDAY   10/2/2023  10:12 AM 7.5 7.5 3.75 7.5 7.5 7.5 7.5         10/2/2023   Warfarin Lab Questionnaire   Missed doses within past 14 days? No   Changes in diet or alcohol within past 14 days? No   Medication changes since last result? No   Injuries or illness since last result? No   New shortness of breath, severe headaches or sudden changes in vision since last result? No   Abnormal bleeding since last result? No   Upcoming surgery, procedure? No   Best number to call with results? [phone]     Previous result: Therapeutic last 2(+) visits  Additional findings: None       PLAN     Recommended plan for no diet, medication or health factor changes affecting INR     Dosing Instructions: Continue your current warfarin dose with next INR in 5 weeks       Summary  As of 10/3/2023      Full warfarin instructions:  3.75 mg every Tue; 7.5 mg all other days   Next INR check:  11/7/2023               Detailed voice message left for Ryan with dosing instructions and follow up date.     Contact [phone]  to schedule and with any changes, questions or concerns.     Education provided:   Please call back if any changes to your diet, medications or how you've been taking warfarin    Plan made per [facility] anticoagulation protocol    Maryuri Anna, RN  Anticoagulation Clinic  10/3/2023    _______________________________________________________________________     Anticoagulation Episode Summary       Current INR goal:  2.0-3.0   TTR:  68.8 % (1 y)   Target end date:  Indefinite   Send INR reminders to:  RUBY Miller     Pulmonary embolism  bilateral (H) (Resolved) [I26.99]  Permanent atrial fibrillation (H) [I48.21]  Pulmonary embolism  bilateral (H) [I26.99]  History of pulmonary embolism [Z86.711]             Comments:  Approved for 8 week checks per -- see encounter from 07/27/21             Anticoagulation Care Providers       Provider Role Specialty Phone number    Mamadou Baez MD Referring Family Medicine [phone]

## 2023-10-04 NOTE — TELEPHONE ENCOUNTER
[facility]-PROCEDURAL ANTICOAGULATION  MANAGEMENT    ASSESSMENT     Warfarin interruption plan for L4-L5 ANDREW on 10/10/23    Indication for Anticoagulation: Atrial Fibrillation, PE    [id number] = 1-3 (Age 65-74; Thromboembolism-Afib present at time of PE-2017)    Past procedure management  8/2017-ANDREW-no pre-procedure bridge; post procedure enoxaparin prophylaxis per Dr. Baez    Hx of hold with therapeutic bridge for procedures [id number]    [facility]-Procedure Risk stratification for thromboembolism: at least low- moderate (2022 Chest guidelines); unable to stratify possible additive risk of duel indications     VTE: 2022 CHEST Perioperative Management guidelines suggest against bridging for patients with Hx of VTE as sole clinical indication for warfarin except in high risk stratification patients.     VTE: 2022 CHEST Perioperative Management guidelines note, suggesting against bridging with a therapeutic-dose regimen does not preclude the use of a low-dose heparin regimen, typically started within 24 hours after surgery and continued for up to 5 days while VKA therapy is resumed, to decrease the risk for postoperative VTE.     AFIB: 2022 CHEST Perioperative Management guidelines recommends against bridging for patients with atrial fibrillation except in high risk stratification patients.     RECOMMENDATION     Pre-Procedure:  Hold warfarin for 5 days, until after procedure starting: Thur 10/5   No pre-procedure bridge    Post-Procedure:  Resume warfarin dose if okay with provider doing procedure on night of procedure, 10/10 PM: 15 mg daily x 2 then resume current dose  Start enoxaparin (Lovenox) 40 mg subq Q 24 hrs (prophylaxis dose) ~ 24 hours post procedure when okay with provider doing procedure. Continue until INR >= 2.0  Recheck INR 5-7 days after resuming warfarin     Plan routed to referring provider for approval  ?   Jessica Orosco, [facility]    SUBJECTIVE/OBJECTIVE     Prem FALCON Claudia, a 73 year old male    Goal INR  "Range: 2.0-3.0     Wt Readings from Last 3 Encounters:   09/15/23 107 kg (236 lb)   08/08/23 106.5 kg (234 lb 12.8 oz)   08/03/23 104.9 kg (231 lb 3.2 oz)      Ideal body weight: 82.2 kg (181 lb 3.5 oz)  Adjusted ideal body weight: 92.1 kg (203 lb 2.1 oz)     Estimated body mass index is 30.3 kg/m  as calculated from the following:    Height as of 9/15/23: 1.88 m (6' 2\").    Weight as of 9/15/23: 107 kg (236 lb).    Lab Results   Component Value Date    INR 2.1 (H) 10/03/2023    INR 2.6 (H) 09/05/2023    INR 2.0 (H) 08/24/2023     Lab Results   Component Value Date    HGB 13.4 09/15/2023    HCT 39.7 (L) 09/15/2023     09/15/2023     Lab Results   Component Value Date    CR 0.92 09/15/2023    CR 0.87 04/11/2023    CR 0.76 09/13/2022     Estimated Creatinine Clearance: 94.5 mL/min (based on SCr of 0.92 mg/dL).    "

## 2023-10-05 RX ORDER — ENOXAPARIN SODIUM 100 MG/ML
40 INJECTION SUBCUTANEOUS EVERY 24 HOURS
Qty: 4 ML | Refills: 0 | Status: SHIPPED | OUTPATIENT
Start: 2023-10-05 | End: 2023-11-21

## 2023-10-05 NOTE — TELEPHONE ENCOUNTER
Mamadou Baez MD  You15 hours ago (6:54 PM)     Okay for requested hold orders.    Mamadou Baez MD

## 2023-10-05 NOTE — TELEPHONE ENCOUNTER
Called and spoke with Ryan regarding hold plan for upcoming ANDREW scheduled on 10/10/23.     Reviewed plan as laid out by Jessica Orosco, PharmD.       Ryan verbalized understanding of plan and read-back directions given by writer.     Scheduled for INR check on 10/13/23.      Elsie Stringer RN  [facility] Anticoagulation  [phone]

## 2023-10-10 ENCOUNTER — ANTICOAGULATION THERAPY VISIT (OUTPATIENT)
Dept: ANTICOAGULATION | Facility: CLINIC | Age: 73
End: 2023-10-10

## 2023-10-10 ENCOUNTER — LAB (OUTPATIENT)
Dept: LAB | Facility: CLINIC | Age: 73
End: 2023-10-10
Payer: COMMERCIAL

## 2023-10-10 ENCOUNTER — TRANSFERRED RECORDS (OUTPATIENT)
Dept: HEALTH INFORMATION MANAGEMENT | Facility: CLINIC | Age: 73
End: 2023-10-10

## 2023-10-10 DIAGNOSIS — I26.99 PULMONARY EMBOLISM, BILATERAL (H): ICD-10-CM

## 2023-10-10 DIAGNOSIS — I48.21 PERMANENT ATRIAL FIBRILLATION (H): Primary | ICD-10-CM

## 2023-10-10 DIAGNOSIS — Z86.711 HISTORY OF PULMONARY EMBOLISM: ICD-10-CM

## 2023-10-10 DIAGNOSIS — I48.21 PERMANENT ATRIAL FIBRILLATION (H): ICD-10-CM

## 2023-10-10 LAB — INR BLD: 1.2 (ref 0.9–1.1)

## 2023-10-10 PROCEDURE — 36416 COLLJ CAPILLARY BLOOD SPEC: CPT

## 2023-10-10 PROCEDURE — 85610 PROTHROMBIN TIME: CPT

## 2023-10-10 NOTE — PROGRESS NOTES
ANTICOAGULATION MANAGEMENT     Prem Taylor 73 year old male is on warfarin with subtherapeutic INR result. (Goal INR 2.0-3.0)    Recent labs: (last 7 days)     10/10/23  0741   INR 1.2*       ASSESSMENT     Warfarin Lab Questionnaire    Warfarin Doses Last 7 Days      10/9/2023    12:01 PM   Dose in Tablet or Mg   TAB or MG? milligram (mg)     Pt Rptd Dose SINDHU MONDAY TUESDAY WED THURS FRIDAY SATURDAY   10/9/2023  12:01 PM 0 0 3.75 7.5 0 0 0         10/9/2023   Warfarin Lab Questionnaire   Missed doses within past 14 days? Yes   If yes; please list when: Hold for a surgical procedure.   Changes in diet or alcohol within past 14 days? No   Medication changes since last result? No   Injuries or illness since last result? No   New shortness of breath, severe headaches or sudden changes in vision since last result? No   Abnormal bleeding since last result? No   Upcoming surgery, procedure? Yes   Please explain, date scheduled? Steroid shot scheduled 10/10/2023 @ 10:00 AM.   Best number to call with results? [phone]     Previous result: Therapeutic last 2(+) visits  Additional findings:  Procedure today. To restart Warfarin tonight if provider approves.  No bridging.       PLAN     Recommended plan for temporary change(s) affecting INR     Dosing Instructions: Resume warfarin tonight after procedure taking 15 mg x2 days then usual dose with next INR in 5-7 days       Summary  As of 10/10/2023      Full warfarin instructions:  10/10: 15 mg; 10/11: 15 mg; Otherwise 3.75 mg every Tue; 7.5 mg all other days   Next INR check:  10/17/2023               Ryan was not contacted today. He was contacted on 10/5/23 with dosing instructions and has INR scheduled 10/13/23.    Plan made per [facility] anticoagulation protocol    Josy Moe RN  Anticoagulation Clinic  10/10/2023    _______________________________________________________________________     Anticoagulation Episode Summary       Current INR goal:  2.0-3.0   TTR:   67.1 % (1 y)   Target end date:  Indefinite   Send INR reminders to:  RUBY MAURICIO    Indications    Pulmonary embolism  bilateral (H) (Resolved) [I26.99]  Permanent atrial fibrillation (H) [I48.21]  Pulmonary embolism  bilateral (H) [I26.99]  History of pulmonary embolism [Z86.711]             Comments:  Approved for 8 week checks per -- see encounter from 07/27/21             Anticoagulation Care Providers       Provider Role Specialty Phone number    Mamadou Baez MD Referring Family Medicine [phone]

## 2023-10-13 ENCOUNTER — TELEPHONE (OUTPATIENT)
Dept: FAMILY MEDICINE | Facility: CLINIC | Age: 73
End: 2023-10-13

## 2023-10-13 ENCOUNTER — LAB (OUTPATIENT)
Dept: LAB | Facility: CLINIC | Age: 73
End: 2023-10-13
Payer: COMMERCIAL

## 2023-10-13 ENCOUNTER — ANTICOAGULATION THERAPY VISIT (OUTPATIENT)
Dept: ANTICOAGULATION | Facility: CLINIC | Age: 73
End: 2023-10-13

## 2023-10-13 DIAGNOSIS — Z86.711 HISTORY OF PULMONARY EMBOLISM: ICD-10-CM

## 2023-10-13 DIAGNOSIS — I48.21 PERMANENT ATRIAL FIBRILLATION (H): Primary | ICD-10-CM

## 2023-10-13 DIAGNOSIS — I48.21 PERMANENT ATRIAL FIBRILLATION (H): ICD-10-CM

## 2023-10-13 DIAGNOSIS — I26.99 PULMONARY EMBOLISM, BILATERAL (H): ICD-10-CM

## 2023-10-13 LAB — INR BLD: 1.9 (ref 0.9–1.1)

## 2023-10-13 PROCEDURE — 36416 COLLJ CAPILLARY BLOOD SPEC: CPT

## 2023-10-13 PROCEDURE — 85610 PROTHROMBIN TIME: CPT

## 2023-10-13 NOTE — PROGRESS NOTES
ANTICOAGULATION MANAGEMENT     Prem Taylor 73 year old male is on warfarin with subtherapeutic INR result. (Goal INR 2.0-3.0)    Recent labs: (last 7 days)     10/13/23  0726   INR 1.9*       ASSESSMENT     Warfarin Lab Questionnaire    Warfarin Doses Last 7 Days      10/12/2023    12:02 PM   Dose in Tablet or Mg   TAB or MG? milligram (mg)     Pt Rptd Dose SINDHU MONDAY TUESDAY WED THURS FRIDAY SATURDAY   10/12/2023  12:02 PM 0 0 15 15 0 0 0         10/12/2023   Warfarin Lab Questionnaire   Missed doses within past 14 days? Yes   If yes; please list when: 10/05 - 10/09/23   Changes in diet or alcohol within past 14 days? No   Medication changes since last result? Yes   Please list: Lovenox   Injuries or illness since last result? No   New shortness of breath, severe headaches or sudden changes in vision since last result? No   Abnormal bleeding since last result? No   Upcoming surgery, procedure? No   Best number to call with results? [phone]     Previous result: Subtherapeutic  Additional findings: Bridging with Enoxaparin until INR >= 2.0       PLAN     Recommended plan for temporary change(s) affecting INR     Dosing Instructions: Continue your current warfarin dose Continue bridging with Enoxaparin for one more injection, then stop enoxaparin with next INR in 5 days       Summary  As of 10/13/2023      Full warfarin instructions:  3.75 mg every Tue; 7.5 mg all other days   Next INR check:  10/18/2023               Telephone call with Ryan who agrees to plan and repeated back plan correctly    Lab visit scheduled    Education provided:   Contact [phone] with any changes, questions or concerns.     Plan made with [facility] Pharmacist Jeaneth Stringer, RN  Anticoagulation Clinic  10/13/2023    _______________________________________________________________________     Anticoagulation Episode Summary       Current INR goal:  2.0-3.0   TTR:  66.3% (1 y)   Target end date:  Indefinite    Send INR reminders to:  RUBY MAURICIO    Indications    Pulmonary embolism  bilateral (H) (Resolved) [I26.99]  Permanent atrial fibrillation (H) [I48.21]  Pulmonary embolism  bilateral (H) [I26.99]  History of pulmonary embolism [Z86.711]             Comments:  Approved for 8 week checks per -- see encounter from 07/27/21             Anticoagulation Care Providers       Provider Role Specialty Phone number    Mamadou Baez MD Referring Family Medicine [phone]

## 2023-10-13 NOTE — TELEPHONE ENCOUNTER
See anticoagulation encounter from today.    Elsie Stringer RN  [facility] Anticoagulation  [phone]

## 2023-10-18 ENCOUNTER — ANTICOAGULATION THERAPY VISIT (OUTPATIENT)
Dept: ANTICOAGULATION | Facility: CLINIC | Age: 73
End: 2023-10-18

## 2023-10-18 ENCOUNTER — LAB (OUTPATIENT)
Dept: LAB | Facility: CLINIC | Age: 73
End: 2023-10-18
Payer: COMMERCIAL

## 2023-10-18 DIAGNOSIS — I48.21 PERMANENT ATRIAL FIBRILLATION (H): ICD-10-CM

## 2023-10-18 DIAGNOSIS — Z86.711 HISTORY OF PULMONARY EMBOLISM: ICD-10-CM

## 2023-10-18 DIAGNOSIS — I48.21 PERMANENT ATRIAL FIBRILLATION (H): Primary | ICD-10-CM

## 2023-10-18 DIAGNOSIS — I26.99 PULMONARY EMBOLISM, BILATERAL (H): ICD-10-CM

## 2023-10-18 LAB — INR BLD: 2.8 (ref 0.9–1.1)

## 2023-10-18 PROCEDURE — 36416 COLLJ CAPILLARY BLOOD SPEC: CPT

## 2023-10-18 PROCEDURE — 85610 PROTHROMBIN TIME: CPT

## 2023-10-27 ENCOUNTER — IMMUNIZATION (OUTPATIENT)
Dept: NURSING | Facility: CLINIC | Age: 73
End: 2023-10-27
Payer: COMMERCIAL

## 2023-10-27 PROCEDURE — 90480 ADMN SARSCOV2 VAC 1/ONLY CMP: CPT

## 2023-10-27 PROCEDURE — 91320 SARSCV2 VAC 30MCG TRS-SUC IM: CPT

## 2023-11-07 ENCOUNTER — ANTICOAGULATION THERAPY VISIT (OUTPATIENT)
Dept: ANTICOAGULATION | Facility: CLINIC | Age: 73
End: 2023-11-07

## 2023-11-07 ENCOUNTER — LAB (OUTPATIENT)
Dept: LAB | Facility: CLINIC | Age: 73
End: 2023-11-07
Payer: COMMERCIAL

## 2023-11-07 DIAGNOSIS — I48.21 PERMANENT ATRIAL FIBRILLATION (H): Primary | ICD-10-CM

## 2023-11-07 DIAGNOSIS — I48.21 PERMANENT ATRIAL FIBRILLATION (H): ICD-10-CM

## 2023-11-07 DIAGNOSIS — Z86.711 HISTORY OF PULMONARY EMBOLISM: ICD-10-CM

## 2023-11-07 DIAGNOSIS — I26.99 PULMONARY EMBOLISM, BILATERAL (H): ICD-10-CM

## 2023-11-07 LAB — INR BLD: 2.9 (ref 0.9–1.1)

## 2023-11-07 PROCEDURE — 36416 COLLJ CAPILLARY BLOOD SPEC: CPT

## 2023-11-07 PROCEDURE — 85610 PROTHROMBIN TIME: CPT

## 2023-11-07 NOTE — PROGRESS NOTES
ANTICOAGULATION MANAGEMENT     Prem Taylor 73 year old male is on warfarin with therapeutic INR result. (Goal INR 2.0-3.0)    Recent labs: (last 7 days)     11/07/23  0725   INR 2.9*       ASSESSMENT     Warfarin Lab Questionnaire    Warfarin Doses Last 7 Days      11/6/2023     8:28 AM   Dose in Tablet or Mg   TAB or MG? milligram (mg)     Pt Rptd Dose SUNDAY MONDAY TUESDAY WED THURS FRIDAY SATURDAY 11/6/2023   8:28 AM 7.5 7.5 3.75 7.5 7.5 7.5 7.5         11/6/2023   Warfarin Lab Questionnaire   Missed doses within past 14 days? No   Changes in diet or alcohol within past 14 days? No   Medication changes since last result? No   Injuries or illness since last result? No   New shortness of breath, severe headaches or sudden changes in vision since last result? No   Abnormal bleeding since last result? No   Upcoming surgery, procedure? No   Best number to call with results? [phone]     Previous result: Therapeutic last visit; previously outside of goal range  Additional findings: None       PLAN     Recommended plan for no diet, medication or health factor changes affecting INR     Dosing Instructions: Continue your current warfarin dose with next INR in 4 weeks       Summary  As of 11/7/2023      Full warfarin instructions:  3.75 mg every Tue; 7.5 mg all other days   Next INR check:  12/5/2023               Telephone call with Ryan who verbalizes understanding and agrees to plan    Lab visit scheduled    Education provided:   Goal range and lab monitoring: goal range and significance of current result  Contact [phone] with any changes, questions or concerns.     Plan made per [facility] anticoagulation protocol    Yesenia Vázquez RN  Anticoagulation Clinic  11/7/2023    _______________________________________________________________________     Anticoagulation Episode Summary       Current INR goal:  2.0-3.0   TTR:  66.1% (1 y)   Target end date:  Indefinite   Send INR reminders to:  RUBY MAURICIO     Indications    Pulmonary embolism  bilateral (H) (Resolved) [I26.99]  Permanent atrial fibrillation (H) [I48.21]  Pulmonary embolism  bilateral (H) [I26.99]  History of pulmonary embolism [Z86.711]             Comments:  Approved for 8 week checks per -- see encounter from 07/27/21             Anticoagulation Care Providers       Provider Role Specialty Phone number    Mamadou Baez MD Referring Family Medicine [phone]

## 2023-11-08 ENCOUNTER — TRANSFERRED RECORDS (OUTPATIENT)
Dept: HEALTH INFORMATION MANAGEMENT | Facility: CLINIC | Age: 73
End: 2023-11-08
Payer: COMMERCIAL

## 2023-11-15 PROCEDURE — 93228 REMOTE 30 DAY ECG REV/REPORT: CPT | Performed by: INTERNAL MEDICINE

## 2023-11-16 ENCOUNTER — TRANSFERRED RECORDS (OUTPATIENT)
Dept: HEALTH INFORMATION MANAGEMENT | Facility: CLINIC | Age: 73
End: 2023-11-16
Payer: COMMERCIAL

## 2023-11-20 ENCOUNTER — TELEPHONE (OUTPATIENT)
Dept: FAMILY MEDICINE | Facility: CLINIC | Age: 73
End: 2023-11-20
Payer: COMMERCIAL

## 2023-11-20 ENCOUNTER — NURSE TRIAGE (OUTPATIENT)
Dept: NURSING | Facility: CLINIC | Age: 73
End: 2023-11-20
Payer: COMMERCIAL

## 2023-11-20 NOTE — COMMUNITY RESOURCES LIST (ENGLISH)
11/20/2023    [employer] [facility] [employer]  N/A  For questions about this resource list or additional care needs, please contact your primary care clinic or care manager.  Phone: [phone]   Email: N/A   Address: [address] [place] [id number]   Hours: N/A        Financial Stability       Utility payment assistance  1  [place] [employer] - [place]'s Telephone Assistance Plan (TAP) and Federal Lifeline and Affordable Connectivity Program (ACP) Distance: 0.49 miles      Phone/Virtual   [address] [place] [address]  Language: English  Fees: Free   Phone: (110) 122-4830 Email: consumer.puc@[facility]. Website: https://[place]puc/consumers/telephone/     2  [employer] [facility] [place] - Energy Assistance Distance: 1.6 miles      Phone/Virtual   [address] [place] [address]  Language: English, Hmong, Welsh, Lao  Hours: Mon - Fri 8:00 AM - 12:00 PM , Mon - Fri 1:00 PM - 4:30 PM  Fees: Free   Phone: (180) 830-5616 Email: janet@caprw.org Website: http://www.caprw.org          Important Numbers & Websites       Emergency Services   911  City Services   311  Poison Control   (236) 806-9139  Suicide Prevention Lifeline   (452) 578-9221 (TALK)  Child Abuse Hotline   (679) 876-4377 (4-A-Child)  Sexual Assault Hotline   (148) 461-6575 (HOPE)  National Runaway Safeline   (796) 275-4306 (RUNAWAY)  All-Options Talkline   (728) 198-3143  Substance Abuse Referral   (181) 184-7601 (HELP)

## 2023-11-20 NOTE — TELEPHONE ENCOUNTER
Please see telephone encounter from 11/20/23   - patient has appointment tomorrow 11/21/23 for concerns.    HILDA Suarez, RN  [facility]

## 2023-11-20 NOTE — TELEPHONE ENCOUNTER
Pt calling that he was has been feeling dizzy, not dizziness today.  Pt got out of his car 11/18/23 and pt felt lightheaded and felt like he was going to fall.  Pt states he let himself go to the ground, pt then got back and ate and felt fine, pt thinks there were seconds that he was out of it, pt remembers the chain of events.  Pt states lately, for the past couple of months, pt has felt like he gets out of breath easier than he should.  Pt states when he walks up a set up stairs he is breathing hard, like gasping for air.  Pt also states more lately pt feels like when he lays down for awhile and when he quickly gets up, if he is sitting for a while watching TV and tries to  walk right away, pt feels like he needs to steady himself, after waiting 20 seconds than he is fine.    Pt states for the last couple of weeks, pt has been on Tizanidine for back issues and now pt is getting cortisone shots - pt goes to [facility].  Pt transferred to  for OV appt.  Mara Herman RN  [facility]NA Nurse Advisor    Reason for Disposition   MILD dizziness (e.g., walking normally) and has NOT been evaluated by physician for this (Exception: Dizziness caused by heat exposure, sudden standing, or poor fluid intake.)    Additional Information   Negative: SEVERE difficulty breathing (e.g., struggling for each breath, speaks in single words)   Negative: Shock suspected (e.g., cold/pale/clammy skin, too weak to stand, low BP, rapid pulse)   Negative: Difficult to awaken or acting confused (e.g., disoriented, slurred speech)   Negative: Fainted, and still feels dizzy afterwards   Negative: Overdose (accidental or intentional) of medications   Negative: New neurologic deficit that is present now: * Weakness of the face, arm, or leg on one side of the body * Numbness of the face, arm, or leg on one side of the body * Loss of speech or garbled speech   Negative: Heart beating < 50 beats per minute OR > 140 beats per minute   Negative:  Sounds like a life-threatening emergency to the triager   Negative: Chest pain   Negative: Rectal bleeding, bloody stool, or tarry-black stool   Negative: Vomiting is main symptom   Negative: Diarrhea is main symptom   Negative: Headache is main symptom   Negative: Heat exhaustion suspected (i.e., dehydration from heat exposure)   Negative: Patient states that they are having an anxiety or panic attack   Negative: Dizziness from low blood sugar (i.e., < 60 mg/dl or 3.5 mmol/l)   Negative: SEVERE dizziness (e.g., unable to stand, requires support to walk, feels like passing out now)   Negative: SEVERE headache or neck pain   Negative: Spinning or tilting sensation (vertigo) present now and one or more stroke risk factors (i.e., hypertension, diabetes mellitus, prior stroke/TIA, heart attack, age over 60) (Exception: Prior physician evaluation for this AND no different/worse than usual.)   Negative: Neurologic deficit that was brief (now gone), ANY of the following:* Weakness of the face, arm, or leg on one side of the body* Numbness of the face, arm, or leg on one side of the body* Loss of speech or garbled speech   Negative: Loss of vision or double vision  (Exception: Similar to previous migraines.)   Negative: Extra heartbeats, irregular heart beating, or heart is beating very fast (i.e., 'palpitations')   Negative: Difficulty breathing   Negative: Drinking very little and dehydration suspected (e.g., no urine > 12 hours, very dry mouth, very lightheaded)   Negative: Follows bleeding (e.g., stomach, rectum, vagina)  (Exception: Became dizzy from sight of small amount blood.)   Negative: Patient sounds very sick or weak to the triager   Negative: Lightheadedness (dizziness) present now, after 2 hours of rest and fluids   Negative: Spinning or tilting sensation (vertigo) present now   Negative: Fever > 103 F (39.4 C)   Negative: Fever > 100.0 F (37.8 C) and has diabetes mellitus or a weak immune system (e.g., HIV  positive, cancer chemotherapy, organ transplant, splenectomy, chronic steroids)   Negative: MODERATE dizziness (e.g., interferes with normal activities)  (Exception: Dizziness caused by heat exposure, sudden standing, or poor fluid intake.)   Negative: Vomiting occurs with dizziness   Negative: Patient wants to be seen   Negative: Taking a medicine that could cause dizziness (e.g., blood pressure medications, diuretics)   Negative: SEVERE difficulty breathing (e.g., struggling for each breath, speaks in single words)   Negative: [1] Difficulty breathing or swallowing AND [2] started suddenly after medicine, an allergic food or bee sting   Negative: Shock suspected (e.g., cold/pale/clammy skin, too weak to stand, low BP, rapid pulse)   Negative: Difficult to awaken or acting confused (e.g., disoriented, slurred speech)   Negative: [1] Weakness (i.e., paralysis, loss of muscle strength) of the face, arm or leg on one side of the body AND [2] sudden onset AND [3] present now   Negative: [1] Numbness (i.e., loss of sensation) of the face, arm or leg on one side of the body AND [2] sudden onset AND [3] present now   Negative: [1] Loss of speech or garbled speech AND [2] sudden onset AND [3] present now   Negative: Overdose (accidental or intentional) of medications   Negative: [1] Fainted > 15 minutes ago AND [2] still feels too weak or dizzy to stand   Negative: Heart beating < 50 beats per minute OR > 140 beats per minute   Negative: Sounds like a life-threatening emergency to the triager   Negative: Chest pain    Protocols used: Dizziness - Iiothjzkotaziba-T-LZ, Dizziness-A-OH

## 2023-11-20 NOTE — TELEPHONE ENCOUNTER
Reason for Call:  Appointment Request    Patient requesting this type of appt:  office visit    Requested provider: Mamadou Baez/ anyone at [facility] provider    Reason patient unable to be scheduled: Not within requested timeframe    When does patient want to be seen/preferred time: 3-7 days    Comments:  Pt state feeling dizzy and pass out recently want to be seen before leaving out of town 12/29/2023    Could we send this information to you in [employer] or would you prefer to receive a phone call?:   Patient would prefer a phone call   Okay to leave a detailed message?: Yes at Cell number on file:    Telephone Information:   Mobile [phone]       Call taken on 11/20/2023 at 11:35 AM by Renzo Noguera MA

## 2023-11-21 ENCOUNTER — PATIENT OUTREACH (OUTPATIENT)
Dept: GASTROENTEROLOGY | Facility: CLINIC | Age: 73
End: 2023-11-21

## 2023-11-21 ENCOUNTER — OFFICE VISIT (OUTPATIENT)
Dept: FAMILY MEDICINE | Facility: CLINIC | Age: 73
End: 2023-11-21
Payer: COMMERCIAL

## 2023-11-21 VITALS
DIASTOLIC BLOOD PRESSURE: 89 MMHG | RESPIRATION RATE: 16 BRPM | TEMPERATURE: 98.4 F | SYSTOLIC BLOOD PRESSURE: 150 MMHG | HEART RATE: 68 BPM | HEIGHT: 74 IN | BODY MASS INDEX: 30.69 KG/M2 | WEIGHT: 239.13 LBS | OXYGEN SATURATION: 98 %

## 2023-11-21 DIAGNOSIS — R42 DIZZINESS: ICD-10-CM

## 2023-11-21 DIAGNOSIS — I48.21 PERMANENT ATRIAL FIBRILLATION (H): Primary | ICD-10-CM

## 2023-11-21 DIAGNOSIS — Z12.11 SPECIAL SCREENING FOR MALIGNANT NEOPLASMS, COLON: Primary | ICD-10-CM

## 2023-11-21 LAB
ATRIAL RATE - MUSE: 468 BPM
DIASTOLIC BLOOD PRESSURE - MUSE: NORMAL MMHG
ERYTHROCYTE [DISTWIDTH] IN BLOOD BY AUTOMATED COUNT: 13.3 % (ref 10–15)
HCT VFR BLD AUTO: 39.7 % (ref 40–53)
HGB BLD-MCNC: 13.3 G/DL (ref 13.3–17.7)
HOLD SPECIMEN: NORMAL
INTERPRETATION ECG - MUSE: NORMAL
MCH RBC QN AUTO: 30.9 PG (ref 26.5–33)
MCHC RBC AUTO-ENTMCNC: 33.5 G/DL (ref 31.5–36.5)
MCV RBC AUTO: 92 FL (ref 78–100)
P AXIS - MUSE: NORMAL DEGREES
PLATELET # BLD AUTO: 188 10E3/UL (ref 150–450)
PR INTERVAL - MUSE: NORMAL MS
QRS DURATION - MUSE: 144 MS
QT - MUSE: 434 MS
QTC - MUSE: 468 MS
R AXIS - MUSE: -41 DEGREES
RBC # BLD AUTO: 4.3 10E6/UL (ref 4.4–5.9)
SYSTOLIC BLOOD PRESSURE - MUSE: NORMAL MMHG
T AXIS - MUSE: -9 DEGREES
VENTRICULAR RATE- MUSE: 70 BPM
WBC # BLD AUTO: 6.1 10E3/UL (ref 4–11)

## 2023-11-21 PROCEDURE — 99214 OFFICE O/P EST MOD 30 MIN: CPT | Mod: 25

## 2023-11-21 PROCEDURE — 85027 COMPLETE CBC AUTOMATED: CPT

## 2023-11-21 PROCEDURE — 84443 ASSAY THYROID STIM HORMONE: CPT

## 2023-11-21 PROCEDURE — 93010 ELECTROCARDIOGRAM REPORT: CPT | Performed by: INTERNAL MEDICINE

## 2023-11-21 PROCEDURE — 36415 COLL VENOUS BLD VENIPUNCTURE: CPT

## 2023-11-21 PROCEDURE — 83735 ASSAY OF MAGNESIUM: CPT

## 2023-11-21 PROCEDURE — 93005 ELECTROCARDIOGRAM TRACING: CPT

## 2023-11-21 PROCEDURE — 80053 COMPREHEN METABOLIC PANEL: CPT

## 2023-11-21 RX ORDER — TIZANIDINE 2 MG/1
2 TABLET ORAL 3 TIMES DAILY PRN
COMMUNITY
Start: 2023-11-20 | End: 2023-12-18

## 2023-11-21 ASSESSMENT — ENCOUNTER SYMPTOMS: SYNCOPE: 1

## 2023-11-21 NOTE — PROGRESS NOTES
Assessment & Plan   Problem List Items Addressed This Visit       Permanent atrial fibrillation (H) - Primary    Relevant Orders    EKG 12-lead, tracing only (Completed)    Dizziness     Reassuring appearance in clinic today.  He is neurologically intact. Symptoms are not consistent with vertigo. We will plan to obtain blood work today to assess for some underlying causes for his dizziness, including anemia, electrolyte deficiencies, thyroid function.  He does have a medically complex history, including permanent atrial fibrillation as well as a history of pulmonary emboli.  He is rate controlled in clinic today and his orthostatic blood pressures are within normal range.  However, on his EKG, a new inferior infarct was noted when compared to April 2022.  Patient denies any symptoms or episodes of chest pain/shortness of breath.  He is no longer following with cardiology, but we discussed that because of this result as well as the dizziness he is experiencing, I do think it would be worthwhile for him to reach out and schedule follow-up with them.  We discussed that if he is unable to schedule follow-up in a reasonable timeframe, would recommend stress echocardiogram as an additional next step, especially with his mild shortness of breath with exertion. Not on cholesterol lowering medication but panel 4/2023 was relatively good. If the remainder of his diagnostic workup is unrevealing, discussed improving hydration and salt/electrolytes, slow position changes, and follow up with his PCP if not improving or worsening in any way. Additionally, he just started tizanidine so I encouraged him to reach out to this prescribing provider for an alternative, as it can cause this side effect. Discussed reasons to present to care immediately including syncope, chest pain, dizziness at rest, or new neurologic symptoms. Patient and wife expressed an understanding of and agreement with this plan. All questions were answered.          Relevant Orders    EKG 12-lead, tracing only (Completed)    CBC with Platelets and Reflex to Iron Studies    Comprehensive metabolic panel (BMP + Alb, Alk Phos, ALT, AST, Total. Bili, TP)    TSH with free T4 reflex    Magnesium      RAY Iniguez CNP  [facility] ROBERT Davis is a 73 year old, presenting for the following health issues:  Syncope (Spell.  Episode was Friday 11/17/23. Has had dizzy spells when going to stand up. C/o winded easier than normal-SOB worse after walking up stairs.)        11/21/2023    11:22 AM   Additional Questions   Roomed by sac   Accompanied by Wife-Aubree         11/21/2023    11:22 AM   Patient Reported Additional Medications   Patient reports taking the following new medications no       Friday, 11/17. Had an extreme episode of dizziness when getting out of the car that led to a fall (no head strike). He did not lose consciousness.   Prior to this episode, he was experiencing more regular episodes of dizziness with position changes.   Dizziness is present just with position changes.   History of a-fib  History of anti-coagulation, but no current signs/symptoms of bleeding  Has been trying to increase water intake, but could be better  Started tizanidine a couple of weeks ago for chronic back pain.      History of Present Illness       Reason for visit:  Dizziness  Symptom onset:  3-7 days ago  Symptoms include:  Fainting  Symptom intensity:  Moderate  Symptom progression:  Staying the same  Had these symptoms before:  No  What makes it worse:  Getting up quickly  What makes it better:  Stand and brace myself for a couple minutes.    He eats 0-1 servings of fruits and vegetables daily.He consumes 0 sweetened beverage(s) daily.He exercises with enough effort to increase his heart rate 60 or more minutes per day.  He exercises with enough effort to increase his heart rate 4 days per week.   He is taking medications regularly.     Review of  "Systems   Cardiovascular:  Positive for syncope.          Objective    BP (!) 150/89 (BP Location: Left arm, Patient Position: Sitting, Cuff Size: Adult Large)   Pulse 68   Temp 98.4  F (36.9  C) (Oral)   Resp 16   Ht 1.88 m (6' 2\")   Wt 108.5 kg (239 lb 2 oz)   SpO2 98%   BMI 30.70 kg/m    Body mass index is 30.7 kg/m .    Physical Exam  Vitals and nursing note reviewed.   Constitutional:       General: He is not in acute distress.     Appearance: Normal appearance. He is not ill-appearing or toxic-appearing.   Eyes:      Extraocular Movements: Extraocular movements intact.      Pupils: Pupils are equal, round, and reactive to light.   Cardiovascular:      Rate and Rhythm: Normal rate. Rhythm irregular.      Pulses: Normal pulses.      Heart sounds: No murmur heard.  Pulmonary:      Effort: Pulmonary effort is normal. No respiratory distress.      Breath sounds: No wheezing or rales.   Musculoskeletal:      Comments: 5/5 strength in BUE and BLE   Neurological:      Mental Status: He is alert.      Cranial Nerves: Cranial nerves 2-12 are intact. No cranial nerve deficit, dysarthria or facial asymmetry.      Sensory: Sensation is intact.      Motor: Motor function is intact. No weakness or tremor.      Coordination: Coordination is intact. Romberg sign negative. Finger-Nose-Finger Test normal.      Gait: Gait abnormal (ataxic secondary to chronic back pain).      Deep Tendon Reflexes: Reflexes are normal and symmetric.        Results for orders placed or performed in visit on 11/21/23 (from the past 24 hour(s))   EKG 12-lead, tracing only   Result Value Ref Range    Systolic Blood Pressure  mmHg    Diastolic Blood Pressure  mmHg    Ventricular Rate 70 BPM    Atrial Rate 468 BPM    SC Interval  ms    QRS Duration 144 ms     ms    QTc 468 ms    P Axis  degrees    R AXIS -41 degrees    T Axis -9 degrees    Interpretation ECG       Atrial fibrillation  Left axis deviation  Right bundle branch block  Inferior " infarct , age undetermined  Abnormal ECG  When compared with ECG of 12-APR-2022 14:35,  Inferior infarct is now Present     CBC with Platelets and Reflex to Iron Studies    Narrative    The following orders were created for panel order CBC with Platelets and Reflex to Iron Studies.  Procedure                               Abnormality         Status                     ---------                               -----------         ------                     CBC with Platelets and R...[[id number]]  Abnormal            Final result               Extra Green Top (Lithium...[[id number]]                      In process                   Please view results for these tests on the individual orders.   CBC with Platelets and Reflex to Iron Studies   Result Value Ref Range    WBC Count 6.1 4.0 - 11.0 10e3/uL    RBC Count 4.30 (L) 4.40 - 5.90 10e6/uL    Hemoglobin 13.3 13.3 - 17.7 g/dL    Hematocrit 39.7 (L) 40.0 - 53.0 %    MCV 92 78 - 100 fL    MCH 30.9 26.5 - 33.0 pg    MCHC 33.5 31.5 - 36.5 g/dL    RDW 13.3 10.0 - 15.0 %    Platelet Count 188 150 - 450 10e3/uL

## 2023-11-21 NOTE — ASSESSMENT & PLAN NOTE
Reassuring appearance in clinic today.  He is neurologically intact. Symptoms are not consistent with vertigo. We will plan to obtain blood work today to assess for some underlying causes for his dizziness, including anemia, electrolyte deficiencies, thyroid function.  He does have a medically complex history, including permanent atrial fibrillation as well as a history of pulmonary emboli.  He is rate controlled in clinic today and his orthostatic blood pressures are within normal range.  However, on his EKG, a new inferior infarct was noted when compared to April 2022.  Patient denies any symptoms or episodes of chest pain/shortness of breath.  He is no longer following with cardiology, but we discussed that because of this result as well as the dizziness he is experiencing, I do think it would be worthwhile for him to reach out and schedule follow-up with them.  We discussed that if he is unable to schedule follow-up in a reasonable timeframe, would recommend stress echocardiogram as an additional next step, especially with his mild shortness of breath with exertion. Not on cholesterol lowering medication but panel 4/2023 was relatively good. If the remainder of his diagnostic workup is unrevealing, discussed improving hydration and salt/electrolytes, slow position changes, and follow up with his PCP if not improving or worsening in any way. Additionally, he just started tizanidine so I encouraged him to reach out to this prescribing provider for an alternative, as it can cause this side effect. Discussed reasons to present to care immediately including syncope, chest pain, dizziness at rest, or new neurologic symptoms. Patient and wife expressed an understanding of and agreement with this plan. All questions were answered.

## 2023-11-21 NOTE — PROGRESS NOTES
"[facility] Screening Colonoscopy Referral Review    Patient meets the inclusion criteria for screening colonoscopy standing order.    Ordering/Referring Provider:  Mamadou Baez      BMI: Estimated body mass index is 30.3 kg/m  as calculated from the following:    Height as of 9/15/23: 1.88 m (6' 2\").    Weight as of 9/15/23: 107 kg (236 lb).     Sedation:  Does patient have any of the following conditions affecting sedation?  No medical conditions affecting sedation.    Previous Scopes:  Any previous recommendations or follow up needs based on previous scope?  na / No recommendations.    Medical Concerns to Postpone Order:  Does patient have any of the following medical concerns that should postpone/delay colonoscopy referral?  No medical conditions affecting colonoscopy referral.    Final Referral Details:  Based on patient's medical history patient is appropriate for referral order with moderate sedation. If patient's BMI > 50 do not schedule in [facility].  "

## 2023-11-22 LAB
ALBUMIN SERPL BCG-MCNC: 4.4 G/DL (ref 3.5–5.2)
ALP SERPL-CCNC: 68 U/L (ref 40–150)
ALT SERPL W P-5'-P-CCNC: 17 U/L (ref 0–70)
ANION GAP SERPL CALCULATED.3IONS-SCNC: 10 MMOL/L (ref 7–15)
AST SERPL W P-5'-P-CCNC: 27 U/L (ref 0–45)
BILIRUB SERPL-MCNC: 0.8 MG/DL
BUN SERPL-MCNC: 13.3 MG/DL (ref 8–23)
CALCIUM SERPL-MCNC: 9.4 MG/DL (ref 8.8–10.2)
CHLORIDE SERPL-SCNC: 106 MMOL/L (ref 98–107)
CREAT SERPL-MCNC: 0.95 MG/DL (ref 0.67–1.17)
DEPRECATED HCO3 PLAS-SCNC: 24 MMOL/L (ref 22–29)
EGFRCR SERPLBLD CKD-EPI 2021: 85 ML/MIN/1.73M2
GLUCOSE SERPL-MCNC: 86 MG/DL (ref 70–99)
MAGNESIUM SERPL-MCNC: 2 MG/DL (ref 1.7–2.3)
POTASSIUM SERPL-SCNC: 4.4 MMOL/L (ref 3.4–5.3)
PROT SERPL-MCNC: 7.1 G/DL (ref 6.4–8.3)
SODIUM SERPL-SCNC: 140 MMOL/L (ref 135–145)
TSH SERPL DL<=0.005 MIU/L-ACNC: 1.77 UIU/ML (ref 0.3–4.2)

## 2023-11-27 NOTE — PROGRESS NOTES
Assessment/Recommendations   Assessment:    1.  Dyspnea on exertion/progressive fatigue/dizziness/abnormal EKG: Patient reports increased dyspnea on exertion walking up incline, progressive fatigue, and increased frequency dizziness and lightheadedness in the last 1 year.    Denies chest pain, PND orthopnea.    Reports unintentional 8 to 10 pounds weight gain in the last 3 months.    EKG on 11/21/23 showed atrial fibrillation with right bundle branch block with inferior infarct.  Previous EKG in April 2022 showed a possible inferior infarct.  Reviewed most recent CMP, CBC and TSH-unremarkable    2.  Permanent atrial fibrillation: On warfarin for stroke prophylaxis.  Most recent INR therapeutic.  Heart rate stable in clinic.  Denies heart palpitation or heart racing except reports increased dyspnea on exertion walking up the stairs, progressive fatigue, and increased frequency in dizziness and lightheadedness.  Dizziness occurs more with position change.    4.  Elevated blood pressure with a history of hypertension: Blood pressure elevated in the last couple months.  No history of hypertension.  Not on antihypertensive medication.    5.  Hypercholesterolemia:  total cholesterol is 225, triglycerides 71, and  in April 2023.  Not on statin therapy.  On diet control.  Works out for 1 and half hour 2-3 times a week at [employer] mainly walking on a treadmill.     6. Obstructive sleep apnea: Using CPAP nightly    Plan/Recommendation:  -Schedule 2 weeks MCOT to evaluate for any heart rhythm disorders  -NT proBNP pending  -Schedule exercise stress echo  -We discussed about starting antihypertensive medication.  Patient would like to hold off until he completes his heart monitor and stress echo  -Patient was encouraged to seek medical attention if double chest pain, shortness of breath or syncope    Follow up with Dr. Velez or with rapid access clinic in 3 to 4 weeks or after above tests is completed      History of  Present Illness/Subjective    Mr. Prem Taylor is a 73 year old male with a past medical history of eminent atrial fibrillation, pulmonary embolism bilaterally, hypercholesterolemia, COPD, obesity, BPH, low back pain, and recent concern with increased fatigue, dyspnea on exertion, and increased frequency and dizziness and lightheadedness over the past 1 year who is seen at [facility] for post hospitalization follow up per patient's request.    Today, Ryan is here accompanied by his spouse.  He states he could not get appointment to see Dr. Velez and therefore came to see me today.  He has noticed increased fatigue, dyspnea on exertion going up a flight of stairs, increase in the lightheadedness and dizziness since he saw Dr. Velez last year.  Dizziness occurs more with the position change.  He reports inadequate fluid intake.  He was seen by PCP team recently and noted abnormal EKG with inferior infarct.  He he was recommended to follow-up with cardiology.    He reports 8 to 10 pounds unintentional weight gain in the last 3 months.  He denies shortness of breath, orthopnea, PND, palpitations, chest pain, abdominal fullness/bloating, and lower extremity edema.  He denies any bleeding complications.    24 hours Holter study in June 2021-reviewed  Conclusion  [facility]   HOLTER REPORT   Results:    Indication for study: Evaluate for atrial fibrillation    A 24-hour Holter monitor was applied June 14, 2021 with date of interpretation June 16, 2021    Atrial fibrillation with right bundle branch block pattern is seen throughout the recording    Average ventricular rate is 72 bpm with heart rate range between  bpm    Modest pauses noted- longest pause of 2.6 seconds; but no pauses in excess of 3 seconds    Otherwise no major bradycardia identified    Infrequent ventricular ectopy 493 PVCs which is 0.5% burden ; one triplet at a modest rate otherwise no complexly  identified    Impression    Persistent atrial fibrillation with controlled ventricular response    Patient activity diary was reviewed;  he was very active during the day and had no symptoms      Comment: The recording was for 23 hours and 59 minutes.  The recording quality was adequate.    ECHO from 2019-Reviewed:   Narrative & Impression    When compared to the previous study dated 11/15/2017, no significant change.    Left ventricle ejection fraction is normal. The calculated left ventricular ejection fraction is 54%. Mild concentric hypertrophy noted.    Normal left ventricular size and systolic function.    Normal right ventricular size and systolic function.    Mild to moderate mitral regurgitation.       Physical Examination Review of Systems   BP (!) 138/93 (BP Location: Right arm, Patient Position: Sitting, Cuff Size: Adult Large)   Pulse 60   Resp 20   Wt 109.3 kg (241 lb)   SpO2 99%   BMI 30.94 kg/m    Body mass index is 30.94 kg/m .  Wt Readings from Last 3 Encounters:   11/28/23 109.3 kg (241 lb)   11/21/23 108.5 kg (239 lb 2 oz)   09/15/23 107 kg (236 lb)     General Appearance:   no distress, normal body habitus   ENT/Mouth: membranes moist, no oral lesions or bleeding gums.      EYES:  no scleral icterus, normal conjunctivae   Neck: no carotid bruits or thyromegaly   Chest/Lungs:   lungs are clear to auscultation, no rales or wheezing, equal chest wall expansion    Cardiovascular:   Irregularly irregular. Normal first and second heart sounds with no murmurs, rubs, or gallops; the carotid, radial and posterior tibial pulses are intact, Jugular venous pressure flat with no edema bilaterally    Abdomen:  no organomegaly, masses, bruits, or tenderness; bowel sounds are present   Extremities   no cyanosis or clubbing   Radial pulses and Pedal pulses intact and symmetrical.  CMS intact.   Skin: no xanthelasma, warm.    Neurologic:  no tremors; gait steady   Psychiatric: alert and oriented x3, calm                                                       Negative unless noted in HPI     Medical History  Surgical History Family History Social History   Past Medical History:   Diagnosis Date    Abnormal ECG     Anemia     Anxiety     Arrhythmia     Atrial fibrillation (H) 10/09/2017    Cancer (H) 2015    CPAP (continuous positive airway pressure) dependence 10/2019    per patient    Depression     Heart valve disease 2017    History of blood clots     Hyperlipidemia 2006    Pulmonary embolism, blood-clot, obstetric 10/09/2017    Sleep apnea 2017    Uses CPAP    Past Surgical History:   Procedure Laterality Date    BACK SURGERY      CERVICAL FUSION  2001    HERNIA REPAIR  2005    [place] LAP,INGUINAL HERNIA REPR,INITIAL Left 8/15/2019    Procedure: HERNIORRHAPHY, INGUINAL, LAPAROSCOPIC;  Surgeon: Yan Leiva MD;  Location: [facility];  Service: General    TONSILLECTOMY      childhood    Family History   Problem Relation Age of Onset    Heart Disease Mother     Hypertension Mother     Dementia Mother     Coronary Artery Disease Father     Social History     Socioeconomic History    Marital status:      Spouse name: Not on file    Number of children: Not on file    Years of education: Not on file    Highest education level: Not on file   Occupational History    Not on file   Tobacco Use    Smoking status: Former     Types: Cigarettes    Smokeless tobacco: Never   Vaping Use    Vaping Use: Never used   Substance and Sexual Activity    Alcohol use: Yes    Drug use: No    Sexual activity: Not on file   Other Topics Concern    Not on file   Social History Narrative    Not on file     Social Determinants of Health     Financial Resource Strain: High Risk (11/20/2023)    Financial Resource Strain     Within the past 12 months, have you or your family members you live with been unable to get utilities (heat, electricity) when it was really needed?: Yes   Food Insecurity: Low Risk  (11/20/2023)    Food Insecurity      Within the past 12 months, did you worry that your food would run out before you got money to buy more?: No     Within the past 12 months, did the food you bought just not last and you didn t have money to get more?: No   Transportation Needs: Low Risk  (11/20/2023)    Transportation Needs     Within the past 12 months, has lack of transportation kept you from medical appointments, getting your medicines, non-medical meetings or appointments, work, or from getting things that you need?: No   Physical Activity: Not on file   Stress: Not on file   Social Connections: Not on file   Interpersonal Safety: Not on file   Housing Stability: Low Risk  (11/20/2023)    Housing Stability     Do you have housing? : Yes     Are you worried about losing your housing?: No          Medications  Allergies   Current Outpatient Medications   Medication Sig Dispense Refill    escitalopram (LEXAPRO) 20 MG tablet Take 1 tablet (20 mg) by mouth daily 90 tablet 3    MULTIVITAMIN (MULTIPLE VITAMIN ORAL) [MULTIVITAMIN (MULTIPLE VITAMIN ORAL)] Take 1 tablet by mouth daily.      tiZANidine (ZANAFLEX) 2 MG tablet Take 2 mg by mouth 3 times daily as needed (low back)      warfarin ANTICOAGULANT (COUMADIN) 7.5 MG tablet Take 3.75 mg every Tuesday, Take 7.5 mg all other days. Or as directed. 90 tablet 1    No Known Allergies      Lab Results    Chemistry/lipid CBC Cardiac Enzymes/BNP/TSH/INR   Lab Results   Component Value Date    CHOL 225 (H) 04/11/2023    HDL 75 04/11/2023    TRIG 71 04/11/2023    BUN 13.3 11/21/2023     11/21/2023    CO2 24 11/21/2023    Lab Results   Component Value Date    WBC 6.1 11/21/2023    HGB 13.3 11/21/2023    HCT 39.7 (L) 11/21/2023    MCV 92 11/21/2023     11/21/2023    Lab Results   Component Value Date    TSH 1.77 11/21/2023    INR 2.9 (H) 11/07/2023        48  minutes spent on the date of encounter doing chart review, review of test results, interpretation with above tests, patient visit,  documentation, and discussion with family.        This note has been dictated using voice recognition software. Any grammatical, typographical, or context distortions are unintentional and inherent to the software

## 2023-11-28 ENCOUNTER — OFFICE VISIT (OUTPATIENT)
Dept: CARDIOLOGY | Facility: CLINIC | Age: 73
End: 2023-11-28
Payer: COMMERCIAL

## 2023-11-28 VITALS
SYSTOLIC BLOOD PRESSURE: 138 MMHG | HEART RATE: 60 BPM | DIASTOLIC BLOOD PRESSURE: 93 MMHG | WEIGHT: 241 LBS | BODY MASS INDEX: 30.94 KG/M2 | OXYGEN SATURATION: 99 % | RESPIRATION RATE: 20 BRPM

## 2023-11-28 DIAGNOSIS — R06.09 DOE (DYSPNEA ON EXERTION): ICD-10-CM

## 2023-11-28 DIAGNOSIS — R42 LIGHTHEADEDNESS: ICD-10-CM

## 2023-11-28 DIAGNOSIS — R42 DIZZINESS: Primary | ICD-10-CM

## 2023-11-28 DIAGNOSIS — E78.00 PURE HYPERCHOLESTEROLEMIA: ICD-10-CM

## 2023-11-28 DIAGNOSIS — I48.21 PERMANENT ATRIAL FIBRILLATION (H): ICD-10-CM

## 2023-11-28 DIAGNOSIS — R94.31 ABNORMAL ELECTROCARDIOGRAM: ICD-10-CM

## 2023-11-28 DIAGNOSIS — R53.83 OTHER FATIGUE: ICD-10-CM

## 2023-11-28 LAB — NT-PROBNP SERPL-MCNC: 714 PG/ML (ref 0–900)

## 2023-11-28 PROCEDURE — 36415 COLL VENOUS BLD VENIPUNCTURE: CPT | Performed by: NURSE PRACTITIONER

## 2023-11-28 PROCEDURE — 99215 OFFICE O/P EST HI 40 MIN: CPT | Performed by: NURSE PRACTITIONER

## 2023-11-28 PROCEDURE — 83880 ASSAY OF NATRIURETIC PEPTIDE: CPT | Performed by: NURSE PRACTITIONER

## 2023-11-28 NOTE — PATIENT INSTRUCTIONS
Prem Taylor,    It was a pleasure to see you today at the [facility].     My recommendations after this visit include:    - No medications changes made today    -We will follow-up with lab result    -Schedule 2 weeks MCOT    -Schedule exercise stress echo    - Follow up with Dr. Velez or [facility] in 3-4 weeks or once MCOT and stress test is completed    -Please seek immediate medical attention if you develop chest pain, shortness of breath at rest, worsening lightheadedness, dizziness or fainting    - Please call [phone], if you have any questions or concerns    Gurwinder Brink, CNP

## 2023-11-28 NOTE — LETTER
11/28/2023    Mamadou Baez MD  [address]  [facility] [address]    RE: Prem TERRIE Taylor       Dear Colleague,     I had the pleasure of seeing Prem Taylor in the [facility].          Assessment/Recommendations   Assessment:    1.  Dyspnea on exertion/progressive fatigue/dizziness/abnormal EKG: Patient reports increased dyspnea on exertion walking up incline, progressive fatigue, and increased frequency dizziness and lightheadedness in the last 1 year.    Denies chest pain, PND orthopnea.    Reports unintentional 8 to 10 pounds weight gain in the last 3 months.    EKG on 11/21/23 showed atrial fibrillation with right bundle branch block with inferior infarct.  Previous EKG in April 2022 showed a possible inferior infarct.  Reviewed most recent CMP, CBC and TSH-unremarkable    2.  Permanent atrial fibrillation: On warfarin for stroke prophylaxis.  Most recent INR therapeutic.  Heart rate stable in clinic.  Denies heart palpitation or heart racing except reports increased dyspnea on exertion walking up the stairs, progressive fatigue, and increased frequency in dizziness and lightheadedness.  Dizziness occurs more with position change.    4.  Elevated blood pressure with a history of hypertension: Blood pressure elevated in the last couple months.  No history of hypertension.  Not on antihypertensive medication.    5.  Hypercholesterolemia:  total cholesterol is 225, triglycerides 71, and  in April 2023.  Not on statin therapy.  On diet control.  Works out for 1 and half hour 2-3 times a week at [employer] mainly walking on a treadmill.     6. Obstructive sleep apnea: Using CPAP nightly    Plan/Recommendation:  -Schedule 2 weeks MCOT to evaluate for any heart rhythm disorders  -NT proBNP pending  -Schedule exercise stress echo  -We discussed about starting antihypertensive medication.  Patient would like to hold off until he completes his heart monitor and stress echo  -Patient was  encouraged to seek medical attention if double chest pain, shortness of breath or syncope    Follow up with Dr. Velez or with rapid access clinic in 3 to 4 weeks or after above tests is completed      History of Present Illness/Subjective    Mr. Prem Taylor is a 73 year old male with a past medical history of eminent atrial fibrillation, pulmonary embolism bilaterally, hypercholesterolemia, COPD, obesity, BPH, low back pain, and recent concern with increased fatigue, dyspnea on exertion, and increased frequency and dizziness and lightheadedness over the past 1 year who is seen at [facility] for post hospitalization follow up per patient's request.    Today, Ryan is here accompanied by his spouse.  He states he could not get appointment to see Dr. Velez and therefore came to see me today.  He has noticed increased fatigue, dyspnea on exertion going up a flight of stairs, increase in the lightheadedness and dizziness since he saw Dr. Velez last year.  Dizziness occurs more with the position change.  He reports inadequate fluid intake.  He was seen by PCP team recently and noted abnormal EKG with inferior infarct.  He he was recommended to follow-up with cardiology.    He reports 8 to 10 pounds unintentional weight gain in the last 3 months.  He denies shortness of breath, orthopnea, PND, palpitations, chest pain, abdominal fullness/bloating, and lower extremity edema.  He denies any bleeding complications.    24 hours Holter study in June 2021-reviewed  Conclusion  [facility]   HOLTER REPORT   Results:    Indication for study: Evaluate for atrial fibrillation    A 24-hour Holter monitor was applied June 14, 2021 with date of interpretation June 16, 2021    Atrial fibrillation with right bundle branch block pattern is seen throughout the recording    Average ventricular rate is 72 bpm with heart rate range between  bpm    Modest pauses noted- longest pause of  2.6 seconds; but no pauses in excess of 3 seconds    Otherwise no major bradycardia identified    Infrequent ventricular ectopy 493 PVCs which is 0.5% burden ; one triplet at a modest rate otherwise no complexly identified    Impression    Persistent atrial fibrillation with controlled ventricular response    Patient activity diary was reviewed;  he was very active during the day and had no symptoms      Comment: The recording was for 23 hours and 59 minutes.  The recording quality was adequate.    ECHO from 2019-Reviewed:   Narrative & Impression    When compared to the previous study dated 11/15/2017, no significant change.    Left ventricle ejection fraction is normal. The calculated left ventricular ejection fraction is 54%. Mild concentric hypertrophy noted.    Normal left ventricular size and systolic function.    Normal right ventricular size and systolic function.    Mild to moderate mitral regurgitation.       Physical Examination Review of Systems   BP (!) 138/93 (BP Location: Right arm, Patient Position: Sitting, Cuff Size: Adult Large)   Pulse 60   Resp 20   Wt 109.3 kg (241 lb)   SpO2 99%   BMI 30.94 kg/m    Body mass index is 30.94 kg/m .  Wt Readings from Last 3 Encounters:   11/28/23 109.3 kg (241 lb)   11/21/23 108.5 kg (239 lb 2 oz)   09/15/23 107 kg (236 lb)     General Appearance:   no distress, normal body habitus   ENT/Mouth: membranes moist, no oral lesions or bleeding gums.      EYES:  no scleral icterus, normal conjunctivae   Neck: no carotid bruits or thyromegaly   Chest/Lungs:   lungs are clear to auscultation, no rales or wheezing, equal chest wall expansion    Cardiovascular:   Irregularly irregular. Normal first and second heart sounds with no murmurs, rubs, or gallops; the carotid, radial and posterior tibial pulses are intact, Jugular venous pressure flat with no edema bilaterally    Abdomen:  no organomegaly, masses, bruits, or tenderness; bowel sounds are present    Extremities   no cyanosis or clubbing   Radial pulses and Pedal pulses intact and symmetrical.  CMS intact.   Skin: no xanthelasma, warm.    Neurologic:  no tremors; gait steady   Psychiatric: alert and oriented x3, calm                                                      Negative unless noted in HPI     Medical History  Surgical History Family History Social History   Past Medical History:   Diagnosis Date    Abnormal ECG     Anemia     Anxiety     Arrhythmia     Atrial fibrillation (H) 10/09/2017    Cancer (H) 2015    CPAP (continuous positive airway pressure) dependence 10/2019    per patient    Depression     Heart valve disease 2017    History of blood clots     Hyperlipidemia 2006    Pulmonary embolism, blood-clot, obstetric 10/09/2017    Sleep apnea 2017    Uses CPAP    Past Surgical History:   Procedure Laterality Date    BACK SURGERY      CERVICAL FUSION  2001    HERNIA REPAIR  2005    [place] LAP,INGUINAL HERNIA REPR,INITIAL Left 8/15/2019    Procedure: HERNIORRHAPHY, INGUINAL, LAPAROSCOPIC;  Surgeon: Yan Leiva MD;  Location: [facility];  Service: General    TONSILLECTOMY      childhood    Family History   Problem Relation Age of Onset    Heart Disease Mother     Hypertension Mother     Dementia Mother     Coronary Artery Disease Father     Social History     Socioeconomic History    Marital status:      Spouse name: Not on file    Number of children: Not on file    Years of education: Not on file    Highest education level: Not on file   Occupational History    Not on file   Tobacco Use    Smoking status: Former     Types: Cigarettes    Smokeless tobacco: Never   Vaping Use    Vaping Use: Never used   Substance and Sexual Activity    Alcohol use: Yes    Drug use: No    Sexual activity: Not on file   Other Topics Concern    Not on file   Social History Narrative    Not on file     Social Determinants of Health     Financial Resource Strain: High Risk (11/20/2023)    Financial Resource  Strain     Within the past 12 months, have you or your family members you live with been unable to get utilities (heat, electricity) when it was really needed?: Yes   Food Insecurity: Low Risk  (11/20/2023)    Food Insecurity     Within the past 12 months, did you worry that your food would run out before you got money to buy more?: No     Within the past 12 months, did the food you bought just not last and you didn t have money to get more?: No   Transportation Needs: Low Risk  (11/20/2023)    Transportation Needs     Within the past 12 months, has lack of transportation kept you from medical appointments, getting your medicines, non-medical meetings or appointments, work, or from getting things that you need?: No   Physical Activity: Not on file   Stress: Not on file   Social Connections: Not on file   Interpersonal Safety: Not on file   Housing Stability: Low Risk  (11/20/2023)    Housing Stability     Do you have housing? : Yes     Are you worried about losing your housing?: No          Medications  Allergies   Current Outpatient Medications   Medication Sig Dispense Refill    escitalopram (LEXAPRO) 20 MG tablet Take 1 tablet (20 mg) by mouth daily 90 tablet 3    MULTIVITAMIN (MULTIPLE VITAMIN ORAL) [MULTIVITAMIN (MULTIPLE VITAMIN ORAL)] Take 1 tablet by mouth daily.      tiZANidine (ZANAFLEX) 2 MG tablet Take 2 mg by mouth 3 times daily as needed (low back)      warfarin ANTICOAGULANT (COUMADIN) 7.5 MG tablet Take 3.75 mg every Tuesday, Take 7.5 mg all other days. Or as directed. 90 tablet 1    No Known Allergies      Lab Results    Chemistry/lipid CBC Cardiac Enzymes/BNP/TSH/INR   Lab Results   Component Value Date    CHOL 225 (H) 04/11/2023    HDL 75 04/11/2023    TRIG 71 04/11/2023    BUN 13.3 11/21/2023     11/21/2023    CO2 24 11/21/2023    Lab Results   Component Value Date    WBC 6.1 11/21/2023    HGB 13.3 11/21/2023    HCT 39.7 (L) 11/21/2023    MCV 92 11/21/2023     11/21/2023    Lab  Results   Component Value Date    TSH 1.77 11/21/2023    INR 2.9 (H) 11/07/2023        48  minutes spent on the date of encounter doing chart review, review of test results, interpretation with above tests, patient visit, documentation, and discussion with family.        This note has been dictated using voice recognition software. Any grammatical, typographical, or context distortions are unintentional and inherent to the software           Thank you for allowing me to participate in the care of your patient.      Sincerely,     RAY Anderson [facility]  cc:   No referring provider defined for this encounter.

## 2023-11-29 ENCOUNTER — HOSPITAL ENCOUNTER (OUTPATIENT)
Dept: CARDIOLOGY | Facility: HOSPITAL | Age: 73
Discharge: HOME | End: 2023-11-29
Attending: NURSE PRACTITIONER
Payer: COMMERCIAL

## 2023-11-29 DIAGNOSIS — R42 DIZZINESS: ICD-10-CM

## 2023-11-29 DIAGNOSIS — I48.21 PERMANENT ATRIAL FIBRILLATION (H): ICD-10-CM

## 2023-11-29 DIAGNOSIS — R42 LIGHTHEADEDNESS: ICD-10-CM

## 2023-11-29 PROCEDURE — 999N000096 CARDIAC MOBILE TELEMETRY MONITOR

## 2023-11-30 ENCOUNTER — TELEPHONE (OUTPATIENT)
Dept: FAMILY MEDICINE | Facility: CLINIC | Age: 73
End: 2023-11-30
Payer: COMMERCIAL

## 2023-11-30 NOTE — TELEPHONE ENCOUNTER
Reason for call:  Other     Patient called regarding (reason for call): call back    Additional comments: Patient is calling and stating that the Tizanidine is not working for the pain and patient is having trouble sleeping due to pain. Please advise and call patient back if needed more information please and thank you.    Phone number to reach patient:  Cell number on file:    Telephone Information:   Mobile [phone]       Best Time:  any    Can we leave a detailed message on this number?  YES

## 2023-12-01 NOTE — TELEPHONE ENCOUNTER
Patient is being seen at pain clinic on 12/5. Pt will wait until that appointment to address concerns and discuss other treatment options.

## 2023-12-01 NOTE — TELEPHONE ENCOUNTER
"Per PCP,  \"Schedule a follow-up office visit with any available provider in order to further evaluate and determine additional treatment options.    Mamadou Baez MD\"     If patient returns call please relay recommendations per PCP and assist with scheduling an appointment.    HILDA Suarez, RN  [facility]    "

## 2023-12-05 ENCOUNTER — LAB (OUTPATIENT)
Dept: LAB | Facility: CLINIC | Age: 73
End: 2023-12-05
Payer: COMMERCIAL

## 2023-12-05 ENCOUNTER — ANTICOAGULATION THERAPY VISIT (OUTPATIENT)
Dept: ANTICOAGULATION | Facility: CLINIC | Age: 73
End: 2023-12-05

## 2023-12-05 DIAGNOSIS — Z86.711 HISTORY OF PULMONARY EMBOLISM: ICD-10-CM

## 2023-12-05 DIAGNOSIS — I48.21 PERMANENT ATRIAL FIBRILLATION (H): Primary | ICD-10-CM

## 2023-12-05 DIAGNOSIS — I26.99 PULMONARY EMBOLISM, BILATERAL (H): ICD-10-CM

## 2023-12-05 DIAGNOSIS — I48.21 PERMANENT ATRIAL FIBRILLATION (H): ICD-10-CM

## 2023-12-05 LAB — INR BLD: 3.3 (ref 0.9–1.1)

## 2023-12-05 PROCEDURE — 85610 PROTHROMBIN TIME: CPT

## 2023-12-05 PROCEDURE — 36416 COLLJ CAPILLARY BLOOD SPEC: CPT

## 2023-12-05 NOTE — PROGRESS NOTES
ANTICOAGULATION MANAGEMENT     Prem Taylor 73 year old male is on warfarin with supratherapeutic INR result. (Goal INR 2.0-3.0)    Recent labs: (last 7 days)     12/05/23  0715   INR 3.3*       ASSESSMENT     Warfarin Lab Questionnaire    Warfarin Doses Last 7 Days      12/4/2023    11:29 AM   Dose in Tablet or Mg   TAB or MG? milligram (mg)     Pt Rptd Dose SUNDAY MONDAY TUESDAY WED THURS FRIDAY SATURDAY 12/4/2023  11:29 AM 7.5 7.5 3.75 7.5 7.5 7.5 7.5         12/4/2023   Warfarin Lab Questionnaire   Missed doses within past 14 days? No   Changes in diet or alcohol within past 14 days? No   Medication changes since last result? No   Injuries or illness since last result? No   New shortness of breath, severe headaches or sudden changes in vision since last result? No   Abnormal bleeding since last result? No   Upcoming surgery, procedure? No   Best number to call with results? [phone]     Previous result: Therapeutic last 2(+) visits  Additional findings:  12/5 pain clinic appointment, he will let us know if different pain meds are started        PLAN     Recommended plan for no diet, medication or health factor changes affecting INR     Dosing Instructions: Continue your current warfarin dose with next INR in 2 weeks       Summary  As of 12/5/2023      Full warfarin instructions:  3.75 mg every Tue; 7.5 mg all other days   Next INR check:  12/19/2023               Telephone call with Ryan who verbalizes understanding and agrees to plan.  Talked about decreasing dosing next time if needed.      Lab visit scheduled    Education provided:   Goal range and lab monitoring: goal range and significance of current result  Contact [phone] with any changes, questions or concerns.     Plan made per [facility] anticoagulation protocol    Yesenia Vázquez, RN  Anticoagulation Clinic  12/5/2023    _______________________________________________________________________     Anticoagulation Episode Summary       Current  INR goal:  2.0-3.0   TTR:  62.3% (1 y)   Target end date:  Indefinite   Send INR reminders to:  RUBY MAURICIO    Indications    Pulmonary embolism  bilateral (H) (Resolved) [I26.99]  Permanent atrial fibrillation (H) [I48.21]  Pulmonary embolism  bilateral (H) [I26.99]  History of pulmonary embolism [Z86.711]             Comments:  Approved for 8 week checks per -- see encounter from 07/27/21             Anticoagulation Care Providers       Provider Role Specialty Phone number    Mamadou Baez MD Referring Family Medicine [phone]

## 2023-12-12 ENCOUNTER — HOSPITAL ENCOUNTER (OUTPATIENT)
Dept: CARDIOLOGY | Facility: HOSPITAL | Age: 73
Discharge: HOME | End: 2023-12-12
Attending: NURSE PRACTITIONER | Admitting: NURSE PRACTITIONER
Payer: COMMERCIAL

## 2023-12-12 DIAGNOSIS — R53.83 OTHER FATIGUE: ICD-10-CM

## 2023-12-12 DIAGNOSIS — R42 LIGHTHEADEDNESS: ICD-10-CM

## 2023-12-12 DIAGNOSIS — R06.09 DOE (DYSPNEA ON EXERTION): ICD-10-CM

## 2023-12-12 DIAGNOSIS — R42 DIZZINESS: ICD-10-CM

## 2023-12-12 PROCEDURE — 93018 CV STRESS TEST I&R ONLY: CPT | Performed by: INTERNAL MEDICINE

## 2023-12-12 PROCEDURE — 93321 DOPPLER ECHO F-UP/LMTD STD: CPT | Mod: 26 | Performed by: INTERNAL MEDICINE

## 2023-12-12 PROCEDURE — 93350 STRESS TTE ONLY: CPT | Mod: 26 | Performed by: INTERNAL MEDICINE

## 2023-12-12 PROCEDURE — 93325 DOPPLER ECHO COLOR FLOW MAPG: CPT | Mod: 26 | Performed by: INTERNAL MEDICINE

## 2023-12-12 PROCEDURE — 93321 DOPPLER ECHO F-UP/LMTD STD: CPT | Mod: TC

## 2023-12-12 PROCEDURE — 93350 STRESS TTE ONLY: CPT | Mod: TC

## 2023-12-12 PROCEDURE — 93016 CV STRESS TEST SUPVJ ONLY: CPT | Performed by: INTERNAL MEDICINE

## 2023-12-18 ENCOUNTER — TELEPHONE (OUTPATIENT)
Dept: CARDIOLOGY | Facility: CLINIC | Age: 73
End: 2023-12-18
Payer: COMMERCIAL

## 2023-12-18 DIAGNOSIS — R06.09 DOE (DYSPNEA ON EXERTION): ICD-10-CM

## 2023-12-18 DIAGNOSIS — I48.91 ATRIAL FIBRILLATION, UNSPECIFIED TYPE (H): Primary | ICD-10-CM

## 2023-12-18 DIAGNOSIS — R53.83 OTHER FATIGUE: Primary | ICD-10-CM

## 2023-12-18 DIAGNOSIS — R00.2 HEART PALPITATIONS: ICD-10-CM

## 2023-12-18 DIAGNOSIS — R42 DIZZINESS: ICD-10-CM

## 2023-12-18 DIAGNOSIS — R94.31 ABNORMAL ELECTROCARDIOGRAM: ICD-10-CM

## 2023-12-18 DIAGNOSIS — R94.39 ABNORMAL STRESS ECHO: ICD-10-CM

## 2023-12-18 RX ORDER — METOPROLOL SUCCINATE 25 MG/1
12.5 TABLET, EXTENDED RELEASE ORAL DAILY
Qty: 45 TABLET | Refills: 3 | Status: SHIPPED | OUTPATIENT
Start: 2023-12-18 | End: 2024-01-10

## 2023-12-18 NOTE — TELEPHONE ENCOUNTER
Patient called into my direct line. Recommendations reviewed with patient. Understanding verbalized and patient is agreeable with plan. Rx for metoprolol sent to patient's preferred pharmacy and instructed patient on symptoms to watch for and when to call the clinic. Await CCTA to be scheduled.

## 2023-12-18 NOTE — TELEPHONE ENCOUNTER
This encounter is being sent to inform the clinic that this patient has a referral from Gurwinder for the diagnoses of Permanent atrial fibrillation and has requested that this patient be seen within Clinic and/or with [facility].  Based on the availability of our provider(s), we are unable to accommodate this request.    Were all sites offered this patient?  Yes with all providers     Does scheduling algorithm request to schedule next available? Yes                   Next available is 1/2/2024, pt leaving for the winter on 12/30/2023  No appointment has been scheduled, please call the patient to let him know what he should do from here and patient waiting to hear from  for CT coronary Angiogram.    Thank you!  [facility]

## 2023-12-18 NOTE — TELEPHONE ENCOUNTER
Left detailed message with recommendations and my direct number to call and confirm preferred pharmacy to send Rx to.  -----------------------------------------  Gurwinder Brink APRN CNP Cliffe, Charles M, MD  Cc: Johana Boyle, RN  Thank you Dr. Velez!    Marquise Vaughn,  Could you please call pt with recommendation from Dr. Velez?  I placed the CT Cor Angio.  Thank you!  CY          Previous Messages       ----- Message -----  From: Gerardo Velez MD  Sent: 12/18/2023  10:54 AM CST  To: RAY Anderson CNP    Thanks.  Stress echo: His exercise capacity is decreased(<6 min), and he didn't hit 85% of his predicted max HR(83%). In my mind this is a nondiagnostic test.  Monitor shows fairly controlled rhythm with no clear correlation of HR to symptoms?   I wonder if he is a) deconditioned-did he stop exercise? Or b) has undetected CAD  12.5 mg metoprolol sucinate is reasonable, and I'd like to check a coronary CT angiogram to exclude obstructive CAD. Encourage exercise.  Could you please order? cmc    ----- Message -----  From: Gurwinder Brink APRN CNP  Sent: 12/15/2023   4:48 PM CST  To: Gerardo Velez MD    Hi Dr. Velez,  Saw pt for follow up couple weeks since he couldn't get appt to see you.  He reported increase shortness of breath, fatigue, and heart palpitation.  His stress echo result appears stable to me.  His cardiac monitor shows A-fib with no significant pauses or PVCs.  Compared to previous Holter study, his average heart rates in 70s and his symptoms seem to be more correlated to heart rate between 61-130s. Questioning if having intolerance with RVR.  He was on Metroprolol tartrate 12.5 mg twice a day in the past and was discontinued due to slow ventricular response.  Should I start him metoprolol succinate 12.5 mg once a day and see how he responds and uptitrate as tolerate?  Would appreciate your input.  Thank you!  PEPPER

## 2023-12-19 ENCOUNTER — ANTICOAGULATION THERAPY VISIT (OUTPATIENT)
Dept: ANTICOAGULATION | Facility: CLINIC | Age: 73
End: 2023-12-19

## 2023-12-19 ENCOUNTER — LAB (OUTPATIENT)
Dept: LAB | Facility: CLINIC | Age: 73
End: 2023-12-19
Payer: COMMERCIAL

## 2023-12-19 DIAGNOSIS — I26.99 PULMONARY EMBOLISM, BILATERAL (H): ICD-10-CM

## 2023-12-19 DIAGNOSIS — Z86.711 HISTORY OF PULMONARY EMBOLISM: ICD-10-CM

## 2023-12-19 DIAGNOSIS — I48.21 PERMANENT ATRIAL FIBRILLATION (H): ICD-10-CM

## 2023-12-19 DIAGNOSIS — I48.21 PERMANENT ATRIAL FIBRILLATION (H): Primary | ICD-10-CM

## 2023-12-19 LAB — INR BLD: 2.6 (ref 0.9–1.1)

## 2023-12-19 PROCEDURE — 85610 PROTHROMBIN TIME: CPT

## 2023-12-19 PROCEDURE — 36416 COLLJ CAPILLARY BLOOD SPEC: CPT

## 2023-12-19 NOTE — PROGRESS NOTES
ANTICOAGULATION MANAGEMENT     Prem Taylor 73 year old male is on warfarin with therapeutic INR result. (Goal INR 2.0-3.0)    Recent labs: (last 7 days)     12/19/23  0715   INR 2.6*       ASSESSMENT     Warfarin Lab Questionnaire    Warfarin Doses Last 7 Days      12/18/2023    11:52 AM   Dose in Tablet or Mg   TAB or MG? milligram (mg)     Pt Rptd Dose SUNDAY MONDAY TUESDAY WED THURS FRIDAY SATURDAY 12/18/2023  11:52 AM 7.5 7.5 3.75 7.5 7.5 7.5 7.5         12/18/2023   Warfarin Lab Questionnaire   Missed doses within past 14 days? No   Changes in diet or alcohol within past 14 days? No   Medication changes since last result? Yes   Please list: ULTRAM 50mg Tablets. For lower back pain. Since 12/6 as needed. Metoprolol also started yesterday per chart.    Injuries or illness since last result? No-atrial fib and will be seeing [facility] clinic and having at [facility].    New shortness of breath, severe headaches or sudden changes in vision since last result? No   Abnormal bleeding since last result? No   Upcoming surgery, procedure? No   Best number to call with results? [phone]     Previous result: Supratherapeutic  Additional findings: None       PLAN     Recommended plan for ongoing change(s) affecting INR     Dosing Instructions: Continue your current warfarin dose with next INR in 2-3 weeks       Summary  As of 12/19/2023      Full warfarin instructions:  3.75 mg every Tue; 7.5 mg all other days   Next INR check:  1/9/2024               Detailed voice message left for Ryan with dosing instructions and follow up date.     Contact [phone] to schedule and with any changes, questions or concerns.     Education provided:   Please call back if any changes to your diet, medications or how you've been taking warfarin    Plan made per [facility] anticoagulation protocol    Mee Maguire, RN  Anticoagulation Clinic  12/19/2023    _______________________________________________________________________      Anticoagulation Episode Summary       Current INR goal:  2.0-3.0   TTR:  64.5% (1 y)   Target end date:  Indefinite   Send INR reminders to:  RUBY MAURICIO    Indications    Pulmonary embolism  bilateral (H) (Resolved) [I26.99]  Permanent atrial fibrillation (H) [I48.21]  Pulmonary embolism  bilateral (H) [I26.99]  History of pulmonary embolism [Z86.711]             Comments:  Approved for 8 week checks per -- see encounter from 07/27/21             Anticoagulation Care Providers       Provider Role Specialty Phone number    Mamadou Baez MD Referring Family Medicine [phone]

## 2023-12-19 NOTE — PROGRESS NOTES
ANTICOAGULATION  MANAGEMENT- Travel planning    Prem Taylor reports upcoming travel plans to [facility]..    Departure date: 12/30/23  Anticipated return date: 4/1/23  Alternate contact information (if applicable): n/a      INR monitoring plan:     [facility] to monitor and dose while traveling. INR standing order faxed to: [phone]. Faxed results to be routed to [facility] in [facility]. Phone number [phone].    Anticoagulation calendar updated with date of next INR     Instructed to call the Anticoauglation Clinic for any changes, questions or concerns. [phone]  ?   Mee Maguire RN

## 2023-12-20 NOTE — TELEPHONE ENCOUNTER
Gurwinder Brink, APRN CNP  Jeanette Walden T2 days ago     CY  Sounds reasonable.  Thank you!  Jeanette Allen T  You; Gurwinder Brink, RAY CNP2 days ago     HM  PATIENT WOULD LIKE TO HOLD OFF SCHEDULING THE CLINIC APPT UNTIL AFTER THE CTA IS DONE, PLEASE ADVISE, THANK YOU, Alena Nieves  [facility] Scheduling Registration Pool - Lhe2 days ago     AK  Please off pt next available EP RAC or RAC.  Thank you!

## 2023-12-27 ENCOUNTER — HOSPITAL ENCOUNTER (OUTPATIENT)
Dept: CARDIOLOGY | Facility: CLINIC | Age: 73
Discharge: HOME | End: 2023-12-27
Attending: NURSE PRACTITIONER | Admitting: NURSE PRACTITIONER
Payer: COMMERCIAL

## 2023-12-27 VITALS — SYSTOLIC BLOOD PRESSURE: 156 MMHG | DIASTOLIC BLOOD PRESSURE: 104 MMHG | HEART RATE: 63 BPM

## 2023-12-27 DIAGNOSIS — R53.83 OTHER FATIGUE: ICD-10-CM

## 2023-12-27 DIAGNOSIS — R42 DIZZINESS: ICD-10-CM

## 2023-12-27 DIAGNOSIS — R94.31 ABNORMAL ELECTROCARDIOGRAM: ICD-10-CM

## 2023-12-27 DIAGNOSIS — R94.39 ABNORMAL STRESS ECHO: ICD-10-CM

## 2023-12-27 DIAGNOSIS — R06.09 DOE (DYSPNEA ON EXERTION): ICD-10-CM

## 2023-12-27 DIAGNOSIS — R00.2 HEART PALPITATIONS: ICD-10-CM

## 2023-12-27 LAB
CREAT BLD-MCNC: 0.8 MG/DL (ref 0.7–1.3)
EGFRCR SERPLBLD CKD-EPI 2021: >60 ML/MIN/1.73M2

## 2023-12-27 PROCEDURE — 250N000013 HC RX MED GY IP 250 OP 250 PS 637: Performed by: INTERNAL MEDICINE

## 2023-12-27 PROCEDURE — 75574 CT ANGIO HRT W/3D IMAGE: CPT | Mod: 26 | Performed by: INTERNAL MEDICINE

## 2023-12-27 PROCEDURE — 82565 ASSAY OF CREATININE: CPT

## 2023-12-27 PROCEDURE — 250N000011 HC RX IP 250 OP 636: Performed by: NURSE PRACTITIONER

## 2023-12-27 PROCEDURE — 75574 CT ANGIO HRT W/3D IMAGE: CPT

## 2023-12-27 RX ORDER — DILTIAZEM HCL 60 MG
120 TABLET ORAL
Status: DISCONTINUED | OUTPATIENT
Start: 2023-12-27 | End: 2023-12-28 | Stop reason: HOSPADM

## 2023-12-27 RX ORDER — IOPAMIDOL 755 MG/ML
500 INJECTION, SOLUTION INTRAVASCULAR ONCE
Status: COMPLETED | OUTPATIENT
Start: 2023-12-27 | End: 2023-12-27

## 2023-12-27 RX ORDER — DILTIAZEM HYDROCHLORIDE 5 MG/ML
10-15 INJECTION INTRAVENOUS
Status: DISCONTINUED | OUTPATIENT
Start: 2023-12-27 | End: 2023-12-28 | Stop reason: HOSPADM

## 2023-12-27 RX ORDER — IVABRADINE 5 MG/1
5-15 TABLET, FILM COATED ORAL
Status: DISCONTINUED | OUTPATIENT
Start: 2023-12-27 | End: 2023-12-28 | Stop reason: HOSPADM

## 2023-12-27 RX ORDER — METOPROLOL TARTRATE 25 MG/1
25-100 TABLET, FILM COATED ORAL
Status: COMPLETED | OUTPATIENT
Start: 2023-12-27 | End: 2023-12-27

## 2023-12-27 RX ORDER — ACYCLOVIR 200 MG/1
0-1 CAPSULE ORAL
Status: DISCONTINUED | OUTPATIENT
Start: 2023-12-27 | End: 2023-12-28 | Stop reason: HOSPADM

## 2023-12-27 RX ORDER — METHYLPREDNISOLONE SODIUM SUCCINATE 125 MG/2ML
125 INJECTION, POWDER, LYOPHILIZED, FOR SOLUTION INTRAMUSCULAR; INTRAVENOUS
Status: DISCONTINUED | OUTPATIENT
Start: 2023-12-27 | End: 2023-12-28 | Stop reason: HOSPADM

## 2023-12-27 RX ORDER — NITROGLYCERIN 0.4 MG/1
0.4 TABLET SUBLINGUAL
Status: DISCONTINUED | OUTPATIENT
Start: 2023-12-27 | End: 2023-12-28 | Stop reason: HOSPADM

## 2023-12-27 RX ORDER — DIPHENHYDRAMINE HCL 25 MG
25 CAPSULE ORAL
Status: DISCONTINUED | OUTPATIENT
Start: 2023-12-27 | End: 2023-12-28 | Stop reason: HOSPADM

## 2023-12-27 RX ORDER — DIPHENHYDRAMINE HYDROCHLORIDE 50 MG/ML
25-50 INJECTION INTRAMUSCULAR; INTRAVENOUS
Status: DISCONTINUED | OUTPATIENT
Start: 2023-12-27 | End: 2023-12-28 | Stop reason: HOSPADM

## 2023-12-27 RX ORDER — METOPROLOL TARTRATE 1 MG/ML
5-15 INJECTION, SOLUTION INTRAVENOUS
Status: DISCONTINUED | OUTPATIENT
Start: 2023-12-27 | End: 2023-12-28 | Stop reason: HOSPADM

## 2023-12-27 RX ORDER — ONDANSETRON 2 MG/ML
4 INJECTION INTRAMUSCULAR; INTRAVENOUS
Status: DISCONTINUED | OUTPATIENT
Start: 2023-12-27 | End: 2023-12-28 | Stop reason: HOSPADM

## 2023-12-27 RX ADMIN — IOPAMIDOL 120 ML: 755 INJECTION, SOLUTION INTRAVENOUS at 15:01

## 2023-12-27 RX ADMIN — METOPROLOL TARTRATE 50 MG: 50 TABLET, FILM COATED ORAL at 12:55

## 2023-12-27 RX ADMIN — NITROGLYCERIN 0.4 MG: 0.4 TABLET SUBLINGUAL at 14:54

## 2023-12-28 ENCOUNTER — TELEPHONE (OUTPATIENT)
Dept: CARDIOLOGY | Facility: CLINIC | Age: 73
End: 2023-12-28
Payer: COMMERCIAL

## 2023-12-28 NOTE — TELEPHONE ENCOUNTER
Noted 12-27-23 corCTA ordered by Gurwinder GAXIOLA CNP who forwarded results to Dr. Velez for review - response pending.    RAY Anderson CNP  12/27/2023  4:50 PM CST       Hi Dr. Velez,  Here is Mr. Taylor's CT Angio result.  Could you please review and let me know if we should set him up for cor angio with possible PCI.  Thank you!  CY       Patient will be contacted with results / recommendations once available - refer to corCTA results note for follow-up.  mg

## 2023-12-28 NOTE — TELEPHONE ENCOUNTER
[facility] Call Center    Phone Message    May a detailed message be left on voicemail: yes     Reason for Call: Other: Pt is requesting a callback ASAP to review the testing that has been completed over the last few weeks.  Pt stated that he is leaving for the winter on Saturday and he understood that they would all be reviewed with him before he left.     Action Taken: Other: cardio    Travel Screening: Not Applicable    Thank you!  [facility]

## 2024-01-01 DIAGNOSIS — R91.8 PULMONARY NODULES: Primary | ICD-10-CM

## 2024-01-02 ENCOUNTER — TELEPHONE (OUTPATIENT)
Dept: CARDIOLOGY | Facility: CLINIC | Age: 74
End: 2024-01-02
Payer: COMMERCIAL

## 2024-01-02 ENCOUNTER — PATIENT OUTREACH (OUTPATIENT)
Dept: ONCOLOGY | Facility: CLINIC | Age: 74
End: 2024-01-02
Payer: COMMERCIAL

## 2024-01-02 DIAGNOSIS — R91.8 PULMONARY NODULES: Primary | ICD-10-CM

## 2024-01-02 DIAGNOSIS — I25.10 CAD (CORONARY ARTERY DISEASE): Primary | ICD-10-CM

## 2024-01-02 RX ORDER — ATORVASTATIN CALCIUM 40 MG/1
40 TABLET, FILM COATED ORAL DAILY
Qty: 90 TABLET | Refills: 3 | Status: SHIPPED | OUTPATIENT
Start: 2024-01-02 | End: 2024-01-10

## 2024-01-02 NOTE — PROGRESS NOTES
New IP (Interventional Pulmonology) referral rec'd.  Chart reviewed.       New Patient: Interventional Pulmonary (Lung nodule) Nurse Navigator Note    Referring provider: [facility]    Referred to (specialty): Interventional Pulmonary (Lung nodule)    Requested provider (if applicable): n/a    Date Referral Received: 1/2/2024    Evaluation for :  Lung nodule--9 x 7 mm nodule left lower lobe    Clinical History (per Nurse review of records provided):    **BOOK MARKED**    RADIOLOGIST CONSULT FOR CARDIOLOGY  12/27/2023 3:50 PM      HISTORY: Other fatigue; LOERA (dyspnea on exertion); Dizziness; Heart  palpitations; Abnormal electrocardiogram; Abnormal stress echo.     COMPARISON: None.     TECHNIQUE: Volumetric helical acquisition of CT images of the chest  from the clavicles to the kidneys were acquired without IV contrast.  Radiation dose for this scan was reduced using automated exposure  control, adjustment of the mA and/or kV according to patient size, or  iterative reconstruction technique.                                                                      IMPRESSION: 9 x 7 mm nodule left lower lobe image 19 series 5.   Records Location: Psychiatric     Records Needed: none    Additional testing needed prior to consult: CT Chest and PFT's

## 2024-01-02 NOTE — PROGRESS NOTES
"See message from the PCP to the patient regarding patient's radiologist consult for cardiology:    \"Emeka Davis.  I have entered a referral order for patient to see a pulmonologist in order to review lung nodule finding and determine follow-up recommendations.  Lung nodules are relatively common findings.  I just want to ensure that the pulmonologist reviews your imaging result and ensure best long-term follow-up plan for you.\"    Above message was read by the patient on 1/1/24, so the writer will close this encounter. Patient has a consult with pulmonology today, 1/2/24.    Written by Mamadou Baez MD on 1/1/2024  5:36 PM CST View Past Comments  Seen by patient Ryan Taylor on 1/1/2024  5:46 PM    Josy Aguirre RN, BSN  [facility]  "

## 2024-01-02 NOTE — TELEPHONE ENCOUNTER
----- Message from RAY Vazquez CNP sent at 1/2/2024  9:58 AM CST -----  Gurwinder Vaughn is out of office and looks like Dr Velez would like patient to start statin. Will you please take care of this, thank you.    Rafaela  ----- Message -----  From: Gerardo Velez MD  Sent: 1/2/2024   9:53 AM CST  To: RAY Anderson CNP    On warfarin without ACS start statin only, like atorvastain 40 mg daily. F/up with me in 3 months.   ----- Message -----  From: Gurwinder Brink APRN CNP  Sent: 12/28/2023   8:59 PM CST  To: Johana Boyle, YOSEF; Gerardo Velez MD    Thank you Dr. Velez!  He is currently not on ASA or statin.  He is on warfarin therapy for afib.  Should I start him on ASA and statin both or statin only?  CY          ----- Message -----  From: Gerardo Velez MD  Sent: 12/28/2023   7:02 PM CST  To: RAY Anderson CNP    With moderately severe disease only noted and second diagonal, I would favor continued medical management at this time.  Otherwise only mild to moderate disease not likely to be amenable to invasive intervention.  Thank you. [facility]  ----- Message -----  From: Gurwinder Brink APRN CNP  Sent: 12/27/2023   4:50 PM CST  To: Gerardo Velez MD    Hi Dr. Velez,  Here is Mr. Taylor's CT Angio result.  Could you please review and let me know if we should set him up for cor angio with possible PCI.  Thank you!  PEPPER

## 2024-01-02 NOTE — TELEPHONE ENCOUNTER
PC to patient with results and recommendations. Patient varbalized understanding and is agreeable with chicho. He is in [place] at this time and will call back to my direct number with info for a local [facility] to send atorvastatin Rx to. Reviewed most common, possible side effects and when to call back. Understanding verbalized.

## 2024-01-04 ENCOUNTER — TELEPHONE (OUTPATIENT)
Dept: FAMILY MEDICINE | Facility: CLINIC | Age: 74
End: 2024-01-04
Payer: COMMERCIAL

## 2024-01-04 NOTE — TELEPHONE ENCOUNTER
Reason for call:  Other   Patient called regarding (reason for call): call back  Additional comments:   Seeking to speak with Mee    Phone number to reach patient:  Cell number on file:    Telephone Information:   Mobile [phone]       Best Time:  any    Can we leave a detailed message on this number?  YES    Travel screening: Not Applicable

## 2024-01-04 NOTE — TELEPHONE ENCOUNTER
Spoke with pt, the lab in [place] did not have the INR orders. Pt found that he had the wrong fax number. The correct number for orders is: Fax: [phone]. I have faxed the orders to the new number. Pt will call and check with them in the next couple days. He's due for INR next week.     Miladis Cook, FAISALN, RN- Coumadin Clinic RN

## 2024-01-08 ENCOUNTER — TRANSFERRED RECORDS (OUTPATIENT)
Dept: HEALTH INFORMATION MANAGEMENT | Facility: CLINIC | Age: 74
End: 2024-01-08
Payer: COMMERCIAL

## 2024-01-08 ENCOUNTER — TELEPHONE (OUTPATIENT)
Dept: FAMILY MEDICINE | Facility: CLINIC | Age: 74
End: 2024-01-08
Payer: COMMERCIAL

## 2024-01-08 LAB
INR (EXTERNAL): 2.06 (ref 0.8–1.2)
INR (EXTERNAL): 2.49 (ref 0.8–15.1)

## 2024-01-08 NOTE — TELEPHONE ENCOUNTER
Left message that POC means poking the finger to get the INR. Asked for a call back if they need a venous order.    Josy Moe RN

## 2024-01-08 NOTE — TELEPHONE ENCOUNTER
General Call      Reason for Call:  Speak to ACN    What are your questions or concerns:  they got an order faxed to them and [facility] needs to clarify what the Point of care means- he has to have this clarification before they can draw this    Date of last appointment with provider:     Could we send this information to you in [facility] or would you prefer to receive a phone call?:   Patient would prefer a phone call   Okay to leave a detailed message?: Yes at Other phone number:  Call [facility] at [phone]

## 2024-01-09 ENCOUNTER — ANTICOAGULATION THERAPY VISIT (OUTPATIENT)
Dept: ANTICOAGULATION | Facility: CLINIC | Age: 74
End: 2024-01-09
Payer: COMMERCIAL

## 2024-01-09 DIAGNOSIS — I48.21 PERMANENT ATRIAL FIBRILLATION (H): Primary | ICD-10-CM

## 2024-01-09 DIAGNOSIS — Z86.711 HISTORY OF PULMONARY EMBOLISM: ICD-10-CM

## 2024-01-09 DIAGNOSIS — I26.99 PULMONARY EMBOLISM, BILATERAL (H): ICD-10-CM

## 2024-01-09 NOTE — PROGRESS NOTES
This is a [place] patient / with outside lab in [place].   Joselyn Patton, RN  Anticoagulation Nurse - [facility] [place]

## 2024-01-09 NOTE — PROGRESS NOTES
ANTICOAGULATION MANAGEMENT     Prem Taylor 73 year old male is on warfarin with therapeutic INR result. (Goal INR 2.0-3.0)    Recent labs: (last 7 days)     01/08/24  0000   INR 2.06*       ASSESSMENT     Source(s): Chart Review and Patient/Caregiver Call     Warfarin doses taken: Warfarin taken as instructed  Diet: No new diet changes identified  Medication/supplement changes:  Atorvastatin started, no interaction  New illness, injury, or hospitalization: No  Signs or symptoms of bleeding or clotting: No  Previous result: Therapeutic last 2(+) visits  Additional findings:  In [place] for a few months. Lab has order and patient is all set up to continue INR's there.       PLAN     Recommended plan for no diet, medication or health factor changes affecting INR     Dosing Instructions: Continue your current warfarin dose with next INR in 4 weeks       Summary  As of 1/9/2024      Full warfarin instructions:  3.75 mg every Tue; 7.5 mg all other days   Next INR check:  2/5/2024               Telephone call with Ryan who verbalizes understanding and agrees to plan    Patient using outside facility for labs    Education provided:   Goal range and lab monitoring: goal range and significance of current result  Importance of notifying anticoagulation clinic for: changes in medications; a sooner lab recheck maybe needed  Contact [phone] with any changes, questions or concerns.     Plan made per [facility] anticoagulation protocol    Josy Moe RN  Anticoagulation Clinic  1/9/2024    _______________________________________________________________________     Anticoagulation Episode Summary       Current INR goal:  2.0-3.0   TTR:  68.6% (1 y)   Target end date:  Indefinite   Send INR reminders to:  RUBY MAURICIO    Indications    Pulmonary embolism  bilateral (H) (Resolved) [I26.99]  Permanent atrial fibrillation (H) [I48.21]  Pulmonary embolism  bilateral (H) [I26.99]  History of pulmonary embolism [Z86.711]              Comments:  Approved for 8 week checks per -- see encounter from 07/27/21             Anticoagulation Care Providers       Provider Role Specialty Phone number    Mamadou Baez MD Referring Family Medicine [phone]

## 2024-01-10 DIAGNOSIS — I48.91 ATRIAL FIBRILLATION, UNSPECIFIED TYPE (H): ICD-10-CM

## 2024-01-10 DIAGNOSIS — I48.19 PERSISTENT ATRIAL FIBRILLATION (H): ICD-10-CM

## 2024-01-10 DIAGNOSIS — I25.10 CAD (CORONARY ARTERY DISEASE): ICD-10-CM

## 2024-01-10 DIAGNOSIS — F41.9 ANXIETY: ICD-10-CM

## 2024-01-10 RX ORDER — WARFARIN SODIUM 7.5 MG/1
TABLET ORAL
Qty: 90 TABLET | Refills: 1 | Status: SHIPPED | OUTPATIENT
Start: 2024-01-10 | End: 2024-06-04

## 2024-01-10 RX ORDER — ESCITALOPRAM OXALATE 20 MG/1
20 TABLET ORAL DAILY
Qty: 90 TABLET | Refills: 3 | Status: SHIPPED | OUTPATIENT
Start: 2024-01-10 | End: 2024-04-01

## 2024-01-10 NOTE — TELEPHONE ENCOUNTER
Pending Prescriptions:                       Disp   Refills    warfarin ANTICOAGULANT (COUMADIN) 7.5 MG *90 tab*1            Sig: Take 3.75 mg every Tuesday, Take 7.5 mg all other           days. Or as directed.    escitalopram (LEXAPRO) 20 MG tablet       90 tab*3            Sig: Take 1 tablet (20 mg) by mouth daily

## 2024-01-10 NOTE — TELEPHONE ENCOUNTER
Pending Prescriptions:                       Disp   Refills    atorvastatin (LIPITOR) 40 MG tablet       90 tab*3            Sig: Take 1 tablet (40 mg) by mouth daily    metoprolol succinate ER (TOPROL XL) 25 MG*45 tab*3            Sig: Take 0.5 tablets (12.5 mg) by mouth daily

## 2024-01-11 DIAGNOSIS — R06.09 DOE (DYSPNEA ON EXERTION): ICD-10-CM

## 2024-01-11 DIAGNOSIS — I25.10 CORONARY ARTERY DISEASE DUE TO LIPID RICH PLAQUE: Primary | ICD-10-CM

## 2024-01-11 DIAGNOSIS — R94.31 ABNORMAL ELECTROCARDIOGRAM: ICD-10-CM

## 2024-01-11 DIAGNOSIS — I48.21 PERMANENT ATRIAL FIBRILLATION (H): ICD-10-CM

## 2024-01-11 DIAGNOSIS — R94.39 ABNORMAL STRESS ECHO: ICD-10-CM

## 2024-01-11 DIAGNOSIS — G47.33 OSA ON CPAP: ICD-10-CM

## 2024-01-11 DIAGNOSIS — R42 DIZZINESS: ICD-10-CM

## 2024-01-11 DIAGNOSIS — I25.83 CORONARY ARTERY DISEASE DUE TO LIPID RICH PLAQUE: Primary | ICD-10-CM

## 2024-01-11 DIAGNOSIS — R00.2 HEART PALPITATIONS: ICD-10-CM

## 2024-01-11 RX ORDER — ATORVASTATIN CALCIUM 40 MG/1
40 TABLET, FILM COATED ORAL DAILY
Qty: 90 TABLET | Refills: 3 | Status: SHIPPED | OUTPATIENT
Start: 2024-01-11 | End: 2024-07-11 | Stop reason: DRUGHIGH

## 2024-01-11 RX ORDER — METOPROLOL SUCCINATE 25 MG/1
12.5 TABLET, EXTENDED RELEASE ORAL DAILY
Qty: 45 TABLET | Refills: 3 | Status: SHIPPED | OUTPATIENT
Start: 2024-01-11 | End: 2024-05-03

## 2024-02-06 ENCOUNTER — ANTICOAGULATION THERAPY VISIT (OUTPATIENT)
Dept: ANTICOAGULATION | Facility: CLINIC | Age: 74
End: 2024-02-06
Payer: COMMERCIAL

## 2024-02-06 DIAGNOSIS — Z86.711 HISTORY OF PULMONARY EMBOLISM: ICD-10-CM

## 2024-02-06 DIAGNOSIS — I48.21 PERMANENT ATRIAL FIBRILLATION (H): Primary | ICD-10-CM

## 2024-02-06 DIAGNOSIS — I26.99 PULMONARY EMBOLISM, BILATERAL (H): ICD-10-CM

## 2024-02-06 LAB — INR (EXTERNAL): 2.49 (ref 0.9–1.1)

## 2024-02-06 NOTE — PROGRESS NOTES
ANTICOAGULATION MANAGEMENT     Prem Taylor 73 year old male is on warfarin with therapeutic INR result. (Goal INR 2.0-3.0)    Recent labs: (last 7 days)     02/05/24  0847   INR 2.49*       ASSESSMENT     Source(s): Chart Review and Patient/Caregiver Call     Warfarin doses taken: Warfarin taken as instructed  Diet: No new diet changes identified  Medication/supplement changes: None noted  New illness, injury, or hospitalization: No  Signs or symptoms of bleeding or clotting: No  Previous result: Therapeutic last 2(+) visits  Additional findings:  [place] until April 2024, [facility] still managing outside labs       PLAN     Recommended plan for no diet, medication or health factor changes affecting INR     Dosing Instructions: Continue your current warfarin dose with next INR in 6 weeks       Summary  As of 2/6/2024      Full warfarin instructions:  3.75 mg every Tue; 7.5 mg all other days   Next INR check:  3/18/2024               Telephone call with wife who verbalizes understanding and agrees to plan.  Incont message sent.      Recheck INR at [facility] in 6 weeks    Education provided:   Goal range and lab monitoring: goal range and significance of current result  Contact [phone] with any changes, questions or concerns.     Plan made per [facility] anticoagulation protocol    Yesenia Vázquez, RN  Anticoagulation Clinic  2/6/2024    _______________________________________________________________________     Anticoagulation Episode Summary       Current INR goal:  2.0-3.0   TTR:  68.7% (1 y)   Target end date:  Indefinite   Send INR reminders to:  RUBY MAURICIO    Indications    Pulmonary embolism  bilateral (H) (Resolved) [I26.99]  Permanent atrial fibrillation (H) [I48.21]  Pulmonary embolism  bilateral (H) [I26.99]  History of pulmonary embolism [Z86.711]             Comments:  Approved for 8 week checks per -- see encounter from 07/27/21             Anticoagulation Care Providers        Provider Role Specialty Phone number    Mamadou Baez MD Referring Family Medicine [phone]

## 2024-03-14 ENCOUNTER — TRANSFERRED RECORDS (OUTPATIENT)
Dept: HEALTH INFORMATION MANAGEMENT | Facility: CLINIC | Age: 74
End: 2024-03-14
Payer: COMMERCIAL

## 2024-03-14 LAB — INR (EXTERNAL): 2.5 (ref 0.9–1.1)

## 2024-03-15 ENCOUNTER — ANTICOAGULATION THERAPY VISIT (OUTPATIENT)
Dept: ANTICOAGULATION | Facility: CLINIC | Age: 74
End: 2024-03-15
Payer: COMMERCIAL

## 2024-03-15 DIAGNOSIS — Z86.711 HISTORY OF PULMONARY EMBOLISM: ICD-10-CM

## 2024-03-15 DIAGNOSIS — I48.21 PERMANENT ATRIAL FIBRILLATION (H): Primary | ICD-10-CM

## 2024-03-15 DIAGNOSIS — I26.99 PULMONARY EMBOLISM, BILATERAL (H): ICD-10-CM

## 2024-03-15 NOTE — PROGRESS NOTES
ANTICOAGULATION MANAGEMENT     Prem Taylor 73 year old male is on warfarin with therapeutic INR result. (Goal INR 2.0-3.0)    Recent labs: (last 7 days)     03/14/24  0000   INR 2.5*       ASSESSMENT     Source(s): Chart Review  Previous INR was Therapeutic last 2(+) visits  Medication, diet, health changes since last INR chart reviewed; none identified         PLAN     Recommended plan for no diet, medication or health factor changes affecting INR     Dosing Instructions: Continue your current warfarin dose with next INR in 6 weeks       Summary  As of 3/15/2024      Full warfarin instructions:  3.75 mg every Tue; 7.5 mg all other days   Next INR check:  4/26/2024               Detailed voice message left for Ryan with dosing instructions and follow up date.     Contact [phone] to schedule and with any changes, questions or concerns.     Education provided:   Please call back if any changes to your diet, medications or how you've been taking warfarin  Goal range and lab monitoring: goal range and significance of current result    Plan made per [facility] anticoagulation protocol    Elsie Stringer RN  Anticoagulation Clinic  3/15/2024    _______________________________________________________________________     Anticoagulation Episode Summary       Current INR goal:  2.0-3.0   TTR:  72.1% (1 y)   Target end date:  Indefinite   Send INR reminders to:  RUBY MAURICIO    Indications    Pulmonary embolism  bilateral (H) (Resolved) [I26.99]  Permanent atrial fibrillation (H) [I48.21]  Pulmonary embolism  bilateral (H) [I26.99]  History of pulmonary embolism [Z86.711]             Comments:  Approved for 8 week checks per -- see encounter from 07/27/21             Anticoagulation Care Providers       Provider Role Specialty Phone number    Mamadou Baez MD Referring Family Medicine [phone]

## 2024-03-18 ENCOUNTER — TELEPHONE (OUTPATIENT)
Dept: ANTICOAGULATION | Facility: CLINIC | Age: 74
End: 2024-03-18
Payer: COMMERCIAL

## 2024-03-18 NOTE — TELEPHONE ENCOUNTER
[facility]-PROCEDURAL ANTICOAGULATION  MANAGEMENT    [facility] requesting pre-procedure hold orders for warfarin and review for bridging      Procedure date: 4/17/24       Procedure: Colonoscopy      Procedure location and phone number (if external): [facility]  ([phone])     Number of warfarin hold days requested and/or target INR: 4 days    Pre-op date: not yet scheduled      Routing to Anticoagulation Pharmacist for review.      Meagan Forte RN

## 2024-04-01 DIAGNOSIS — F41.9 ANXIETY: ICD-10-CM

## 2024-04-01 NOTE — TELEPHONE ENCOUNTER
Pending Prescriptions:                       Disp   Refills    escitalopram (LEXAPRO) 20 MG tablet       90 tab*3            Sig: Take 1 tablet (20 mg) by mouth daily

## 2024-04-02 RX ORDER — ESCITALOPRAM OXALATE 20 MG/1
20 TABLET ORAL DAILY
Qty: 90 TABLET | Refills: 3 | Status: SHIPPED | OUTPATIENT
Start: 2024-04-02

## 2024-04-02 NOTE — TELEPHONE ENCOUNTER
[facility] ANTICOAGULATION  MANAGEMENT    ASSESSMENT     Warfarin interruption plan for colonoscopy on .    Indication for Anticoagulation: Atrial Fibrillation and PE    [id number] = 2 (Hypertension and Age 65-74)  Hx of colonoscopy 2019, hx of 8 mm polyp  Past procedure management  10/2023 ANDREW, hold with post-procedure prophylaxis  [id number] Hx of therapeutic bridge with interruptions    Deedee-Procedure Risk stratification for thromboembolism: low ( Chest guidelines); unable to stratify possible additive risk of duel indications      VTE:  CHEST Perioperative Management guidelines suggest against bridging for patients with Hx of VTE as sole clinical indication for warfarin except in high risk stratification patients.     POLYPECTOMY:  CHEST Perioperative Management guidelines suggest no bridging during warfarin interruption for colonoscopy with anticipated polypectomy      AFIB:  CHEST Perioperative Management guidelines recommends against bridging for patients with atrial fibrillation except in high risk stratification patients.    RECOMMENDATION     Pre-Procedure:  Hold warfarin for 4 days, until after procedure startin/13   No Bridge  No bridge suggested for colonoscopy with history of polypectomy. Consider post-op prophylactic enoxaparin for future procedures    Post-Procedure:  Resume warfarin dose if okay with provider doing procedure on night of procedure,  PM: 15 mg daily x 2 days, then resume current dose  Recheck INR ~ 7 days after resuming warfarin       Plan routed to referring provider for approval  ?   Daphne Maravilla, PharmD Student    SUBJECTIVE/OBJECTIVE     Prem Taylor, a 73 year old male    Goal INR Range: 2.0-3.0     Wt Readings from Last 3 Encounters:   23 109.3 kg (241 lb)   23 108.5 kg (239 lb 2 oz)   09/15/23 107 kg (236 lb)      Ideal body weight: 82.2 kg (181 lb 3.5 oz)  Adjusted ideal body weight: 93 kg (205 lb 2.1 oz)     Estimated  "body mass index is 30.94 kg/m  as calculated from the following:    Height as of 11/21/23: 1.88 m (6' 2\").    Weight as of 11/28/23: 109.3 kg (241 lb).    Lab Results   Component Value Date    INR 2.5 (A) 03/14/2024    INR 2.49 (A) 02/05/2024    INR 2.06 (A) 01/08/2024     Lab Results   Component Value Date    HGB 13.3 11/21/2023    HCT 39.7 (L) 11/21/2023     11/21/2023     Lab Results   Component Value Date    CR 0.8 12/27/2023    CR 0.95 11/21/2023    CR 0.92 09/15/2023     Estimated Creatinine Clearance: 109.8 mL/min (based on SCr of 0.8 mg/dL).   "

## 2024-04-03 NOTE — TELEPHONE ENCOUNTER
[facility]ED[facility] ANTICOAGULATION MANAGEMENT    Returned call to [facility] ([phone]). Left detailed message on confidential hold line and relayed pre-procedure orders:    Okay to hold warfarin 4 days prior to procedure  No bridge      Daphne Maravilla  [facility] Anticoagulation

## 2024-04-03 NOTE — TELEPHONE ENCOUNTER
Mamadou Baez MD Xiong, Molly21 hours ago (5:59 PM)     Agree with hold without bridge.  Please notify.    Mamadou Baez MD

## 2024-04-09 ENCOUNTER — ANTICOAGULATION THERAPY VISIT (OUTPATIENT)
Dept: ANTICOAGULATION | Facility: CLINIC | Age: 74
End: 2024-04-09

## 2024-04-09 ENCOUNTER — OFFICE VISIT (OUTPATIENT)
Dept: FAMILY MEDICINE | Facility: CLINIC | Age: 74
End: 2024-04-09
Payer: COMMERCIAL

## 2024-04-09 VITALS
HEART RATE: 80 BPM | SYSTOLIC BLOOD PRESSURE: 146 MMHG | RESPIRATION RATE: 13 BRPM | OXYGEN SATURATION: 98 % | WEIGHT: 242 LBS | TEMPERATURE: 97.3 F | BODY MASS INDEX: 31.06 KG/M2 | HEIGHT: 74 IN | DIASTOLIC BLOOD PRESSURE: 94 MMHG

## 2024-04-09 DIAGNOSIS — I26.99 PULMONARY EMBOLISM, BILATERAL (H): ICD-10-CM

## 2024-04-09 DIAGNOSIS — I48.21 PERMANENT ATRIAL FIBRILLATION (H): Primary | ICD-10-CM

## 2024-04-09 DIAGNOSIS — R55 SYNCOPE, UNSPECIFIED SYNCOPE TYPE: ICD-10-CM

## 2024-04-09 DIAGNOSIS — Z86.711 HISTORY OF PULMONARY EMBOLISM: ICD-10-CM

## 2024-04-09 DIAGNOSIS — E78.00 PURE HYPERCHOLESTEROLEMIA: ICD-10-CM

## 2024-04-09 DIAGNOSIS — M54.50 CHRONIC RIGHT-SIDED LOW BACK PAIN, UNSPECIFIED WHETHER SCIATICA PRESENT: ICD-10-CM

## 2024-04-09 DIAGNOSIS — G89.29 CHRONIC RIGHT-SIDED LOW BACK PAIN, UNSPECIFIED WHETHER SCIATICA PRESENT: ICD-10-CM

## 2024-04-09 DIAGNOSIS — F41.9 ANXIETY: ICD-10-CM

## 2024-04-09 DIAGNOSIS — R03.0 ELEVATED BLOOD PRESSURE READING WITHOUT DIAGNOSIS OF HYPERTENSION: ICD-10-CM

## 2024-04-09 LAB
ANION GAP SERPL CALCULATED.3IONS-SCNC: 9 MMOL/L (ref 7–15)
BUN SERPL-MCNC: 12.8 MG/DL (ref 8–23)
CALCIUM SERPL-MCNC: 9.3 MG/DL (ref 8.8–10.2)
CHLORIDE SERPL-SCNC: 105 MMOL/L (ref 98–107)
CHOLEST SERPL-MCNC: 162 MG/DL
CREAT SERPL-MCNC: 0.77 MG/DL (ref 0.67–1.17)
DEPRECATED HCO3 PLAS-SCNC: 25 MMOL/L (ref 22–29)
EGFRCR SERPLBLD CKD-EPI 2021: >90 ML/MIN/1.73M2
ERYTHROCYTE [DISTWIDTH] IN BLOOD BY AUTOMATED COUNT: 12.9 % (ref 10–15)
FASTING STATUS PATIENT QL REPORTED: NORMAL
GLUCOSE SERPL-MCNC: 93 MG/DL (ref 70–99)
HCT VFR BLD AUTO: 39.9 % (ref 40–53)
HDLC SERPL-MCNC: 69 MG/DL
HGB BLD-MCNC: 13.3 G/DL (ref 13.3–17.7)
INR BLD: 2.5 (ref 0.9–1.1)
LDLC SERPL CALC-MCNC: 79 MG/DL
MCH RBC QN AUTO: 30.5 PG (ref 26.5–33)
MCHC RBC AUTO-ENTMCNC: 33.3 G/DL (ref 31.5–36.5)
MCV RBC AUTO: 92 FL (ref 78–100)
NONHDLC SERPL-MCNC: 93 MG/DL
PLATELET # BLD AUTO: 159 10E3/UL (ref 150–450)
POTASSIUM SERPL-SCNC: 4.1 MMOL/L (ref 3.4–5.3)
RBC # BLD AUTO: 4.36 10E6/UL (ref 4.4–5.9)
SODIUM SERPL-SCNC: 139 MMOL/L (ref 135–145)
TRIGL SERPL-MCNC: 72 MG/DL
WBC # BLD AUTO: 4.2 10E3/UL (ref 4–11)

## 2024-04-09 PROCEDURE — 80048 BASIC METABOLIC PNL TOTAL CA: CPT | Performed by: FAMILY MEDICINE

## 2024-04-09 PROCEDURE — 36415 COLL VENOUS BLD VENIPUNCTURE: CPT | Performed by: FAMILY MEDICINE

## 2024-04-09 PROCEDURE — 80061 LIPID PANEL: CPT | Performed by: FAMILY MEDICINE

## 2024-04-09 PROCEDURE — 85610 PROTHROMBIN TIME: CPT | Performed by: FAMILY MEDICINE

## 2024-04-09 PROCEDURE — 99214 OFFICE O/P EST MOD 30 MIN: CPT | Performed by: FAMILY MEDICINE

## 2024-04-09 PROCEDURE — 85027 COMPLETE CBC AUTOMATED: CPT | Performed by: FAMILY MEDICINE

## 2024-04-09 ASSESSMENT — PAIN SCALES - GENERAL: PAINLEVEL: MILD PAIN (2)

## 2024-04-09 NOTE — TELEPHONE ENCOUNTER
Called patient and left detailed message with Warfarin hold plan for upcoming colonoscopy.  See [facility] note for details.  Ran NAVAS

## 2024-04-09 NOTE — PROGRESS NOTES
Assessment/Plan:    Permanent atrial fibrillation (H)  Permanent atrial fibrillation appears rate controlled currently.  INR updated today.  Follows up with cardiology Thursday, April 11, 2024.  Continues metoprolol succinate 25 mg using half tablet daily.  - Basic metabolic panel    Syncope, unspecified syncope type  Syncopal episode described late November 2023 with subsequent cardiology workup including CT coronary angiogram, etc.  Follows up with cardiology April 11, 2024 as noted.  No recurrent concerns.  Question secondary to orthostatic hypotension, bradycardia arrhythmia, etc.    Blood Pressure Isolated Elevated  Blood pressure elevation with recheck 146/94 on metoprolol succinate 25 mg using half tablet daily and will continue current treatment until follow-up visit in 2 days with cardiology to determine if further increase of beta-blocker versus addition of other antihypertensive medication necessary.    Chronic right-sided low back pain, unspecified whether sciatica present  Patient will follow-up with local [facility] clinic [facility].  Has previously had benefits of chiropractic help well vacationing this winter.  Physical therapy has been of some benefit with cortisone injections not as much of help.    History of pulmonary embolism  Prior history of bilateral pulmonary emboli.  Chronic warfarin anticoagulation 7.5 mg daily.    Anxiety  Benefits of escitalopram 20 mg daily continuing.    Hypercholesterolemia  Remains on atorvastatin 40 mg daily we will update lipid cascade today while fasting.  - Lipid panel reflex to direct LDL Fasting    Pulmonary embolism, bilateral (H)  As above, continues warfarin anticoagulation 7.5 mg daily.               Subjective:    Prem Taylor is seen today for follow-up assessment.  Persistent atrial fibrillation.  Metoprolol succinate 25 mg using half tablet daily.  Warfarin 7 and half milligrams daily for bilateral PE historically.  Has upcoming repeat colonoscopy  "scheduled 2024 apparently and can hold warfarin x 4 days without bridge per anticoagulation nurse.  Atorvastatin 40 mg daily for cholesterol management.  Escitalopram 20 mg daily for anxiety issues with benefit.  CPAP for ALISE.  Told that inspire device not of benefit.  Will see cardiologist this Thursday.  Has evaluation with pulmonologist as well upcoming later this month for pulmonary function testing due to some shortness of breath with activity as well as follow-up assessment of prior noted pulmonary nodule CT scan.  Comprehensive review of systems as above otherwise all negative.       \"Aubree\" x 44 years   2 daughters - Renee (38) and Susannah (35)   3 grandchildren   No smoke (quit  after 1 and  ppd)   EtOH: weekends \"few beers\"   Surgeries: anterior cervical fusion; inguinal hernia; tonsils; right eye cataract 3/4/20 and left eye cataract 3/11/20; \"right eye vitrectomy scheduled for 20 due to floaters\"   Hospitalizations: [facility] 10/9/17-10/10/17 bilateral pulmonary emboli with a. 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)   Exercise: run 4x/week at 6-6.5 mph; situps and pushups       Past Surgical History:   Procedure Laterality Date    BACK SURGERY      CERVICAL FUSION      HERNIA REPAIR      LA LAP,INGUINAL HERNIA REPR,INITIAL Left 8/15/2019    Procedure: HERNIORRHAPHY, INGUINAL, LAPAROSCOPIC;  Surgeon: Yan Leiva MD;  [facility];  Service: General    TONSILLECTOMY      childhood        Family History   Problem Relation Age of Onset    Heart Disease Mother     Hypertension Mother     Dementia Mother     Coronary Artery Disease Father         Past Medical History:   Diagnosis Date    Abnormal ECG     Anemia     Anxiety     Arrhythmia     Atrial fibrillation (H) 10/09/2017    Cancer (H)     CPAP (continuous positive " "airway pressure) dependence 10/2019    per patient    Depression     Heart valve disease 2017    History of blood clots     Hyperlipidemia 2006    Pulmonary embolism, blood-clot, obstetric 10/09/2017    Sleep apnea 2017    Uses CPAP        Social History     Tobacco Use    Smoking status: Former     Types: Cigarettes    Smokeless tobacco: Never   Vaping Use    Vaping Use: Never used   Substance Use Topics    Alcohol use: Yes    Drug use: No        Current Outpatient Medications   Medication Sig Dispense Refill    atorvastatin (LIPITOR) 40 MG tablet Take 1 tablet (40 mg) by mouth daily 90 tablet 3    escitalopram (LEXAPRO) 20 MG tablet Take 1 tablet (20 mg) by mouth daily 90 tablet 3    metoprolol succinate ER (TOPROL XL) 25 MG 24 hr tablet Take 0.5 tablets (12.5 mg) by mouth daily 45 tablet 3    MULTIVITAMIN (MULTIPLE VITAMIN ORAL) [MULTIVITAMIN (MULTIPLE VITAMIN ORAL)] Take 1 tablet by mouth daily.      warfarin ANTICOAGULANT (COUMADIN) 7.5 MG tablet Take 3.75 mg every Tuesday, Take 7.5 mg all other days. Or as directed. 90 tablet 1          Objective:    Vitals:    04/09/24 0655 04/09/24 0658 04/09/24 0739   BP: (!) 144/100 (!) 148/100 (!) 146/94   Pulse: 80     Resp: 13     Temp: 97.3  F (36.3  C)     SpO2: 98%     Weight: 109.8 kg (242 lb)     Height: 1.88 m (6' 2\")        Body mass index is 31.07 kg/m .    Alert.  No apparent distress.  Blood pressure 146/94 on recheck.  Cardiac exam irregular, rate controlled.  Extremities warm and dry.        CT Coronary angiogram 12/27/23 IMPRESSION:     1. Total Agatston score 541, placing the patient in the 71st  percentile when compared to age and gender matched control group.     2. Diffuse coronary disease as outlined below.     3.  Please review Radiology report for incidental noncardiac findings  that  will follow separately.          Cardiac Electrophysiology 11/29/23  Mobile cardiac telemetry monitor report     Mobile cardiac telemetry monitoring from 11/29/2023 to " 12/12/2023 (monitored duration 13d 7h 4m).  Continuous atrial fibrillation, ventricular rates 50 to 139bpm, average 71bpm.  IVCD present.  There were no pauses of over 3.0s.  Occasional premature ventricular contractions (3%).  Symptom triggers (11, dyspnea, lightheaded, palpitations, other) correlated to atrial fibrillation with ventricular rates 61-139bpm.     Electronically signed by Tacos Ellsworth MD  12/15/2023  4:20 PM        This note has been dictated using voice recognition software and as a result may contain minor grammatical errors and unintended word substitutions.       Answers submitted by the patient for this visit:  General Questionnaire (Submitted on 4/4/2024)  Chief Complaint: Chronic problems general questions HPI Form  What is the reason for your visit today? : annual physical  On average, how many sweetened beverages do you drink each day (Examples: soda, juice, sweet tea, etc.  Do NOT count diet or artificially sweetened beverages)?: 1  How many minutes a day do you exercise enough to make your heart beat faster?: 60 or more  How many days a week do you exercise enough to make your heart beat faster?: 4  How many days per week do you miss taking your medication?: 0

## 2024-04-09 NOTE — PROGRESS NOTES
ANTICOAGULATION MANAGEMENT     Prem Taylor 73 year old male is on warfarin with therapeutic INR result. (Goal INR 2.0-3.0)    Recent labs: (last 7 days)     04/09/24  0743   INR 2.5*       ASSESSMENT     Warfarin Lab Questionnaire    Warfarin Doses Last 7 Days      4/8/2024    10:17 AM   Dose in Tablet or Mg   TAB or MG? milligram (mg)     Pt Rptd Dose SUNDAY MONDAY TUESDAY WED THURS FRIDAY SATURDAY 4/8/2024  10:17 AM 7.5 7.5 3.75 7.5 7.5 7.5 7.5         4/8/2024   Warfarin Lab Questionnaire   Missed doses within past 14 days? No   Changes in diet or alcohol within past 14 days? No   Medication changes since last result? No   Injuries or illness since last result? No   New shortness of breath, severe headaches or sudden changes in vision since last result? No   Abnormal bleeding since last result? No   Upcoming surgery, procedure? No     Previous result: Therapeutic last 2(+) visits  Additional findings: Upcoming surgery/procedure 4/17/24 Colonoscopy, hold 4 days and no bridge        PLAN     Recommended plan for temporary change(s) affecting INR     Dosing Instructions:  Continue Warfarin until 4 day hold starts on 4/13/24.  Resume Warfarin on 4/17 if ok per GI with boost x 2 then normal schedule with INR check about 1 week later per [facility] hold plan.    Summary  As of 4/9/2024      Full warfarin instructions:  4/13: Hold; 4/14: Hold; 4/15: Hold; 4/16: Hold; 4/17: 15 mg; 4/18: 15 mg; Otherwise 3.75 mg every Tue; 7.5 mg all other days   Next INR check:  4/24/2024               Detailed voice message left for Ryan with dosing instructions and follow up date.   Sent WiFi [employer] message with dosing and follow up instructions    Contact [phone] to schedule and with any changes, questions or concerns.     Education provided:   Goal range and lab monitoring: goal range and significance of current result  Contact [phone] with any changes, questions or concerns.     Plan made per [facility] anticoagulation  protocol    Yesenia Vázquez, RN  Anticoagulation Clinic  4/9/2024    _______________________________________________________________________     Anticoagulation Episode Summary       Current INR goal:  2.0-3.0   TTR:  72.2% (1 y)   Target end date:  Indefinite   Send INR reminders to:  RUBY MAURICIO    Indications    Pulmonary embolism  bilateral (H) (Resolved) [I26.99]  Permanent atrial fibrillation (H) [I48.21]  Pulmonary embolism  bilateral (H) [I26.99]  History of pulmonary embolism [Z86.711]             Comments:  Approved for 8 week checks per -- see encounter from 07/27/21             Anticoagulation Care Providers       Provider Role Specialty Phone number    Mamadou Baez MD Referring Family Medicine [phone]

## 2024-04-10 NOTE — PROGRESS NOTES
HEART CARE ENCOUNTER NOTE      [facility]  [phone]      Assessment/Recommendations   Assessment:   Chronic atrial fibrillation: On warfarin for anticoagulation.  On metoprolol 12.5 mg daily.  Patient is asymptomatic with his atrial fibrillation.  Denies any bleeding issues.  Cardiac monitor done December 2023 showed controlled rates with average heart rate of 71 bpm.  Ranges from 50 to 139 bpm with the elevation being correlated to activity.  Patient denies feeling heart racing while at rest.  Coronary artery disease: CT coronary angiogram 12/27/2023 showed moderately severe ostial narrowing in the large second diagonal branch and moderate plaque in the LAD, the mid RCA moderately obstructed with mixed plaque and mild to moderately obstructive mixed plaque in the first obtuse marginal artery off the left circumflex.  Patient denies anginal symptoms but does have dyspnea on exertion.  Elevated blood pressure without diagnosis of hypertension: On metoprolol 12.5 mg daily.  Patient has had some elevated readings at other doctor appointments recently 146/94.  Today his blood pressure is well-controlled at 124/70.  Will have patient monitor his blood pressure at home and report numbers in 2 weeks.  Could consider increasing metoprolol versus starting another antihypertensive if needed.  History of pulmonary embolism: Recurrent and unprovoked.  Patient remains on chronic warfarin.  Hypercholesterolemia: On atorvastatin 40 mg daily.  Last lipids 4/9/2024 showed LDL of 79 and total cholesterol of 162.  Patient notes recent diet change and starting to walk on a treadmill.  Will recheck in 3 months and if LDL is still above 70 would favor increasing his atorvastatin.  Obstructive sleep apnea: Compliant on CPAP nightly  Dyspnea on exertion: Patient notes his dyspnea on exertion have been gradually over the past 6 to 12 months.  Patient notes feeling more winded with stairs.  Patient goes to the [employer]  times a week and walks on a treadmill and denies any worsening symptoms with this.  Patient denies any anginal symptoms with the shortness of breath.  Patient is seeing pulmonology 4/24/24.  If pulmonology finds no lung cause of his shortness of breath given CT angiogram could consider starting Imdur and if no improvement would favor coronary angiogram.    Plan:   Continue current medications  Patient will monitor blood pressure for 2 weeks and send me numbers.  If elevated could increase metoprolol and possibly add another antihypertensive  Patient recently made diet changes and is exercising.  Will recheck fasting cholesterol in 3 months and if LDL is still above 70 would favor increasing his atorvastatin  Patient is to see pulmonology 4/24/2024 for following up on a nodule as well as PFTs given his dyspnea on exertion.  If pulmonology finds no lung issue for patient's shortness of breath would favor starting Imdur.  If this gave no improvement in symptoms would favor coronary angiogram  Follow-up with me or Shila Mckeon PA-C in 2 to 3 months  Follow-up with Dr. Velez in 6 months       History of Present Illness/Subjective    HPI: Prem Taylor is a 73 year old male with PMHx of chronic atrial fibrillation on anticoagulation, bilateral PEs, hypercholesterolemia, COPD, obesity, BPH, dyspnea on exertion presents for follow-up.  Patient last saw Dr. Velez 8/8/2022 where patient had noted fatigue with regular exercise.  Discussed getting CPAP mask checked.  Patient saw Gurwinder Brink CNP 11/28/2023 where patient had been noting increased dyspnea on exertion walking up inclines, progressive fatigue, and increased dizziness and lightheadedness in the past year.  Blood pressure has also been elevated the last couple months.  2-week MCOT showed continuous atrial fibrillation with rates 50 to 139 bpm averaging 71 bpm and 3% PVCs.  Exercise echo stress test was obtained which was negative for ischemia.  CT coronary  angiogram was obtained showing a calcium score of 541 with moderately obstructive plaque in the mid LAD on moderately severe ostial narrowing in the large second diagonal branch.  Circumflex showed mild to moderate obstruction and RCA showing moderate in the mid and mild obstruction in distal.  Given the moderately severe lesion was only noted in the second diagonal was favored to continue medical management at that time.  Patient does have upcoming evaluation with pulmonologist and PFTs this month due to the shortness of breath with activity.    Patient continues to note dyspnea on exertion and feeling more winded with going up.  Recently started going to the 4 times a week and walking on a treadmill and notes he can do this without issue.  Denies any exertional angina.  Patient notes the dizziness he was previously experiencing has greatly improved though still notes occasional lightheadedness more when going from sitting to standing.  Notes it is mild and denies feeling that he is going to pass out.  Patient does endorse that he could drink more water and is starting to make a conscious effort about this.  He notes recently him and his wife are making dietary changes since coming back from [place].  Patient denies any side effects of atorvastatin.  Denies palpitations, lower extremity edema.  Patient continues using his CPAP nightly and notes feeling well rested.      Echocardiogram exercise stress 12/12/2023 results:  1. Normal stress echocardiogram without evidence of stress induced ischemia.  2. Normal resting LV systolic performance with an ejection fraction of 55-60%.  There is normal improvement in left ventricular systolic performance with a  peak ejection fraction of 70-75%.  3. Nondiagnostic stress EKG due to baseline right bundle branch block  4. No anginal chest pain reported with exercise.  5. Fair functional capacity for age.     Electrocardiogram: Baseline ECG reveals atrial fibrillation and  right bundle  branch block. With exercise, there are no diagnostic EKG changes    CT coronary angiogram 2023:  1. Total Agatston score 541, placing the patient in the 71st  percentile when compared to age and gender matched control group.     2. Diffuse coronary disease as outlined below.     CORONARY CALCIUM SCORE: The total Agatston calcium score is 541, Left  main: 3.37, left anterior descendin,  circumflex: 125, right  coronary artery: 137. This places the patient in the 71st percentile  when compared to age and gender matched control group.     CORONARY CT ANGIOGRAPHY     DOMINANCE: Right dominant system.      LEFT MAIN: The left main arises normally from the left coronary cusp  and there is mildly obstructed with calcified plaque.     LEFT ANTERIOR DESCENDING: The left anterior descending is relatively  diffusely diseased. In the proximal segment there is a moderately  obstructive mixed plaque as well as in the mid segment of the LAD. The  distal LAD is mildly obstructed with calcified plaque. There is a  large second diagonal branch which appears to have a moderately severe  severe ostial narrowing with mixed plaque.     CIRCUMFLEX: The circumflex artery gives off its major branches the  first obtuse marginal branch which is mild to moderately obstructive  mixed plaque. The distal circumflex is in the AV groove and is small  without significant lesions detected.     RIGHT CORONARY ARTERY: The right coronary artery is dominant. There is  a mildly obstructed calcified plaque in the proximal segment. The mid  RCA is moderately obstructed with mixed plaque. There is mildly  obstructive plaque in the distal right coronary artery. PDA is without  significant disease.    Adult mobile telemetry 2023:  Mobile cardiac telemetry monitoring from 2023 to 2023 (monitored duration 13d 7h 4m).  Continuous atrial fibrillation, ventricular rates 50 to 139bpm, average 71bpm.  IVCD present.  There  "were no pauses of over 3.0s.  Occasional premature ventricular contractions (3%).  Symptom triggers (11, dyspnea, lightheaded, palpitations, other) correlated to atrial fibrillation with ventricular rates 61-139bpm.     Physical Examination  Review of Systems   Vitals: /70 (BP Location: Left arm, Patient Position: Sitting, Cuff Size: Adult Large)   Pulse 64   Resp 16   Ht 1.88 m (6' 2\")   Wt 111.1 kg (245 lb)   BMI 31.46 kg/m    BMI= Body mass index is 31.46 kg/m .  Wt Readings from Last 3 Encounters:   04/11/24 111.1 kg (245 lb)   04/09/24 109.8 kg (242 lb)   11/28/23 109.3 kg (241 lb)           ENT/Mouth: membranes moist, no oral lesions or bleeding gums.      EYES:  no scleral icterus, normal conjunctivae       Chest/Lungs:   lungs are clear to auscultation, no rales or wheezing, equal chest wall expansion    Cardiovascular:   Regular. Normal first and second heart sounds with no murmurs, rubs, or gallops; the carotid, radial and posterior tibial pulses are intact, no edema bilaterally        Extremities: no cyanosis or clubbing   Skin: no xanthelasma, warm.    Neurologic: no tremors     Psychiatric: alert and oriented x3, calm        Please refer above for cardiac ROS details.        Medical History  Surgical History Family History Social History   Past Medical History:   Diagnosis Date    Abnormal ECG     Anemia     Anxiety     Arrhythmia     Atrial fibrillation (H) 10/09/2017    Cancer (H) 2015    CPAP (continuous positive airway pressure) dependence 10/2019    per patient    Depression     Heart valve disease 2017    History of blood clots     Hyperlipidemia 2006    Pulmonary embolism, blood-clot, obstetric 10/09/2017    Sleep apnea 2017    Uses CPAP     Past Surgical History:   Procedure Laterality Date    BACK SURGERY      CERVICAL FUSION  2001    HERNIA REPAIR  2005    PA LAP,INGUINAL HERNIA REPR,INITIAL Left 8/15/2019    Procedure: HERNIORRHAPHY, INGUINAL, LAPAROSCOPIC;  Surgeon: Cali, " Yan PRADO MD;  Location: [facility];  Service: General    TONSILLECTOMY      childhood     Family History   Problem Relation Age of Onset    Heart Disease Mother     Hypertension Mother     Dementia Mother     Coronary Artery Disease Father         Social History     Socioeconomic History    Marital status:      Spouse name: Not on file    Number of children: Not on file    Years of education: Not on file    Highest education level: Not on file   Occupational History    Not on file   Tobacco Use    Smoking status: Former     Types: Cigarettes    Smokeless tobacco: Never   Vaping Use    Vaping status: Never Used   Substance and Sexual Activity    Alcohol use: Yes    Drug use: No    Sexual activity: Not on file   Other Topics Concern    Not on file   Social History Narrative    Not on file     Social Determinants of Health     Financial Resource Strain: High Risk (11/20/2023)    Financial Resource Strain     Within the past 12 months, have you or your family members you live with been unable to get utilities (heat, electricity) when it was really needed?: Yes   Food Insecurity: Low Risk  (11/20/2023)    Food Insecurity     Within the past 12 months, did you worry that your food would run out before you got money to buy more?: No     Within the past 12 months, did the food you bought just not last and you didn t have money to get more?: No   Transportation Needs: Low Risk  (11/20/2023)    Transportation Needs     Within the past 12 months, has lack of transportation kept you from medical appointments, getting your medicines, non-medical meetings or appointments, work, or from getting things that you need?: No   Physical Activity: Not on file   Stress: Not on file   Social Connections: Not on file   Interpersonal Safety: Low Risk  (4/9/2024)    Interpersonal Safety     Do you feel physically and emotionally safe where you currently live?: Yes     Within the past 12 months, have you been hit, slapped, kicked  "or otherwise physically hurt by someone?: No     Within the past 12 months, have you been humiliated or emotionally abused in other ways by your partner or ex-partner?: No   Housing Stability: Low Risk  (11/20/2023)    Housing Stability     Do you have housing? : Yes     Are you worried about losing your housing?: No           Medications  Allergies   Current Outpatient Medications   Medication Sig Dispense Refill    atorvastatin (LIPITOR) 40 MG tablet Take 1 tablet (40 mg) by mouth daily 90 tablet 3    escitalopram (LEXAPRO) 20 MG tablet Take 1 tablet (20 mg) by mouth daily 90 tablet 3    metoprolol succinate ER (TOPROL XL) 25 MG 24 hr tablet Take 0.5 tablets (12.5 mg) by mouth daily 45 tablet 3    MULTIVITAMIN (MULTIPLE VITAMIN ORAL) [MULTIVITAMIN (MULTIPLE VITAMIN ORAL)] Take 1 tablet by mouth daily.      warfarin ANTICOAGULANT (COUMADIN) 7.5 MG tablet Take 3.75 mg every Tuesday, Take 7.5 mg all other days. Or as directed. 90 tablet 1     No Known Allergies       Lab Results    Chemistry/lipid CBC Cardiac Enzymes/BNP/TSH/INR   Recent Labs   Lab Test 04/09/24  0743   CHOL 162   HDL 69   LDL 79   TRIG 72     Recent Labs   Lab Test 04/09/24  0743 04/11/23  0804 09/13/22  0741   LDL 79 136* 159*     Recent Labs   Lab Test 04/09/24  0743      POTASSIUM 4.1   CHLORIDE 105   CO2 25   GLC 93   BUN 12.8   CR 0.77   GFRESTIMATED >90   DANDY 9.3     Recent Labs   Lab Test 04/09/24  0743 12/27/23  1302 11/21/23  1218   CR 0.77 0.8 0.95     No results for input(s): \"A1C\" in the last 38616 hours.       Recent Labs   Lab Test 04/09/24  0743   WBC 4.2   HGB 13.3   HCT 39.9*   MCV 92        Recent Labs   Lab Test 04/09/24  0743 11/21/23  1218 09/15/23  0741   HGB 13.3 13.3 13.4    No results for input(s): \"TROPONINI\" in the last 89393 hours.  Recent Labs   Lab Test 11/28/23  1045   NTBNP 714     Recent Labs   Lab Test 11/21/23  1218   TSH 1.77     Recent Labs   Lab Test 04/09/24  0743 03/14/24  0000 02/05/24  0847 "   INR 2.5* 2.5* 2.49*          Catherine Amaya PA-C

## 2024-04-11 ENCOUNTER — OFFICE VISIT (OUTPATIENT)
Dept: CARDIOLOGY | Facility: CLINIC | Age: 74
End: 2024-04-11
Attending: INTERNAL MEDICINE
Payer: COMMERCIAL

## 2024-04-11 VITALS
DIASTOLIC BLOOD PRESSURE: 70 MMHG | WEIGHT: 245 LBS | BODY MASS INDEX: 31.44 KG/M2 | HEART RATE: 64 BPM | HEIGHT: 74 IN | RESPIRATION RATE: 16 BRPM | SYSTOLIC BLOOD PRESSURE: 124 MMHG

## 2024-04-11 DIAGNOSIS — R06.09 DOE (DYSPNEA ON EXERTION): ICD-10-CM

## 2024-04-11 DIAGNOSIS — E78.00 PURE HYPERCHOLESTEROLEMIA: Primary | ICD-10-CM

## 2024-04-11 DIAGNOSIS — R00.2 HEART PALPITATIONS: ICD-10-CM

## 2024-04-11 DIAGNOSIS — I25.10 CORONARY ARTERY DISEASE DUE TO LIPID RICH PLAQUE: ICD-10-CM

## 2024-04-11 DIAGNOSIS — R42 DIZZINESS: ICD-10-CM

## 2024-04-11 DIAGNOSIS — I25.83 CORONARY ARTERY DISEASE DUE TO LIPID RICH PLAQUE: ICD-10-CM

## 2024-04-11 DIAGNOSIS — I48.21 PERMANENT ATRIAL FIBRILLATION (H): ICD-10-CM

## 2024-04-11 DIAGNOSIS — G47.33 OSA ON CPAP: ICD-10-CM

## 2024-04-11 PROCEDURE — 99214 OFFICE O/P EST MOD 30 MIN: CPT | Performed by: STUDENT IN AN ORGANIZED HEALTH CARE EDUCATION/TRAINING PROGRAM

## 2024-04-11 NOTE — LETTER
4/11/2024    Mamadou Baez MD  [address]  [facility] [address]    RE: Prem Taylor       Dear Colleague,     I had the pleasure of seeing Prem Taylor in the [facility].    HEART CARE ENCOUNTER NOTE      [facility]  [phone]      Assessment/Recommendations   Assessment:   Chronic atrial fibrillation: On warfarin for anticoagulation.  On metoprolol 12.5 mg daily.  Patient is asymptomatic with his atrial fibrillation.  Denies any bleeding issues.  Cardiac monitor done December 2023 showed controlled rates with average heart rate of 71 bpm.  Ranges from 50 to 139 bpm with the elevation being correlated to activity.  Patient denies feeling heart racing while at rest.  Coronary artery disease: CT coronary angiogram 12/27/2023 showed moderately severe ostial narrowing in the large second diagonal branch and moderate plaque in the LAD, the mid RCA moderately obstructed with mixed plaque and mild to moderately obstructive mixed plaque in the first obtuse marginal artery off the left circumflex.  Patient denies anginal symptoms but does have dyspnea on exertion.  Elevated blood pressure without diagnosis of hypertension: On metoprolol 12.5 mg daily.  Patient has had some elevated readings at other doctor appointments recently 146/94.  Today his blood pressure is well-controlled at 124/70.  Will have patient monitor his blood pressure at home and report numbers in 2 weeks.  Could consider increasing metoprolol versus starting another antihypertensive if needed.  History of pulmonary embolism: Recurrent and unprovoked.  Patient remains on chronic warfarin.  Hypercholesterolemia: On atorvastatin 40 mg daily.  Last lipids 4/9/2024 showed LDL of 79 and total cholesterol of 162.  Patient notes recent diet change and starting to walk on a treadmill.  Will recheck in 3 months and if LDL is still above 70 would favor increasing his atorvastatin.  Obstructive sleep apnea:  Compliant on CPAP nightly  Dyspnea on exertion: Patient notes his dyspnea on exertion have been gradually over the past 6 to 12 months.  Patient notes feeling more winded with stairs.  Patient goes to the 4 times a week and walks on a treadmill and denies any worsening symptoms with this.  Patient denies any anginal symptoms with the shortness of breath.  Patient is seeing pulmonology 4/24/24.  If pulmonology finds no lung cause of his shortness of breath given CT angiogram could consider starting Imdur and if no improvement would favor coronary angiogram.    Plan:   Continue current medications  Patient will monitor blood pressure for 2 weeks and send me numbers.  If elevated could increase metoprolol and possibly add another antihypertensive  Patient recently made diet changes and is exercising.  Will recheck fasting cholesterol in 3 months and if LDL is still above 70 would favor increasing his atorvastatin  Patient is to see pulmonology 4/24/2024 for following up on a nodule as well as PFTs given his dyspnea on exertion.  If pulmonology finds no lung issue for patient's shortness of breath would favor starting Imdur.  If this gave no improvement in symptoms would favor coronary angiogram  Follow-up with me or Shila Mckeon PA-C in 2 to 3 months  Follow-up with Dr. Velez in 6 months       History of Present Illness/Subjective    HPI: Prem Taylor is a 73 year old male with PMHx of chronic atrial fibrillation on anticoagulation, bilateral PEs, hypercholesterolemia, COPD, obesity, BPH, dyspnea on exertion presents for follow-up.  Patient last saw Dr. Velez 8/8/2022 where patient had noted fatigue with regular exercise.  Discussed getting CPAP mask checked.  Patient saw Gurwinder Brink CNP 11/28/2023 where patient had been noting increased dyspnea on exertion walking up inclines, progressive fatigue, and increased dizziness and lightheadedness in the past year.  Blood pressure has also been elevated the last  couple months.  2-week MCOT showed continuous atrial fibrillation with rates 50 to 139 bpm averaging 71 bpm and 3% PVCs.  Exercise echo stress test was obtained which was negative for ischemia.  CT coronary angiogram was obtained showing a calcium score of 541 with moderately obstructive plaque in the mid LAD on moderately severe ostial narrowing in the large second diagonal branch.  Circumflex showed mild to moderate obstruction and RCA showing moderate in the mid and mild obstruction in distal.  Given the moderately severe lesion was only noted in the second diagonal was favored to continue medical management at that time.  Patient does have upcoming evaluation with pulmonologist and PFTs this month due to the shortness of breath with activity.    Patient continues to note dyspnea on exertion and feeling more winded with going up.  Recently started going to the 4 times a week and walking on a treadmill and notes he can do this without issue.  Denies any exertional angina.  Patient notes the dizziness he was previously experiencing has greatly improved though still notes occasional lightheadedness more when going from sitting to standing.  Notes it is mild and denies feeling that he is going to pass out.  Patient does endorse that he could drink more water and is starting to make a conscious effort about this.  He notes recently him and his wife are making dietary changes since coming back from [place].  Patient denies any side effects of atorvastatin.  Denies palpitations, lower extremity edema.  Patient continues using his CPAP nightly and notes feeling well rested.      Echocardiogram exercise stress 12/12/2023 results:  1. Normal stress echocardiogram without evidence of stress induced ischemia.  2. Normal resting LV systolic performance with an ejection fraction of 55-60%.  There is normal improvement in left ventricular systolic performance with a  peak ejection fraction of 70-75%.  3. Nondiagnostic  stress EKG due to baseline right bundle branch block  4. No anginal chest pain reported with exercise.  5. Fair functional capacity for age.     Electrocardiogram: Baseline ECG reveals atrial fibrillation and right bundle  branch block. With exercise, there are no diagnostic EKG changes    CT coronary angiogram 2023:  1. Total Agatston score 541, placing the patient in the 71st  percentile when compared to age and gender matched control group.     2. Diffuse coronary disease as outlined below.     CORONARY CALCIUM SCORE: The total Agatston calcium score is 541, Left  main: 3.37, left anterior descendin,  circumflex: 125, right  coronary artery: 137. This places the patient in the 71st percentile  when compared to age and gender matched control group.     CORONARY CT ANGIOGRAPHY     DOMINANCE: Right dominant system.      LEFT MAIN: The left main arises normally from the left coronary cusp  and there is mildly obstructed with calcified plaque.     LEFT ANTERIOR DESCENDING: The left anterior descending is relatively  diffusely diseased. In the proximal segment there is a moderately  obstructive mixed plaque as well as in the mid segment of the LAD. The  distal LAD is mildly obstructed with calcified plaque. There is a  large second diagonal branch which appears to have a moderately severe  severe ostial narrowing with mixed plaque.     CIRCUMFLEX: The circumflex artery gives off its major branches the  first obtuse marginal branch which is mild to moderately obstructive  mixed plaque. The distal circumflex is in the AV groove and is small  without significant lesions detected.     RIGHT CORONARY ARTERY: The right coronary artery is dominant. There is  a mildly obstructed calcified plaque in the proximal segment. The mid  RCA is moderately obstructed with mixed plaque. There is mildly  obstructive plaque in the distal right coronary artery. PDA is without  significant disease.    Adult mobile telemetry  "11/29/2023:  Mobile cardiac telemetry monitoring from 11/29/2023 to 12/12/2023 (monitored duration 13d 7h 4m).  Continuous atrial fibrillation, ventricular rates 50 to 139bpm, average 71bpm.  IVCD present.  There were no pauses of over 3.0s.  Occasional premature ventricular contractions (3%).  Symptom triggers (11, dyspnea, lightheaded, palpitations, other) correlated to atrial fibrillation with ventricular rates 61-139bpm.     Physical Examination  Review of Systems   Vitals: /70 (BP Location: Left arm, Patient Position: Sitting, Cuff Size: Adult Large)   Pulse 64   Resp 16   Ht 1.88 m (6' 2\")   Wt 111.1 kg (245 lb)   BMI 31.46 kg/m    BMI= Body mass index is 31.46 kg/m .  Wt Readings from Last 3 Encounters:   04/11/24 111.1 kg (245 lb)   04/09/24 109.8 kg (242 lb)   11/28/23 109.3 kg (241 lb)           ENT/Mouth: membranes moist, no oral lesions or bleeding gums.      EYES:  no scleral icterus, normal conjunctivae       Chest/Lungs:   lungs are clear to auscultation, no rales or wheezing, equal chest wall expansion    Cardiovascular:   Regular. Normal first and second heart sounds with no murmurs, rubs, or gallops; the carotid, radial and posterior tibial pulses are intact, no edema bilaterally        Extremities: no cyanosis or clubbing   Skin: no xanthelasma, warm.    Neurologic: no tremors     Psychiatric: alert and oriented x3, calm        Please refer above for cardiac ROS details.        Medical History  Surgical History Family History Social History   Past Medical History:   Diagnosis Date    Abnormal ECG     Anemia     Anxiety     Arrhythmia     Atrial fibrillation (H) 10/09/2017    Cancer (H) 2015    CPAP (continuous positive airway pressure) dependence 10/2019    per patient    Depression     Heart valve disease 2017    History of blood clots     Hyperlipidemia 2006    Pulmonary embolism, blood-clot, obstetric 10/09/2017    Sleep apnea 2017    Uses CPAP     Past Surgical History:   Procedure " Laterality Date    BACK SURGERY      CERVICAL FUSION  2001    HERNIA REPAIR  2005    [place] LAP,INGUINAL HERNIA REPR,INITIAL Left 8/15/2019    Procedure: HERNIORRHAPHY, INGUINAL, LAPAROSCOPIC;  Surgeon: Yan Leiva MD;  Location: [facility];  Service: General    TONSILLECTOMY      childhood     Family History   Problem Relation Age of Onset    Heart Disease Mother     Hypertension Mother     Dementia Mother     Coronary Artery Disease Father         Social History     Socioeconomic History    Marital status:      Spouse name: Not on file    Number of children: Not on file    Years of education: Not on file    Highest education level: Not on file   Occupational History    Not on file   Tobacco Use    Smoking status: Former     Types: Cigarettes    Smokeless tobacco: Never   Vaping Use    Vaping status: Never Used   Substance and Sexual Activity    Alcohol use: Yes    Drug use: No    Sexual activity: Not on file   Other Topics Concern    Not on file   Social History Narrative    Not on file     Social Determinants of Health     Financial Resource Strain: High Risk (11/20/2023)    Financial Resource Strain     Within the past 12 months, have you or your family members you live with been unable to get utilities (heat, electricity) when it was really needed?: Yes   Food Insecurity: Low Risk  (11/20/2023)    Food Insecurity     Within the past 12 months, did you worry that your food would run out before you got money to buy more?: No     Within the past 12 months, did the food you bought just not last and you didn t have money to get more?: No   Transportation Needs: Low Risk  (11/20/2023)    Transportation Needs     Within the past 12 months, has lack of transportation kept you from medical appointments, getting your medicines, non-medical meetings or appointments, work, or from getting things that you need?: No   Physical Activity: Not on file   Stress: Not on file   Social Connections: Not on file  "  Interpersonal Safety: Low Risk  (4/9/2024)    Interpersonal Safety     Do you feel physically and emotionally safe where you currently live?: Yes     Within the past 12 months, have you been hit, slapped, kicked or otherwise physically hurt by someone?: No     Within the past 12 months, have you been humiliated or emotionally abused in other ways by your partner or ex-partner?: No   Housing Stability: Low Risk  (11/20/2023)    Housing Stability     Do you have housing? : Yes     Are you worried about losing your housing?: No           Medications  Allergies   Current Outpatient Medications   Medication Sig Dispense Refill    atorvastatin (LIPITOR) 40 MG tablet Take 1 tablet (40 mg) by mouth daily 90 tablet 3    escitalopram (LEXAPRO) 20 MG tablet Take 1 tablet (20 mg) by mouth daily 90 tablet 3    metoprolol succinate ER (TOPROL XL) 25 MG 24 hr tablet Take 0.5 tablets (12.5 mg) by mouth daily 45 tablet 3    MULTIVITAMIN (MULTIPLE VITAMIN ORAL) [MULTIVITAMIN (MULTIPLE VITAMIN ORAL)] Take 1 tablet by mouth daily.      warfarin ANTICOAGULANT (COUMADIN) 7.5 MG tablet Take 3.75 mg every Tuesday, Take 7.5 mg all other days. Or as directed. 90 tablet 1     No Known Allergies       Lab Results    Chemistry/lipid CBC Cardiac Enzymes/BNP/TSH/INR   Recent Labs   Lab Test 04/09/24  0743   CHOL 162   HDL 69   LDL 79   TRIG 72     Recent Labs   Lab Test 04/09/24  0743 04/11/23  0804 09/13/22  0741   LDL 79 136* 159*     Recent Labs   Lab Test 04/09/24  0743      POTASSIUM 4.1   CHLORIDE 105   CO2 25   GLC 93   BUN 12.8   CR 0.77   GFRESTIMATED >90   DANDY 9.3     Recent Labs   Lab Test 04/09/24  0743 12/27/23  1302 11/21/23  1218   CR 0.77 0.8 0.95     No results for input(s): \"A1C\" in the last 39248 hours.       Recent Labs   Lab Test 04/09/24  0743   WBC 4.2   HGB 13.3   HCT 39.9*   MCV 92        Recent Labs   Lab Test 04/09/24  0743 11/21/23  1218 09/15/23  0741   HGB 13.3 13.3 13.4    No results for input(s): " "\"TROPONINI\" in the last 27745 hours.  Recent Labs   Lab Test 11/28/23  1045   NTBNP 714     Recent Labs   Lab Test 11/21/23  1218   TSH 1.77     Recent Labs   Lab Test 04/09/24  0743 03/14/24  0000 02/05/24  0847   INR 2.5* 2.5* 2.49*          Catherine Amaya PA-C        Thank you for allowing me to participate in the care of your patient.      Sincerely,     Catherine Amaya PA-C     [facility]  cc:   Gerardo Velez MD  [address]  [place] [address]      "

## 2024-04-11 NOTE — PATIENT INSTRUCTIONS
Prem Taylor,    It was a pleasure to see you today at the [facility].     My recommendations after this visit include:    - Continue current medications.   - Let's monitor blood pressure for 2 weeks and send me your numbers. Take it 1-2 hours after eating, after medications, when you have been sitting for 15 minutes  - Will recheck fasting cholesterol in 3 months since you have been making diet changes and exercising. If LDL still above 70 would favor increasing your atorvastatin  - Continue with plan to see pulmonology  - Keep exercising and if developing chest pain let us know  - Follow up with me or Shila Mckeon in 2-3 months  - Follow up with Dr. Velez in 6 months    - Please call [phone], if you have any questions or concerns      Catherine Amaya PA-C

## 2024-04-17 ENCOUNTER — TRANSFERRED RECORDS (OUTPATIENT)
Dept: HEALTH INFORMATION MANAGEMENT | Facility: CLINIC | Age: 74
End: 2024-04-17
Payer: COMMERCIAL

## 2024-04-24 ENCOUNTER — HOSPITAL ENCOUNTER (OUTPATIENT)
Dept: CT IMAGING | Facility: CLINIC | Age: 74
Discharge: HOME | End: 2024-04-24
Attending: STUDENT IN AN ORGANIZED HEALTH CARE EDUCATION/TRAINING PROGRAM | Admitting: STUDENT IN AN ORGANIZED HEALTH CARE EDUCATION/TRAINING PROGRAM
Payer: COMMERCIAL

## 2024-04-24 ENCOUNTER — ONCOLOGY VISIT (OUTPATIENT)
Dept: PULMONOLOGY | Facility: CLINIC | Age: 74
End: 2024-04-24
Payer: COMMERCIAL

## 2024-04-24 ENCOUNTER — OFFICE VISIT (OUTPATIENT)
Dept: PULMONOLOGY | Facility: CLINIC | Age: 74
End: 2024-04-24
Payer: COMMERCIAL

## 2024-04-24 VITALS
WEIGHT: 241 LBS | HEART RATE: 80 BPM | DIASTOLIC BLOOD PRESSURE: 89 MMHG | OXYGEN SATURATION: 99 % | BODY MASS INDEX: 30.94 KG/M2 | SYSTOLIC BLOOD PRESSURE: 146 MMHG | RESPIRATION RATE: 20 BRPM

## 2024-04-24 DIAGNOSIS — R91.8 PULMONARY NODULES: ICD-10-CM

## 2024-04-24 PROCEDURE — 94726 PLETHYSMOGRAPHY LUNG VOLUMES: CPT | Performed by: INTERNAL MEDICINE

## 2024-04-24 PROCEDURE — 99204 OFFICE O/P NEW MOD 45 MIN: CPT | Mod: 25 | Performed by: INTERNAL MEDICINE

## 2024-04-24 PROCEDURE — 94729 DIFFUSING CAPACITY: CPT | Performed by: INTERNAL MEDICINE

## 2024-04-24 PROCEDURE — 94375 RESPIRATORY FLOW VOLUME LOOP: CPT | Performed by: INTERNAL MEDICINE

## 2024-04-24 PROCEDURE — 71250 CT THORAX DX C-: CPT

## 2024-04-24 ASSESSMENT — PAIN SCALES - GENERAL: PAINLEVEL: NO PAIN (0)

## 2024-04-24 NOTE — PROGRESS NOTES
Prem FALCON Claudia comes into clinic today at the request of Dr. Diaz, Ordering Provider for PFT    This service provided today was under the supervising provider of the day Dr. Murray, who was available if needed.    Marshall Ghotra, RT

## 2024-04-24 NOTE — PATIENT INSTRUCTIONS
For the lung nodule, there has not been a substantial change in 4 months, I recommend we continue watching with another CT in 4-6 months    Your lung function tests are normal, pretty much as expected for a man your age and height.

## 2024-04-24 NOTE — NURSING NOTE
Chief Complaint   Patient presents with    New Patient     Pulmonary nodules       Vitals:    04/24/24 0931 04/24/24 0935   BP: (!) 162/93 (!) 146/89   BP Location: Left arm Left arm   Patient Position: Sitting Sitting   Cuff Size: Adult Large Adult Large   Pulse: 80    Resp: 20    SpO2: 99%    Weight: 109.3 kg (241 lb)        Body mass index is 30.94 kg/m .      Gerry Leonard MA

## 2024-04-24 NOTE — PROGRESS NOTES
[facility] Initial Visit    Reason for Visit  Prem Taylor is a 73 year old male who is referred by Catherine Amaya for lung nodule and difficulty breathing  Pulmonary HPI    - He is referred for incidental lung nodule but also is eager to discuss difficulty breathing. He is unable to walk on treadmill as he used to, one flight of stairs is difficult but he used to be able to do more. Thinks symptoms started about a year ago. He has decreased the treadmill speed to allow himself to make it to half an hour. He used to do 4 mph, then backing off to 3.5 and 3 mph for 30 minutes. Also noticed some difficulty standing from a chair - he thinks his muscles are weaker than they should be despite doing weight training exercise. He has also noticed difficulty with laying flat  - morning stiffness especially in the low back, improves quickly  - no history of asthma  - last year he had an episode of syncope out of the blue, his cardiologist did an evaluation including a cardiac CT      Other active medical problems include:   - Atrial fibrillation   - CPAP for ALISE      ROS Pulmonary  Dyspnea: Yes, Cough: No, Chest pain: No, Wheezing: No, Sputum Production: No, Hemoptysis: No  A complete ROS was otherwise negative except as noted in the HPI.  The patient was seen and examined by Meghana Murray MD   Current Outpatient Medications   Medication Sig Dispense Refill    atorvastatin (LIPITOR) 40 MG tablet Take 1 tablet (40 mg) by mouth daily 90 tablet 3    escitalopram (LEXAPRO) 20 MG tablet Take 1 tablet (20 mg) by mouth daily 90 tablet 3    metoprolol succinate ER (TOPROL XL) 25 MG 24 hr tablet Take 0.5 tablets (12.5 mg) by mouth daily 45 tablet 3    MULTIVITAMIN (MULTIPLE VITAMIN ORAL) [MULTIVITAMIN (MULTIPLE VITAMIN ORAL)] Take 1 tablet by mouth daily.      Psyllium (DAILY FIBER PO) Take 2 tablets by mouth daily      warfarin ANTICOAGULANT (COUMADIN) 7.5 MG tablet Take 3.75 mg every  Tuesday, Take 7.5 mg all other days. Or as directed. 90 tablet 1     No current facility-administered medications for this visit.     No Known Allergies  Social History     Socioeconomic History    Marital status:      Spouse name: Not on file    Number of children: Not on file    Years of education: Not on file    Highest education level: Not on file   Occupational History    Not on file   Tobacco Use    Smoking status: Former     Types: Cigarettes    Smokeless tobacco: Never   Vaping Use    Vaping status: Never Used   Substance and Sexual Activity    Alcohol use: Yes    Drug use: No    Sexual activity: Not on file   Other Topics Concern    Not on file   Social History Narrative    Not on file     Social Determinants of Health     Financial Resource Strain: High Risk (11/20/2023)    Financial Resource Strain     Within the past 12 months, have you or your family members you live with been unable to get utilities (heat, electricity) when it was really needed?: Yes   Food Insecurity: Low Risk  (11/20/2023)    Food Insecurity     Within the past 12 months, did you worry that your food would run out before you got money to buy more?: No     Within the past 12 months, did the food you bought just not last and you didn t have money to get more?: No   Transportation Needs: Low Risk  (11/20/2023)    Transportation Needs     Within the past 12 months, has lack of transportation kept you from medical appointments, getting your medicines, non-medical meetings or appointments, work, or from getting things that you need?: No   Physical Activity: Not on file   Stress: Not on file   Social Connections: Not on file   Interpersonal Safety: Low Risk  (4/9/2024)    Interpersonal Safety     Do you feel physically and emotionally safe where you currently live?: Yes     Within the past 12 months, have you been hit, slapped, kicked or otherwise physically hurt by someone?: No     Within the past 12 months, have you been humiliated  or emotionally abused in other ways by your partner or ex-partner?: No   Housing Stability: Low Risk  (11/20/2023)    Housing Stability     Do you have housing? : Yes     Are you worried about losing your housing?: No     Past Medical History:   Diagnosis Date    Abnormal ECG     Anemia     Anxiety     Arrhythmia     Atrial fibrillation (H) 10/09/2017    Cancer (H) 2015    CPAP (continuous positive airway pressure) dependence 10/2019    per patient    Depression     Heart valve disease 2017    History of blood clots     Hyperlipidemia 2006    Pulmonary embolism, blood-clot, obstetric 10/09/2017    Sleep apnea 2017    Uses CPAP     Past Surgical History:   Procedure Laterality Date    BACK SURGERY      CERVICAL FUSION  2001    HERNIA REPAIR  2005    NM LAP,INGUINAL HERNIA REPR,INITIAL Left 8/15/2019    Procedure: HERNIORRHAPHY, INGUINAL, LAPAROSCOPIC;  Surgeon: Yan Leiva MD;  Location: [facility];  Service: General    TONSILLECTOMY      childhood     Family History   Problem Relation Age of Onset    Heart Disease Mother     Hypertension Mother     Dementia Mother     Coronary Artery Disease Father        Exam:   BP (!) 146/89 (BP Location: Left arm, Patient Position: Sitting, Cuff Size: Adult Large)   Pulse 80   Resp 20   Wt 109.3 kg (241 lb)   SpO2 99%   BMI 30.94 kg/m    GENERAL APPEARANCE: Well developed, well nourished, alert, and in no apparent distress.  RESP: normal percussion, good air flow throughout.  No crackles. No rhonchi. No wheezes.  SKIN: no rash on limited exam  NEURO: Mentation intact, speech normal, normal strength and tone, normal gait and stance  PSYCH: mentation appears normal. and affect normal/bright  Results:  - My interpretation of the images relevant for this visit includes: CT chest today images reviewed and compared to 12/27 cardiac CT. There has been no significant change.    - My interpretation of the PFT's relevant for this visit includes: Normal   Recent  hemoglobin is normal    Assessment and plan: Ryan is a 72 yo male with incidental indeterminate lung nodule as well as exertional dyspnea  Lung nodule - seen on coronary CT, nothing about the appearance of it is concerning.  But it is indeterminate   Considering patient with high risk for lung cancer, recommend follow up CT in  5 months per Julián 2017  Exertional dyspnea-I reviewed his pulmonary function tests which are normal except for a mildly increased residual volume.  We briefly discussed short acting bronchodilators as a trial but he prefers to minimize medications.  I do not see any objective evidence concerning for restrictive lung disease or neuromuscular disease contributing to dyspnea.  He feels reassured by all of these findings and will continue to work with his cardiologist.  I will plan to see him back in September.

## 2024-04-25 ENCOUNTER — LAB (OUTPATIENT)
Dept: LAB | Facility: CLINIC | Age: 74
End: 2024-04-25
Payer: COMMERCIAL

## 2024-04-25 ENCOUNTER — TRANSFERRED RECORDS (OUTPATIENT)
Dept: HEALTH INFORMATION MANAGEMENT | Facility: CLINIC | Age: 74
End: 2024-04-25

## 2024-04-25 ENCOUNTER — ANTICOAGULATION THERAPY VISIT (OUTPATIENT)
Dept: ANTICOAGULATION | Facility: CLINIC | Age: 74
End: 2024-04-25

## 2024-04-25 DIAGNOSIS — I26.99 PULMONARY EMBOLISM, BILATERAL (H): ICD-10-CM

## 2024-04-25 DIAGNOSIS — I48.21 PERMANENT ATRIAL FIBRILLATION (H): ICD-10-CM

## 2024-04-25 DIAGNOSIS — Z86.711 HISTORY OF PULMONARY EMBOLISM: ICD-10-CM

## 2024-04-25 DIAGNOSIS — I48.21 PERMANENT ATRIAL FIBRILLATION (H): Primary | ICD-10-CM

## 2024-04-25 LAB
DLCOUNC-%PRED-PRE: 94 %
DLCOUNC-PRE: 27.65 ML/MIN/MMHG
DLCOUNC-PRED: 29.28 ML/MIN/MMHG
ERV-%PRED-PRE: 66 %
ERV-PRE: 1.14 L
ERV-PRED: 1.72 L
EXPTIME-PRE: 7.44 SEC
FEF2575-%PRED-PRE: 83 %
FEF2575-PRE: 2.06 L/SEC
FEF2575-PRED: 2.48 L/SEC
FEFMAX-%PRED-PRE: 102 %
FEFMAX-PRE: 9.47 L/SEC
FEFMAX-PRED: 9.26 L/SEC
FEV1-%PRED-PRE: 96 %
FEV1-PRE: 3.28 L
FEV1FEV6-PRE: 69 %
FEV1FEV6-PRED: 77 %
FEV1FVC-PRE: 70 %
FEV1FVC-PRED: 76 %
FEV1SVC-PRE: 67 %
FEV1SVC-PRED: 73 %
FIFMAX-PRE: 6.35 L/SEC
FRCPLETH-%PRED-PRE: 123 %
FRCPLETH-PRE: 4.98 L
FRCPLETH-PRED: 4.02 L
FVC-%PRED-PRE: 103 %
FVC-PRE: 4.66 L
FVC-PRED: 4.52 L
IC-%PRED-PRE: 110 %
IC-PRE: 3.8 L
IC-PRED: 3.43 L
INR BLD: 2.3 (ref 0.9–1.1)
RVPLETH-%PRED-PRE: 134 %
RVPLETH-PRE: 3.84 L
RVPLETH-PRED: 2.86 L
TLCPLETH-%PRED-PRE: 107 %
TLCPLETH-PRE: 8.78 L
TLCPLETH-PRED: 8.14 L
VA-%PRED-PRE: 95 %
VA-PRE: 7.2 L
VC-%PRED-PRE: 105 %
VC-PRE: 4.94 L
VC-PRED: 4.66 L

## 2024-04-25 PROCEDURE — 36416 COLLJ CAPILLARY BLOOD SPEC: CPT

## 2024-04-25 PROCEDURE — 85610 PROTHROMBIN TIME: CPT

## 2024-04-25 NOTE — PROGRESS NOTES
ANTICOAGULATION MANAGEMENT     Prem Taylor 73 year old male is on warfarin with therapeutic INR result. (Goal INR 2.0-3.0)    Recent labs: (last 7 days)     04/25/24  0747   INR 2.3*       ASSESSMENT     Warfarin Lab Questionnaire    Warfarin Doses Last 7 Days      4/24/2024     4:06 PM   Dose in Tablet or Mg   TAB or MG? milligram (mg)     Pt Rptd Dose SUNDAY MONDAY TUESDAY WED THURS FRIDAY SATURDAY 4/24/2024   4:06 PM 7.5 7.5 3.75 7.5 7.5 7.5 7.5         4/24/2024   Warfarin Lab Questionnaire   Missed doses within past 14 days? Yes   If yes; please list when: 4/13 - 4/18  Doubled up on 4/19 - 4/20   Changes in diet or alcohol within past 14 days? No   Medication changes since last result? Yes   Please list: I started taking Metamucil. 2capsules/day.   Injuries or illness since last result? No   New shortness of breath, severe headaches or sudden changes in vision since last result? No   Abnormal bleeding since last result? No   Upcoming surgery, procedure? No     Previous result: Therapeutic last 2(+) visits  Additional findings: Colonoscopy 4/17. No issues.       PLAN     Recommended plan for temporary change(s) affecting INR     Dosing Instructions: Continue your current warfarin dose with next INR in 4 weeks       Summary  As of 4/25/2024      Full warfarin instructions:  3.75 mg every Tue; 7.5 mg all other days   Next INR check:  5/23/2024               Telephone call with Ryan who verbalizes understanding and agrees to plan    Lab visit scheduled    Education provided:   Goal range and lab monitoring: goal range and significance of current result  Contact [phone] with any changes, questions or concerns.     Plan made per [facility] anticoagulation protocol    oJsy Moe RN  Anticoagulation Clinic  4/25/2024    _______________________________________________________________________     Anticoagulation Episode Summary       Current INR goal:  2.0-3.0   TTR:  72.4% (1 y)   Target end date:  Indefinite    Send INR reminders to:  [facility]    Indications    Pulmonary embolism  bilateral (H) (Resolved) [I26.99]  Permanent atrial fibrillation (H) [I48.21]  Pulmonary embolism  bilateral (H) [I26.99]  History of pulmonary embolism [Z86.711]             Comments:  Approved for 8 week checks per -- see encounter from 07/27/21             Anticoagulation Care Providers       Provider Role Specialty Phone number    Mamadou Baez MD Referring Family Medicine [phone]

## 2024-05-01 ENCOUNTER — MYC MEDICAL ADVICE (OUTPATIENT)
Dept: CARDIOLOGY | Facility: CLINIC | Age: 74
End: 2024-05-01
Payer: COMMERCIAL

## 2024-05-01 DIAGNOSIS — I48.91 ATRIAL FIBRILLATION, UNSPECIFIED TYPE (H): ICD-10-CM

## 2024-05-03 RX ORDER — METOPROLOL SUCCINATE 25 MG/1
25 TABLET, EXTENDED RELEASE ORAL DAILY
Qty: 90 TABLET | Refills: 3 | Status: SHIPPED | OUTPATIENT
Start: 2024-05-03

## 2024-05-20 ENCOUNTER — MYC MEDICAL ADVICE (OUTPATIENT)
Dept: CARDIOLOGY | Facility: CLINIC | Age: 74
End: 2024-05-20
Payer: COMMERCIAL

## 2024-05-21 ENCOUNTER — LAB (OUTPATIENT)
Dept: LAB | Facility: CLINIC | Age: 74
End: 2024-05-21
Payer: COMMERCIAL

## 2024-05-21 ENCOUNTER — ANTICOAGULATION THERAPY VISIT (OUTPATIENT)
Dept: ANTICOAGULATION | Facility: CLINIC | Age: 74
End: 2024-05-21

## 2024-05-21 DIAGNOSIS — I26.99 PULMONARY EMBOLISM, BILATERAL (H): ICD-10-CM

## 2024-05-21 DIAGNOSIS — Z86.711 HISTORY OF PULMONARY EMBOLISM: ICD-10-CM

## 2024-05-21 DIAGNOSIS — I48.21 PERMANENT ATRIAL FIBRILLATION (H): Primary | ICD-10-CM

## 2024-05-21 DIAGNOSIS — I48.21 PERMANENT ATRIAL FIBRILLATION (H): ICD-10-CM

## 2024-05-21 LAB — INR BLD: 3.4 (ref 0.9–1.1)

## 2024-05-21 PROCEDURE — 85610 PROTHROMBIN TIME: CPT

## 2024-05-21 PROCEDURE — 36416 COLLJ CAPILLARY BLOOD SPEC: CPT

## 2024-05-21 NOTE — PROGRESS NOTES
ANTICOAGULATION MANAGEMENT     Prem Taylor 73 year old male is on warfarin with supratherapeutic INR result. (Goal INR 2.0-3.0)    Recent labs: (last 7 days)     05/21/24  0724   INR 3.4*       ASSESSMENT     Warfarin Lab Questionnaire    Warfarin Doses Last 7 Days      5/20/2024     9:40 AM   Dose in Tablet or Mg   TAB or MG? milligram (mg)     Pt Rptd Dose SUNDAY MONDAY TUESDAY WED THURS FRIDAY SATURDAY 5/20/2024   9:40 AM 7.5 7.5 3.75 7.5 7.5 7.5 7.5         5/20/2024   Warfarin Lab Questionnaire   Missed doses within past 14 days? No   Changes in diet or alcohol within past 14 days? No   Medication changes since last result? 5/1 Metoprolol increased-no interaction, Has been taking tylenol the past few weeks and was taking 3 tabs at HS but that amount has decreased now    Injuries or illness since last result? Back pain, going to chiro and feeling better   New shortness of breath, severe headaches or sudden changes in vision since last result? No   Abnormal bleeding since last result? No   Upcoming surgery, procedure? No     Previous result: Therapeutic last 2(+) visits  Additional findings: None       PLAN     Recommended plan for temporary change(s) affecting INR     Dosing Instructions: hold dose then continue your current warfarin dose with next INR in 2 weeks       Summary  As of 5/21/2024      Full warfarin instructions:  5/21: Hold; Otherwise 3.75 mg every Tue; 7.5 mg all other days   Next INR check:  6/4/2024               Telephone call with Ryan who verbalizes understanding and agrees to plan    Lab visit scheduled    Education provided:   Goal range and lab monitoring: goal range and significance of current result  Contact [phone] with any changes, questions or concerns.     Plan made per [facility] anticoagulation protocol    Yesenia Vázquez, RN  Anticoagulation Clinic  5/21/2024    _______________________________________________________________________     Anticoagulation Episode Summary        Current INR goal:  2.0-3.0   TTR:  76.9% (1 y)   Target end date:  Indefinite   Send INR reminders to:  [facility]    Indications    Pulmonary embolism  bilateral (H) (Resolved) [I26.99]  Permanent atrial fibrillation (H) [I48.21]  Pulmonary embolism  bilateral (H) [I26.99]  History of pulmonary embolism [Z86.711]             Comments:  Approved for 8 week checks per -- see encounter from 07/27/21             Anticoagulation Care Providers       Provider Role Specialty Phone number    Mamadou Baez MD Referring Family Medicine [phone]

## 2024-05-21 NOTE — TELEPHONE ENCOUNTER
Pt updated via [employer].  -ral    ----- Message -----  From: Shila Mckeon PA-C  Sent: 5/21/2024  12:39 PM CDT  To: Paulina Bliss RN  Subject: FW: Blood Pressure                               Overall improved, no changes at this time.  Continue to monitor blood pressure.    ----- Message -----  From: Paulina Bliss RN  Sent: 5/20/2024   3:30 PM CDT  To: Shila Mckeon PA-C  Subject: Blood Pressure                                   Hi Shila,    In [facility]'s absence - are you able to review message from pt to [facility]?  Pt is scheduled with you next on 7/11/24.

## 2024-05-22 ENCOUNTER — TRANSFERRED RECORDS (OUTPATIENT)
Dept: HEALTH INFORMATION MANAGEMENT | Facility: CLINIC | Age: 74
End: 2024-05-22
Payer: COMMERCIAL

## 2024-06-04 ENCOUNTER — ANTICOAGULATION THERAPY VISIT (OUTPATIENT)
Dept: ANTICOAGULATION | Facility: CLINIC | Age: 74
End: 2024-06-04

## 2024-06-04 ENCOUNTER — LAB (OUTPATIENT)
Dept: LAB | Facility: CLINIC | Age: 74
End: 2024-06-04
Payer: COMMERCIAL

## 2024-06-04 DIAGNOSIS — Z86.711 HISTORY OF PULMONARY EMBOLISM: ICD-10-CM

## 2024-06-04 DIAGNOSIS — I48.21 PERMANENT ATRIAL FIBRILLATION (H): ICD-10-CM

## 2024-06-04 DIAGNOSIS — I48.19 PERSISTENT ATRIAL FIBRILLATION (H): ICD-10-CM

## 2024-06-04 DIAGNOSIS — I26.99 PULMONARY EMBOLISM, BILATERAL (H): ICD-10-CM

## 2024-06-04 DIAGNOSIS — I48.21 PERMANENT ATRIAL FIBRILLATION (H): Primary | ICD-10-CM

## 2024-06-04 LAB — INR BLD: 2.7 (ref 0.9–1.1)

## 2024-06-04 PROCEDURE — 85610 PROTHROMBIN TIME: CPT

## 2024-06-04 PROCEDURE — 36416 COLLJ CAPILLARY BLOOD SPEC: CPT

## 2024-06-04 RX ORDER — WARFARIN SODIUM 7.5 MG/1
TABLET ORAL
Qty: 90 TABLET | Refills: 1 | Status: SHIPPED | OUTPATIENT
Start: 2024-06-04

## 2024-06-04 NOTE — TELEPHONE ENCOUNTER
ANTICOAGULATION MANAGEMENT:  Medication Refill    Anticoagulation Summary  As of 6/4/2024      Warfarin maintenance plan:  3.75 mg (7.5 mg x 0.5) every Tue; 7.5 mg (7.5 mg x 1) all other days   Next INR check:  7/2/2024   Target end date:  Indefinite    Indications    Pulmonary embolism  bilateral (H) (Resolved) [I26.99]  Permanent atrial fibrillation (H) [I48.21]  Pulmonary embolism  bilateral (H) [I26.99]  History of pulmonary embolism [Z86.711]                 Anticoagulation Care Providers       Provider Role Specialty Phone number    Mamadou Baez MD Referring Family Medicine [phone]            Refill Criteria    Visit with referring provider/group: Meets criteria: office visit within referring provider group in the last 1 year on 4/9/24    [facility] referral last signed: 09/06/2023; within last year: Yes    Lab monitoring not exceeding 2 weeks overdue: Yes    Prem meets all criteria for refill. Rx instructions and quantity supplied updated to match patient's current dosing plan. Warfarin 90 day supply with 1 refill granted per [facility] protocol     Josy Moe RN  Anticoagulation Clinic

## 2024-06-04 NOTE — PROGRESS NOTES
ANTICOAGULATION MANAGEMENT     Prem Taylor 73 year old male is on warfarin with therapeutic INR result. (Goal INR 2.0-3.0)    Recent labs: (last 7 days)     06/04/24  0726   INR 2.7*       ASSESSMENT     Warfarin Lab Questionnaire    Warfarin Doses Last 7 Days      6/3/2024    11:57 AM   Dose in Tablet or Mg   TAB or MG? milligram (mg)     Pt Rptd Dose SINDHU MONDAY TUESDAY WED THURS FRIDAY SATURDAY   6/3/2024  11:57 AM 7.5 7.5 3.75 7.5 7.5 7.5 7.5         6/3/2024   Warfarin Lab Questionnaire   Missed doses within past 14 days? No   Changes in diet or alcohol within past 14 days? No   Medication changes since last result? No   Injuries or illness since last result? No   New shortness of breath, severe headaches or sudden changes in vision since last result? No   Abnormal bleeding since last result? No   Upcoming surgery, procedure? No   Best number to call with results? [phone]     Previous result: Supratherapeutic-temporary change due to back pain  Additional findings: None       PLAN     Recommended plan for no diet, medication or health factor changes affecting INR     Dosing Instructions: Continue your current warfarin dose with next INR in 4-5 weeks due to holiday       Summary  As of 6/4/2024      Full warfarin instructions:  3.75 mg every Tue; 7.5 mg all other days   Next INR check:  7/2/2024               Detailed voice message left for Ryan with dosing instructions and follow up date.   Sent [employer] message with dosing and follow up instructions    Contact [phone] to schedule and with any changes, questions or concerns.     Education provided:   Please call back if any changes to your diet, medications or how you've been taking warfarin  Goal range and lab monitoring: goal range and significance of current result  Importance of notifying anticoagulation clinic for: changes in medications; a sooner lab recheck maybe needed  Written instructions provided  Contact [phone] with any changes,  questions or concerns.     Plan made per [facility] anticoagulation protocol    Josy Moe, RN  Anticoagulation Clinic  6/4/2024    _______________________________________________________________________     Anticoagulation Episode Summary       Current INR goal:  2.0-3.0   TTR:  77.0% (1 y)   Target end date:  Indefinite   Send INR reminders to:  [facility]    Indications    Pulmonary embolism  bilateral (H) (Resolved) [I26.99]  Permanent atrial fibrillation (H) [I48.21]  Pulmonary embolism  bilateral (H) [I26.99]  History of pulmonary embolism [Z86.711]             Comments:  Approved for 8 week checks per -- see encounter from 07/27/21             Anticoagulation Care Providers       Provider Role Specialty Phone number    Mamadou Baez MD Referring Family Medicine [phone]

## 2024-07-02 ENCOUNTER — ANTICOAGULATION THERAPY VISIT (OUTPATIENT)
Dept: ANTICOAGULATION | Facility: CLINIC | Age: 74
End: 2024-07-02

## 2024-07-02 ENCOUNTER — LAB (OUTPATIENT)
Dept: LAB | Facility: CLINIC | Age: 74
End: 2024-07-02
Payer: COMMERCIAL

## 2024-07-02 DIAGNOSIS — Z86.711 HISTORY OF PULMONARY EMBOLISM: ICD-10-CM

## 2024-07-02 DIAGNOSIS — I48.21 PERMANENT ATRIAL FIBRILLATION (H): Primary | ICD-10-CM

## 2024-07-02 DIAGNOSIS — I26.99 PULMONARY EMBOLISM, BILATERAL (H): ICD-10-CM

## 2024-07-02 DIAGNOSIS — I48.21 PERMANENT ATRIAL FIBRILLATION (H): ICD-10-CM

## 2024-07-02 LAB — INR BLD: 3.5 (ref 0.9–1.1)

## 2024-07-02 PROCEDURE — 85610 PROTHROMBIN TIME: CPT

## 2024-07-02 PROCEDURE — 36416 COLLJ CAPILLARY BLOOD SPEC: CPT

## 2024-07-02 NOTE — PROGRESS NOTES
ANTICOAGULATION MANAGEMENT     Prem Taylor 73 year old male is on warfarin with supratherapeutic INR result. (Goal INR 2.0-3.0)    Recent labs: (last 7 days)     07/02/24  0740   INR 3.5*       ASSESSMENT     Warfarin Lab Questionnaire    Warfarin Doses Last 7 Days      7/1/2024     9:28 AM   Dose in Tablet or Mg   TAB or MG? milligram (mg)     Pt Rptd Dose SUNDAY MONDAY TUESDAY WED THURS FRIDAY SATURDAY 7/1/2024   9:28 AM 7.5 7.5 3.75 7.5 7.5 7.5 7.5         7/1/2024   Warfarin Lab Questionnaire   Missed doses within past 14 days? No   Changes in diet or alcohol within past 14 days? ETOH drink last night   Medication changes since last result? No   Injuries or illness since last result? No   New shortness of breath, severe headaches or sudden changes in vision since last result? No   Abnormal bleeding since last result? No   Upcoming surgery, procedure? No   Best number to call with results? [phone]        Previous result: Therapeutic last visit; previously outside of goal range  Additional findings: None       PLAN     Recommended plan for temporary change(s) affecting INR     Dosing Instructions: hold dose then continue your current warfarin dose with next INR in 1 week       Summary  As of 7/2/2024      Full warfarin instructions:  7/2: Hold; Otherwise 3.75 mg every Tue; 7.5 mg all other days   Next INR check:  7/9/2024               Telephone call with Ryan who verbalizes understanding and agrees to plan    Lab visit scheduled    Education provided: Goal range and lab monitoring: goal range and significance of current result  Contact [phone] with any changes, questions or concerns.     Plan made per [facility] anticoagulation protocol    Yesenia Vázquez, RN  Anticoagulation Clinic  7/2/2024    _______________________________________________________________________     Anticoagulation Episode Summary       Current INR goal:  2.0-3.0   TTR:  72.2% (1 y)   Target end date:  Indefinite   Send INR  reminders to:  [facility]    Indications    Pulmonary embolism  bilateral (H) (Resolved) [I26.99]  Permanent atrial fibrillation (H) [I48.21]  Pulmonary embolism  bilateral (H) [I26.99]  History of pulmonary embolism [Z86.711]             Comments:  Approved for 8 week checks per -- see encounter from 07/27/21             Anticoagulation Care Providers       Provider Role Specialty Phone number    Mamadou Baez MD Referring Family Medicine [phone]

## 2024-07-09 ENCOUNTER — LAB (OUTPATIENT)
Dept: CARDIOLOGY | Facility: CLINIC | Age: 74
End: 2024-07-09
Payer: COMMERCIAL

## 2024-07-09 ENCOUNTER — TELEPHONE (OUTPATIENT)
Dept: FAMILY MEDICINE | Facility: CLINIC | Age: 74
End: 2024-07-09

## 2024-07-09 ENCOUNTER — ANTICOAGULATION THERAPY VISIT (OUTPATIENT)
Dept: ANTICOAGULATION | Facility: CLINIC | Age: 74
End: 2024-07-09

## 2024-07-09 DIAGNOSIS — I25.83 CORONARY ARTERY DISEASE DUE TO LIPID RICH PLAQUE: ICD-10-CM

## 2024-07-09 DIAGNOSIS — I48.21 PERMANENT ATRIAL FIBRILLATION (H): Primary | ICD-10-CM

## 2024-07-09 DIAGNOSIS — E78.00 PURE HYPERCHOLESTEROLEMIA: ICD-10-CM

## 2024-07-09 DIAGNOSIS — Z86.711 HISTORY OF PULMONARY EMBOLISM: ICD-10-CM

## 2024-07-09 DIAGNOSIS — Z79.01 LONG TERM (CURRENT) USE OF ANTICOAGULANTS: Primary | ICD-10-CM

## 2024-07-09 DIAGNOSIS — I25.10 CORONARY ARTERY DISEASE DUE TO LIPID RICH PLAQUE: ICD-10-CM

## 2024-07-09 DIAGNOSIS — I26.99 PULMONARY EMBOLISM, BILATERAL (H): ICD-10-CM

## 2024-07-09 LAB
CHOLEST SERPL-MCNC: 160 MG/DL
FASTING STATUS PATIENT QL REPORTED: YES
HDLC SERPL-MCNC: 67 MG/DL
INR POINT OF CARE: 2.8 (ref 0.9–1.1)
LDLC SERPL CALC-MCNC: 80 MG/DL
NONHDLC SERPL-MCNC: 93 MG/DL
TRIGL SERPL-MCNC: 66 MG/DL

## 2024-07-09 PROCEDURE — 85610 PROTHROMBIN TIME: CPT

## 2024-07-09 PROCEDURE — 80061 LIPID PANEL: CPT

## 2024-07-09 PROCEDURE — 36415 COLL VENOUS BLD VENIPUNCTURE: CPT

## 2024-07-09 NOTE — TELEPHONE ENCOUNTER
Patient returning call to INR nurse but no answer at number listed.   Please call patient back at   [phone]

## 2024-07-09 NOTE — PROGRESS NOTES
ANTICOAGULATION MANAGEMENT     Prem Taylor 73 year old male is on warfarin with therapeutic INR result. (Goal INR 2.0-3.0)    Recent labs: (last 7 days)     07/09/24  0737   INR 2.8*       ASSESSMENT     Source(s): Chart Review and Patient/Caregiver Call     Warfarin doses taken: Warfarin taken as instructed  Diet: No new diet changes identified  Medication/supplement changes: None noted  New illness, injury, or hospitalization: No  Signs or symptoms of bleeding or clotting: No  Previous result: Supratherapeutic  Additional findings: None       PLAN     Recommended plan for no diet, medication or health factor changes affecting INR     Dosing Instructions: Continue your current warfarin dose with next INR in 1-2 weeks       Summary  As of 7/9/2024      Full warfarin instructions:  3.75 mg every Tue; 7.5 mg all other days   Next INR check:  7/23/2024               Telephone call with Ryan who verbalizes understanding and agrees to plan    Lab visit scheduled    Education provided: Goal range and lab monitoring: goal range and significance of current result and Importance of therapeutic range    Plan made per [facility] anticoagulation protocol    Annabella Hannah RN  Anticoagulation Clinic  7/9/2024    _______________________________________________________________________     Anticoagulation Episode Summary       Current INR goal:  2.0-3.0   TTR:  70.8% (1 y)   Target end date:  Indefinite   Send INR reminders to:  [facility]    Indications    Pulmonary embolism  bilateral (H) (Resolved) [I26.99]  Permanent atrial fibrillation (H) [I48.21]  Pulmonary embolism  bilateral (H) [I26.99]  History of pulmonary embolism [Z86.711]             Comments:  Approved for 8 week checks per -- see encounter from 07/27/21             Anticoagulation Care Providers       Provider Role Specialty Phone number    Mamadou Baez MD Referring Family Medicine [phone]

## 2024-07-10 NOTE — PROGRESS NOTES
"  HEART CARE ENCOUNTER CONSULTATON NOTE      [facility]  [phone]      Assessment/Recommendations   Assessment:   Dyspnea on exertion: Possible angina equivalent. Stable, mild.  Continues to exercise regularly but intensity is reduced.  More noticeable with stairs.  CT 5 months to monitor lung nodule, review of PFT without objective evidence for lung involvement in breathing.  3% PVCs on MCOT.  Coronary artery disease: CCTA 12/2023 shows calcium score 541 \"The left anterior descending is relatively diffusely diseased. In the proximal segment there is a moderately obstructive mixed plaque as well as in the mid segment of the LAD. The distal LAD is mildly obstructed with calcified plaque. There is a large second diagonal branch which appears to have a moderately severe ostial narrowing with mixed plaque.\" -Denies chest pain  Permanent/chronic atrial fibrillation: Warfarin, Metoprolol succinate 25 mg with controlled rates per patient's Fitbit monitoring, as well as cardiac monitor your November 2023  Hypercholesterolemia: LDL 80, stable from 79 3 months ago on atorvastatin 40 mg   Hypertension: Controlled  ALISE: CPAP nightly  Right bundle branch block  History of pulmonary embolism on chronic warfarin therapy        Plan:   Trial increase in atorvastatin to discuss monitoring myalgias.  Without tolerance or eventual LDL less than 70 Consider transition to rosuvastatin.  Repeat lipid panel in 3 to 4 months  We discussed CAD monitoring, management of dyspnea with exertion.  Given stability, known maintained LVEF discussed trial of Imdur 30 mg daily and monitoring for improvement.  Patient is agreeable to try this.  Potential side effects discussed.  Continue with CT monitoring and pulmonology follow-up.  With persistent shortness of breath would consider trial of inhaler to rule out lung involvement despite largely normal PFTs.        Follow up in 3-4 months with Dr. Velez     History of Present " Illness/Subjective    HPI: Prem Taylor is a 73 year old male with PMHx of CAD, HLD, HTN, ALISE, chronic atrial fibrillation PE presents for follow up of dyspnea on exertion.    Patient reports persistent mild dyspnea with exertion that is not worsening or improving since last visit in April.  He has known moderate to CAD, possibly more severe lesion in D2 branch per CCTA 2023.  Echocardiogram at that time showed maintained LVEF.  He does get winded easier with going up stairs.  Months ago had to reduce his treadmill speed when he exercises due to being more breath.,  He continues to exercise regularly as well as with yard work which he tolerates well.  He denies any chest pain.  Maybe a little more fatigued in the past year and feels he could nap easier.    Blood pressure following increase in metoprolol has remained well-controlled at home.  Although no effect on shortness of breath.  He denies lightheadedness, orthopnea, PND, palpitations, and lower extremity edema.        CTA 2023                                                         IMPRESSION:     1. Total Agatston score 541, placing the patient in the 71st  percentile when compared to age and gender matched control group.     2. Diffuse coronary disease as outlined below.     3.  Please review Radiology report for incidental noncardiac findings  that  will follow separately.     FINDINGS:     CORONARY CALCIUM SCORE: The total Agatston calcium score is 541, Left  main: 3.37, left anterior descendin,  circumflex: 125, right  coronary artery: 137. This places the patient in the 71st percentile  when compared to age and gender matched control group.     CORONARY CT ANGIOGRAPHY     DOMINANCE: Right dominant system.      LEFT MAIN: The left main arises normally from the left coronary cusp  and there is mildly obstructed with calcified plaque.     LEFT ANTERIOR DESCENDING: The left anterior descending is relatively  diffusely diseased. In the proximal  segment there is a moderately  obstructive mixed plaque as well as in the mid segment of the LAD. The  distal LAD is mildly obstructed with calcified plaque. There is a  large second diagonal branch which appears to have a moderately severe  severe ostial narrowing with mixed plaque.    Echocardiogram stress 12/2023 Results:  Interpretation Summary  1. Normal stress echocardiogram without evidence of stress induced ischemia.  2. Normal resting LV systolic performance with an ejection fraction of 55-60%.  There is normal improvement in left ventricular systolic performance with a  peak ejection fraction of 70-75%.  3. Nondiagnostic stress EKG due to baseline right bundle branch block  4. No anginal chest pain reported with exercise.  5. Fair functional capacity for age.     Electrocardiogram: Baseline ECG reveals atrial fibrillation and right bundle  branch block. With exercise, there are no diagnostic EKG changes     Baseline echocardiogram: Technically adequate images were obtained in the  standard quad-screen format. LV systolic performance is normal with a visually  estimated ejection fraction of 55-60%. There is normal regional wall motion.  No significant valvular heart disease is identified on limited screening  Doppler.       Mobile cardiac telemetry monitoring from 11/29/2023 to 12/12/2023 (monitored duration 13d 7h 4m).  Continuous atrial fibrillation, ventricular rates 50 to 139bpm, average 71bpm.  IVCD present.  There were no pauses of over 3.0s.  Occasional premature ventricular contractions (3%).  Symptom triggers (11, dyspnea, lightheaded, palpitations, other) correlated to atrial fibrillation with ventricular rates 61-139bpm.        Physical Examination  Review of Systems   Vitals: /84 (BP Location: Right arm, Patient Position: Sitting, Cuff Size: Adult Large)   Pulse 73   Resp 16   Wt 108 kg (238 lb)   SpO2 98%   BMI 30.56 kg/m    BMI= Body mass index is 30.56 kg/m .  Wt Readings from Last  3 Encounters:   07/11/24 108 kg (238 lb)   04/24/24 109.3 kg (241 lb)   04/11/24 111.1 kg (245 lb)           ENT/Mouth: membranes moist, no oral lesions or bleeding gums.      EYES:  no scleral icterus, normal conjunctivae       Chest/Lungs:   lungs are clear to auscultation, no rales or wheezing, equal chest wall expansion    Cardiovascular:   Regular. Normal first and second heart sounds with no murmurs, rubs, or gallops; the radial and posterior tibial pulses are intact, edema bilaterally        Extremities: no cyanosis or clubbing   Skin: no xanthelasma, warm.    Neurologic: no tremors     Psychiatric: alert and oriented x3, calm        Please refer above for cardiac ROS details.        Medical History  Surgical History Family History Social History   Past Medical History:   Diagnosis Date    Abnormal ECG     Anemia     Anxiety     Arrhythmia     Atrial fibrillation (H) 10/09/2017    Cancer (H) 2015    CPAP (continuous positive airway pressure) dependence 10/2019    per patient    Depression     Heart valve disease 2017    History of blood clots     Hyperlipidemia 2006    Pulmonary embolism, blood-clot, obstetric 10/09/2017    Sleep apnea 2017    Uses CPAP     Past Surgical History:   Procedure Laterality Date    BACK SURGERY      CERVICAL FUSION  2001    HERNIA REPAIR  2005    NE LAP,INGUINAL HERNIA REPR,INITIAL Left 8/15/2019    Procedure: HERNIORRHAPHY, INGUINAL, LAPAROSCOPIC;  Surgeon: Yan Leiva MD;  Location: [facility];  Service: General    TONSILLECTOMY      childhood     Family History   Problem Relation Age of Onset    Heart Disease Mother     Hypertension Mother     Dementia Mother     Coronary Artery Disease Father     LUNG DISEASE No family hx of         Social History     Socioeconomic History    Marital status:      Spouse name: Not on file    Number of children: Not on file    Years of education: Not on file    Highest education level: Not on file   Occupational History     Not on file   Tobacco Use    Smoking status: Former     Current packs/day: 0.00     Average packs/day: 1.5 packs/day for 22.9 years (34.3 ttl pk-yrs)     Types: Cigarettes     Start date:      Quit date: 1990     Years since quittin.6    Smokeless tobacco: Never   Vaping Use    Vaping status: Never Used   Substance and Sexual Activity    Alcohol use: Yes    Drug use: No    Sexual activity: Not on file   Other Topics Concern    Not on file   Social History Narrative    Has worked as a dental  and supervisor (more office work)     Social Determinants of Health     Financial Resource Strain: High Risk (2023)    Financial Resource Strain     Within the past 12 months, have you or your family members you live with been unable to get utilities (heat, electricity) when it was really needed?: Yes   Food Insecurity: Low Risk  (2023)    Food Insecurity     Within the past 12 months, did you worry that your food would run out before you got money to buy more?: No     Within the past 12 months, did the food you bought just not last and you didn t have money to get more?: No   Transportation Needs: Low Risk  (2023)    Transportation Needs     Within the past 12 months, has lack of transportation kept you from medical appointments, getting your medicines, non-medical meetings or appointments, work, or from getting things that you need?: No   Physical Activity: Not on file   Stress: Not on file   Social Connections: Not on file   Interpersonal Safety: Low Risk  (2024)    Interpersonal Safety     Do you feel physically and emotionally safe where you currently live?: Yes     Within the past 12 months, have you been hit, slapped, kicked or otherwise physically hurt by someone?: No     Within the past 12 months, have you been humiliated or emotionally abused in other ways by your partner or ex-partner?: No   Housing Stability: Low Risk  (2023)    Housing Stability     Do you  "have housing? : Yes     Are you worried about losing your housing?: No           Medications  Allergies   Current Outpatient Medications   Medication Sig Dispense Refill    atorvastatin (LIPITOR) 80 MG tablet Take 1 tablet (80 mg) by mouth daily 90 tablet 3    escitalopram (LEXAPRO) 20 MG tablet Take 1 tablet (20 mg) by mouth daily 90 tablet 3    isosorbide mononitrate (IMDUR) 30 MG 24 hr tablet Take 1 tablet (30 mg) by mouth daily 30 tablet 3    metoprolol succinate ER (TOPROL XL) 25 MG 24 hr tablet Take 1 tablet (25 mg) by mouth daily 90 tablet 3    MULTIVITAMIN (MULTIPLE VITAMIN ORAL) [MULTIVITAMIN (MULTIPLE VITAMIN ORAL)] Take 1 tablet by mouth daily.      warfarin ANTICOAGULANT (COUMADIN) 7.5 MG tablet Take 3.75 mg every Tuesday; Take 7.5 mg all other days. Or as directed. 90 tablet 1    Psyllium (DAILY FIBER PO) Take 2 tablets by mouth daily       No Known Allergies       Lab Results    Chemistry/lipid CBC Cardiac Enzymes/BNP/TSH/INR   Recent Labs   Lab Test 07/09/24  0746   CHOL 160   HDL 67   LDL 80   TRIG 66     Recent Labs   Lab Test 07/09/24  0746 04/09/24  0743 04/11/23  0804   LDL 80 79 136*     Recent Labs   Lab Test 04/09/24  0743      POTASSIUM 4.1   CHLORIDE 105   CO2 25   GLC 93   BUN 12.8   CR 0.77   GFRESTIMATED >90   DANDY 9.3     Recent Labs   Lab Test 04/09/24  0743 12/27/23  1302 11/21/23  1218   CR 0.77 0.8 0.95     No results for input(s): \"A1C\" in the last 56850 hours.       Recent Labs   Lab Test 04/09/24  0743   WBC 4.2   HGB 13.3   HCT 39.9*   MCV 92        Recent Labs   Lab Test 04/09/24  0743 11/21/23  1218 09/15/23  0741   HGB 13.3 13.3 13.4    No results for input(s): \"TROPONINI\" in the last 13836 hours.  Recent Labs   Lab Test 11/28/23  1045   NTBNP 714     Recent Labs   Lab Test 11/21/23  1218   TSH 1.77     Recent Labs   Lab Test 07/09/24  0737 07/02/24  0740 06/04/24  0726   INR 2.8* 3.5* 2.7*          This note has been dictated using voice recognition software. Any " grammatical, typographical, or context distortions are unintentional and inherent to the software    Shila Mckeon PA-C

## 2024-07-11 ENCOUNTER — OFFICE VISIT (OUTPATIENT)
Dept: CARDIOLOGY | Facility: CLINIC | Age: 74
End: 2024-07-11
Payer: COMMERCIAL

## 2024-07-11 VITALS
HEART RATE: 73 BPM | OXYGEN SATURATION: 98 % | SYSTOLIC BLOOD PRESSURE: 136 MMHG | RESPIRATION RATE: 16 BRPM | DIASTOLIC BLOOD PRESSURE: 84 MMHG | BODY MASS INDEX: 30.56 KG/M2 | WEIGHT: 238 LBS

## 2024-07-11 DIAGNOSIS — R06.09 DOE (DYSPNEA ON EXERTION): Primary | ICD-10-CM

## 2024-07-11 DIAGNOSIS — G47.33 OSA ON CPAP: ICD-10-CM

## 2024-07-11 DIAGNOSIS — E78.00 PURE HYPERCHOLESTEROLEMIA: ICD-10-CM

## 2024-07-11 DIAGNOSIS — I25.118 CORONARY ARTERY DISEASE INVOLVING NATIVE CORONARY ARTERY OF NATIVE HEART WITH OTHER FORM OF ANGINA PECTORIS (H): ICD-10-CM

## 2024-07-11 DIAGNOSIS — I48.21 PERMANENT ATRIAL FIBRILLATION (H): ICD-10-CM

## 2024-07-11 DIAGNOSIS — I10 BENIGN ESSENTIAL HYPERTENSION: ICD-10-CM

## 2024-07-11 PROCEDURE — 99214 OFFICE O/P EST MOD 30 MIN: CPT

## 2024-07-11 RX ORDER — ISOSORBIDE MONONITRATE 30 MG/1
30 TABLET, EXTENDED RELEASE ORAL DAILY
Qty: 30 TABLET | Refills: 3 | Status: SHIPPED | OUTPATIENT
Start: 2024-07-11 | End: 2024-07-11

## 2024-07-11 RX ORDER — ATORVASTATIN CALCIUM 80 MG/1
80 TABLET, FILM COATED ORAL DAILY
Qty: 90 TABLET | Refills: 3 | Status: SHIPPED | OUTPATIENT
Start: 2024-07-11 | End: 2024-08-16

## 2024-07-11 RX ORDER — ISOSORBIDE MONONITRATE 30 MG/1
30 TABLET, EXTENDED RELEASE ORAL DAILY
Qty: 30 TABLET | Refills: 3 | Status: SHIPPED | OUTPATIENT
Start: 2024-07-11 | End: 2024-07-12

## 2024-07-11 NOTE — PATIENT INSTRUCTIONS
It was a pleasure taking part in your care today:    - Trial Isosorbide (Imdur) 30 mg once daily. Monitor for lightheadedness or headaches. Check in with me in 2-3 weeks.   - Increase atorvastatin to 80 mg once daily. Repeat fasting lipid panel in 3 months. Watch out for increasing muscle aches.  - Consider trial of inhaler with pulmonologist if no affect of isosorbide  - Follow up with Dr. Velez in 3-4 months    Please call the [facility] clinic with any questions or concerns at [phone].     Shila Mckeon PA-C

## 2024-07-11 NOTE — LETTER
"7/11/2024    Mamadou Baez MD  [address]  [facility] [address]    RE: Prem TERRIE Taylor       Dear Colleague,     I had the [facility] of [facility]CON [facility].    HEART CARE ENCOUNTER CONSULTATON NOTE      [facility]  [phone]      Assessment/Recommendations   Assessment:   Dyspnea on exertion: Possible angina equivalent. Stable, mild.  Continues to exercise regularly but intensity is reduced.  More noticeable with stairs.  CT 5 months to monitor lung nodule, review of PFT without objective evidence for lung involvement in breathing.  3% PVCs on MCOT.  Coronary artery disease: CCTA 12/2023 shows calcium score 541 \"The left anterior descending is relatively diffusely diseased. In the proximal segment there is a moderately obstructive mixed plaque as well as in the mid segment of the LAD. The distal LAD is mildly obstructed with calcified plaque. There is a large second diagonal branch which appears to have a moderately severe ostial narrowing with mixed plaque.\" -Denies chest pain  Permanent/chronic atrial fibrillation: Warfarin, Metoprolol succinate 25 mg with controlled rates per patient's Fitbit monitoring, as well as cardiac monitor your November 2023  Hypercholesterolemia: LDL 80, stable from 79 3 months ago on atorvastatin 40 mg   Hypertension: Controlled  ALISE: CPAP nightly  Right bundle branch block  History of pulmonary embolism on chronic warfarin therapy        Plan:   Trial increase in atorvastatin to discuss monitoring myalgias.  Without tolerance or eventual LDL less than 70 Consider transition to rosuvastatin.  Repeat lipid panel in 3 to 4 months  We discussed CAD monitoring, management of dyspnea with exertion.  Given stability, known maintained LVEF discussed trial of Imdur 30 mg daily and monitoring for improvement.  Patient is agreeable to try this.  Potential side effects discussed.  Continue with CT monitoring and neurology " follow-up.  With persistent shortness of breath would consider trial of inhaler to rule out lung involvement despite largely normal PFTs.        Follow up in 3-4 months with Dr. Velez     History of Present Illness/Subjective    HPI: Prem Taylor is a 73 year old male with PMHx of CAD, HLD, HTN, ALISE, chronic atrial fibrillation PE presents for follow up of dyspnea on exertion.    Patient reports persistent mild dyspnea with exertion that is not worsening or improving since last visit in April.  He has known moderate to CAD, possibly more severe lesion in D2 branch per CCTA 2023.  Echocardiogram at that time showed maintained LVEF.  He does get winded easier with going up stairs.  Months ago had to reduce his treadmill speed when he exercises due to being more breath.,  He continues to exercise regularly as well as with yard work which he tolerates well.  He denies any chest pain.  Maybe a little more fatigued in the past year and feels he could nap easier.    Blood pressure following increase in metoprolol has remained well-controlled at home.  Although no effect on shortness of breath.  He denies lightheadedness, orthopnea, PND, palpitations, and lower extremity edema.        CTA 2023                                                         IMPRESSION:     1. Total Agatston score 541, placing the patient in the 71st  percentile when compared to age and gender matched control group.     2. Diffuse coronary disease as outlined below.     3.  Please review Radiology report for incidental noncardiac findings  that  will follow separately.     FINDINGS:     CORONARY CALCIUM SCORE: The total Agatston calcium score is 541, Left  main: 3.37, left anterior descendin,  circumflex: 125, right  coronary artery: 137. This places the patient in the 71st percentile  when compared to age and gender matched control group.     CORONARY CT ANGIOGRAPHY     DOMINANCE: Right dominant system.      LEFT MAIN: The left main  arises normally from the left coronary cusp  and there is mildly obstructed with calcified plaque.     LEFT ANTERIOR DESCENDING: The left anterior descending is relatively  diffusely diseased. In the proximal segment there is a moderately  obstructive mixed plaque as well as in the mid segment of the LAD. The  distal LAD is mildly obstructed with calcified plaque. There is a  large second diagonal branch which appears to have a moderately severe  severe ostial narrowing with mixed plaque.    Echocardiogram stress 12/2023 Results:  Interpretation Summary  1. Normal stress echocardiogram without evidence of stress induced ischemia.  2. Normal resting LV systolic performance with an ejection fraction of 55-60%.  There is normal improvement in left ventricular systolic performance with a  peak ejection fraction of 70-75%.  3. Nondiagnostic stress EKG due to baseline right bundle branch block  4. No anginal chest pain reported with exercise.  5. Fair functional capacity for age.     Electrocardiogram: Baseline ECG reveals atrial fibrillation and right bundle  branch block. With exercise, there are no diagnostic EKG changes     Baseline echocardiogram: Technically adequate images were obtained in the  standard quad-screen format. LV systolic performance is normal with a visually  estimated ejection fraction of 55-60%. There is normal regional wall motion.  No significant valvular heart disease is identified on limited screening  Doppler.       Mobile cardiac telemetry monitoring from 11/29/2023 to 12/12/2023 (monitored duration 13d 7h 4m).  Continuous atrial fibrillation, ventricular rates 50 to 139bpm, average 71bpm.  IVCD present.  There were no pauses of over 3.0s.  Occasional premature ventricular contractions (3%).  Symptom triggers (11, dyspnea, lightheaded, palpitations, other) correlated to atrial fibrillation with ventricular rates 61-139bpm.        Physical Examination  Review of Systems   Vitals: /84 (BP  Location: Right arm, Patient Position: Sitting, Cuff Size: Adult Large)   Pulse 73   Resp 16   Wt 108 kg (238 lb)   SpO2 98%   BMI 30.56 kg/m    BMI= Body mass index is 30.56 kg/m .  Wt Readings from Last 3 Encounters:   07/11/24 108 kg (238 lb)   04/24/24 109.3 kg (241 lb)   04/11/24 111.1 kg (245 lb)           ENT/Mouth: membranes moist, no oral lesions or bleeding gums.      EYES:  no scleral icterus, normal conjunctivae       Chest/Lungs:   lungs are clear to auscultation, no rales or wheezing, equal chest wall expansion    Cardiovascular:   Regular. Normal first and second heart sounds with no murmurs, rubs, or gallops; the radial and posterior tibial pulses are intact, edema bilaterally        Extremities: no cyanosis or clubbing   Skin: no xanthelasma, warm.    Neurologic: no tremors     Psychiatric: alert and oriented x3, calm        Please refer above for cardiac ROS details.        Medical History  Surgical History Family History Social History   Past Medical History:   Diagnosis Date    Abnormal ECG     Anemia     Anxiety     Arrhythmia     Atrial fibrillation (H) 10/09/2017    Cancer (H) 2015    CPAP (continuous positive airway pressure) dependence 10/2019    per patient    Depression     Heart valve disease 2017    History of blood clots     Hyperlipidemia 2006    Pulmonary embolism, blood-clot, obstetric 10/09/2017    Sleep apnea 2017    Uses CPAP     Past Surgical History:   Procedure Laterality Date    BACK SURGERY      CERVICAL FUSION  2001    HERNIA REPAIR  2005    DC LAP,INGUINAL HERNIA REPR,INITIAL Left 8/15/2019    Procedure: HERNIORRHAPHY, INGUINAL, LAPAROSCOPIC;  Surgeon: Yan Leiva MD;  Location: [facility];  Service: General    TONSILLECTOMY      childhood     Family History   Problem Relation Age of Onset    Heart Disease Mother     Hypertension Mother     Dementia Mother     Coronary Artery Disease Father     LUNG DISEASE No family hx of         Social History      Socioeconomic History    Marital status:      Spouse name: Not on file    Number of children: Not on file    Years of education: Not on file    Highest education level: Not on file   Occupational History    Not on file   Tobacco Use    Smoking status: Former     Current packs/day: 0.00     Average packs/day: 1.5 packs/day for 22.9 years (34.3 ttl pk-yrs)     Types: Cigarettes     Start date:      Quit date: 1990     Years since quittin.6    Smokeless tobacco: Never   Vaping Use    Vaping status: Never Used   Substance and Sexual Activity    Alcohol use: Yes    Drug use: No    Sexual activity: Not on file   Other Topics Concern    Not on file   Social History Narrative    Has worked as a dental  and supervisor (more office work)     Social Determinants of Health     Financial Resource Strain: High Risk (2023)    Financial Resource Strain     Within the past 12 months, have you or your family members you live with been unable to get utilities (heat, electricity) when it was really needed?: Yes   Food Insecurity: Low Risk  (2023)    Food Insecurity     Within the past 12 months, did you worry that your food would run out before you got money to buy more?: No     Within the past 12 months, did the food you bought just not last and you didn t have money to get more?: No   Transportation Needs: Low Risk  (2023)    Transportation Needs     Within the past 12 months, has lack of transportation kept you from medical appointments, getting your medicines, non-medical meetings or appointments, work, or from getting things that you need?: No   Physical Activity: Not on file   Stress: Not on file   Social Connections: Not on file   Interpersonal Safety: Low Risk  (2024)    Interpersonal Safety     Do you feel physically and emotionally safe where you currently live?: Yes     Within the past 12 months, have you been hit, slapped, kicked or otherwise physically hurt by  "someone?: No     Within the past 12 months, have you been humiliated or emotionally abused in other ways by your partner or ex-partner?: No   Housing Stability: Low Risk  (11/20/2023)    Housing Stability     Do you have housing? : Yes     Are you worried about losing your housing?: No           Medications  Allergies   Current Outpatient Medications   Medication Sig Dispense Refill    atorvastatin (LIPITOR) 80 MG tablet Take 1 tablet (80 mg) by mouth daily 90 tablet 3    escitalopram (LEXAPRO) 20 MG tablet Take 1 tablet (20 mg) by mouth daily 90 tablet 3    isosorbide mononitrate (IMDUR) 30 MG 24 hr tablet Take 1 tablet (30 mg) by mouth daily 30 tablet 3    metoprolol succinate ER (TOPROL XL) 25 MG 24 hr tablet Take 1 tablet (25 mg) by mouth daily 90 tablet 3    MULTIVITAMIN (MULTIPLE VITAMIN ORAL) [MULTIVITAMIN (MULTIPLE VITAMIN ORAL)] Take 1 tablet by mouth daily.      warfarin ANTICOAGULANT (COUMADIN) 7.5 MG tablet Take 3.75 mg every Tuesday; Take 7.5 mg all other days. Or as directed. 90 tablet 1    Psyllium (DAILY FIBER PO) Take 2 tablets by mouth daily       No Known Allergies       Lab Results    Chemistry/lipid CBC Cardiac Enzymes/BNP/TSH/INR   Recent Labs   Lab Test 07/09/24  0746   CHOL 160   HDL 67   LDL 80   TRIG 66     Recent Labs   Lab Test 07/09/24  0746 04/09/24  0743 04/11/23  0804   LDL 80 79 136*     Recent Labs   Lab Test 04/09/24  0743      POTASSIUM 4.1   CHLORIDE 105   CO2 25   GLC 93   BUN 12.8   CR 0.77   GFRESTIMATED >90   DANDY 9.3     Recent Labs   Lab Test 04/09/24  0743 12/27/23  1302 11/21/23  1218   CR 0.77 0.8 0.95     No results for input(s): \"A1C\" in the last 80883 hours.       Recent Labs   Lab Test 04/09/24  0743   WBC 4.2   HGB 13.3   HCT 39.9*   MCV 92        Recent Labs   Lab Test 04/09/24  0743 11/21/23  1218 09/15/23  0741   HGB 13.3 13.3 13.4    No results for input(s): \"TROPONINI\" in the last 41905 hours.  Recent Labs   Lab Test 11/28/23  1045   NTBNP 714 "     Recent Labs   Lab Test 11/21/23  1218   TSH 1.77     Recent Labs   Lab Test 07/09/24  0737 07/02/24  0740 06/04/24  0726   INR 2.8* 3.5* 2.7*          This note has been dictated using voice recognition software. Any grammatical, typographical, or context distortions are unintentional and inherent to the software    Shila Mckeon PA-C            Thank you for allowing me to participate in the care of your patient.      Sincerely,     Shila Zapata PA-C     [facility]  cc:   Catherine Amaya PA-C  [address] [place] [address]

## 2024-07-12 DIAGNOSIS — R06.09 DOE (DYSPNEA ON EXERTION): ICD-10-CM

## 2024-07-12 DIAGNOSIS — I25.118 CORONARY ARTERY DISEASE INVOLVING NATIVE CORONARY ARTERY OF NATIVE HEART WITH OTHER FORM OF ANGINA PECTORIS (H): ICD-10-CM

## 2024-07-12 RX ORDER — ISOSORBIDE MONONITRATE 30 MG/1
30 TABLET, EXTENDED RELEASE ORAL DAILY
Qty: 90 TABLET | Refills: 1 | Status: SHIPPED | OUTPATIENT
Start: 2024-07-12 | End: 2024-08-07

## 2024-07-23 ENCOUNTER — ANTICOAGULATION THERAPY VISIT (OUTPATIENT)
Dept: ANTICOAGULATION | Facility: CLINIC | Age: 74
End: 2024-07-23

## 2024-07-23 ENCOUNTER — DOCUMENTATION ONLY (OUTPATIENT)
Dept: ANTICOAGULATION | Facility: CLINIC | Age: 74
End: 2024-07-23

## 2024-07-23 ENCOUNTER — LAB (OUTPATIENT)
Dept: LAB | Facility: CLINIC | Age: 74
End: 2024-07-23
Payer: COMMERCIAL

## 2024-07-23 DIAGNOSIS — Z86.711 HISTORY OF PULMONARY EMBOLISM: ICD-10-CM

## 2024-07-23 DIAGNOSIS — I48.21 PERMANENT ATRIAL FIBRILLATION (H): Primary | ICD-10-CM

## 2024-07-23 DIAGNOSIS — I26.99 PULMONARY EMBOLISM, BILATERAL (H): ICD-10-CM

## 2024-07-23 DIAGNOSIS — I48.21 PERMANENT ATRIAL FIBRILLATION (H): ICD-10-CM

## 2024-07-23 LAB — INR BLD: 2.6 (ref 0.9–1.1)

## 2024-07-23 PROCEDURE — 36416 COLLJ CAPILLARY BLOOD SPEC: CPT

## 2024-07-23 PROCEDURE — 85610 PROTHROMBIN TIME: CPT

## 2024-07-23 NOTE — PROGRESS NOTES
ANTICOAGULATION MANAGEMENT     Prem Taylor 73 year old male is on warfarin with therapeutic INR result. (Goal INR 2.0-3.0)    Recent labs: (last 7 days)     07/23/24  0739   INR 2.6*       ASSESSMENT     Warfarin Lab Questionnaire    Warfarin Doses Last 7 Days      7/22/2024    11:26 AM   Dose in Tablet or Mg   TAB or MG? milligram (mg)     Pt Rptd Dose SUNDAY MONDAY TUESDAY WED THURS FRIDAY SATURDAY 7/22/2024  11:26 AM 7.5 7.5 3.75 7.5 7.5 7.5 7.5         7/22/2024   Warfarin Lab Questionnaire   Missed doses within past 14 days? No   Changes in diet or alcohol within past 14 days? No   Medication changes since last result? No-added Imdur and increased Atorvastatin at [facility] visit 7/11. No interaction.    Injuries or illness since last result? No   New shortness of breath, severe headaches or sudden changes in vision since last result? No   Abnormal bleeding since last result? No   Upcoming surgery, procedure? No   Best number to call with results? [phone]        Previous result: Therapeutic last visit; previously outside of goal range  Additional findings: None       PLAN     Recommended plan for no diet, medication or health factor changes affecting INR     Dosing Instructions: Continue your current warfarin dose with next INR in 3 weeks       Summary  As of 7/23/2024      Full warfarin instructions:  3.75 mg every Tue; 7.5 mg all other days   Next INR check:  8/13/2024               Detailed voice message left for Ryan with dosing instructions and follow up date.     Contact [phone] to schedule and with any changes, questions or concerns.     Education provided: Please call back if any changes to your diet, medications or how you've been taking warfarin  Contact [phone] with any changes, questions or concerns.     Plan made per [facility] anticoagulation protocol    Mee Maguire RN  Anticoagulation  Clinic  7/23/2024    _______________________________________________________________________     Anticoagulation Episode Summary       Current INR goal:  2.0-3.0   TTR:  70.8% (1 y)   Target end date:  Indefinite   Send INR reminders to:  [facility] [place]    Indications    Pulmonary embolism  bilateral (H) (Resolved) [I26.99]  Permanent atrial fibrillation (H) [I48.21]  Pulmonary embolism  bilateral (H) [I26.99]  History of pulmonary embolism [Z86.711]             Comments:  Approved for 8 week checks per -- see encounter from 07/27/21             Anticoagulation Care Providers       Provider Role Specialty Phone number    Mamadou Baez MD Referring Family Medicine [phone]

## 2024-07-23 NOTE — PROGRESS NOTES
ANTICOAGULATION CLINIC REFERRAL RENEWAL REQUEST       An annual renewal order is required for all patients referred to [facility].?  Please review and sign the pended referral order for Prem Taylor.       ANTICOAGULATION SUMMARY      Warfarin indication(s)   Atrial Fibrillation and PE    Mechanical heart valve present?  NO       Current goal range   INR: 2.0-3.0     Goal appropriate for indication? Goal INR 2-3, standard for indication(s) above     Time in Therapeutic Range (TTR)  (Goal > 60%) 70.8%       Office visit with referring provider's group within last year yes on 4/9/24       Mee Maguire RN  [facility]

## 2024-08-01 ENCOUNTER — TRANSFERRED RECORDS (OUTPATIENT)
Dept: HEALTH INFORMATION MANAGEMENT | Facility: CLINIC | Age: 74
End: 2024-08-01
Payer: COMMERCIAL

## 2024-08-05 ENCOUNTER — MYC MEDICAL ADVICE (OUTPATIENT)
Dept: CARDIOLOGY | Facility: CLINIC | Age: 74
End: 2024-08-05
Payer: COMMERCIAL

## 2024-08-05 DIAGNOSIS — R06.09 DOE (DYSPNEA ON EXERTION): ICD-10-CM

## 2024-08-05 DIAGNOSIS — I25.118 CORONARY ARTERY DISEASE INVOLVING NATIVE CORONARY ARTERY OF NATIVE HEART WITH OTHER FORM OF ANGINA PECTORIS (H): ICD-10-CM

## 2024-08-07 RX ORDER — ISOSORBIDE MONONITRATE 30 MG/1
60 TABLET, EXTENDED RELEASE ORAL DAILY
Qty: 180 TABLET | Refills: 1 | Status: SHIPPED | OUTPATIENT
Start: 2024-08-07 | End: 2024-08-08

## 2024-08-07 NOTE — TELEPHONE ENCOUNTER
PC to patient with recommendations to increase isosorbide. Patient verbalized understanding and is agreeable with plan. Updated Rx sent to [facility].     Follow up with Dr. Velez 10/29/24 as already scheduled.  --------------------------------------------------------------------------------------  ----- Message -----   From: Shila Mckeon PA-C   Sent: 8/7/2024   1:18 PM CDT   To: Johana Boyle RN   Subject: FW: medications                                  With improvement can try increasing the isosorbide mononitrate to 60 mg once daily, or 30 mg twice daily pending patient preference.

## 2024-08-08 RX ORDER — ISOSORBIDE MONONITRATE 30 MG/1
60 TABLET, EXTENDED RELEASE ORAL DAILY
Qty: 180 TABLET | Refills: 1 | Status: SHIPPED | OUTPATIENT
Start: 2024-08-08

## 2024-08-14 ENCOUNTER — LAB (OUTPATIENT)
Dept: LAB | Facility: CLINIC | Age: 74
End: 2024-08-14
Payer: COMMERCIAL

## 2024-08-14 ENCOUNTER — ANTICOAGULATION THERAPY VISIT (OUTPATIENT)
Dept: ANTICOAGULATION | Facility: CLINIC | Age: 74
End: 2024-08-14

## 2024-08-14 DIAGNOSIS — I48.21 PERMANENT ATRIAL FIBRILLATION (H): Primary | ICD-10-CM

## 2024-08-14 DIAGNOSIS — Z86.711 HISTORY OF PULMONARY EMBOLISM: ICD-10-CM

## 2024-08-14 DIAGNOSIS — I26.99 PULMONARY EMBOLISM, BILATERAL (H): ICD-10-CM

## 2024-08-14 DIAGNOSIS — I48.21 PERMANENT ATRIAL FIBRILLATION (H): ICD-10-CM

## 2024-08-14 LAB — INR BLD: 3.2 (ref 0.9–1.1)

## 2024-08-14 PROCEDURE — 85610 PROTHROMBIN TIME: CPT

## 2024-08-14 PROCEDURE — 36416 COLLJ CAPILLARY BLOOD SPEC: CPT

## 2024-08-14 NOTE — PROGRESS NOTES
ANTICOAGULATION MANAGEMENT     Prem Taylor 73 year old male is on warfarin with supratherapeutic INR result. (Goal INR 2.0-3.0)    Recent labs: (last 7 days)     08/14/24  0740   INR 3.2*       ASSESSMENT     Warfarin Lab Questionnaire    Warfarin Doses Last 7 Days      8/13/2024     1:32 PM   Dose in Tablet or Mg   TAB or MG? milligram (mg)     Pt Rptd Dose SUNDAY MONDAY TUESDAY WED THURS FRIDAY SATURDAY 8/13/2024   1:32 PM 7.5 7.5 3.75 7.5 7.5 7.5 7.5         8/13/2024   Warfarin Lab Questionnaire   Missed doses within past 14 days? No   Changes in diet or alcohol within past 14 days? No   Medication changes since last result? No   Injuries or illness since last result? No   New shortness of breath, severe headaches or sudden changes in vision since last result? No   Abnormal bleeding since last result? No   Upcoming surgery, procedure? No   Best number to call with results? [phone]        Previous result: Therapeutic last 2(+) visits, results being 2+ weeks apart  Additional findings: None       PLAN     Recommended plan for no diet, medication or health factor changes affecting INR     Dosing Instructions: Continue your current warfarin dose with next INR in 2 weeks       Summary  As of 8/14/2024      Full warfarin instructions:  3.75 mg every Tue; 7.5 mg all other days   Next INR check:  8/28/2024               Telephone call with Ryan who agrees to plan and repeated back plan correctly    Lab visit scheduled    Education provided: Contact [phone] with any changes, questions or concerns.     Plan made per [facility] anticoagulation protocol    Elsie Stringer RN  Anticoagulation Clinic  8/14/2024    _______________________________________________________________________     Anticoagulation Episode Summary       Current INR goal:  2.0-3.0   TTR:  74.4% (1 y)   Target end date:  Indefinite   Send INR reminders to:  [facility]    Indications    Pulmonary embolism  bilateral (H) (Resolved)  [I26.99]  Permanent atrial fibrillation (H) [I48.21]  Pulmonary embolism  bilateral (H) [I26.99]  History of pulmonary embolism [Z86.711]             Comments:  Approved for 8 week checks per -- see encounter from 07/27/21             Anticoagulation Care Providers       Provider Role Specialty Phone number    Mamadou Baez MD Referring Family Medicine [phone]

## 2024-08-15 ENCOUNTER — MYC MEDICAL ADVICE (OUTPATIENT)
Dept: CARDIOLOGY | Facility: CLINIC | Age: 74
End: 2024-08-15
Payer: COMMERCIAL

## 2024-08-15 DIAGNOSIS — I25.118 CORONARY ARTERY DISEASE INVOLVING NATIVE CORONARY ARTERY OF NATIVE HEART WITH OTHER FORM OF ANGINA PECTORIS (H): Primary | ICD-10-CM

## 2024-08-16 RX ORDER — ROSUVASTATIN CALCIUM 10 MG/1
10 TABLET, COATED ORAL DAILY
Qty: 90 TABLET | Refills: 3 | Status: SHIPPED | OUTPATIENT
Start: 2024-08-16 | End: 2024-08-21 | Stop reason: DRUGHIGH

## 2024-08-16 NOTE — TELEPHONE ENCOUNTER
PC to patient with options for transitioning therapy.  Patient would like to change to rosuvastatin as recommended. He will stop atorvastatin all together and then start rosuvastatin in 1 week. He will monitor for myalgias and call with any further questions. Rx sent to [facility] home delivery.  -------------------------------------------------------  ----- Message -----   From: Shila Mckeon PA-C   Sent: 8/16/2024  12:34 PM CDT   To: Johana Boyle RN   Subject: FW: Question about meds                          Can provide the patient a few options for transitioning cholesterol therapy in setting of myalgias on 80 mg of atorvastatin daily.     Reduce back to atorvastatin 40 mg once daily and add Zetia 10 mg daily (does not carry same myalgia side effect)     Stop atorvastatin. Transition to rosuvastatin 10 mg daily and titrate up slowly. 10 mg for 2 weeks then increase to 20 mg daily at that time if tolerated. Monitor for myalgias. We had discussed rosuvastatin at last visit.     In every case recommend taking a 5-7 day break from statin.       KEYA

## 2024-08-19 NOTE — TELEPHONE ENCOUNTER
[facility] Call Center    Phone Message    May a detailed message be left on voicemail: yes     Reason for Call: Other: Pt is calling to check on the status of the rosuvastatin order     Action Taken: Other: cardio    Travel Screening: Not Applicable    Thank you!  [facility]       Date of Service:

## 2024-08-21 DIAGNOSIS — I25.118 CORONARY ARTERY DISEASE INVOLVING NATIVE CORONARY ARTERY OF NATIVE HEART WITH OTHER FORM OF ANGINA PECTORIS (H): ICD-10-CM

## 2024-08-21 RX ORDER — ROSUVASTATIN CALCIUM 20 MG/1
TABLET, COATED ORAL
Qty: 90 TABLET | Refills: 0 | Status: SHIPPED | OUTPATIENT
Start: 2024-08-21 | End: 2024-08-22 | Stop reason: ALTCHOICE

## 2024-08-22 DIAGNOSIS — E78.00 PURE HYPERCHOLESTEROLEMIA: Primary | ICD-10-CM

## 2024-08-22 RX ORDER — ROSUVASTATIN CALCIUM 10 MG/1
TABLET, COATED ORAL
Qty: 90 TABLET | Refills: 2 | Status: SHIPPED | OUTPATIENT
Start: 2024-08-22 | End: 2024-08-28

## 2024-08-26 ENCOUNTER — PATIENT OUTREACH (OUTPATIENT)
Dept: CARE COORDINATION | Facility: CLINIC | Age: 74
End: 2024-08-26
Payer: COMMERCIAL

## 2024-08-28 ENCOUNTER — ANTICOAGULATION THERAPY VISIT (OUTPATIENT)
Dept: ANTICOAGULATION | Facility: CLINIC | Age: 74
End: 2024-08-28

## 2024-08-28 ENCOUNTER — LAB (OUTPATIENT)
Dept: LAB | Facility: CLINIC | Age: 74
End: 2024-08-28
Payer: COMMERCIAL

## 2024-08-28 ENCOUNTER — TELEPHONE (OUTPATIENT)
Dept: CARDIOLOGY | Facility: CLINIC | Age: 74
End: 2024-08-28

## 2024-08-28 DIAGNOSIS — I48.21 PERMANENT ATRIAL FIBRILLATION (H): ICD-10-CM

## 2024-08-28 DIAGNOSIS — Z86.711 HISTORY OF PULMONARY EMBOLISM: ICD-10-CM

## 2024-08-28 DIAGNOSIS — I26.99 PULMONARY EMBOLISM, BILATERAL (H): ICD-10-CM

## 2024-08-28 DIAGNOSIS — E78.00 PURE HYPERCHOLESTEROLEMIA: ICD-10-CM

## 2024-08-28 DIAGNOSIS — I48.21 PERMANENT ATRIAL FIBRILLATION (H): Primary | ICD-10-CM

## 2024-08-28 LAB — INR BLD: 3.1 (ref 0.9–1.1)

## 2024-08-28 PROCEDURE — 85610 PROTHROMBIN TIME: CPT

## 2024-08-28 PROCEDURE — 36416 COLLJ CAPILLARY BLOOD SPEC: CPT

## 2024-08-28 RX ORDER — ROSUVASTATIN CALCIUM 20 MG/1
20 TABLET, COATED ORAL DAILY
Qty: 90 TABLET | Refills: 0 | Status: SHIPPED | OUTPATIENT
Start: 2024-08-28 | End: 2024-09-05

## 2024-08-28 NOTE — TELEPHONE ENCOUNTER
Return call to patient - informed him that new Rx for Crestor 20mg daily was sent to preferred pharmacy as requested and that further refills should be requested at follow-up in October - patient verbalized understanding and appreciation.  mg

## 2024-08-28 NOTE — TELEPHONE ENCOUNTER
[facility] Call Center    Phone Message    May a detailed message be left on voicemail: yes     Reason for Call: Medication Refill Request    Has the patient contacted the pharmacy for the refill? Yes   Name of medication being requested: Crestor  Provider who prescribed the medication: Shila Mckeon  [facility]      [id number] [place], [place] - [address] AT [place] [address]      Date medication is needed: 08/28/24   Patient has been out of this RX for awhile and is very frustrated. He would like this rx sent to a different pharmacy ([id number] ) today if possible. He also wants it to be noted that he has finished the 10 mg and needs the rx for 20 mg . He is also asking for a call back when the prescription is sent over. Thank you     Action Taken: Other: cardiology    Travel Screening: Not Applicable   Thank you!  Specialty Access Center      Date of Service:

## 2024-08-28 NOTE — TELEPHONE ENCOUNTER
Noted per active med list:  rosuvastatin (CRESTOR) 10 MG tablet 90 tablet 2 8/22/2024 -- No   Sig: Take 1 tablet (10 mg) daily for two weeks. If tolerated, increase to 2 tablets (20 mg) daily.   Sent to pharmacy as: Rosuvastatin Calcium 10 MG Oral Tablet (CRESTOR)   Class: E-Prescribe   Order: [id number]   E-Prescribing Status: Receipt confirmed by pharmacy (8/22/2024 12:14 PM CDT)     Follow-up sched with Dr. Velez 10-29-24 - Rx refilled as requested.  mg

## 2024-08-28 NOTE — PROGRESS NOTES
ANTICOAGULATION MANAGEMENT     Prem Taylor 73 year old male is on warfarin with supratherapeutic INR result. (Goal INR 2.0-3.0)    Recent labs: (last 7 days)     08/28/24  0746   INR 3.1*       ASSESSMENT     Warfarin Lab Questionnaire    Warfarin Doses Last 7 Days      8/27/2024    10:37 AM   Dose in Tablet or Mg   TAB or MG? milligram (mg)     Pt Rptd Dose SUNDAY MONDAY TUESDAY WED THURS FRIDAY SATURDAY 8/27/2024  10:37 AM 7.5 7.5 3.75 7.5 7.5 7.5 7.5         8/27/2024   Warfarin Lab Questionnaire   Missed doses within past 14 days? No   Changes in diet or alcohol within past 14 days? No   Medication changes since last result? No   Injuries or illness since last result? No   New shortness of breath, severe headaches or sudden changes in vision since last result? No   Abnormal bleeding since last result? No   Upcoming surgery, procedure? No   Best number to call with results? [phone]        Previous result: Supratherapeutic  Additional findings: None       PLAN     Recommended plan for no diet, medication or health factor changes affecting INR     Dosing Instructions: decrease your warfarin dose (7.7% change) with next INR in 3 weeks (at annual visit)      Summary  As of 8/28/2024      Full warfarin instructions:  3.75 mg every Tue, Fri; 7.5 mg all other days   Next INR check:  9/18/2024               Telephone call with Ryan who verbalizes understanding and agrees to plan and who agrees to plan and repeated back plan correctly    Check at provider office visit    Education provided: Please call back if any changes to your diet, medications or how you've been taking warfarin  Goal range and lab monitoring: goal range and significance of current result  Symptom monitoring: monitoring for bleeding signs and symptoms  Contact [phone] with any changes, questions or concerns.     Plan made per [facility] anticoagulation protocol    Josy Moe RN  Anticoagulation  Clinic  8/28/2024    _______________________________________________________________________     Anticoagulation Episode Summary       Current INR goal:  2.0-3.0   TTR:  73.2% (1 y)   Target end date:  Indefinite   Send INR reminders to:  [facility]    Indications    Pulmonary embolism  bilateral (H) (Resolved) [I26.99]  Permanent atrial fibrillation (H) [I48.21]  Pulmonary embolism  bilateral (H) [I26.99]  History of pulmonary embolism [Z86.711]             Comments:  Approved for 8 week checks per -- see encounter from 07/27/21             Anticoagulation Care Providers       Provider Role Specialty Phone number    Mamadou Baez MD Referring Family Medicine [phone]

## 2024-09-05 ENCOUNTER — TELEPHONE (OUTPATIENT)
Dept: CARDIOLOGY | Facility: CLINIC | Age: 74
End: 2024-09-05
Payer: COMMERCIAL

## 2024-09-05 DIAGNOSIS — E78.00 PURE HYPERCHOLESTEROLEMIA: ICD-10-CM

## 2024-09-05 RX ORDER — ROSUVASTATIN CALCIUM 20 MG/1
20 TABLET, COATED ORAL DAILY
Qty: 90 TABLET | Refills: 0 | Status: SHIPPED | OUTPATIENT
Start: 2024-09-05

## 2024-09-05 NOTE — TELEPHONE ENCOUNTER
[facility] Call Center    Phone Message    May a detailed message be left on voicemail: yes     Reason for Call: Medication Refill Request    Has the patient contacted the pharmacy for the refill? Yes   Name of medication being requested: rosuvastatin (CRESTOR) 20 MG tablet    Provider who prescribed the medication:  Shila Zapata    [facility]: [facility] - [address]    Date medication is needed: ASAP - pt is switching to home delivery and they need a new script sent over to them instead.        Action Taken: Message routed to:  Other: [facility] [[id number]]    Travel Screening: Not Applicable     Date of Service:

## 2024-09-11 ENCOUNTER — HOSPITAL ENCOUNTER (OUTPATIENT)
Dept: CT IMAGING | Facility: CLINIC | Age: 74
Discharge: HOME | End: 2024-09-11
Attending: INTERNAL MEDICINE | Admitting: INTERNAL MEDICINE
Payer: COMMERCIAL

## 2024-09-11 ENCOUNTER — ONCOLOGY VISIT (OUTPATIENT)
Dept: PULMONOLOGY | Facility: CLINIC | Age: 74
End: 2024-09-11
Attending: INTERNAL MEDICINE
Payer: COMMERCIAL

## 2024-09-11 VITALS
DIASTOLIC BLOOD PRESSURE: 96 MMHG | HEART RATE: 60 BPM | WEIGHT: 241 LBS | BODY MASS INDEX: 30.94 KG/M2 | OXYGEN SATURATION: 98 % | RESPIRATION RATE: 20 BRPM | SYSTOLIC BLOOD PRESSURE: 146 MMHG

## 2024-09-11 DIAGNOSIS — R91.8 PULMONARY NODULES: ICD-10-CM

## 2024-09-11 DIAGNOSIS — R91.8 PULMONARY NODULES: Primary | ICD-10-CM

## 2024-09-11 PROCEDURE — 71250 CT THORAX DX C-: CPT

## 2024-09-11 PROCEDURE — 99214 OFFICE O/P EST MOD 30 MIN: CPT | Performed by: INTERNAL MEDICINE

## 2024-09-11 NOTE — PATIENT INSTRUCTIONS
We can repeat a CT in about a year.   If you feel like breathing is getting worse, let me know and we can arrange for lung function testing during the visit.

## 2024-09-11 NOTE — PROGRESS NOTES
[facility] [address] [address]  Phone: [phone]  Fax: [phone]    Patient:  Prem Taylor, Date of birth 1950  Date of Visit:  09/11/2024  Referring Provider Meghana Murray      Assessment & Plan      Ryan is a 73 yo male with incidental indeterminate lung nodule as well as exertional dyspnea    Lung nodule - seen on coronary CT, nothing about the appearance of it is concerning.  Stable 9 months.    Considering patient with high risk for lung cancer, recommend follow up CT one year per Julián 2017    Exertional dyspnea-I reviewed his pulmonary function tests which are normal except for a mildly increased residual volume.  We briefly discussed short acting bronchodilators as a trial but he prefers to minimize medications.  I do not see any objective evidence concerning for restrictive lung disease or neuromuscular disease contributing to dyspnea.  He feels reassured by all of these findings and will continue to work with his cardiologist.    I recommended considering a  who can help him mix up his strength training given stagnation in his exercises and continuing to feel like he should be stronger.     I will plan to see him back in a year    Medical Decision Making             Meghana Murray MD           [facility] Follow Up Visit    Reason for Visit  Prem Taylor is a 74 year old male who is referred by Catherine Amaya for lung nodule and difficulty breathing  Pulmonary HPI    - His breathing seems to be pretty good, he is able to golf 18 holes but notices getting more tired after doing so. Symptoms started before he started metoprolol. Also noticed some difficulty standing from a chair - he thinks his muscles are weaker than they should be despite doing weight training exercise. He has also noticed difficulty with laying flat  - morning stiffness especially in the  low back, improves quickly  - no history of asthma  - last year he had an episode of syncope out of the blue, his cardiologist did an evaluation including a cardiac CT      Other active medical problems include:   - Atrial fibrillation   - hyperlipidemia  - CPAP for ALISE      ROS Pulmonary  Dyspnea: Yes, Cough: No, Chest pain: No, Wheezing: No, Sputum Production: No, Hemoptysis: No  A complete ROS was otherwise negative except as noted in the HPI.  The patient was seen and examined by Meghana Murray MD   Current Outpatient Medications   Medication Sig Dispense Refill    escitalopram (LEXAPRO) 20 MG tablet Take 1 tablet (20 mg) by mouth daily 90 tablet 3    isosorbide mononitrate (IMDUR) 30 MG 24 hr tablet Take 2 tablets (60 mg) by mouth daily 180 tablet 1    metoprolol succinate ER (TOPROL XL) 25 MG 24 hr tablet Take 1 tablet (25 mg) by mouth daily 90 tablet 3    MULTIVITAMIN (MULTIPLE VITAMIN ORAL) [MULTIVITAMIN (MULTIPLE VITAMIN ORAL)] Take 1 tablet by mouth daily.      Psyllium (DAILY FIBER PO) Take 2 tablets by mouth daily      rosuvastatin (CRESTOR) 20 MG tablet Take 1 tablet (20 mg) by mouth daily. 90 tablet 0    warfarin ANTICOAGULANT (COUMADIN) 7.5 MG tablet Take 3.75 mg every Tuesday; Take 7.5 mg all other days. Or as directed. 90 tablet 1     No current facility-administered medications for this visit.     No Known Allergies  Social History     Socioeconomic History    Marital status:      Spouse name: Not on file    Number of children: Not on file    Years of education: Not on file    Highest education level: Not on file   Occupational History    Not on file   Tobacco Use    Smoking status: Former     Current packs/day: 0.00     Average packs/day: 1.5 packs/day for 22.9 years (34.3 ttl pk-yrs)     Types: Cigarettes     Start date:      Quit date: 1990     Years since quittin.8    Smokeless tobacco: Never   Vaping Use    Vaping status: Never Used   Substance and Sexual Activity     Alcohol use: Yes    Drug use: No    Sexual activity: Not on file   Other Topics Concern    Not on file   Social History Narrative    Has worked as a dental  and supervisor (more office work)     Social Determinants of Health     Financial Resource Strain: High Risk (11/20/2023)    Financial Resource Strain     Within the past 12 months, have you or your family members you live with been unable to get utilities (heat, electricity) when it was really needed?: Yes   Food Insecurity: Low Risk  (11/20/2023)    Food Insecurity     Within the past 12 months, did you worry that your food would run out before you got money to buy more?: No     Within the past 12 months, did the food you bought just not last and you didn t have money to get more?: No   Transportation Needs: Low Risk  (11/20/2023)    Transportation Needs     Within the past 12 months, has lack of transportation kept you from medical appointments, getting your medicines, non-medical meetings or appointments, work, or from getting things that you need?: No   Physical Activity: Not on file   Stress: Not on file   Social Connections: Not on file   Interpersonal Safety: Low Risk  (4/9/2024)    Interpersonal Safety     Do you feel physically and emotionally safe where you currently live?: Yes     Within the past 12 months, have you been hit, slapped, kicked or otherwise physically hurt by someone?: No     Within the past 12 months, have you been humiliated or emotionally abused in other ways by your partner or ex-partner?: No   Housing Stability: Low Risk  (11/20/2023)    Housing Stability     Do you have housing? : Yes     Are you worried about losing your housing?: No     Past Medical History:   Diagnosis Date    Abnormal ECG     Anemia     Anxiety     Arrhythmia     Atrial fibrillation (H) 10/09/2017    Cancer (H) 2015    CPAP (continuous positive airway pressure) dependence 10/2019    per patient    Depression     Heart valve disease 2017     History of blood clots     Hyperlipidemia 2006    Pulmonary embolism, blood-clot, obstetric 10/09/2017    Sleep apnea 2017    Uses CPAP     Past Surgical History:   Procedure Laterality Date    BACK SURGERY      CERVICAL FUSION  2001    HERNIA REPAIR  2005    KS LAP,INGUINAL HERNIA REPR,INITIAL Left 8/15/2019    Procedure: HERNIORRHAPHY, INGUINAL, LAPAROSCOPIC;  Surgeon: Yan Leiva MD;  Location: [facility];  Service: General    TONSILLECTOMY      childhood     Family History   Problem Relation Age of Onset    Heart Disease Mother     Hypertension Mother     Dementia Mother     Coronary Artery Disease Father     LUNG DISEASE No family hx of        Exam:   BP (!) 146/96   Pulse 60   Resp 20   Wt 109.3 kg (241 lb)   SpO2 98%   BMI 30.94 kg/m    GENERAL APPEARANCE: Well developed, well nourished, alert, and in no apparent distress.  RESP: normal percussion, good air flow throughout.  No crackles. No rhonchi. No wheezes.  SKIN: no rash on limited exam  NEURO: Mentation intact, speech normal, normal strength and tone, normal gait and stance  PSYCH: mentation appears normal. and affect normal/bright  Results:  - My interpretation of the images relevant for this visit includes: CT chest today images reviewed and compared to 12/27 cardiac CT. There has been no significant change.    - My interpretation of the PFT's relevant for this visit includes: Normal   Recent hemoglobin is normal

## 2024-09-11 NOTE — NURSING NOTE
Chief Complaint   Patient presents with    Follow Up     Pulmonary nodules       Vitals:    09/11/24 1018 09/11/24 1020   BP: (!) 148/90 (!) 146/96   BP Location: Left arm    Patient Position: Sitting    Cuff Size: Adult Large    Pulse: 60    Resp: 20    SpO2: 98%    Weight: 109.3 kg (241 lb)        Body mass index is 30.94 kg/m .

## 2024-09-12 DIAGNOSIS — G47.33 OBSTRUCTIVE SLEEP APNEA (ADULT) (PEDIATRIC): Primary | ICD-10-CM

## 2024-09-13 SDOH — HEALTH STABILITY: PHYSICAL HEALTH: ON AVERAGE, HOW MANY MINUTES DO YOU ENGAGE IN EXERCISE AT THIS LEVEL?: 100 MIN

## 2024-09-18 ENCOUNTER — ANTICOAGULATION THERAPY VISIT (OUTPATIENT)
Dept: ANTICOAGULATION | Facility: CLINIC | Age: 74
End: 2024-09-18

## 2024-09-18 ENCOUNTER — OFFICE VISIT (OUTPATIENT)
Dept: FAMILY MEDICINE | Facility: CLINIC | Age: 74
End: 2024-09-18
Payer: COMMERCIAL

## 2024-09-18 VITALS
SYSTOLIC BLOOD PRESSURE: 150 MMHG | HEART RATE: 61 BPM | TEMPERATURE: 97.1 F | DIASTOLIC BLOOD PRESSURE: 90 MMHG | RESPIRATION RATE: 15 BRPM | OXYGEN SATURATION: 98 % | WEIGHT: 239 LBS | BODY MASS INDEX: 30.67 KG/M2 | HEIGHT: 74 IN

## 2024-09-18 DIAGNOSIS — F41.9 ANXIETY: ICD-10-CM

## 2024-09-18 DIAGNOSIS — N40.0 BPH WITHOUT URINARY OBSTRUCTION: ICD-10-CM

## 2024-09-18 DIAGNOSIS — Z86.711 HISTORY OF PULMONARY EMBOLISM: ICD-10-CM

## 2024-09-18 DIAGNOSIS — D12.6 TUBULAR ADENOMA OF COLON: ICD-10-CM

## 2024-09-18 DIAGNOSIS — R91.8 PULMONARY NODULES: ICD-10-CM

## 2024-09-18 DIAGNOSIS — I25.10 CORONARY ARTERY DISEASE INVOLVING NATIVE CORONARY ARTERY OF NATIVE HEART, UNSPECIFIED WHETHER ANGINA PRESENT: ICD-10-CM

## 2024-09-18 DIAGNOSIS — R03.0 ELEVATED BLOOD PRESSURE READING WITHOUT DIAGNOSIS OF HYPERTENSION: ICD-10-CM

## 2024-09-18 DIAGNOSIS — I26.99 PULMONARY EMBOLISM, BILATERAL (H): ICD-10-CM

## 2024-09-18 DIAGNOSIS — Z23 ENCOUNTER FOR IMMUNIZATION: ICD-10-CM

## 2024-09-18 DIAGNOSIS — R43.0 ANOSMIA: ICD-10-CM

## 2024-09-18 DIAGNOSIS — Z00.00 MEDICARE ANNUAL WELLNESS VISIT, SUBSEQUENT: Primary | ICD-10-CM

## 2024-09-18 DIAGNOSIS — E66.811 CLASS 1 OBESITY WITH SERIOUS COMORBIDITY AND BODY MASS INDEX (BMI) OF 30.0 TO 30.9 IN ADULT, UNSPECIFIED OBESITY TYPE: ICD-10-CM

## 2024-09-18 DIAGNOSIS — I48.21 PERMANENT ATRIAL FIBRILLATION (H): Primary | ICD-10-CM

## 2024-09-18 DIAGNOSIS — E78.00 PURE HYPERCHOLESTEROLEMIA: ICD-10-CM

## 2024-09-18 DIAGNOSIS — I48.21 PERMANENT ATRIAL FIBRILLATION (H): ICD-10-CM

## 2024-09-18 DIAGNOSIS — G47.33 OSA ON CPAP: ICD-10-CM

## 2024-09-18 DIAGNOSIS — H90.3 SENSORINEURAL HEARING LOSS (SNHL) OF BOTH EARS: ICD-10-CM

## 2024-09-18 LAB
ALBUMIN SERPL BCG-MCNC: 4.4 G/DL (ref 3.5–5.2)
ALP SERPL-CCNC: 64 U/L (ref 40–150)
ALT SERPL W P-5'-P-CCNC: 23 U/L (ref 0–70)
ANION GAP SERPL CALCULATED.3IONS-SCNC: 9 MMOL/L (ref 7–15)
AST SERPL W P-5'-P-CCNC: 31 U/L (ref 0–45)
BILIRUB SERPL-MCNC: 1 MG/DL
BUN SERPL-MCNC: 17.2 MG/DL (ref 8–23)
CALCIUM SERPL-MCNC: 9.3 MG/DL (ref 8.8–10.4)
CHLORIDE SERPL-SCNC: 104 MMOL/L (ref 98–107)
CHOLEST SERPL-MCNC: 157 MG/DL
CREAT SERPL-MCNC: 0.88 MG/DL (ref 0.67–1.17)
EGFRCR SERPLBLD CKD-EPI 2021: >90 ML/MIN/1.73M2
ERYTHROCYTE [DISTWIDTH] IN BLOOD BY AUTOMATED COUNT: 12.9 % (ref 10–15)
FASTING STATUS PATIENT QL REPORTED: YES
FASTING STATUS PATIENT QL REPORTED: YES
GLUCOSE SERPL-MCNC: 92 MG/DL (ref 70–99)
HCO3 SERPL-SCNC: 25 MMOL/L (ref 22–29)
HCT VFR BLD AUTO: 38.8 % (ref 40–53)
HDLC SERPL-MCNC: 68 MG/DL
HGB BLD-MCNC: 13 G/DL (ref 13.3–17.7)
INR BLD: 2.6 (ref 0.9–1.1)
LDLC SERPL CALC-MCNC: 76 MG/DL
MCH RBC QN AUTO: 30.7 PG (ref 26.5–33)
MCHC RBC AUTO-ENTMCNC: 33.5 G/DL (ref 31.5–36.5)
MCV RBC AUTO: 92 FL (ref 78–100)
NONHDLC SERPL-MCNC: 89 MG/DL
PLATELET # BLD AUTO: 163 10E3/UL (ref 150–450)
POTASSIUM SERPL-SCNC: 4.4 MMOL/L (ref 3.4–5.3)
PROT SERPL-MCNC: 7 G/DL (ref 6.4–8.3)
RBC # BLD AUTO: 4.24 10E6/UL (ref 4.4–5.9)
SODIUM SERPL-SCNC: 138 MMOL/L (ref 135–145)
TRIGL SERPL-MCNC: 65 MG/DL
WBC # BLD AUTO: 4.6 10E3/UL (ref 4–11)

## 2024-09-18 PROCEDURE — 36416 COLLJ CAPILLARY BLOOD SPEC: CPT | Performed by: FAMILY MEDICINE

## 2024-09-18 PROCEDURE — G0439 PPPS, SUBSEQ VISIT: HCPCS | Performed by: FAMILY MEDICINE

## 2024-09-18 PROCEDURE — 80061 LIPID PANEL: CPT | Performed by: FAMILY MEDICINE

## 2024-09-18 PROCEDURE — 85610 PROTHROMBIN TIME: CPT | Performed by: FAMILY MEDICINE

## 2024-09-18 PROCEDURE — 85027 COMPLETE CBC AUTOMATED: CPT | Performed by: FAMILY MEDICINE

## 2024-09-18 PROCEDURE — 69210 REMOVE IMPACTED EAR WAX UNI: CPT | Performed by: FAMILY MEDICINE

## 2024-09-18 PROCEDURE — G0008 ADMIN INFLUENZA VIRUS VAC: HCPCS | Performed by: FAMILY MEDICINE

## 2024-09-18 PROCEDURE — 36415 COLL VENOUS BLD VENIPUNCTURE: CPT | Performed by: FAMILY MEDICINE

## 2024-09-18 PROCEDURE — 80053 COMPREHEN METABOLIC PANEL: CPT | Performed by: FAMILY MEDICINE

## 2024-09-18 PROCEDURE — 99214 OFFICE O/P EST MOD 30 MIN: CPT | Mod: 25 | Performed by: FAMILY MEDICINE

## 2024-09-18 PROCEDURE — 90662 IIV NO PRSV INCREASED AG IM: CPT | Performed by: FAMILY MEDICINE

## 2024-09-18 ASSESSMENT — PAIN SCALES - GENERAL: PAINLEVEL: NO PAIN (0)

## 2024-09-18 ASSESSMENT — PATIENT HEALTH QUESTIONNAIRE - PHQ9
10. IF YOU CHECKED OFF ANY PROBLEMS, HOW DIFFICULT HAVE THESE PROBLEMS MADE IT FOR YOU TO DO YOUR WORK, TAKE CARE OF THINGS AT HOME, OR GET ALONG WITH OTHER PEOPLE: NOT DIFFICULT AT ALL
SUM OF ALL RESPONSES TO PHQ QUESTIONS 1-9: 2
SUM OF ALL RESPONSES TO PHQ QUESTIONS 1-9: 2

## 2024-09-18 ASSESSMENT — ANXIETY QUESTIONNAIRES
GAD7 TOTAL SCORE: 3
7. FEELING AFRAID AS IF SOMETHING AWFUL MIGHT HAPPEN: NOT AT ALL
GAD7 TOTAL SCORE: 3
8. IF YOU CHECKED OFF ANY PROBLEMS, HOW DIFFICULT HAVE THESE MADE IT FOR YOU TO DO YOUR WORK, TAKE CARE OF THINGS AT HOME, OR GET ALONG WITH OTHER PEOPLE?: NOT DIFFICULT AT ALL
GAD7 TOTAL SCORE: 3

## 2024-09-18 NOTE — PROGRESS NOTES
ANTICOAGULATION MANAGEMENT     Prem Taylor 74 year old male is on warfarin with therapeutic INR result. (Goal INR 2.0-3.0)    Recent labs: (last 7 days)     09/18/24  0758   INR 2.6*       ASSESSMENT     Warfarin Lab Questionnaire    Warfarin Doses Last 7 Days      9/17/2024    11:34 AM   Dose in Tablet or Mg   TAB or MG? milligram (mg)     Pt Rptd Dose SUNDAY MONDAY TUESDAY WED THURS FRIDAY SATURDAY 9/17/2024  11:34 AM 7.5 7.5 3.75 7.5 7.5 3.75 7.5         9/17/2024   Warfarin Lab Questionnaire   Missed doses within past 14 days? No   Changes in diet or alcohol within past 14 days? No   Medication changes since last result? No   Injuries or illness since last result? No   New shortness of breath, severe headaches or sudden changes in vision since last result? No   Abnormal bleeding since last result? No   Upcoming surgery, procedure? No        Previous result: Supratherapeutic  Additional findings: None       PLAN     Recommended plan for no diet, medication or health factor changes affecting INR     Dosing Instructions: Continue your current warfarin dose with next INR in 3-4 weeks       Summary  As of 9/18/2024      Full warfarin instructions:  3.75 mg every Tue, Fri; 7.5 mg all other days   Next INR check:  10/11/2024               Telephone call with Ryan who verbalizes understanding and agrees to plan    Will check with cardiology labs on 10/11/24    Education provided: Contact [phone] with any changes, questions or concerns.     Plan made per [facility] anticoagulation protocol    Elsie Stringer RN  9/18/2024  Anticoagulation Clinic  [place] for routing messages: elsie MAURICIO  [facility] patient phone line: [phone]        _______________________________________________________________________     Anticoagulation Episode Summary       Current INR goal:  2.0-3.0   TTR:  72.0% (1 y)   Target end date:  Indefinite   Send INR reminders to:  [facility]    Indications    Pulmonary  embolism  bilateral (H) (Resolved) [I26.99]  Permanent atrial fibrillation (H) [I48.21]  Pulmonary embolism  bilateral (H) [I26.99]  History of pulmonary embolism [Z86.711]             Comments:  Approved for 8 week checks per -- see encounter from 07/27/21             Anticoagulation Care Providers       Provider Role Specialty Phone number    Mamadou Baez MD Referring Family Medicine [phone]

## 2024-09-18 NOTE — PROGRESS NOTES
"Preventive Care Visit  [facility]  Mamadou Baez MD, Family Medicine  Sep 18, 2024      Assessment & Plan     Medicare annual wellness visit, subsequent  Annual Wellness Visit completed.  Risk questionaire reviewed in detail.  Appropriate preventive services were discussed with this patient, including applicable screening as appropriate for fall prevention, nutrition, physical activity, tobacco-use cessation, weight loss, and cognition.  Annual Wellness Visits recommended to continue.     Class 1 obesity with serious comorbidity and body mass index (BMI) of 30.0 to 30.9 in adult, unspecified obesity type  Dietary and exercise modification for weight goal less than 230 pounds initially, less than 220 pounds ideally.    Anosmia  Anosmia following prior COVID infection described.  Does have episodes of déjà vu as well.  No other focal neurologic concerns.  Question correlation with medication adjustments recently and patient elects to monitor.  Denies headache etc.    Hypercholesterolemia  Update lipid cascade today while on rosuvastatin 20 mg daily which had been switched from atorvastatin 40 mg daily.  - Comprehensive metabolic panel  - Lipid panel reflex to direct LDL Fasting    Permanent atrial fibrillation (H)  Permanent atrial fibrillation, rate controlled.  Warfarin anticoagulation 3.75 mg on Tuesday and Friday otherwise 7.5 mg daily.  - Comprehensive metabolic panel    History of pulmonary embolism  Continues chronic warfarin anticoagulation for atrial fibrillation.  CBC updated.  Follows with pulmonologist who he had seen September 11, 2024.  - CBC with platelets    Coronary artery disease involving native coronary artery of native heart, unspecified whether angina present  CCTA 12/2023 shows calcium score 541 \"The left anterior descending is relatively diffusely diseased. In the proximal segment there is a moderately obstructive mixed plaque as well as in the mid segment of the LAD. " "The distal LAD is mildly obstructed with calcified plaque. There is a large second diagonal branch which appears to have a moderately severe ostial narrowing with mixed plaque.   Follows with cardiology and will see Dr. Velez in follow-up October 29, 2024.  Isosorbide mononitrate 60 mg daily currently in order to manage dyspnea on exertion more so than concerns for specific chest pain etc.  - Comprehensive metabolic panel    ALISE on CPAP  Tolerating CPAP for ALISE management.    Tubular adenoma of colon  - Colonoscopy Screening  Referral    Pulmonary nodules  Seen by pulmonologist September 11, 2024 with repeat chest CT 1 year interval likely.    Encounter for immunization  High-dose flu shot provided today.  Will receive COVID and RSV vaccination through local pharmacy.  - INFLUENZA HIGH DOSE, TRIVALENT, PF (FLUZONE)    Anxiety  Escitalopram 20 mg daily.  Patient states that wife does not have anything to worry about so he worries for both of them.    BPH without urinary obstruction  Nocturia 1 time per night.  Declines digital rectal exam today.    Blood Pressure Isolated Elevated  Blood pressure elevation 146/94 on recheck and will increase metoprolol succinate from 25 mg up to 50 mg daily.    Sensorineural hearing loss (SNHL) of both ears  Bilateral hearing aids in place.  Cerumen removal with curette.              BMI  Estimated body mass index is 30.89 kg/m  as calculated from the following:    Height as of this encounter: 1.873 m (6' 1.75\").    Weight as of this encounter: 108.4 kg (239 lb).   Weight management plan: Discussed healthy diet and exercise guidelines    Counseling  Appropriate preventive services were addressed with this patient via screening, questionnaire, or discussion as appropriate for fall prevention, nutrition, physical activity, Tobacco-use cessation, social engagement, weight loss and cognition.  Checklist reviewing preventive services available has been given to the " "patient.  Reviewed patient's diet, addressing concerns and/or questions.   Discussed possible causes of fatigue. The patient was provided with written information regarding signs of hearing loss.           Temo Davis is a 74 year old, presenting for the following:  Physical (Pt is fasting)        9/18/2024     7:04 AM   Additional Questions   Roomed by          Health Care Directive  Patient does not have a Health Care Directive or Living Will: Patient states has Advance Directive and will bring in a copy to clinic.    HPI    Patient seen today for annual wellness visit.  Risk questionnaire reviewed in detail.  Persistent atrial fibrillation. Metoprolol succinate 25 mg using 1 tablet daily. Warfarin 7.5 milligrams daily except 3.75 mg on Tuesday and Friday.  Bilateral PE historically.  Seen by pulmonologist September 11, 2024.  Seen by cardiologist July 11, 2024.  Had repeat colonoscopy April 16, 2024 with multiple tubular adenomas with 6-month follow-up recommendation.  Was able to hold warfarin x 4 days without bridge per anticoagulation nurse.  Atorvastatin 40 mg daily switch to rosuvastatin 20 mg daily due to some muscle aches.  Escitalopram 20 mg daily for anxiety issues with benefit.  CPAP for ALISE.  Told that inspire device not of benefit.  Will follow-up with Dr. King October 29, 2024.  Has evaluation with pulmonologist as well upcoming later this month for pulmonary function testing due to some shortness of breath with activity as well as follow-up assessment of prior noted pulmonary nodule CT scan.  Seen September 11, 2024 and told to repeat CT in 1 year.  Loss ability to smell following COVID infection.  Déjà vu episodes at times.  Denies headache.  Comprehensive review of systems as above otherwise all negative.        \"Aubree\" x 44 years   2 daughters - Renee (38) and Susannah (35)   3 grandchildren   No smoke (quit 1990 after 1 and 1/2 ppd)   EtOH: weekends \"few beers\"   Surgeries: " "anterior cervical fusion; inguinal hernia; tonsils; right eye cataract 3/4/20 and left eye cataract 3/11/20; \"right eye vitrectomy scheduled for 20 due to floaters\"   Hospitalizations: [facility] 10/9/17-10/10/17 bilateral pulmonary emboli with a. 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 Excellent   Feel stress (tense, anxious, or unable to sleep) To some extent      (!) STRESS CONCERN      2024   Nutrition   Diet: Regular (no restrictions)            2024   Exercise   Days per week of moderate/strenous exercise 5 days   Average minutes spent exercising at this level 100 min            2024   Social Factors   Frequency of gathering with friends or relatives Twice a week   Worry food won't last until get money to buy more No   Food not last or not have enough money for food? No   Do you have housing? (Housing is defined as stable permanent housing and does not include staying ouside in a car, in a tent, in an abandoned building, in an overnight shelter, or couch-surfing.) Yes   Are you worried about losing your housing? No   Lack of transportation? No   Unable to get utilities (heat,electricity)? No            2024   Fall Risk   Fallen 2 or more times in the past year? No   Trouble with walking or balance? No             2024   Activities of Daily Living- Home Safety   Needs help with the following daily activites None of the above   Safety concerns in the home None of the above            2024   Dental   Dentist two times every year? Yes            2024   Hearing Screening   Hearing concerns? (!) IT'S HARD TO FOLLOW A CONVERSATION IN A NOISY RESTAURANT OR CROWDED ROOM.    (!) TROUBLE UNDERSTANDING SOFT OR " WHISPERED SPEECH.       Multiple values from one day are sorted in reverse-chronological order         2024   Driving Risk Screening   Patient/family members have concerns about driving No            2024   General Alertness/Fatigue Screening   Have you been more tired than usual lately? (!) YES            2024   Urinary Incontinence Screening   Bothered by leaking urine in past 6 months No            2024   TB Screening   Were you born outside of the [place]? No          Today's PHQ-9 Score:       2024     6:59 AM   PHQ-9 SCORE   PHQ-9 Total Score MyChart 2 (Minimal depression)   PHQ-9 Total Score 2         2024   Substance Use   Alcohol more than 3/day or more than 7/wk No   Do you have a current opioid prescription? No   How severe/bad is pain from 1 to 10? 1/10   Do you use any other substances recreationally? No        Social History     Tobacco Use    Smoking status: Former     Current packs/day: 0.00     Average packs/day: 1.5 packs/day for 22.9 years (34.3 ttl pk-yrs)     Types: Cigarettes     Start date:      Quit date: 1990     Years since quittin.8    Smokeless tobacco: Never   Vaping Use    Vaping status: Never Used   Substance Use Topics    Alcohol use: Yes    Drug use: No       ASCVD Risk   The 10-year ASCVD risk score ([place] CAPPS, et al., 2019) is: 26.8%    Values used to calculate the score:      Age: 74 years      Sex: Male      Is Non- : No      Diabetic: No      Tobacco smoker: No      Systolic Blood Pressure: 150 mmHg      Is BP treated: Yes      HDL Cholesterol: 67 mg/dL      Total Cholesterol: 160 mg/dL            Reviewed and updated as needed this visit by Provider                    Past Medical History:   Diagnosis Date    Abnormal ECG     Anemia     Anxiety     Arrhythmia     Atrial fibrillation (H) 10/09/2017    Cancer (H) 2015    CPAP (continuous positive airway pressure) dependence 10/2019    per patient     Depression     Heart valve disease 2017    History of blood clots     Hyperlipidemia 2006    Pulmonary embolism, blood-clot, obstetric 10/09/2017    Sleep apnea 2017    Uses CPAP     Past Surgical History:   Procedure Laterality Date    BACK SURGERY      CERVICAL FUSION  2001    HERNIA REPAIR  2005    IA LAP,INGUINAL HERNIA REPR,INITIAL Left 8/15/2019    Procedure: HERNIORRHAPHY, INGUINAL, LAPAROSCOPIC;  Surgeon: Yan Leiva MD;  Location: [facility];  Service: General    TONSILLECTOMY      childhood     Lab work is in process  Labs reviewed in EPIC  BP Readings from Last 3 Encounters:   09/18/24 (!) 150/90   09/11/24 (!) 146/96   07/11/24 136/84    Wt Readings from Last 3 Encounters:   09/18/24 108.4 kg (239 lb)   09/11/24 109.3 kg (241 lb)   07/11/24 108 kg (238 lb)                  Patient Active Problem List   Diagnosis    Hearing Loss    Hypercholesterolemia    Presbycusis    Sinus bradycardia    BPH without urinary obstruction    Atypical Chest Pain    Blood Pressure Isolated Elevated    Male erectile dysfunction    Basal cell carcinoma, ear    Erectile dysfunction, unspecified erectile dysfunction type    Overweight (BMI 25.0-29.9)    Tubular adenoma of colon    Hyperbilirubinemia    Permanent atrial fibrillation (H)    Mitral valve insufficiency    ALISE on CPAP    Anxiety    Chronic cough    Normochromic normocytic anemia    Left inguinal hernia    Blepharitis of both eyes    History of pulmonary embolism    Pulmonary embolism, bilateral (H)    Dizziness    Abnormal electrocardiogram     Past Surgical History:   Procedure Laterality Date    BACK SURGERY      CERVICAL FUSION  2001    HERNIA REPAIR  2005    IA LAP,INGUINAL HERNIA REPR,INITIAL Left 8/15/2019    Procedure: HERNIORRHAPHY, INGUINAL, LAPAROSCOPIC;  Surgeon: Yan Leiva MD;  Location: [facility];  Service: General    TONSILLECTOMY      childhood       Social History     Tobacco Use    Smoking status: Former     Current  packs/day: 0.00     Average packs/day: 1.5 packs/day for 22.9 years (34.3 ttl pk-yrs)     Types: Cigarettes     Start date:      Quit date: 1990     Years since quittin.8    Smokeless tobacco: Never   Substance Use Topics    Alcohol use: Yes     Family History   Problem Relation Age of Onset    Heart Disease Mother     Hypertension Mother     Dementia Mother     Coronary Artery Disease Father     LUNG DISEASE No family hx of          Current Outpatient Medications   Medication Sig Dispense Refill    escitalopram (LEXAPRO) 20 MG tablet Take 1 tablet (20 mg) by mouth daily 90 tablet 3    isosorbide mononitrate (IMDUR) 30 MG 24 hr tablet Take 2 tablets (60 mg) by mouth daily 180 tablet 1    metoprolol succinate ER (TOPROL XL) 25 MG 24 hr tablet Take 1 tablet (25 mg) by mouth daily 90 tablet 3    MULTIVITAMIN (MULTIPLE VITAMIN ORAL) [MULTIVITAMIN (MULTIPLE VITAMIN ORAL)] Take 1 tablet by mouth daily.      rosuvastatin (CRESTOR) 20 MG tablet Take 1 tablet (20 mg) by mouth daily. 90 tablet 0    warfarin ANTICOAGULANT (COUMADIN) 7.5 MG tablet Take 3.75 mg every Tuesday; Take 7.5 mg all other days. Or as directed. 90 tablet 1     No Known Allergies  Current providers sharing in care for this patient include:  Patient Care Team:  Mamadou Baez MD as PCP - General  Judit Licona, PharmD as Pharmacist (Pharmacist)  Mamadou Baez MD as Assigned PCP  Adarsh Titus DO as Assigned Sleep Provider  Gurwinder Brink APRN CNP as Nurse Practitioner (Cardiovascular Disease)  Meghana Murray MD as MD (Pulmonary Disease)  Catherine Amaya PA-C as Physician Assistant (Physician Assistant - Medical)  Meghana Murray MD as Assigned Pulmonology Provider  Shila Mckeon PA-C as Assigned Heart and Vascular Provider    The following health maintenance items are reviewed in Epic and correct as of today:  Health Maintenance   Topic Date Due    AORTIC ANEURYSM SCREENING (SYSTEM ASSIGNED)  Never done     "INFLUENZA VACCINE (1) 09/01/2024    COVID-19 Vaccine (7 - 2024-25 season) 09/01/2024    COLORECTAL CANCER SCREENING  10/17/2024    BMP  04/09/2025    LIPID  07/09/2025    MEDICARE ANNUAL WELLNESS VISIT  09/18/2025    ANNUAL REVIEW OF HM ORDERS  09/18/2025    FALL RISK ASSESSMENT  09/18/2025    DTAP/TDAP/TD IMMUNIZATION (3 - Td or Tdap) 01/03/2027    GLUCOSE  04/09/2027    ADVANCE CARE PLANNING  09/18/2029    HEPATITIS C SCREENING  Completed    PHQ-2 (once per calendar year)  Completed    Pneumococcal Vaccine: 65+ Years  Completed    ZOSTER IMMUNIZATION  Completed    HPV IMMUNIZATION  Aged Out    MENINGITIS IMMUNIZATION  Aged Out    RSV MONOCLONAL ANTIBODY  Aged Out    LUNG CANCER SCREENING  Discontinued         Review of Systems  Constitutional, HEENT, cardiovascular, pulmonary, GI, , musculoskeletal, neuro, skin, endocrine and psych systems are negative, except as otherwise noted.     Objective    Exam  BP (!) 150/90   Pulse 61   Temp 97.1  F (36.2  C)   Resp 15   Ht 1.873 m (6' 1.75\")   Wt 108.4 kg (239 lb)   SpO2 98%   BMI 30.89 kg/m     Estimated body mass index is 30.89 kg/m  as calculated from the following:    Height as of this encounter: 1.873 m (6' 1.75\").    Weight as of this encounter: 108.4 kg (239 lb).    Physical Exam  GENERAL: alert and no distress  EYES: Eyes grossly normal to inspection, PERRL and conjunctivae and sclerae normal  HENT: ear canals and TM's normal, nose and mouth without ulcers or lesions  NECK: no adenopathy, no asymmetry, masses, or scars  RESP: lungs clear to auscultation - no rales, rhonchi or wheezes  CV:  irregular rate and rhythm, normal S1 S2, no S3 or S4, no murmur, click or rub, no peripheral edema  ABDOMEN: soft, nontender, no hepatosplenomegaly, no masses and bowel sounds normal   (male):  deferred per patient  RECTAL:  deferred per patient  MS: no gross musculoskeletal defects noted, no edema  SKIN: no suspicious lesions or rashes  NEURO: Normal strength and " tone, mentation intact and speech normal  PSYCH: mentation appears normal, affect normal/bright        9/18/2024   Mini Cog   Clock Draw Score 2 Normal   3 Item Recall 3 objects recalled   Mini Cog Total Score 5                 Signed Electronically by: Mamadou Baez MD  Answers submitted by the patient for this visit:  Patient Health Questionnaire (Submitted on 9/18/2024)  If you checked off any problems, how difficult have these problems made it for you to do your work, take care of things at home, or get along with other people?: Not difficult at all  PHQ9 TOTAL SCORE: 2  Patient Health Questionnaire (G7) (Submitted on 9/18/2024)  KALPANA 7 TOTAL SCORE: 3

## 2024-10-01 ENCOUNTER — DOCUMENTATION ONLY (OUTPATIENT)
Dept: OTHER | Facility: CLINIC | Age: 74
End: 2024-10-01
Payer: COMMERCIAL

## 2024-10-11 ENCOUNTER — ANTICOAGULATION THERAPY VISIT (OUTPATIENT)
Dept: ANTICOAGULATION | Facility: CLINIC | Age: 74
End: 2024-10-11

## 2024-10-11 ENCOUNTER — LAB (OUTPATIENT)
Dept: CARDIOLOGY | Facility: CLINIC | Age: 74
End: 2024-10-11
Payer: COMMERCIAL

## 2024-10-11 DIAGNOSIS — I48.21 PERMANENT ATRIAL FIBRILLATION (H): Primary | ICD-10-CM

## 2024-10-11 DIAGNOSIS — I26.99 PULMONARY EMBOLISM, BILATERAL (H): ICD-10-CM

## 2024-10-11 DIAGNOSIS — Z86.711 HISTORY OF PULMONARY EMBOLISM: ICD-10-CM

## 2024-10-11 DIAGNOSIS — E78.00 PURE HYPERCHOLESTEROLEMIA: ICD-10-CM

## 2024-10-11 DIAGNOSIS — I48.21 PERMANENT ATRIAL FIBRILLATION (H): ICD-10-CM

## 2024-10-11 LAB
CHOLEST SERPL-MCNC: 155 MG/DL
FASTING STATUS PATIENT QL REPORTED: YES
HDLC SERPL-MCNC: 65 MG/DL
INR POINT OF CARE: 4.2 (ref 0.9–1.1)
LDLC SERPL CALC-MCNC: 79 MG/DL
NONHDLC SERPL-MCNC: 90 MG/DL
TRIGL SERPL-MCNC: 56 MG/DL

## 2024-10-11 PROCEDURE — 36415 COLL VENOUS BLD VENIPUNCTURE: CPT

## 2024-10-11 PROCEDURE — 80061 LIPID PANEL: CPT

## 2024-10-11 PROCEDURE — 85610 PROTHROMBIN TIME: CPT | Mod: QW

## 2024-10-11 NOTE — PROGRESS NOTES
ANTICOAGULATION MANAGEMENT     Prem Taylor 74 year old male is on warfarin with supratherapeutic INR result. (Goal INR 2.0-3.0)    Recent labs: (last 7 days)     10/11/24  0750   INR 4.2*       ASSESSMENT     Source(s): Chart Review and Patient/Caregiver Call     Warfarin doses taken: Warfarin taken as instructed  Diet: No new diet changes identified  Medication/supplement changes:  rosuvastatin started on 9/5/24 which may be increasing INR today  New illness, injury, or hospitalization: No  Signs or symptoms of bleeding or clotting: No  Previous result: Therapeutic last visit; previously outside of goal range  Additional findings: None       PLAN     Recommended plan for ongoing change(s) affecting INR     Dosing Instructions: decrease your warfarin dose (8.3% change) with next INR in 10 days       Summary  As of 10/11/2024      Full warfarin instructions:  3.75 mg every Sun, Tue, Fri; 7.5 mg all other days   Next INR check:  10/21/2024               Telephone call with Ryan who agrees to plan and repeated back plan correctly    Patient elected to schedule next visit 10/28/24 as he will be out of town for 2 weeks    Education provided: Symptom monitoring: travel related clotting risk and prevention  Contact [phone] with any changes, questions or concerns.     Plan made per [facility] anticoagulation protocol    Elsie Stringer RN  10/11/2024  Anticoagulation Clinic  [employer] for routing messages: elsie MAURICIO  [facility] patient phone line: [phone]        _______________________________________________________________________     Anticoagulation Episode Summary       Current INR goal:  2.0-3.0   TTR:  69.5% (1 y)   Target end date:  Indefinite   Send INR reminders to:  RUBY MAURICIO    Indications    Pulmonary embolism  bilateral (H) (Resolved) [I26.99]  Permanent atrial fibrillation (H) [I48.21]  Pulmonary embolism  bilateral (H) [I26.99]  History of pulmonary embolism [Z86.711]             Comments:   Approved for 8 week checks per -- see encounter from 07/27/21             Anticoagulation Care Providers       Provider Role Specialty Phone number    Mamadou Baez MD Referring Family Medicine [phone]

## 2024-10-28 ENCOUNTER — LAB (OUTPATIENT)
Dept: LAB | Facility: CLINIC | Age: 74
End: 2024-10-28
Payer: COMMERCIAL

## 2024-10-28 ENCOUNTER — ANTICOAGULATION THERAPY VISIT (OUTPATIENT)
Dept: ANTICOAGULATION | Facility: CLINIC | Age: 74
End: 2024-10-28

## 2024-10-28 DIAGNOSIS — I48.21 PERMANENT ATRIAL FIBRILLATION (H): ICD-10-CM

## 2024-10-28 DIAGNOSIS — I48.21 PERMANENT ATRIAL FIBRILLATION (H): Primary | ICD-10-CM

## 2024-10-28 DIAGNOSIS — Z86.711 HISTORY OF PULMONARY EMBOLISM: ICD-10-CM

## 2024-10-28 DIAGNOSIS — I26.99 PULMONARY EMBOLISM, BILATERAL (H): ICD-10-CM

## 2024-10-28 LAB — INR BLD: 2.4 (ref 0.9–1.1)

## 2024-10-28 PROCEDURE — 36416 COLLJ CAPILLARY BLOOD SPEC: CPT

## 2024-10-28 PROCEDURE — 85610 PROTHROMBIN TIME: CPT

## 2024-10-28 NOTE — PROGRESS NOTES
ANTICOAGULATION MANAGEMENT     Prem Taylor 74 year old male is on warfarin with therapeutic INR result. (Goal INR 2.0-3.0)    Recent labs: (last 7 days)     10/28/24  0709   INR 2.4*       ASSESSMENT     Warfarin Lab Questionnaire    Warfarin Doses Last 7 Days      10/27/2024    12:05 PM   Dose in Tablet or Mg   TAB or MG? milligram (mg)     Pt Rptd Dose SINDHU MONDAY TUESDAY WED THURS FRIDAY SATURDAY   10/27/2024  12:05 PM 3.75 7.5 3.75 7.5 7.5 3.75 7.5         10/27/2024   Warfarin Lab Questionnaire   Missed doses within past 14 days? No   Changes in diet or alcohol within past 14 days? No   Medication changes since last result? No   Injuries or illness since last result? No   New shortness of breath, severe headaches or sudden changes in vision since last result? No   Abnormal bleeding since last result? No   Upcoming surgery, procedure? No   Best number to call with results? [phone]        Previous result: Supratherapeutic  Additional findings: None       PLAN     Recommended plan for no diet, medication or health factor changes affecting INR     Dosing Instructions: Continue your current warfarin dose with next INR in 4 weeks       Summary  As of 10/28/2024      Full warfarin instructions:  3.75 mg every Sun, Tue, Fri; 7.5 mg all other days   Next INR check:  11/25/2024               Telephone call with Ryan who verbalizes understanding and agrees to plan    Lab visit scheduled    Education provided: Please call back if any changes to your diet, medications or how you've been taking warfarin    Plan made per [facility] anticoagulation protocol    Elza Shine RN  10/28/2024  Anticoagulation Clinic  [facility] for routing messages: elsie MAURICIO  [facility] patient phone line: [phone]        _______________________________________________________________________     Anticoagulation Episode Summary       Current INR goal:  2.0-3.0   TTR:  66.9% (1 y)   Target end date:  Indefinite   Send INR reminders  to:  RUBY MAURICIO    Indications    Pulmonary embolism  bilateral (H) (Resolved) [I26.99]  Permanent atrial fibrillation (H) [I48.21]  Pulmonary embolism  bilateral (H) [I26.99]  History of pulmonary embolism [Z86.711]             Comments:  Approved for 8 week checks per -- see encounter from 07/27/21             Anticoagulation Care Providers       Provider Role Specialty Phone number    Mamadou Baez MD Referring Family Medicine [phone]

## 2024-10-29 ENCOUNTER — OFFICE VISIT (OUTPATIENT)
Dept: CARDIOLOGY | Facility: CLINIC | Age: 74
End: 2024-10-29
Payer: COMMERCIAL

## 2024-10-29 VITALS
WEIGHT: 244 LBS | BODY MASS INDEX: 31.54 KG/M2 | SYSTOLIC BLOOD PRESSURE: 130 MMHG | RESPIRATION RATE: 16 BRPM | DIASTOLIC BLOOD PRESSURE: 88 MMHG | HEART RATE: 62 BPM

## 2024-10-29 DIAGNOSIS — I25.10 CORONARY ARTERY DISEASE DUE TO LIPID RICH PLAQUE: ICD-10-CM

## 2024-10-29 DIAGNOSIS — I25.118 CORONARY ARTERY DISEASE INVOLVING NATIVE CORONARY ARTERY OF NATIVE HEART WITH OTHER FORM OF ANGINA PECTORIS (H): ICD-10-CM

## 2024-10-29 DIAGNOSIS — R06.09 DOE (DYSPNEA ON EXERTION): ICD-10-CM

## 2024-10-29 DIAGNOSIS — R42 LIGHTHEADEDNESS: ICD-10-CM

## 2024-10-29 DIAGNOSIS — G47.33 OSA ON CPAP: ICD-10-CM

## 2024-10-29 DIAGNOSIS — I25.83 CORONARY ARTERY DISEASE DUE TO LIPID RICH PLAQUE: ICD-10-CM

## 2024-10-29 DIAGNOSIS — E78.00 PURE HYPERCHOLESTEROLEMIA: ICD-10-CM

## 2024-10-29 DIAGNOSIS — R42 DIZZINESS: ICD-10-CM

## 2024-10-29 DIAGNOSIS — I48.21 PERMANENT ATRIAL FIBRILLATION (H): ICD-10-CM

## 2024-10-29 DIAGNOSIS — R00.2 HEART PALPITATIONS: ICD-10-CM

## 2024-10-29 DIAGNOSIS — R53.83 OTHER FATIGUE: ICD-10-CM

## 2024-10-29 DIAGNOSIS — I48.91 ATRIAL FIBRILLATION, UNSPECIFIED TYPE (H): ICD-10-CM

## 2024-10-29 PROCEDURE — 99214 OFFICE O/P EST MOD 30 MIN: CPT | Performed by: INTERNAL MEDICINE

## 2024-10-29 PROCEDURE — G2211 COMPLEX E/M VISIT ADD ON: HCPCS | Performed by: INTERNAL MEDICINE

## 2024-10-29 RX ORDER — METOPROLOL SUCCINATE 25 MG/1
50 TABLET, EXTENDED RELEASE ORAL DAILY
Qty: 180 TABLET | Refills: 3 | Status: SHIPPED | OUTPATIENT
Start: 2024-10-29

## 2024-10-29 RX ORDER — ROSUVASTATIN CALCIUM 40 MG/1
40 TABLET, COATED ORAL DAILY
Qty: 90 TABLET | Refills: 3 | Status: SHIPPED | OUTPATIENT
Start: 2024-10-29

## 2024-10-29 NOTE — PATIENT INSTRUCTIONS
Increase rosuvastatin to 40 mg daily.  Continue your current doses of isosorbide mononitrate, and metoprolol.  We will check a 24-hour Holter monitor to make sure your heart rate is appropriately controlled.  Complete the diary and be active while you are wearing the monitor.  Get your CPAP mask checked to make sure it is working well for you.  Continue your regular exercise regimen.  There is no substitute!  Monitor your blood pressure and bring a record of your readings in with you to follow-up visits.  Call if your symptoms worsen, otherwise we will plan to see you again in 6 months time

## 2024-10-29 NOTE — LETTER
10/29/2024    Mamadou Baez MD  [address]  [facility] [address]    RE: Prem Taylor       Dear Colleague,     I had the pleasure of seeing Prem Taylor in the [facility].    Cardiology Clinic Office Note    Assessment / Plan:    1.  Chronic atrial fibrillation, anticoagulated on warfarin.  He has been told he will need to be on lifelong warfarin for his pulmonary emboli, so not a good candidate for Watchman device.  We will check a Holter monitor to assess adequacy of rate control, as well as appropriateness of heart rate response to activities  2.  Coronary artery disease mild to moderate diffuse disease at CT angiography.  Will plan to continue medical management but consider invasive angiography for symptom progression.  He is overall quite satisfied with his quality of life at this point.  3.  Mixed hyperlipidemia with LDL 79 after switch from atorvastatin to rosuvastatin.  Will increase atorvastatin to 40 mg daily.  Target LDL is less than 70.  4.  Obstructive sleep apnea on CPAP.  Advised follow-up check to make sure his equipment is working well for him.    Follow-up in 6 months, unless further recommendations follow results of Holter monitor.    ______________________________________________________________________    Subjective:    I had the opportunity to see Prem Taylor at the [facility]. Prem Taylor is a 74 year old male with a history of hyperlipidemia, bilateral pulmonary emboli with new onset atrial fibrillation 2017 maintained on warfarin since that time.  He was subsequently diagnosed with obstructive sleep apnea and has been compliant with CPAP mask with good results.  He has had some chronic exertional dyspnea and had stress echo done 12/2023 showed decrease in exercise capacity achieving only 84% of his predicted maximal heart rate but no evidence of inducible ischemia.  Subsequent coronary CT angiography  revealed mild to moderate diffuse atherosclerotic disease with a moderately severe narrowing noted in a diagonal branch.  Medical therapy has been embarked upon.  He returns today for routine follow-up, accompanied by his wife.    Over the last year, he has had no additional syncope or falls.  He is still exercising 4 times a week at the [facility] doing treadmill, stationary bike elliptical, and weight machines at a vigorous pace.  He has been doing this for a number of years and notes that the pace has gradually decreased over time.  He has not had any chest pain or palpitations develop, or lightheadedness with exercise.  He notes his recovery is somewhat prolonged, this recovery time has significantly improved with his medication regimen.  Since his metoprolol was increased to 50 mg daily, he has noticed vivid dreams but these are not particularly bothersome.  He notes with exercise his heart rate seldom goes above 100.  He does use his CPAP mask regularly but has not had his device checked to make sure it is effective.  Wife notes that he sleeps easily during the day, though he feels that he awakens refreshed.  He has no awareness of his heart racing, and is tolerating warfarin.  He does not monitor his blood pressure at home though he has the equipment.    ______________________________________________________________________    Problem List:  Patient Active Problem List   Diagnosis     Hearing Loss     Hypercholesterolemia     Presbycusis     Sinus bradycardia     BPH without urinary obstruction     Atypical Chest Pain     Blood Pressure Isolated Elevated     Male erectile dysfunction     Basal cell carcinoma, ear     Erectile dysfunction, unspecified erectile dysfunction type     Overweight (BMI 25.0-29.9)     Tubular adenoma of colon     Hyperbilirubinemia     Permanent atrial fibrillation (H)     Mitral valve insufficiency     ALISE on CPAP     Anxiety     Chronic cough     Normochromic normocytic anemia     Left  inguinal hernia     Blepharitis of both eyes     History of pulmonary embolism     Pulmonary embolism, bilateral (H)     Dizziness     Abnormal electrocardiogram     Medical History:  Past Medical History:   Diagnosis Date     Abnormal ECG      Anemia      Anxiety      Arrhythmia      Atrial fibrillation (H) 10/09/2017     Cancer (H)      CPAP (continuous positive airway pressure) dependence 10/2019    per patient     Depression      Heart valve disease 2017     History of blood clots      Hyperlipidemia 2006     Pulmonary embolism, blood-clot, obstetric 10/09/2017     Sleep apnea 2017    Uses CPAP     Surgical History:  Past Surgical History:   Procedure Laterality Date     BACK SURGERY       CERVICAL FUSION       HERNIA REPAIR       MO LAP,INGUINAL HERNIA REPR,INITIAL Left 8/15/2019    Procedure: HERNIORRHAPHY, INGUINAL, LAPAROSCOPIC;  Surgeon: Yan Leiva MD;  Location: [facility];  Service: General     TONSILLECTOMY      childhood     Social History:  Social History     Socioeconomic History     Marital status:      Spouse name: Not on file     Number of children: Not on file     Years of education: Not on file     Highest education level: Not on file   Occupational History     Not on file   Tobacco Use     Smoking status: Former     Current packs/day: 0.00     Average packs/day: 1.5 packs/day for 22.9 years (34.3 ttl pk-yrs)     Types: Cigarettes     Start date:      Quit date: 1990     Years since quittin.9     Smokeless tobacco: Never   Vaping Use     Vaping status: Never Used   Substance and Sexual Activity     Alcohol use: Yes     Drug use: No     Sexual activity: Not on file   Other Topics Concern     Not on file   Social History Narrative    Has worked as a dental  and supervisor (more office work)     Social Drivers of Health     Financial Resource Strain: Low Risk  (2024)    Financial Resource Strain      Within the past 12 months, have  you or your family members you live with been unable to get utilities (heat, electricity) when it was really needed?: No   Food Insecurity: Low Risk  (9/13/2024)    Food Insecurity      Within the past 12 months, did you worry that your food would run out before you got money to buy more?: No      Within the past 12 months, did the food you bought just not last and you didn t have money to get more?: No   Transportation Needs: Low Risk  (9/13/2024)    Transportation Needs      Within the past 12 months, has lack of transportation kept you from medical appointments, getting your medicines, non-medical meetings or appointments, work, or from getting things that you need?: No   Physical Activity: Sufficiently Active (9/13/2024)    Exercise Vital Sign      Days of Exercise per Week: 5 days      Minutes of Exercise per Session: 100 min   Stress: Stress Concern Present (9/13/2024)    Citizen of [place] of Occupational Health - Occupational Stress Questionnaire      Feeling of Stress : To some extent   Social Connections: Unknown (9/13/2024)    Social Connection and Isolation Panel [NHANES]      Frequency of Communication with Friends and Family: Not on file      Frequency of Social Gatherings with Friends and Family: Twice a week      Attends [employer] Services: Not on file      Active Member of Clubs or Organizations: Not on file      Attends Club or Organization Meetings: Not on file      Marital Status: Not on file   Interpersonal Safety: Low Risk  (9/18/2024)    Interpersonal Safety      Do you feel physically and emotionally safe where you currently live?: Yes      Within the past 12 months, have you been hit, slapped, kicked or otherwise physically hurt by someone?: No      Within the past 12 months, have you been humiliated or emotionally abused in other ways by your partner or ex-partner?: No   Housing Stability: Low Risk  (9/13/2024)    Housing Stability      Do you have housing? : Yes      Are you worried about  losing your housing?: No     Sleep History:  Generally restorative on CPAP though does tired easily at days end.  Wife notes he sleeps easily.  Has not had his device checked  Exercise History:  4 days a week does prolonged workout [employer] including treadmill stationary bike elliptical and weight training.  Notes more shortness of breath when using the treadmill than he has in the past.  Overall exercise capacity stable    Review of Systems:      Positive for shortness of breath with activity, fall, anxiety.  12 point review of systems otherwise negative     Please refer above for cardiac ROS details.         Family History:  Family History   Problem Relation Age of Onset     Heart Disease Mother      Hypertension Mother      Dementia Mother      Coronary Artery Disease Father      LUNG DISEASE No family hx of          Allergies:  No Known Allergies  Medications:  Current Outpatient Medications   Medication Sig Dispense Refill     escitalopram (LEXAPRO) 20 MG tablet Take 1 tablet (20 mg) by mouth daily 90 tablet 3     isosorbide mononitrate (IMDUR) 30 MG 24 hr tablet Take 2 tablets (60 mg) by mouth daily 180 tablet 1     metoprolol succinate ER (TOPROL XL) 25 MG 24 hr tablet Take 1 tablet (25 mg) by mouth daily (Patient taking differently: Take 50 mg by mouth daily.) 90 tablet 3     MULTIVITAMIN (MULTIPLE VITAMIN ORAL) [MULTIVITAMIN (MULTIPLE VITAMIN ORAL)] Take 1 tablet by mouth daily.       rosuvastatin (CRESTOR) 20 MG tablet Take 1 tablet (20 mg) by mouth daily. 90 tablet 0     warfarin ANTICOAGULANT (COUMADIN) 7.5 MG tablet Take 3.75 mg every Tuesday; Take 7.5 mg all other days. Or as directed. 90 tablet 1       Objective:   Wt Readings from Last 3 Encounters:   10/29/24 110.7 kg (244 lb)   09/18/24 108.4 kg (239 lb)   09/11/24 109.3 kg (241 lb)     Vital signs:  /88   Pulse 62   Resp 16   Wt 110.7 kg (244 lb)   BMI 31.54 kg/m        Physical Exam:    General Appearance : Awake, Alert, No acute  "distress  HEENT: No Scleral icterus; the mucous membranes were pink and moist.  Conjunctivae not injected  Neck:  No cervical bruits, jugular venous distention, or thyromegaly   Chest: The spine was straight. Chest wall symmetric  Lungs: Respirations unlabored; the lungs are clear to auscultation.  No wheezing   Cardiovascular:   Normal point of maximal impulse.  Auscultation reveals irregular first and second heart sounds with no murmurs, rubs, or gallops.  Carotid, radial, and dorsalis pedal pulses are intact and symmetric.    Abdomen: No organomegaly, masses, bruits, or tenderness. Bowels sounds are present  Extremities:  No clubbing, cyanosis.  No edema  Skin: No rash, bruising  Musculoskeletal: No tenderness.  Neurologic: Alert and oriented ×3. Speech is fluent.     Lab Results    Chemistry/lipid CBC Cardiac Enzymes/BNP/TSH/INR   Recent Labs   Lab Test 10/11/24  0802   CHOL 155   HDL 65   LDL 79   TRIG 56     Recent Labs   Lab Test 10/11/24  0802 09/18/24  0759 07/09/24  0746   LDL 79 76 80     Recent Labs   Lab Test 09/18/24  0759      POTASSIUM 4.4   CHLORIDE 104   CO2 25   GLC 92   BUN 17.2   CR 0.88   GFRESTIMATED >90   DANDY 9.3     Recent Labs   Lab Test 09/18/24  0759 04/09/24  0743 12/27/23  1302   CR 0.88 0.77 0.8     No results for input(s): \"A1C\" in the last 10338 hours.       Recent Labs   Lab Test 09/18/24  0759   WBC 4.6   HGB 13.0*   HCT 38.8*   MCV 92        Recent Labs   Lab Test 09/18/24  0759 04/09/24  0743 11/21/23  1218   HGB 13.0* 13.3 13.3    No results for input(s): \"TROPONINI\" in the last 13666 hours.  Recent Labs   Lab Test 11/28/23  1045   NTBNP 714     Recent Labs   Lab Test 11/21/23  1218   TSH 1.77     Recent Labs   Lab Test 10/28/24  0709 10/11/24  0750 09/18/24  0758   INR 2.4* 4.2* 2.6*        Coronary CT angiogram 12/2023 at [facility]:  1. Total Agatston score 541, placing the patient in the 71st  percentile when compared to age and gender matched control group.  2. " Diffuse coronary disease as outlined below.  CORONARY CT ANGIOGRAPHY  DOMINANCE: Right dominant system.   LEFT MAIN: The left main arises normally from the left coronary cusp  and there is mildly obstructed with calcified plaque.  LEFT ANTERIOR DESCENDING: The left anterior descending is relatively  diffusely diseased. In the proximal segment there is a moderately  obstructive mixed plaque as well as in the mid segment of the LAD. The  distal LAD is mildly obstructed with calcified plaque. There is a  large second diagonal branch which appears to have a moderately severe  severe ostial narrowing with mixed plaque.  CIRCUMFLEX: The circumflex artery gives off its major branches the  first obtuse marginal branch which is mild to moderately obstructive  mixed plaque. The distal circumflex is in the AV groove and is small  without significant lesions detected.  RIGHT CORONARY ARTERY: The right coronary artery is dominant. There is  a mildly obstructed calcified plaque in the proximal segment. The mid  RCA is moderately obstructed with mixed plaque. There is mildly  obstructive plaque in the distal right coronary artery. PDA is without  significant disease.      Stress echocardiogram 12/2023:  1. Normal stress echocardiogram without evidence of stress induced ischemia.  2. Normal resting LV systolic performance with an ejection fraction of 55-60%.  There is normal improvement in left ventricular systolic performance with a  peak ejection fraction of 70-75%.  3. Nondiagnostic stress EKG due to baseline right bundle branch block  4. No anginal chest pain reported with exercise.  5. Fair functional capacity for age.  5 minutes on Bryson protocol to 83% predicted maximal heart rate        HOLLY GARCIA MD [facility]  [phone]    This note created using Dragon voice recognition software.  Sound alike errors may have escaped editing.             Thank you for allowing me to participate in the care of your  patient.      Sincerely,     Gerardo Velez MD     [facility]  cc:   Shila Mckeon PA-C  [address]  [place] [address]

## 2024-10-29 NOTE — PROGRESS NOTES
Cardiology Clinic Office Note    Assessment / Plan:    1.  Chronic atrial fibrillation, anticoagulated on warfarin.  He has been told he will need to be on lifelong warfarin for his pulmonary emboli, so not a good candidate for Watchman device.  We will check a Holter monitor to assess adequacy of rate control, as well as appropriateness of heart rate response to activities  2.  Coronary artery disease mild to moderate diffuse disease at CT angiography.  Will plan to continue medical management but consider invasive angiography for symptom progression.  He is overall quite satisfied with his quality of life at this point.  3.  Mixed hyperlipidemia with LDL 79 after switch from atorvastatin to rosuvastatin.  Will increase atorvastatin to 40 mg daily.  Target LDL is less than 70.  4.  Obstructive sleep apnea on CPAP.  Advised follow-up check to make sure his equipment is working well for him.    Follow-up in 6 months, unless further recommendations follow results of Holter monitor.    ______________________________________________________________________    Subjective:    I had the opportunity to see Prem Taylor at the [facility]. Prem Taylor is a 74 year old male with a history of hyperlipidemia, bilateral pulmonary emboli with new onset atrial fibrillation 2017 maintained on warfarin since that time.  He was subsequently diagnosed with obstructive sleep apnea and has been compliant with CPAP mask with good results.  He has had some chronic exertional dyspnea and had stress echo done 12/2023 showed decrease in exercise capacity achieving only 84% of his predicted maximal heart rate but no evidence of inducible ischemia.  Subsequent coronary CT angiography revealed mild to moderate diffuse atherosclerotic disease with a moderately severe narrowing noted in a diagonal branch.  Medical therapy has been embarked upon.  He returns today for routine follow-up, accompanied by  his wife.    Over the last year, he has had no additional syncope or falls.  He is still exercising 4 times a week at the [facility] doing treadmill, stationary bike elliptical, and weight machines at a vigorous pace.  He has been doing this for a number of years and notes that the pace has gradually decreased over time.  He has not had any chest pain or palpitations develop, or lightheadedness with exercise.  He notes his recovery is somewhat prolonged, this recovery time has significantly improved with his medication regimen.  Since his metoprolol was increased to 50 mg daily, he has noticed vivid dreams but these are not particularly bothersome.  He notes with exercise his heart rate seldom goes above 100.  He does use his CPAP mask regularly but has not had his device checked to make sure it is effective.  Wife notes that he sleeps easily during the day, though he feels that he awakens refreshed.  He has no awareness of his heart racing, and is tolerating warfarin.  He does not monitor his blood pressure at home though he has the equipment.    ______________________________________________________________________    Problem List:  Patient Active Problem List   Diagnosis    Hearing Loss    Hypercholesterolemia    Presbycusis    Sinus bradycardia    BPH without urinary obstruction    Atypical Chest Pain    Blood Pressure Isolated Elevated    Male erectile dysfunction    Basal cell carcinoma, ear    Erectile dysfunction, unspecified erectile dysfunction type    Overweight (BMI 25.0-29.9)    Tubular adenoma of colon    Hyperbilirubinemia    Permanent atrial fibrillation (H)    Mitral valve insufficiency    ALISE on CPAP    Anxiety    Chronic cough    Normochromic normocytic anemia    Left inguinal hernia    Blepharitis of both eyes    History of pulmonary embolism    Pulmonary embolism, bilateral (H)    Dizziness    Abnormal electrocardiogram     Medical History:  Past Medical History:   Diagnosis Date    Abnormal ECG      Anemia     Anxiety     Arrhythmia     Atrial fibrillation (H) 10/09/2017    Cancer (H) 2015    CPAP (continuous positive airway pressure) dependence 10/2019    per patient    Depression     Heart valve disease 2017    History of blood clots     Hyperlipidemia 2006    Pulmonary embolism, blood-clot, obstetric 10/09/2017    Sleep apnea 2017    Uses CPAP     Surgical History:  Past Surgical History:   Procedure Laterality Date    BACK SURGERY      CERVICAL FUSION  2001    HERNIA REPAIR  2005    OH LAP,INGUINAL HERNIA REPR,INITIAL Left 8/15/2019    Procedure: HERNIORRHAPHY, INGUINAL, LAPAROSCOPIC;  Surgeon: Yan Leiva MD;  Location: [facility];  Service: General    TONSILLECTOMY      childhood     Social History:  Social History     Socioeconomic History    Marital status:      Spouse name: Not on file    Number of children: Not on file    Years of education: Not on file    Highest education level: Not on file   Occupational History    Not on file   Tobacco Use    Smoking status: Former     Current packs/day: 0.00     Average packs/day: 1.5 packs/day for 22.9 years (34.3 ttl pk-yrs)     Types: Cigarettes     Start date:      Quit date: 1990     Years since quittin.9    Smokeless tobacco: Never   Vaping Use    Vaping status: Never Used   Substance and Sexual Activity    Alcohol use: Yes    Drug use: No    Sexual activity: Not on file   Other Topics Concern    Not on file   Social History Narrative    Has worked as a dental  and supervisor (more office work)     Social Drivers of Health     Financial Resource Strain: Low Risk  (2024)    Financial Resource Strain     Within the past 12 months, have you or your family members you live with been unable to get utilities (heat, electricity) when it was really needed?: No   Food Insecurity: Low Risk  (2024)    Food Insecurity     Within the past 12 months, did you worry that your food would run out before you got  money to buy more?: No     Within the past 12 months, did the food you bought just not last and you didn t have money to get more?: No   Transportation Needs: Low Risk  (9/13/2024)    Transportation Needs     Within the past 12 months, has lack of transportation kept you from medical appointments, getting your medicines, non-medical meetings or appointments, work, or from getting things that you need?: No   Physical Activity: Sufficiently Active (9/13/2024)    Exercise Vital Sign     Days of Exercise per Week: 5 days     Minutes of Exercise per Session: 100 min   Stress: Stress Concern Present (9/13/2024)    [employer] [place] [employer] - Occupational Stress Questionnaire     Feeling of Stress : To some extent   Social Connections: Unknown (9/13/2024)    Social Connection and Isolation Panel [NHANES]     Frequency of Communication with Friends and Family: Not on file     Frequency of Social Gatherings with Friends and Family: Twice a week     Attends Yazdanism Services: Not on file     Active Member of Clubs or Organizations: Not on file     Attends Club or Organization Meetings: Not on file     Marital Status: Not on file   Interpersonal Safety: Low Risk  (9/18/2024)    Interpersonal Safety     Do you feel physically and emotionally safe where you currently live?: Yes     Within the past 12 months, have you been hit, slapped, kicked or otherwise physically hurt by someone?: No     Within the past 12 months, have you been humiliated or emotionally abused in other ways by your partner or ex-partner?: No   Housing Stability: Low Risk  (9/13/2024)    Housing Stability     Do you have housing? : Yes     Are you worried about losing your housing?: No     Sleep History:  Generally restorative on CPAP though does tired easily at days end.  Wife notes he sleeps easily.  Has not had his device checked  Exercise History:  4 days a week does prolonged workout [employer] including treadmill stationary bike elliptical and  weight training.  Notes more shortness of breath when using the treadmill than he has in the past.  Overall exercise capacity stable    Review of Systems:      Positive for shortness of breath with activity, fall, anxiety.  12 point review of systems otherwise negative     Please refer above for cardiac ROS details.         Family History:  Family History   Problem Relation Age of Onset    Heart Disease Mother     Hypertension Mother     Dementia Mother     Coronary Artery Disease Father     LUNG DISEASE No family hx of          Allergies:  No Known Allergies  Medications:  Current Outpatient Medications   Medication Sig Dispense Refill    escitalopram (LEXAPRO) 20 MG tablet Take 1 tablet (20 mg) by mouth daily 90 tablet 3    isosorbide mononitrate (IMDUR) 30 MG 24 hr tablet Take 2 tablets (60 mg) by mouth daily 180 tablet 1    metoprolol succinate ER (TOPROL XL) 25 MG 24 hr tablet Take 1 tablet (25 mg) by mouth daily (Patient taking differently: Take 50 mg by mouth daily.) 90 tablet 3    MULTIVITAMIN (MULTIPLE VITAMIN ORAL) [MULTIVITAMIN (MULTIPLE VITAMIN ORAL)] Take 1 tablet by mouth daily.      rosuvastatin (CRESTOR) 20 MG tablet Take 1 tablet (20 mg) by mouth daily. 90 tablet 0    warfarin ANTICOAGULANT (COUMADIN) 7.5 MG tablet Take 3.75 mg every Tuesday; Take 7.5 mg all other days. Or as directed. 90 tablet 1       Objective:   Wt Readings from Last 3 Encounters:   10/29/24 110.7 kg (244 lb)   09/18/24 108.4 kg (239 lb)   09/11/24 109.3 kg (241 lb)     Vital signs:  /88   Pulse 62   Resp 16   Wt 110.7 kg (244 lb)   BMI 31.54 kg/m        Physical Exam:    General Appearance : Awake, Alert, No acute distress  HEENT: No Scleral icterus; the mucous membranes were pink and moist.  Conjunctivae not injected  Neck:  No cervical bruits, jugular venous distention, or thyromegaly   Chest: The spine was straight. Chest wall symmetric  Lungs: Respirations unlabored; the lungs are clear to auscultation.  No  "wheezing   Cardiovascular:   Normal point of maximal impulse.  Auscultation reveals irregular first and second heart sounds with no murmurs, rubs, or gallops.  Carotid, radial, and dorsalis pedal pulses are intact and symmetric.    Abdomen: No organomegaly, masses, bruits, or tenderness. Bowels sounds are present  Extremities:  No clubbing, cyanosis.  No edema  Skin: No rash, bruising  Musculoskeletal: No tenderness.  Neurologic: Alert and oriented ×3. Speech is fluent.     Lab Results    Chemistry/lipid CBC Cardiac Enzymes/BNP/TSH/INR   Recent Labs   Lab Test 10/11/24  0802   CHOL 155   HDL 65   LDL 79   TRIG 56     Recent Labs   Lab Test 10/11/24  0802 09/18/24  0759 07/09/24  0746   LDL 79 76 80     Recent Labs   Lab Test 09/18/24  0759      POTASSIUM 4.4   CHLORIDE 104   CO2 25   GLC 92   BUN 17.2   CR 0.88   GFRESTIMATED >90   DANDY 9.3     Recent Labs   Lab Test 09/18/24  0759 04/09/24  0743 12/27/23  1302   CR 0.88 0.77 0.8     No results for input(s): \"A1C\" in the last 02866 hours.       Recent Labs   Lab Test 09/18/24  0759   WBC 4.6   HGB 13.0*   HCT 38.8*   MCV 92        Recent Labs   Lab Test 09/18/24  0759 04/09/24  0743 11/21/23  1218   HGB 13.0* 13.3 13.3    No results for input(s): \"TROPONINI\" in the last 77070 hours.  Recent Labs   Lab Test 11/28/23  1045   NTBNP 714     Recent Labs   Lab Test 11/21/23  1218   TSH 1.77     Recent Labs   Lab Test 10/28/24  0709 10/11/24  0750 09/18/24  0758   INR 2.4* 4.2* 2.6*        Coronary CT angiogram 12/2023 at [facility]:  1. Total Agatston score 541, placing the patient in the 71st  percentile when compared to age and gender matched control group.  2. Diffuse coronary disease as outlined below.  CORONARY CT ANGIOGRAPHY  DOMINANCE: Right dominant system.   LEFT MAIN: The left main arises normally from the left coronary cusp  and there is mildly obstructed with calcified plaque.  LEFT ANTERIOR DESCENDING: The left anterior descending is " relatively  diffusely diseased. In the proximal segment there is a moderately  obstructive mixed plaque as well as in the mid segment of the LAD. The  distal LAD is mildly obstructed with calcified plaque. There is a  large second diagonal branch which appears to have a moderately severe  severe ostial narrowing with mixed plaque.  CIRCUMFLEX: The circumflex artery gives off its major branches the  first obtuse marginal branch which is mild to moderately obstructive  mixed plaque. The distal circumflex is in the AV groove and is small  without significant lesions detected.  RIGHT CORONARY ARTERY: The right coronary artery is dominant. There is  a mildly obstructed calcified plaque in the proximal segment. The mid  RCA is moderately obstructed with mixed plaque. There is mildly  obstructive plaque in the distal right coronary artery. PDA is without  significant disease.      Stress echocardiogram 12/2023:  1. Normal stress echocardiogram without evidence of stress induced ischemia.  2. Normal resting LV systolic performance with an ejection fraction of 55-60%.  There is normal improvement in left ventricular systolic performance with a  peak ejection fraction of 70-75%.  3. Nondiagnostic stress EKG due to baseline right bundle branch block  4. No anginal chest pain reported with exercise.  5. Fair functional capacity for age.  5 minutes on Bryson protocol to 83% predicted maximal heart rate        HOLLY GARCIA MD [facility]  [phone]    This note created using Dragon voice recognition software.  Sound alike errors may have escaped editing.

## 2024-10-30 ENCOUNTER — HOSPITAL ENCOUNTER (OUTPATIENT)
Dept: CARDIOLOGY | Facility: HOSPITAL | Age: 74
Discharge: HOME | End: 2024-10-30
Attending: INTERNAL MEDICINE | Admitting: INTERNAL MEDICINE
Payer: COMMERCIAL

## 2024-10-30 DIAGNOSIS — I25.10 CORONARY ARTERY DISEASE DUE TO LIPID RICH PLAQUE: ICD-10-CM

## 2024-10-30 DIAGNOSIS — I48.19 PERSISTENT ATRIAL FIBRILLATION (H): ICD-10-CM

## 2024-10-30 DIAGNOSIS — R00.2 HEART PALPITATIONS: ICD-10-CM

## 2024-10-30 DIAGNOSIS — I48.21 PERMANENT ATRIAL FIBRILLATION (H): ICD-10-CM

## 2024-10-30 DIAGNOSIS — I25.83 CORONARY ARTERY DISEASE DUE TO LIPID RICH PLAQUE: ICD-10-CM

## 2024-10-30 DIAGNOSIS — G47.33 OSA ON CPAP: ICD-10-CM

## 2024-10-30 DIAGNOSIS — R06.09 DOE (DYSPNEA ON EXERTION): ICD-10-CM

## 2024-10-30 DIAGNOSIS — R42 DIZZINESS: ICD-10-CM

## 2024-10-30 DIAGNOSIS — R53.83 OTHER FATIGUE: ICD-10-CM

## 2024-10-30 DIAGNOSIS — I25.118 CORONARY ARTERY DISEASE INVOLVING NATIVE CORONARY ARTERY OF NATIVE HEART WITH OTHER FORM OF ANGINA PECTORIS (H): ICD-10-CM

## 2024-10-30 DIAGNOSIS — E78.00 PURE HYPERCHOLESTEROLEMIA: ICD-10-CM

## 2024-10-30 DIAGNOSIS — R42 LIGHTHEADEDNESS: ICD-10-CM

## 2024-10-30 PROCEDURE — 93225 XTRNL ECG REC<48 HRS REC: CPT

## 2024-10-30 PROCEDURE — 93226 XTRNL ECG REC<48 HR SCAN A/R: CPT

## 2024-10-30 RX ORDER — WARFARIN SODIUM 7.5 MG/1
TABLET ORAL
Qty: 75 TABLET | Refills: 1 | Status: SHIPPED | OUTPATIENT
Start: 2024-10-30

## 2024-10-30 NOTE — TELEPHONE ENCOUNTER
ANTICOAGULATION MANAGEMENT:  Medication Refill    Anticoagulation Summary  As of 10/28/2024      Warfarin maintenance plan:  3.75 mg (7.5 mg x 0.5) every Sun, Tue, Fri; 7.5 mg (7.5 mg x 1) all other days   Next INR check:  11/25/2024   Target end date:  Indefinite    Indications    Pulmonary embolism  bilateral (H) (Resolved) [I26.99]  Permanent atrial fibrillation (H) [I48.21]  Pulmonary embolism  bilateral (H) [I26.99]  History of pulmonary embolism [Z86.711]                 Anticoagulation Care Providers       Provider Role Specialty Phone number    Mamadou Baez MD Referring Family Medicine [phone]            Refill Criteria    Visit with referring provider/group: Meets criteria: visit within referring provider group in the last 15 months on 9/18/24    [facility] referral last signed: 07/24/2024; within last year: Yes    Lab monitoring not exceeding 2 weeks overdue: Yes    Prem meets all criteria for refill. Rx instructions and quantity supplied updated to match patient's current dosing plan. Warfarin 90 day supply with 1 refill granted per [facility] protocol     Yesenia Vázquez, RN  Anticoagulation Clinic

## 2024-11-04 PROCEDURE — 93227 XTRNL ECG REC<48 HR R&I: CPT | Performed by: INTERNAL MEDICINE

## 2024-11-06 ENCOUNTER — TELEPHONE (OUTPATIENT)
Dept: FAMILY MEDICINE | Facility: CLINIC | Age: 74
End: 2024-11-06
Payer: COMMERCIAL

## 2024-11-06 DIAGNOSIS — H90.3 SENSORINEURAL HEARING LOSS (SNHL) OF BOTH EARS: Primary | ICD-10-CM

## 2024-11-06 NOTE — TELEPHONE ENCOUNTER
Order/Referral Request    Who is requesting: Patient    Orders being requested: Audiology referral    Reason service is needed/diagnosis: Hearing test    When are orders needed by: 3/3/2025     Has this been discussed with Provider: Yes    Does patient have a preference on a Group/Provider/Facility? [facility]    Does patient have an appointment scheduled?: Yes: Audiology instructed patient to reach out for referral    Where to send orders: Fax [phone]    Could we send this information to you in [facility] or would you prefer to receive a phone call?:   Patient would like to be contacted via [facility]

## 2024-11-12 ENCOUNTER — LAB (OUTPATIENT)
Dept: LAB | Facility: CLINIC | Age: 74
End: 2024-11-12
Payer: COMMERCIAL

## 2024-11-12 ENCOUNTER — ANTICOAGULATION THERAPY VISIT (OUTPATIENT)
Dept: ANTICOAGULATION | Facility: CLINIC | Age: 74
End: 2024-11-12

## 2024-11-12 DIAGNOSIS — I48.21 PERMANENT ATRIAL FIBRILLATION (H): ICD-10-CM

## 2024-11-12 DIAGNOSIS — I26.99 PULMONARY EMBOLISM, BILATERAL (H): ICD-10-CM

## 2024-11-12 DIAGNOSIS — Z86.711 HISTORY OF PULMONARY EMBOLISM: ICD-10-CM

## 2024-11-12 DIAGNOSIS — I48.21 PERMANENT ATRIAL FIBRILLATION (H): Primary | ICD-10-CM

## 2024-11-12 LAB — INR BLD: 2.4 (ref 0.9–1.1)

## 2024-11-12 PROCEDURE — 85610 PROTHROMBIN TIME: CPT

## 2024-11-12 PROCEDURE — 36416 COLLJ CAPILLARY BLOOD SPEC: CPT

## 2024-11-12 NOTE — PROGRESS NOTES
ANTICOAGULATION MANAGEMENT     Prem Taylor 74 year old male is on warfarin with therapeutic INR result. (Goal INR 2.0-3.0)    Recent labs: (last 7 days)     11/12/24  0739   INR 2.4*       ASSESSMENT     Warfarin Lab Questionnaire    Warfarin Doses Last 7 Days      11/11/2024     9:49 AM   Dose in Tablet or Mg   TAB or MG? milligram (mg)     Pt Rptd Dose SUNDAY MONDAY TUESDAY WED THURS FRIDAY SATURDAY 11/11/2024   9:49 AM 3.75 7.5 3.75 7.5 7.5 3.75 7.5         11/11/2024   Warfarin Lab Questionnaire   Missed doses within past 14 days? No   Changes in diet or alcohol within past 14 days? No   Medication changes since last result? Yes: 10/29 Crestor dose increased (has been 2 weeks)- can increase INR   Injuries or illness since last result? No   New shortness of breath, severe headaches or sudden changes in vision since last result? No   Abnormal bleeding since last result? No   Upcoming surgery, procedure? No   Best number to call with results? [phone]        Previous result: Therapeutic last visit; previously outside of goal range  Additional findings: None       PLAN     Recommended plan for ongoing change(s) affecting INR     Dosing Instructions: Continue your current warfarin dose with next INR in 3 weeks       Summary  As of 11/12/2024      Full warfarin instructions:  3.75 mg every Sun, Tue, Fri; 7.5 mg all other days   Next INR check:  12/3/2024               Telephone call with Ryan who verbalizes understanding and agrees to plan    Lab visit scheduled    Education provided: Goal range and lab monitoring: goal range and significance of current result  Contact [phone] with any changes, questions or concerns.     Plan made per [facility] anticoagulation protocol    Yesenia Vázquez, RN  11/12/2024  Anticoagulation Clinic  [employer] for routing messages: elsie MAURICIO  [facility] patient phone line: [phone]        _______________________________________________________________________      Anticoagulation Episode Summary       Current INR goal:  2.0-3.0   TTR:  66.9% (1 y)   Target end date:  Indefinite   Send INR reminders to:  RUBY MAURICIO    Indications    Pulmonary embolism  bilateral (H) (Resolved) [I26.99]  Permanent atrial fibrillation (H) [I48.21]  Pulmonary embolism  bilateral (H) [I26.99]  History of pulmonary embolism [Z86.711]             Comments:  Approved for 8 week checks per -- see encounter from 07/27/21             Anticoagulation Care Providers       Provider Role Specialty Phone number    Mamadou Baez MD Referring Family Medicine [phone]

## 2024-12-03 ENCOUNTER — ANTICOAGULATION THERAPY VISIT (OUTPATIENT)
Dept: ANTICOAGULATION | Facility: CLINIC | Age: 74
End: 2024-12-03

## 2024-12-03 ENCOUNTER — LAB (OUTPATIENT)
Dept: LAB | Facility: CLINIC | Age: 74
End: 2024-12-03
Payer: COMMERCIAL

## 2024-12-03 DIAGNOSIS — Z86.711 HISTORY OF PULMONARY EMBOLISM: ICD-10-CM

## 2024-12-03 DIAGNOSIS — I26.99 PULMONARY EMBOLISM, BILATERAL (H): ICD-10-CM

## 2024-12-03 DIAGNOSIS — I48.21 PERMANENT ATRIAL FIBRILLATION (H): ICD-10-CM

## 2024-12-03 DIAGNOSIS — I48.21 PERMANENT ATRIAL FIBRILLATION (H): Primary | ICD-10-CM

## 2024-12-03 LAB — INR BLD: 3.1 (ref 0.9–1.1)

## 2024-12-03 PROCEDURE — 36416 COLLJ CAPILLARY BLOOD SPEC: CPT

## 2024-12-03 PROCEDURE — 85610 PROTHROMBIN TIME: CPT

## 2024-12-03 NOTE — PROGRESS NOTES
ANTICOAGULATION MANAGEMENT     Prem Taylor 74 year old male is on warfarin with supratherapeutic INR result. (Goal INR 2.0-3.0)    Recent labs: (last 7 days)     12/03/24  0743   INR 3.1*       ASSESSMENT     Warfarin Lab Questionnaire    Warfarin Doses Last 7 Days      12/2/2024    10:13 AM   Dose in Tablet or Mg   TAB or MG? milligram (mg)     Pt Rptd Dose SUNDAY MONDAY TUESDAY WED THURS FRIDAY SATURDAY 12/2/2024  10:13 AM 3.75 7.5 3.75 7.5 7.5 3.75 7.5         12/2/2024   Warfarin Lab Questionnaire   Missed doses within past 14 days? Confirmed that he took dosing correctly    Changes in diet or alcohol within past 14 days? No   Medication changes since last result? No   Injuries or illness since last result? No   New shortness of breath, severe headaches or sudden changes in vision since last result? No   Abnormal bleeding since last result? No   Upcoming surgery, procedure? No        Previous result: Therapeutic last 2(+) visits  Additional findings: None       PLAN     Recommended plan for no diet, medication or health factor changes affecting INR     Dosing Instructions: Continue your current warfarin dose with next INR in 2 weeks       Summary  As of 12/3/2024      Full warfarin instructions:  3.75 mg every Sun, Tue, Fri; 7.5 mg all other days   Next INR check:  12/17/2024               Telephone call with Ryan who verbalizes understanding and agrees to plan. My Chart message sent.     Lab visit scheduled    Education provided: Goal range and lab monitoring: goal range and significance of current result  Contact [phone] with any changes, questions or concerns.     Plan made per [facility] anticoagulation protocol    Yesenia Vázquez, RN  12/3/2024  Anticoagulation Clinic  [employer] for routing messages: elsie MAURICIO  [facility] patient phone line: [phone]        _______________________________________________________________________     Anticoagulation Episode Summary       Current INR goal:   2.0-3.0   TTR:  71.4% (1 y)   Target end date:  Indefinite   Send INR reminders to:  [facility]    Indications    Pulmonary embolism  bilateral (H) (Resolved) [I26.99]  Permanent atrial fibrillation (H) [I48.21]  Pulmonary embolism  bilateral (H) [I26.99]  History of pulmonary embolism [Z86.711]             Comments:  Approved for 8 week checks per -- see encounter from 07/27/21             Anticoagulation Care Providers       Provider Role Specialty Phone number    Mamadou Baez MD Referring Family Medicine [phone]

## 2024-12-09 ENCOUNTER — TELEPHONE (OUTPATIENT)
Dept: ANTICOAGULATION | Facility: CLINIC | Age: 74
End: 2024-12-09
Payer: COMMERCIAL

## 2024-12-09 ENCOUNTER — TELEPHONE (OUTPATIENT)
Dept: FAMILY MEDICINE | Facility: CLINIC | Age: 74
End: 2024-12-09
Payer: COMMERCIAL

## 2024-12-09 DIAGNOSIS — F41.9 ANXIETY: Primary | ICD-10-CM

## 2024-12-09 NOTE — TELEPHONE ENCOUNTER
[facility]-PROCEDURAL ANTICOAGULATION  MANAGEMENT    [facility] requesting pre-procedure hold orders for warfarin and review for bridging      Procedure date: 1/16/25       Procedure: Colonoscopy      Procedure location and phone number (if external): [facility]  ([phone])     Number of warfarin hold days requested and/or target INR: 4 days    Pre-op date: Not applicable      Routing to Anticoagulation Pharmacist for review.     [facility] pool: elsie Maguire RN

## 2024-12-09 NOTE — TELEPHONE ENCOUNTER
Order/Referral Request    Who is requesting: patient    Orders being requested: psychiatric eval    Reason service is needed/diagnosis: psychiatric consult for medications;mental health concerns, medication check on mental health meds; anxiety    When are orders needed by: when done    Has this been discussed with Provider: Yes    Does patient have a preference on a Group/Provider/Facility? Within [facility] ([facility] preferred)    Does patient have an appointment scheduled?: No    Where to send orders: Place orders within Epic    Could we send this information to you in [facility] or would you prefer to receive a phone call?:   Patient would prefer a phone call   Okay to leave a detailed message?: Yes at Cell number on file:    Telephone Information:   Mobile [phone]

## 2024-12-17 ENCOUNTER — ANTICOAGULATION THERAPY VISIT (OUTPATIENT)
Dept: ANTICOAGULATION | Facility: CLINIC | Age: 74
End: 2024-12-17

## 2024-12-17 ENCOUNTER — LAB (OUTPATIENT)
Dept: LAB | Facility: CLINIC | Age: 74
End: 2024-12-17
Payer: COMMERCIAL

## 2024-12-17 DIAGNOSIS — I48.21 PERMANENT ATRIAL FIBRILLATION (H): Primary | ICD-10-CM

## 2024-12-17 DIAGNOSIS — Z86.711 HISTORY OF PULMONARY EMBOLISM: ICD-10-CM

## 2024-12-17 DIAGNOSIS — I26.99 PULMONARY EMBOLISM, BILATERAL (H): ICD-10-CM

## 2024-12-17 DIAGNOSIS — I48.21 PERMANENT ATRIAL FIBRILLATION (H): ICD-10-CM

## 2024-12-17 LAB — INR BLD: 2.6 (ref 0.9–1.1)

## 2024-12-17 PROCEDURE — 36416 COLLJ CAPILLARY BLOOD SPEC: CPT

## 2024-12-17 PROCEDURE — 85610 PROTHROMBIN TIME: CPT

## 2024-12-23 NOTE — TELEPHONE ENCOUNTER
[facility]ED[facility]-PROCEDURAL ANTICOAGULATION  MANAGEMENT    ASSESSMENT     Warfarin interruption plan for Colonoscopy on 25.    Indication for Anticoagulation: Atrial Fibrillation and PE    Indication for Anticoagulation: Atrial Fibrillation and PE    [id number] = 2-3 (Hypertension and Age 65-74, +/-CAD)    Hx of colonoscopy 2019, hx of 8 mm polyp    Past procedure management  2024 colonoscopy-4 day hold, no bridge  10 polyps; multiple > 10 mm  10/2023 ANDREW, hold with post-procedure prophylaxis  [id number] Hx of therapeutic bridge with interruptions    Deedee-Procedure Risk stratification for thromboembolism: low-moderate ( Chest guidelines) based on individual indications; unknown additive risk of multiple indications    VTE:  CHEST Perioperative Management guidelines suggest against bridging for patients with Hx of VTE as sole clinical indication for warfarin except in high risk stratification patients.  AFIB:  CHEST Perioperative Management guidelines recommends against bridging for patients with atrial fibrillation except in high risk stratification patients.  POLYPECTOMY:  CHEST Perioperative Management guidelines suggest no bridging during warfarin interruption for colonoscopy with anticipated polypectomy    RECOMMENDATION     Pre-Procedure:  Hold warfarin for 4 days, until after procedure startin25   No Bridge    Post-Procedure:  Resume warfarin dose if okay with provider doing procedure on night of procedure, 25 PM: 11.25 x 1 then resume current dose  Recheck INR ~ 7 days after resuming warfarin     Plan routed to referring provider for approval  ?   Jessica Orosco, [facility]    SUBJECTIVE/OBJECTIVE     Prem Taylor, a 74 year old male    Goal INR Range: 2.0-3.0     Wt Readings from Last 3 Encounters:   10/29/24 110.7 kg (244 lb)   24 108.4 kg (239 lb)   24 109.3 kg (241 lb)      Ideal body weight: 81.6 kg (179 lb 15.2 oz)  Adjusted ideal body weight: 93.2 kg (205 lb 9.1  "oz)     Estimated body mass index is 31.54 kg/m  as calculated from the following:    Height as of 9/18/24: 1.873 m (6' 1.75\").    Weight as of 10/29/24: 110.7 kg (244 lb).    Lab Results   Component Value Date    INR 2.6 (H) 12/17/2024    INR 3.1 (H) 12/03/2024    INR 2.4 (H) 11/12/2024     Lab Results   Component Value Date    HGB 13.0 (L) 09/18/2024    HCT 38.8 (L) 09/18/2024     09/18/2024     Lab Results   Component Value Date    CR 0.88 09/18/2024    CR 0.77 04/09/2024    CR 0.8 12/27/2023     Estimated Creatinine Clearance: 98.6 mL/min (based on SCr of 0.88 mg/dL).    "

## 2024-12-25 DIAGNOSIS — F41.9 ANXIETY: ICD-10-CM

## 2024-12-26 RX ORDER — ESCITALOPRAM OXALATE 20 MG/1
20 TABLET ORAL DAILY
Qty: 90 TABLET | Refills: 3 | OUTPATIENT
Start: 2024-12-26

## 2024-12-30 DIAGNOSIS — R06.09 DOE (DYSPNEA ON EXERTION): ICD-10-CM

## 2024-12-30 DIAGNOSIS — I25.118 CORONARY ARTERY DISEASE INVOLVING NATIVE CORONARY ARTERY OF NATIVE HEART WITH OTHER FORM OF ANGINA PECTORIS (H): ICD-10-CM

## 2024-12-30 RX ORDER — ISOSORBIDE MONONITRATE 30 MG/1
60 TABLET, EXTENDED RELEASE ORAL DAILY
Qty: 180 TABLET | Refills: 0 | Status: SHIPPED | OUTPATIENT
Start: 2024-12-30

## 2024-12-31 NOTE — TELEPHONE ENCOUNTER
[facility]-PROCEDURAL ANTICOAGULATION MANAGEMENT    Returned call to [facility] ([phone]). Spoke to YOSEF Brewster and relayed pre-procedure orders:    Okay to hold warfarin 4 days prior to procedure  No bridge    Jessica Orosco, [facility]  ___    Mamadou Baez MD  [facility]8 days ago     I approve.  Thank you.    Mamadou Baez MD

## 2025-01-06 ENCOUNTER — ANTICOAGULATION THERAPY VISIT (OUTPATIENT)
Dept: ANTICOAGULATION | Facility: CLINIC | Age: 75
End: 2025-01-06

## 2025-01-06 ENCOUNTER — LAB (OUTPATIENT)
Dept: LAB | Facility: CLINIC | Age: 75
End: 2025-01-06
Payer: COMMERCIAL

## 2025-01-06 DIAGNOSIS — Z86.711 HISTORY OF PULMONARY EMBOLISM: ICD-10-CM

## 2025-01-06 DIAGNOSIS — I48.21 PERMANENT ATRIAL FIBRILLATION (H): Primary | ICD-10-CM

## 2025-01-06 DIAGNOSIS — I26.99 PULMONARY EMBOLISM, BILATERAL (H): ICD-10-CM

## 2025-01-06 DIAGNOSIS — I48.21 PERMANENT ATRIAL FIBRILLATION (H): ICD-10-CM

## 2025-01-06 LAB — INR BLD: 3.8 (ref 0.9–1.1)

## 2025-01-06 PROCEDURE — 36416 COLLJ CAPILLARY BLOOD SPEC: CPT

## 2025-01-06 PROCEDURE — 85610 PROTHROMBIN TIME: CPT

## 2025-01-06 NOTE — PROGRESS NOTES
ANTICOAGULATION MANAGEMENT     Prem Taylor 74 year old male is on warfarin with supratherapeutic INR result. (Goal INR 2.0-3.0)    Recent labs: (last 7 days)     01/06/25  0724   INR 3.8*       ASSESSMENT     Warfarin Lab Questionnaire    Warfarin Doses Last 7 Days      1/5/2025    10:12 AM   Dose in Tablet or Mg   TAB or MG? milligram (mg)     Pt Rptd Dose SUNDAY MONDAY TUESDAY WED THURS FRIDAY SATURDAY 1/5/2025  10:12 AM 3.75 7.5 3.75 7.5 7.5 3.75 7.5         1/5/2025   Warfarin Lab Questionnaire   Missed doses within past 14 days? No   Changes in diet or alcohol within past 14 days? No   Medication changes since last result? No   Injuries or illness since last result? No   New shortness of breath, severe headaches or sudden changes in vision since last result? No   Abnormal bleeding since last result? No   Upcoming surgery, procedure? No   Best number to call with results? [phone]     Previous result: Therapeutic last visit; previously outside of goal range  Additional findings: Upcoming surgery/procedure 1/16/25 colonoscopy ( pls see 12/23 plan)  hold 4 days no bridge       PLAN     Recommended plan for no diet, medication or health factor changes affecting INR     Dosing Instructions: hold dose then continue your current warfarin dose until beginning warfarin hold for colonoscopy 1/16 . Resume warfarin with booster that evening with surgeon's ok with next INR 5 days after resumption     Summary  As of 1/6/2025      Full warfarin instructions:  1/6: Hold; 1/12: Hold; 1/13: Hold; 1/14: Hold; 1/15: Hold; 1/16: 11.25 mg; Otherwise 3.75 mg every Sun, Tue, Fri; 7.5 mg all other days   Next INR check:  1/21/2025               Telephone call with Ryan who verbalizes understanding and agrees to plan    Lab visit scheduled    Education provided: None required    Plan made with [facility] Pharmacist Jessica Orosco ( procedure plan)    Nicole Luis, RN  1/6/2025  Anticoagulation Clinic  [employer] for routing messages: elsie  RUBY MAURICIO  [facility] patient phone line: [phone]        _______________________________________________________________________     Anticoagulation Episode Summary       Current INR goal:  2.0-3.0   TTR:  69.0% (1 y)   Target end date:  Indefinite   Send INR reminders to:  RUBY MAURICIO    Indications    Pulmonary embolism  bilateral (H) (Resolved) [I26.99]  Permanent atrial fibrillation (H) [I48.21]  Pulmonary embolism  bilateral (H) [I26.99]  History of pulmonary embolism [Z86.711]             Comments:  Approved for 8 week checks per -- see encounter from 07/27/21             Anticoagulation Care Providers       Provider Role Specialty Phone number    Mamadou Baez MD Referring Family Medicine [phone]

## 2025-01-14 ENCOUNTER — ANTICOAGULATION THERAPY VISIT (OUTPATIENT)
Dept: ANTICOAGULATION | Facility: CLINIC | Age: 75
End: 2025-01-14

## 2025-01-14 ENCOUNTER — OFFICE VISIT (OUTPATIENT)
Dept: FAMILY MEDICINE | Facility: CLINIC | Age: 75
End: 2025-01-14
Payer: COMMERCIAL

## 2025-01-14 VITALS
BODY MASS INDEX: 32.37 KG/M2 | DIASTOLIC BLOOD PRESSURE: 88 MMHG | SYSTOLIC BLOOD PRESSURE: 138 MMHG | HEART RATE: 80 BPM | HEIGHT: 74 IN | OXYGEN SATURATION: 97 % | RESPIRATION RATE: 14 BRPM | TEMPERATURE: 96.9 F | WEIGHT: 252.2 LBS

## 2025-01-14 DIAGNOSIS — I48.21 PERMANENT ATRIAL FIBRILLATION (H): ICD-10-CM

## 2025-01-14 DIAGNOSIS — I48.19 PERSISTENT ATRIAL FIBRILLATION (H): ICD-10-CM

## 2025-01-14 DIAGNOSIS — R41.3 MEMORY DIFFICULTIES: Primary | ICD-10-CM

## 2025-01-14 DIAGNOSIS — I48.21 PERMANENT ATRIAL FIBRILLATION (H): Primary | ICD-10-CM

## 2025-01-14 DIAGNOSIS — F41.9 ANXIETY: ICD-10-CM

## 2025-01-14 DIAGNOSIS — E78.00 PURE HYPERCHOLESTEROLEMIA: ICD-10-CM

## 2025-01-14 DIAGNOSIS — I26.99 PULMONARY EMBOLISM, BILATERAL (H): ICD-10-CM

## 2025-01-14 DIAGNOSIS — Z86.711 HISTORY OF PULMONARY EMBOLISM: ICD-10-CM

## 2025-01-14 LAB
ALBUMIN UR-MCNC: 30 MG/DL
APPEARANCE UR: CLEAR
BACTERIA #/AREA URNS HPF: ABNORMAL /HPF
BILIRUB UR QL STRIP: NEGATIVE
COLOR UR AUTO: YELLOW
ERYTHROCYTE [DISTWIDTH] IN BLOOD BY AUTOMATED COUNT: 13.6 % (ref 10–15)
GLUCOSE UR STRIP-MCNC: NEGATIVE MG/DL
HCT VFR BLD AUTO: 37.9 % (ref 40–53)
HGB BLD-MCNC: 12.6 G/DL (ref 13.3–17.7)
HGB UR QL STRIP: ABNORMAL
INR BLD: 3.1 (ref 0.9–1.1)
KETONES UR STRIP-MCNC: NEGATIVE MG/DL
LEUKOCYTE ESTERASE UR QL STRIP: NEGATIVE
MCH RBC QN AUTO: 30.5 PG (ref 26.5–33)
MCHC RBC AUTO-ENTMCNC: 33.2 G/DL (ref 31.5–36.5)
MCV RBC AUTO: 92 FL (ref 78–100)
MUCOUS THREADS #/AREA URNS LPF: PRESENT /LPF
NITRATE UR QL: NEGATIVE
PH UR STRIP: 5.5 [PH] (ref 5–8)
PLATELET # BLD AUTO: 144 10E3/UL (ref 150–450)
RBC # BLD AUTO: 4.13 10E6/UL (ref 4.4–5.9)
RBC #/AREA URNS AUTO: ABNORMAL /HPF
SP GR UR STRIP: 1.02 (ref 1–1.03)
SQUAMOUS #/AREA URNS AUTO: ABNORMAL /LPF
UROBILINOGEN UR STRIP-ACNC: 1 E.U./DL
WBC # BLD AUTO: 5.5 10E3/UL (ref 4–11)
WBC #/AREA URNS AUTO: ABNORMAL /HPF

## 2025-01-14 PROCEDURE — 99214 OFFICE O/P EST MOD 30 MIN: CPT | Performed by: FAMILY MEDICINE

## 2025-01-14 PROCEDURE — 81001 URINALYSIS AUTO W/SCOPE: CPT | Performed by: FAMILY MEDICINE

## 2025-01-14 PROCEDURE — 96127 BRIEF EMOTIONAL/BEHAV ASSMT: CPT | Performed by: FAMILY MEDICINE

## 2025-01-14 PROCEDURE — 85610 PROTHROMBIN TIME: CPT | Performed by: FAMILY MEDICINE

## 2025-01-14 PROCEDURE — 36416 COLLJ CAPILLARY BLOOD SPEC: CPT | Performed by: FAMILY MEDICINE

## 2025-01-14 PROCEDURE — 36415 COLL VENOUS BLD VENIPUNCTURE: CPT | Performed by: FAMILY MEDICINE

## 2025-01-14 PROCEDURE — 82607 VITAMIN B-12: CPT | Performed by: FAMILY MEDICINE

## 2025-01-14 PROCEDURE — 80053 COMPREHEN METABOLIC PANEL: CPT | Performed by: FAMILY MEDICINE

## 2025-01-14 PROCEDURE — 84443 ASSAY THYROID STIM HORMONE: CPT | Performed by: FAMILY MEDICINE

## 2025-01-14 PROCEDURE — 85027 COMPLETE CBC AUTOMATED: CPT | Performed by: FAMILY MEDICINE

## 2025-01-14 ASSESSMENT — ANXIETY QUESTIONNAIRES
GAD7 TOTAL SCORE: 8
3. WORRYING TOO MUCH ABOUT DIFFERENT THINGS: MORE THAN HALF THE DAYS
IF YOU CHECKED OFF ANY PROBLEMS ON THIS QUESTIONNAIRE, HOW DIFFICULT HAVE THESE PROBLEMS MADE IT FOR YOU TO DO YOUR WORK, TAKE CARE OF THINGS AT HOME, OR GET ALONG WITH OTHER PEOPLE: NOT DIFFICULT AT ALL
7. FEELING AFRAID AS IF SOMETHING AWFUL MIGHT HAPPEN: SEVERAL DAYS
GAD7 TOTAL SCORE: 8
2. NOT BEING ABLE TO STOP OR CONTROL WORRYING: MORE THAN HALF THE DAYS
4. TROUBLE RELAXING: NOT AT ALL
GAD7 TOTAL SCORE: 8
7. FEELING AFRAID AS IF SOMETHING AWFUL MIGHT HAPPEN: SEVERAL DAYS
1. FEELING NERVOUS, ANXIOUS, OR ON EDGE: SEVERAL DAYS
5. BEING SO RESTLESS THAT IT IS HARD TO SIT STILL: SEVERAL DAYS
8. IF YOU CHECKED OFF ANY PROBLEMS, HOW DIFFICULT HAVE THESE MADE IT FOR YOU TO DO YOUR WORK, TAKE CARE OF THINGS AT HOME, OR GET ALONG WITH OTHER PEOPLE?: NOT DIFFICULT AT ALL
6. BECOMING EASILY ANNOYED OR IRRITABLE: SEVERAL DAYS

## 2025-01-14 ASSESSMENT — PATIENT HEALTH QUESTIONNAIRE - PHQ9
10. IF YOU CHECKED OFF ANY PROBLEMS, HOW DIFFICULT HAVE THESE PROBLEMS MADE IT FOR YOU TO DO YOUR WORK, TAKE CARE OF THINGS AT HOME, OR GET ALONG WITH OTHER PEOPLE: SOMEWHAT DIFFICULT
SUM OF ALL RESPONSES TO PHQ QUESTIONS 1-9: 3
SUM OF ALL RESPONSES TO PHQ QUESTIONS 1-9: 3
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
## 2025-01-14 NOTE — PROGRESS NOTES
ANTICOAGULATION MANAGEMENT     Prem Taylor 74 year old male is on warfarin with supratherapeutic INR result. (Goal INR 2.0-3.0)    Recent labs: (last 7 days)     01/14/25  1358   INR 3.1*       ASSESSMENT     Source(s): Chart Review and Patient/Caregiver Call     Warfarin doses taken:  Did not start Warfarin hold for procedure  Diet: No new diet changes identified  Medication/supplement changes: None noted  New illness, injury, or hospitalization: 1/14 seen by PCP today regarding memory difficulties  Signs or symptoms of bleeding or clotting: No  Previous result: Supratherapeutic hold dose x 1  Additional findings: Upcoming surgery/procedure 1/16 colonoscopy, was supposed to hold warfarin for 4 days but he didn't, he will contact [facility] and reschedule        PLAN     Recommended plan for temporary change(s) affecting INR     Dosing Instructions: Continue your current warfarin dose with next INR in 1 week       Summary  As of 1/14/2025      Full warfarin instructions:  3.75 mg every Sun, Tue, Fri; 7.5 mg all other days   Next INR check:  1/21/2025               Telephone call with Ryan who verbalizes understanding and agrees to plan  Sent [employer] message with dosing and follow up instructions    Lab visit scheduled    Education provided: Goal range and lab monitoring: goal range and significance of current result  Contact [phone] with any changes, questions or concerns.     Plan made per [facility] anticoagulation protocol    Yesenia Vázquez RN  1/14/2025  Anticoagulation Clinic  [employer] for routing messages: elsie MAURICIO  [facility] patient phone line: [phone]        _______________________________________________________________________     Anticoagulation Episode Summary       Current INR goal:  2.0-3.0   TTR:  66.8% (1 y)   Target end date:  Indefinite   Send INR reminders to:  RUBY MAURICIO    Indications    Pulmonary embolism  bilateral (H) (Resolved) [I26.99]  Permanent atrial fibrillation (H)  [I48.21]  Pulmonary embolism  bilateral (H) [I26.99]  History of pulmonary embolism [Z86.711]             Comments:  Approved for 8 week checks per -- see encounter from 07/27/21             Anticoagulation Care Providers       Provider Role Specialty Phone number    Mamadou Baez MD Referring Family Medicine [phone]

## 2025-01-15 LAB
ALBUMIN SERPL BCG-MCNC: 4.3 G/DL (ref 3.5–5.2)
ALP SERPL-CCNC: 75 U/L (ref 40–150)
ALT SERPL W P-5'-P-CCNC: 36 U/L (ref 0–70)
ANION GAP SERPL CALCULATED.3IONS-SCNC: 9 MMOL/L (ref 7–15)
AST SERPL W P-5'-P-CCNC: 31 U/L (ref 0–45)
BILIRUB SERPL-MCNC: 1.2 MG/DL
BUN SERPL-MCNC: 15.2 MG/DL (ref 8–23)
CALCIUM SERPL-MCNC: 9.4 MG/DL (ref 8.8–10.4)
CHLORIDE SERPL-SCNC: 106 MMOL/L (ref 98–107)
CREAT SERPL-MCNC: 0.93 MG/DL (ref 0.67–1.17)
EGFRCR SERPLBLD CKD-EPI 2021: 87 ML/MIN/1.73M2
GLUCOSE SERPL-MCNC: 83 MG/DL (ref 70–99)
HCO3 SERPL-SCNC: 26 MMOL/L (ref 22–29)
POTASSIUM SERPL-SCNC: 4.3 MMOL/L (ref 3.4–5.3)
PROT SERPL-MCNC: 6.6 G/DL (ref 6.4–8.3)
SODIUM SERPL-SCNC: 141 MMOL/L (ref 135–145)
TSH SERPL DL<=0.005 MIU/L-ACNC: 1.76 UIU/ML (ref 0.3–4.2)
VIT B12 SERPL-MCNC: 433 PG/ML (ref 232–1245)

## 2025-01-16 ENCOUNTER — TRANSFERRED RECORDS (OUTPATIENT)
Dept: HEALTH INFORMATION MANAGEMENT | Facility: CLINIC | Age: 75
End: 2025-01-16
Payer: COMMERCIAL

## 2025-01-21 ENCOUNTER — LAB (OUTPATIENT)
Dept: LAB | Facility: CLINIC | Age: 75
End: 2025-01-21
Payer: COMMERCIAL

## 2025-01-21 ENCOUNTER — ANTICOAGULATION THERAPY VISIT (OUTPATIENT)
Dept: ANTICOAGULATION | Facility: CLINIC | Age: 75
End: 2025-01-21

## 2025-01-21 DIAGNOSIS — Z86.711 HISTORY OF PULMONARY EMBOLISM: ICD-10-CM

## 2025-01-21 DIAGNOSIS — I26.99 PULMONARY EMBOLISM, BILATERAL (H): ICD-10-CM

## 2025-01-21 DIAGNOSIS — I48.21 PERMANENT ATRIAL FIBRILLATION (H): ICD-10-CM

## 2025-01-21 DIAGNOSIS — I48.21 PERMANENT ATRIAL FIBRILLATION (H): Primary | ICD-10-CM

## 2025-01-21 LAB — INR BLD: 2.1 (ref 0.9–1.1)

## 2025-01-21 PROCEDURE — 36416 COLLJ CAPILLARY BLOOD SPEC: CPT

## 2025-01-21 PROCEDURE — 85610 PROTHROMBIN TIME: CPT

## 2025-01-21 NOTE — PROGRESS NOTES
ANTICOAGULATION MANAGEMENT     Prem Taylor 74 year old male is on warfarin with therapeutic INR result. (Goal INR 2.0-3.0)    Recent labs: (last 7 days)     01/21/25  0734   INR 2.1*       ASSESSMENT     Warfarin Lab Questionnaire    Warfarin Doses Last 7 Days      1/20/2025     5:54 AM   Dose in Tablet or Mg   TAB or MG? milligram (mg)     Pt Rptd Dose SUNDAY MONDAY TUESDAY WED THURS FRIDAY SATURDAY 1/20/2025   5:54 AM 3.75 7.5 3.75 7.5 7.5 3.75 7.5         1/20/2025   Warfarin Lab Questionnaire   Missed doses within past 14 days? No   Changes in diet or alcohol within past 14 days? No   Medication changes since last result? No   Injuries or illness since last result? 1/14 seen by PCP for memory difficulties   New shortness of breath, severe headaches or sudden changes in vision since last result? No   Abnormal bleeding since last result? No   Upcoming surgery, procedure? No   Best number to call with results? [phone]     Previous result: Supratherapeutic  Additional findings:  Patient said he did have the colonoscopy on 1/16 even though he didn't hold the warfarin and he is still saying he wasn't supposed to hold it.       PLAN     Recommended plan for temporary change(s) affecting INR     Dosing Instructions: Continue your current warfarin dose with next INR in 2 weeks       Summary  As of 1/21/2025      Full warfarin instructions:  3.75 mg every Sun, Tue, Fri; 7.5 mg all other days   Next INR check:  2/4/2025               Telephone call with Ryan who verbalizes understanding and agrees to plan    Lab visit scheduled    Education provided: Goal range and lab monitoring: goal range and significance of current result  Contact [phone] with any changes, questions or concerns.     Plan made per [facility] anticoagulation protocol    Yesenia Vázquez, RN  1/21/2025  Anticoagulation Clinic  [employer] for routing messages: elsie MAURICIO  [facility] patient phone line:  [phone]        _______________________________________________________________________     Anticoagulation Episode Summary       Current INR goal:  2.0-3.0   TTR:  66.6% (1 y)   Target end date:  Indefinite   Send INR reminders to:  RUBY MAURICIO    Indications    Pulmonary embolism  bilateral (H) (Resolved) [I26.99]  Permanent atrial fibrillation (H) [I48.21]  Pulmonary embolism  bilateral (H) [I26.99]  History of pulmonary embolism [Z86.711]             Comments:  Approved for 8 week checks per -- see encounter from 07/27/21             Anticoagulation Care Providers       Provider Role Specialty Phone number    Mamadou Baez MD Referring Family Medicine [phone]

## 2025-01-24 ENCOUNTER — HOSPITAL ENCOUNTER (OUTPATIENT)
Dept: MRI IMAGING | Facility: CLINIC | Age: 75
Discharge: HOME | End: 2025-01-24
Attending: FAMILY MEDICINE | Admitting: FAMILY MEDICINE
Payer: COMMERCIAL

## 2025-01-24 DIAGNOSIS — R41.3 MEMORY DIFFICULTIES: ICD-10-CM

## 2025-01-24 PROCEDURE — 70551 MRI BRAIN STEM W/O DYE: CPT

## 2025-02-02 ASSESSMENT — ANXIETY QUESTIONNAIRES
GAD7 TOTAL SCORE: 14
7. FEELING AFRAID AS IF SOMETHING AWFUL MIGHT HAPPEN: MORE THAN HALF THE DAYS
5. BEING SO RESTLESS THAT IT IS HARD TO SIT STILL: MORE THAN HALF THE DAYS
GAD7 TOTAL SCORE: 14
6. BECOMING EASILY ANNOYED OR IRRITABLE: SEVERAL DAYS
IF YOU CHECKED OFF ANY PROBLEMS ON THIS QUESTIONNAIRE, HOW DIFFICULT HAVE THESE PROBLEMS MADE IT FOR YOU TO DO YOUR WORK, TAKE CARE OF THINGS AT HOME, OR GET ALONG WITH OTHER PEOPLE: NOT DIFFICULT AT ALL
7. FEELING AFRAID AS IF SOMETHING AWFUL MIGHT HAPPEN: MORE THAN HALF THE DAYS
4. TROUBLE RELAXING: SEVERAL DAYS
1. FEELING NERVOUS, ANXIOUS, OR ON EDGE: NEARLY EVERY DAY
4. TROUBLE RELAXING: SEVERAL DAYS
7. FEELING AFRAID AS IF SOMETHING AWFUL MIGHT HAPPEN: MORE THAN HALF THE DAYS
5. BEING SO RESTLESS THAT IT IS HARD TO SIT STILL: MORE THAN HALF THE DAYS
GAD7 TOTAL SCORE: 14
8. IF YOU CHECKED OFF ANY PROBLEMS, HOW DIFFICULT HAVE THESE MADE IT FOR YOU TO DO YOUR WORK, TAKE CARE OF THINGS AT HOME, OR GET ALONG WITH OTHER PEOPLE?: NOT DIFFICULT AT ALL
GAD7 TOTAL SCORE: 14
8. IF YOU CHECKED OFF ANY PROBLEMS, HOW DIFFICULT HAVE THESE MADE IT FOR YOU TO DO YOUR WORK, TAKE CARE OF THINGS AT HOME, OR GET ALONG WITH OTHER PEOPLE?: NOT DIFFICULT AT ALL
2. NOT BEING ABLE TO STOP OR CONTROL WORRYING: MORE THAN HALF THE DAYS
GAD7 TOTAL SCORE: 14
1. FEELING NERVOUS, ANXIOUS, OR ON EDGE: NEARLY EVERY DAY
3. WORRYING TOO MUCH ABOUT DIFFERENT THINGS: NEARLY EVERY DAY
IF YOU CHECKED OFF ANY PROBLEMS ON THIS QUESTIONNAIRE, HOW DIFFICULT HAVE THESE PROBLEMS MADE IT FOR YOU TO DO YOUR WORK, TAKE CARE OF THINGS AT HOME, OR GET ALONG WITH OTHER PEOPLE: NOT DIFFICULT AT ALL
GAD7 TOTAL SCORE: 14
6. BECOMING EASILY ANNOYED OR IRRITABLE: SEVERAL DAYS
7. FEELING AFRAID AS IF SOMETHING AWFUL MIGHT HAPPEN: MORE THAN HALF THE DAYS
2. NOT BEING ABLE TO STOP OR CONTROL WORRYING: MORE THAN HALF THE DAYS
3. WORRYING TOO MUCH ABOUT DIFFERENT THINGS: NEARLY EVERY DAY

## 2025-02-02 ASSESSMENT — PATIENT HEALTH QUESTIONNAIRE - PHQ9
SUM OF ALL RESPONSES TO PHQ QUESTIONS 1-9: 5
10. IF YOU CHECKED OFF ANY PROBLEMS, HOW DIFFICULT HAVE THESE PROBLEMS MADE IT FOR YOU TO DO YOUR WORK, TAKE CARE OF THINGS AT HOME, OR GET ALONG WITH OTHER PEOPLE: SOMEWHAT DIFFICULT
SUM OF ALL RESPONSES TO PHQ QUESTIONS 1-9: 5
10. IF YOU CHECKED OFF ANY PROBLEMS, HOW DIFFICULT HAVE THESE PROBLEMS MADE IT FOR YOU TO DO YOUR WORK, TAKE CARE OF THINGS AT HOME, OR GET ALONG WITH OTHER PEOPLE: SOMEWHAT DIFFICULT
SUM OF ALL RESPONSES TO PHQ QUESTIONS 1-9: 5
SUM OF ALL RESPONSES TO PHQ QUESTIONS 1-9: 5

## 2025-02-02 NOTE — PROGRESS NOTES
"Virtual Visit Details    Type of service:  Video Visit   Video Start Time: 8:40 AM  Video End Time:9:27 AM    Originating Location (pt. Location): Home    Distant Location (provider location):  Off-site  Platform used for Video Visit: [facility]           PSYCHIATRIC  DIAGNOSTIC  EVALUATION                                                                    Name:  Prem Taylor  : 1950     Telemedicine Visit: The patient's condition can be safely assessed and treated via synchronous audio and visual telemedicine encounter.      Consent:  The patient/guardian has verbally consented to: the potential risks and benefits of telemedicine (video visit or phone) versus in person care; bill my insurance or make self-payment for services provided; and responsibility for payment of non-covered services.    As the provider I attest to compliance with applicable laws and regulations related to telemedicine.    Source of Referral:  Primary Care Provider: Mamadou Baez MD   Current Psychotherapist: none     PCP Clinical Question for referral: \"anxiety management and medication adjustments\"    The [facility] psychiatry providers act as a specialty service for Primary Care Providers in the [facility] who seek to optimize medications for unstable patients. Once medications have been optimized, [facility] providers discharge the patient back to the referring Primary Care Provider for ongoing medication management. This type of system allows [facility] to serve a high volume of patients.     Identifying Data:  Patient is a 74 year old,  White Not  or  male  who presents for initial visit with me.    Patient prefers to be called: \"Ryan\".  Patient is currently retired.     Patient attended the session with wife Ursula , who they agreed to have interview with.    HPI  Patient presents for initial psychiatric evaluation with the [facility] ([facility]) for evaluation of anxiety.      RECORDS " "AVAILABLE FOR REVIEW: EHR records through [employer]  and I have reviewed the assessment completed by CHELSEA Kasper, dated today .       Chief Complaint:  \"Pain on both sides of my collar bone that increases at the rotator cuff.\"    Per [facility], CHELSEA Kasper, during today's team-based visit:  \"Patient has attempted to resolve these concerns in the past through therapy, PCP, testing for dementia .  Reason for CCPS: anxiety and depression are concerns and this became more of a concern in the last 5 years, the last year has been challenging   Cognitive concerns as well, had a CAT scan, is scheduled with a neurologist and wife is going to ask about a PET Scan   Hope to: \"to find get to a plan of moving forward of what is best to live is a best of life I can\", look at what I am taking\"    Prem Taylor is a 74 year old White, male who presents for initial visit with [facility] ([facility]) for medication management. Carries past diagnoses: anxiety.  Additional medical comorbidities include: permanent atrial fibrillation, history of PE, normochromic normocytic anemia, hypercholesterolemia, hyperbilirubinemia. Patient denies history of psychiatric providers, hospitalizations, or suicide attempts. Started medications through PCP 4/2021, on escitalopram 20 mg since 03/2022. Denies known side effects from medication, patient reports it has been helpful (although rates anxiety relatively high today), wife Aubree Doesn't think it's been that effective, although does report it's the only thing we could look at and change, but I think it's more cognitive issues.\"      ANXIETY: patient reports a moderate-severe level of anxiety today (self-rates as 6-10/10, GAD7: 14). Anxiety associated with frequent worries, rumination, restlessness / trouble relaxing, irritability. Patient does think escitalopram has been somewhat helpful as \"Taken care of going about daily living.\" Reports anxiety is typically good " "in AM (6/10), then worsens into PM and can be 10/10 at bedtime if pain is high. Patient reports history of anxiety for several years when he was working , travelling - did have PRN ativan at that time he would rarely use. Did previously have hydroxyzine sent for PRN as well, but patient denies taking frequently - discontinued after discussing with chiropractor. Additionally on gabapentin 300 mg QHS more for pain, unsure if benefit to anxiety / sleep. Patient and wife do think part of anxiety currently associated with fear around  possible dementia / alzheimers given family history (\"A big part of the concern is a concern I might develop that or end up with that\").     MEMORY CHANGES: patient and wife report concern for memory changes, forgetfulness, frequent \"dejavu\" feeling of watching something on TV or having a conversation with wife that he is sure has happened before but it has not. Denies any other obvious delusions, paranoia, no AH/VH. Denies any significant changes to gait, balance, or tremors. Did have a couple falls historically without head injury. Total 6CIT Score: 6/28 completed today. Patient was previously seen by PCP, due to concern for memory difficulties with family history of Alzheimer's (mother). Labs completed and CMP, TSH, B12, and UA all WNL. CBC with mild anemia stable over last year. Completed MRI: nonspecific findings noted, no significant abnormalities. Referred to neurology, scheduled to see Errol 4/1/2025.     Patient denies significant depression (PHQ9: 5, self rates as 2/10). No history of mykel, psychosis, significant trauma. Reports mild-moderate alcohol 1x a week (up to 3-4 beers ), denies other substance use or history of problematic use, past CD treatment.         Current Medications:    Current Outpatient Medications:     escitalopram (LEXAPRO) 20 MG tablet, Take 1 tablet (20 mg) by mouth daily, Disp: 90 tablet, Rfl: 3    isosorbide mononitrate (IMDUR) 30 MG 24 hr tablet, Take 2 " "tablets (60 mg) by mouth daily., Disp: 180 tablet, Rfl: 0    metoprolol succinate ER (TOPROL XL) 25 MG 24 hr tablet, Take 2 tablets (50 mg) by mouth daily., Disp: 180 tablet, Rfl: 3    MULTIVITAMIN (MULTIPLE VITAMIN ORAL), [MULTIVITAMIN (MULTIPLE VITAMIN ORAL)] Take 1 tablet by mouth daily., Disp: , Rfl:     rosuvastatin (CRESTOR) 40 MG tablet, Take 1 tablet (40 mg) by mouth daily., Disp: 90 tablet, Rfl: 3    warfarin ANTICOAGULANT (COUMADIN) 7.5 MG tablet, 3.75mg Sunday, Tuesday, Friday and 7.5mg all other days per INR clinic, Disp: 75 tablet, Rfl: 1    Medication side effects: Denies    The [place] Prescription Monitoring Program has been reviewed and there are no concerns about diversionary activity for controlled substances at this time.  No data for controlled substances over the last one year.    Psychiatric Review of Symptoms:  Depression: depressed mood, sleep changes (insomnia or hypersomnia), fatigue of loss of energy, worthlessness or excessive guilt, and difficulty concentrating or indecisiveness Self rates as \"no, maybe just 2\" / 10, where 0 is none at all and 10 being severe depression.  SI/SIB/HI: denies all.  Anxiety: excessive worry, difficult to control, restlessness / feeling keyed up,  easily fatigued,  difficulty concentrating or mind going blank, irritability, and sleep disturbances (difficulty falling / staying asleep, or restless / unsatisfying) Self rates as 6, up to 8-10 in PM  /10, where 0 is none at all and 10 being severe anxiety.  Sleep / changes:  \"I've never had any problem sleeping. But lately waking up more bc of the pain, and up to bathroom more.\" Reports quick back to sleep. Wife reports he sleeps a lot during the day (est he naps constantly throughout the day) and at night (7 solid hours).  Energy: \"I think it's fine\"  Appetite or weight changes: increased, including sugar / sweets. gained + 20 lbs / 2 years  Irritability / mood swings: patient denies significant, wife reports " an increase in irritability /agitation for patient - although still low compared to others  Luisa / impulsivity / racing thoughts / speech: denies  Panic (description): history previously, + ativan (work travel).   OCD: No symptoms  Psychosis/AH/VH/delusions: denies  Paranoia: denies  Eating DO: No symptoms  Trauma sx: No symptoms    All other ROS negative.     PHQ-9 scores:       9/18/2024     6:59 AM 1/14/2025     1:03 PM 2/2/2025    10:44 AM   PHQ-9 SCORE   PHQ-9 Total Score MyChart 2 (Minimal depression) 3 (Minimal depression) 5 (Mild depression)   PHQ-9 Total Score 2 3  5        Patient-reported       KALPANA-7 scores:        9/18/2024     7:00 AM 1/14/2025     1:05 PM 2/2/2025    10:46 AM   KALPANA-7 SCORE   Total Score 3 (minimal anxiety) 8 (mild anxiety) 14 (moderate anxiety)   Total Score 3 8  14        Patient-reported       Promis-10   [id number] [facility] [employer] Version 1.1    Question 1/30/2025  9:29 AM CST - Filed by Patient   In general, would you say your health is: Very good   In general, would you say your quality of life is: Excellent   In general, how would you rate your physical health? Very good   In general, how would you rate your mental health, including your mood and your ability to think? Good   In general, how would you rate your satisfaction with your social activities and relationships? Good   In general, please rate how well you carry out your usual social activities and roles. (This includes activities at home, at work and in your community, and responsibilities as a parent, child, spouse, employee, friend, etc.) Good   To what extent are you able to carry out your everyday physical activities such as walking, climbing stairs, carrying groceries, or moving a chair? Moderately   In the past 7 days    How often have you been bothered by emotional problems such as feeling anxious, depressed or irritable? Often   How would you rate your fatigue on average?  "Moderate   How would you rate your pain on average?   0 = No Pain  to  10 = Worst Imaginable Pain 3       Medical Review of Systems:  10 systems (general, cardiovascular, respiratory, eyes, ENT, endocrine, GI, , M/S, neurological) were reviewed. Most pertinent finding(s) is/are: + palpitations (Patient in Afib for ~6-7 years \"constantly\").  No acute distress; no wheezing / short of breath / increased work of breathing; denies chest pain / tightness; reduced appetite and recent weight loss; no nausea / vomiting / abdominal pain; no tics / tremors / abnormal muscle mvmts; no visible skin changes / rashes. The remaining systems are all unremarkable.    Vitals:   There were no vitals taken for this visit.    Pulse Readings from Last 5 Encounters:   01/14/25 80   10/29/24 62   09/18/24 61   09/11/24 60   07/11/24 73     Wt Readings from Last 5 Encounters:   01/14/25 114.4 kg (252 lb 3.2 oz)   10/29/24 110.7 kg (244 lb)   09/18/24 108.4 kg (239 lb)   09/11/24 109.3 kg (241 lb)   07/11/24 108 kg (238 lb)     BP Readings from Last 5 Encounters:   01/14/25 138/88   10/29/24 130/88   09/18/24 (!) 150/90   09/11/24 (!) 146/96   07/11/24 136/84       Labs:    Recent Labs   Lab Test 01/14/25  1356   WBC 5.5   HGB 12.6*   HCT 37.9*   MCV 92   *     Recent Labs   Lab Test 01/14/25  1356 12/27/23  1302 11/21/23  1218      < > 140   POTASSIUM 4.3   < > 4.4   CHLORIDE 106   < > 106   CO2 26   < > 24   GLC 83   < > 86   DANDY 9.4   < > 9.4   MAG  --   --  2.0   BUN 15.2   < > 13.3   CR 0.93   < > 0.95   GFRESTIMATED 87   < > 85   ALBUMIN 4.3   < > 4.4   PROTTOTAL 6.6   < > 7.1   AST 31   < > 27   ALT 36   < > 17   ALKPHOS 75   < > 68   BILITOTAL 1.2   < > 0.8    < > = values in this interval not displayed.     Recent Labs   Lab Test 10/11/24  0802   CHOL 155   LDL 79   HDL 65   TRIG 56     Recent Labs   Lab Test 01/14/25  1356   TSH 1.76     No results found for: \"PNJ027\", \"IIIY610\", \"PKVO54MHGTU\", \"VITD3\", \"D2VIT\", " "\"D3VIT\", \"DTOT\", \"[id number]\", \"[id number]\", \"[id number]\", \"[id number]\", \"[id number]\", \"[id number]\"      Psychiatric History:   Hospitalizations: None  History of Commitment? No   Past Treatment: medication(s) from physician / PCP  History of Suicidal Ideation: denies  Suicide Attempts: No   History of Violence/Aggression: No   Self-injurious Behavior: Denies  Electroconvulsive Therapy (ECT) or Transcranial Magnetic Stimulation (TMS): No   GeneSight Genetic Testing: No     Past psychotropic medication trials include but are not limited to:   Gabapentin 300 mg at bedtime: for pain, unsure if helpful  Hydroxyzine 50 mg (03/2022): never really took it , ran it past his chiropractor  Ativan: had very sparingly for work travel years ago    Substance Use History:  Current Use of Drugs/Alcohol: reports on weekends will have 3-4 beers, concerned due to warfarin but not any other problematic use per wife.    Past Use of Drugs/Alcohol: denies  Patient reports no problems as a result of their drinking / drug use.   Patient has not received chemical dependency treatment in the past  Recovery Programming Involvement: None    Tobacco use: History (last 11/2000)  Caffeine:  Yes  3 cups/day of coffee, denies worsening of anxiety.     Based on the clinical interview, there  are indications of drug or alcohol use. Continue to monitor.   Discussed effect of substance use on overall health.   CAGE-AID Score today was: 1     Family History:   Patient reported family history includes:   Family History   Problem Relation Age of Onset    Heart Disease Mother     Hypertension Mother     Dementia Mother     Coronary Artery Disease Father     LUNG DISEASE No family hx of       Sudden cardiac death at young age? denies at young age  Mental Illness History: Yes: both children  Substance Abuse History: Denies  Suicide History: Denies  Medications that helped: Yes: ?unknown, maybe zoloft      Social History:   Financial- What are your current " financial sources?: (Patient-Rptd) [facility] assistance, Does your finances cause stress?: (Patient-Rptd) does not  Employment- What is your employment status?: (Patient-Rptd) retired, If you work in a paying job or as a volunteer, describe the job and how long you have held it: : (Patient-Rptd) RETIRED Did you serve in the ?: (Patient-Rptd) did not  Living situation- What is your housing situation?: (Patient-Rptd) staying in own home/apartment  Feels safe at home- Yes  Household / family- Name: (Patient-Rptd) BASILIO ELIAS, Age: (Patient-Rptd) 74, Relationship: (Patient-Rptd) SPOUSE, Living in same house?: (Patient-Rptd) yes  Relationships- What is your current relationship status? : (Patient-Rptd) , What is your sexual orientation?: (Patient-Rptd) heterosexual  Children- Do you have children?: (Patient-Rptd) yes, How many children do you have?: (Patient-Rptd) 2, Ages of boys:: (Patient-Rptd) 0, Ages of girls:: (Patient-Rptd) 45,41  Social/spiritual support- Who are the most supportive people in your life?  : (Patient-Rptd) siblings;adult child;friends;spouse  Cultural- What is your cultural background? : (Patient-Rptd) , What are ethnic, cultural, or Caodaism influences that may be useful to know about you (for example history of experiencing discrimination, growing up rural/urban, valuing culturally specific treatments)?  : (Patient-Rptd) Raised as a Sikh, What is your preferred language?  : (Patient-Rptd) English  Education- What is your highest education? : (Patient-Rptd) associate degree / vocational certificate  Early history- Where did you grow up?: [facility]Rptd[facility], Who took care of you as a child?: (Patient-Rptd) biological parents  Raised by- How would you describe your parent's relationships?: (Patient-Rptd) were always together  Siblings- Do you have siblings?: (Patient-Rptd) yes, How many full siblings do you have?: (Patient-Rptd) 2  Quality of family relationships-  How would you describe your current family relationships?: (Patient-Rptd) stable and meaningful  Legal- Have you been involved with the legal system (child custody, order for protection, DWI, etc.)?: (Patient-Rptd) have not, Do you have a ?  : (Patient-Rptd) does not    Significant Losses / Trauma / Abuse / Neglect Issues:  There are no indications or report of: significant losses, trauma, abuse or neglect.   Issues of possible neglect are not present.   Recommended that patient call 911 or go to the local ED should there be a change in any of these risk factors    Past Medical History:  Reviewed per Electronic Medical Record Today    Past Medical History:   Diagnosis Date    Abnormal ECG     Anemia     Anxiety     Arrhythmia     Atrial fibrillation (H) 10/09/2017    Cancer (H) 2015    CPAP (continuous positive airway pressure) dependence 10/2019    per patient    Depression     Heart valve disease 2017    History of blood clots     Hyperlipidemia 2006    Pulmonary embolism, blood-clot, obstetric 10/09/2017    Sleep apnea 2017    Uses CPAP      Surgery:   Past Surgical History:   Procedure Laterality Date    BACK SURGERY      CERVICAL FUSION  2001    HERNIA REPAIR  2005    NE LAP,INGUINAL HERNIA REPR,INITIAL Left 8/15/2019    Procedure: HERNIORRHAPHY, INGUINAL, LAPAROSCOPIC;  Surgeon: Yan Leiva MD;  [facility]: [facility];  Service: General    TONSILLECTOMY      childhood     Food and Medicine Allergies:   No Known Allergies  Seizures or Head Injury: denies  Diet: No Restrictions  Exercise: Moderate regular exercise program which includes walking  Supplements:  multivitamin     Mental Status Exam:  Alertness: alert  and oriented x4  Appearance: well groomed  Behavior/Demeanor: cooperative, pleasant, and calm, with good eye contact   Speech: normal and regular rate and rhythm  Language: intact and no problems  Psychomotor: normal or unremarkable  Mood: anxious  Affect: full range and  appropriate; was congruent to mood; was congruent to content  Thought Process/Associations: tangential at times  Thought Content:  Reports none;  Denies suicidal ideation, violent ideation, and delusions  Perception:  Reports none;  Denies auditory hallucinations and visual hallucinations  Insight: intact  Judgment: intact  Cognition: does  appear grossly intact; formal cognitive testing was not done, 6CIT below  Recent and Remote Memory: Intact to interview. Not formally assessed. No amnesia.  Attention Span and Concentration: ntact to interview  Fund of Knowledge:  appropriate  Gait and Station: unremarkable via seated video visit    Six Item Cognitive Impairment Test   (6CIT):    What year is it?                               Correct - 0 points  What month is it?                               Correct - 0 points    Give the patient an address to remember with five components:   [place] ( first and last name - 2 components)   [address]  (number and name of street - 2 components)   [place] ( city - 1 component)    About what time is it (within the hour)? Correct - 0 points  Count backwards from 20 to 1:   Correct - 0 points  Say the months of the year in reverse: One error - 2 points  Repeat the address phrase:   2 errors - 4 points    Total 6CIT Score:      6/28    Interpretation: The 6CIT uses an inverse score and questions are weighted to produce a total out of 28. Scores of 0-7 are considered normal and 8 or more significant.    Advantages The test has high sensitivity without compromising specificity even in mild dementia. It is easy to translate linguistically and culturally.  Disadvantages The main disadvantage is in the scoring and weighting of the test, which is initially confusing, however computer models have simplified this greatly.    Probability Statistics: At the 7/8 cut off: Overall figures sensitivity 90% specificity 100%, in mild dementia sensitivity = 78% , specificity =  100%    Copyright 2000 The [facility], [facility]. Courtesy of Dr. Keenan Eddy      Strengths and Opportunities:   Prem Taylor identified the following strengths or resources that may help he succeed in counseling: commitment to health and well being, exercise routine, family support, and motivation.     Things that may interfere with the patient's success include:  none noted at this time.    Protective Factors: hope for the future, compliance with medication, future oriented, healthy intimate relationships, restricted access to means, perceived internal locus of control, social support, access to care as needed, motivation and readiness for change, and reasons for living    Static Risk Factors: age, sex, and history of MH diagnoses and/or treatment    Dynamic Risk Factors: substance use, anxiety, agitation, and chronic pain    There are no language or communication issues or need for modification in treatment.   There are no ethnic, cultural or Moravian factors that may be relevant for therapy.  Client identified their preferred language to be English.  Client does not need the assistance of an  or other support involved in therapy.    Suicide Risk Assessment:  Based on review of above risk and protective factors and today's exam, pt is at low acute risk of harm to self or others and chronic elevated risk due to history and risk factors. He does not meet criteria for a 72 hr hold and remains appropriate for ongoing outpatient care. The patient convincingly denies suicidality today. There was no deceit detected, and the patient presented in a manner that was believable. Local community safety resources reviewed and sent to patient to use if needed.    Recommended that patient call 911 or go to the local ED should there be a change in any of these risk factors.    DSM5  Diagnosis:  300.02 (F41.1) Generalized Anxiety Disorder    Medical Comorbidities Include:   Patient Active  Problem List    Diagnosis Date Noted    Abnormal electrocardiogram 11/28/2023     Priority: Medium    Dizziness 11/21/2023     Priority: Medium    Pulmonary embolism, bilateral (H) 10/11/2022     Priority: Medium    History of pulmonary embolism 08/10/2021     Priority: Medium    ALISE on CPAP 02/08/2019     Priority: Medium    Anxiety 02/08/2019     Priority: Medium    Chronic cough 02/08/2019     Priority: Medium    Normochromic normocytic anemia 02/08/2019     Priority: Medium    Left inguinal hernia 02/08/2019     Priority: Medium     Small, reducible noted on Annual Wellness Visit exam February 8, 2019.        Sinus bradycardia      Priority: Medium     Created by Conversion        Permanent atrial fibrillation (H) 10/25/2017     Priority: Medium     Diagnosed on 10/10/2017 and had bilateral PEs at the time.  [id number] score of 1+ with mitral insufficiency.  On warfarin        Mitral valve insufficiency 10/25/2017     Priority: Medium    Erectile dysfunction, unspecified erectile dysfunction type 01/03/2017     Priority: Medium    Overweight (BMI 25.0-29.9) 01/03/2017     Priority: Medium     IMO SNOMED LOAD SPRING 2020 [.6/.18/.2020 9:04 PM]  Abad Tse:          Tubular adenoma of colon 01/03/2017     Priority: Medium     tubular adenoma x 2 (rectum) - 4 and 8 mm        Hyperbilirubinemia 01/03/2017     Priority: Medium    BPH without urinary obstruction      Priority: Medium     Created by Conversion        Hearing Loss      Priority: Medium     Created by Conversion  Replacement Utility updated for latest IMO load        Male erectile dysfunction 12/23/2014     Priority: Medium    Basal cell carcinoma, ear 12/23/2014     Priority: Medium    Hypercholesterolemia      Priority: Medium     Created by Conversion        Presbycusis      Priority: Medium     Created by Conversion        Atypical Chest Pain      Priority: Medium     Created by Conversion        Blood Pressure Isolated Elevated      Priority:  Medium     Created by Conversion        Blepharitis of both eyes 09/03/2008     Priority: Medium     Formatting of this note might be different from the original.  Epic         DIFFERENTIAL DIAGNOSIS: R/O neurocognitive disorder, anxiety due to other medical condition, adjustment DO     Medical comorbidities impacting or contributing to clinical picture: permanent atrial fibrillation, history of PE, normochromic normocytic anemia, hypercholesterolemia, hyperbilirubinemia  Known issue that I take into account for their medical decisions, no current exacerbations or new concerns.    Impression:  Prem Taylor is a 74 year old White, male who presents for initial visit with [facility] ([facility]) for medication management. Carries past diagnoses: anxiety.  . Patient denies history of psychiatric providers, hospitalizations, or suicide attempts. Started medications through PCP 4/2021, on escitalopram 20 mg since 03/2022. Denies known side effects from medication, patient reports it has been helpful (although rates anxiety relatively high today), wife Aubree Doesn't think it's been that effective, although does wonder if sx are more related to a neurocognitive process. Patient was previously seen by PCP, due to concern for memory difficulties with family history of Alzheimer's (mother). Labs completed largely unremarkable and completed MRI: nonspecific findings noted, no significant abnormalities. Referred to neurology, scheduled to see Errol 4/1/2025. Denies significant depression, SI/SIB/HI, psychosis, mykel, problematic substance use. Discussed risks / benefits of medication changes at this time. Given max dose of escitalopram, no other SSRI trials, it is reasonable to trial move to alternative if more effective or better tolerated. We will cross taper to sertraline. Patient to establish with neurology as planned. Follow-up with this provider and Behavioral Health Consultant in 1 month or sooner  as needed. Patient and wife agreeable to plan.       Medication side effects and alternatives reviewed. Health promotion activities recommended and reviewed today. All questions addressed. Education and counseling completed regarding risks and benefits of medications and psychotherapy options. Recommend therapy for additional support.     Treatment Plan:  DISCONTINUE escitalopram: Take 1 tablet (10 mg) by mouth daily for 7 days, THEN 0.5 tablets (5 mg) daily for 7 days. Then STOP. Script sent.  START sertraline: Take 0.5 tablets (12.5 mg) by mouth daily for 7 days, THEN 1 tablet (25 mg) daily for 7 days, THEN 2 tablets (50 mg) daily. Script sent for #60 + 1 refill.  Continue all other medications per primary care provider.   Establish care with neurology as planned.  Safety plan reviewed. To the Emergency Department as needed or call after hours crisis line at [phone] or [phone]. [place] Crisis Text Line: Text [place] to 444331 or  Suicide LifeLine Chat: suicideprevention"[email or web address][email or web address]/chat  Schedule an appointment with me and Behavioral Health Consultant in 4 weeks or sooner as needed.  Call [facility] at [phone] to schedule.  Follow up with primary care provider as planned or sooner if needed for acute medical concerns.  Call the psychiatric nurse line with medication questions or concerns at [phone].  MyChart may be used to communicate with your provider, but this is not intended to be used for emergencies.    Patient Education:  Medication side effects and alternatives reviewed. Health promotion activities recommended and reviewed today. All questions addressed. Education and counseling completed regarding risks and benefits of medications and psychotherapy options.  Consent provided by patient/guardian  Call the psychiatric nurse line with medication questions or concerns at [phone].  MyChart may be used to communicate with your provider, but this is not intended to be used  for emergencies.  BEERS CRITERIA: The American Geriatrics Society (AGS) released its second updated and expanded Beers Criteria - lists of potentially inappropriate medications for older adults - and one of the most frequently cited reference tools in the field of geriatrics. The Society also unveiled a suite of new  resources - including a list of alternative therapies for potentially inappropriate medications and more detailed guidance on best practices for implementing AGS recommendations.  Discussed with client.   Medlineplus.gov is information for patients.  It is run by the [employer] and it contains information about all disorders, diseases and all medications.      Side effects for SSRI: sexual dysfunction, decreased appetite, increased appetite, nausea, diarrhea, constipation, dry mouth, insomnia, sedation, agitation, tremors, headaches, dizziness, sweating, bruising and rare bleeding, discontinuation syndrome, rare hyponatremia, rare induction of mykel, suicidal ideations, and serotonin syndrome.      Community Resources:    National Suicide Prevention Lifeline: 752.637.3780 (TTY: 917.392.2603). Call anytime for help.  (www.suicidepreventionlifeline.org)  National Bostic on Mental Illness (www.melva.org): 787.984.5732 or 265-041-0677.   Mental Health Association (www.mentalhealth.org): 742.547.4190 or 967-322-1911.  [place] Crisis Text Line: Text [place] to 117978  Suicide LifeLine Chat: [employer].org/chat    Administrative Billing:     Level of Medical Decision Making:   - At least 1 chronic problem that is not stable  - Engaged in prescription drug management during visit (discussed any medication benefits, side effects, alternatives, etc.)         Patient Status:  [facility] MD/DO/NP/PA providers offer care a specialty service for Primary Care Providers in the [facility] that seek to optimize psychotropic medications for unstable patients.  Once medications have  been optimized, our providers discharge the patient back to the referring Primary Care Provider for ongoing medication management.  This type of system allows our providers to serve a high volume of patients.   Patient will continue to be seen for ongoing consultation and stabilization.    Signed:   Yvonne Benton, MSN, APRN, PMHNP-BC  [facility] ([facility])  [facility]    Chart documentation done in part with Dragon Voice Recognition software.  Although reviewed after completion, some word and grammatical errors may remain.

## 2025-02-03 ENCOUNTER — VIRTUAL VISIT (OUTPATIENT)
Dept: BEHAVIORAL HEALTH | Facility: CLINIC | Age: 75
End: 2025-02-03
Payer: COMMERCIAL

## 2025-02-03 ENCOUNTER — VIRTUAL VISIT (OUTPATIENT)
Dept: PSYCHIATRY | Facility: CLINIC | Age: 75
End: 2025-02-03
Payer: COMMERCIAL

## 2025-02-03 DIAGNOSIS — F33.0 MILD EPISODE OF RECURRENT MAJOR DEPRESSIVE DISORDER: ICD-10-CM

## 2025-02-03 DIAGNOSIS — F41.1 GENERALIZED ANXIETY DISORDER: Primary | ICD-10-CM

## 2025-02-03 DIAGNOSIS — F41.9 ANXIETY: Primary | ICD-10-CM

## 2025-02-03 PROCEDURE — 90791 PSYCH DIAGNOSTIC EVALUATION: CPT | Mod: 95 | Performed by: COUNSELOR

## 2025-02-03 RX ORDER — ESCITALOPRAM OXALATE 10 MG/1
TABLET ORAL
Qty: 11 TABLET | Refills: 0 | Status: SHIPPED | OUTPATIENT
Start: 2025-02-03 | End: 2025-02-17

## 2025-02-03 RX ORDER — SERTRALINE HYDROCHLORIDE 25 MG/1
TABLET, FILM COATED ORAL
Qty: 60 TABLET | Refills: 0 | Status: SHIPPED | OUTPATIENT
Start: 2025-02-03 | End: 2025-02-24

## 2025-02-03 ASSESSMENT — COLUMBIA-SUICIDE SEVERITY RATING SCALE - C-SSRS
TOTAL  NUMBER OF ABORTED OR SELF INTERRUPTED ATTEMPTS LIFETIME: NO
6. HAVE YOU EVER DONE ANYTHING, STARTED TO DO ANYTHING, OR PREPARED TO DO ANYTHING TO END YOUR LIFE?: NO
TOTAL  NUMBER OF INTERRUPTED ATTEMPTS LIFETIME: NO
2. HAVE YOU ACTUALLY HAD ANY THOUGHTS OF KILLING YOURSELF?: NO
ATTEMPT LIFETIME: NO
1. HAVE YOU WISHED YOU WERE DEAD OR WISHED YOU COULD GO TO SLEEP AND NOT WAKE UP?: NO

## 2025-02-03 ASSESSMENT — PAIN SCALES - GENERAL: PAINLEVEL_OUTOF10: NO PAIN (0)

## 2025-02-03 NOTE — PROGRESS NOTES
"    [facility]iatry Services - [place]         PATIENT'S NAME: Prem Taylor  PREFERRED NAME: Ryan  PRONOUNS:       MRN: [id number]  : 1950  ADDRESS: [address] [place] [id number]  [facility] NUM[facility]:  [id number]  DATE OF SERVICE: 25  START TIME: 807am  END TIME: 837am  PREFERRED PHONE: [phone]  May we leave a program related message: Yes  EMERGENCY CONTACT: was obtained  spouse.  SERVICE MODALITY:  Video Visit:      Provider verified identity through the following two step process.  Patient provided:  Patient  and Patient was verified at admission/transfer    Telemedicine Visit: The patient's condition can be safely assessed and treated via synchronous audio and visual telemedicine encounter.      Reason for Telemedicine Visit: Patient has requested telehealth visit    Originating Site (Patient Location): Patient's home    Distant Site (Provider Location): Provider Remote Setting- Home Office    Consent:  The patient/guardian has verbally consented to: the potential risks and benefits of telemedicine (video visit) versus in person care; bill my insurance or make self-payment for services provided; and responsibility for payment of non-covered services.     Patient would like the video invitation sent by:  My Chart    Mode of Communication:  Video Conference via Amwell    Distant Location (Provider):  Off-site    As the provider I attest to compliance with applicable laws and regulations related to telemedicine.    UNIVERSAL ADULT Mental Health DIAGNOSTIC ASSESSMENT    Identifying Information:  Patient is a 74 year old,   individual.  Patient was referred for an assessment by primary care provider.  Patient attended the session with spouse with permission .    Chief Complaint:   The reason for seeking services at this time is: \"Pain on both sides of my collar bone that increases at the rotator cuff.\" The problem(s) began 25.    Patient has attempted " "to resolve these concerns in the past through therapy, PCP, testing for dementia .    Reason for CCPS: anxiety and depression are concerns and this became more of a concern in the last 5 years, the last year has been challenging   Cognitive concerns as well, had a CAT scan, is scheduled with a neurologist and wife is going to ask about a PET Scan     Hope to: \"to find get to a plan of moving forward of what is best to live is a best of life I can\", look at what I am taking     Social/Family History:  Patient reported they grew up in [facility]  .  They were raised by biological parents  .  Parents were always together.  Patient reported that their childhood was \"back in the 50s and it was pretty normal, what you would expect... a very loving mother and we had a good life, good family.\"  Patient described their current relationships with family of origin as with the exception of 1 it is just one it's just fine.     The patient describes their cultural background as .  Cultural influences and impact on patient's life structure, values, norms, and healthcare: Raised as a Congregational.  Contextual influences on patient's health include: Individual Factors patient is a white 38-year-old  male, history of anxiety, possible cognitive concerns, Family Factors raised by good parents, family history of dementia, and Health- Seeking Factors help with anxiety and depression .    These factors will be addressed in the Preliminary Treatment plan. Patient identified their preferred language to be English. Patient reported they does not need the assistance of an  or other support involved in therapy.     Patient reported had no significant delays in developmental tasks.   Patient's highest education level was associate degree / vocational certificate  .  Patient identified the following learning problems: none reported.  Modifications will not be used to assist communication in therapy.  Patient reports " they are  able to understand written materials.    Patient reported the following relationship history  1x.  Patient's current relationship status is  for 52 years.   Patient identified their sexual orientation as heterosexual.  Patient reported having 2 child(juventino). Patient identified siblings; adult child; friends; spouse as part of their support system.  Patient identified the quality of these relationships as stable and meaningful .      Patient's current living/housing situation involves staying in own home/apartment.  The immediate members of family and household include BASILIO ELIAS, 74,SPOUSE and they report that housing is stable.    Patient is currently retired.  Patient reports their finances are obtained through [employer]. Patient does not identify finances as a current stressor.      Patient reported that they have not been involved with the legal system.    Patient does not report being under probation/ parole/ jurisdiction. They are not under any current court jurisdiction.     Patient's Strengths and Limitations:  Patient identified the following strengths or resources that will help them succeed in treatment: Uatsdin / Zoroastrian, commitment to health and well being, exercise routine, friends / good social support, family support, intelligence, motivation, sense of humor, and work ethic. Things that may interfere with the patient's success in treatment include: physical health concerns.     Assessments:  The following assessments were completed by patient for this visit:  PHQ9:       2/11/2020     8:13 AM 2/12/2020     8:00 AM 9/14/2023     3:23 PM 9/18/2024     6:59 AM 1/14/2025     1:03 PM 2/2/2025    10:44 AM   PHQ-9 SCORE   PHQ-9 Total Score MyChart   0 2 (Minimal depression) 3 (Minimal depression) 5 (Mild depression)   PHQ-9 Total Score 1 1 0 2 3  5        Patient-reported     GAD2:       2/2/2025    10:46 AM   KALPANA-2   Feeling nervous, anxious, or on edge 3   Not being able to  stop or control worrying 1   KALPANA-2 Total Score 4        Patient-reported     GAD7:       2/11/2020     8:13 AM 8/10/2021     6:42 AM 9/14/2023     3:24 PM 9/18/2024     7:00 AM 1/14/2025     1:05 PM 2/2/2025    10:46 AM   KALPANA-7 SCORE   Total Score  0 (minimal anxiety) 0 (minimal anxiety) 3 (minimal anxiety) 8 (mild anxiety) 14 (moderate anxiety)   Total Score 0 0 0 3 8  14        Patient-reported     CAGE-AID:       1/30/2025     9:29 AM   CAGE-AID Total Score   Total Score 1    1   Total Score MyChart 1 (A total score of 2 or greater is considered clinically significant)     PROMIS 10-Global Health (only subscores and total score):       1/30/2025     9:29 AM   PROMIS-10 Scores Only   Global Mental Health Score 13    13   Global Physical Health Score 14    14   PROMIS TOTAL - SUBSCORES 27    27     [place] Suicide Severity Rating Scale (Lifetime/Recent)      2/3/2025     9:36 AM   [place] Suicide Severity Rating (Lifetime/Recent)   1. Wish to be Dead (Lifetime) N   2. Non-Specific Active Suicidal Thoughts (Lifetime) N   Actual Attempt (Lifetime) N   Has subject engaged in non-suicidal self-injurious behavior? (Lifetime) N   Interrupted Attempts (Lifetime) N   Aborted or Self-Interrupted Attempt (Lifetime) N   Preparatory Acts or Behavior (Lifetime) N   Calculated C-SSRS Risk Score (Lifetime/Recent) No Risk Indicated       Personal and Family Medical History:  Patient does not report a family history of mental health concerns.  Patient reports family history includes Coronary Artery Disease in his father; Dementia in his mother; Heart Disease in his mother; Hypertension in his mother.    Patient does report Mental Health Diagnosis and/or Treatment.  Patient reported the following previous diagnoses which include(s): Depression and anxiety  .  Patient reported symptoms began in childhood and got worse in the last 5 years.  Patient has received mental health services in the past:  therapist.  Psychiatric  Hospitalizations: none  .    Patient denies a history of civil commitment.      Currently, patient    is not receiving other mental health services.  These include none.       Patient has had a physical exam to rule out medical causes for current symptoms.  Date of last physical exam was within the past year. Client was encouraged to follow up with PCP if symptoms were to develop. The patient has a [facility] Primary Care Provider, who is named Mamadou Baez. Patient reports the following current medical concerns: see pt's chart and Yvonne Benton's note from CCPS team based visit.   Patient reports there is always some chronic pain and it is not debilitating.  There are significant appetite / nutritional concerns / weight changes.   Patient does not report a history of head injury / trauma / cognitive impairment.      Patient reports current meds as:   Current Outpatient Medications   Medication Sig Dispense Refill    escitalopram (LEXAPRO) 10 MG tablet Take 1 tablet (10 mg) by mouth daily for 7 days, THEN 0.5 tablets (5 mg) daily for 7 days. Then STOP. 11 tablet 0    isosorbide mononitrate (IMDUR) 30 MG 24 hr tablet Take 2 tablets (60 mg) by mouth daily. 180 tablet 0    metoprolol succinate ER (TOPROL XL) 25 MG 24 hr tablet Take 2 tablets (50 mg) by mouth daily. 180 tablet 3    MULTIVITAMIN (MULTIPLE VITAMIN ORAL) [MULTIVITAMIN (MULTIPLE VITAMIN ORAL)] Take 1 tablet by mouth daily.      rosuvastatin (CRESTOR) 40 MG tablet Take 1 tablet (40 mg) by mouth daily. 90 tablet 3    sertraline (ZOLOFT) 25 MG tablet Take 0.5 tablets (12.5 mg) by mouth daily for 7 days, THEN 1 tablet (25 mg) daily for 7 days, THEN 2 tablets (50 mg) daily for 7 days. 60 tablet 0    warfarin ANTICOAGULANT (COUMADIN) 7.5 MG tablet 3.75mg Sunday, Tuesday, Friday and 7.5mg all other days per INR clinic 75 tablet 1     No current facility-administered medications for this visit.       Medication Adherence:  Patient reports taking. Good adherence.      Patient Allergies:  No Known Allergies    Medical History:    Past Medical History:   Diagnosis Date    Abnormal ECG     Anemia     Anxiety     Arrhythmia     Atrial fibrillation (H) 10/09/2017    Cancer (H) 2015    CPAP (continuous positive airway pressure) dependence 10/2019    per patient    Depression     Heart valve disease 2017    History of blood clots     Hyperlipidemia 2006    Pulmonary embolism, blood-clot, obstetric 10/09/2017    Sleep apnea 2017    Uses CPAP         Current Mental Status Exam:   Appearance:  Appropriate    Eye Contact:  Good   Psychomotor:  Normal , fidgeting      Gait / station:  no problem  Attitude / Demeanor: Cooperative  Interested Pleasant  Speech      Rate / Production: Normal/ Responsive      Volume:  Normal  volume      Language:  intact  Mood:   Anxious  Depressed   Affect:   Appropriate    Thought Content: Clear  Rumination   Thought Process: Coherent  Logical       Associations: No loosening of associations  Insight:   Fair   Judgment:  Intact   Orientation:  All  Attention/concentration: Good    Substance Use:   Patient did not report a family history of substance use concerns; see medical history section for details.  Patient has not received chemical dependency treatment in the past.  Patient has not ever been to detox.      Patient is not currently receiving any chemical dependency treatment.           Substance History of use Age of first use Date of last use     Pattern and duration of use (include amounts and frequency)   Alcohol currently use   22 01/25/25 Weekends a couple of beers   Cannabis   never used     REPORTS SUBSTANCE USE: N/A     Amphetamines   never used     REPORTS SUBSTANCE USE: N/A   Cocaine/crack    never used       REPORTS SUBSTANCE USE: N/A   Hallucinogens never used         REPORTS SUBSTANCE USE: N/A   Inhalants never used         REPORTS SUBSTANCE USE: N/A   Heroin never used         REPORTS SUBSTANCE USE: N/A   Other Opiates never used      REPORTS SUBSTANCE USE: N/A   Benzodiazepine   never used     REPORTS SUBSTANCE USE: N/A   Barbiturates never used     REPORTS SUBSTANCE USE: N/A   Over the counter meds currently use AS PRESCRIBED OR REQUIRED 01/30/25 REPORTS SUBSTANCE USE: N/A   Caffeine currently use 23   Coffee decaf   Nicotine  used in the past 18 11/14/00 REPORTS SUBSTANCE USE: N/A   Other substances not listed above:  Identify:  never used     REPORTS SUBSTANCE USE: N/A     Patient reported the following problems as a result of their substance use: no problems, not applicable.     Substance Use: No symptoms    Based on the CAGE score of 0 and clinical interview there  are not indications of drug or alcohol abuse.    Significant Losses / Trauma / Abuse / Neglect Issues:   Patient did not serve in the .  There are not indications or report of significant loss, trauma, abuse or neglect issues related to: are no indications and client denies any losses, trauma, abuse, or neglect concerns.  Patient has not been a victim of exploitation.  Concerns for possible neglect are not present.     Safety Assessment:   Patient denies current or past homicidal ideation and behaviors.  Patient denies current or past suicidal ideation and behaviors.  Patient denies current or past self-injurious behaviors.  Patient denied risk behaviors associated with substance use.  Patient denies any high risk behaviors associated with mental health symptoms.  Patient denied current or past personal safety concerns.    Patient denies past of current/recent assaultive behaviors.    Patient denied a history of sexual assault behaviors.     Patient reports there are not   firearms in the house.    Patient reports the following protective factors:  forward or future oriented thinking; dedication to family or friends; safe and stable environment; regular sleep; regular physical activity; sense of belonging; purpose; abstinence from substances; adherence with prescribed  medication; living with other people; daily obligations; structured day; commitment to well being; healthy fear of risky behaviors or pain; financial stability; sense of personal control or determination; access to a variety of clinical interventions and pets    Risk Plan:  See Recommendations for Safety and Risk Management Plan    Review of Symptoms per patient report:   Depression: Feeling sad, down, or depressed, Change in energy level, Change in sleep, Ruminations, and Irritability, wife says yes to pt not tracking as much from A to B and it's hard for him to do this- this is new for him, his wife says he is focused on things that aren't true and it's hard to redirect him, will get frustrated more quickly, SIB- no in lifetime, SI- no in lifetime   Luisa:  No Symptoms  Psychosis: No Symptoms  Anxiety: Excessive worry, Nervousness, Social anxiety, Sleep disturbance, Ruminations, and Irritability will feel anxious hoping that events- this visit, trips as we get closer, will feel more anxious without his wife   Panic:  Had middle age and now isn't a concern  Post Traumatic Stress Disorder:  No Symptoms   Eating Disorder:    wife says he is eating more than he use to, gained 20 lbs in the last couple of years, eats sweets and if it's accessible it will be gone and they will limit this in the house   ADD / ADHD:  Poor task completion, Distractibility, Forgetful, Restlessness/fidgety, and In groups it is hard to hear- has hearing aids   Conduct Disorder: No symptoms  Autism Spectrum Disorder: No symptoms  Obsessive Compulsive Disorder: No Symptoms  Personality Disorders:   none    Patient reports the following compulsive behaviors and treatment history: None.      Sleep- having strange dreams, things recalled from the past and more myrna vu, this dreams will wake him up and it's hard to go back to sleep  When in a car alone or in a room alone will think of tings that he thought happened that couldn't have happened, he  had conversations with his wife and he feels like he had them and his wife isn't recalling this happening   With COVID pt lost sense of smell     Diagnostic Criteria:   Generalized Anxiety Disorder  A. Excessive anxiety and worry about a number of events or activities (such as work or school performance).   B. The person finds it difficult to control the worry.  C. Select 3 or more symptoms (required for diagnosis). Only one item is required in children.   - Restlessness or feeling keyed up or on edge.    - Being easily fatigued.    - Difficulty concentrating or mind going blank.    - Irritability.    - Sleep disturbance (difficulty falling or staying asleep, or restless unsatisfying sleep).   D. The focus of the anxiety and worry is not confined to features of an Axis I disorder.  E. The anxiety, worry, or physical symptoms cause clinically significant distress or impairment in social, occupational, or other important areas of functioning.   F. The disturbance is not due to the direct physiological effects of a substance (e.g., a drug of abuse, a medication) or a general medical condition (e.g., hyperthyroidism) and does not occur exclusively during a Mood Disorder, a Psychotic Disorder, or a Pervasive Developmental Disorder. Major Depressive Disorder  A) Recurrent episode(s) - symptoms have been present during the same 2-week period and represent a change from previous functioning 5 or more symptoms (required for diagnosis)   - Depressed mood. Note: In children and adolescents, can be irritable mood.     - Diminished interest or pleasure in all, or almost all, activities.    - Decreased sleep.    - Fatigue or loss of energy.    - Diminished ability to think or concentrate, or indecisiveness.   B) The symptoms cause clinically significant distress or impairment in social, occupational, or other important areas of functioning  C) The episode is not attributable to the physiological effects of a substance or to  another medical condition  D) The occurence of major depressive episode is not better explained by other thought / psychotic disorders  E) There has never been a manic episode or hypomanic episode    Functional Status:  Patient reports the following functional impairments:    Home life    Organization   Relationship(s)   Social interactions      Nonprogrammatic care:  Patient is requesting basic services to address current mental health concerns.    Clinical Summary:  1. Psychosocial Factors:  Medical complexities, Cognitive concerns.  Cultural and Contextual Factors:  white retired male, concerns about cognition and is Islam  2. Principal DSM5 Diagnoses  (Sustained by DSM5 Criteria Listed Above):   296.31 (F33.0) Major Depressive Disorder, Recurrent Episode, Mild _  300.02 (F41.1) Generalized Anxiety Disorder..  3. Other Diagnoses that is relevant to services:   Cognitive concerns and chronic pain.  4. Provisional Diagnosis:  296.31 (F33.0) Major Depressive Disorder, Recurrent Episode, Mild _  300.02 (F41.1) Generalized Anxiety Disorder. as evidenced by PHQ9, GAD7 and clinical interview  5. Prognosis: Expect improvement.  6. Likely consequences of symptoms if not treated: worsening MH.  7. Patient strengths include:  educated, goal-focused, has a previous history of therapy, intelligent, motivated, support of family, friends and providers, and work history.     Recommendations:     1. Plan for Safety and Risk Management:   Safety and Risk: Recommended that patient call 911 or go to the local ED should there be a change in any of these risk factors        Report to child / adult protection services was NA.     2. Patient's identified mental health concerns with a cultural influence will be addressed by [facility] staff .     3. Initial Treatment will focus on:    Depressed Mood - feeling down, anhedonia, lack of interest, irritability  Anxiety - worry, rumination, nervous .     4. Resources/Service  Plan:    services are not indicated.   Modifications to assist communication are not indicated.   Additional disability accommodations are not indicated.      5. Collaboration:   Collaboration / coordination of treatment will be initiated with the following  support professionals: Yvonne BELL CNP.      6.  Referrals:   The following referral(s) will be initiated: TBD.       A Release of Information has been obtained for the following: N/A.     Clinical Substantiation/medical necessity for the above recommendations:  Pt has a hx of depression, anxiety symptoms that are impacting daily functioning in daily living and social settings. [facility] and [facility] checking on use of coping skills and therapy to help combat these symptoms may provide Pt with relief. Pt reports that they are struggling to manage depressive and anxiety symptoms and again CCPS model can assist with providing coping skills, following up that pt is using these skills, safety plan or other interventions along with medication to have the best impact to manage symptoms and provide relief. At this time pt's symptoms are able to be managed with OP services and pt will be referred to a higher level of care if there are abrupt changes in presentation or risk of harm.    7. RIVAS:    RIVAS:  Discussed the general effects of drugs and alcohol on health and well-being. Provider gave patient printed information about the effects of chemical use on their health and well being. Recommendations: Limit use.     8. Records:   These were reviewed at time of assessment.   Information in this assessment was obtained from the medical record and  provided by patient who is a good historian.    Patient will have open access to their mental health medical record.    9.   Interactive Complexity: No    10. Safety Plan: No Safety plan indicated    Provider Name/ Credentials:  NICOLASA Kasper, [facility]  February  3, 2025

## 2025-02-03 NOTE — NURSING NOTE
Current patient location: [address] [place] [id number]    Is the patient currently in the state of [place]? YES    Visit mode: VIDEO    If the visit is dropped, the patient can be reconnected by:VIDEO VISIT: Send to e-mail at: [email or web address]    Will anyone else be joining the visit? NO  (If patient encounters technical issues they should call [phone])    Are changes needed to the allergy or medication list? Pt stated no changes to allergies and Pt stated no med changes    Are refills needed on medications prescribed by this physician? NO    Rooming Documentation:  Questionnaire(s) completed Attendance guidelines (MH&A only)    Reason for visit: Consult    [facility]    Care team has reviewed attendance agreement with patient. Patient advised that two failed appointments within 6 months may lead to termination of current episode of care.

## 2025-02-03 NOTE — PATIENT INSTRUCTIONS
**For crisis resources, please see the information at the end of this document**     Thank you for coming to the [facility] MENT[facility] HEALTH & [facility] [place] [facility].    TREATMENT PLAN:    Medications:   DISCONTINUE escitalopram: Take 1 tablet (10 mg) by mouth daily for 7 days, THEN 0.5 tablets (5 mg) daily for 7 days. Then STOP. Script sent.  START sertraline: Take 0.5 tablets (12.5 mg) by mouth daily for 7 days, THEN 1 tablet (25 mg) daily for 7 days, THEN 2 tablets (50 mg) daily. Script sent for #60 + 1 refill.  Continue all other medications per primary care provider.     Consults / Referrals:   - Establish care with neurology as planned.    Follow-up:  Schedule an appointment with me and Behavioral Health Consultant in 4 weeks or sooner as needed.  Call [facility] at [phone] to schedule.  Follow up with primary care provider as planned or sooner if needed for acute medical concerns.  Call the psychiatric nurse line with medication questions or concerns at [phone].  [employer] may be used to communicate with your provider, but this is not intended to be used for emergencies.    Psychoeducation:  Side effects for SSRI: sexual dysfunction, decreased appetite, increased appetite, nausea, diarrhea, constipation, dry mouth, insomnia, sedation, agitation, tremors, headaches, dizziness, sweating, bruising and rare bleeding, discontinuation syndrome, rare hyponatremia, rare induction of mykel, suicidal ideations, and serotonin syndrome.      Financial Assistance [phone]  [employer] [facility] Billing [phone]  Central Billing Office, [facility]: [phone]  [facility] Billing [phone]  Medical Records [phone]  [facility] Patient Bill of Rights [email or web address]       MENTAL HEALTH CRISIS RESOURCES:  For a emergency help, please call 911 or go to the nearest Emergency Department.     Emergency Walk-In Options:   EmPATH Unit @  [facility] ([place]): [phone] - Specialized mental health emergency area designed to be calming  [facility] ([place]): [phone]  [facility] Psychiat[facility] ([place]): [phone]  [facility] ([place]): [phone]    [facility] Crisis Information:   [place]: [phone]  [place]: [phone]  [place] ([place]) - Adult: [phone]     Child: [phone]  [place] - Adult: [phone]     Child: [phone]  [place]: [phone]  List of all [facility] resources:   https://[place].gov/dhs/people-we-serve/adults/health-care/mental-health/resources/crisis-contacts.jsp    National Crisis Information:   National Suicide & Crisis Lifeline: Call 988        For online chat options, visit https://suicidepreventionlifeline.org/chat/  Poison Control Center: 9-298-612-2961  Poison Control Center: 1-572-527-3542  Trans Lifeline: 6-237-854-1756 - Hotline for transgender people of all ages  [employer]: 2-166-549-3638 - Hotline for LGBT youth     For Non-Emergency Support:   Fast Tracker: Mental Health & Substance Use Disorder Resources -   https://www.fasttrackermn.org/       Again thank you for choosing [facility] M[facility] & ADDI[facility] [place] [facility] and please let us know how we can best partner with you to improve you and your family's health.    You may be receiving a survey regarding this appointment. We would love to have your feedback, both positive and negative. The survey is done by an external company, so your answers are anonymous.        Patient Education   Collaborative Care Psychiatry Service  What to Expect  Here's what to expect from your Collaborative Care Psychiatry Service (CCPS).   About CCPS  CCPS means 2 people work together to help you get better. You'll meet with a behavioral health clinician and a psychiatric doctor. A behavioral health clinician helps people with mental health problems by talking with them. A psychiatric doctor  "helps people by giving them medicine.  How it works  At every visit, you'll see the behavioral health clinician (BHC) first. They'll talk with you about how you're doing and teach you how to feel better.   Then you'll see the psychiatric doctor. This doctor can help you deal with troubling thoughts and feelings by giving you medicine. They'll make sure you know the plan for your care.   CCPS usually takes 3 to 6 visits. If you need more visits, we may have you start seeing a different psychiatric doctor for ongoing care.  If you have any questions or concerns, we'll be glad to talk with you.  About visits  Be open  At your visits, please talk openly about your problems. It may feel hard, but it's the best way for us to help you.  Cancelling visits  If you can't come to your visit, please call us right away at [phone]. If you don't cancel at least 24 hours (1 full day) before your visit, that's \"late cancellation.\"  Being late to visits  Being very late is the same as not showing up. You will be a \"no show\" if:  Your appointment starts with a BHC, and you're more than 15 minutes late for a 30-minute (half hour) visit. This will also cancel your appointment with the psychiatric doctor.  Your appointment is with a psychiatric doctor only, and you're more than 15 minutes late for a 30-minute (half hour) visit.  Your appointment is with a psychiatric doctor only, and you're more than 30 minutes late for a 60-minute (full hour) visit.  If you cancel late or don't show up 2 times within 6 months, we may end your care.   Getting help between visits  If you need help between visits, you can call us Monday to Friday from 8 a.m. to 4:30 p.m. at [phone].  Emergency care  Call 911 or go to the nearest emergency department if your life or someone else's life is in danger.  Call 158 anytime to reach the national Suicide and Crisis hotline.  Medicine refills  To refill your medicine, call your pharmacy. You can also " call [facility]'s Behavioral Access at [phone], Monday to Friday, 8 a.m. to 4:30 p.m. It can take 1 to 3 business days to get a refill.   Forms, letters, and tests  You may have papers to fill out, like FMLA, short-term disability, and workability. We can help you with these forms at your visits, but you must have an appointment. You may need more than 1 visit for this, to be in an intensive therapy program, or both.  Before we can give you medicine for ADHD, we may refer you to get tested for it or confirm it another way.  We may not be able to give you an emotional support animal letter.  We don't do mental health checks ordered by the court.   We don't do mental health testing, but we can refer you to get tested.   Thank you for choosing us for your care.  For informational purposes only. Not to replace the advice of your health care provider. Copyright   2022 [facility]. All rights reserved. ipnexus [id number] - Rev 11/24.

## 2025-02-04 ENCOUNTER — LAB (OUTPATIENT)
Dept: LAB | Facility: CLINIC | Age: 75
End: 2025-02-04
Payer: COMMERCIAL

## 2025-02-04 ENCOUNTER — ANTICOAGULATION THERAPY VISIT (OUTPATIENT)
Dept: ANTICOAGULATION | Facility: CLINIC | Age: 75
End: 2025-02-04

## 2025-02-04 DIAGNOSIS — I26.99 PULMONARY EMBOLISM, BILATERAL (H): ICD-10-CM

## 2025-02-04 DIAGNOSIS — Z86.711 HISTORY OF PULMONARY EMBOLISM: ICD-10-CM

## 2025-02-04 DIAGNOSIS — I48.21 PERMANENT ATRIAL FIBRILLATION (H): ICD-10-CM

## 2025-02-04 DIAGNOSIS — I48.21 PERMANENT ATRIAL FIBRILLATION (H): Primary | ICD-10-CM

## 2025-02-04 LAB — INR BLD: 3.4 (ref 0.9–1.1)

## 2025-02-04 PROCEDURE — 36416 COLLJ CAPILLARY BLOOD SPEC: CPT

## 2025-02-04 PROCEDURE — 85610 PROTHROMBIN TIME: CPT

## 2025-02-04 NOTE — PROGRESS NOTES
ANTICOAGULATION MANAGEMENT     Prem Taylor 74 year old male is on warfarin with supratherapeutic INR result. (Goal INR 2.0-3.0)    Recent labs: (last 7 days)     02/04/25  0730   INR 3.4*       ASSESSMENT     Warfarin Lab Questionnaire    Warfarin Doses Last 7 Days      2/3/2025     2:12 PM   Dose in Tablet or Mg   TAB or MG? - 7.5mg wararin tabs (evenings) milligram (mg)     Pt Rptd Dose SINDHU MONDAY TUESDAY WED THURS FRIDAY SATURDAY   2/3/2025   2:12 PM 3.75 7.5 3.75 7.5 7.5 3.75 7.5         2/3/2025   Warfarin Lab Questionnaire   Missed doses within past 14 days? No   Changes in diet or alcohol within past 14 days? No   Medication changes since last result? No - reported working with his psychiatrist with meds and will be adjusting doses of Sertraline / Escitalopram.        - Yes. On 2/3 - 24/25 - Sertraline 25mg - instructed - Take 0.5 tablets (12.5 mg) by mouth daily for 7 days, THEN 1 tablet (25 mg) daily for 7 days, THEN 2 tablets (50 mg) daily for 7 days.         - and Escitalopram 10mg instructed - Take 1 tablet (10 mg) by mouth daily for 7 days, THEN 0.5 tablets (5 mg) daily for 7 days. Then STOP.        - Per Psychiatry virtual visit on 2/3/25 -due to anxiety.  Escitalopram  5 To sertratline up to 50 mg 2 week cross taper.     Injuries or illness since last result? No   New shortness of breath, severe headaches or sudden changes in vision since last result? No   Abnormal bleeding since last result? No   Upcoming surgery, procedure? No   Best number to call with results? [phone]     Previous result: Therapeutic last visit at 2.1; previously outside of goal range supratherapeutic last 2 INRs..    Additional findings:  NOTE:  Will be traveling to [facility] in May 2025 -.       PLAN     Recommended plan for ongoing change(s) affecting INR     Dosing Instructions: hold dose then decrease your warfarin dose (9.1% change) with next INR in 2 weeks       Summary  As of 2/4/2025      Full warfarin  instructions:  2/4: Hold; Otherwise 7.5 mg every Mon, Wed, Fri; 3.75 mg all other days   Next INR check:  2/18/2025               Telephone call with Ryan who verbalizes understanding and agrees to plan   - also sent [employer] message.    Lab visit scheduled - INR on 2/18/25 @ [facility]    Education provided: Taking warfarin: Importance of taking warfarin as instructed  Goal range and lab monitoring: goal range and significance of current result  Interaction IS anticipated between warfarin and Sertraline and Escitalopram    Plan made per [facility] anticoagulation protocol    Mouna Min RN  2/4/2025  Anticoagulation Clinic  [employer] for routing messages: elsie MAURICIO  [facility] patient phone line: [phone]        _______________________________________________________________________     Anticoagulation Episode Summary       Current INR goal:  2.0-3.0   TTR:  65.4% (1 y)   Target end date:  Indefinite   Send INR reminders to:  RUBY MAURICIO    Indications    Pulmonary embolism  bilateral (H) (Resolved) [I26.99]  Permanent atrial fibrillation (H) [I48.21]  Pulmonary embolism  bilateral (H) [I26.99]  History of pulmonary embolism [Z86.711]             Comments:  Approved for 8 week checks per -- see encounter from 07/27/21             Anticoagulation Care Providers       Provider Role Specialty Phone number    Mamadou Baez MD Referring Family Medicine [phone]

## 2025-02-17 DIAGNOSIS — F41.1 GENERALIZED ANXIETY DISORDER: ICD-10-CM

## 2025-02-17 NOTE — TELEPHONE ENCOUNTER
1) Reviewed refill request(s) from    [id number] - [place], [place] - [address] AT [place] [address]    2) Any Controlled Substance(s)? No    3) Refill(s) requested for:     - escitalopram (LEXAPRO) 10 MG tablet  Date last ordered: 02/03/25 Qty: 11 Refills: 0   this order was discontinued on 02/17/25    4) Action taken: routed encounter back to RN Pool for review; not within LPN/MA Scope of Practice to rosangela Guillaume MA on 2/17/2025 at 11:32 AM

## 2025-02-18 ENCOUNTER — LAB (OUTPATIENT)
Dept: LAB | Facility: CLINIC | Age: 75
End: 2025-02-18
Payer: COMMERCIAL

## 2025-02-18 ENCOUNTER — ANTICOAGULATION THERAPY VISIT (OUTPATIENT)
Dept: ANTICOAGULATION | Facility: CLINIC | Age: 75
End: 2025-02-18

## 2025-02-18 DIAGNOSIS — I48.21 PERMANENT ATRIAL FIBRILLATION (H): Primary | ICD-10-CM

## 2025-02-18 DIAGNOSIS — I48.21 PERMANENT ATRIAL FIBRILLATION (H): ICD-10-CM

## 2025-02-18 DIAGNOSIS — Z86.711 HISTORY OF PULMONARY EMBOLISM: ICD-10-CM

## 2025-02-18 DIAGNOSIS — I26.99 PULMONARY EMBOLISM, BILATERAL (H): ICD-10-CM

## 2025-02-18 LAB
INR BLD: >8 (ref 0.9–1.1)
INR PPP: 6.41 (ref 0.85–1.15)

## 2025-02-18 PROCEDURE — 36415 COLL VENOUS BLD VENIPUNCTURE: CPT

## 2025-02-18 PROCEDURE — 85610 PROTHROMBIN TIME: CPT | Mod: 59

## 2025-02-18 PROCEDURE — 36416 COLLJ CAPILLARY BLOOD SPEC: CPT

## 2025-02-18 PROCEDURE — 85610 PROTHROMBIN TIME: CPT

## 2025-02-18 RX ORDER — ESCITALOPRAM OXALATE 10 MG/1
TABLET ORAL
Qty: 11 TABLET | Refills: 0 | OUTPATIENT
Start: 2025-02-18 | End: 2025-03-04

## 2025-02-18 NOTE — PROGRESS NOTES
ANTICOAGULATION MANAGEMENT     Prem Taylor 74 year old male is on warfarin with supratherapeutic INR result. (Goal INR 2.0-3.0)    Recent labs: (last 7 days)     02/18/25  1020   INR 6.41*       ASSESSMENT     Warfarin Lab Questionnaire    Warfarin Doses Last 7 Days      2/17/2025     1:27 PM   Dose in Tablet or Mg   TAB or MG? milligram (mg)     Pt Rptd Dose SUNDAY MONDAY TUESDAY WED THURS FRIDAY SATURDAY 2/17/2025   1:27 PM 3.75 7.5 3.75 7.5 7.5 3.75 7.5         2/17/2025   Warfarin Lab Questionnaire   Missed doses within past 14 days? No   Changes in diet or alcohol within past 14 days? No   Medication changes since last result? No - Per [facility] encounter note on 2/4/25 - Sertraline dose increasing and Escitalopram dose decreasing to a stop.   Injuries or illness since last result? No   New shortness of breath, severe headaches or sudden changes in vision since last result? No   Abnormal bleeding since last result? No   Upcoming surgery, procedure? No   Best number to call with results? [phone]     Previous result: Supratherapeutic of 3.4, hold and 9% dose reduction advised on 2/4/25.  Additional findings: Dosing above does not match instructions from [facility] (7.5 M/W/F and 3.75 mg all other days)       PLAN     Unable to reach Ryan by phone today.    Left message to hold warfarin tonight. Request call back for assessment. "[employer]"hart Message sent also.    Follow up required to confirm warfarin dose taken and assess for changes, discuss out of range result , and discuss dosing instructions and confirm understanding of instructions    Josy Moe RN  2/18/2025  Anticoagulation Clinic  Check for routing messages: elsie MAURICIO  [facility] patient phone line: [phone]

## 2025-02-18 NOTE — PROGRESS NOTES
ANTICOAGULATION MANAGEMENT     Prem Taylor 74 year old male is on warfarin with supratherapeutic INR result. (Goal INR 2.0-3.0)    Recent labs: (last 7 days)     02/18/25  1020   INR 6.41*       ASSESSMENT     Warfarin Lab Questionnaire    Warfarin Doses Last 7 Days      2/17/2025     1:27 PM   Dose in Tablet or Mg   TAB or MG? milligram (mg)     Pt Rptd Dose SUNDAY MONDAY TUESDAY WED THURS FRIDAY SATURDAY 2/17/2025   1:27 PM 3.75 7.5 3.75 7.5 7.5 3.75 7.5         2/17/2025   Warfarin Lab Questionnaire   Missed doses within past 14 days? No   Changes in diet or alcohol within past 14 days? No   Medication changes since last result? No   Injuries or illness since last result? No   New shortness of breath, severe headaches or sudden changes in vision since last result? No   Abnormal bleeding since last result? No   Upcoming surgery, procedure? No   Best number to call with results? [phone]       Additional findings: Verified dosing with Ryan; he's been taking taking 3.75 mg Sun/Tue/Fri and 7.5 mg all other days. This is 10% more than he was advised to take two weeks ago. [facility] calendar has been updated       PLAN     Recommended plan for temporary change(s) and ongoing change(s) affecting INR     Dosing Instructions: hold 2 doses then decrease your warfarin dose (10% change) to the dosing you were supposed to be taking, with next INR in 3 days       Summary  As of 2/18/2025      Full warfarin instructions:  2/18: Hold; 2/19: Hold; Otherwise 7.5 mg every Mon, Wed, Fri; 3.75 mg all other days   Next INR check:  2/21/2025               Telephone call with Ryan who agrees to plan and repeated back plan correctly  Sent [employer] message with dosing and follow up instructions    Lab visit scheduled    Education provided: Taking warfarin: prescribed tablet strength and color  Goal range and lab monitoring: goal range and significance of current result and Importance of following up at instructed interval  Symptom  monitoring: monitoring for bleeding signs and symptoms, when to seek medical attention/emergency care, and if you hit your head or have a bad fall seek emergency care  Written instructions provided  Contact [phone] with any changes, questions or concerns.     Plan made per [facility] anticoagulation protocol    Josy Moe RN  2/18/2025  Anticoagulation Clinic  [facility] "[employer] for routing messages: elsie MAURICIO  [facility] patient phone line: [phone]        _______________________________________________________________________     Anticoagulation Episode Summary       Current INR goal:  2.0-3.0   TTR:  61.5% (1 y)   Target end date:  Indefinite   Send INR reminders to:  RUBY MAURICIO    Indications    Pulmonary embolism  bilateral (H) (Resolved) [I26.99]  Permanent atrial fibrillation (H) [I48.21]  Pulmonary embolism  bilateral (H) [I26.99]  History of pulmonary embolism [Z86.711]             Comments:  Approved for 8 week checks per -- see encounter from 07/27/21             Anticoagulation Care Providers       Provider Role Specialty Phone number    Mamadou Baez MD Referring Family Medicine [phone]

## 2025-02-18 NOTE — TELEPHONE ENCOUNTER
2/3/25    Treatment Plan:  DISCONTINUE escitalopram: Take 1 tablet (10 mg) by mouth daily for 7 days, THEN 0.5 tablets (5 mg) daily for 7 days. Then STOP. Script sent.  START sertraline: Take 0.5 tablets (12.5 mg) by mouth daily for 7 days, THEN 1 tablet (25 mg) daily for 7 days, THEN 2 tablets (50 mg) daily. Script sent for #60 + 1 refill.  Continue all other medications per primary care provider.   Establish care with neurology as planned.  Safety plan reviewed. To the Emergency Department as needed or call after hours crisis line at [phone] or [phone]. [place] Crisis Text Line: Text [place] to 925666 or  Suicide LifeLine Chat: suicidepreventionlifeline.org/chat  Schedule an appointment with me and Behavioral Health Consultant in 4 weeks or sooner as needed.  Call [facility] at [phone] to schedule.  Follow up with primary care provider as planned or sooner if needed for acute medical concerns.  Call the psychiatric nurse line with medication questions or concerns at [phone].  [employer] may be used to communicate with your provider, but this is not intended to be used for emergencies.

## 2025-02-19 DIAGNOSIS — F41.1 GENERALIZED ANXIETY DISORDER: ICD-10-CM

## 2025-02-19 NOTE — TELEPHONE ENCOUNTER
1) Reviewed refill request(s) from for escitalopram (LEXAPRO) 10 MG tablet     [id number] [address] AT [facility] &     2) Any Controlled Substance(s)? No    3) Refill(s) requested for: escitalopram (LEXAPRO) 10 MG tablet   Medications:   DISCONTINUE escitalopram: Take 1 tablet (10 mg) by mouth daily for 7 days, THEN 0.5 tablets (5 mg) daily for 7 days. Then STOP. Script sent.  START sertraline: Take 0.5 tablets (12.5 mg) by mouth daily for 7 days, THEN 1 tablet (25 mg) daily for 7 days, THEN 2 tablets (50 mg) daily. Script sent for #60 + 1 refill.  Continue all other medications per primary care provider.     4) Action taken: sending to RN pool for denial dt to completion of taper    Spoke to pt. Prem said he didn't request a refill, he is done with escitalopram taper; reports doing well adjusting to  new medicine.    Denisha Hilario LPN on 2/19/2025 at 4:09 PM

## 2025-02-20 RX ORDER — ESCITALOPRAM OXALATE 10 MG/1
TABLET ORAL
Qty: 11 TABLET | Refills: 0 | OUTPATIENT
Start: 2025-02-20 | End: 2025-03-06

## 2025-02-20 NOTE — TELEPHONE ENCOUNTER
RN received a refill request through [employer] basket for Lexapro 10 mg tablet. This refill request was DENIED for the following reasons:    1.  This medication has been discontinued by the provider.  Please refer to Treatment Plan below.       Disp Refills Start End COLLIN   escitalopram (LEXAPRO) 10 MG tablet 11 tablet 0 2/3/2025 2/17/2025 No   Sig - Route: Take 1 tablet (10 mg) by mouth daily for 7 days, THEN 0.5 tablets (5 mg) daily for 7 days. Then STOP. - Oral   Sent to pharmacy as: Escitalopram Oxalate 10 MG Oral Tablet (Lexapro)   Class: E-Prescribe   Order: [id number]   E-Prescribing Status: Receipt confirmed by pharmacy (2/3/2025 10:23 AM CST)     Treatment Plan:  DISCONTINUE escitalopram: Take 1 tablet (10 mg) by mouth daily for 7 days, THEN 0.5 tablets (5 mg) daily for 7 days. Then STOP. Script sent.  START sertraline: Take 0.5 tablets (12.5 mg) by mouth daily for 7 days, THEN 1 tablet (25 mg) daily for 7 days, THEN 2 tablets (50 mg) daily. Script sent for #60 + 1 refill.  Continue all other medications per primary care provider.   Establish care with neurology as planned.  Safety plan reviewed. To the Emergency Department as needed or call after hours crisis line at [phone] or [phone]. [place] Crisis Text Line: Text [place] to 697040 or  Suicide LifeLine Chat: suicidepreventionlifeline.org/chat  Schedule an appointment with me and Behavioral Health Consultant in 4 weeks or sooner as needed.  Call [facility] at [phone] to schedule.  Follow up with primary care provider as planned or sooner if needed for acute medical concerns.  Call the psychiatric nurse line with medication questions or concerns at [phone].  MyChart may be used to communicate with your provider, but this is not intended to be used for emergencies.    Jasvir Alaniz RN on 2/20/2025 at 2:22 PM

## 2025-02-27 ENCOUNTER — OFFICE VISIT (OUTPATIENT)
Dept: FAMILY MEDICINE | Facility: CLINIC | Age: 75
End: 2025-02-27
Payer: COMMERCIAL

## 2025-02-27 VITALS
HEIGHT: 74 IN | BODY MASS INDEX: 32.73 KG/M2 | HEART RATE: 67 BPM | RESPIRATION RATE: 15 BRPM | DIASTOLIC BLOOD PRESSURE: 70 MMHG | TEMPERATURE: 97.3 F | WEIGHT: 255 LBS | SYSTOLIC BLOOD PRESSURE: 140 MMHG | OXYGEN SATURATION: 95 %

## 2025-02-27 DIAGNOSIS — R41.3 MEMORY DIFFICULTIES: ICD-10-CM

## 2025-02-27 DIAGNOSIS — R41.0 CONFUSION: Primary | ICD-10-CM

## 2025-02-27 DIAGNOSIS — F41.9 ANXIETY: ICD-10-CM

## 2025-02-27 DIAGNOSIS — I48.19 PERSISTENT ATRIAL FIBRILLATION (H): ICD-10-CM

## 2025-02-27 LAB
ALBUMIN SERPL BCG-MCNC: 4.2 G/DL (ref 3.5–5.2)
ALBUMIN UR-MCNC: 100 MG/DL
ALP SERPL-CCNC: 72 U/L (ref 40–150)
ALT SERPL W P-5'-P-CCNC: 38 U/L (ref 0–70)
ANION GAP SERPL CALCULATED.3IONS-SCNC: 8 MMOL/L (ref 7–15)
APPEARANCE UR: CLEAR
AST SERPL W P-5'-P-CCNC: 37 U/L (ref 0–45)
BACTERIA #/AREA URNS HPF: ABNORMAL /HPF
BILIRUB SERPL-MCNC: 0.9 MG/DL
BILIRUB UR QL STRIP: ABNORMAL
BUN SERPL-MCNC: 17.3 MG/DL (ref 8–23)
CALCIUM SERPL-MCNC: 9.7 MG/DL (ref 8.8–10.4)
CHLORIDE SERPL-SCNC: 105 MMOL/L (ref 98–107)
COLOR UR AUTO: YELLOW
CREAT SERPL-MCNC: 0.87 MG/DL (ref 0.67–1.17)
EGFRCR SERPLBLD CKD-EPI 2021: >90 ML/MIN/1.73M2
ERYTHROCYTE [DISTWIDTH] IN BLOOD BY AUTOMATED COUNT: 13.4 % (ref 10–15)
GLUCOSE SERPL-MCNC: 92 MG/DL (ref 70–99)
GLUCOSE UR STRIP-MCNC: NEGATIVE MG/DL
HCO3 SERPL-SCNC: 28 MMOL/L (ref 22–29)
HCT VFR BLD AUTO: 36.1 % (ref 40–53)
HGB BLD-MCNC: 12.2 G/DL (ref 13.3–17.7)
HGB UR QL STRIP: ABNORMAL
KETONES UR STRIP-MCNC: NEGATIVE MG/DL
LEUKOCYTE ESTERASE UR QL STRIP: NEGATIVE
MCH RBC QN AUTO: 30.8 PG (ref 26.5–33)
MCHC RBC AUTO-ENTMCNC: 33.8 G/DL (ref 31.5–36.5)
MCV RBC AUTO: 91 FL (ref 78–100)
MUCOUS THREADS #/AREA URNS LPF: PRESENT /LPF
NITRATE UR QL: NEGATIVE
PH UR STRIP: 6 [PH] (ref 5–8)
PLATELET # BLD AUTO: 154 10E3/UL (ref 150–450)
POTASSIUM SERPL-SCNC: 4.2 MMOL/L (ref 3.4–5.3)
PROT SERPL-MCNC: 6.7 G/DL (ref 6.4–8.3)
RBC # BLD AUTO: 3.96 10E6/UL (ref 4.4–5.9)
RBC #/AREA URNS AUTO: ABNORMAL /HPF
SODIUM SERPL-SCNC: 141 MMOL/L (ref 135–145)
SP GR UR STRIP: 1.02 (ref 1–1.03)
SQUAMOUS #/AREA URNS AUTO: ABNORMAL /LPF
UROBILINOGEN UR STRIP-ACNC: 2 E.U./DL
WBC # BLD AUTO: 5.9 10E3/UL (ref 4–11)
WBC #/AREA URNS AUTO: ABNORMAL /HPF

## 2025-02-27 ASSESSMENT — PAIN SCALES - GENERAL: PAINLEVEL_OUTOF10: NO PAIN (0)

## 2025-02-27 ASSESSMENT — PATIENT HEALTH QUESTIONNAIRE - PHQ9
10. IF YOU CHECKED OFF ANY PROBLEMS, HOW DIFFICULT HAVE THESE PROBLEMS MADE IT FOR YOU TO DO YOUR WORK, TAKE CARE OF THINGS AT HOME, OR GET ALONG WITH OTHER PEOPLE: SOMEWHAT DIFFICULT
SUM OF ALL RESPONSES TO PHQ QUESTIONS 1-9: 12
SUM OF ALL RESPONSES TO PHQ QUESTIONS 1-9: 12

## 2025-02-27 ASSESSMENT — ANXIETY QUESTIONNAIRES
GAD7 TOTAL SCORE: 13
1. FEELING NERVOUS, ANXIOUS, OR ON EDGE: NEARLY EVERY DAY
GAD7 TOTAL SCORE: 13
7. FEELING AFRAID AS IF SOMETHING AWFUL MIGHT HAPPEN: SEVERAL DAYS
4. TROUBLE RELAXING: SEVERAL DAYS
GAD7 TOTAL SCORE: 13
8. IF YOU CHECKED OFF ANY PROBLEMS, HOW DIFFICULT HAVE THESE MADE IT FOR YOU TO DO YOUR WORK, TAKE CARE OF THINGS AT HOME, OR GET ALONG WITH OTHER PEOPLE?: SOMEWHAT DIFFICULT
6. BECOMING EASILY ANNOYED OR IRRITABLE: SEVERAL DAYS
3. WORRYING TOO MUCH ABOUT DIFFERENT THINGS: NEARLY EVERY DAY
7. FEELING AFRAID AS IF SOMETHING AWFUL MIGHT HAPPEN: SEVERAL DAYS
2. NOT BEING ABLE TO STOP OR CONTROL WORRYING: NEARLY EVERY DAY
5. BEING SO RESTLESS THAT IT IS HARD TO SIT STILL: SEVERAL DAYS
IF YOU CHECKED OFF ANY PROBLEMS ON THIS QUESTIONNAIRE, HOW DIFFICULT HAVE THESE PROBLEMS MADE IT FOR YOU TO DO YOUR WORK, TAKE CARE OF THINGS AT HOME, OR GET ALONG WITH OTHER PEOPLE: SOMEWHAT DIFFICULT

## 2025-02-27 NOTE — PROGRESS NOTES
Assessment/Plan:    Confusion  Noted confusion with worsening anxiety memory difficulties.  Has deteriorated quickly according to his wife.  Will update labs including ammonia level and urinalysis as well as CBC and comprehensive metabolic panel.  Will notify with results.  Had recent MRI that showed brain atrophy without acute findings.  Prior lab assessment.  Relatively unremarkable other than hemoglobin 12.6 with MCV 92.  - Ammonia  - Comprehensive metabolic panel  - CBC with platelets  - UA Macroscopic with reflex to Microscopic and Culture  - Ammonia  - Comprehensive metabolic panel  - CBC with platelets  - UA Macroscopic with reflex to Microscopic and Culture    Anxiety  KALPANA-7 questionnaire 13 out of 21 and PHQ-9 questionnaire 12 out of 27.  Weaning from escitalopram currently 10 mg daily and will decrease to 5 mg daily x 7 days then discontinue and then start sertraline as noted with scheduled follow-up with clear he will see NP in March 10, 2025.    Memory difficulties  As above, does have appointment on April 1, 2025 at [facility] neurologic clinic.  Will call to get on cancellation list.  Hopes to be seen sooner.  Lab assessment completed.  Recent MRI as noted.  Prior B12 level normal.    Persistent atrial fibrillation (H)  Persistent atrial fibrillation, rate controlled.  Continues warfarin anticoagulation.    40 minutes total time spent on the date of encounter including patient chart review, counseling and coordination of cares, and documentation.                      Subjective:    Prem Taylor is seen today for follow-up assessment.  Wife is with.  She states that he is deteriorating quickly.  Increasing anxiety and memory difficulties.  Tried to by 2 cars in 1 week.  Weaning from escitalopram currently 10 mg daily and then will start sertraline.  Works with clear he will see NP.  Has not picked up sertraline yet.  Short-term forgetfulness.  Did review prior office note from January 14, 2025 as well.   "Describes history of fall 2023 in a parking lot and uncertain of head injury.  Unremarkable workup at that time.  History of coronary artery disease and atrial fibrillation.  Continues rosuvastatin 40 mg daily previously which was subsequently discontinued 2025 due to cognitive concerns to ensure no correlation with this.  Recent thrombocytopenia with platelet count 144 with hemoglobin 12.6 and MCV 92.  Otherwise B12, TSH and comprehensive metabolic panel appeared fine as well as urinalysis.  No cough shortness of breath.       \"Aubree\" x 52 years   2 daughters - Renee (46) and Susannah (43)   3 grandchildren   No smoke (quit  after 1 and  ppd)   EtOH: weekends \"few beers\"   Surgeries: anterior cervical fusion; inguinal hernia; tonsils; right eye cataract 3/4/20 and left eye cataract 3/11/20; \"right eye vitrectomy scheduled for 20 due to floaters\"   Hospitalizations: [facility] 10/9/17-10/10/17 bilateral pulmonary emboli with a. 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)   Exercise: run 4x/week at 6-6.5 mph; situps and pushups       Past Surgical History:   Procedure Laterality Date    BACK SURGERY      CERVICAL FUSION      HERNIA REPAIR      KS LAP,INGUINAL HERNIA REPR,INITIAL Left 8/15/2019    Procedure: HERNIORRHAPHY, INGUINAL, LAPAROSCOPIC;  Surgeon: Yan Leiva MD;  Location: [facility];  Service: General    TONSILLECTOMY      childhood        Family History   Problem Relation Age of Onset    Heart Disease Mother     Hypertension Mother     Dementia Mother     Coronary Artery Disease Father     LUNG DISEASE No family hx of         Past Medical History:   Diagnosis Date    Abnormal ECG     Anemia     Anxiety     Arrhythmia     Atrial fibrillation (H) 10/09/2017    Cancer (H)     CPAP (continuous positive airway pressure) " "dependence 10/2019    per patient    Depression     Heart valve disease 2017    History of blood clots     Hyperlipidemia 2006    Pulmonary embolism, blood-clot, obstetric 10/09/2017    Sleep apnea 2017    Uses CPAP        Social History     Tobacco Use    Smoking status: Former     Current packs/day: 0.00     Average packs/day: 1.5 packs/day for 22.9 years (34.3 ttl pk-yrs)     Types: Cigarettes     Start date:      Quit date: 1990     Years since quittin.3    Smokeless tobacco: Never   Vaping Use    Vaping status: Never Used   Substance Use Topics    Alcohol use: Yes    Drug use: No        Current Outpatient Medications   Medication Sig Dispense Refill    isosorbide mononitrate (IMDUR) 30 MG 24 hr tablet Take 2 tablets (60 mg) by mouth daily. 180 tablet 0    metoprolol succinate ER (TOPROL XL) 25 MG 24 hr tablet Take 2 tablets (50 mg) by mouth daily. 180 tablet 3    MULTIVITAMIN (MULTIPLE VITAMIN ORAL) [MULTIVITAMIN (MULTIPLE VITAMIN ORAL)] Take 1 tablet by mouth daily.      rosuvastatin (CRESTOR) 40 MG tablet Take 1 tablet (40 mg) by mouth daily. 90 tablet 3    warfarin ANTICOAGULANT (COUMADIN) 7.5 MG tablet 3.75mg , Tuesday, Friday and 7.5mg all other days per INR clinic 75 tablet 1    escitalopram (LEXAPRO) 10 MG tablet Take 1 tablet (10 mg) by mouth daily for 7 days, THEN 0.5 tablets (5 mg) daily for 7 days. Then STOP. 11 tablet 0    sertraline (ZOLOFT) 25 MG tablet Take 0.5 tablets (12.5 mg) by mouth daily for 7 days, THEN 1 tablet (25 mg) daily for 7 days, THEN 2 tablets (50 mg) daily for 7 days. 60 tablet 0          Objective:    Vitals:    25 1618 25 1623   BP: (!) 150/90 (!) 140/70   Pulse: 67    Resp: 15    Temp: 97.3  F (36.3  C)    SpO2: 95%    Weight: 115.7 kg (255 lb)    Height: 1.867 m (6' 1.5\")       Body mass index is 33.19 kg/m .    Alert.  No apparent distress with blood pressure 158/92 on recheck.  Cooperative.  Seems somewhat surprised with what his wife shares " with me.  Patient otherwise forthcoming.  No active psychoses.  Pleasant.        EXAM: MR BRAIN WITHOUT CONTRAST  LOCATION: [facility]  DATE: 01/24/2025     INDICATION: Memory difficulties.  COMPARISON: None.  TECHNIQUE: Routine multiplanar multisequence head MRI without intravenous contrast.     FINDINGS:  INTRACRANIAL CONTENTS: No abnormal intracranial restricted diffusion is identified to suggest recent infarct. The ventricles are normal in size and configuration. Mild generalized brain parenchymal volume loss. Mild to moderate scattered patchy   nonspecific T2 FLAIR hyperintense signal in the cerebral white matter and deshawn, likely due to chronic small vessel ischemic disease. No intracranial hemorrhage, extra-axial fluid collection, or mass effect.     SELLA: No abnormality accounting for technique.     OSSEOUS STRUCTURES/SOFT TISSUES: Normal marrow signal. The major intracranial vascular flow-voids are maintained.      ORBITS: Prior bilateral cataract surgery. Visualized portions of the orbits are otherwise unremarkable.      SINUSES/MASTOIDS: Mild mucosal thickening scattered about the paranasal sinuses. Moderate fluid/membrane thickening in the right mastoid air cells.                                                                       IMPRESSION:  1.  No acute intracranial process.  2.  Brain atrophy and presumed chronic small vessel ischemic changes, as described.  3.  Moderate fluid/membrane thickening in the right mastoid air cells.        This note has been dictated using voice recognition software and as a result may contain minor grammatical errors and unintended word substitutions.         Answers submitted by the patient for this visit:  Patient Health Questionnaire (Submitted on 2/27/2025)  If you checked off any problems, how difficult have these problems made it for you to do your work, take care of things at home, or get along with other people?: Somewhat  difficult  PHQ9 TOTAL SCORE: 12  Patient Health Questionnaire (G7) (Submitted on 2/27/2025)  KALPANA 7 TOTAL SCORE: 13

## 2025-03-03 ENCOUNTER — OFFICE VISIT (OUTPATIENT)
Dept: AUDIOLOGY | Facility: CLINIC | Age: 75
End: 2025-03-03
Payer: COMMERCIAL

## 2025-03-03 DIAGNOSIS — H90.3 SENSORINEURAL HEARING LOSS (SNHL) OF BOTH EARS: ICD-10-CM

## 2025-03-03 PROCEDURE — 92550 TYMPANOMETRY & REFLEX THRESH: CPT | Performed by: AUDIOLOGIST

## 2025-03-03 PROCEDURE — 92557 COMPREHENSIVE HEARING TEST: CPT | Performed by: AUDIOLOGIST

## 2025-03-05 ENCOUNTER — MYC REFILL (OUTPATIENT)
Dept: PSYCHIATRY | Facility: CLINIC | Age: 75
End: 2025-03-05
Payer: COMMERCIAL

## 2025-03-05 ENCOUNTER — MYC MEDICAL ADVICE (OUTPATIENT)
Dept: ANTICOAGULATION | Facility: CLINIC | Age: 75
End: 2025-03-05
Payer: COMMERCIAL

## 2025-03-05 DIAGNOSIS — F41.1 GENERALIZED ANXIETY DISORDER: ICD-10-CM

## 2025-03-05 RX ORDER — ESCITALOPRAM OXALATE 10 MG/1
TABLET ORAL
Qty: 11 TABLET | Refills: 0 | OUTPATIENT
Start: 2025-03-05 | End: 2025-03-19

## 2025-03-05 RX ORDER — SERTRALINE HYDROCHLORIDE 25 MG/1
TABLET, FILM COATED ORAL
Qty: 60 TABLET | Refills: 0 | Status: SHIPPED | OUTPATIENT
Start: 2025-03-05 | End: 2025-03-05

## 2025-03-05 NOTE — TELEPHONE ENCOUNTER
Date of Last Office Visit: 02/03/2025  Date of Next Office Visit:  03/10/2025  No shows since last visit: No  More than one patient-initiated cancellation (with reschedule) since last seen in clinic? No    []Medication refilled per  Medication Refill in Ambulatory Care  policy.  []Medication unable to be refilled by RN due to criteria not met as indicated below:    []Eligibility: has not had a provider visit within last 6 months   []Supervision: no future appointment; < 7 days before next appointment   []Compliance: no shows; cancellations; lapse in therapy   []Verification: order discrepancy; may need modification...   [] > 30-day supply request   []Advanced refill request: > 7 days before refill date   []Controlled medication   [x]Medication not included in policy   []Review: new med; med adjusted <= 30 days; safety alert; requires lab monitoring...   [x]Scope of Practice: refill request processed by LPN/MA   [x]Other:      Medication(s) requested:     -  sertraline (ZOLOFT) 25 MG tablet   Date last ordered: 02/03/2025  Qty: 60  Refills: 0  Appropriate for refill? Yes          Any Controlled Substance(s)? No      Requested medication(s) verified as identical to current order? Yes    Any lapse in adherence to medication(s) greater than 5 days? No      Additional action taken? cued up medication/order(s) and routed encounter to provider for review.      Last visit treatment plan:   Return in about 1 month (around 3/3/2025) for Follow up.    DISCONTINUE escitalopram: Take 1 tablet (10 mg) by mouth daily for 7 days, THEN 0.5 tablets (5 mg) daily for 7 days. Then STOP. Script sent.  START sertraline: Take 0.5 tablets (12.5 mg) by mouth daily for 7 days, THEN 1 tablet (25 mg) daily for 7 days, THEN 2 tablets (50 mg) daily. Script sent for #60   Continue all other medications per primary care provider.     Any medication(s) require lab monitoring? No    Marcia Guillaume MA on 3/5/2025 at 11:09 AM

## 2025-03-05 NOTE — PROGRESS NOTES
AUDIOLOGY REPORT    SUBJECTIVE:  Prem Taylor is a 74 year old male who was seen in the Audiology Clinic at the [facility] for audiologic evaluation, referred by Mamadou Baez MD. He was accompanied by his wife. The patient reports hearing loss for at least the past 10 years. Prior audiologic evaluation at this clinic 10/13/2021 showed right borderline/normal hearing sloping to moderately-severe sensorineural hearing loss and left normal hearing sloping to moderately-severe sensorineural hearing loss. The patient has bilateral near constant ringing tinnitus. 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 He has recently been experiencing confusion and memory difficulties. He is scheduled with Neurology in April. He has hearing aids from [facility] in [place] from 3-4 yrs ago, but feels they are not working well and would like to look into new hearing aids at a different clinic. Denies otalgia, otorrhea, aural fullness, dizziness, prior ear surgery, and family history of hearing loss.    OBJECTIVE:  Abuse Screening:  Do you feel unsafe at home or work/school? No  Do you feel threatened by someone? No  Does anyone try to keep you from having contact with others, or doing things outside of your home? No  Physical signs of abuse present? No     Fall Risk Screen:  1. Have you fallen two or more times in the past year? No  2. Have you fallen and had an injury in the past year? No    Otoscopic exam indicates ears are clear of cerumen bilaterally     Pure Tone Thresholds assessed using conventional audiometry with good reliability from 250-8000 Hz bilaterally using insert earphones and circumaural headphones     RIGHT:  Normal hearing sloping to moderately-severe sensorineural hearing loss    LEFT:    Normal hearing sloping to moderately-severe sensorineural hearing loss    Tympanogram:    RIGHT: normal eardrum mobility    LEFT:   normal eardrum  mobility    Reflexes (reported by stimulus ear):  RIGHT: Ipsilateral is absent at frequencies tested  RIGHT: Contralateral is absent at frequencies tested  LEFT:   Ipsilateral is absent at frequencies tested  LEFT:   Contralateral is absent at frequencies tested    Speech Reception Threshold:    RIGHT: 25 dB HL    LEFT:   25 dB HL  Word Recognition Score:     RIGHT: 96% at 80 dB HL using NU-6 recorded word list.    LEFT:   92% at 80 dB HL using NU-6 recorded word list.      ASSESSMENT:   Normal hearing sloping to moderately-severe sensorineural hearing loss, bilaterally. Compared to patient's previous audiogram dated 10/13/2021, hearing has remained stable. Today s results were discussed with the patient in detail.     PLAN:  The family is interested in pursuing new hearing aids at this time. It was recommended that they call to find out current insurance benefits for hearing aids and then call to schedule a Hearing Aid Consultation. Please call this clinic with questions regarding these results or recommendations.      Gudelia Costello, [facility]  [place] Licensed Audiologist #[id number]

## 2025-03-05 NOTE — TELEPHONE ENCOUNTER
1) Reviewed refill request(s) from      [id number] - [place], [place] - [address] AT [place] [address].     2) Any Controlled Substance(s)? No    3) Refill(s) requested for:     - escitalopram (LEXAPRO) 10 MG tablet  Date last ordered: 2/3/2025 Qty: 11 Refills: 0    Pt to discontinue medication per visit encounter 2/3/2025          4) Action taken: routed encounter back to RN Pool for review; not within LPN/MA Scope of Practice to deny.    Charmaine Santos MA on 3/5/2025 at 9:34 AM       Treatment Plan:  DISCONTINUE escitalopram: Take 1 tablet (10 mg) by mouth daily for 7 days, THEN 0.5 tablets (5 mg) daily for 7 days. Then STOP. Script sent.  START sertraline: Take 0.5 tablets (12.5 mg) by mouth daily for 7 days, THEN 1 tablet (25 mg) daily for 7 days, THEN 2 tablets (50 mg) daily. Script sent for #60 + 1 refill.  Continue all other medications per primary care provider.   Establish care with neurology as planned.  Safety plan reviewed. To the Emergency Department as needed or call after hours crisis line at [phone] or [phone]. [place] Crisis Text Line: Text [place] to 697886 or  Suicide LifeLine Chat: suicidepreventionlifeline.org/chat  Schedule an appointment with me and Behavioral Health Consultant in 4 weeks or sooner as needed.  Call [facility] at [phone] to schedule.  Follow up with primary care provider as planned or sooner if needed for acute medical concerns.  Call the psychiatric nurse line with medication questions or concerns at [phone].  MyChart may be used to communicate with your provider, but this is not intended to be used for emergencies

## 2025-03-05 NOTE — TELEPHONE ENCOUNTER
"Called and spoke with patient's wife, Aubree.    Empathized with Aubree regarding her frustrations and concerns. Provided active listening.    Aubree indicates that pt's prescription for Zoloft was sent to the wrong pharmacy ([facility]) and that that's a mail order pharmacy, and they won't have the prescription for Zoloft until Friday. Aubree would like to start the cross titration from Lexapro to Zoloft as soon as possible.    Let Aubree know that prescription was also sent to [facility] today as well, so that they can pick that up right away.     Went over titration dosing schedule and sent this via [employer]. Aubree indicates that she has these instructions at home also and will be giving patient his medications from here on out, as she's very concerned about pt's mentation and memory.    Aubree would like to address pt's memory issues and cognition at pt's upcoming appt with Yvonne law on 3/10/2025 in hopes of pt having something prescribed for this, as she feels it's progressing quickly and is much more than \"just anxiety\".    RN called [facility] to ensure that prescription for Zoloft 25 mg was being filled and going through patient's insurance okay. Pharmacy confirmed this is ready for  and went through insurance.    RN called [facility] to cancel duplicate prescription that was called in.    Aubree indicates that they have plenty of pt's Lexapro to start titrating pt's medication from Lexapro to Zoloft.    Pt would like note routed to Yvonne Law regarding her concerns with patient's memory and cognition, as she would like to discuss possible medications at patient's appt on 3/10/2025. Also let Aubree know that pt's PCP can likely address this as well.    Routing to Yvonne Law for review.          "

## 2025-03-06 ENCOUNTER — ANTICOAGULATION THERAPY VISIT (OUTPATIENT)
Dept: ANTICOAGULATION | Facility: CLINIC | Age: 75
End: 2025-03-06

## 2025-03-06 ENCOUNTER — LAB (OUTPATIENT)
Dept: LAB | Facility: CLINIC | Age: 75
End: 2025-03-06
Payer: COMMERCIAL

## 2025-03-06 DIAGNOSIS — I48.21 PERMANENT ATRIAL FIBRILLATION (H): ICD-10-CM

## 2025-03-06 DIAGNOSIS — I26.99 PULMONARY EMBOLISM, BILATERAL (H): ICD-10-CM

## 2025-03-06 DIAGNOSIS — Z86.711 HISTORY OF PULMONARY EMBOLISM: ICD-10-CM

## 2025-03-06 DIAGNOSIS — I48.21 PERMANENT ATRIAL FIBRILLATION (H): Primary | ICD-10-CM

## 2025-03-06 LAB — INR BLD: 1.1 (ref 0.9–1.1)

## 2025-03-06 NOTE — PROGRESS NOTES
"ANTICOAGULATION MANAGEMENT     Prem Taylor 74 year old male is on warfarin with subtherapeutic INR result. (Goal INR 2.0-3.0)    Recent labs: (last 7 days)     03/06/25  0723   INR 1.1       ASSESSMENT     Source(s): Chart Review and Patient/Caregiver Call     Warfarin doses taken: Warfarin taken as instructed-- denied any missed doses, confirmed tablet strength, no recent refill  Diet: No new diet changes identified  Medication/supplement changes: None noted  New illness, injury, or hospitalization: No  Signs or symptoms of bleeding or clotting: No  Previous result: Supratherapeutic  Additional findings: None       PLAN     Recommended plan for no diet, medication or health factor changes affecting INR     Dosing Instructions: booster dose then Increase your warfarin dose (10% change) with next INR in 1 week       Summary  As of 3/6/2025      Full warfarin instructions:  3/6: 7.5 mg; Otherwise 3.75 mg every Tue, Thu, Sat; 7.5 mg all other days   Next INR check:  3/13/2025               Telephone call with Ryan and wife, Aubree who agrees to plan and repeated back plan correctly. Due to patient's recent memory changes, wife will be taking over management of warfarin-- requests call be made to her cell phone, ok to [facility] if no changes. Per wife, this is very new to her so asks for [facility] to \"have patience with any dumb questions\" she may ask about dosing (she knows about warfarin and INR/goal range meaning; declined education)-- writer reassured wife that we are here to help and support her and not to hesitate to ask questions. Wife appreciates this and will reach out with any questions. Requested that [employer] message be sent at least initially and with any dose changes for her to reference if needed.    Lab visit scheduled    Education provided: Contact [phone] with any changes, questions or concerns.     Plan made per [facility] anticoagulation protocol    Elsie Stringer RN  3/6/2025  Anticoagulation Clinic  Epic " pool for routing messages: elsie MAURICIO  [facility] patient phone line: [phone]        _______________________________________________________________________     Anticoagulation Episode Summary       Current INR goal:  2.0-3.0   TTR:  59.0% (1 y)   Target end date:  Indefinite   Send INR reminders to:  RUBY MAURICIO    Indications    Pulmonary embolism  bilateral (H) (Resolved) [I26.99]  Permanent atrial fibrillation (H) [I48.21]  Pulmonary embolism  bilateral (H) [I26.99]  History of pulmonary embolism [Z86.711]             Comments:  Approved for 8 week checks per -- see encounter from 07/27/21             Anticoagulation Care Providers       Provider Role Specialty Phone number    Mamadou Baez MD Referring Family Medicine [phone]

## 2025-03-09 DIAGNOSIS — R06.09 DOE (DYSPNEA ON EXERTION): ICD-10-CM

## 2025-03-09 DIAGNOSIS — I25.118 CORONARY ARTERY DISEASE INVOLVING NATIVE CORONARY ARTERY OF NATIVE HEART WITH OTHER FORM OF ANGINA PECTORIS: ICD-10-CM

## 2025-03-09 NOTE — PROGRESS NOTES
"Virtual Visit Details    Type of service:  Video Visit   Video Start Time: 8:11 AM  Video End Time: 8:43 AM    Originating Location (pt. Location): Home    Distant Location (provider location):  Off-site  Platform used for Video Visit: [employer]       PSYCHIATRIC MEDICATION FOLLOW UP APPT     Name: Prem Taylor   : 1950               Telemedicine Visit: The patient's condition can be safely assessed and treated via synchronous audio and visual telemedicine encounter.      Consent:  The patient/guardian has verbally consented to: the potential risks and benefits of telemedicine (video visit or phone) versus in person care; bill my insurance or make self-payment for services provided; and responsibility for payment of non-covered services.     As the provider I attest to compliance with applicable laws and regulations related to telemedicine.         Source of Referral / Care Team:  Primary Care Provider: Mamadou Baez MD   Therapist: none     The [facility] psychiatry providers act as a specialty service for Primary Care Providers in the [facility] who seek to optimize medications for unstable patients. Once medications have been optimized, [facility] providers discharge the patient back to the referring Primary Care Provider for ongoing medication management. This type of system allows [facility] to serve a high volume of patients.     Patient Identification:  Patient is a 74 year old,   White Not  or  male  who presents for return visit with me.   Patient prefers to be called: \"Ryan\".  Patient is currently retired.     Patient attended the session with wife , who they agreed to have interview with.    RECORDS AVAILABLE FOR REVIEW: EHR records through [employer]  and I have reviewed the assessment completed by Tanesha Rehman [facility], dated today .       Interim History:  I last saw Prem Taylor for outpatient psychiatry Consultation on 2/3/2025. During that appointment, we planned to cross " "taper from escitalopram 20 mg to sertraline 50 mg. Patient /wife report NOT ADHERING to prescribed medications, as misplaced the sertraline so just picked up. They clarify dosing, and are most interested in taper and discontinue escitalopram versus cross taper. Will plan to start sertraline in 2 weeks. Seen by PCP in interim and rechecked labs: Mostly unremarkable - RBC, Hgb, Hct all low and worsening of mild anemia and UA had some changes and will be rechecked in 2 weeks. Patient and wife report concern for worsening memory changes, forgetfulness has increased over the last month. Fortunately they were able to reschedule the [place] appointment and were seen last week, no medications started as they are waiting to review the MRI. He was additionally seen by audiology and pursuing new hearing aids, wife does report hearing hasn't changed much so unlikely contributing to significant confusion. Patient and wife report his anxiety and \"OCD stuff\" (eg. Repetitive checking behaviors) have been very high. Denies any current safety concerns related to : getting lost, driving, stove on, doors open / unlocked. Deny any AH/VH, delusions or paranoia - no evidence during interview. He is oriented x4 today and hoping a medication adjustment will be helpful for his anxiety as well as possible medication through neuro to stabilize memory symptoms. He denies any SI/SIB/HI.         Initial Impression:   2/3/2025: Prem Taylor is a 74 year old White, male who presents for initial visit with [facility] ([facility]) for medication management. Carries past diagnoses: anxiety.  . Patient denies history of psychiatric providers, hospitalizations, or suicide attempts. Started medications through PCP 4/2021, on escitalopram 20 mg since 03/2022. Denies known side effects from medication, patient reports it has been helpful (although rates anxiety relatively high today), wife Aubree Doesn't think it's been that effective, " although does wonder if sx are more related to a neurocognitive process. Patient was previously seen by PCP, due to concern for memory difficulties with family history of Alzheimer's (mother). Labs completed largely unremarkable and completed MRI: nonspecific findings noted, no significant abnormalities. Referred to neurology, scheduled to see Isrraelvanessa 4/1/2025. Denies significant depression, SI/SIB/HI, psychosis, mykel, problematic substance use. Discussed risks / benefits of medication changes at this time. Given max dose of escitalopram, no other SSRI trials, it is reasonable to trial move to alternative if more effective or better tolerated. We will cross taper to sertraline. Patient to establish with neurology as planned. Follow-up with this provider and Behavioral Health Consultant in 1 month or sooner as needed. Patient and wife agreeable to plan.       Current medications include:   Current Outpatient Medications   Medication Sig Dispense Refill    escitalopram (LEXAPRO) 10 MG tablet Take 1 tablet (10 mg) by mouth daily for 7 days, THEN 0.5 tablets (5 mg) daily for 7 days. Then STOP. 11 tablet 0    isosorbide mononitrate (IMDUR) 30 MG 24 hr tablet Take 2 tablets (60 mg) by mouth daily. 180 tablet 0    metoprolol succinate ER (TOPROL XL) 25 MG 24 hr tablet Take 2 tablets (50 mg) by mouth daily. 180 tablet 3    MULTIVITAMIN (MULTIPLE VITAMIN ORAL) [MULTIVITAMIN (MULTIPLE VITAMIN ORAL)] Take 1 tablet by mouth daily.      rosuvastatin (CRESTOR) 40 MG tablet Take 1 tablet (40 mg) by mouth daily. 90 tablet 3    sertraline (ZOLOFT) 25 MG tablet Take 0.5 tablets (12.5 mg) by mouth daily for 7 days, THEN 1 tablet (25 mg) daily for 7 days, THEN 2 tablets (50 mg) daily for 7 days. 60 tablet 0    warfarin ANTICOAGULANT (COUMADIN) 7.5 MG tablet 3.75mg Sunday, Tuesday, Friday and 7.5mg all other days per INR clinic 75 tablet 1     No current facility-administered medications for this visit.         Side effects:  "Denies    The [place] Prescription Monitoring Program has been reviewed and there are no concerns about diversionary activity for controlled substances at this time.     Psychiatric ROS:  Prem Taylor reports mood has been: \"Ok, moving along day by day.\"  Depression has been: depressed mood, little interest / pleasure, sleep changes (insomnia or hypersomnia), psychomotor agitation or retardation, fatigue of loss of energy, worthlessness or excessive guilt, and difficulty concentrating or indecisiveness self rates as ~2/ 10, where 0 is none at all and 10 being severe depression. Was 2/10 at last appt.  SI/SIB/HI: denies all  Anxiety has been: excessive worry, difficult to control, restlessness / feeling keyed up,  easily fatigued,  difficulty concentrating or mind going blank, irritability, and sleep disturbances (difficulty falling / staying asleep, or restless / unsatisfying)  self rates as ~8/ 10, where 0 is none at all and 10 being severe anxiety. Was 6-8/10 at last appt.  Sleep has been: \"Really good under normal circumstances, but waking more often in the night\" with the strange dreams. Can get back to sleep ok and can still nap during the day.   Energy has been: low > 1/2 days  Appetite has been: \"not really, always been a good eater\"  Luisa sxs: denies  Psychosis sxs: denies  ADHD/ADD sxs:  Poor task completion, Distractibility, Forgetful, Restlessness/fidgety, and In groups it is hard to hear- has hearing aids   Trauma sx: No symptoms       Most recent PHQ9 and GAD7 scores were reviewed today.   PHQ-9 scores:       1/14/2025     1:03 PM 2/2/2025    10:44 AM 2/27/2025     4:13 PM   PHQ-9 SCORE   PHQ-9 Total Score MyChart 3 (Minimal depression) 5 (Mild depression) 12 (Moderate depression)   PHQ-9 Total Score 3  5  12        Patient-reported       KALPANA-7 scores:        1/14/2025     1:05 PM 2/2/2025    10:46 AM 2/27/2025     4:14 PM   KALPANA-7 SCORE   Total Score 8 (mild anxiety) 14 (moderate anxiety) 13 " "(moderate anxiety)   Total Score 8  14  13        Patient-reported         Current stressors include: Symptoms  Coping mechanisms and supports include: Family    Vital Signs:   There were no vitals taken for this visit.    Review of Systems:  10 systems (general, cardiovascular, respiratory, eyes, ENT, endocrine, GI, , M/S, neurological) were reviewed. Most pertinent finding(s) is/are: + palpitations (Patient in Afib for ~6-7 years \"constantly\").   No acute distress; no wheezing / short of breath / increased work of breathing; denies chest pain / tightness; reduced appetite and recent weight loss; no nausea / vomiting / abdominal pain; no tics / tremors / abnormal muscle mvmts; no visible skin changes / rashes . The remaining systems are all unremarkable.      Labs:  Most recent laboratory results reviewed and the pertinent results include:   Recent Labs   Lab Test 02/27/25  1728   WBC 5.9   HGB 12.2*   HCT 36.1*   MCV 91        Recent Labs   Lab Test 02/27/25  1728 12/27/23  1302 11/21/23  1218      < > 140   POTASSIUM 4.2   < > 4.4   CHLORIDE 105   < > 106   CO2 28   < > 24   GLC 92   < > 86   DANDY 9.7   < > 9.4   MAG  --   --  2.0   BUN 17.3   < > 13.3   CR 0.87   < > 0.95   GFRESTIMATED >90   < > 85   ALBUMIN 4.2   < > 4.4   PROTTOTAL 6.7   < > 7.1   AST 37   < > 27   ALT 38   < > 17   ALKPHOS 72   < > 68   BILITOTAL 0.9   < > 0.8    < > = values in this interval not displayed.     Recent Labs   Lab Test 10/11/24  0802   CHOL 155   LDL 79   HDL 65   TRIG 56     Recent Labs   Lab Test 01/14/25  1356   TSH 1.76     No results found for: \"FBM797\", \"MZCM223\", \"QUVA53OARQE\", \"VITD3\", \"D2VIT\", \"D3VIT\", \"DTOT\", \"TN69250492\", \"[id number]\", \"[id number]\", \"[id number]\", \"[id number]\", \"[id number]\"     Past Medical/Surgical History:  Past Medical History:   Diagnosis Date    Abnormal ECG     Anemia     Anxiety     Arrhythmia     Atrial fibrillation (H) 10/09/2017    Cancer (H) 2015    CPAP (continuous positive " airway pressure) dependence 10/2019    per patient    Depression     Heart valve disease 2017    History of blood clots     Hyperlipidemia 2006    Pulmonary embolism, blood-clot, obstetric 10/09/2017    Sleep apnea 2017    Uses CPAP      has a past medical history of Abnormal ECG, Anemia, Anxiety, Arrhythmia, Atrial fibrillation (H) (10/09/2017), Cancer (H) (2015), CPAP (continuous positive airway pressure) dependence (10/2019), Depression, Heart valve disease (2017), History of blood clots, Hyperlipidemia (2006), Pulmonary embolism, blood-clot, obstetric (10/09/2017), and Sleep apnea (2017).    Medication allergies:  No Known Allergies    Mental Status Exam:  Alertness: alert  and oriented x4  Appearance: adequately groomed  Behavior/Demeanor: cooperative and pleasant, passive with fair eye contact   Speech: normal and regular rate and rhythm  Language: intact and no obvious problem  Psychomotor: slowed  Mood: anxious  Affect:  mildly flat ; was congruent to mood; was congruent to content  Thought Process/Associations: unremarkable  Thought Content:  Reports none;  Denies suicidal ideation, violent ideation, and delusions  Perception:  Reports none;  Denies auditory hallucinations and visual hallucinations  Insight: adequate  Judgment: adequate for safety and intact  Cognition: grossly intact; formal cognitive testing was not done  Recent and Remote Memory: grossly intact to interview, required wife to answer at times during interview  Attention Span and Concentration: grossly intact to interview  Fund of Knowledge:  appropriate  Gait and Station: unremarkable via seated video visit     Suicide Risk Assessment:  Today Prem Taylor reports no suicidal ideation. In addition, there are notable risk factors for self-harm, including age, anxiety, comorbid medical condition of multiple, including possible neurocognitive disorder, and mood change. However, risk is mitigated by commitment to family, absence of past  attempts, history of seeking help when needed, future oriented, no access to firearms or weapons, denies suicidal intent or plan, and denies homicidal ideation, intent, or plan. Therefore, based on all available evidence including the factors cited above, Prem Taylor does not appear to be at imminent risk for self-harm, does not meet criteria for a 72-hr hold, and therefore remains appropriate for ongoing outpatient level of care.  A thorough assessment of risk factors related to suicide and self-harm have been reviewed and are noted above. The patient convincingly denies suicidality on several occasions. Local community safety resources printed and reviewed for patient to use if needed. There was no deceit detected, and the patient presented in a manner that was believable.     Recommended that patient call 911 or go to the local ED should there be a change in any of these risk factors    DSM5 Diagnosis:  300.02 (F41.1) Generalized Anxiety Disorder     Medical comorbidities include:   Patient Active Problem List    Diagnosis Date Noted    Abnormal electrocardiogram 11/28/2023     Priority: Medium    Dizziness 11/21/2023     Priority: Medium    Pulmonary embolism, bilateral (H) 10/11/2022     Priority: Medium    History of pulmonary embolism 08/10/2021     Priority: Medium    ALISE on CPAP 02/08/2019     Priority: Medium    Anxiety 02/08/2019     Priority: Medium    Chronic cough 02/08/2019     Priority: Medium    Normochromic normocytic anemia 02/08/2019     Priority: Medium    Left inguinal hernia 02/08/2019     Priority: Medium     Small, reducible noted on Annual Wellness Visit exam February 8, 2019.        Sinus bradycardia      Priority: Medium     Created by Conversion        Permanent atrial fibrillation (H) 10/25/2017     Priority: Medium     Diagnosed on 10/10/2017 and had bilateral PEs at the time.  [id number] score of 1+ with mitral insufficiency.  On warfarin        Mitral valve insufficiency  10/25/2017     Priority: Medium    Erectile dysfunction, unspecified erectile dysfunction type 01/03/2017     Priority: Medium    Overweight (BMI 25.0-29.9) 01/03/2017     Priority: Medium     IMO SNOMED LOAD SPRING 2020 [.6/.18/.2020 9:04 PM]  Abad Tse:          Tubular adenoma of colon 01/03/2017     Priority: Medium     tubular adenoma x 2 (rectum) - 4 and 8 mm        Hyperbilirubinemia 01/03/2017     Priority: Medium    BPH without urinary obstruction      Priority: Medium     Created by Conversion        Hearing Loss      Priority: Medium     Created by Conversion  Replacement Utility updated for latest IMO load        Male erectile dysfunction 12/23/2014     Priority: Medium    Basal cell carcinoma, ear 12/23/2014     Priority: Medium    Hypercholesterolemia      Priority: Medium     Created by Conversion        Presbycusis      Priority: Medium     Created by Conversion        Atypical Chest Pain      Priority: Medium     Created by Conversion        Blood Pressure Isolated Elevated      Priority: Medium     Created by Conversion        Blepharitis of both eyes 09/03/2008     Priority: Medium     Formatting of this note might be different from the original.  Epic         Psychosocial & Contextual Factors:  Phase of Life Difficulties and Medical Comorbidites    DIFFERENTIAL DIAGNOSIS: R/O neurocognitive disorder, anxiety due to other medical condition, adjustment DO     Medical comorbidities impacting or contributing to clinical picture: permanent atrial fibrillation, history of PE, normochromic normocytic anemia, hypercholesterolemia, hyperbilirubinemia  Known issue that I take into account for their medical decisions, no current exacerbations or new concerns.    Impression:  Prem Taylor is a 74 year old White, male who presents for return visit with  [facility] ([facility]) for medication management. At initial appointment one month ago, we planned to cross taper from  escitalopram 20 mg to sertraline 50 mg. Patient /wife report NOT ADHERING to prescribed medications, as misplaced the sertraline so just picked up. They clarify dosing, and are most interested in taper and discontinue escitalopram versus cross taper. Will plan to start sertraline in 2 weeks. Patient and wife report concern for seemingly very quick memory changes, recently seen by PCP and labs largely unremarkable. Fortunately [facility] and were seen last week, no medications started as they are waiting to review the MRI. They report a high level of anxiety today , but did again discuss the risks/ benefits of medication change if symptoms are more related to neurocognitive disorder. Given previous max dose of escitalopram, no other SSRI trials, it is reasonable to trial move to alternative if more effective or better tolerated. We will cross taper to sertraline. Patient to continue with neurology as planned. Follow-up with this provider and Behavioral Health Consultant in 6 weeks or sooner as needed. Patient and wife agreeable to plan.       Medication side effects and alternatives were reviewed. Health promotion activities recommended and reviewed today. All questions addressed. Education and counseling completed regarding risks and benefits of medications and psychotherapy options. Recommend therapy for additional support.       Treatment Plan:  DISCONTINUE escitalopram: DECREASE TO escitalopram 10 mg (1/2 tablet) every day then STOP medication. No script needed.  THEN START sertraline: Take 0.5 tablets (12.5 mg) by mouth daily for 7 days, THEN 1 tablet (25 mg) daily for 7 days, THEN 2 tablets (50 mg) daily. Script sent for #60 + 1 refill.  Continue all other medications per primary care provider.   Continue care with neurology as planned.   Safety plan reviewed. To the Emergency Department as needed or call after hours crisis line at [phone] or [phone]. [place]  Crisis Text Line. Text [place] to 323924 or Suicide LifeLine Chat: suicidepreventionlifeline.org/chat  Schedule an appointment with me and Behavioral Health Consultant in 6 weeks or sooner as needed. Call [facility] at [phone] to schedule.  Follow up with primary care provider as planned or for acute medical concerns.  Call the psychiatric nurse line with medication questions or concerns at [phone].  MyChart may be used to communicate with your provider, but this is not intended to be used for emergencies.    Patient Education:  Medication side effects and alternatives reviewed. Health promotion activities recommended and reviewed today. All questions addressed. Education and counseling completed regarding risks and benefits of medications and psychotherapy options.  Consent provided by patient/guardian  Call the psychiatric nurse line with medication questions or concerns at [phone].  MyChart may be used to communicate with your provider, but this is not intended to be used for emergencies.  BEERS CRITERIA: The American Geriatrics Society (AGS) released its second updated and expanded Beers Criteria - lists of potentially inappropriate medications for older adults - and one of the most frequently cited reference tools in the field of geriatrics. The Society also unveiled a suite of new  resources - including a list of alternative therapies for potentially inappropriate medications and more detailed guidance on best practices for implementing AGS recommendations.  Discussed with client.   Medlineplus.gov is information for patients.  It is run by the National Library of Medicine and it contains information about all disorders, diseases and all medications.      Side effects for SSRI: sexual dysfunction, decreased appetite, increased appetite, nausea, diarrhea, constipation, dry mouth, insomnia, sedation, agitation, tremors, headaches, dizziness, sweating, bruising and rare  bleeding, discontinuation syndrome, rare hyponatremia, rare induction of mykel, suicidal ideations, and serotonin syndrome.     Community Resources:    National Suicide Prevention Lifeline: 189.747.9259 (TTY: 610.444.1982). Call anytime for help.  (www.suicidepreventionlifeline.org)  National [place] on Mental Illness (www.melva.org): 623.229.4346 or 993-677-9524.   Mental Health Association (www.mentalhealth.org): 652.265.3525 or 620-326-3442.  [place] Crisis Text Line: Text [place] to 050547  Suicide LifeLine Chat: [employer]org/chat    Administrative Billing:       Level of Medical Decision Making:   - At least 1 chronic problem that is not stable  - Engaged in prescription drug management during visit (discussed any medication benefits, side effects, alternatives, etc.)             Patient Status:  [facility] MD/DO/NP/PA providers offer care a specialty service for Primary Care Providers in the [facility] that seek to optimize psychotropic medications for unstable patients.  Once medications have been optimized, our providers discharge the patient back to the referring Primary Care Provider for ongoing medication management.  This type of system allows our providers to serve a high volume of patients.   Patient will continue to be seen for ongoing consultation and stabilization.    Signed:   Yvonne Benton, MSN, APRN, PMHNP-BC  [facility] ([facility])  [facility]    Chart documentation done in part with Dragon Voice Recognition software.  Although reviewed after completion, some word and grammatical errors may remain.

## 2025-03-10 ENCOUNTER — VIRTUAL VISIT (OUTPATIENT)
Dept: PSYCHIATRY | Facility: CLINIC | Age: 75
End: 2025-03-10
Payer: COMMERCIAL

## 2025-03-10 ENCOUNTER — VIRTUAL VISIT (OUTPATIENT)
Dept: BEHAVIORAL HEALTH | Facility: CLINIC | Age: 75
End: 2025-03-10
Payer: COMMERCIAL

## 2025-03-10 DIAGNOSIS — F41.1 GENERALIZED ANXIETY DISORDER: Primary | ICD-10-CM

## 2025-03-10 DIAGNOSIS — F41.9 ANXIETY: Primary | ICD-10-CM

## 2025-03-10 PROCEDURE — 90832 PSYTX W PT 30 MINUTES: CPT | Mod: 95 | Performed by: COUNSELOR

## 2025-03-10 RX ORDER — ISOSORBIDE MONONITRATE 30 MG/1
60 TABLET, EXTENDED RELEASE ORAL DAILY
Qty: 180 TABLET | Refills: 3 | Status: SHIPPED | OUTPATIENT
Start: 2025-03-10

## 2025-03-10 NOTE — PROGRESS NOTES
[facility] Psychiatry Services [facility]    Behavioral Health Clinician Progress Note   Mental Health & Addiction Services      Monday March 10, 2025    Patient Name: Prem Taylor       Service Type:  Individual   Service Location:  Group [employer] / Email (patient reached)   Visit Start Time: 7:33 AM  Visit End Time:  8:07 AM   Session Length: 16 - 37    Attendees: Patient and Spouse / Significant Other   Service Modality: Video Visit:      Provider verified identity through the following two step process.  Patient provided:  Patient is known previously to provider and Patient was verified at admission/transfer    Telemedicine Visit: The patient's condition can be safely assessed and treated via synchronous audio and visual telemedicine encounter.      Reason for Telemedicine Visit: Patient has requested telehealth visit    Originating Site (Patient Location): Patient's home    Distant Site (Provider Location): Provider Remote Setting- Home Office    Consent:  The patient/guardian has verbally consented to: the potential risks and benefits of telemedicine (video visit) versus in person care; bill my insurance or make self-payment for services provided; and responsibility for payment of non-covered services.     Patient would like the video invitation sent by:  My Chart    Mode of Communication:  Video Conference via [facility]    Distant Location (Provider):  Off-site    As the provider I attest to compliance with applicable laws and regulations related to telemedicine.   Visit number: 2    [facility] Visit Activities (Refresh list every visit): [facility] Only     [facility] Diagnostic Assessment: 2/3/2025  Treatment Plan Review Date:       DATA:    Extended Session (60+ minutes): No   Interactive Complexity: No   Crisis: No   [facility] Patient: No     Assessments completed prior to visit:   The following assessments were completed by patient for this visit:  PHQ9:       2/11/2020     8:13 AM 2/12/2020     8:00 AM  "9/14/2023     3:23 PM 9/18/2024     6:59 AM 1/14/2025     1:03 PM 2/2/2025    10:44 AM 2/27/2025     4:13 PM   PHQ-9 SCORE   PHQ-9 Total Score MyChart   0 2 (Minimal depression) 3 (Minimal depression) 5 (Mild depression) 12 (Moderate depression)   PHQ-9 Total Score 1 1 0 2 3  5  12        Patient-reported     GAD2:       2/2/2025    10:46 AM 3/10/2025     7:19 AM   KALPANA-2   Feeling nervous, anxious, or on edge 3 3    Not being able to stop or control worrying 1 1    KALPANA-2 Total Score 4  4        Patient-reported    Proxy-reported     GAD7:       2/11/2020     8:13 AM 8/10/2021     6:42 AM 9/14/2023     3:24 PM 9/18/2024     7:00 AM 1/14/2025     1:05 PM 2/2/2025    10:46 AM 2/27/2025     4:14 PM   KALPANA-7 SCORE   Total Score  0 (minimal anxiety) 0 (minimal anxiety) 3 (minimal anxiety) 8 (mild anxiety) 14 (moderate anxiety) 13 (moderate anxiety)   Total Score 0 0 0 3 8  14  13        Patient-reported     PROMIS 10-Global Health (only subscores and total score):       1/30/2025     9:29 AM   PROMIS-10 Scores Only   Global Mental Health Score 13    13   Global Physical Health Score 14    14   PROMIS TOTAL - SUBSCORES 27    27        Reason for Visit/Presenting Concern:  Anxiety and Cognitive Symptoms    Current Stressors / Issues:   Questions/Thoughts for psychiatric provider:they finally got the Zoloft and weaning off the Lexapro, they have to cancel a trip     Better/worse: anxiety is high, struggling to recall short term memory, concerns about losing hearing     Current Symptoms: He will have a EEG and a PET scan soon, he saw the audiologist and they said he does have hearing loss, his wife says \"his wife is getting out of the control\", pt needs medial verification that he isn't able to go on a trip  It's hard for his wife to keep track of his impulsive purchases, there are packages being delivered daily     [facility]     Substance Use: looking to reduce alcohol and could be forgetting to drink " alcohol, wife will say that he has a drink in there     [facility] recommendations: [facility] will provide information in [facility] about drop in support groups, Alzheimer's phone, watch to track places and make it easier to track falls. [facility] recommends pt's wife to block websites to purchase things on web browser through browser settings, bubble packs from pharmacy to help as pt thinks they are put in wrong       Therapeutic Interventions:  Psycho-education: Provided psycho-education about patient's behavioral health condition and symptoms. Explained and reviewed treatment options. Provided support and education to family members of patient. Information about upstairs and downstairs brain    Response to treatment interventions:   Patient was receptive to interventions utilized.  Patient was engaged in the therapy process.   Patient gained new insights into symptoms. Patient gained new insights into behaviors.       Progress on Treatment Objective(s) / Homework:   Minimal progress - PREPARATION (Decided to change - considering how); Intervened by negotiating a change plan and determining options / strategies for behavior change, identifying triggers, exploring social supports, and working towards setting a date to begin behavior change     Medication Review:   No changes to current psychiatric medication(s)     Medication Compliance:   Yes     Chemical Use Review:  Substance Use: decrease in alcohol .  Patient reports frequency of use see above.  Provided encouragement towards sobriety        Tobacco Use: No current tobacco use.       Assessment: Current Emotional / Mental Status (status of significant symptoms):    Risk status (Self / Other harm or suicidal ideation)   Patient denies a history of suicidal ideation, suicide attempts, self-injurious behavior, homicidal ideation, homicidal behavior, and and other safety concerns    Patient denies current fears or concerns for personal safety.   Patient denies current or recent  suicidal ideation or behaviors.   Patient denies current or recent homicidal ideation or behaviors.   Patient denies current or recent self injurious behavior or ideation.   Patient denies other safety concerns.   Recommended that patient call 911 or go to the local ED should there be a change in any of these risk factors      ASSESSMENT:   Mental Status:     Appearance:   Appropriate    Eye Contact:   Good    Psychomotor Behavior: Normal    Attitude:   Cooperative  Pleasant   Orientation:   All   Speech Rate / Production: Normal    Volume:   Normal    Mood:    Anxious  Panicked   Affect:    Worrisome    Thought Content:  At times needed wife to clarify and answer questions [facility] asked    Thought Form:  Coherent    Insight:    Fair          Diagnoses:   Anxiety    Collateral Reports Completed:   Communicated with: Yvonne Benton CNP       Plan: (Homework, other):   Patient was provided No indications of CD issues  Patient was given information about behavioral services and encouraged to schedule a follow up appointment with the clinic [facility] in 1 month.         CHELSEA Kasper, [facility]    _____________________________________________________________________________________________________________________________________                                              Individual Treatment Plan    Patient's Name: Prem Taylor   YOB: 1950  Date of Creation:   Date Treatment Plan Last Reviewed/Revised: IP    DSM5 Diagnoses: Anxiety  Mild episode of recurrent major depressive disorder  Psychosocial / Contextual Factors: Interpersonal Concerns and Cognitive concerns  PROMIS (reviewed every 90 days):   PROMIS 10-Global Health (only subscores and total score):       1/30/2025     9:29 AM   PROMIS-10 Scores Only   Global Mental Health Score 13    13   Global Physical Health Score 14    14   PROMIS TOTAL - SUBSCORES 27    27        Referral / Collaboration:  Referral to another professional/service is not indicated  at this time..    Anticipated number of session for this episode of care:  6-9 sessions  Anticipation frequency of session: Monthly  Anticipated Duration of each session: 16-37 minutes  Treatment plan will be reviewed in 90 days or when goals have been changed.       Patient has reviewed and agreed to the above plan.     Written by  CHELSEA Kasper, [facility]

## 2025-03-10 NOTE — PATIENT INSTRUCTIONS
**For crisis resources, please see the information at the end of this document**     Thank you for coming to the [facility] [place] [facility].    TREATMENT PLAN:    Medications:   DISCONTINUE escitalopram: DECREASE TO escitalopram 10 mg (1/2 tablet) every day then STOP medication. No script needed.  THEN START sertraline: Take 0.5 tablets (12.5 mg) by mouth daily for 7 days, THEN 1 tablet (25 mg) daily for 7 days, THEN 2 tablets (50 mg) daily. Script sent for #60 + 1 refill.  Continue all other medications per primary care provider.     Consults / Referrals:   - Continue care with neurology as planned.    Follow-up:  Schedule an appointment with me and Behavioral Health Consultant in 6 weeks or sooner as needed.  Call [facility] at [phone] to schedule.  Follow up with primary care provider as planned or sooner if needed for acute medical concerns.  Call the psychiatric nurse line with medication questions or concerns at [phone].  [employer] may be used to communicate with your provider, but this is not intended to be used for emergencies.    Psychoeducation:  Side effects for SSRI: sexual dysfunction, decreased appetite, increased appetite, nausea, diarrhea, constipation, dry mouth, insomnia, sedation, agitation, tremors, headaches, dizziness, sweating, bruising and rare bleeding, discontinuation syndrome, rare hyponatremia, rare induction of mykel, suicidal ideations, and serotonin syndrome.      Financial Assistance [phone]  [facility] Billing [phone]  Central Billing Office, ealth: [phone]  [facility] Billing [phone]  Medical Records [phone]  [facility] Patient Bill of Rights [email or web address]       MENTAL HEALTH CRISIS RESOURCES:  For a emergency help, please call 911 or go to the nearest Emergency Department.     Emergency Walk-In Options:   EmPATH Unit @ [facility]  [facility] ([place]): [phone] - Specialized mental health emergency area designed to be calming  [facility] ([place]): [phone]  [facility] ([place]): [phone]  [facility] ([place]): [phone]    [facility] Crisis Information:   [place]: [phone]  [place]: [phone]  [place] ([place]) - Adult: [phone]     Child: [phone]  [place] - Adult: [phone]     Child: [phone]  [place]: [phone]  List of all [facility] resources:   https://[place].gov/dhs/people-we-serve/adults/health-care/mental-health/resources/crisis-contacts.jsp    National Crisis Information:   National Suicide & Crisis Lifeline: Call 988        For online chat options, visit https://suicidepreventionlifeline.org/chat/  Poison Control Center: 1-342-845-7868  Poison Control Center: 5-418-688-1665  Trans Lifeline: 4-734-430-5343 - Hotline for transgender people of all ages  The [employer]: 1-706-082-5076 - Hotline for LGBT youth     For Non-Emergency Support:   Fast Tracker: Mental Health & Substance Use Disorder Resources -   https://www.fasttrackermn.org/       Again thank you for choosing [facility] M[facility]AL HEALTH & ADDICTION [place] [facility] and please let us know how we can best partner with you to improve you and your family's health.    You may be receiving a survey regarding this appointment. We would love to have your feedback, both positive and negative. The survey is done by an external company, so your answers are anonymous.        Patient Education   Collaborative Care Psychiatry Service  What to Expect  Here's what to expect from your Collaborative Care Psychiatry Service (CCPS).   About CCPS  CCPS means 2 people work together to help you get better. You'll meet with a behavioral health clinician and a psychiatric doctor. A behavioral health clinician helps people with mental health problems by talking with them. A psychiatric doctor helps people  "by giving them medicine.  How it works  At every visit, you'll see the behavioral health clinician (BHC) first. They'll talk with you about how you're doing and teach you how to feel better.   Then you'll see the psychiatric doctor. This doctor can help you deal with troubling thoughts and feelings by giving you medicine. They'll make sure you know the plan for your care.   CCPS usually takes 3 to 6 visits. If you need more visits, we may have you start seeing a different psychiatric doctor for ongoing care.  If you have any questions or concerns, we'll be glad to talk with you.  About visits  Be open  At your visits, please talk openly about your problems. It may feel hard, but it's the best way for us to help you.  Cancelling visits  If you can't come to your visit, please call us right away at [phone]. If you don't cancel at least 24 hours (1 full day) before your visit, that's \"late cancellation.\"  Being late to visits  Being very late is the same as not showing up. You will be a \"no show\" if:  Your appointment starts with a BHC, and you're more than 15 minutes late for a 30-minute (half hour) visit. This will also cancel your appointment with the psychiatric doctor.  Your appointment is with a psychiatric doctor only, and you're more than 15 minutes late for a 30-minute (half hour) visit.  Your appointment is with a psychiatric doctor only, and you're more than 30 minutes late for a 60-minute (full hour) visit.  If you cancel late or don't show up 2 times within 6 months, we may end your care.   Getting help between visits  If you need help between visits, you can call us Monday to Friday from 8 a.m. to 4:30 p.m. at [phone].  Emergency care  Call 911 or go to the nearest emergency department if your life or someone else's life is in danger.  Call 498 anytime to reach the national Suicide and Crisis hotline.  Medicine refills  To refill your medicine, call your pharmacy. You can also call [facility] [place] at [phone], Monday to Friday, 8 a.m. to 4:30 p.m. It can take 1 to 3 business days to get a refill.   Forms, letters, and tests  You may have papers to fill out, like FMLA, short-term disability, and workability. We can help you with these forms at your visits, but you must have an appointment. You may need more than 1 visit for this, to be in an intensive therapy program, or both.  Before we can give you medicine for ADHD, we may refer you to get tested for it or confirm it another way.  We may not be able to give you an emotional support animal letter.  We don't do mental health checks ordered by the court.   We don't do mental health testing, but we can refer you to get tested.   Thank you for choosing us for your care.  For informational purposes only. Not to replace the advice of your health care provider. Copyright   2022 [facility]. All rights reserved. [employer] 580652 - Rev 11/24.

## 2025-03-10 NOTE — NURSING NOTE
Current patient location: [address] [place] [id number]    Is the patient currently in the state of [place]? YES    Visit mode: VIDEO    If the visit is dropped, the patient can be reconnected by:VIDEO VISIT: Send to e-mail at: [email or web address]    Will anyone else be joining the visit? Wife will be with patient in same video  (If patient encounters technical issues they should call [phone])    Are changes needed to the allergy or medication list? No    Are refills needed on medications prescribed by this physician? Discuss with provider    Rooming Documentation:  Questionnaire(s) completed    Reason for visit: RECHECK    Fanny LOMELI

## 2025-03-13 ENCOUNTER — ANTICOAGULATION THERAPY VISIT (OUTPATIENT)
Dept: ANTICOAGULATION | Facility: CLINIC | Age: 75
End: 2025-03-13

## 2025-03-13 ENCOUNTER — LAB (OUTPATIENT)
Dept: LAB | Facility: CLINIC | Age: 75
End: 2025-03-13
Payer: COMMERCIAL

## 2025-03-13 DIAGNOSIS — Z86.711 HISTORY OF PULMONARY EMBOLISM: ICD-10-CM

## 2025-03-13 DIAGNOSIS — I48.21 PERMANENT ATRIAL FIBRILLATION (H): Primary | ICD-10-CM

## 2025-03-13 DIAGNOSIS — I26.99 PULMONARY EMBOLISM, BILATERAL (H): ICD-10-CM

## 2025-03-13 DIAGNOSIS — I48.21 PERMANENT ATRIAL FIBRILLATION (H): ICD-10-CM

## 2025-03-13 LAB — INR BLD: 1.8 (ref 0.9–1.1)

## 2025-03-13 NOTE — PROGRESS NOTES
ANTICOAGULATION MANAGEMENT     Prem Taylor 74 year old male is on warfarin with subtherapeutic INR result. (Goal INR 2.0-3.0)    Recent labs: (last 7 days)     03/13/25  0733   INR 1.8*       ASSESSMENT     Warfarin Lab Questionnaire    Warfarin Doses Last 7 Days    Pt Rptd Dose SINDHU MONDAY TUESDAY WED THURS FRIDAY SATURDAY   3/12/2025  11:14 AM 3.75 7.5 3.75 7.5 7.5 3.75 7.5         3/12/2025   Warfarin Lab Questionnaire   Missed doses within past 14 days? No   Changes in diet or alcohol within past 14 days? No   Medication changes since last result? No   Injuries or illness since last result? No   New shortness of breath, severe headaches or sudden changes in vision since last result? No   Abnormal bleeding since last result? No   Upcoming surgery, procedure? No   Best number to call with results? [phone]     Previous result: Subtherapeutic  Additional findings:  INR coming up nicely from 1.1 last week. Will not adjust dose today.     Wife, Aubree asks that we call her cell phone going forward for dosing (even if Ryan puts his number on template).        PLAN     Recommended plan for temporary change(s) affecting INR     Dosing Instructions: Continue your current warfarin dose with next INR in 7-10 days       Summary  As of 3/13/2025      Full warfarin instructions:  3.75 mg every Tue, Thu, Sat; 7.5 mg all other days   Next INR check:  3/25/2025               Telephone call with Ryan and Aubree who verbalizes understanding and agrees to plan  Sent [employer] message with dosing and follow up instructions    Check at provider office visit    Education provided: Goal range and lab monitoring: goal range and significance of current result, Importance of therapeutic range, and Importance of following up at instructed interval    Plan made per [facility] anticoagulation protocol    Cari Kay, RN  3/13/2025  Anticoagulation Clinic  Zuldi [place] for routing messages: elsie MAURICIO  [facility] patient phone line:  [phone]        _______________________________________________________________________     Anticoagulation Episode Summary       Current INR goal:  2.0-3.0   TTR:  57.0% (1 y)   Target end date:  Indefinite   Send INR reminders to:  RUBY MAURICIO    Indications    Pulmonary embolism  bilateral (H) (Resolved) [I26.99]  Permanent atrial fibrillation (H) [I48.21]  Pulmonary embolism  bilateral (H) [I26.99]  History of pulmonary embolism [Z86.711]             Comments:  Approved for 8 week checks per -- see encounter from 07/27/21             Anticoagulation Care Providers       Provider Role Specialty Phone number    Mamadou Baez MD Referring Family Medicine [phone]

## 2025-03-14 ENCOUNTER — TRANSFERRED RECORDS (OUTPATIENT)
Dept: HEALTH INFORMATION MANAGEMENT | Facility: CLINIC | Age: 75
End: 2025-03-14
Payer: COMMERCIAL

## 2025-03-18 LAB — INR (EXTERNAL): 1.7 (ref 1.2–1.5)

## 2025-03-25 ENCOUNTER — ANTICOAGULATION THERAPY VISIT (OUTPATIENT)
Dept: ANTICOAGULATION | Facility: CLINIC | Age: 75
End: 2025-03-25

## 2025-03-25 ENCOUNTER — OFFICE VISIT (OUTPATIENT)
Dept: FAMILY MEDICINE | Facility: CLINIC | Age: 75
End: 2025-03-25
Payer: COMMERCIAL

## 2025-03-25 ENCOUNTER — TELEPHONE (OUTPATIENT)
Dept: FAMILY MEDICINE | Facility: CLINIC | Age: 75
End: 2025-03-25

## 2025-03-25 VITALS
OXYGEN SATURATION: 96 % | HEIGHT: 74 IN | HEART RATE: 43 BPM | TEMPERATURE: 98.2 F | RESPIRATION RATE: 17 BRPM | BODY MASS INDEX: 32.73 KG/M2 | SYSTOLIC BLOOD PRESSURE: 138 MMHG | WEIGHT: 255 LBS | DIASTOLIC BLOOD PRESSURE: 80 MMHG

## 2025-03-25 DIAGNOSIS — I26.99 PULMONARY EMBOLISM, BILATERAL (H): ICD-10-CM

## 2025-03-25 DIAGNOSIS — R41.0 CONFUSION: Primary | ICD-10-CM

## 2025-03-25 DIAGNOSIS — D64.9 NORMOCHROMIC NORMOCYTIC ANEMIA: ICD-10-CM

## 2025-03-25 DIAGNOSIS — R41.3 MEMORY DIFFICULTIES: ICD-10-CM

## 2025-03-25 DIAGNOSIS — Z86.711 HISTORY OF PULMONARY EMBOLISM: ICD-10-CM

## 2025-03-25 DIAGNOSIS — I48.21 PERMANENT ATRIAL FIBRILLATION (H): Primary | ICD-10-CM

## 2025-03-25 DIAGNOSIS — I48.21 PERMANENT ATRIAL FIBRILLATION (H): ICD-10-CM

## 2025-03-25 DIAGNOSIS — R51.9 NONINTRACTABLE EPISODIC HEADACHE, UNSPECIFIED HEADACHE TYPE: ICD-10-CM

## 2025-03-25 DIAGNOSIS — F41.9 ANXIETY: ICD-10-CM

## 2025-03-25 DIAGNOSIS — G47.33 OSA ON CPAP: ICD-10-CM

## 2025-03-25 DIAGNOSIS — I48.19 PERSISTENT ATRIAL FIBRILLATION (H): ICD-10-CM

## 2025-03-25 LAB
ALBUMIN SERPL BCG-MCNC: 4.2 G/DL (ref 3.5–5.2)
ALP SERPL-CCNC: 69 U/L (ref 40–150)
ALT SERPL W P-5'-P-CCNC: 33 U/L (ref 0–70)
ANION GAP SERPL CALCULATED.3IONS-SCNC: 11 MMOL/L (ref 7–15)
AST SERPL W P-5'-P-CCNC: 33 U/L (ref 0–45)
BILIRUB SERPL-MCNC: 1 MG/DL
BUN SERPL-MCNC: 14.7 MG/DL (ref 8–23)
CALCIUM SERPL-MCNC: 9.4 MG/DL (ref 8.8–10.4)
CHLORIDE SERPL-SCNC: 105 MMOL/L (ref 98–107)
CREAT SERPL-MCNC: 0.86 MG/DL (ref 0.67–1.17)
EGFRCR SERPLBLD CKD-EPI 2021: >90 ML/MIN/1.73M2
ERYTHROCYTE [DISTWIDTH] IN BLOOD BY AUTOMATED COUNT: 13.3 % (ref 10–15)
GLUCOSE SERPL-MCNC: 108 MG/DL (ref 70–99)
HCO3 SERPL-SCNC: 23 MMOL/L (ref 22–29)
HCT VFR BLD AUTO: 37.5 % (ref 40–53)
HGB BLD-MCNC: 12.7 G/DL (ref 13.3–17.7)
INR BLD: 1.7 (ref 0.9–1.1)
MCH RBC QN AUTO: 31 PG (ref 26.5–33)
MCHC RBC AUTO-ENTMCNC: 33.9 G/DL (ref 31.5–36.5)
MCV RBC AUTO: 92 FL (ref 78–100)
PLATELET # BLD AUTO: 154 10E3/UL (ref 150–450)
POTASSIUM SERPL-SCNC: 4.4 MMOL/L (ref 3.4–5.3)
PROT SERPL-MCNC: 6.9 G/DL (ref 6.4–8.3)
RBC # BLD AUTO: 4.1 10E6/UL (ref 4.4–5.9)
SODIUM SERPL-SCNC: 139 MMOL/L (ref 135–145)
T PALLIDUM AB SER QL: NONREACTIVE
WBC # BLD AUTO: 7.1 10E3/UL (ref 4–11)

## 2025-03-25 PROCEDURE — 3075F SYST BP GE 130 - 139MM HG: CPT | Performed by: FAMILY MEDICINE

## 2025-03-25 PROCEDURE — 85610 PROTHROMBIN TIME: CPT | Performed by: FAMILY MEDICINE

## 2025-03-25 PROCEDURE — 3079F DIAST BP 80-89 MM HG: CPT | Performed by: FAMILY MEDICINE

## 2025-03-25 PROCEDURE — 36415 COLL VENOUS BLD VENIPUNCTURE: CPT | Performed by: FAMILY MEDICINE

## 2025-03-25 PROCEDURE — 96127 BRIEF EMOTIONAL/BEHAV ASSMT: CPT | Performed by: FAMILY MEDICINE

## 2025-03-25 PROCEDURE — G2211 COMPLEX E/M VISIT ADD ON: HCPCS | Performed by: FAMILY MEDICINE

## 2025-03-25 PROCEDURE — 99214 OFFICE O/P EST MOD 30 MIN: CPT | Performed by: FAMILY MEDICINE

## 2025-03-25 PROCEDURE — 85027 COMPLETE CBC AUTOMATED: CPT | Performed by: FAMILY MEDICINE

## 2025-03-25 PROCEDURE — 86780 TREPONEMA PALLIDUM: CPT | Performed by: FAMILY MEDICINE

## 2025-03-25 PROCEDURE — 1126F AMNT PAIN NOTED NONE PRSNT: CPT | Performed by: FAMILY MEDICINE

## 2025-03-25 PROCEDURE — 80053 COMPREHEN METABOLIC PANEL: CPT | Performed by: FAMILY MEDICINE

## 2025-03-25 ASSESSMENT — PATIENT HEALTH QUESTIONNAIRE - PHQ9
SUM OF ALL RESPONSES TO PHQ QUESTIONS 1-9: 19
SUM OF ALL RESPONSES TO PHQ QUESTIONS 1-9: 19
10. IF YOU CHECKED OFF ANY PROBLEMS, HOW DIFFICULT HAVE THESE PROBLEMS MADE IT FOR YOU TO DO YOUR WORK, TAKE CARE OF THINGS AT HOME, OR GET ALONG WITH OTHER PEOPLE: SOMEWHAT DIFFICULT

## 2025-03-25 ASSESSMENT — ANXIETY QUESTIONNAIRES
5. BEING SO RESTLESS THAT IT IS HARD TO SIT STILL: SEVERAL DAYS
2. NOT BEING ABLE TO STOP OR CONTROL WORRYING: MORE THAN HALF THE DAYS
6. BECOMING EASILY ANNOYED OR IRRITABLE: NEARLY EVERY DAY
7. FEELING AFRAID AS IF SOMETHING AWFUL MIGHT HAPPEN: NOT AT ALL
3. WORRYING TOO MUCH ABOUT DIFFERENT THINGS: NEARLY EVERY DAY
GAD7 TOTAL SCORE: 12
GAD7 TOTAL SCORE: 12
1. FEELING NERVOUS, ANXIOUS, OR ON EDGE: NEARLY EVERY DAY
4. TROUBLE RELAXING: NOT AT ALL
IF YOU CHECKED OFF ANY PROBLEMS ON THIS QUESTIONNAIRE, HOW DIFFICULT HAVE THESE PROBLEMS MADE IT FOR YOU TO DO YOUR WORK, TAKE CARE OF THINGS AT HOME, OR GET ALONG WITH OTHER PEOPLE: SOMEWHAT DIFFICULT
8. IF YOU CHECKED OFF ANY PROBLEMS, HOW DIFFICULT HAVE THESE MADE IT FOR YOU TO DO YOUR WORK, TAKE CARE OF THINGS AT HOME, OR GET ALONG WITH OTHER PEOPLE?: SOMEWHAT DIFFICULT
7. FEELING AFRAID AS IF SOMETHING AWFUL MIGHT HAPPEN: NOT AT ALL
GAD7 TOTAL SCORE: 12

## 2025-03-25 ASSESSMENT — PAIN SCALES - GENERAL: PAINLEVEL_OUTOF10: NO PAIN (0)

## 2025-03-25 NOTE — TELEPHONE ENCOUNTER
Patient Returning Call    Reason for call:  anticoagulation     Information relayed to patient:  Message sent to INR nurse to return call.     Patient has additional questions:  No      Could we send this information to you in [facility] or would you prefer to receive a phone call?:   Patient would prefer a phone call   Okay to leave a detailed message?: Yes at Other phone number:  [phone]

## 2025-03-25 NOTE — PROGRESS NOTES
"Assessment/Plan:    Confusion  Lab assessment updated today.  Had recent EEG March 14, 2025 which appeared unremarkable.  PET scan scheduled tomorrow and but does schedule with follow-up neurology appointment April 30, 2025 regarding concerns for confusion and memory difficulties.  - Comprehensive metabolic panel  - Treponema Abs w Reflex to RPR and Titer  - Comprehensive metabolic panel    Memory difficulties  As above.  - Comprehensive metabolic panel  - Treponema Abs w Reflex to RPR and Titer  - Comprehensive metabolic panel    Anxiety  Anxiety disorder with KALPANA-7 questionnaire 12 out of 21 and PHQ-9 questionnaire 19 out of 27.  Did wean from escitalopram and now utilizing sertraline 50 mg daily.  Reassess at follow-up in 6 to 8 weeks.    Persistent atrial fibrillation (H)  Persistent atrial fibrillation.  Is on warfarin anticoagulation.  Update INR today.  Will follow-up with cardiology April 17, 2025 is noted.  Dr. Velez retired.    ALISE on CPAP  CPAP for ALISE management ongoing.    Nonintractable episodic headache, unspecified headache type  Tylenol utilized for as needed headache management    Normochromic normocytic anemia  Update CBC.  Prior hemoglobin 12.6 with MCV 92.  - Comprehensive metabolic panel  - CBC with platelets  - Comprehensive metabolic panel  - CBC with platelets    Permanent atrial fibrillation (H)  INR updated.  - INR point of care    History of pulmonary embolism  INR updated.  - INR point of care    The longitudinal plan of care for the diagnosis(es)/condition(s) as documented were addressed during this visit. Due to the added complexity in care, I will continue to support Ryan in the subsequent management and with ongoing continuity of care.            Subjective:    Prem Taylor is seen today for follow-up assessment.  Confusion and memory difficulties.  Patient is having progressive decline per patient's wife Aubree.  \"He is not Ryan\".  \"He is stuck in there somewhere\".  Episodes of " confusion.  Getting lost when driving.  Not tracking.  His gait has changed and describes shuffling and much slower in movements.  Has headaches often occipital associate with neck pain.  Tightness Tylenol.  Had MRI earlier this year with mild brain atrophy and small vessel ischemic change likely.  Has PET scan scheduled tomorrow.  Had recent EEG that appeared unremarkable March 14, 2025.  Will see neurology April 30, 2025.  Had seen cardiologist Dr. Velez October 29, 2024 and told to follow-up at 6-month interval and does have cardiology follow-up April 17, 2025 and is interested in considering change of warfarin to other anticoagulant that would be easier to take without required monitoring.  Comprehensive review of systems as above otherwise all negative.      Reviewed Dr. Velez's note from 10/29/24 Assessment / Plan:     1.  Chronic atrial fibrillation, anticoagulated on warfarin.  He has been told he will need to be on lifelong warfarin for his pulmonary emboli, so not a good candidate for Watchman device.  We will check a Holter monitor to assess adequacy of rate control, as well as appropriateness of heart rate response to activities  2.  Coronary artery disease mild to moderate diffuse disease at CT angiography.  Will plan to continue medical management but consider invasive angiography for symptom progression.  He is overall quite satisfied with his quality of life at this point.  3.  Mixed hyperlipidemia with LDL 79 after switch from atorvastatin to rosuvastatin.  Will increase atorvastatin to 40 mg daily.  Target LDL is less than 70.  4.  Obstructive sleep apnea on CPAP.  Advised follow-up check to make sure his equipment is working well for him.     Follow-up in 6 months, unless further recommendations follow results of Holter monitor.      Past Surgical History:   Procedure Laterality Date    BACK SURGERY      CERVICAL FUSION  2001    HERNIA REPAIR  2005    OK LAP,INGUINAL HERNIA REPR,INITIAL Left  8/15/2019    Procedure: HERNIORRHAPHY, INGUINAL, LAPAROSCOPIC;  Surgeon: Yan Leiva MD;  Location: [facility];  Service: General    TONSILLECTOMY      childhood        Family History   Problem Relation Age of Onset    Heart Disease Mother     Hypertension Mother     Dementia Mother     Coronary Artery Disease Father     LUNG DISEASE No family hx of         Past Medical History:   Diagnosis Date    Abnormal ECG     Anemia     Anxiety     Arrhythmia     Atrial fibrillation (H) 10/09/2017    Cancer (H)     CPAP (continuous positive airway pressure) dependence 10/2019    per patient    Depression     Heart valve disease     History of blood clots     Hyperlipidemia 2006    Pulmonary embolism, blood-clot, obstetric 10/09/2017    Sleep apnea 2017    Uses CPAP        Social History     Tobacco Use    Smoking status: Former     Current packs/day: 0.00     Average packs/day: 1.5 packs/day for 22.9 years (34.3 ttl pk-yrs)     Types: Cigarettes     Start date:      Quit date: 1990     Years since quittin.3    Smokeless tobacco: Never   Vaping Use    Vaping status: Never Used   Substance Use Topics    Alcohol use: Yes    Drug use: No        Current Outpatient Medications   Medication Sig Dispense Refill    isosorbide mononitrate (IMDUR) 30 MG 24 hr tablet TAKE 2 TABLETS BY MOUTH DAILY 180 tablet 3    metoprolol succinate ER (TOPROL XL) 25 MG 24 hr tablet Take 2 tablets (50 mg) by mouth daily. 180 tablet 3    MULTIVITAMIN (MULTIPLE VITAMIN ORAL) [MULTIVITAMIN (MULTIPLE VITAMIN ORAL)] Take 1 tablet by mouth daily.      rosuvastatin (CRESTOR) 40 MG tablet Take 1 tablet (40 mg) by mouth daily. 90 tablet 3    sertraline (ZOLOFT) 25 MG tablet Take 0.5 tablets (12.5 mg) by mouth daily for 7 days, THEN 1 tablet (25 mg) daily for 7 days, THEN 2 tablets (50 mg) daily for 7 days. 60 tablet 0    warfarin ANTICOAGULANT (COUMADIN) 7.5 MG tablet 3.75mg , Tuesday, Friday and 7.5mg all other days per  "INR clinic 75 tablet 1    escitalopram (LEXAPRO) 10 MG tablet Take 1 tablet (10 mg) by mouth daily for 7 days, THEN 0.5 tablets (5 mg) daily for 7 days. Then STOP. 11 tablet 0          Objective:    Vitals:    03/25/25 0716 03/25/25 0719   BP: (!) 140/90 138/80   Pulse: (!) 43    Resp: 17    Temp: 98.2  F (36.8  C)    SpO2: 96%    Weight: 115.7 kg (255 lb)    Height: 1.867 m (6' 1.5\")       Body mass index is 33.19 kg/m .    Alert.  No apparent distress.  Cooperative and forthcoming.  No psychomotor agitation.  Pleasant.  Cardiac exam irregular, rate controlled.  Extremities warm and dry.        EXAM: MR BRAIN WITHOUT CONTRAST  LOCATION: [facility]  DATE: 01/24/2025     INDICATION: Memory difficulties.  COMPARISON: None.  TECHNIQUE: Routine multiplanar multisequence head MRI without intravenous contrast.     FINDINGS:  INTRACRANIAL CONTENTS: No abnormal intracranial restricted diffusion is identified to suggest recent infarct. The ventricles are normal in size and configuration. Mild generalized brain parenchymal volume loss. Mild to moderate scattered patchy   nonspecific T2 FLAIR hyperintense signal in the cerebral white matter and deshawn, likely due to chronic small vessel ischemic disease. No intracranial hemorrhage, extra-axial fluid collection, or mass effect.     SELLA: No abnormality accounting for technique.     OSSEOUS STRUCTURES/SOFT TISSUES: Normal marrow signal. The major intracranial vascular flow-voids are maintained.      ORBITS: Prior bilateral cataract surgery. Visualized portions of the orbits are otherwise unremarkable.      SINUSES/MASTOIDS: Mild mucosal thickening scattered about the paranasal sinuses. Moderate fluid/membrane thickening in the right mastoid air cells.                                                                       IMPRESSION:  1.  No acute intracranial process.  2.  Brain atrophy and presumed chronic small vessel ischemic changes, as described.  3. " " Moderate fluid/membrane thickening in the right mastoid air cells.          EEG (3/14/25) - \"unremarkable\"        This note has been dictated using voice recognition software and as a result may contain minor grammatical errors and unintended word substitutions.       Answers submitted by the patient for this visit:  Patient Health Questionnaire (Submitted on 3/25/2025)  If you checked off any problems, how difficult have these problems made it for you to do your work, take care of things at home, or get along with other people?: Somewhat difficult  PHQ9 TOTAL SCORE: 19  Patient Health Questionnaire (G7) (Submitted on 3/25/2025)  KALPANA 7 TOTAL SCORE: 12  General Questionnaire (Submitted on 3/23/2025)  Chief Complaint: Chronic problems general questions HPI Form  What is the reason for your visit today? : AFIB  How many days per week do you miss taking your medication?: 7  What makes it hard for you to take your medication every day?: remembering to take  Questionnaire about: Chronic problems general questions HPI Form (Submitted on 3/23/2025)  Chief Complaint: Chronic problems general questions HPI Form    "

## 2025-03-25 NOTE — PROGRESS NOTES
ANTICOAGULATION MANAGEMENT     Prem Taylor 74 year old male is on warfarin with subtherapeutic INR result. (Goal INR 2.0-3.0)    Recent labs: (last 7 days)     03/25/25  0753   INR 1.7*       ASSESSMENT     Source(s): Chart Review and Patient/Caregiver Call     Warfarin doses taken: Warfarin taken as instructed  Diet: No new diet changes identified  Medication/supplement changes: None noted  New illness, injury, or hospitalization: Yes: urgency room on 3/18/25 for headaches, no acute concerns noted, seen in clinic today, PET scan tomorrow, follow-up with neurology in April  Signs or symptoms of bleeding or clotting: No  Previous result: Subtherapeutic  Additional findings: None       PLAN     Recommended plan for no diet, medication or health factor changes affecting INR     Dosing Instructions: Increase your warfarin dose (9.1% change) with next INR in 10 days       Summary  As of 3/25/2025      Full warfarin instructions:  3.75 mg every Tue, Sat; 7.5 mg all other days   Next INR check:  4/4/2025               Telephone call with wife, Aubree who agrees to plan and repeated back plan correctly  Sent [employer] message with dosing and follow up instructions    Lab visit scheduled    Education provided: Contact [phone] with any changes, questions or concerns.     Plan made per [facility] anticoagulation protocol    Elsie Stringer RN  3/25/2025  Anticoagulation Clinic  #waywire for routing messages: elsie MAURICIO  [facility] patient phone line: [phone]        _______________________________________________________________________     Anticoagulation Episode Summary       Current INR goal:  2.0-3.0   TTR:  53.7% (1 y)   Target end date:  Indefinite   Send INR reminders to:  RUBY MAURICIO    Indications    Pulmonary embolism  bilateral (H) (Resolved) [I26.99]  Permanent atrial fibrillation (H) [I48.21]  Pulmonary embolism  bilateral (H) [I26.99]  History of pulmonary embolism [Z86.711]             Comments:   Approved for 8 week checks per -- see encounter from 07/27/21             Anticoagulation Care Providers       Provider Role Specialty Phone number    Mamadou Baez MD Referring Family Medicine [phone]

## 2025-03-25 NOTE — TELEPHONE ENCOUNTER
See anticoagulation encounter from today.    Elsie Stringer RN  [facility] Anticoagulation  [phone]

## 2025-03-26 DIAGNOSIS — I48.19 PERSISTENT ATRIAL FIBRILLATION (H): ICD-10-CM

## 2025-03-26 RX ORDER — WARFARIN SODIUM 7.5 MG/1
TABLET ORAL
Qty: 75 TABLET | Refills: 0 | Status: SHIPPED | OUTPATIENT
Start: 2025-03-26

## 2025-03-26 NOTE — TELEPHONE ENCOUNTER
ANTICOAGULATION MANAGEMENT:  Medication Refill    Anticoagulation Summary  As of 3/25/2025      Warfarin maintenance plan:  3.75 mg (7.5 mg x 0.5) every Tue, Sat; 7.5 mg (7.5 mg x 1) all other days   Next INR check:  4/4/2025   Target end date:  Indefinite    Indications    Pulmonary embolism  bilateral (H) (Resolved) [I26.99]  Permanent atrial fibrillation (H) [I48.21]  Pulmonary embolism  bilateral (H) [I26.99]  History of pulmonary embolism [Z86.711]                 Anticoagulation Care Providers       Provider Role Specialty Phone number    Mamadou Baez MD Referring Family Medicine [phone]            Refill Criteria    Visit with referring provider/group: Meets criteria: visit within referring provider group in the last 15 months on 3/25/25    [facility] referral last signed: 07/24/2024; within last year:  Yes    Lab monitoring is up to date (not exceeding 2 weeks overdue): Yes    Prem meets all criteria for refill. Rx instructions and quantity supplied updated to match patient's current dosing plan.  90 day supply with 0 refills granted per [facility] protocol     Josy Moe RN  Anticoagulation Clinic

## 2025-04-04 ENCOUNTER — LAB (OUTPATIENT)
Dept: LAB | Facility: CLINIC | Age: 75
End: 2025-04-04
Payer: COMMERCIAL

## 2025-04-04 DIAGNOSIS — Z86.711 HISTORY OF PULMONARY EMBOLISM: ICD-10-CM

## 2025-04-04 DIAGNOSIS — I48.21 PERMANENT ATRIAL FIBRILLATION (H): ICD-10-CM

## 2025-04-04 LAB — INR BLD: 3.6 (ref 0.9–1.1)

## 2025-04-04 PROCEDURE — 85610 PROTHROMBIN TIME: CPT

## 2025-04-04 PROCEDURE — 36416 COLLJ CAPILLARY BLOOD SPEC: CPT

## 2025-04-16 ENCOUNTER — TELEPHONE (OUTPATIENT)
Dept: PSYCHIATRY | Facility: CLINIC | Age: 75
End: 2025-04-16
Payer: COMMERCIAL

## 2025-04-16 NOTE — TELEPHONE ENCOUNTER
Reason for call:  Medication   If this is a refill request, has the caller requested the refill from the pharmacy already? Yes  Will the patient be using a [facility]? No  Name of the pharmacy and phone number for the current request:     [facility]: P: [phone]      Name of the medication requested: zoloft    Other request: pt needs med refilled     Phone number to reach patient:  Home number on file [phone] (home)    Best Time:  asap     Can we leave a detailed message on this number?  YES    Travel screening: Not Applicable

## 2025-04-17 ENCOUNTER — OFFICE VISIT (OUTPATIENT)
Dept: CARDIOLOGY | Facility: CLINIC | Age: 75
End: 2025-04-17
Attending: INTERNAL MEDICINE
Payer: COMMERCIAL

## 2025-04-17 ENCOUNTER — LAB (OUTPATIENT)
Dept: CARDIOLOGY | Facility: CLINIC | Age: 75
End: 2025-04-17
Payer: COMMERCIAL

## 2025-04-17 ENCOUNTER — TELEPHONE (OUTPATIENT)
Dept: ANTICOAGULATION | Facility: CLINIC | Age: 75
End: 2025-04-17

## 2025-04-17 ENCOUNTER — ANTICOAGULATION THERAPY VISIT (OUTPATIENT)
Dept: ANTICOAGULATION | Facility: CLINIC | Age: 75
End: 2025-04-17

## 2025-04-17 VITALS
SYSTOLIC BLOOD PRESSURE: 161 MMHG | DIASTOLIC BLOOD PRESSURE: 90 MMHG | BODY MASS INDEX: 33.32 KG/M2 | WEIGHT: 256 LBS | RESPIRATION RATE: 14 BRPM | HEART RATE: 65 BPM

## 2025-04-17 DIAGNOSIS — R06.02 SOB (SHORTNESS OF BREATH): Primary | ICD-10-CM

## 2025-04-17 DIAGNOSIS — R42 LIGHTHEADEDNESS: ICD-10-CM

## 2025-04-17 DIAGNOSIS — I26.99 PULMONARY EMBOLISM, BILATERAL (H): ICD-10-CM

## 2025-04-17 DIAGNOSIS — I25.83 CORONARY ARTERY DISEASE DUE TO LIPID RICH PLAQUE: ICD-10-CM

## 2025-04-17 DIAGNOSIS — I25.10 CORONARY ARTERY DISEASE DUE TO LIPID RICH PLAQUE: ICD-10-CM

## 2025-04-17 DIAGNOSIS — Z86.711 HISTORY OF PULMONARY EMBOLISM: ICD-10-CM

## 2025-04-17 DIAGNOSIS — G47.33 OSA ON CPAP: ICD-10-CM

## 2025-04-17 DIAGNOSIS — E78.00 PURE HYPERCHOLESTEROLEMIA: ICD-10-CM

## 2025-04-17 DIAGNOSIS — R00.2 HEART PALPITATIONS: ICD-10-CM

## 2025-04-17 DIAGNOSIS — R06.09 DOE (DYSPNEA ON EXERTION): ICD-10-CM

## 2025-04-17 DIAGNOSIS — I25.118 CORONARY ARTERY DISEASE INVOLVING NATIVE CORONARY ARTERY OF NATIVE HEART WITH OTHER FORM OF ANGINA PECTORIS: ICD-10-CM

## 2025-04-17 DIAGNOSIS — I48.21 PERMANENT ATRIAL FIBRILLATION (H): ICD-10-CM

## 2025-04-17 DIAGNOSIS — R42 DIZZINESS: ICD-10-CM

## 2025-04-17 DIAGNOSIS — I48.21 PERMANENT ATRIAL FIBRILLATION (H): Primary | ICD-10-CM

## 2025-04-17 DIAGNOSIS — R53.83 OTHER FATIGUE: ICD-10-CM

## 2025-04-17 LAB — INR POINT OF CARE: 7.2 (ref 0.9–1.1)

## 2025-04-17 PROCEDURE — 36416 COLLJ CAPILLARY BLOOD SPEC: CPT

## 2025-04-17 PROCEDURE — 85610 PROTHROMBIN TIME: CPT

## 2025-04-17 RX ORDER — ATORVASTATIN CALCIUM 40 MG/1
TABLET, FILM COATED ORAL
COMMUNITY
Start: 2024-06-14

## 2025-04-17 NOTE — TELEPHONE ENCOUNTER
Reason for call:  Other   Patient called regarding (reason for call): call back  Additional comments: pt spouse calling in regards to medication and wants to connect on instructions    Phone number to reach patient:  Home number on file [phone] (home)    Best Time:  asap    Can we leave a detailed message on this number?  YES    Travel screening: Not Applicable

## 2025-04-17 NOTE — TELEPHONE ENCOUNTER
General Call      Reason for Call:  patients spouse called anti coag nurse back.    No answer.    Please contact her back if consent.  Thank you.    What are your questions or concerns:  no    Date of last appointment with provider: na    Could we send this information to you in Timescape or would you prefer to receive a phone call?:   Patient would prefer a phone call   Okay to leave a detailed message?: No at Home number on file [phone] (home)

## 2025-04-17 NOTE — PATIENT INSTRUCTIONS
Schedule the following tests:   Pulmonary function test  Echocardiogram  Nuclear stress test  VQ scan  Followup in 3 months

## 2025-04-17 NOTE — TELEPHONE ENCOUNTER
1) No MAYI for spouse.     2) RN sent pt [employer] message asking what dose of sertraline (ZOLOFT) 25 MG tablet he is currently taking.    3) RN included instructions for for how to complete an MAYI for wife.     4) Refill request to be further addressed via 4/17/25 [facility] Medical Advice encounter.     Miladis Valenzuela RN on 4/17/2025 at 4:28 PM

## 2025-04-17 NOTE — LETTER
4/17/2025    Mamadou Baez MD  [address]  [facility] [address]    RE: Prem FALCON Claudia       Dear Colleague,     I had the pleasure of seeing Prem Olsenterrance in the [facility].    [facility]   [address]  [place] [address]   [email or web address]   OFFICE: [phone]     CARDIOLOGY CLINIC NOTE     Assessment/Recommendations   Assessment:    SOB: euvolemic on exam, rec echo, lexiscan; PFTs, VQ scan; follow-up after testing   Afib: rate controlled w bb and on AC; c/w current regimen  CAD: statin dose inc last visit (LDL was 79). NST as above for SOB.  HLD: as above; labs at next visit.       HPI:   Pmh- HLD,  unprovoked bilat PE, afib (both dx 2017) on ac, john paul complaint w mask; stress test in dec 2023 for LOERA- reached 84% MPHR therefore had CT that showed mild-mod diffuse dz, moderately severe narrowing noted in a diagonal branch; maintained on medical therpay      Since his last visit he has been diagnosed with Alzheimer's and seen by neurologist.  In the last 6 months he has been more short of breath, he still goes to the gym and does treadmill elliptical and weights but finds himself pushing harder.  His wife is also noticed that he is short of breath when sitting down.  No orthopnea PND or edema.  His CT scan from 2024 (for lung nodules) is notable for emphysema, they are unaware of this finding. Remote h/o smoking, quit in 1990.    Wears fitbit, HR avgs 60s.      PMH:     As above    Cardiac history:    24 hr holter 10/30/24:  bnormal 24-hour Holter monitor recording by virtue the presence of continuous atrial fibrillation.  The ventricular response appears to  be well-controlled.  There were no apparent symptoms referable to this arrhythmia.  The patient also has evidence of conduction disease manifest as an  IVCD.  There is no evidence of high degree AV block.       Coronary Angiogram CT 12/27/23:  cac 541, 71st percentile    LEFT MAIN: The left main arises normally from the left coronary cusp and there is mildly obstructed with calcified plaque.     LEFT ANTERIOR DESCENDING: The left anterior descending is relatively diffusely diseased. In the proximal segment there is a moderately obstructive mixed plaque as well as in the mid segment of the LAD. The distal LAD is mildly obstructed with calcified plaque. There is a large second diagonal branch which appears to have a moderately severe severe ostial narrowing with mixed plaque.     CIRCUMFLEX: The circumflex artery gives off its major branches the first obtuse marginal branch which is mild to moderately obstructive mixed plaque. The distal circumflex is in the AV groove and is small without significant lesions detected.     RIGHT CORONARY ARTERY: The right coronary artery is dominant. There is a mildly obstructed calcified plaque in the proximal segment. The mid RCA is moderately obstructed with mixed plaque. There is mildly obstructive plaque in the distal right coronary artery. PDA is without significant disease.        Medications  Allergies   Current Outpatient Medications   Medication Sig Dispense Refill     atorvastatin (LIPITOR) 40 MG tablet        isosorbide mononitrate (IMDUR) 30 MG 24 hr tablet TAKE 2 TABLETS BY MOUTH DAILY 180 tablet 3     metoprolol succinate ER (TOPROL XL) 25 MG 24 hr tablet Take 2 tablets (50 mg) by mouth daily. 180 tablet 3     MULTIVITAMIN (MULTIPLE VITAMIN ORAL) [MULTIVITAMIN (MULTIPLE VITAMIN ORAL)] Take 1 tablet by mouth daily.       rosuvastatin (CRESTOR) 40 MG tablet Take 1 tablet (40 mg) by mouth daily. 90 tablet 3     warfarin ANTICOAGULANT (COUMADIN) 7.5 MG tablet Take a half tablet (3.75 mg) by mouth every Tuesday and Saturday; Take a whole tablet (7.5 mg) by mouth all other days. OR as directed by INR clinic. 75 tablet 0     escitalopram (LEXAPRO) 10 MG tablet Take 1 tablet (10 mg) by mouth daily for 7 days, THEN 0.5 tablets (5 mg) daily for  7 days. Then STOP. 11 tablet 0     sertraline (ZOLOFT) 25 MG tablet Take 0.5 tablets (12.5 mg) by mouth daily for 7 days, THEN 1 tablet (25 mg) daily for 7 days, THEN 2 tablets (50 mg) daily for 7 days. 60 tablet 0      No Known Allergies     Physical Examination Review of Systems   Vitals: BP (!) 161/90 (BP Location: Right arm, Patient Position: Sitting, Cuff Size: Adult Large)   Pulse 65   Resp 14   Wt 116.1 kg (256 lb)   BMI 33.32 kg/m    BMI= Body mass index is 33.32 kg/m .  Wt Readings from Last 3 Encounters:   04/17/25 116.1 kg (256 lb)   03/25/25 115.7 kg (255 lb)   02/27/25 115.7 kg (255 lb)       General: pleasant male. No acute distress.   Neck: No JVD  Lungs: clear to auscultation  COR:  irregular rhythm, No murmurs, rubs, or gallops  Extrem: No edema   Per HPI          Lab Results    Chemistry/lipid CBC Cardiac Enzymes/BNP/TSH/INR   Lab Results   Component Value Date    CHOL 155 10/11/2024    HDL 65 10/11/2024    TRIG 56 10/11/2024    BUN 14.7 03/25/2025     03/25/2025    CO2 23 03/25/2025    Lab Results   Component Value Date    WBC 7.1 03/25/2025    HGB 12.7 (L) 03/25/2025    HCT 37.5 (L) 03/25/2025    MCV 92 03/25/2025     03/25/2025    Lab Results   Component Value Date    TSH 1.76 01/14/2025    INR 3.6 (H) 04/04/2025          The longitudinal plan of care for the diagnosis(es)/condition(s) as documented were addressed during this visit. Due to the added complexity in care, I will continue to support Ryan in the subsequent management and with ongoing continuity of care.          Reji Gordon MD, MPH  Non-invasive Cardiologist  [facility]         Thank you for allowing me to participate in the care of your patient.      Sincerely,     Reji HAMILTON [facility]  cc:   Gerardo Velez MD  [address]  [place] [address]

## 2025-04-17 NOTE — TELEPHONE ENCOUNTER
See anticoagulation encounter from today.    Elsie Stringer RN  [facility] Anticoagulation  [phone]

## 2025-04-17 NOTE — PROGRESS NOTES
ANTICOAGULATION MANAGEMENT     Prem Taylor 74 year old male is on warfarin with supratherapeutic INR result. (Goal INR 2.0-3.0)    Recent labs: (last 7 days)     04/17/25  1125   INR 7.2*       ASSESSMENT     Warfarin Lab Questionnaire    Warfarin Doses Last 7 Days      4/16/2025    11:48 AM   Dose in Tablet or Mg   TAB or MG? milligram (mg)     Pt Rptd Dose SUNDAY MONDAY TUESDAY WED THURS FRIDAY SATURDAY 4/16/2025  11:48 AM 3.75 7.5 3.75 7.5 7.5 3.75 7.5         4/16/2025   Warfarin Lab Questionnaire   Missed doses within past 14 days? No   Changes in diet or alcohol within past 14 days? No   Medication changes since last result? No-- started zoloft a few weeks ago, not expected to affect INR but can increase bleed risk   Injuries or illness since last result? No   New shortness of breath, severe headaches or sudden changes in vision since last result? No   Abnormal bleeding since last result? No   Upcoming surgery, procedure? No   Best number to call with results? [phone]     Previous result: Supratherapeutic  Additional findings: None       PLAN     Recommended plan for ongoing change(s) affecting INR     Dosing Instructions: hold 2 doses then decrease your warfarin dose (18.2% change) with next INR on Monday       Summary  As of 4/17/2025      Full warfarin instructions:  4/17: Hold; 4/18: Hold; Otherwise 7.5 mg every Mon, Fri; 3.75 mg all other days   Next INR check:  4/21/2025               Telephone call with wife, Aubree who agrees to plan and repeated back plan correctly  Sent [employer] message with dosing and follow up instructions    Will walk in at [facility] outpatient lab after appt on Monday (standing venous INR order placed)    Education provided: Interaction IS anticipated between warfarin and sertraline (increased bleed risk)  Symptom monitoring: monitoring for bleeding signs and symptoms, monitoring for stroke signs and symptoms, when to seek medical attention/emergency care, and if you hit  your head or have a bad fall seek emergency care  Contact [phone] with any changes, questions or concerns.     Plan made per [facility] anticoagulation protocol    Elsie Stringer RN  4/17/2025  Anticoagulation Clinic  [facility] [email or web address] for routing messages: elsie MAURICIO  [facility] patient phone line: [phone]        _______________________________________________________________________     Anticoagulation Episode Summary       Current INR goal:  2.0-3.0   TTR:  48.9% (1 y)   Target end date:  Indefinite   Send INR reminders to:  RUBY MAURICIO    Indications    Pulmonary embolism  bilateral (H) (Resolved) [I26.99]  Permanent atrial fibrillation (H) [I48.21]  Pulmonary embolism  bilateral (H) [I26.99]  History of pulmonary embolism [Z86.711]             Comments:  Approved for 8 week checks per -- see encounter from 07/27/21             Anticoagulation Care Providers       Provider Role Specialty Phone number    Mamadou Baez MD Referring Family Medicine [phone]

## 2025-04-17 NOTE — TELEPHONE ENCOUNTER
1) Per Chart Review, sertraline (ZOLOFT) 25 MG tablet was last prescribed on 3/5/2025 with the following titration instructions:     - Take 0.5 tablets (12.5 mg) by mouth daily for 7 days, THEN...  - Take 1 tablet (25 mg) daily for 7 days, THEN...  - Take 2 tablets (50 mg) daily for 7 days.    2) RN left detailed VM requesting pt call [phone] to let clinic know if he is up to the titrated dose of taking two tablets (50 MG) daily.     3) Next RN process refill request for titrated dose of 50 MG daily if appropriate.     Miladis Valenzuela RN on 4/17/2025 at 12:04 PM

## 2025-04-17 NOTE — PROGRESS NOTES
[facility]   [address]  [place] [address]   [email or web address]   OFFICE: [phone]     CARDIOLOGY CLINIC NOTE     Assessment/Recommendations   Assessment:    SOB: euvolemic on exam, rec echo, lexiscan; PFTs, VQ scan; follow-up after testing   Afib: rate controlled w bb and on AC; c/w current regimen  CAD: statin dose inc last visit (LDL was 79). NST as above for SOB.  HLD: as above; labs at next visit.       HPI:   Pmh- HLD,  unprovoked bilat PE, afib (both dx 2017) on ac, john paul complaint w mask; stress test in dec 2023 for LOERA- reached 84% MPHR therefore had CT that showed mild-mod diffuse dz, moderately severe narrowing noted in a diagonal branch; maintained on medical therpay      Since his last visit he has been diagnosed with Alzheimer's and seen by neurologist.  In the last 6 months he has been more short of breath, he still goes to the gym and does treadmill elliptical and weights but finds himself pushing harder.  His wife is also noticed that he is short of breath when sitting down.  No orthopnea PND or edema.  His CT scan from 2024 (for lung nodules) is notable for emphysema, they are unaware of this finding. Remote h/o smoking, quit in 1990.    Wears fitbit, HR avgs 60s.      PMH:     As above    Cardiac history:    24 hr holter 10/30/24:  bnormal 24-hour Holter monitor recording by virtue the presence of continuous atrial fibrillation.  The ventricular response appears to  be well-controlled.  There were no apparent symptoms referable to this arrhythmia.  The patient also has evidence of conduction disease manifest as an  IVCD.  There is no evidence of high degree AV block.       Coronary Angiogram CT 12/27/23:  cac 541, 71st percentile   LEFT MAIN: The left main arises normally from the left coronary cusp and there is mildly obstructed with calcified plaque.     LEFT ANTERIOR DESCENDING: The left anterior descending is relatively diffusely  diseased. In the proximal segment there is a moderately obstructive mixed plaque as well as in the mid segment of the LAD. The distal LAD is mildly obstructed with calcified plaque. There is a large second diagonal branch which appears to have a moderately severe severe ostial narrowing with mixed plaque.     CIRCUMFLEX: The circumflex artery gives off its major branches the first obtuse marginal branch which is mild to moderately obstructive mixed plaque. The distal circumflex is in the AV groove and is small without significant lesions detected.     RIGHT CORONARY ARTERY: The right coronary artery is dominant. There is a mildly obstructed calcified plaque in the proximal segment. The mid RCA is moderately obstructed with mixed plaque. There is mildly obstructive plaque in the distal right coronary artery. PDA is without significant disease.        Medications  Allergies   Current Outpatient Medications   Medication Sig Dispense Refill    atorvastatin (LIPITOR) 40 MG tablet       isosorbide mononitrate (IMDUR) 30 MG 24 hr tablet TAKE 2 TABLETS BY MOUTH DAILY 180 tablet 3    metoprolol succinate ER (TOPROL XL) 25 MG 24 hr tablet Take 2 tablets (50 mg) by mouth daily. 180 tablet 3    MULTIVITAMIN (MULTIPLE VITAMIN ORAL) [MULTIVITAMIN (MULTIPLE VITAMIN ORAL)] Take 1 tablet by mouth daily.      rosuvastatin (CRESTOR) 40 MG tablet Take 1 tablet (40 mg) by mouth daily. 90 tablet 3    warfarin ANTICOAGULANT (COUMADIN) 7.5 MG tablet Take a half tablet (3.75 mg) by mouth every Tuesday and Saturday; Take a whole tablet (7.5 mg) by mouth all other days. OR as directed by INR clinic. 75 tablet 0    escitalopram (LEXAPRO) 10 MG tablet Take 1 tablet (10 mg) by mouth daily for 7 days, THEN 0.5 tablets (5 mg) daily for 7 days. Then STOP. 11 tablet 0    sertraline (ZOLOFT) 25 MG tablet Take 0.5 tablets (12.5 mg) by mouth daily for 7 days, THEN 1 tablet (25 mg) daily for 7 days, THEN 2 tablets (50 mg) daily for 7 days. 60 tablet 0       No Known Allergies     Physical Examination Review of Systems   Vitals: BP (!) 161/90 (BP Location: Right arm, Patient Position: Sitting, Cuff Size: Adult Large)   Pulse 65   Resp 14   Wt 116.1 kg (256 lb)   BMI 33.32 kg/m    BMI= Body mass index is 33.32 kg/m .  Wt Readings from Last 3 Encounters:   04/17/25 116.1 kg (256 lb)   03/25/25 115.7 kg (255 lb)   02/27/25 115.7 kg (255 lb)       General: pleasant male. No acute distress.   Neck: No JVD  Lungs: clear to auscultation  COR:  irregular rhythm, No murmurs, rubs, or gallops  Extrem: No edema   Per HPI          Lab Results    Chemistry/lipid CBC Cardiac Enzymes/BNP/TSH/INR   Lab Results   Component Value Date    CHOL 155 10/11/2024    HDL 65 10/11/2024    TRIG 56 10/11/2024    BUN 14.7 03/25/2025     03/25/2025    CO2 23 03/25/2025    Lab Results   Component Value Date    WBC 7.1 03/25/2025    HGB 12.7 (L) 03/25/2025    HCT 37.5 (L) 03/25/2025    MCV 92 03/25/2025     03/25/2025    Lab Results   Component Value Date    TSH 1.76 01/14/2025    INR 3.6 (H) 04/04/2025          The longitudinal plan of care for the diagnosis(es)/condition(s) as documented were addressed during this visit. Due to the added complexity in care, I will continue to support Ryan in the subsequent management and with ongoing continuity of care.          Reji Gordon MD, MPH  Non-invasive Cardiologist  [facility]

## 2025-04-21 ENCOUNTER — HOSPITAL ENCOUNTER (OUTPATIENT)
Dept: NUCLEAR MEDICINE | Facility: HOSPITAL | Age: 75
Discharge: HOME | End: 2025-04-21
Attending: INTERNAL MEDICINE
Payer: COMMERCIAL

## 2025-04-21 ENCOUNTER — TELEPHONE (OUTPATIENT)
Dept: NURSING | Facility: CLINIC | Age: 75
End: 2025-04-21

## 2025-04-21 ENCOUNTER — HOSPITAL ENCOUNTER (OUTPATIENT)
Dept: RADIOLOGY | Facility: HOSPITAL | Age: 75
Discharge: HOME | End: 2025-04-21
Attending: INTERNAL MEDICINE
Payer: COMMERCIAL

## 2025-04-21 ENCOUNTER — TELEPHONE (OUTPATIENT)
Dept: ANTICOAGULATION | Facility: CLINIC | Age: 75
End: 2025-04-21

## 2025-04-21 ENCOUNTER — LAB (OUTPATIENT)
Dept: LAB | Facility: CLINIC | Age: 75
End: 2025-04-21
Payer: COMMERCIAL

## 2025-04-21 DIAGNOSIS — Z86.711 HISTORY OF PULMONARY EMBOLISM: ICD-10-CM

## 2025-04-21 DIAGNOSIS — R06.02 SOB (SHORTNESS OF BREATH): ICD-10-CM

## 2025-04-21 DIAGNOSIS — I26.99 PULMONARY EMBOLISM, BILATERAL (H): ICD-10-CM

## 2025-04-21 DIAGNOSIS — I48.21 PERMANENT ATRIAL FIBRILLATION (H): ICD-10-CM

## 2025-04-21 DIAGNOSIS — I48.21 PERMANENT ATRIAL FIBRILLATION (H): Primary | ICD-10-CM

## 2025-04-21 LAB
INR BLD: 8 (ref 0.9–1.1)
INR PPP: >10 (ref 0.85–1.15)

## 2025-04-21 PROCEDURE — 85610 PROTHROMBIN TIME: CPT

## 2025-04-21 PROCEDURE — 36416 COLLJ CAPILLARY BLOOD SPEC: CPT

## 2025-04-21 PROCEDURE — A9540 TC99M MAA: HCPCS | Performed by: INTERNAL MEDICINE

## 2025-04-21 PROCEDURE — 78582 LUNG VENTILAT&PERFUS IMAGING: CPT

## 2025-04-21 PROCEDURE — 36415 COLL VENOUS BLD VENIPUNCTURE: CPT

## 2025-04-21 PROCEDURE — A9567 TECHNETIUM TC-99M AEROSOL: HCPCS | Performed by: INTERNAL MEDICINE

## 2025-04-21 PROCEDURE — 272N000035 NM LUNG SCAN VENTILATION AND PERFUSION

## 2025-04-21 PROCEDURE — 71046 X-RAY EXAM CHEST 2 VIEWS: CPT

## 2025-04-21 PROCEDURE — 343N000001 HC RX 343 MED OP 636: Performed by: INTERNAL MEDICINE

## 2025-04-21 RX ADMIN — KIT FOR THE PREPARATION OF TECHNETIUM TC 99M ALBUMIN AGGREGATED 8.3 MILLICURIE: 2.5 INJECTION, POWDER, FOR SOLUTION INTRAVENOUS at 08:44

## 2025-04-21 RX ADMIN — KIT FOR THE PREPARATION OF TECHNETIUM TC 99M PENTETATE 60.2 MILLICURIE: 20 INJECTION, POWDER, LYOPHILIZED, FOR SOLUTION INTRAVENOUS; RESPIRATORY (INHALATION) at 08:11

## 2025-04-21 NOTE — TELEPHONE ENCOUNTER
2nd call - called and left message with spouse, Aubree   - also sent [employer] message - to HOLD WARFARIN DOSE TONIGHT.   - ACN will follow-up with pt on 4/22.        1st call - Called and spoke with spouse, Aubree.     - informed fingerstick INR this morning was >8.0   - an INR venous was drawn for confirmation - still in process.     - advise to ensure to HOLD warfarin, until she hears back from this writer with further instructions.

## 2025-04-22 ENCOUNTER — HOSPITAL ENCOUNTER (OUTPATIENT)
Facility: CLINIC | Age: 75
Setting detail: OBSERVATION
Discharge: HOME | End: 2025-04-23
Attending: EMERGENCY MEDICINE | Admitting: HOSPITALIST
Payer: COMMERCIAL

## 2025-04-22 DIAGNOSIS — R79.1 SUPRATHERAPEUTIC INR: ICD-10-CM

## 2025-04-22 LAB
ANION GAP SERPL CALCULATED.3IONS-SCNC: 10 MMOL/L (ref 7–15)
APTT PPP: 82 SECONDS (ref 22–38)
BASOPHILS # BLD AUTO: 0 10E3/UL (ref 0–0.2)
BASOPHILS NFR BLD AUTO: 0 %
BUN SERPL-MCNC: 20.5 MG/DL (ref 8–23)
CALCIUM SERPL-MCNC: 9 MG/DL (ref 8.8–10.4)
CHLORIDE SERPL-SCNC: 106 MMOL/L (ref 98–107)
CREAT SERPL-MCNC: 0.81 MG/DL (ref 0.67–1.17)
EGFRCR SERPLBLD CKD-EPI 2021: >90 ML/MIN/1.73M2
EOSINOPHIL # BLD AUTO: 0.1 10E3/UL (ref 0–0.7)
EOSINOPHIL NFR BLD AUTO: 2 %
ERYTHROCYTE [DISTWIDTH] IN BLOOD BY AUTOMATED COUNT: 13.4 % (ref 10–15)
GLUCOSE SERPL-MCNC: 100 MG/DL (ref 70–99)
HCO3 SERPL-SCNC: 23 MMOL/L (ref 22–29)
HCT VFR BLD AUTO: 36.2 % (ref 40–53)
HGB BLD-MCNC: 12.3 G/DL (ref 13.3–17.7)
IMM GRANULOCYTES # BLD: 0 10E3/UL
IMM GRANULOCYTES NFR BLD: 0 %
INR PPP: 9.82 (ref 0.85–1.15)
INR PPP: >10 (ref 0.85–1.15)
LYMPHOCYTES # BLD AUTO: 0.8 10E3/UL (ref 0.8–5.3)
LYMPHOCYTES NFR BLD AUTO: 17 %
MCH RBC QN AUTO: 30.2 PG (ref 26.5–33)
MCHC RBC AUTO-ENTMCNC: 34 G/DL (ref 31.5–36.5)
MCV RBC AUTO: 89 FL (ref 78–100)
MONOCYTES # BLD AUTO: 0.6 10E3/UL (ref 0–1.3)
MONOCYTES NFR BLD AUTO: 14 %
NEUTROPHILS # BLD AUTO: 3 10E3/UL (ref 1.6–8.3)
NEUTROPHILS NFR BLD AUTO: 67 %
NRBC # BLD AUTO: 0 10E3/UL
NRBC BLD AUTO-RTO: 0 /100
PLATELET # BLD AUTO: 163 10E3/UL (ref 150–450)
POTASSIUM SERPL-SCNC: 4.3 MMOL/L (ref 3.4–5.3)
RBC # BLD AUTO: 4.07 10E6/UL (ref 4.4–5.9)
SODIUM SERPL-SCNC: 139 MMOL/L (ref 135–145)
WBC # BLD AUTO: 4.5 10E3/UL (ref 4–11)

## 2025-04-22 PROCEDURE — 80048 BASIC METABOLIC PNL TOTAL CA: CPT | Performed by: EMERGENCY MEDICINE

## 2025-04-22 PROCEDURE — 85610 PROTHROMBIN TIME: CPT | Performed by: EMERGENCY MEDICINE

## 2025-04-22 PROCEDURE — 250N000009 HC RX 250: Performed by: HOSPITALIST

## 2025-04-22 PROCEDURE — 85004 AUTOMATED DIFF WBC COUNT: CPT | Performed by: EMERGENCY MEDICINE

## 2025-04-22 PROCEDURE — G0378 HOSPITAL OBSERVATION PER HR: HCPCS

## 2025-04-22 PROCEDURE — 250N000011 HC RX IP 250 OP 636: Mod: JZ | Performed by: HOSPITALIST

## 2025-04-22 PROCEDURE — 250N000011 HC RX IP 250 OP 636: Mod: JZ | Performed by: EMERGENCY MEDICINE

## 2025-04-22 PROCEDURE — 85610 PROTHROMBIN TIME: CPT | Performed by: HOSPITALIST

## 2025-04-22 PROCEDURE — 99285 EMERGENCY DEPT VISIT HI MDM: CPT

## 2025-04-22 PROCEDURE — 36415 COLL VENOUS BLD VENIPUNCTURE: CPT | Performed by: EMERGENCY MEDICINE

## 2025-04-22 PROCEDURE — 250N000013 HC RX MED GY IP 250 OP 250 PS 637: Performed by: HOSPITALIST

## 2025-04-22 PROCEDURE — 85730 THROMBOPLASTIN TIME PARTIAL: CPT | Performed by: EMERGENCY MEDICINE

## 2025-04-22 PROCEDURE — 36415 COLL VENOUS BLD VENIPUNCTURE: CPT | Performed by: HOSPITALIST

## 2025-04-22 PROCEDURE — 250N000009 HC RX 250: Performed by: EMERGENCY MEDICINE

## 2025-04-22 RX ORDER — DONEPEZIL HYDROCHLORIDE 5 MG/1
5 TABLET, FILM COATED ORAL AT BEDTIME
COMMUNITY
End: 2025-04-24

## 2025-04-22 RX ORDER — METOPROLOL SUCCINATE 25 MG/1
50 TABLET, EXTENDED RELEASE ORAL AT BEDTIME
Status: DISCONTINUED | OUTPATIENT
Start: 2025-04-22 | End: 2025-04-23 | Stop reason: HOSPADM

## 2025-04-22 RX ORDER — AMOXICILLIN 250 MG
2 CAPSULE ORAL 2 TIMES DAILY PRN
Status: DISCONTINUED | OUTPATIENT
Start: 2025-04-22 | End: 2025-04-23 | Stop reason: HOSPADM

## 2025-04-22 RX ORDER — ISOSORBIDE MONONITRATE 30 MG/1
60 TABLET, EXTENDED RELEASE ORAL DAILY
Status: DISCONTINUED | OUTPATIENT
Start: 2025-04-23 | End: 2025-04-23 | Stop reason: HOSPADM

## 2025-04-22 RX ORDER — WARFARIN SODIUM 7.5 MG/1
3.75-7.5 TABLET ORAL SEE ADMIN INSTRUCTIONS
COMMUNITY

## 2025-04-22 RX ORDER — AMOXICILLIN 250 MG
1 CAPSULE ORAL 2 TIMES DAILY PRN
Status: DISCONTINUED | OUTPATIENT
Start: 2025-04-22 | End: 2025-04-23 | Stop reason: HOSPADM

## 2025-04-22 RX ORDER — ROSUVASTATIN CALCIUM 10 MG/1
40 TABLET, COATED ORAL AT BEDTIME
Status: DISCONTINUED | OUTPATIENT
Start: 2025-04-23 | End: 2025-04-23 | Stop reason: HOSPADM

## 2025-04-22 RX ORDER — SERTRALINE HCL 25 MG
12.5 TABLET ORAL AT BEDTIME
Status: DISCONTINUED | OUTPATIENT
Start: 2025-04-22 | End: 2025-04-23 | Stop reason: HOSPADM

## 2025-04-22 RX ORDER — ACETAMINOPHEN 325 MG/1
650 TABLET ORAL EVERY 4 HOURS PRN
Status: DISCONTINUED | OUTPATIENT
Start: 2025-04-22 | End: 2025-04-23 | Stop reason: HOSPADM

## 2025-04-22 RX ORDER — ONDANSETRON 2 MG/ML
4 INJECTION INTRAMUSCULAR; INTRAVENOUS EVERY 6 HOURS PRN
Status: DISCONTINUED | OUTPATIENT
Start: 2025-04-22 | End: 2025-04-23 | Stop reason: HOSPADM

## 2025-04-22 RX ORDER — DONEPEZIL HYDROCHLORIDE 5 MG/1
5 TABLET, FILM COATED ORAL AT BEDTIME
Status: DISCONTINUED | OUTPATIENT
Start: 2025-04-22 | End: 2025-04-23 | Stop reason: HOSPADM

## 2025-04-22 RX ORDER — PROCHLORPERAZINE MALEATE 5 MG/1
5 TABLET ORAL EVERY 6 HOURS PRN
Status: DISCONTINUED | OUTPATIENT
Start: 2025-04-22 | End: 2025-04-23 | Stop reason: HOSPADM

## 2025-04-22 RX ORDER — ONDANSETRON 4 MG/1
4 TABLET, ORALLY DISINTEGRATING ORAL EVERY 6 HOURS PRN
Status: DISCONTINUED | OUTPATIENT
Start: 2025-04-22 | End: 2025-04-23 | Stop reason: HOSPADM

## 2025-04-22 RX ORDER — ACETAMINOPHEN 650 MG/1
650 SUPPOSITORY RECTAL EVERY 4 HOURS PRN
Status: DISCONTINUED | OUTPATIENT
Start: 2025-04-22 | End: 2025-04-23 | Stop reason: HOSPADM

## 2025-04-22 RX ADMIN — PHYTONADIONE 2.5 MG: 10 INJECTION, EMULSION INTRAMUSCULAR; INTRAVENOUS; SUBCUTANEOUS at 11:35

## 2025-04-22 RX ADMIN — SERTRALINE HYDROCHLORIDE 12.5 MG: 25 TABLET, FILM COATED ORAL at 21:29

## 2025-04-22 RX ADMIN — METOPROLOL SUCCINATE 50 MG: 25 TABLET, EXTENDED RELEASE ORAL at 21:28

## 2025-04-22 RX ADMIN — DONEPEZIL HYDROCHLORIDE 5 MG: 5 TABLET, FILM COATED ORAL at 21:29

## 2025-04-22 RX ADMIN — PHYTONADIONE 2.5 MG: 10 INJECTION, EMULSION INTRAMUSCULAR; INTRAVENOUS; SUBCUTANEOUS at 16:59

## 2025-04-22 ASSESSMENT — ACTIVITIES OF DAILY LIVING (ADL)
ADLS_ACUITY_SCORE: 30
ADLS_ACUITY_SCORE: 24
ADLS_ACUITY_SCORE: 41
ADLS_ACUITY_SCORE: 24
ADLS_ACUITY_SCORE: 30
ADLS_ACUITY_SCORE: 41
ADLS_ACUITY_SCORE: 30
ADLS_ACUITY_SCORE: 41
ADLS_ACUITY_SCORE: 30
ADLS_ACUITY_SCORE: 24
ADLS_ACUITY_SCORE: 41
ADLS_ACUITY_SCORE: 30
ADLS_ACUITY_SCORE: 41
ADLS_ACUITY_SCORE: 24

## 2025-04-22 ASSESSMENT — COLUMBIA-SUICIDE SEVERITY RATING SCALE - C-SSRS
6. HAVE YOU EVER DONE ANYTHING, STARTED TO DO ANYTHING, OR PREPARED TO DO ANYTHING TO END YOUR LIFE?: NO
2. HAVE YOU ACTUALLY HAD ANY THOUGHTS OF KILLING YOURSELF IN THE PAST MONTH?: NO
1. IN THE PAST MONTH, HAVE YOU WISHED YOU WERE DEAD OR WISHED YOU COULD GO TO SLEEP AND NOT WAKE UP?: NO

## 2025-04-22 NOTE — TELEPHONE ENCOUNTER
Please contact patient to ensure that he had been seen in ER due to INR > 10 as noted in phone note.

## 2025-04-22 NOTE — ED PROVIDER NOTES
EMERGENCY DEPARTMENT ENCOUNTER      NAME: Prem Taylor  AGE: 74 year old male  YOB: 1950  MRN: [id number]  EVALUATION DATE & TIME: 2025  9:13 AM    PCP: Mamadou Baez    ED PROVIDER: Jose Sethi D.O.      Chief Complaint   Patient presents with    Abnormal Labs       FINAL IMPRESSION:  1. Supratherapeutic INR        ED COURSE & MEDICAL DECISION MAKIN:22 AM I met with the patient to gather history and to perform my initial exam. I discussed the plan for care while in the Emergency Department.         Pertinent Labs & Imaging studies reviewed. (See chart for details)  74 year old male presents to the Emergency Department for evaluation of elevated INR.  Family does report that the patient has been accidentally doubling up on his Coumadin for several days.  They are uncertain exactly how much he has taken.  He did not take any over the past 24 hours, but still had an elevation of greater than 10 in the emergency department today.  No  evidence of active bleeding, therefore I did opt to give the patient oral vitamin K.  However, as the patient's INR remained greater than 10 despite not taking Coumadin yesterday, I am concerned that his INR could be significantly higher than 10, significantly outside the detectable range, and thus would benefit from observation until we can ensure that his INR has come down appropriately.  I discussed with the hospitalist, and after discussion, we agreed to observation.    Medical Decision Making  I discussed the care with another health care provider: Hospitalist  Admit.    MIPS (CTPE, Dental pain, Pittman, Sinusitis, Asthma/COPD, Head Trauma): Not Applicable    SEPSIS: None        At the conclusion of the encounter I discussed the results of all of the tests and the disposition. The questions were answered. The patient or family acknowledged understanding and was agreeable with the care plan.        HPI    Patient information was obtained from: Patient and  wife    Use of : N/A        Prem Taylor is a 74 year old male who presents for evaluation of abnormal labs.     The patient reports that his INR is typically between 2.6 and 3, but has been increasing over the last couple weeks. He had an INR check yesterday and received a phone call from his PCP this morning instructing him to seek evaluation in the ED. Patient's wife says that yesterday, she was looking through his prescription medications and noticed that there were 2 medications mixed in the same pill bottle, and she believes he may have been taking more warfarin than he is prescribed. She also notes a history of dementia. Patient denies any symptoms related to his abnormal labs.     Denies headache, vision changes, bruising, abdominal pain, abdominal distension, black or tarry stools, bloody stools, hematuria, dysuria, chest pain, numbness, new extremity weakness, recent falls or trauma, or any other concerns at this time.     Of note, patient endorses some exertional shortness of breath that has been progressively worsening over the last year. His PCP is aware of this. He also follows with cardiology for persistent atrial fibrillation and has an appointment scheduled within the next 2 weeks for a stress test.     Per Chart Review: Patient's recent INR results as follows:   1.7 on 3/25/2025 (point of care)  3.6 on 4/4/2025 (point of care)  7.2 on 4/17/2025 (point of care)  8.0 on 4/21/2025 (point of care)  >10 on 4/21/2025      PAST MEDICAL HISTORY:  Past Medical History:   Diagnosis Date    Abnormal ECG     Anemia     Anxiety     Arrhythmia     Atrial fibrillation (H) 10/09/2017    Cancer (H) 2015    CPAP (continuous positive airway pressure) dependence 10/2019    per patient    Depression     Heart valve disease 2017    History of blood clots     Hyperlipidemia 2006    Pulmonary embolism, blood-clot, obstetric 10/09/2017    Sleep apnea 2017    Uses CPAP       PAST SURGICAL HISTORY:  Past  Surgical History:   Procedure Laterality Date    BACK SURGERY      CERVICAL FUSION  2001    HERNIA REPAIR  2005    MD LAP,INGUINAL HERNIA REPR,INITIAL Left 8/15/2019    Procedure: HERNIORRHAPHY, INGUINAL, LAPAROSCOPIC;  Surgeon: Yan Leiva MD;  Location: [facility];  Service: General    TONSILLECTOMY      childhood         CURRENT MEDICATIONS:    Current Facility-Administered Medications   Medication Dose Route Frequency Provider Last Rate Last Admin    acetaminophen (TYLENOL) tablet 650 mg  650 mg Oral Q4H PRN Jose Haji DO        Or    acetaminophen (TYLENOL) Suppository 650 mg  650 mg Rectal Q4H PRN Jose Haji DO        donepezil (ARICEPT) tablet 5 mg  5 mg Oral At Bedtime Jose Haji DO        [START ON 4/23/2025] isosorbide mononitrate (IMDUR) 24 hr tablet 60 mg  60 mg Oral Daily Jose Haji DO        metoprolol succinate ER (TOPROL XL) 24 hr tablet 50 mg  50 mg Oral At Bedtime Jose Haji DO        ondansetron (ZOFRAN ODT) ODT tab 4 mg  4 mg Oral Q6H PRN Jose Haji DO        Or    ondansetron (ZOFRAN) injection 4 mg  4 mg Intravenous Q6H PRN Jose Haji DO        Patient is already receiving anticoagulation with heparin, enoxaparin (LOVENOX), warfarin (COUMADIN)  or other anticoagulant medication   Does not apply Continuous PRN Jose Haji DO        prochlorperazine (COMPAZINE) injection 5 mg  5 mg Intravenous Q6H PRN Jose Haji DO        Or    prochlorperazine (COMPAZINE) tablet 5 mg  5 mg Oral Q6H PRN Jose Haji DO        [START ON 4/23/2025] rosuvastatin (CRESTOR) tablet 40 mg  40 mg Oral At Bedtime Jose Haji DO        senna-docusate (SENOKOT-S/PERICOLACE) 8.6-50 MG per tablet 1 tablet  1 tablet Oral BID PRN Jose Haji DO        Or    senna-docusate (SENOKOT-S/PERICOLACE) 8.6-50 MG per tablet 2 tablet  2 tablet Oral BID PRN Jose Haji DO        sertraline (ZOLOFT) half-tab 12.5 mg  12.5 mg Oral At Bedtime Jose Haji, DO          Current Outpatient Medications   Medication Sig Dispense Refill    donepezil (ARICEPT) 5 MG tablet Take 5 mg by mouth at bedtime.      isosorbide mononitrate (IMDUR) 30 MG 24 hr tablet TAKE 2 TABLETS BY MOUTH DAILY 180 tablet 3    metoprolol succinate ER (TOPROL XL) 25 MG 24 hr tablet Take 2 tablets (50 mg) by mouth daily. 180 tablet 3    MULTIVITAMIN (MULTIPLE VITAMIN ORAL) [MULTIVITAMIN (MULTIPLE VITAMIN ORAL)] Take 1 tablet by mouth daily.      rosuvastatin (CRESTOR) 40 MG tablet Take 1 tablet (40 mg) by mouth daily. 90 tablet 3    sertraline (ZOLOFT) 25 MG tablet Take 0.5 tablets (12.5 mg) by mouth daily for 7 days, THEN 1 tablet (25 mg) daily for 7 days, THEN 2 tablets (50 mg) daily for 7 days. 60 tablet 0    warfarin ANTICOAGULANT (COUMADIN) 7.5 MG tablet Take 3.75-7.5 mg by mouth See Admin Instructions. 7.5mg on Monday and Friday; 3.75mg on Tuesday, Wednesday, Thursday, Saturday,            ALLERGIES:  No Known Allergies    FAMILY HISTORY:  Family History   Problem Relation Age of Onset    Heart Disease Mother     Hypertension Mother     Dementia Mother     Coronary Artery Disease Father     LUNG DISEASE No family hx of        SOCIAL HISTORY:  Social History     Socioeconomic History    Marital status:    Tobacco Use    Smoking status: Former     Current packs/day: 0.00     Average packs/day: 1.5 packs/day for 22.9 years (34.3 ttl pk-yrs)     Types: Cigarettes     Start date:      Quit date: 1990     Years since quittin.4    Smokeless tobacco: Never   Vaping Use    Vaping status: Never Used   Substance and Sexual Activity    Alcohol use: Yes    Drug use: No   Social History Narrative    Has worked as a dental  and supervisor (more office work)     Social Drivers of Health     Financial Resource Strain: Low Risk  (2024)    Financial Resource Strain     Within the past 12 months, have you or your family members you live with been unable to get utilities (heat,  electricity) when it was really needed?: No   Food Insecurity: Low Risk  (9/13/2024)    Food Insecurity     Within the past 12 months, did you worry that your food would run out before you got money to buy more?: No     Within the past 12 months, did the food you bought just not last and you didn t have money to get more?: No   Transportation Needs: Low Risk  (9/13/2024)    Transportation Needs     Within the past 12 months, has lack of transportation kept you from medical appointments, getting your medicines, non-medical meetings or appointments, work, or from getting things that you need?: No   Physical Activity: Sufficiently Active (9/13/2024)    Exercise Vital Sign     Days of Exercise per Week: 5 days     Minutes of Exercise per Session: 100 min   Stress: Stress Concern Present (9/13/2024)    [place] [employer] - Occupational Stress Questionnaire     Feeling of Stress : To some extent   Social Connections: Unknown (9/13/2024)    Social Connection and Isolation Panel [NHANES]     Frequency of Social Gatherings with Friends and Family: Twice a week   Interpersonal Safety: Low Risk  (3/25/2025)    Interpersonal Safety     Do you feel physically and emotionally safe where you currently live?: Yes     Within the past 12 months, have you been hit, slapped, kicked or otherwise physically hurt by someone?: No     Within the past 12 months, have you been humiliated or emotionally abused in other ways by your partner or ex-partner?: No   Housing Stability: Low Risk  (9/13/2024)    Housing Stability     Do you have housing? : Yes     Are you worried about losing your housing?: No       VITALS:  Patient Vitals for the past 24 hrs:   BP Temp Temp src Pulse Resp SpO2 Height Weight   04/22/25 1200 (!) 159/111 -- -- 55 -- 97 % -- --   04/22/25 1136 (!) 167/96 -- -- 66 -- 95 % -- --   04/22/25 1118 -- -- -- 58 -- 98 % -- --   04/22/25 1031 (!) 166/101 -- -- 59 -- 94 % -- --   04/22/25 1007 -- -- -- 54 --  "97 % -- --   04/22/25 1000 135/82 -- -- 61 -- 95 % -- --   04/22/25 0930 (!) 157/100 -- -- 63 -- 97 % -- --   04/22/25 0918 (!) 153/99 98  F (36.7  C) Oral 61 18 98 % -- --   04/22/25 0916 -- -- -- -- -- -- 1.905 m (6' 3\") 111.1 kg (245 lb)       PHYSICAL EXAM    VITAL SIGNS: BP (!) 159/111   Pulse 55   Temp 98  F (36.7  C) (Oral)   Resp 18   Ht 1.905 m (6' 3\")   Wt 111.1 kg (245 lb)   SpO2 97%   BMI 30.62 kg/m      General Appearance: Well-appearing, well-nourished, no acute distress   Head:  Normocephalic, without obvious abnormality, atraumatic  Eyes:  PERRL, conjunctiva/corneas clear, EOM's intact,  ENT:  Lips, mucosa, and tongue normal, membranes are moist without pallor  Neck:  Normal ROM, symmetrical, trachea midline    Cardio:  Irregularly irregular rate and rhythm, no murmur, rub or gallop, 2+ pulses symmetric in all extremities  Pulm:  Clear to auscultation bilaterally, respirations unlabored,  Musculoskeletal: Full ROM, no edema, no cyanosis, good ROM of major joints  Integument:  Warm, Dry, No erythema, No rash.    Neurologic:  Alert & oriented.  No focal deficits appreciated.  Ambulatory.  Psychiatric:  Affect normal, Judgment normal, Mood normal.      LABS  Results for orders placed or performed during the hospital encounter of 04/22/25 (from the past 24 hours)   CBC with platelets + differential    Narrative    The following orders were created for panel order CBC with platelets + differential.  Procedure                               Abnormality         Status                     ---------                               -----------         ------                     CBC with platelets and ...[[id number]]  Abnormal            Final result                 Please view results for these tests on the individual orders.   Basic metabolic panel   Result Value Ref Range    Sodium 139 135 - 145 mmol/L    Potassium 4.3 3.4 - 5.3 mmol/L    Chloride 106 98 - 107 mmol/L    Carbon Dioxide (CO2) 23 22 - 29 " mmol/L    Anion Gap 10 7 - 15 mmol/L    Urea Nitrogen 20.5 8.0 - 23.0 mg/dL    Creatinine 0.81 0.67 - 1.17 mg/dL    GFR Estimate >90 >60 mL/min/1.73m2    Calcium 9.0 8.8 - 10.4 mg/dL    Glucose 100 (H) 70 - 99 mg/dL   INR   Result Value Ref Range    INR >10.00 (HH) 0.85 - 1.15   PTT   Result Value Ref Range    aPTT 82 (H) 22 - 38 Seconds   CBC with platelets and differential   Result Value Ref Range    WBC Count 4.5 4.0 - 11.0 10e3/uL    RBC Count 4.07 (L) 4.40 - 5.90 10e6/uL    Hemoglobin 12.3 (L) 13.3 - 17.7 g/dL    Hematocrit 36.2 (L) 40.0 - 53.0 %    MCV 89 78 - 100 fL    MCH 30.2 26.5 - 33.0 pg    MCHC 34.0 31.5 - 36.5 g/dL    RDW 13.4 10.0 - 15.0 %    Platelet Count 163 150 - 450 10e3/uL    % Neutrophils 67 %    % Lymphocytes 17 %    % Monocytes 14 %    % Eosinophils 2 %    % Basophils 0 %    % Immature Granulocytes 0 %    NRBCs per 100 WBC 0 <1 /100    Absolute Neutrophils 3.0 1.6 - 8.3 10e3/uL    Absolute Lymphocytes 0.8 0.8 - 5.3 10e3/uL    Absolute Monocytes 0.6 0.0 - 1.3 10e3/uL    Absolute Eosinophils 0.1 0.0 - 0.7 10e3/uL    Absolute Basophils 0.0 0.0 - 0.2 10e3/uL    Absolute Immature Granulocytes 0.0 <=0.4 10e3/uL    Absolute NRBCs 0.0 10e3/uL         RADIOLOGY  No orders to display          MEDICATIONS GIVEN IN THE EMERGENCY:  Medications   donepezil (ARICEPT) tablet 5 mg (has no administration in time range)   isosorbide mononitrate (IMDUR) 24 hr tablet 60 mg (has no administration in time range)   metoprolol succinate ER (TOPROL XL) 24 hr tablet 50 mg (has no administration in time range)   rosuvastatin (CRESTOR) tablet 40 mg (has no administration in time range)   sertraline (ZOLOFT) half-tab 12.5 mg (has no administration in time range)   senna-docusate (SENOKOT-S/PERICOLACE) 8.6-50 MG per tablet 1 tablet (has no administration in time range)     Or   senna-docusate (SENOKOT-S/PERICOLACE) 8.6-50 MG per tablet 2 tablet (has no administration in time range)   ondansetron (ZOFRAN ODT) ODT tab 4 mg  (has no administration in time range)     Or   ondansetron (ZOFRAN) injection 4 mg (has no administration in time range)   prochlorperazine (COMPAZINE) injection 5 mg (has no administration in time range)     Or   prochlorperazine (COMPAZINE) tablet 5 mg (has no administration in time range)   Patient is already receiving anticoagulation with heparin, enoxaparin (LOVENOX), warfarin (COUMADIN)  or other anticoagulant medication (has no administration in time range)   acetaminophen (TYLENOL) tablet 650 mg (has no administration in time range)     Or   acetaminophen (TYLENOL) Suppository 650 mg (has no administration in time range)   phytonadione (MEPHYTON/VITAMIN K) 1 mg/mL oral suspension 2.5 mg (2.5 mg Oral $Given 4/22/25 1135)       NEW PRESCRIPTIONS STARTED AT TODAY'S ER VISIT  New Prescriptions    No medications on file        I, Isaura Jasmine, am serving as a scribe to document services personally performed by Jose Sethi D.O., based on my observations and the provider's statements to me.  I, Jose Sethi D.O., attest that Isaura Jasmine is acting in a scribe capacity, has observed my performance of the services and has documented them in accordance with my direction.     Joes Sethi D.O.  Emergency Medicine  [facility] EMERGENCY ROOM  [address] [place] [id number] [phone]  Dept: [phone]       Jose Sethi,   04/22/25 1214       Jose Sethi,   04/22/25 1214

## 2025-04-22 NOTE — PLAN OF CARE
"[facility] ED Handoff Report    ED Chief Complaint: high INR from clinic called and told pt to come in    ED Diagnosis:  (R79.1) Supratherapeutic INR    Plan: Vit K reversal and trend INR levels        PMH:    Past Medical History:   Diagnosis Date    Abnormal ECG     Anemia     Anxiety     Arrhythmia     Atrial fibrillation (H) 10/09/2017    Cancer (H) 2015    CPAP (continuous positive airway pressure) dependence 10/2019    per patient    Depression     Heart valve disease 2017    History of blood clots     Hyperlipidemia 2006    Pulmonary embolism, blood-clot, obstetric 10/09/2017    Sleep apnea 2017    Uses CPAP        Code Status:  Full Code     Falls Risk: Yes Band: Applied    Current Living Situation/Residence: lives with a significant other     Elimination Status: Continent: Yes     Activity Level: SBA w/ walker   is ind at baseline but with high INR we been having pt call for help.     Patient's Preferred Language:  English     Needed: No    Vital Signs:  BP (!) 161/119   Pulse 61   Temp 98  F (36.7  C) (Oral)   Resp 18   Ht 1.905 m (6' 3\")   Wt 111.1 kg (245 lb)   SpO2 95%   BMI 30.62 kg/m       Pain Score: None    Is the Patient Confused:  No  but pt does have hx of dementia but has been alertx4 during his stay here.     Last Food or Drink: 04/22/25 at 1200    Assessment and Plan of Care: Vit K given monitor INR levels    Tests Performed: Done: Labs and Imaging    Treatments Provided:  Vit K     Family Dynamics/Concerns: No    Belongings Checklist Done and Signed by Patient: Yes    Belongings Sent with Patient: Yes    Boarding medications sent with patient: No    Additional Information: 1600 INR check.     Trinh Julien RN on 4/22/2025 at 1:34 PM                               "

## 2025-04-22 NOTE — TELEPHONE ENCOUNTER
Writer called and left message for patient and called patient's wife. Informed patient's wife of PCP recommendations, states patient just left for [facility] but she will go get him and bring him to [facility].    HILDA Suarez, RN  [facility]

## 2025-04-22 NOTE — TELEPHONE ENCOUNTER
Date/time of call received from lab: 04/21/25 at 10:03 PM.  Lab test:  INR  Lab value:  >10  Ordering provider name: FLAKITO Baez  Ordering provider department: Family Medicine  Primary Care Provider: Mamadou Baez     Result managed by: After Hours: [place] Nurse Advisor Does patient have an [facility] PCP? Yes, patient has [facility] PCP. On-call provider for PCP will be paged.     Action taken: RN will page on-call provider, following paging standard work process.         [place] Primary Care Provider consult indicated.  Reason for page: Critical INR  Specialty Group number: [id number]   Specialty Group: FM-Family Medicine    Initial call made to Dr. WENCESLAO Marrero by Answering Service at 10:08 PM.     Patient's wife had been instructed after the fingerstick lab draw that patient was to hold his warfarin dose tonight.     [place] Primary Care Provider, Dr. Marrero, returning page to Nurse Advisors at 10:09 PM    Provider recommended plan of care:  Call patient to check for current bleeding and send him to the ED for vitamin K. Route to Dr. FLAKITO Baez.    Provider Recommendation Follow Up:   Unable to reach patient/caregiver. Left message to return call to [phone] (To enter phone number for call back directly to clinic/staff member). Upon return call please notify caller of provider's recommendations.          Deidra Bliss RN  April 21, 2025 10:16 PM

## 2025-04-22 NOTE — H&P
"[facility] MEDICINE ADMISSION HISTORY AND PHYSICAL     Brief Synopsis:     Prem Taylor is a 74 year old male who presented after INR checked in the outpatient setting found value to be greater than 10.    Medical history is notable for A-fib, history of PE, chronically anticoagulated with warfarin, mild dementia, sleep apnea, depression.    Initial evaluation revealed hypertension, otherwise normal vitals.  Labs unremarkable other than INR greater than 10.  Negative VQ scan and chest x-ray yesterday.    Initial treatment included oral vitamin K.    Assessment and Plan:  Supratherapeutic INR  No evidence of bleeding  Due to taking extra doses of warfarin at home by mistake  Wife will take over his medications at home  Holding warfarin  Given 1 dose of vitamin K  Trend INR  Fall precautions  Continue other home medications    Mild dementia, on Aricept  History of pulmonary embolism  Atrial fibrillation  Sleep apnea  Depression, on sertraline  Diffuse coronary disease, on Imdur  Essential hypertension, on metoprolol  Hyperlipidemia, on rosuvastatin    Clinically Significant Risk Factors Present on Admission                # Drug Induced Coagulation Defect: home medication list includes an anticoagulant medication              # Obesity: Estimated body mass index is 30.62 kg/m  as calculated from the following:    Height as of this encounter: 1.905 m (6' 3\").    Weight as of this encounter: 111.1 kg (245 lb).                Disposition Plan      Expected Discharge Date: 04/23/2025               Chief Complaint Elevated INR     HISTORY   Prem Taylor is a 74 year old male who presented after elevated INR at outpatient clinic.    Seems that he has been inadvertently doubling up on his warfarin at home.  Denies any bruising, nosebleeds, bloody stools, chest pain.  Denies shortness of breath to me but recent clinic notes indicate he does have some trouble with shortness of breath.  No " edema.  No urinary complaints.      Past Medical History     Past Medical History:  No date: Abnormal ECG  No date: Anemia  No date: Anxiety  No date: Arrhythmia  10/09/2017: Atrial fibrillation (H)  2015: Cancer (H)  10/2019: CPAP (continuous positive airway pressure) dependence      Comment:  per patient  No date: Depression  2017: Heart valve disease  No date: History of blood clots  2006: Hyperlipidemia  10/09/2017: Pulmonary embolism, blood-clot, obstetric  2017: Sleep apnea      Comment:  Uses CPAP     Surgical History     Past Surgical History:   Procedure Laterality Date    BACK SURGERY      CERVICAL FUSION  2001    HERNIA REPAIR  2005    NM LAP,INGUINAL HERNIA REPR,INITIAL Left 8/15/2019    Procedure: HERNIORRHAPHY, INGUINAL, LAPAROSCOPIC;  Surgeon: Yan Leiva MD;  Location: [facility];  Service: General    TONSILLECTOMY      childhood     Family History      Family History   Problem Relation Age of Onset    Heart Disease Mother     Hypertension Mother     Dementia Mother     Coronary Artery Disease Father     LUNG DISEASE No family hx of       Social History      Social History     Tobacco Use    Smoking status: Former     Current packs/day: 0.00     Average packs/day: 1.5 packs/day for 22.9 years (34.3 ttl pk-yrs)     Types: Cigarettes     Start date:      Quit date: 1990     Years since quittin.4    Smokeless tobacco: Never   Vaping Use    Vaping status: Never Used   Substance Use Topics    Alcohol use: Yes    Drug use: No      Allergies   No Known Allergies  Prior to Admission Medications      Prior to Admission Medications   Prescriptions Last Dose Informant Patient Reported? Taking?   MULTIVITAMIN (MULTIPLE VITAMIN ORAL) 2025 Morning  Yes Yes   Sig: [MULTIVITAMIN (MULTIPLE VITAMIN ORAL)] Take 1 tablet by mouth daily.   donepezil (ARICEPT) 5 MG tablet 2025 Bedtime  Yes Yes   Sig: Take 5 mg by mouth at bedtime.   isosorbide mononitrate (IMDUR) 30 MG 24 hr tablet  4/21/2025 Evening  No Yes   Sig: TAKE 2 TABLETS BY MOUTH DAILY   metoprolol succinate ER (TOPROL XL) 25 MG 24 hr tablet 4/21/2025 Bedtime  No Yes   Sig: Take 2 tablets (50 mg) by mouth daily.   rosuvastatin (CRESTOR) 40 MG tablet 4/21/2025 Bedtime  No Yes   Sig: Take 1 tablet (40 mg) by mouth daily.   sertraline (ZOLOFT) 25 MG tablet 4/21/2025 Bedtime  No Yes   Sig: Take 0.5 tablets (12.5 mg) by mouth daily for 7 days, THEN 1 tablet (25 mg) daily for 7 days, THEN 2 tablets (50 mg) daily for 7 days.   warfarin ANTICOAGULANT (COUMADIN) 7.5 MG tablet 4/19/2025  Yes Yes   Sig: Take 3.75-7.5 mg by mouth See Admin Instructions. 7.5mg on Monday and Friday; 3.75mg on Tuesday, Wednesday, Thursday, Saturday, Sunday      Facility-Administered Medications: None      Review of Systems     A 12 point comprehensive review of systems was negative except as noted above in HPI.    PHYSICAL EXAMINATION     Vitals      Temp:  [98  F (36.7  C)] 98  F (36.7  C)  Pulse:  [54-66] 55  Resp:  [18] 18  BP: (135-167)/() 159/111  SpO2:  [94 %-98 %] 97 %    Examination   Physical Exam:    Gen: no acute distress, comfortable, alert, pleasant  ENT: no scleral icterus  Pulm: Breathing comfortably on room air at rest  CV: regular rate and rhythm, no significant lower extremity pitting edema  GI: abdomen is soft, non-tender, non-distended with active bowel sounds.  MSK: no obvious deformities of the extremities  Derm: Not pale, no jaundice, no bruising  Psych: appropriate affect      Pertinent Radiology     Radiology Results: No results found for this or any previous visit (from the past 24 hours).    Jose Haji DO  [facility]  [facility]   Phone: #[phone]

## 2025-04-22 NOTE — ED TRIAGE NOTES
Pt presents to the ED after receiving call to come in for INR >10. On blood thinners for hx of DVT and afib. No other symptoms.      Triage Assessment (Adult)       Row Name 04/22/25 0916          Triage Assessment    Airway WDL WDL        Respiratory WDL    Respiratory WDL WDL        Skin Circulation/Temperature WDL    Skin Circulation/Temperature WDL WDL        Cardiac WDL    Cardiac WDL WDL        Peripheral/Neurovascular WDL    Peripheral Neurovascular WDL WDL        Cognitive/Neuro/Behavioral WDL    Cognitive/Neuro/Behavioral WDL WDL

## 2025-04-22 NOTE — PHARMACY-ADMISSION MEDICATION HISTORY
Pharmacist Admission Medication History    Admission medication history is complete. The information provided in this note is only as accurate as the sources available at the time of the update.    Medication reconciliation/reorder completed by provider prior to medication history? No    Information Source(s): Patient and CareEverywhere/SureScripts via in-person; wife Ursula helps with meds    Pertinent Information:   Patient and wife state that he likely double dosed his warfarin for ~ 1 week ending 4/19/2025    Changes made to PTA medication list:  Added: donepezil  Deleted: Lipitor, Lexapro  Changed: warfarin updated as below      Allergies reviewed with patient and updates made in EHR: yes    Medications available for use during hospital stay: NONE.     Medication History Completed By: Gary Gordon [facility] 4/22/2025 11:42 AM    PTA Med List   Medication Sig Note Last Dose/Taking    donepezil (ARICEPT) 5 MG tablet Take 5 mg by mouth at bedtime.  4/21/2025 Bedtime    isosorbide mononitrate (IMDUR) 30 MG 24 hr tablet TAKE 2 TABLETS BY MOUTH DAILY 4/22/2025: Wants to start taking in the morning 4/21/2025 Evening    metoprolol succinate ER (TOPROL XL) 25 MG 24 hr tablet Take 2 tablets (50 mg) by mouth daily. 4/22/2025: At HS 4/21/2025 Bedtime    MULTIVITAMIN (MULTIPLE VITAMIN ORAL) [MULTIVITAMIN (MULTIPLE VITAMIN ORAL)] Take 1 tablet by mouth daily.  4/22/2025 Morning    rosuvastatin (CRESTOR) 40 MG tablet Take 1 tablet (40 mg) by mouth daily. 4/22/2025: hs 4/21/2025 Bedtime    sertraline (ZOLOFT) 25 MG tablet Take 0.5 tablets (12.5 mg) by mouth daily for 7 days, THEN 1 tablet (25 mg) daily for 7 days, THEN 2 tablets (50 mg) daily for 7 days. 4/22/2025: 12.5mg qhs 4/21/2025 Bedtime    warfarin ANTICOAGULANT (COUMADIN) 7.5 MG tablet Take 3.75-7.5 mg by mouth See Admin Instructions. 7.5mg on Monday and Friday; 3.75mg on Tuesday, Wednesday, Thursday, Saturday, Sunday 4/19/2025

## 2025-04-22 NOTE — TELEPHONE ENCOUNTER
Patient's wife, Aubree, CTC on file, called into the clinic, on 4/22/25, to report that the patient is currently at the [facility] ED and is being admitted to the hospital.    Denies other questions or concerns at this time.    Writer will route the above information to the PCP to review.    Josy Aguirre RN, BSN  [facility]

## 2025-04-22 NOTE — PLAN OF CARE
Pt A&O x4. Calls appropriately and can make needs known. Call light within reach. Stand by assist. On alarm d/t high INR and safety risk af bleeding if falls. Pt educated and agreeable to alarms and will call. Last INR 9.82. Another order of Vit K received and given. VSS. Wife at bedside. Monitoring INR.   Problem: Adult Inpatient Plan of Care  Goal: Plan of Care Review  Description: The Plan of Care Review/Shift note should be completed every shift.  The Outcome Evaluation is a brief statement about your assessment that the patient is improving, declining, or no change.  This information will be displayed automatically on your shiftnote.  Outcome: Progressing   Goal Outcome Evaluation:

## 2025-04-23 ENCOUNTER — ANTICOAGULATION THERAPY VISIT (OUTPATIENT)
Dept: ANTICOAGULATION | Facility: CLINIC | Age: 75
End: 2025-04-23
Payer: COMMERCIAL

## 2025-04-23 VITALS
BODY MASS INDEX: 31.35 KG/M2 | OXYGEN SATURATION: 95 % | HEIGHT: 75 IN | TEMPERATURE: 97.1 F | HEART RATE: 50 BPM | DIASTOLIC BLOOD PRESSURE: 96 MMHG | RESPIRATION RATE: 18 BRPM | SYSTOLIC BLOOD PRESSURE: 150 MMHG | WEIGHT: 252.1 LBS

## 2025-04-23 DIAGNOSIS — R79.1 SUPRATHERAPEUTIC INR: ICD-10-CM

## 2025-04-23 DIAGNOSIS — I26.99 PULMONARY EMBOLISM, BILATERAL (H): ICD-10-CM

## 2025-04-23 DIAGNOSIS — Z86.711 HISTORY OF PULMONARY EMBOLISM: ICD-10-CM

## 2025-04-23 DIAGNOSIS — I48.21 PERMANENT ATRIAL FIBRILLATION (H): Primary | ICD-10-CM

## 2025-04-23 LAB
ERYTHROCYTE [DISTWIDTH] IN BLOOD BY AUTOMATED COUNT: 13.3 % (ref 10–15)
HCT VFR BLD AUTO: 38 % (ref 40–53)
HGB BLD-MCNC: 12.9 G/DL (ref 13.3–17.7)
HOLD SPECIMEN: NORMAL
INR PPP: 2.78 (ref 0.85–1.15)
MCH RBC QN AUTO: 30.2 PG (ref 26.5–33)
MCHC RBC AUTO-ENTMCNC: 33.9 G/DL (ref 31.5–36.5)
MCV RBC AUTO: 89 FL (ref 78–100)
PLATELET # BLD AUTO: 160 10E3/UL (ref 150–450)
RBC # BLD AUTO: 4.27 10E6/UL (ref 4.4–5.9)
WBC # BLD AUTO: 5.6 10E3/UL (ref 4–11)

## 2025-04-23 PROCEDURE — 36415 COLL VENOUS BLD VENIPUNCTURE: CPT | Performed by: HOSPITALIST

## 2025-04-23 PROCEDURE — 250N000013 HC RX MED GY IP 250 OP 250 PS 637: Performed by: HOSPITALIST

## 2025-04-23 PROCEDURE — G0378 HOSPITAL OBSERVATION PER HR: HCPCS

## 2025-04-23 PROCEDURE — 85610 PROTHROMBIN TIME: CPT | Performed by: HOSPITALIST

## 2025-04-23 PROCEDURE — 85027 COMPLETE CBC AUTOMATED: CPT | Performed by: HOSPITALIST

## 2025-04-23 RX ADMIN — ISOSORBIDE MONONITRATE 60 MG: 30 TABLET, EXTENDED RELEASE ORAL at 08:53

## 2025-04-23 ASSESSMENT — ACTIVITIES OF DAILY LIVING (ADL)
ADLS_ACUITY_SCORE: 30

## 2025-04-23 ASSESSMENT — SLEEP AND FATIGUE QUESTIONNAIRES
HOW LIKELY ARE YOU TO NOD OFF OR FALL ASLEEP WHILE SITTING QUIETLY AFTER LUNCH WITHOUT ALCOHOL: MODERATE CHANCE OF DOZING
HOW LIKELY ARE YOU TO NOD OFF OR FALL ASLEEP WHILE SITTING AND READING: MODERATE CHANCE OF DOZING
HOW LIKELY ARE YOU TO NOD OFF OR FALL ASLEEP WHILE WATCHING TV: SLIGHT CHANCE OF DOZING
HOW LIKELY ARE YOU TO NOD OFF OR FALL ASLEEP IN A CAR, WHILE STOPPED FOR A FEW MINUTES IN TRAFFIC: WOULD NEVER DOZE
HOW LIKELY ARE YOU TO NOD OFF OR FALL ASLEEP WHILE SITTING AND TALKING TO SOMEONE: SLIGHT CHANCE OF DOZING
HOW LIKELY ARE YOU TO NOD OFF OR FALL ASLEEP WHEN YOU ARE A PASSENGER IN A CAR FOR AN HOUR WITHOUT A BREAK: SLIGHT CHANCE OF DOZING
HOW LIKELY ARE YOU TO NOD OFF OR FALL ASLEEP WHILE SITTING INACTIVE IN A PUBLIC PLACE: SLIGHT CHANCE OF DOZING
HOW LIKELY ARE YOU TO NOD OFF OR FALL ASLEEP WHILE LYING DOWN TO REST IN THE AFTERNOON WHEN CIRCUMSTANCES PERMIT: MODERATE CHANCE OF DOZING

## 2025-04-23 NOTE — DISCHARGE SUMMARY
[facility] MEDICINE  DISCHARGE SUMMARY     Primary Care Physician: Mamadou Baez  Admission Date: 4/22/2025   Discharge Provider: Jose Haji DO Discharge Date: 4/23/2025   Diet:   Active Diet and Nourishment Order   Procedures    Diet       Code Status: Prior   Activity: as tolerated        Condition at Discharge: Stable     REASON FOR PRESENTATION(See Admission Note for Details)   Elevated INR  PRINCIPAL & ACTIVE DISCHARGE DIAGNOSES   Supratherapeutic INR  Dementia  A fib  Hx of PE  ALISE  Depression  CAD  Htn  hyperlipidemia  PENDING LABS     Unresulted Labs Ordered in the Past 30 Days of this Admission       No orders found for last 31 day(s).          PROCEDURES ( this hospitalization only)      RECOMMENDATIONS TO OUTPATIENT PROVIDER FOR F/U VISIT     Follow-up Appointments       Hospital Follow-up with Existing Primary Care Provider (PCP)          Schedule Primary Care visit within: 3-5 Days (Urgent)   Recommended labs and Imaging (to be ordered by Primary Care Provider): INR, CBC             DISPOSITION   Home  SUMMARY OF HOSPITAL COURSE:    Prem Taylor is a 74 year old male who presented after INR checked in the outpatient setting found value to be greater than 10. It was discovered that he had inadvertently been taking more than double his rx'd dose of warfarin at home.     Medical history is notable for A-fib, history of PE, chronically anticoagulated with warfarin, mild dementia, sleep apnea, depression.    Initial evaluation revealed hypertension, otherwise normal vitals.  Labs unremarkable other than INR greater than 10.  Negative VQ scan and chest x-ray one day prior to presentation done for evaluation of dypsnea.    Initial treatment included oral vitamin K. The following morning INR was in therapeutic range. Discussed with wife that pt should no longer be managing meds by himself. She agreed with this and will monitor his meds.   Discharge Medications  with Med changes:     Discharge Medication List as of 4/23/2025  9:04 AM        CONTINUE these medications which have NOT CHANGED    Details   donepezil (ARICEPT) 5 MG tablet Take 5 mg by mouth at bedtime., Historical      isosorbide mononitrate (IMDUR) 30 MG 24 hr tablet TAKE 2 TABLETS BY MOUTH DAILY, Disp-180 tablet, R-3, E-PrescribePlease send a replace/new response with 90-Day Supply if appropriate to maximize member benefit. Requesting 1 year supply.      metoprolol succinate ER (TOPROL XL) 25 MG 24 hr tablet Take 2 tablets (50 mg) by mouth daily., Disp-180 tablet, R-3, E-Prescribe      MULTIVITAMIN (MULTIPLE VITAMIN ORAL) [MULTIVITAMIN (MULTIPLE VITAMIN ORAL)] Take 1 tablet by mouth daily., Historical      rosuvastatin (CRESTOR) 40 MG tablet Take 1 tablet (40 mg) by mouth daily., Disp-90 tablet, R-3, E-Prescribe      sertraline (ZOLOFT) 25 MG tablet Take 0.5 tablets (12.5 mg) by mouth daily for 7 days, THEN 1 tablet (25 mg) daily for 7 days, THEN 2 tablets (50 mg) daily for 7 days., Disp-60 tablet, R-0, E-Prescribe      warfarin ANTICOAGULANT (COUMADIN) 7.5 MG tablet Take 3.75-7.5 mg by mouth See Admin Instructions. 7.5mg on Monday and Friday; 3.75mg on Tuesday, Wednesday, Thursday, Saturday, Sunday, Historical           Rationale for medication changes:    N/a  Consults   none  Anticoagulation Information    Resumed warfarin at rx'd dose, needs INR check in 1-2 days.  SIGNIFICANT IMAGING FINDINGS   No results found for this visit on 04/22/25.  SIGNIFICANT LABORATORY FINDINGS   See EMR  Discharge Orders        Reason for your hospital stay    Elevated INR     Activity    Your activity upon discharge: activity as tolerated     Diet    Follow this diet upon discharge: Current Diet:Orders Placed This Encounter      Regular Diet Adult     Hospital Follow-up with Existing Primary Care Provider (PCP)          Examination   Physical Exam   Temp:  [97.1  F (36.2  C)-98  F (36.7  C)] 97.1  F (36.2  C)  Pulse:  [50-73]  50  Resp:  [18-20] 18  BP: (145-179)/() 150/96  SpO2:  [95 %-97 %] 95 %  Wt Readings from Last 1 Encounters:   04/23/25 114.4 kg (252 lb 1.6 oz)       Subjective: feels great today.     Physical Exam:    Gen: no acute distress, comfortable, alert, pleasant      Please see EMR for more detailed significant labs, imaging, consultant notes etc.    I, Jose Haji DO, personally saw the patient today and spent greater than 30 minutes discharging this patient.    Jose Haji DO  [facility]    CC:Mamadou Baez

## 2025-04-23 NOTE — PROGRESS NOTES
ANTICOAGULATION  MANAGEMENT: Discharge Review    Prem Taylor chart reviewed for anticoagulation continuity of care    Hospital Admission on 4/22/25 for supratherapeutic INR.    Discharge disposition: Home    Results:    Recent labs: (last 7 days)     04/17/25  1125 04/21/25  0915 04/21/25  0926 04/22/25  0954 04/22/25  1547 04/23/25  0644   INR 7.2* 8.0* >10.00* >10.00* 9.82* 2.78*     Anticoagulation inpatient management:     reversed with a total of 5 mg Phytonadione on 4/22/25    Anticoagulation discharge instructions:     Warfarin dosing:  Resume half tab tonight - tory Vergara. She said this is what the doctor told her to do this morning.   Bridging: No   INR goal change: No      Medication changes affecting anticoagulation: Yes: Reversal with Vit. K.    Additional factors affecting anticoagulation: Per hospital note: Patient and wife state that he likely double dosed his warfarin for ~ 1 week ending 4/19/2025. **Went back to previous [facility] encounter and updated dosing calendar with increased doses through 4/18/25**     PLAN     Recommend to check INR on 4/24/25 being he has an appointment that day anyway. Ursula agrees with this and would like to stay on top of it in case of any issues.    Spoke with Ryan's wife, Ursula. Added INR appointment to tomorrow.    Anticoagulation Calendar updated.    Verified ok to start warfarin dosing with KIRK UGARTE as long as no sign or symptom of bleeding, etc.    Josy Moe RN  4/23/2025  Anticoagulation Clinic  [employer] for routing messages: elsie MAURICIO  [facility] patient phone line: [phone]

## 2025-04-23 NOTE — PLAN OF CARE
Goal Outcome Evaluation:      Problem: Adult Inpatient Plan of Care  Goal: Optimal Comfort and Wellbeing  Outcome: Progressing     Problem: Delirium  Goal: Improved Sleep  Outcome: Progressing     Pt A&Ox4, VSS, RA, Afebrile.  Pt denies any pain.  Pt denies SOB, N/V/D.  PIV patent and SL.  Pt SBA.  Pt care ongoing, call light within reach, calls appropriately, bed in lowest position, bed alarms on.     HLM Admission: 8- Walk 250 feet or more  HLM Daily8- Walk 250 feet or more

## 2025-04-23 NOTE — PLAN OF CARE
Problem: Adult Inpatient Plan of Care  Goal: Absence of Hospital-Acquired Illness or Injury  Intervention: Identify and Manage Fall Risk  Recent Flowsheet Documentation  Taken 4/22/2025 2132 by Kathia Ceballos, RN  Safety Promotion/Fall Prevention:   activity supervised   safety round/check completed   room organization consistent   lighting adjusted     Problem: Adult Inpatient Plan of Care  Goal: Optimal Comfort and Wellbeing  Outcome: Progressing   Goal Outcome Evaluation:    Pt A&Ox4, VSS at this time, on RA. Pt denies pain this shift. Up to bathroom SBA. Bed in low, alarms on for safety, pt able to make needs known.

## 2025-04-23 NOTE — PROGRESS NOTES
Patient discharging home via ride from wife. PIV removed. All belongings left with patient, agreeable to discharge. AVS reviewed with patient bedside.

## 2025-04-24 ENCOUNTER — PATIENT OUTREACH (OUTPATIENT)
Dept: CARE COORDINATION | Facility: CLINIC | Age: 75
End: 2025-04-24

## 2025-04-24 ENCOUNTER — ANTICOAGULATION THERAPY VISIT (OUTPATIENT)
Dept: ANTICOAGULATION | Facility: CLINIC | Age: 75
End: 2025-04-24

## 2025-04-24 ENCOUNTER — OFFICE VISIT (OUTPATIENT)
Dept: FAMILY MEDICINE | Facility: CLINIC | Age: 75
End: 2025-04-24
Payer: COMMERCIAL

## 2025-04-24 VITALS
BODY MASS INDEX: 32.2 KG/M2 | HEIGHT: 75 IN | DIASTOLIC BLOOD PRESSURE: 80 MMHG | HEART RATE: 58 BPM | WEIGHT: 259 LBS | SYSTOLIC BLOOD PRESSURE: 138 MMHG | RESPIRATION RATE: 14 BRPM | OXYGEN SATURATION: 97 % | TEMPERATURE: 97.6 F

## 2025-04-24 DIAGNOSIS — R79.1 SUPRATHERAPEUTIC INR: Primary | ICD-10-CM

## 2025-04-24 DIAGNOSIS — I48.19 PERSISTENT ATRIAL FIBRILLATION (H): ICD-10-CM

## 2025-04-24 DIAGNOSIS — R79.1 SUPRATHERAPEUTIC INR: ICD-10-CM

## 2025-04-24 DIAGNOSIS — I26.99 PULMONARY EMBOLISM, BILATERAL (H): ICD-10-CM

## 2025-04-24 DIAGNOSIS — Z23 ENCOUNTER FOR IMMUNIZATION: ICD-10-CM

## 2025-04-24 DIAGNOSIS — I48.21 PERMANENT ATRIAL FIBRILLATION (H): Primary | ICD-10-CM

## 2025-04-24 DIAGNOSIS — Z86.711 HISTORY OF PULMONARY EMBOLISM: ICD-10-CM

## 2025-04-24 DIAGNOSIS — I48.21 PERMANENT ATRIAL FIBRILLATION (H): ICD-10-CM

## 2025-04-24 DIAGNOSIS — F41.9 ANXIETY: ICD-10-CM

## 2025-04-24 DIAGNOSIS — R06.02 SHORTNESS OF BREATH: ICD-10-CM

## 2025-04-24 DIAGNOSIS — R41.3 MEMORY DIFFICULTIES: ICD-10-CM

## 2025-04-24 LAB — INR BLD: 2.1 (ref 0.9–1.1)

## 2025-04-24 RX ORDER — DONEPEZIL HYDROCHLORIDE 10 MG/1
TABLET, FILM COATED ORAL
COMMUNITY
Start: 2025-03-31

## 2025-04-24 ASSESSMENT — ANXIETY QUESTIONNAIRES
GAD7 TOTAL SCORE: 13
GAD7 TOTAL SCORE: 13
7. FEELING AFRAID AS IF SOMETHING AWFUL MIGHT HAPPEN: NEARLY EVERY DAY
3. WORRYING TOO MUCH ABOUT DIFFERENT THINGS: NEARLY EVERY DAY
7. FEELING AFRAID AS IF SOMETHING AWFUL MIGHT HAPPEN: NEARLY EVERY DAY
5. BEING SO RESTLESS THAT IT IS HARD TO SIT STILL: NOT AT ALL
4. TROUBLE RELAXING: SEVERAL DAYS
IF YOU CHECKED OFF ANY PROBLEMS ON THIS QUESTIONNAIRE, HOW DIFFICULT HAVE THESE PROBLEMS MADE IT FOR YOU TO DO YOUR WORK, TAKE CARE OF THINGS AT HOME, OR GET ALONG WITH OTHER PEOPLE: NOT DIFFICULT AT ALL
6. BECOMING EASILY ANNOYED OR IRRITABLE: NEARLY EVERY DAY
1. FEELING NERVOUS, ANXIOUS, OR ON EDGE: SEVERAL DAYS
2. NOT BEING ABLE TO STOP OR CONTROL WORRYING: MORE THAN HALF THE DAYS
8. IF YOU CHECKED OFF ANY PROBLEMS, HOW DIFFICULT HAVE THESE MADE IT FOR YOU TO DO YOUR WORK, TAKE CARE OF THINGS AT HOME, OR GET ALONG WITH OTHER PEOPLE?: NOT DIFFICULT AT ALL
GAD7 TOTAL SCORE: 13

## 2025-04-24 ASSESSMENT — PATIENT HEALTH QUESTIONNAIRE - PHQ9
10. IF YOU CHECKED OFF ANY PROBLEMS, HOW DIFFICULT HAVE THESE PROBLEMS MADE IT FOR YOU TO DO YOUR WORK, TAKE CARE OF THINGS AT HOME, OR GET ALONG WITH OTHER PEOPLE: NOT DIFFICULT AT ALL
SUM OF ALL RESPONSES TO PHQ QUESTIONS 1-9: 3
SUM OF ALL RESPONSES TO PHQ QUESTIONS 1-9: 3

## 2025-04-24 ASSESSMENT — PAIN SCALES - GENERAL: PAINLEVEL_OUTOF10: NO PAIN (0)

## 2025-04-24 NOTE — PROGRESS NOTES
ANTICOAGULATION MANAGEMENT     Prem Taylor 74 year old male is on warfarin with therapeutic INR result. (Goal INR 2.0-3.0)    Recent labs: (last 7 days)     04/24/25  1550   INR 2.1*       ASSESSMENT     Warfarin Lab Questionnaire    Warfarin Doses Last 7 Days    Pt Rptd Dose SUNDAY MONDAY TUESDAY WED THURS FRIDAY SATURDAY 4/23/2025   4:44 PM 3.75 7.5 3.75 7.5 7.5 3.75 7.5         4/23/2025   Warfarin Lab Questionnaire   Missed doses within past 14 days? No   Changes in diet or alcohol within past 14 days? No   Medication changes since last result? No   Injuries or illness since last result? No   New shortness of breath, severe headaches or sudden changes in vision since last result? No   Abnormal bleeding since last result? No   Upcoming surgery, procedure? No   Best number to call with results? [phone]     Previous result: Therapeutic yesterday at hospital discharge.   Additional findings: Dosing above is not correct. I verified with Aubree that 3.75 mg was taken yesterday; but Ryan already took a full 7.5 mg tab of warfarin today. I advised again that he not take Warfarin on INR days until AFTER speaking to the INR nurse.    No changes made at provider appointment today.     PLAN     Recommended plan for temporary change(s) affecting INR     Dosing Instructions: Continue your current warfarin dose with next INR in no more than 1 week       Summary  As of 4/24/2025      Full warfarin instructions:  7.5 mg every Mon, Thu; 3.75 mg all other days   Next INR check:  5/1/2025               Telephone call with Ruben who agrees to plan and repeated back plan correctly  Sent [employer] message with dosing and follow up instructions    Patient elected to schedule next visit 5/1/25    Education provided: Please call back if any changes to your diet, medications or how you've been taking warfarin  Goal range and lab monitoring: goal range and significance of current result and Importance of following up at  instructed interval  Symptom monitoring: monitoring for bleeding signs and symptoms  Written instructions provided  Contact [phone] with any changes, questions or concerns.     Plan made per [facility] anticoagulation protocol    Josy Moe RN  4/24/2025  Anticoagulation Clinic  [facility] for routing messages: elsie MAURICIO  [facility] patient phone line: [phone]        _______________________________________________________________________     Anticoagulation Episode Summary       Current INR goal:  2.0-3.0   TTR:  47.3% (1 y)   Target end date:  Indefinite   Send INR reminders to:  RUBY MAURICIO    Indications    Pulmonary embolism  bilateral (H) (Resolved) [I26.99]  Permanent atrial fibrillation (H) [I48.21]  Pulmonary embolism  bilateral (H) [I26.99]  History of pulmonary embolism [Z86.711]  Supratherapeutic INR [R79.1]             Comments:  Approved for 8 week checks per -- see encounter from 07/27/21             Anticoagulation Care Providers       Provider Role Specialty Phone number    Mamadou Baez MD Referring Family Medicine [phone]    Jose Haji DO Referring HOSPITALIST [phone]

## 2025-04-24 NOTE — PROGRESS NOTES
[facility]    Background: Transitional Care Management program identified per system criteria and reviewed by [facility] Resource [place] team for possible outreach.    Assessment: Upon chart review, [facility] Team member will not proceed with patient outreach related to this episode of Transitional Care Management program due to reason below:    Patient has a follow up appointment with an appropriate provider today for hospital discharge    Plan: Transitional Care Management episode addressed appropriately per reason noted above.      SERENA Rogers  [facility]    *Connected Care Resource Team does NOT follow patient ongoing. Referrals are identified based on internal discharge reports and the outreach is to ensure patient has an understanding of their discharge instructions.

## 2025-04-24 NOTE — PROGRESS NOTES
Hospital Follow-up Visit:    Hospital/Nursing Home/IP Rehab Facility: [facility]  Most Recent Admission Date: 4/22/2025   Most Recent Admission Diagnosis: Supratherapeutic INR - R79.1     Most Recent Discharge Date: 4/23/2025   Most Recent Discharge Diagnosis: Supratherapeutic INR - R79.1   Was the patient in the ICU or did the patient experience delirium during hospitalization?  No  Do you have any other stressors you would like to discuss with your provider? No    Problems taking medications regularly:  None  Medication changes since discharge: None  Problems adhering to non-medication therapy:  None    Summary of hospitalization:  [facility] discharge summary reviewed    SUMMARY OF HOSPITAL COURSE:    Prem Taylor is a 74 year old male who presented after INR checked in the outpatient setting found value to be greater than 10. It was discovered that he had inadvertently been taking more than double his rx'd dose of warfarin at home.     Medical history is notable for A-fib, history of PE, chronically anticoagulated with warfarin, mild dementia, sleep apnea, depression.    Initial evaluation revealed hypertension, otherwise normal vitals.  Labs unremarkable other than INR greater than 10.  Negative VQ scan and chest x-ray one day prior to presentation done for evaluation of dypsnea.    Initial treatment included oral vitamin K. The following morning INR was in therapeutic range. Discussed with wife that pt should no longer be managing meds by himself. She agreed with this and will monitor his meds.       HPI:   Pmh- HLD,  unprovoked bilat PE, afib (both dx 2017) on ac, john paul complaint w mask; stress test in dec 2023 for LOERA- reached 84% MPHR therefore had CT that showed mild-mod diffuse dz, moderately severe narrowing noted in a diagonal branch; maintained on medical therpay       Since his last visit he has been diagnosed with Alzheimer's and seen by neurologist.  In  "the last 6 months he has been more short of breath, he still goes to the gym and does treadmill elliptical and weights but finds himself pushing harder.  His wife is also noticed that he is short of breath when sitting down.  No orthopnea PND or edema.  His CT scan from 2024 (for lung nodules) is notable for emphysema, they are unaware of this finding. Remote h/o smoking, quit in 1990.     Wears fitbit, HR avgs 60s.      Reviewed office note from 3/25/25:  Confusion and memory difficulties. Patient is having progressive decline per patient's wife Aubree. \"He is not Ryan\". \"He is stuck in there somewhere\". Episodes of confusion. Getting lost when driving. Not tracking. His gait has changed and describes shuffling and much slower in movements. Has headaches often occipital associate with neck pain. Tightness Tylenol. Had MRI earlier this year with mild brain atrophy and small vessel ischemic change likely. Has PET scan scheduled tomorrow. Had recent EEG that appeared unremarkable March 14, 2025. Will see neurology April 30, 2025. Had seen cardiologist Dr. Velez October 29, 2024 and told to follow-up at 6-month interval and does have cardiology follow-up April 17, 2025 and is interested in considering change of warfarin to other anticoagulant that would be easier to take without required monitoring.     Diagnostic Tests/Treatments reviewed.  Follow up needed: none  Other Healthcare Providers Involved in Patient s Care:         Specialist appointment - as scheduled  Update since discharge: improved.             A/P:    Supratherapeutic INR  Supratherapeutic INR greater than 10 with hospitalization noted following incorrect dosing at home.  Did receive vitamin K 2.5 mg p.o. x 1.  Improvement in INR down to 2.78 prior to hospital discharge and did resume warfarin 7.5 mg on Monday and Friday while using 3.75 mg other days of the week.  Repeat INR today.    Persistent atrial fibrillation (H)  Permanent atrial fibrillation " appears rate controlled.  Has resumed warfarin anticoagulation.    Memory difficulties  Memory difficulties with progressive Alzheimer's dementia.  Donepezil 10 mg at bedtime.  Letter written in order to excuse patient from upcoming travel to [facility] from May 5 through May 25, 2025 due to ongoing evaluation of recent concerns for progressive Alzheimer's dementia as well as underlying shortness of breath and supratherapeutic INR.    Anxiety  Titrating sertraline currently with likely some benefit of anxiety management already and will monitor.  Follows up with [facility] June 25, 2025.    Encounter for immunization  COVID updated booster review was to be provided but left prior to administration.  - COVID-19 12+ (PFIZER)    Permanent atrial fibrillation (H)  INR point-of-care.  - INR point of care    History of pulmonary embolism  INR point-of-care.  - INR point of care     Shortness of breath  Patient with recent chest x-ray and VQ scan unremarkable and does have upcoming echocardiogram and Lexiscan nuclear study scheduled with cardiology.      Plan of care communicated with patient              Answers submitted by the patient for this visit:  Patient Health Questionnaire (Submitted on 4/24/2025)  If you checked off any problems, how difficult have these problems made it for you to do your work, take care of things at home, or get along with other people?: Not difficult at all  PHQ9 TOTAL SCORE: 3  Patient Health Questionnaire (G7) (Submitted on 4/24/2025)  KALPANA 7 TOTAL SCORE: 13

## 2025-04-24 NOTE — LETTER
April 24, 2025      Prem Taylor  [address] [place] [id number]        To Whom It May Concern,     Due to recent hospitalization following concerns with worsening shortness of breath and supratherapeutic INR along with progressive Alzheimer's dementia, patient will be unable to travel as scheduled from 5/5/25 through 5/25/25 due to ongoing need for evaluation and treatment while monitoring response to treatment closely.  Patient deemed unsafe to travel.      Sincerely,        Mamadou Baez MD    Electronically signed

## 2025-04-28 ENCOUNTER — OFFICE VISIT (OUTPATIENT)
Dept: SLEEP MEDICINE | Facility: CLINIC | Age: 75
End: 2025-04-28
Payer: COMMERCIAL

## 2025-04-28 VITALS
BODY MASS INDEX: 32.33 KG/M2 | DIASTOLIC BLOOD PRESSURE: 96 MMHG | HEIGHT: 75 IN | OXYGEN SATURATION: 94 % | HEART RATE: 64 BPM | WEIGHT: 260 LBS | SYSTOLIC BLOOD PRESSURE: 151 MMHG

## 2025-04-28 DIAGNOSIS — G47.33 OSA (OBSTRUCTIVE SLEEP APNEA): ICD-10-CM

## 2025-04-28 DIAGNOSIS — G47.33 OSA ON CPAP: Primary | ICD-10-CM

## 2025-04-28 PROCEDURE — 3080F DIAST BP >= 90 MM HG: CPT | Performed by: INTERNAL MEDICINE

## 2025-04-28 PROCEDURE — 99213 OFFICE O/P EST LOW 20 MIN: CPT | Performed by: INTERNAL MEDICINE

## 2025-04-28 PROCEDURE — 3077F SYST BP >= 140 MM HG: CPT | Performed by: INTERNAL MEDICINE

## 2025-04-28 PROCEDURE — 1126F AMNT PAIN NOTED NONE PRSNT: CPT | Performed by: INTERNAL MEDICINE

## 2025-04-28 NOTE — NURSING NOTE
"Chief Complaint   Patient presents with    CPAP Follow Up       Initial BP (!) 151/96   Pulse 64   Ht 1.905 m (6' 3\")   Wt 117.9 kg (260 lb)   SpO2 94%   BMI 32.50 kg/m   Estimated body mass index is 32.5 kg/m  as calculated from the following:    Height as of this encounter: 1.905 m (6' 3\").    Weight as of this encounter: 117.9 kg (260 lb).    Medication Reconciliation: complete  ESS 10  NEYDA 15    DME: EDWARD Roth MA      "

## 2025-04-28 NOTE — PATIENT INSTRUCTIONS
Your blood pressure was checked while you were in clinic today. The top number was 151 and the bottom number was 96  Please read the guidelines below about what these numbers mean and what you should do about them.  Your systolic blood pressure is the top number.  This is the pressure when the heart is pumping.  Your diastolic blood pressure is the bottom number.  This is the pressure in between beats.  If your systolic blood pressure is less than 120 and your diastolic blood pressure is less than 80, then your blood pressure is normal. There is nothing more that you need to do about it  If your systolic blood pressure is 120-139 or your diastolic blood pressure is 80-89, your blood pressure may be higher than it should be.  You should have your blood pressure re-checked within a year by a primary care provider.  If your systolic blood pressure is 140 or greater or your diastolic blood pressure is 90 or greater, you may have high blood pressure.  High blood pressure is treatable, but if left untreated over time it can put you at risk for heart attack, stroke, or kidney failure.  You should have your blood pressure re-checked by a primary care provider within the next four weeks.   Your Body mass index is 32.5 kg/m .  Weight management is a personal decision.  If you are interested in exploring weight loss strategies, the following discussion covers the approaches that may be successful. Body mass index (BMI) is one way to tell whether you are at a healthy weight, overweight, or obese. It measures your weight in relation to your height.  A BMI of 18.5 to 24.9 is in the healthy range. A person with a BMI of 25 to 29.9 is considered overweight, and someone with a BMI of 30 or greater is considered obese. More than two-thirds of American adults are considered overweight or obese.  Being overweight or obese increases the risk for further weight gain. Excess weight may lead to heart disease and diabetes.  Creating and  following plans for healthy eating and physical activity may help you improve your health.  Weight control is part of healthy lifestyle and includes exercise, emotional health, and healthy eating habits. Careful eating habits lifelong are the mainstay of weight control. Though there are significant health benefits from weight loss, long-term weight loss with diet alone may be very difficult to achieve- studies show long-term success with dietary management in less than 10% of people. Attaining a healthy weight may be especially difficult to achieve in those with severe obesity. In some cases, medications, devices and surgical management might be considered.  What can you do?  If you are overweight or obese and are interested in methods for weight loss, you should discuss this with your provider.   Consider reducing daily calorie intake by 500 calories.   Keep a food journal.   Avoiding skipping meals, consider cutting portions instead.    Diet combined with exercise helps maintain muscle while optimizing fat loss. Strength training is particularly important for building and maintaining muscle mass. Exercise helps reduce stress, increase energy, and improves fitness. Increasing exercise without diet control, however, may not burn enough calories to loose weight.     Start walking three days a week 10-20 minutes at a time  Work towards walking thirty minutes five days a week   Eventually, increase the speed of your walking for 1-2 minutes at time    In addition, we recommend that you review healthy lifestyles and methods for weight loss available through the National Institutes of Health patient information sites:  http://win.niddk.nih.gov/publications/index.htm    And look into health and wellness programs that may be available through your health insurance provider, employer, local community center, or horace club.

## 2025-04-28 NOTE — PROGRESS NOTES
[facility]  Sleep clinic follow-up visit note                Date: 4/28/25    Chief complaint:  Follow-up of ALISE, review CPAP compliance measures      Chief Complaint   Patient presents with    CPAP Follow Up       HPI: Prem Taylor presents for follow-up of their moderate sleep apnea, managed with CPAP.     Prem Taylor was set up at [facility] on 11/19/18. Patient received a Resmed [id number] Auto. Pressures were set at 6-16.   Patient s ramp is 6 cm H2O for off and FLEX/EPR is EPR 2.  Patient received a Playtabase Mask name: Simplus  FFM Size Lg, heated tubing and heated humidifier.     His device was obtained through [facility] on 7/2/2020.    DME: [facility]    Do you use a CPAP Machine at home: (Patient-Rptd) Yes  Overall, on a scale of 0-10 how would you rate your CPAP (0 poor, 10 great): (Patient-Rptd) 8    What type of mask do you use: Full face mask  Is your mask comfortable: (Patient-Rptd) Yes  If not, why:      Is your mask leaking: (Patient-Rptd) Yes  If yes, where do you feel it: (Patient-Rptd) varying spots  How many night per week does the mask leak (0-7): (Patient-Rptd) 5    Do you notice snoring with mask on: (Patient-Rptd) No  Do you notice gasping arousals with mask on: (Patient-Rptd) No  Are you having significant oral or nasal dryness: (Patient-Rptd) No  Is the pressure setting comfortable: (Patient-Rptd) Yes, but feels he could some more pressure  It is not verified if not, why:      What is your typical bedtime: (Patient-Rptd) 10:00 PM  How long does it take you to go to sleep on PAP therapy: (Patient-Rptd) 10 - 20 minutes  What time do you typically get out of bed for the day: (Patient-Rptd) 6:00 AM  How many hours on average per night are you using PAP therapy:    How many hours are you sleeping per night: (Patient-Rptd) 8  Do you feel well rested in the morning: (Patient-Rptd) Yes      ResMed   Auto-PAP 9 - 13.2 cmH2O 30 day usage data:    90% of days  with > 4 hours of use. 1/30 days with no use.   Average use 429 minutes per day.   95%ile Leak 0.1 L/min.   CPAP 95% pressure 12.6 cm.   AHI 1.6 events per hour.        EPWORTH SLEEPINESS SCALE         4/23/2025     4:53 PM    [place] Sleepiness Scale ( KATE Mensah  [id number]<br>ESS - [place]/English - Final version - 21 Nov 07 - [facility] Research [place].)   Sitting and reading Moderate chance of dozing   Watching TV Slight chance of dozing   Sitting, inactive in a public place (e.g. a theatre or a meeting) Slight chance of dozing   As a passenger in a car for an hour without a break Slight chance of dozing   Lying down to rest in the afternoon when circumstances permit Moderate chance of dozing   Sitting and talking to someone Slight chance of dozing   Sitting quietly after a lunch without alcohol Moderate chance of dozing   In a car, while stopped for a few minutes in traffic Would never doze   [place] Score (MC) 10   [place] Score (Sleep) 10        Patient-reported       INSOMNIA SEVERITY INDEX (NEYDA)          4/23/2025     4:48 PM   Insomnia Severity Index (NEYDA)   Difficulty falling asleep 0   Difficulty staying asleep 2   Problems waking up too early 3   How SATISFIED/DISSATISFIED are you with your CURRENT sleep pattern? 3   How NOTICEABLE to others do you think your sleep problem is in terms of impairing the quality of your life? 1   How WORRIED/DISTRESSED are you about your current sleep problem? 3   To what extent do you consider your sleep problem to INTERFERE with your daily functioning (e.g. daytime fatigue, mood, ability to function at work/daily chores, concentration, memory, mood, etc.) CURRENTLY? 3   NEYDA Total Score 15        Patient-reported       Guidelines for Scoring/Interpretation:  Total score categories:  0-7 = No clinically significant insomnia   8-14 = Subthreshold insomnia   15-21 = Clinical insomnia (moderate severity)  22-28 = Clinical insomnia (severe)  Used via courtesy of www.myhealth.va.gov  with permission from Gerardo Mahmood PhD., [facility]      Past medical/surgical history, family history, social history, medications and allergies were reviewed.        Problem List:  Patient Active Problem List    Diagnosis Date Noted    Supratherapeutic INR 04/22/2025     Priority: Medium    Persistent atrial fibrillation (H) 03/25/2025     Priority: Medium    Abnormal electrocardiogram 11/28/2023     Priority: Medium    Dizziness 11/21/2023     Priority: Medium    Pulmonary embolism, bilateral (H) 10/11/2022     Priority: Medium    History of pulmonary embolism 08/10/2021     Priority: Medium    ALISE on CPAP 02/08/2019     Priority: Medium    Anxiety 02/08/2019     Priority: Medium    Chronic cough 02/08/2019     Priority: Medium    Normochromic normocytic anemia 02/08/2019     Priority: Medium    Left inguinal hernia 02/08/2019     Priority: Medium     Small, reducible noted on Annual Wellness Visit exam February 8, 2019.        Sinus bradycardia      Priority: Medium     Created by Conversion        Permanent atrial fibrillation (H) 10/25/2017     Priority: Medium     Diagnosed on 10/10/2017 and had bilateral PEs at the time.  [id number] score of 1+ with mitral insufficiency.  On warfarin        Mitral valve insufficiency 10/25/2017     Priority: Medium    Erectile dysfunction, unspecified erectile dysfunction type 01/03/2017     Priority: Medium    Overweight (BMI 25.0-29.9) 01/03/2017     Priority: Medium     IMO SNOMED LOAD SPRING 2020 [.6/.18/.2020 9:04 PM]  Abad Tse:          Tubular adenoma of colon 01/03/2017     Priority: Medium     tubular adenoma x 2 (rectum) - 4 and 8 mm        Hyperbilirubinemia 01/03/2017     Priority: Medium    BPH without urinary obstruction      Priority: Medium     Created by Conversion        Hearing Loss      Priority: Medium     Created by Conversion  Replacement Utility updated for latest IMO load        Male erectile dysfunction 12/23/2014     Priority:  "Medium    Basal cell carcinoma, ear 12/23/2014     Priority: Medium    Hypercholesterolemia      Priority: Medium     Created by Conversion        Presbycusis      Priority: Medium     Created by Conversion        Atypical Chest Pain      Priority: Medium     Created by Conversion        Blood Pressure Isolated Elevated      Priority: Medium     Created by Conversion        Blepharitis of both eyes 09/03/2008     Priority: Medium     Formatting of this note might be different from the original.  [facility]               Physical Examination:   /96   Pulse 64   Ht 1.905 m (6' 3\")   Wt 117.9 kg (260 lb)   SpO2 94%   BMI 32.50 kg/m    General: Pleasant. Cooperative. In no apparent distress.  Pulmonary: Able to speak in full sentences easily. No cough or wheeze.   Neurologic: Alert, oriented x3.  Psychiatric: Mood euthymic. Affect congruent with full range and intensity.     ASSESSMENT/PLAN:  Obstructive sleep apnea: Pt reports adequate compliance with CPAP and reports positive benefits with CPAP treatment. Based on compliance measures, ALISE is adequately controlled with CPAP at the current settings.    DME orders were generated for revision of the pressure settings on the auto CPAP to range 11 to 15 cm water based on the compliance download and due to air hunger and for renewal of CPAP supplies.  DME orders were also generated for replacement auto CPAP device with 11-15 cm water since he would be eligible for new device after July 2025    Recommended to continue using the CPAP regularly during sleep and getting the supplies for the CPAP replaced regularly.   Patient instructed to remember to bring PAP with him if hospitalized and if anticipating procedure that requires sedation/surgery to be sure to discuss with the provider/surgeon that he has sleep apnea and uses PAP therapy.     Obesity: We discussed weight management with healthy diet, and exercise.     Elevated BP readings: He was instructed to monitor BP " "regularly and follow up with PCP.     Patient was strongly advised to avoid driving, operating any heavy machinery or other hazardous situations while drowsy or sleepy.  Patient was counseled on the importance of driving while alert, to pull over if drowsy, or nap before getting into the vehicle if sleepy.      Plan is to follow-up with sleep clinic within 3 months after obtaining the replacement device if he needs to meet compliance. Otherwise plan is to follow-up in 1 year or sooner if any concerns.    The above note was dictated using voice recognition software. Although reviewed after completion, some word and grammatical error may remain . Please contact the author for any clarifications.      \" Total time spent was 16 minutes for this appointment on this date of service which include time spent before, during and after the visit for chart review, patient care, counseling and coordination of care. Including documentation\"      Reuben Delcid MD  [facility]  [address] , [place] [address]       "

## 2025-05-01 ENCOUNTER — ANTICOAGULATION THERAPY VISIT (OUTPATIENT)
Dept: ANTICOAGULATION | Facility: CLINIC | Age: 75
End: 2025-05-01

## 2025-05-01 ENCOUNTER — LAB (OUTPATIENT)
Dept: LAB | Facility: CLINIC | Age: 75
End: 2025-05-01
Payer: COMMERCIAL

## 2025-05-01 DIAGNOSIS — Z86.711 HISTORY OF PULMONARY EMBOLISM: ICD-10-CM

## 2025-05-01 DIAGNOSIS — I26.99 PULMONARY EMBOLISM, BILATERAL (H): ICD-10-CM

## 2025-05-01 DIAGNOSIS — R79.1 SUPRATHERAPEUTIC INR: ICD-10-CM

## 2025-05-01 DIAGNOSIS — I48.21 PERMANENT ATRIAL FIBRILLATION (H): Primary | ICD-10-CM

## 2025-05-01 DIAGNOSIS — I48.21 PERMANENT ATRIAL FIBRILLATION (H): ICD-10-CM

## 2025-05-01 LAB — INR BLD: 3.3 (ref 0.9–1.1)

## 2025-05-01 NOTE — PROGRESS NOTES
ANTICOAGULATION MANAGEMENT     Prem Taylor 74 year old male is on warfarin with supratherapeutic INR result. (Goal INR 2.0-3.0)    Recent labs: (last 7 days)     05/01/25  0813   INR 3.3*       ASSESSMENT     Source(s): Chart Review and Patient/Caregiver Call     Warfarin doses taken: Warfarin taken as instructed  Diet: No new diet changes identified  Medication/supplement changes: None noted  New illness, injury, or hospitalization: Memory issues  Signs or symptoms of bleeding or clotting: No  Previous result: Therapeutic last 2(+) visits not 2 weeks apart, was 7.2, 8.0, and 9.82 in the past 2 weeks   Additional findings: None       PLAN     Recommended plan for no diet, medication or health factor changes affecting INR     Dosing Instructions: decrease your warfarin dose (11.1% change) with next INR in 10 days       Summary  As of 5/1/2025      Full warfarin instructions:  7.5 mg every Mon; 3.75 mg all other days   Next INR check:  5/13/2025               Telephone call with Aubree who verbalizes understanding and agrees to plan  Sent Powtoon message with dosing and follow up instructions    Check at provider office visit 5/13/25    Education provided: Goal range and lab monitoring: goal range and significance of current result  Contact [phone] with any changes, questions or concerns.     Plan made per [facility] anticoagulation protocol; closest % change with current tablet size made per protocol for for INR 3.1-3.3  (goal 2-3)    Yesenia Vázquez, RN  5/1/2025  Anticoagulation Clinic  Epic pool for routing messages: elsie MAURICIO  [facility] patient phone line: [phone]        _______________________________________________________________________     Anticoagulation Episode Summary       Current INR goal:  2.0-3.0   TTR:  46.8% (1 y)   Target end date:  Indefinite   Send INR reminders to:  RUBY MAURICIO    Indications    Pulmonary embolism  bilateral (H) (Resolved) [I26.99]  Permanent atrial  fibrillation (H) [I48.21]  Pulmonary embolism  bilateral (H) [I26.99]  History of pulmonary embolism [Z86.711]  Supratherapeutic INR [R79.1]             Comments:  Approved for 8 week checks per -- see encounter from 07/27/21             Anticoagulation Care Providers       Provider Role Specialty Phone number    Mamadou Baez MD Referring Family Medicine [phone]    Jose Haji DO Referring HOSPITALIST [phone]

## 2025-05-05 ENCOUNTER — TELEPHONE (OUTPATIENT)
Dept: FAMILY MEDICINE | Facility: CLINIC | Age: 75
End: 2025-05-05
Payer: COMMERCIAL

## 2025-05-05 NOTE — TELEPHONE ENCOUNTER
RN called Ursula on 5/5 and left a message to return call to clinic.      TC if patient returns call please  route to RN to discuss, Chart review shows that the lexapro was stopped and patient was started on the Zoloft instead .     Leonard Ferrer RN  [facility]

## 2025-05-05 NOTE — TELEPHONE ENCOUNTER
General Call    Contacts       Contact Date/Time Type Contact Phone/Fax    05/05/2025 10:54 AM CDT Phone (Incoming) Salvador Zimmerman, [id number] [place] [address] AT [place] [address] (Pharmacy) [phone]          Reason for Call:  Patient called [facility] insisting his Lexapro, 10 mg needs to be refilled.  Pharmacist states they do not have a prescription. Last filled was 2/2025.  Patient insisted that he needs it and is out.    Please Advise Salvador Zimmerman and notify patient.    Could we send this information to you in [employer] or would you prefer to receive a phone call?: Patient would prefer a phone call   Okay to leave a detailed message?: Yes at Cell number on file:    Telephone Information:   Mobile [phone]

## 2025-05-05 NOTE — TELEPHONE ENCOUNTER
Patient's wife called back and stated that patient is off the lexapro and needs a refill of his Zoloft. The Zoloft was prescribed by the psych and she states that she did call them about this but did not hear back so will call them again today.     FAISAL VergaraN RN

## 2025-05-06 DIAGNOSIS — F41.1 GENERALIZED ANXIETY DISORDER: ICD-10-CM

## 2025-05-06 NOTE — TELEPHONE ENCOUNTER
Reason for call:  Medication   If this is a refill request, has the caller requested the refill from the pharmacy already? Yes  Will the patient be using a [facility]? No  Name of the pharmacy and phone number for the current request: [facility]    Name of the medication requested: zoloft    Other request: Wife states pt is completely out of this med    Phone number to reach patient:  Cell number on file:    Telephone Information:   Mobile [phone]  simran Nichols Time:  asap    Can we leave a detailed message on this number?  YES    Travel screening: Not Applicable

## 2025-05-06 NOTE — TELEPHONE ENCOUNTER
Patient needs new script for Zoloft 50 mg daily.  Patient was on escalating taper with 50 mg being target dose.  Providers  Please note that pended prescription will need to be modified.        Date of Last Office Visit: 3/10/25  Date of Next Office Visit:  5/15/25  No shows since last visit: No  More than one patient-initiated cancellation (with reschedule) since last seen in clinic? No    []Medication refilled per  Medication Refill in Ambulatory Care  policy.  []Scope of Practice: refill request processed by LPN/MA  [x]Medication unable to be refilled by RN due to criteria not met as indicated below:    []Eligibility: has not had a provider visit within last 6 months   []Supervision: no future appointment; < 7 days before next appointment   []Compliance: no shows; cancellations; lapse in therapy   []Verification: order discrepancy; may need modification...   [] > 30-day supply request   []Advanced refill request: > 7 days before refill date   []Controlled medication   []Medication not included in policy   []Review: new med; med adjusted <= 30 days; safety alert; requires lab monitoring...   [x]Other:  Patient needs script for Zoloft 50 mg daily      Medication(s) requested:     -  sertraline (ZOLOFT) 25 MG tablet   Date last ordered: 3/5/25  Qty: 60  Refills: 0  Appropriate for refill? Provider to review.      Any Controlled Substance(s)? No      Requested medication(s) verified as identical to current order? Yes    Any lapse in adherence to medication(s) greater than 5 days? No      Additional action taken? routed encounter to provider for review.  RN phoned and spoke to the patient.  RN instructed him that his refill request was being sent to the provider for review.        Last visit treatment plan:   Treatment Plan:  DISCONTINUE escitalopram: DECREASE TO escitalopram 10 mg (1/2 tablet) every day then STOP medication. No script needed.  THEN START sertraline: Take 0.5 tablets (12.5 mg) by mouth daily for 7  days, THEN 1 tablet (25 mg) daily for 7 days, THEN 2 tablets (50 mg) daily. Script sent for #60 + 1 refill.  Continue all other medications per primary care provider.   Continue care with neurology as planned.   Safety plan reviewed. To the Emergency Department as needed or call after hours crisis line at [phone] or [phone]. [place] Crisis Text Line. Text [place] to 779112 or Suicide LifeLine Chat: suicidepreventionGreen Energy Transportationline.org/chat  Schedule an appointment with me and Behavioral Health Consultant in 6 weeks or sooner as needed. Call [facility] at [phone] to schedule.  Follow up with primary care provider as planned or for acute medical concerns.  Call the psychiatric nurse line with medication questions or concerns at [phone].  MyChart may be used to communicate with your provider, but this is not intended to be used for emergencies.       Any medication(s) require lab monitoring? No    Jasvir Alaniz RN on 5/6/2025 at 4:09 PM

## 2025-05-09 ASSESSMENT — ANXIETY QUESTIONNAIRES
7. FEELING AFRAID AS IF SOMETHING AWFUL MIGHT HAPPEN: NOT AT ALL
IF YOU CHECKED OFF ANY PROBLEMS ON THIS QUESTIONNAIRE, HOW DIFFICULT HAVE THESE PROBLEMS MADE IT FOR YOU TO DO YOUR WORK, TAKE CARE OF THINGS AT HOME, OR GET ALONG WITH OTHER PEOPLE: SOMEWHAT DIFFICULT
7. FEELING AFRAID AS IF SOMETHING AWFUL MIGHT HAPPEN: NOT AT ALL
6. BECOMING EASILY ANNOYED OR IRRITABLE: SEVERAL DAYS
GAD7 TOTAL SCORE: 11
5. BEING SO RESTLESS THAT IT IS HARD TO SIT STILL: NEARLY EVERY DAY
GAD7 TOTAL SCORE: 11
1. FEELING NERVOUS, ANXIOUS, OR ON EDGE: SEVERAL DAYS
3. WORRYING TOO MUCH ABOUT DIFFERENT THINGS: NEARLY EVERY DAY
8. IF YOU CHECKED OFF ANY PROBLEMS, HOW DIFFICULT HAVE THESE MADE IT FOR YOU TO DO YOUR WORK, TAKE CARE OF THINGS AT HOME, OR GET ALONG WITH OTHER PEOPLE?: SOMEWHAT DIFFICULT
4. TROUBLE RELAXING: MORE THAN HALF THE DAYS
GAD7 TOTAL SCORE: 11
2. NOT BEING ABLE TO STOP OR CONTROL WORRYING: SEVERAL DAYS

## 2025-05-13 ENCOUNTER — OFFICE VISIT (OUTPATIENT)
Dept: FAMILY MEDICINE | Facility: CLINIC | Age: 75
End: 2025-05-13
Payer: COMMERCIAL

## 2025-05-13 ENCOUNTER — RESULTS FOLLOW-UP (OUTPATIENT)
Dept: FAMILY MEDICINE | Facility: CLINIC | Age: 75
End: 2025-05-13

## 2025-05-13 ENCOUNTER — TELEPHONE (OUTPATIENT)
Dept: ANTICOAGULATION | Facility: CLINIC | Age: 75
End: 2025-05-13

## 2025-05-13 ENCOUNTER — ANTICOAGULATION THERAPY VISIT (OUTPATIENT)
Dept: ANTICOAGULATION | Facility: CLINIC | Age: 75
End: 2025-05-13

## 2025-05-13 VITALS
DIASTOLIC BLOOD PRESSURE: 80 MMHG | OXYGEN SATURATION: 99 % | TEMPERATURE: 97.6 F | HEART RATE: 42 BPM | HEIGHT: 75 IN | RESPIRATION RATE: 16 BRPM | SYSTOLIC BLOOD PRESSURE: 120 MMHG | WEIGHT: 257 LBS | BODY MASS INDEX: 31.95 KG/M2

## 2025-05-13 DIAGNOSIS — Z86.711 HISTORY OF PULMONARY EMBOLISM: ICD-10-CM

## 2025-05-13 DIAGNOSIS — I48.21 PERMANENT ATRIAL FIBRILLATION (H): Primary | ICD-10-CM

## 2025-05-13 DIAGNOSIS — R79.1 SUPRATHERAPEUTIC INR: ICD-10-CM

## 2025-05-13 DIAGNOSIS — I48.21 PERMANENT ATRIAL FIBRILLATION (H): ICD-10-CM

## 2025-05-13 DIAGNOSIS — I26.99 PULMONARY EMBOLISM, BILATERAL (H): ICD-10-CM

## 2025-05-13 DIAGNOSIS — R41.3 MEMORY DIFFICULTIES: Primary | ICD-10-CM

## 2025-05-13 DIAGNOSIS — E78.00 PURE HYPERCHOLESTEROLEMIA: ICD-10-CM

## 2025-05-13 DIAGNOSIS — Z23 ENCOUNTER FOR IMMUNIZATION: ICD-10-CM

## 2025-05-13 LAB
ERYTHROCYTE [DISTWIDTH] IN BLOOD BY AUTOMATED COUNT: 13.3 % (ref 10–15)
HCT VFR BLD AUTO: 39.7 % (ref 40–53)
HGB BLD-MCNC: 13.2 G/DL (ref 13.3–17.7)
INR BLD: 6.5 (ref 0.9–1.1)
MCH RBC QN AUTO: 30.2 PG (ref 26.5–33)
MCHC RBC AUTO-ENTMCNC: 33.2 G/DL (ref 31.5–36.5)
MCV RBC AUTO: 91 FL (ref 78–100)
PLATELET # BLD AUTO: 166 10E3/UL (ref 150–450)
RBC # BLD AUTO: 4.37 10E6/UL (ref 4.4–5.9)
WBC # BLD AUTO: 3.9 10E3/UL (ref 4–11)

## 2025-05-13 PROCEDURE — 90480 ADMN SARSCOV2 VAC 1/ONLY CMP: CPT | Performed by: FAMILY MEDICINE

## 2025-05-13 PROCEDURE — 3074F SYST BP LT 130 MM HG: CPT | Performed by: FAMILY MEDICINE

## 2025-05-13 PROCEDURE — 3079F DIAST BP 80-89 MM HG: CPT | Performed by: FAMILY MEDICINE

## 2025-05-13 PROCEDURE — 1111F DSCHRG MED/CURRENT MED MERGE: CPT | Performed by: FAMILY MEDICINE

## 2025-05-13 PROCEDURE — 36415 COLL VENOUS BLD VENIPUNCTURE: CPT | Performed by: FAMILY MEDICINE

## 2025-05-13 PROCEDURE — 85027 COMPLETE CBC AUTOMATED: CPT | Performed by: FAMILY MEDICINE

## 2025-05-13 PROCEDURE — G2211 COMPLEX E/M VISIT ADD ON: HCPCS | Performed by: FAMILY MEDICINE

## 2025-05-13 PROCEDURE — 1126F AMNT PAIN NOTED NONE PRSNT: CPT | Performed by: FAMILY MEDICINE

## 2025-05-13 PROCEDURE — 91320 SARSCV2 VAC 30MCG TRS-SUC IM: CPT | Performed by: FAMILY MEDICINE

## 2025-05-13 PROCEDURE — 85610 PROTHROMBIN TIME: CPT | Performed by: FAMILY MEDICINE

## 2025-05-13 PROCEDURE — 99214 OFFICE O/P EST MOD 30 MIN: CPT | Mod: 25 | Performed by: FAMILY MEDICINE

## 2025-05-13 PROCEDURE — 36416 COLLJ CAPILLARY BLOOD SPEC: CPT | Performed by: FAMILY MEDICINE

## 2025-05-13 PROCEDURE — 84460 ALANINE AMINO (ALT) (SGPT): CPT | Performed by: FAMILY MEDICINE

## 2025-05-13 PROCEDURE — 80048 BASIC METABOLIC PNL TOTAL CA: CPT | Performed by: FAMILY MEDICINE

## 2025-05-13 ASSESSMENT — PATIENT HEALTH QUESTIONNAIRE - PHQ9
SUM OF ALL RESPONSES TO PHQ QUESTIONS 1-9: 8
SUM OF ALL RESPONSES TO PHQ QUESTIONS 1-9: 8
10. IF YOU CHECKED OFF ANY PROBLEMS, HOW DIFFICULT HAVE THESE PROBLEMS MADE IT FOR YOU TO DO YOUR WORK, TAKE CARE OF THINGS AT HOME, OR GET ALONG WITH OTHER PEOPLE: SOMEWHAT DIFFICULT

## 2025-05-13 ASSESSMENT — PAIN SCALES - GENERAL: PAINLEVEL_OUTOF10: NO PAIN (0)

## 2025-05-13 NOTE — PROGRESS NOTES
[facility] Psychiatry Services [facility]    Behavioral Health Clinician Progress Note   Mental Health & Addiction Services      5/15/2025  Patient Name: Prem Taylor       Service Type:  Individual   Service Location:  Get.[email or web address] / Email (patient reached)   Visit Start Time: 835 AM  Visit End Time:  907 AM   Session Length: 16 - 37    Attendees: Patient and Spouse / Significant Other   Service Modality: Video Visit:      Provider verified identity through the following two step process.  Patient provided:  Patient is known previously to provider and Patient was verified at admission/transfer    Telemedicine Visit: The patient's condition can be safely assessed and treated via synchronous audio and visual telemedicine encounter.      Reason for Telemedicine Visit: Patient has requested telehealth visit    Originating Site (Patient Location): Patient's home    Distant Site (Provider Location): Provider Remote Setting- Home Office    Consent:  The patient/guardian has verbally consented to: the potential risks and benefits of telemedicine (video visit) versus in person care; bill my insurance or make self-payment for services provided; and responsibility for payment of non-covered services.     Patient would like the video invitation sent by:  My Chart    Mode of Communication:  Video Conference via [facility]    Distant Location (Provider):  Off-site    As the provider I attest to compliance with applicable laws and regulations related to telemedicine.   Visit number: 3    [facility] Visit Activities (Refresh list every visit): [facility] Only     [place] Diagnostic Assessment: 2/3/2025  Treatment Plan Review Date: 8/13/25    DATA:    Extended Session (60+ minutes): No   Interactive Complexity: No   Crisis: No   [facility] Patient: No     Assessments completed prior to visit:   The following assessments were completed by patient for this visit:  PHQ9:       9/18/2024     6:59 AM 1/14/2025     1:03 PM 2/2/2025     10:44 AM 2/27/2025     4:13 PM 3/25/2025     7:07 AM 4/24/2025     3:15 PM 5/13/2025    10:33 AM   PHQ-9 SCORE   PHQ-9 Total Score MyChart 2 (Minimal depression) 3 (Minimal depression) 5 (Mild depression) 12 (Moderate depression) 19 (Moderately severe depression) 3 (Minimal depression) 8 (Mild depression)   PHQ-9 Total Score 2 3  5  12  19  3  8        Patient-reported     GAD2:       2/2/2025    10:46 AM 3/10/2025     7:19 AM 5/15/2025     8:24 AM   KALPANA-2   Feeling nervous, anxious, or on edge 3 3  1    Not being able to stop or control worrying 1 1  0    KALPANA-2 Total Score 4  4  1        Patient-reported    Proxy-reported     GAD7:       9/18/2024     7:00 AM 1/14/2025     1:05 PM 2/2/2025    10:46 AM 2/27/2025     4:14 PM 3/25/2025     7:08 AM 4/24/2025     3:17 PM 5/9/2025     1:51 PM   KALPANA-7 SCORE   Total Score 3 (minimal anxiety) 8 (mild anxiety) 14 (moderate anxiety) 13 (moderate anxiety) 12 (moderate anxiety) 13 (moderate anxiety) 11 (moderate anxiety)   Total Score 3 8  14  13  12  13  11        Patient-reported     PROMIS 10-Global Health (only subscores and total score):       1/30/2025     9:29 AM 5/12/2025     7:36 AM   PROMIS-10 Scores Only   Global Mental Health Score 13    13 13   Global Physical Health Score 14    14 15   PROMIS TOTAL - SUBSCORES 27    27 28        Reason for Visit/Presenting Concern:  Anxiety and Cognitive Symptoms    Current Stressors / Issues:  Questions/Thoughts for psychiatric provider:strange dreams, he went a week without Zoloft,  wondering about pill packs, having refills to not run out     Better/worse: anxiety is still a large concern, pt and his wife are thinking that some of the medications maybe causing strange dreams that are waking him up, each time he moves he has to reposition the cpap machine, is waiting to get fitted for hearing aids, pt's wife says that mentally pt is doing okay and she is getting use to it, physically she is wondering about his blood concerns- it  "is still very high, impulsive spending is the same and she will need to stay on things and he will forget that he will order something and order it again, wife attended a support group and it wasn't for her and she will shop around   Zoloft there was a weekend between where it didn't get filled     ADLs: sleep- interrupted sleep, no concerns falling asleep, on waking will lay in a bed a little bit to stretch and will get up and walk around the house, when coming home from the  will nod off, before bed will watch the news and won't to wake himself up and will fall asleep watching the news, just got a new cpap machine and was comfortable   Mood:high anxiety     Substance Use: pt says no changes, wife says he is drinking less, \"it's really really better\"    [facility] recommendations: ask to be scheduled with a Behavioral health clinician as needed for support, formerly [facility] smart watch, senior linkage line [facility] will send this information through [employer]     3/10/2025  Questions/Thoughts for psychiatric provider:they finally got the Zoloft and weaning off the Lexapro, they have to cancel a trip     Better/worse: anxiety is high, struggling to recall short term memory, concerns about losing hearing     Current Symptoms: He will have a EEG and a PET scan soon, he saw the audiologist and they said he does have hearing loss, his wife says \"his wife is getting out of the control\", pt needs medial verification that he isn't able to go on a trip  It's hard for his wife to keep track of his impulsive purchases, there are packages being delivered daily     [facility]     Substance Use: looking to reduce alcohol and could be forgetting to drink alcohol, wife will say that he has a drink in there     [facility] recommendations: [facility] will provide information in [employer] about drop in support groups, Alzheimer's phone, watch to track places and make it easier to track falls. [facility] recommends pt's wife to block websites to purchase " things on web browser through browser settings, bubble packs from pharmacy to help as pt thinks they are put in wrong       Therapeutic Interventions:  Cognitive Behavioral Therapy (CBT): Assisted patient in developing coping strategies (e.g. more physical exercise, less internal focus, increase social involvement, more assertiveness, etc.). and Reinforced successes reported by patient since last appointment.  Psycho-education: Explained and reviewed treatment options.     Response to treatment interventions:   Patient was receptive to interventions utilized.  Patient was engaged in the therapy process.       Progress on Treatment Objective(s) / Homework:   Satisfactory progress - ACTION (Actively working towards change); Intervened by reinforcing change plan / affirming steps taken     Medication Review:   No changes to current psychiatric medication(s)     Medication Compliance:   Yes     Chemical Use Review:  Substance Use: decrease in alcohol .  Patient reports frequency of use see above.  Provided encouragement towards sobriety   , patient's wife reports that usage has significantly decreased    Tobacco Use: No current tobacco use.       Assessment: Current Emotional / Mental Status (status of significant symptoms):    Risk status (Self / Other harm or suicidal ideation)   Patient denies a history of suicidal ideation, suicide attempts, self-injurious behavior, homicidal ideation, homicidal behavior, and and other safety concerns    Patient denies current fears or concerns for personal safety.   Patient denies current or recent suicidal ideation or behaviors.   Patient denies current or recent homicidal ideation or behaviors.   Patient denies current or recent self injurious behavior or ideation.   Patient denies other safety concerns.   Recommended that patient call 911 or go to the local ED should there be a change in any of these risk factors      ASSESSMENT:   Mental Status:     Appearance:   Appropriate     Eye Contact:   Good    Psychomotor Behavior: Normal    Attitude:   Cooperative  Pleasant   Orientation:   All   Speech Rate / Production: Normal    Volume:   Normal    Mood:    Anxious    Affect:    Worrisome    Thought Content:  Clear , did not need behavioral health clinician to repeat questions or his wife to repeat questions as much as last visit  Thought Form:  Coherent    Insight:    Fair          Diagnoses:   Anxiety    Collateral Reports Completed:   Communicated with: Yvonne Benton CNP       Plan: (Homework, other):   Patient was provided No indications of CD issues  Patient was given information about behavioral services and encouraged to schedule a follow up appointment with the clinic [facility] in 1 month.         Tanesha Rehman [facility], [facility]    _____________________________________________________________________________________________________________________________________                                              Individual Treatment Plan    Patient's Name: Prem Taylor   YOB: 1950  Date of Creation: 5/15/2025  Date Treatment Plan Last Reviewed/Revised: 5/15/2025    DSM5 Diagnoses: Anxiety  Mild episode of recurrent major depressive disorder  Psychosocial / Contextual Factors: Interpersonal Concerns and Cognitive concerns  PROMIS (reviewed every 90 days):   PROMIS 10-Global Health (only subscores and total score):       1/30/2025     9:29 AM 5/12/2025     7:36 AM   PROMIS-10 Scores Only   Global Mental Health Score 13    13 13   Global Physical Health Score 14    14 15   PROMIS TOTAL - SUBSCORES 27    27 28        Referral / Collaboration:  Referral to another professional/service is not indicated at this time.    Anticipated number of session for this episode of care:  6-9 sessions  Anticipation frequency of session: Monthly  Anticipated Duration of each session: 16-37 minutes  Treatment plan will be reviewed in 90 days or when goals have been changed.     MeasurableTreatment Goal(s)  related to diagnosis / functional impairment(s)  Goal 1: Patient will work with providers to manage symptoms    I will know I've met my goal when I have less anxiety.      Objective #A (Patient Action)  Patient will attend all appointments, take medication as prescribed.  Status: New - Date: 5/15/2025    Intervention(s)  [facility] will Monitor and assist in overcoming barriers to treatment adherence    Objective #B  Patient will consider all recommendations offered.  Status: New - Date: 5/15/2025     Intervention(s)  [facility] will educate patient on treatment options, clarify concerns, work with pt to overcome any resistance to compliance.     Patient has reviewed and agreed to the above plan.     Written by  CHELSEA Kasper, [facility]

## 2025-05-13 NOTE — TELEPHONE ENCOUNTER
Anticoagulation Management    Prem Taylor, 74 year old, male is on warfarin. Requested to review for conversion to novel anticoagulant.    Indication for anticoagulation:  Atrial Fibrillation and History of VTE    Goal INR Range: 2-3    5/13/25 office visit Patient's wife expresses desire to be off warfarin; Considering Eliquis vs. Watchman.     Wt Readings from Last 2 Encounters:   05/13/25 116.6 kg (257 lb)   04/28/25 117.9 kg (260 lb)     Lab Results   Component Value Date    INR 6.5 (HH) 05/13/2025    INR 3.3 (H) 05/01/2025    INR 2.1 (H) 04/24/2025        Lab Results   Component Value Date    CR 0.81 04/22/2025    CR 0.86 03/25/2025    CR 0.87 02/27/2025     Recommendation     Test claim obtained from [facility] Liaison-Eliquis and Xarelto covered $30 for 30 days supply, Medicare plan so can't use copay cards    Hold warfarin for high INR Recheck Friday.  Consider Apixaban transition if okay with Dr. Baez, affordable for patient.  Transition when INR < 2.     Jessica Orosco, [facility]

## 2025-05-13 NOTE — PROGRESS NOTES
ANTICOAGULATION MANAGEMENT     Prem Taylor 74 year old male is on warfarin with supratherapeutic INR result. (Goal INR 2.0-3.0)    Recent labs: (last 7 days)     05/13/25  1154   INR 6.5*       ASSESSMENT     Warfarin Lab Questionnaire    Warfarin Doses Last 7 Days      5/12/2025     7:30 AM   Dose in Tablet or Mg   TAB or MG? - 7.5mg warfarin tabs (evenings) milligram (mg)     Pt Rptd Dose SUNDAY MONDAY TUESDAY WED THURS FRIDAY SATURDAY 5/12/2025   7:30 AM 3.75 7.5 3.75 7.5 7.5 3.75 7.5         5/12/2025   Warfarin Lab Questionnaire   Missed doses within past 14 days? No - Ryan filled out the Ipad questionnaire and Aubree did not see him fill out the info.        - did take more than ordered.      No   Medication changes since last result? No - 1.  Will await approval from Dr. Baez for transition to Xarelto.           2.  Aubree would prefer to send RX to mail order.           3.  Currently have a lot of warfarin tabs on hand, which we can utilize for now, until Xarelto is received from mail order.           - weekly warfarin dose was decreased by 11.1% on 5/1/25.        - continues with Donepezil 10mg at bedtime       Injuries or illness since last result? No - Aubree expressed so much confusion with warfarin and INR testings.  Would be happy to switch to Xarelto        - OV today with PCP due to progressive memory impairment with progressive Alzheimer's dementia.  Patient's wife expressed she does not want Ryan to be on warfarin anymore.  She will consult with Cardiologist with other options - Watchman device.     New shortness of breath, severe headaches or sudden changes in vision since last result? No   Abnormal bleeding since last result? No   Upcoming surgery, procedure? No   Best number to call with results? [phone]     Previous result: Supratherapeutic at 3.3 on 5/1/25.    Additional findings: None        PLAN     Recommended plan for temporary change(s) affecting INR     Dosing Instructions:   -  advised to HOLD warfarin doses for 3 days, 5/13-15.      Summary  As of 5/13/2025      Full warfarin instructions:  7.5 mg every Mon; 3.75 mg all other days   Next INR check:  5/16/2025               Telephone call with spouse,Aubree who verbalizes understanding and agrees to plan   - 5/14/25 - Josy will call and talk with Aubree with plan.    Plan made with [facility] Pharmacist Jessica Min RN  5/13/2025  Anticoagulation Clinic  Squee for routing messages: elsie MAURICIO  [facility] patient phone line: [phone]        _______________________________________________________________________     Anticoagulation Episode Summary       Current INR goal:  2.0-3.0   TTR:  43.8% (1 y)   Target end date:  Indefinite   Send INR reminders to:  RUBY MAURICIO    Indications    Pulmonary embolism  bilateral (H) (Resolved) [I26.99]  Permanent atrial fibrillation (H) [I48.21]  Pulmonary embolism  bilateral (H) [I26.99]  History of pulmonary embolism [Z86.711]  Supratherapeutic INR [R79.1]             Comments:  Approved for 8 week checks per -- see encounter from 07/27/21             Anticoagulation Care Providers       Provider Role Specialty Phone number    Mamadou Baez MD Referring Family Medicine [phone]    Jose Haji DO Referring HOSPITALIST [phone]

## 2025-05-13 NOTE — PROGRESS NOTES
Assessment/Plan:    Memory difficulties  Memory difficulties with progressive Alzheimer's dementia likely with donepezil 10 mg bedtime dosing continuing.    Permanent atrial fibrillation (H)  Permanent atrial fibrillation.  Patient's wife is certain that she does not want him on warfarin anymore and she will contact cardiology office regarding alternatives including Eliquis versus Watchman device etc.  - Basic metabolic panel  - CBC with platelets    Supratherapeutic INR  Supratherapeutic INR previously with hospitalization.  Update INR today.  Now dosing warfarin as directed.  Prior INR at 3.3 and will update.    Encounter for immunization  Updated COVID booster provided.  - COVID-19 12+ (PFIZER)    The longitudinal plan of care for the diagnosis(es)/condition(s) as documented were addressed during this visit. Due to the added complexity in care, I will continue to support Ryan in the subsequent management and with ongoing continuity of care.         Subjective:    Prem Taylor is seen today for follow-up assessment.  Patient does have progressive cognitive impairment consistent with Alzheimer's dementia.  Donepezil 10 mg at bedtime.  Will visit with psychiatric nurse practitioner on Thursday, May 15, 2025 for further evaluation management of underlying anxiety.  He is on sertraline 50 mg daily.  CAD historically and utilizes isosorbide mononitrate 30 mg using 2 tablets daily as well as high intensity statin therapy with rosuvastatin 40 mg daily.  Metoprolol succinate 25 mg using 2 tablets daily for rate control as well.  Comprehensive review of systems as above otherwise all negative.      Reviewed cardiology note from 4/17/25 (Dr. Gordon)  Assessment:    SOB: euvolemic on exam, rec echo, lexiscan; PFTs, VQ scan; follow-up after testing   Afib: rate controlled w bb and on AC; c/w current regimen  CAD: statin dose inc last visit (LDL was 79). NST as above for SOB.  HLD: as above; labs at next visit.         HPI:  "  Pmh- HLD,  unprovoked bilat PE, afib (both dx ) on ac, john paul complaint w mask; stress test in dec 2023 for LOERA- reached 84% MPHR therefore had CT that showed mild-mod diffuse dz, moderately severe narrowing noted in a diagonal branch; maintained on medical therpay       Since his last visit he has been diagnosed with Alzheimer's and seen by neurologist.  In the last 6 months he has been more short of breath, he still goes to the gym and does treadmill elliptical and weights but finds himself pushing harder.  His wife is also noticed that he is short of breath when sitting down.  No orthopnea PND or edema.  His CT scan from  (for lung nodules) is notable for emphysema, they are unaware of this finding. Remote h/o smoking, quit in .     Wears fitbit, HR avgs 60s.               \"Aubree\" x 52 years   2 daughters - Renee (46) and Susannah (43)   3 grandchildren   No smoke (quit  after 1 and 1/2 ppd)   EtOH: weekends \"few beers\"   Surgeries: anterior cervical fusion; inguinal hernia; tonsils; right eye cataract 3/4/20 and left eye cataract 3/11/20; \"right eye vitrectomy scheduled for 20 due to floaters\"   Hospitalizations: [facility] 10/9/17-10/10/17 bilateral pulmonary emboli with a. 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)   Exercise: run 4x/week at 6-6.5 mph; situps and pushups         Past Surgical History:   Procedure Laterality Date    BACK SURGERY      CERVICAL FUSION  2001    HERNIA REPAIR      OH LAP,INGUINAL HERNIA REPR,INITIAL Left 8/15/2019    Procedure: HERNIORRHAPHY, INGUINAL, LAPAROSCOPIC;  Surgeon: Yan Leiva MD;  Location: [facility];  Service: General    TONSILLECTOMY      childhood        Family History   Problem Relation Age of Onset    Heart Disease Mother     Hypertension Mother     Dementia Mother     Coronary Artery Disease " "Father     LUNG DISEASE No family hx of         Past Medical History:   Diagnosis Date    Abnormal ECG     Anemia     Anxiety     Arrhythmia     Atrial fibrillation (H) 10/09/2017    Cancer (H) 2015    CPAP (continuous positive airway pressure) dependence 10/2019    per patient    Depression     Heart valve disease 2017    History of blood clots     Hyperlipidemia 2006    Pulmonary embolism, blood-clot, obstetric 10/09/2017    Sleep apnea 2017    Uses CPAP        Social History     Tobacco Use    Smoking status: Former     Current packs/day: 0.00     Average packs/day: 1.5 packs/day for 22.9 years (34.3 ttl pk-yrs)     Types: Cigarettes     Start date:      Quit date: 1990     Years since quittin.5    Smokeless tobacco: Never   Vaping Use    Vaping status: Never Used   Substance Use Topics    Alcohol use: Yes    Drug use: No        Current Outpatient Medications   Medication Sig Dispense Refill    donepezil (ARICEPT) 10 MG tablet TAKE 1/2 TABLET BY MOUTH EVERY EVENING. INCREASE TO 1 TABLET BY MOUTH EVERY EVENING      isosorbide mononitrate (IMDUR) 30 MG 24 hr tablet TAKE 2 TABLETS BY MOUTH DAILY 180 tablet 3    metoprolol succinate ER (TOPROL XL) 25 MG 24 hr tablet Take 2 tablets (50 mg) by mouth daily. 180 tablet 3    MULTIVITAMIN (MULTIPLE VITAMIN ORAL) [MULTIVITAMIN (MULTIPLE VITAMIN ORAL)] Take 1 tablet by mouth daily.      rosuvastatin (CRESTOR) 40 MG tablet Take 1 tablet (40 mg) by mouth daily. 90 tablet 3    sertraline (ZOLOFT) 50 MG tablet Take 1 tablet (50 mg) by mouth daily. 30 tablet 1    warfarin ANTICOAGULANT (COUMADIN) 7.5 MG tablet Take 3.75-7.5 mg by mouth See Admin Instructions. 7.5mg on Monday and Friday; 3.75mg on Tuesday, Wednesday, Thursday, Saturday,             Objective:    Vitals:    25 1101   BP: 120/80   Pulse: (!) 42   Resp: 16   Temp: 97.6  F (36.4  C)   SpO2: 99%   Weight: 116.6 kg (257 lb)   Height: 1.905 m (6' 3\")      Body mass index is 32.12 " kg/m .    Alert.  No apparent distress.  Chest clear.  Cardiac exam irregular, rate controlled.  Pleasant and smiling.      This note has been dictated using voice recognition software and as a result may contain minor grammatical errors and unintended word substitutions.         Answers submitted by the patient for this visit:  Patient Health Questionnaire (Submitted on 5/13/2025)  If you checked off any problems, how difficult have these problems made it for you to do your work, take care of things at home, or get along with other people?: Somewhat difficult  PHQ9 TOTAL SCORE: 8  Patient Health Questionnaire (G7) (Submitted on 5/9/2025)  KALPANA 7 TOTAL SCORE: 11  Hypertension Visit (Submitted on 5/9/2025)  Chief Complaint: Chronic problems general questions HPI Form  Do you check your blood pressure regularly outside of the clinic?: Yes  Are your blood pressures ever more than 140 on the top number (systolic) OR more than 90 on the bottom number (diastolic)? (For example, greater than 140/90): No  Are you following a low salt diet?: No  Depression / Anxiety Questionnaire (Submitted on 5/9/2025)  Chief Complaint: Chronic problems general questions HPI Form  Depression/Anxiety: Depression & Anxiety  Depression & Anxiety (Submitted on 5/9/2025)  Chief Complaint: Chronic problems general questions HPI Form  Status since last visit:: better  Anxiety since last: : better  Other associated symptoms of depression:: No  Other associated symotome: : No  Significant life event: : health concerns  Anxious:: No  Current substance use:: No  General Questionnaire (Submitted on 5/9/2025)  Chief Complaint: Chronic problems general questions HPI Form  How many servings of fruits and vegetables do you eat daily?: 0-1  On average, how many sweetened beverages do you drink each day (Examples: soda, juice, sweet tea, etc.  Do NOT count diet or artificially sweetened beverages)?: 0  How many minutes a day do you exercise enough to make  your heart beat faster?: 60 or more  How many days a week do you exercise enough to make your heart beat faster?: 7  How many days per week do you miss taking your medication?: 0  Questionnaire about: Chronic problems general questions HPI Form (Submitted on 5/9/2025)  Chief Complaint: Chronic problems general questions HPI Form

## 2025-05-14 ENCOUNTER — DOCUMENTATION ONLY (OUTPATIENT)
Dept: SLEEP MEDICINE | Facility: CLINIC | Age: 75
End: 2025-05-14
Payer: COMMERCIAL

## 2025-05-14 DIAGNOSIS — G47.33 OBSTRUCTIVE SLEEP APNEA (ADULT) (PEDIATRIC): Primary | ICD-10-CM

## 2025-05-14 LAB
ALT SERPL W P-5'-P-CCNC: 45 U/L (ref 0–70)
ANION GAP SERPL CALCULATED.3IONS-SCNC: 11 MMOL/L (ref 7–15)
BUN SERPL-MCNC: 16 MG/DL (ref 8–23)
CALCIUM SERPL-MCNC: 9.2 MG/DL (ref 8.8–10.4)
CHLORIDE SERPL-SCNC: 105 MMOL/L (ref 98–107)
CREAT SERPL-MCNC: 0.92 MG/DL (ref 0.67–1.17)
EGFRCR SERPLBLD CKD-EPI 2021: 88 ML/MIN/1.73M2
GLUCOSE SERPL-MCNC: 89 MG/DL (ref 70–99)
HCO3 SERPL-SCNC: 24 MMOL/L (ref 22–29)
POTASSIUM SERPL-SCNC: 4.9 MMOL/L (ref 3.4–5.3)
SODIUM SERPL-SCNC: 140 MMOL/L (ref 135–145)

## 2025-05-14 NOTE — PROGRESS NOTES
Patient was offered choice of vendor and chose [facility].  Patient Prem Taylor was set up at [facility] on May 14, 2025. Patient received a Resmed Airsense 10 Pressures were set at 11-15 cm H2O.   Patient s ramp is 4 cm H2O for Auto and FLEX/EPR is EPR, 2.  Patient received a [employer] Mask name: Vitera Full Face mask size Large, heated tubing and heated humidifier.  Patient has the following compliance requirements: using and visit requirements.  Patient will make a follow up with Dr. Delcid.    SAMANTA RAMIRES

## 2025-05-14 NOTE — TELEPHONE ENCOUNTER
I called and spoke with Aubree. She is very thankful for Ryan getting switched over to Eliquis. She will not have him take any Eliquis until we tell them and she will call us as soon as they get it in the mail. I told her the expected cost of a 90 day supply is $90 and she seems ok with this.    Ryan is holding warfarin right now due to supra INR yesterday and Aubree will make sure he gets in 5/16/25 for another INR.    She had no other questions at this time.    Josy Moe RN

## 2025-05-14 NOTE — TELEPHONE ENCOUNTER
Approval to transition to Apixaban from Dr. Baez.     Per 5/14/25 anticoauglation visit Rx requested to be sent to Mail order pharmacy.      Aubree to call anticoagulation clinic when Apixaban (Eliquis) received for anticoagulation clinic to advise on transition (INR < 2). Do not give apixaban until instructed.    Apixaban (Eliquis) sent to mail order pharmacy for 90 day supply. Note $30 cost estimate is for 30 day supply; 90 day supply may be $90.    Jessica Orosco, [facility]    ______________________          Mamadou Baez MD to Me (Selected Message)  AH      5/14/25  6:20 AM  That would be great.  I know that his wife would love that.  Please notify patient/spouse and help them to transition.       Thank you for all that you do!     Mamadou Baez MD

## 2025-05-14 NOTE — PROGRESS NOTES
See telephone encounter from 5/13/25 regarding approval to transition to Eliquis.  Rx sent to mail order pharmacy so will need to continue warfarin until they receive supply.    Josy Moe RN

## 2025-05-15 ENCOUNTER — VIRTUAL VISIT (OUTPATIENT)
Dept: BEHAVIORAL HEALTH | Facility: CLINIC | Age: 75
End: 2025-05-15
Payer: COMMERCIAL

## 2025-05-15 ENCOUNTER — VIRTUAL VISIT (OUTPATIENT)
Dept: PSYCHIATRY | Facility: CLINIC | Age: 75
End: 2025-05-15
Payer: COMMERCIAL

## 2025-05-15 DIAGNOSIS — F41.1 GENERALIZED ANXIETY DISORDER: Primary | ICD-10-CM

## 2025-05-15 DIAGNOSIS — F03.94 DEMENTIA WITH ANXIETY, UNSPECIFIED DEMENTIA SEVERITY, UNSPECIFIED DEMENTIA TYPE (H): ICD-10-CM

## 2025-05-15 DIAGNOSIS — F41.9 ANXIETY: Primary | ICD-10-CM

## 2025-05-15 ASSESSMENT — PAIN SCALES - GENERAL: PAINLEVEL_OUTOF10: NO PAIN (0)

## 2025-05-15 NOTE — PATIENT INSTRUCTIONS
**For crisis resources, please see the information at the end of this document**     Thank you for coming to the [facility] [place] [facility].    TREATMENT PLAN:    Medications:   CONTINUE sertraline 50 mg every day (Move to AM). No script needed.  Continue all other medications per primary care / speciality providers.    Consults / Referrals:   - Continue care with neurology as planned.    Follow-up:  Schedule an appointment with me and Behavioral Health Consultant in 4 weeks or sooner as needed.  Call [facility] at [phone] to schedule.  Follow up with primary care provider as planned or sooner if needed for acute medical concerns.  Call the psychiatric nurse line with medication questions or concerns at [phone].  [employer] may be used to communicate with your provider, but this is not intended to be used for emergencies.    Psychoeducation:  Side effects for SSRI: sexual dysfunction, decreased appetite, increased appetite, nausea, diarrhea, constipation, dry mouth, insomnia, sedation, agitation, tremors, headaches, dizziness, sweating, bruising and rare bleeding, discontinuation syndrome, rare hyponatremia, rare induction of mykel, suicidal ideations, and serotonin syndrome.      Financial Assistance [phone]  [facility] Billing [phone]  Central Billing Office, [facility]: [phone]  [facility] Billing [phone]  Medical Records [phone]  [facility] Patient Bill of Rights [email or web address]       MENTAL HEALTH CRISIS RESOURCES:  For a emergency help, please call 911 or go to the nearest Emergency Department.     Emergency Walk-In Options:   EmPATH Unit @ [facility] ([place]): [phone] - Specialized mental health emergency area designed to be calming  [facility] ([place]): [phone]  [facility] ([place]):  [phone]  [facility] ([place]): [phone]    [facility] Crisis Information:   [place]: [phone]  [place]: [phone]  Jose R DRAKE) - Adult: [phone]     Child: [phone]  [place] - Adult: [phone]     Child: [phone]  [place]: [phone]  List of all [facility] resources:   https://[place].gov/dhs/people-we-serve/adults/health-care/mental-health/resources/crisis-contacts.jsp    National Crisis Information:   National Suicide & Crisis Lifeline: Call 438        For online chat options, visit https://suicidepreventionlifeline.org/chat/  Poison Control Center: 0-998-129-6416  Poison Control Center: 7-754-416-1553  Trans Lifeline: 1-721.292.5242 - Hotline for transgender people of all ages  The [employer]: 4-833-931-9874 - Hotline for LGBT youth     For Non-Emergency Support:   Fast Tracker: Mental Health & Substance Use Disorder Resources -   https://www.fasttrackermn.org/       Again thank you for choosing [facility] [place] [facility] and please let us know how we can best partner with you to improve you and your family's health.    You may be receiving a survey regarding this appointment. We would love to have your feedback, both positive and negative. The survey is done by an external company, so your answers are anonymous.        Patient Education   Collaborative Care Psychiatry Service  What to Expect  Here's what to expect from your Collaborative Care Psychiatry Service (CCPS).   About CCPS  CCPS means 2 people work together to help you get better. You'll meet with a behavioral health clinician and a psychiatric doctor. A behavioral health clinician helps people with mental health problems by talking with them. A psychiatric doctor helps people by giving them medicine.  How it works  At every visit, you'll see the behavioral health clinician (BHC) first. They'll talk with you about how you're doing and teach you how to feel better.   Then you'll  "see the psychiatric doctor. This doctor can help you deal with troubling thoughts and feelings by giving you medicine. They'll make sure you know the plan for your care.   CCPS usually takes 3 to 6 visits. If you need more visits, we may have you start seeing a different psychiatric doctor for ongoing care.  If you have any questions or concerns, we'll be glad to talk with you.  About visits  Be open  At your visits, please talk openly about your problems. It may feel hard, but it's the best way for us to help you.  Cancelling visits  If you can't come to your visit, please call us right away at [phone]. If you don't cancel at least 24 hours (1 full day) before your visit, that's \"late cancellation.\"  Being late to visits  Being very late is the same as not showing up. You will be a \"no show\" if:  Your appointment starts with a [facility], and you're more than 15 minutes late for a 30-minute (half hour) visit. This will also cancel your appointment with the psychiatric doctor.  Your appointment is with a psychiatric doctor only, and you're more than 15 minutes late for a 30-minute (half hour) visit.  Your appointment is with a psychiatric doctor only, and you're more than 30 minutes late for a 60-minute (full hour) visit.  If you cancel late or don't show up 2 times within 6 months, we may end your care.   Getting help between visits  If you need help between visits, you can call us Monday to Friday from 8 a.m. to 4:30 p.m. at [phone].  Emergency care  Call 911 or go to the nearest emergency department if your life or someone else's life is in danger.  Call 988 anytime to reach the national Suicide and Crisis hotline.  Medicine refills  To refill your medicine, call your pharmacy. You can also call [facility]'s Behavioral Access at [phone], Monday to Friday, 8 a.m. to 4:30 p.m. It can take 1 to 3 business days to get a refill.   Forms, letters, and tests  You may have papers to fill out, like " FMLA, short-term disability, and workability. We can help you with these forms at your visits, but you must have an appointment. You may need more than 1 visit for this, to be in an intensive therapy program, or both.  Before we can give you medicine for ADHD, we may refer you to get tested for it or confirm it another way.  We may not be able to give you an emotional support animal letter.  We don't do mental health checks ordered by the court.   We don't do mental health testing, but we can refer you to get tested.   Thank you for choosing us for your care.  For informational purposes only. Not to replace the advice of your health care provider. Copyright   2022 [facility]. All rights reserved. [employer] [id number] - Rev 11/24.

## 2025-05-15 NOTE — PROGRESS NOTES
"Virtual Visit Details    Type of service:  Video Visit   Video Start Time: 9:10 AM  Video End Time:9:27 AM    Originating Location (pt. Location): Home    Distant Location (provider location):  Off-site  Platform used for Video Visit: Parastructure         PSYCHIATRIC MEDICATION FOLLOW UP APPT     Name: Prem Taylor   : 1950               Telemedicine Visit: The patient's condition can be safely assessed and treated via synchronous audio and visual telemedicine encounter.      Consent:  The patient/guardian has verbally consented to: the potential risks and benefits of telemedicine (video visit or phone) versus in person care; bill my insurance or make self-payment for services provided; and responsibility for payment of non-covered services.     As the provider I attest to compliance with applicable laws and regulations related to telemedicine.         Source of Referral / Care Team:  Primary Care Provider: Mamadou Baez MD   Therapist: none      The [facility] psychiatry providers act as a specialty service for Primary Care Providers in the [facility] who seek to optimize medications for unstable patients. Once medications have been optimized, [facility] providers discharge the patient back to the referring Primary Care Provider for ongoing medication management. This type of system allows [facility] to serve a high volume of patients.      Patient Identification:  Patient is a 74 year old,   White Not  or  male  who presents for return visit with me.   Patient prefers to be called: \"Ryan\".  Patient is currently retired.      Patient attended the session with wife , who they agreed to have interview with.     RECORDS AVAILABLE FOR REVIEW: EHR records through [employer]  and I have reviewed the assessment completed by CHELSEA Kasper, dated today .      Interim History:  Per [facility], CHELSEA Kasper, during today's team-based visit:  \"Better/worse: anxiety is still a large concern, pt and " "his wife are thinking that some of the medications maybe causing strange dreams that are waking him up, each time he moves he has to reposition the cpap machine, is waiting to get fitted for hearing aids, pt's wife says that mentally pt is doing okay and she is getting use to it, physically she is wondering about his blood concerns- it is still very high, impulsive spending is the same and she will need to stay on things and he will forget that he will order something and order it again, wife attended a support group and it wasn't for her and she will shop around   Ornicept there was a weekend between where it didn't get filled\"    I last saw Prem Taylor for outpatient psychiatry Return Visit two months ago on 3/10/25. During that appointment, we planned to cross taper from escitalopram to sertraline. Since his last visit he has been seen by neurologist  and diagnosed with Alzheimer's. Started donepezil 10 mg at bedtime. Additionally In interim, patient hospitalized at [facility] 4/22-23/2025 for INR > 10 outpt (\"had inadvertently been taking more than double his rx'd dose of warfarin at home\"). They have follow-up with cardiology trying to change warfarin.  Today Patient / wife reports ADHERING to prescribed medications. Denies any intolerable side effects during the cross taper, and overall think tolerating the sertraline well. He does report an increase in vivid dreams (\"nothing terrible - just more than normal\"), initially nightmares but those have resolved. Denies any AH/VH, delusions. Some impulsive spending / forgetting ordered things multiple times. Overall patient and wife report a mild improvement in anxiety recently (\"All in all, considering how he's doing, He's handled really well\"). Denies panic. Denies significant depression, anhedonia. No SI/SIB/HI.         Initial Impression / MREs:  3/10/25: At initial appointment one month ago, we planned to cross taper from escitalopram 20 mg to sertraline 50 mg. " Patient /wife report NOT ADHERING to prescribed medications, as misplaced the sertraline so just picked up. They clarify dosing, and are most interested in taper and discontinue escitalopram versus cross taper. Will plan to start sertraline in 2 weeks. Patient and wife report concern for seemingly very quick memory changes, recently seen by PCP and labs largely unremarkable. Fortunately they were able to reschedule the Errol appointment and were seen last week, no medications started as they are waiting to review the MRI. They report a high level of anxiety today , but did again discuss the risks/ benefits of medication change if symptoms are more related to neurocognitive disorder. Given previous max dose of escitalopram, no other SSRI trials, it is reasonable to trial move to alternative if more effective or better tolerated. We will cross taper to sertraline. Patient to continue with neurology as planned. Follow-up with this provider and Behavioral Health Consultant in 6 weeks or sooner as needed. Patient and wife agreeable to plan.    2/3/2025: Prem Taylor is a 74 year old White, male who presents for initial visit with [facility] ([facility]) for medication management. Carries past diagnoses: anxiety.  . Patient denies history of psychiatric providers, hospitalizations, or suicide attempts. Started medications through PCP 4/2021, on escitalopram 20 mg since 03/2022. Denies known side effects from medication, patient reports it has been helpful (although rates anxiety relatively high today), wife Aubree Doesn't think it's been that effective, although does wonder if sx are more related to a neurocognitive process. Patient was previously seen by PCP, due to concern for memory difficulties with family history of Alzheimer's (mother). Labs completed largely unremarkable and completed MRI: nonspecific findings noted, no significant abnormalities. Referred to neurology, scheduled to see Errol  "4/1/2025. Denies significant depression, SI/SIB/HI, psychosis, mykel, problematic substance use. Discussed risks / benefits of medication changes at this time. Given max dose of escitalopram, no other SSRI trials, it is reasonable to trial move to alternative if more effective or better tolerated. We will cross taper to sertraline. Patient to establish with neurology as planned. Follow-up with this provider and Behavioral Health Consultant in 1 month or sooner as needed. Patient and wife agreeable to plan.     Current medications include:   Current Outpatient Medications   Medication Sig Dispense Refill    apixaban ANTICOAGULANT (ELIQUIS) 5 MG tablet Take 1 tablet (5 mg) by mouth 2 times daily. 180 tablet 1    donepezil (ARICEPT) 10 MG tablet TAKE 1/2 TABLET BY MOUTH EVERY EVENING. INCREASE TO 1 TABLET BY MOUTH EVERY EVENING      isosorbide mononitrate (IMDUR) 30 MG 24 hr tablet TAKE 2 TABLETS BY MOUTH DAILY 180 tablet 3    metoprolol succinate ER (TOPROL XL) 25 MG 24 hr tablet Take 2 tablets (50 mg) by mouth daily. 180 tablet 3    MULTIVITAMIN (MULTIPLE VITAMIN ORAL) [MULTIVITAMIN (MULTIPLE VITAMIN ORAL)] Take 1 tablet by mouth daily.      rosuvastatin (CRESTOR) 40 MG tablet Take 1 tablet (40 mg) by mouth daily. 90 tablet 3    sertraline (ZOLOFT) 50 MG tablet Take 1 tablet (50 mg) by mouth daily. 30 tablet 1    warfarin ANTICOAGULANT (COUMADIN) 7.5 MG tablet Take 3.75-7.5 mg by mouth See Admin Instructions. 7.5mg on Monday and Friday; 3.75mg on Tuesday, Wednesday, Thursday, Saturday, Sunday       No current facility-administered medications for this visit.         Side effects: Yes: possible strange dreams     The [place] Prescription Monitoring Program has been reviewed and there are no concerns about diversionary activity for controlled substances at this time.     Psychiatric ROS:  Prem Taylor reports mood has been: \"feel like seeing some improvement\"  Depression has been: depressed mood, appetite change " "or significant weight loss / gain, sleep changes (insomnia or hypersomnia), fatigue of loss of energy, and difficulty concentrating or indecisiveness self rates as \"moments of that - but certainly not ongoing days\"/10, where 0 is none at all and 10 being severe depression. Was 2/10 at last appt.  SI/SIB/HI: denies all  Anxiety has been: excessive worry, difficult to control, restlessness / feeling keyed up,  easily fatigued,  difficulty concentrating or mind going blank, irritability, and sleep disturbances (difficulty falling / staying asleep, or restless / unsatisfying)  self rates as 3-5/10, where 0 is none at all and 10 being severe anxiety. Was 8/10 at last appt.  Sleep has been: easy onset, does have + vivid dreams, frequent wakeups.   Energy has been: \"starts out pretty good, but do certainly wear out as day goes on.\"  Appetite has been: denies issues / changes  Luisa sxs: none  Psychosis sxs: denies  ADHD/ADD sxs:  Poor task completion, Distractibility, Forgetful, Restlessness/fidgety, and In groups it is hard to hear- has hearing aids   Trauma sx: No symptoms    PROMIS, PHQ2 (0) and GAD7 scores were reviewed today.   PHQ-9 scores:       3/25/2025     7:07 AM 4/24/2025     3:15 PM 5/13/2025    10:33 AM   PHQ-9 SCORE   PHQ-9 Total Score MyChart 19 (Moderately severe depression) 3 (Minimal depression) 8 (Mild depression)   PHQ-9 Total Score 19  3  8        Patient-reported       KALPANA-7 scores:        3/25/2025     7:08 AM 4/24/2025     3:17 PM 5/9/2025     1:51 PM   KALPANA-7 SCORE   Total Score 12 (moderate anxiety) 13 (moderate anxiety) 11 (moderate anxiety)   Total Score 12  13  11        Patient-reported     Promis-10   [id number] [facility] [employer] Version 1.1    Question 5/12/2025  7:36 AM CDT - Filed by Patient   In general, would you say your health is: Excellent   In general, would you say your quality of life is: Very good   In general, how would you rate your physical " "health? Excellent   In general, how would you rate your mental health, including your mood and your ability to think? Fair   In general, how would you rate your satisfaction with your social activities and relationships? Very good   In general, please rate how well you carry out your usual social activities and roles. (This includes activities at home, at work and in your community, and responsibilities as a parent, child, spouse, employee, friend, etc.) Excellent   To what extent are you able to carry out your everyday physical activities such as walking, climbing stairs, carrying groceries, or moving a chair? Completely   In the past 7 days    How often have you been bothered by emotional problems such as feeling anxious, depressed or irritable? Sometimes   How would you rate your fatigue on average? Moderate   How would you rate your pain on average?   0 = No Pain  to  10 = Worst Imaginable Pain 8         Current stressors include: Symptoms  Coping mechanisms and supports include: Family    Vital Signs:   There were no vitals taken for this visit.    Review of Systems:  10 systems (general, cardiovascular, respiratory, eyes, ENT, endocrine, GI, , M/S, neurological) were reviewed. Most pertinent finding(s) is/are: + palpitations (Patient in Afib for ~6-7 years \"constantly\").  No acute distress; no wheezing / short of breath / increased work of breathing; denies chest pain / tightness / palpitations; reduced appetite and recent weight loss; no nausea / vomiting / abdominal pain; no tics / tremors / abnormal muscle mvmts; no visible skin changes / rashes . The remaining systems are all unremarkable.    Labs:  Most recent laboratory results reviewed and the pertinent results include:   Recent Labs   Lab Test 05/13/25  1154 04/23/25  0644 04/22/25  0954   WBC 3.9*   < > 4.5   HGB 13.2*   < > 12.3*   HCT 39.7*   < > 36.2*   MCV 91   < > 89      < > 163   ANEU  --   --  3.0    < > = values in this interval not " "displayed.     Recent Labs   Lab Test 05/13/25  1154 04/22/25  0954 03/25/25  0753 12/27/23  1302 11/21/23  1218      < > 139   < > 140   POTASSIUM 4.9   < > 4.4   < > 4.4   CHLORIDE 105   < > 105   < > 106   CO2 24   < > 23   < > 24   GLC 89   < > 108*   < > 86   DANDY 9.2   < > 9.4   < > 9.4   MAG  --   --   --   --  2.0   BUN 16.0   < > 14.7   < > 13.3   CR 0.92   < > 0.86   < > 0.95   GFRESTIMATED 88   < > >90   < > 85   ALBUMIN  --   --  4.2   < > 4.4   PROTTOTAL  --   --  6.9   < > 7.1   AST  --   --  33   < > 27   ALT 45  --  33   < > 17   ALKPHOS  --   --  69   < > 68   BILITOTAL  --   --  1.0   < > 0.8    < > = values in this interval not displayed.     Recent Labs   Lab Test 10/11/24  0802   CHOL 155   LDL 79   HDL 65   TRIG 56     Recent Labs   Lab Test 01/14/25  1356   TSH 1.76     No results found for: \"ZMK325\", \"DAKP845\", \"OEVX04DDCTL\", \"VITD3\", \"D2VIT\", \"D3VIT\", \"DTOT\", \"QL09630418\", \"[id number]\", \"[id number]\", \"[id number]\", \"[id number]\", \"[id number]\"     Past Medical/Surgical History:  Past Medical History:   Diagnosis Date    Abnormal ECG     Anemia     Anxiety     Arrhythmia     Atrial fibrillation (H) 10/09/2017    Cancer (H) 2015    CPAP (continuous positive airway pressure) dependence 10/2019    per patient    Depression     Heart valve disease 2017    History of blood clots     Hyperlipidemia 2006    Pulmonary embolism, blood-clot, obstetric 10/09/2017    Sleep apnea 2017    Uses CPAP      has a past medical history of Abnormal ECG, Anemia, Anxiety, Arrhythmia, Atrial fibrillation (H) (10/09/2017), Cancer (H) (2015), CPAP (continuous positive airway pressure) dependence (10/2019), Depression, Heart valve disease (2017), History of blood clots, Hyperlipidemia (2006), Pulmonary embolism, blood-clot, obstetric (10/09/2017), and Sleep apnea (2017).    Medication allergies:  No Known Allergies    Mental Status Exam:  Alertness: alert  and oriented x4  Appearance: well groomed  Behavior/Demeanor: " cooperative, pleasant, and calm, with good eye contact   Speech: normal and regular rate and rhythm  Language: intact and no problems  Psychomotor: normal or unremarkable  Mood: anxious  Affect: full range and appropriate; was congruent to mood; was congruent to content  Thought Process/Associations: unremarkable  Thought Content:  Reports none;  Denies suicidal ideation, violent ideation, and delusions  Perception:  Reports none;  Denies auditory hallucinations and visual hallucinations  Insight: intact  Judgment: intact  Cognition: does  appear grossly intact; formal cognitive testing was not done  Recent and Remote Memory: Intact to interview. Not formally assessed. No amnesia.  Attention Span and Concentration: Intact to interview.  Fund of Knowledge:  appropriate  Gait and Station: unremarkable    Suicide Risk Assessment:  Today Prem Taylor reports reports no suicidal ideation. In addition, there are notable risk factors for self-harm, including age, anxiety, comorbid medical condition of (multiple, including Alzheimers), and mood change. However, risk is mitigated by commitment to family, absence of past attempts, history of seeking help when needed, future oriented, no access to firearms or weapons, denies suicidal intent or plan, and denies homicidal ideation, intent, or plan. Therefore, based on all available evidence including the factors cited above, Prem Taylor does not appear to be at imminent risk for self-harm, does not meet criteria for a 72-hr hold, and therefore remains appropriate for ongoing outpatient level of care.  A thorough assessment of risk factors related to suicide and self-harm have been reviewed and are noted above. The patient convincingly denies suicidality on several occasions. Local community safety resources printed and reviewed for patient to use if needed. There was no deceit detected, and the patient presented in a manner that was believable.     Recommended that  patient call 911 or go to the local ED should there be a change in any of these risk factors    DSM5 Diagnosis:  300.02 (F41.1) Generalized Anxiety Disorder     Alzheimers by history       Medical comorbidities include:   Patient Active Problem List    Diagnosis Date Noted    Supratherapeutic INR 04/22/2025     Priority: Medium    Persistent atrial fibrillation (H) 03/25/2025     Priority: Medium    Abnormal electrocardiogram 11/28/2023     Priority: Medium    Dizziness 11/21/2023     Priority: Medium    Pulmonary embolism, bilateral (H) 10/11/2022     Priority: Medium    History of pulmonary embolism 08/10/2021     Priority: Medium    ALISE on CPAP 02/08/2019     Priority: Medium    Anxiety 02/08/2019     Priority: Medium    Chronic cough 02/08/2019     Priority: Medium    Normochromic normocytic anemia 02/08/2019     Priority: Medium    Left inguinal hernia 02/08/2019     Priority: Medium     Small, reducible noted on Annual Wellness Visit exam February 8, 2019.        Sinus bradycardia      Priority: Medium     Created by Conversion        Permanent atrial fibrillation (H) 10/25/2017     Priority: Medium     Diagnosed on 10/10/2017 and had bilateral PEs at the time.  [id number] score of 1+ with mitral insufficiency.  On warfarin        Mitral valve insufficiency 10/25/2017     Priority: Medium    Erectile dysfunction, unspecified erectile dysfunction type 01/03/2017     Priority: Medium    Overweight (BMI 25.0-29.9) 01/03/2017     Priority: Medium     IMO SNOMED LOAD SPRING 2020 [.6/.18/.2020 9:04 PM]  Abad Tse:          Tubular adenoma of colon 01/03/2017     Priority: Medium     tubular adenoma x 2 (rectum) - 4 and 8 mm        Hyperbilirubinemia 01/03/2017     Priority: Medium    BPH without urinary obstruction      Priority: Medium     Created by Conversion        Hearing Loss      Priority: Medium     Created by Conversion  Replacement Utility updated for latest IMO load        Male erectile dysfunction  "12/23/2014     Priority: Medium    Basal cell carcinoma, ear 12/23/2014     Priority: Medium    Hypercholesterolemia      Priority: Medium     Created by Conversion        Presbycusis      Priority: Medium     Created by Conversion        Atypical Chest Pain      Priority: Medium     Created by Conversion        Blood Pressure Isolated Elevated      Priority: Medium     Created by Conversion        Blepharitis of both eyes 09/03/2008     Priority: Medium     Formatting of this note might be different from the original.  Epic         Psychosocial & Contextual Factors:  Phase of Life Difficulties and Medical Comorbidites     DIFFERENTIAL DIAGNOSIS: R/O  anxiety due to other medical condition, adjustment DO     Medical comorbidities impacting or contributing to clinical picture: permanent atrial fibrillation, history of PE, normochromic normocytic anemia, hypercholesterolemia, hyperbilirubinemia  Known issue that I take into account for their medical decisions, no current exacerbations or new concerns.    Impression:  Prem Taylor is a 74 year old White, male who presents for return visit with  [facility] ([facility]) for medication management. At last appointment, we planned to cross taper from escitalopram to sertraline. Importantly, since his last visit he has been seen by neurologist  and diagnosed with Alzheimer's. Started donepezil 10 mg at bedtime. Today Patient / wife reports ADHERING to prescribed medications. Denies any intolerable side effects during the cross taper, and overall think tolerating the sertraline well. He does report an increase in vivid dreams, not currently intolerable and unsure if attributes to sertraline (is taking QHS). Denies any AH/VH, delusions. Some impulsive spending / forgetting ordered things multiple times. Overall patient and wife report a mild improvement in anxiety recently (\"All in all, considering how he's doing, He's handled really well\"). Denies " panic. Denies significant depression, anhedonia. No SI/SIB/HI. Discussed risk / benefits of medication changes. We will initially trial moving sertraline to AM if benefit to vivid dreams. Will consider increase to medication at follow-up. Follow-up with this provider and Behavioral Health Consultant in 4 weeks or sooner as needed. Patient agreeable to plan.       Medication side effects and alternatives were reviewed. Health promotion activities recommended and reviewed today. All questions addressed. Education and counseling completed regarding risks and benefits of medications and psychotherapy options. Recommend therapy for additional support.       Treatment Plan:  CONTINUE sertraline 50 mg every day (Move to AM). No script needed.  Continue all other medications per primary care / speciality providers.  Follow-up with neurology as planned.   Safety plan reviewed. To the Emergency Department as needed or call after hours crisis line at [phone] or [phone]. [place] Crisis Text Line. Text [place] to 506499 or Suicide LifeLine Chat: suicidepreventionHIRO Medialine.org/chat  Schedule an appointment with me and Behavioral Health Consultant in 4 weeks or sooner as needed. Call [facility] at [phone] to schedule.  Follow up with primary care provider as planned or for acute medical concerns.  Call the psychiatric nurse line with medication questions or concerns at [phone].  Klipt may be used to communicate with your provider, but this is not intended to be used for emergencies.    Patient Education:  Medication side effects and alternatives reviewed. Health promotion activities recommended and reviewed today. All questions addressed. Education and counseling completed regarding risks and benefits of medications and psychotherapy options.  Consent provided by patient/guardian  Call the psychiatric nurse line with medication questions or concerns at [phone].  Klipt may be used to  communicate with your provider, but this is not intended to be used for emergencies.  BEERS CRITERIA: The American Geriatrics Society (AGS) released its second updated and expanded Beers Criteria - lists of potentially inappropriate medications for older adults - and one of the most frequently cited reference tools in the field of geriatrics. The Society also unveiled a suite of new  resources - including a list of alternative therapies for potentially inappropriate medications and more detailed guidance on best practices for implementing AGS recommendations.  Discussed with client.   Medlineplus.gov is information for patients.  It is run by the [employer] and it contains information about all disorders, diseases and all medications.      Side effects for SSRI: sexual dysfunction, decreased appetite, increased appetite, nausea, diarrhea, constipation, dry mouth, insomnia, sedation, agitation, tremors, headaches, dizziness, sweating, bruising and rare bleeding, discontinuation syndrome, rare hyponatremia, rare induction of mykel, suicidal ideations, and serotonin syndrome.      Community Resources:    National Suicide Prevention Lifeline: 873.567.6238 (TTY: 605.294.2543). Call anytime for help.  (www.suicidepreventionlifeline.org)  National [employer] on Mental Illness (www.melva.org): 780.802.2798 or 681-457-8661.   Mental Health Association (www.mentalhealth.org): 489.636.5338 or 410-691-4552.  [place] Crisis Text Line: Text [place] to 014969  Suicide LifeLine Chat: suicidepreuberlifeline.org/chat    Administrative Billing:       Level of Medical Decision Making:   - At least 1 chronic problem that is not stable  - Engaged in prescription drug management during visit (discussed any medication benefits, side effects, alternatives, etc.)             Patient Status:  [facility] MD/DO/NP/PA providers offer care a specialty service for Primary Care Providers in the [facility] that seek to  optimize psychotropic medications for unstable patients.  Once medications have been optimized, our providers discharge the patient back to the referring Primary Care Provider for ongoing medication management.  This type of system allows our providers to serve a high volume of patients.   Patient will continue to be seen for ongoing consultation and stabilization.    Signed:   Yvonne Benton, MSN, APRN, PMHNP-BC  [facility] (CCPS)  [facility]    Chart documentation done in part with Dragon Voice Recognition software.  Although reviewed after completion, some word and grammatical errors may remain.

## 2025-05-15 NOTE — PATIENT INSTRUCTIONS
[employer]-is the name of the watch company-is a smart watch and it also looks like for an extra purchase of $50 you can buy a thing that looks like tweezers that will only allow the watch to come off with that  You can call them at: [phone]  [email or web address]-[email or web address]    The senior Linkage Line can be a helpful resource to ask about supports and ways to help with services for memory.   [phone]

## 2025-05-15 NOTE — NURSING NOTE
Is the patient currently in the state of [place]? YES    Current patient location: [address] [place] [id number]    Visit mode:Video    If the visit is dropped, the patient can be reconnected by: VIDEO VISIT:  Send e-mail to at [email or web address]    Will anyone else be joining the visit? No  (If patient encounters technical issues they should call [phone])    Are changes needed to the allergy or medication list? Pt stated no changes to allergies and Pt stated no med changes    Are refills needed on medications prescribed by this physician? No    Rooming Documentation: Questionnaire(s) completed.    Reason for visit: RECHSHUBHAM Lo, VVF

## 2025-05-19 ENCOUNTER — TELEPHONE (OUTPATIENT)
Dept: FAMILY MEDICINE | Facility: CLINIC | Age: 75
End: 2025-05-19
Payer: COMMERCIAL

## 2025-05-19 ENCOUNTER — RESULTS FOLLOW-UP (OUTPATIENT)
Dept: CARDIOLOGY | Facility: CLINIC | Age: 75
End: 2025-05-19

## 2025-05-19 ENCOUNTER — PATIENT OUTREACH (OUTPATIENT)
Dept: CARE COORDINATION | Facility: CLINIC | Age: 75
End: 2025-05-19
Payer: COMMERCIAL

## 2025-05-19 DIAGNOSIS — I10 BENIGN ESSENTIAL HYPERTENSION: ICD-10-CM

## 2025-05-19 DIAGNOSIS — R06.02 SOB (SHORTNESS OF BREATH): Primary | ICD-10-CM

## 2025-05-19 NOTE — TELEPHONE ENCOUNTER
"Called and spoke with patient's wife.   Informed that due to patient's recent INR of 6.5 last week, it would be recommended that he have another INR prior to starting Eliquis.     Wife believes that Ryan may have taken today's dose already. Instructed to take warfarin out of patient's pill box going forward and checking his INR after 2 \"missed\" doses. Patient's wife states that she will check his pill box and if its already taken, she will schedule an INR for Thursday, if it hasn't been taken yet, she will remove it and schedule an INR for Wednesday.     Elsie Stringer RN  [facility] Anticoagulation  [phone]    "

## 2025-05-19 NOTE — TELEPHONE ENCOUNTER
General Call      Reason for Call:  PT SPOUSE BASILIO IS CALLING ABOUT DOSING INSTRUCTIONS    What are your questions or concerns:  SEE ABOVE    Date of last appointment with provider: 5/16/25    Could we send this information to you in [place] or would you prefer to receive a phone call?:   Patient would prefer a phone call   Okay to leave a detailed message?: Yes at Cell number on file:    Telephone Information:   Mobile [phone]

## 2025-05-19 NOTE — PROGRESS NOTES
Clinic Care Coordination Contact  Clinic Care Coordination Contact  OUTREACH    Referral Information: Tanesha Rehman, LICSW      Assessment: WILLIAM TOUSSAINT reached out to pt and spoke with spouse, Ursula. She declined resources for now on referral. She states if anything is needed she or pt will reach back out to WILLIAM TOUSSAINT. WILLIAM TOUSSAINT provided contact info.        Chief Complaint   Patient presents with    Clinic Care Coordination - Initial        Universal Utilization:      Utilization      No Show Count (past year)  3             ED Visits  1             Hospital Admissions  1                    Current as of: 5/18/2025  3:15 PM                Clinical Concerns:  Current Medical Concerns:      Current Behavioral Concerns:     Education Provided to patient:       Health Maintenance Reviewed:    Clinical Pathway:     Medication Management:  Medication review status:     Functional Status:       Living Situation:       Lifestyle & Psychosocial Needs:    Social Drivers of Health     Food Insecurity: Low Risk  (4/22/2025)    Food Insecurity     Within the past 12 months, did you worry that your food would run out before you got money to buy more?: No     Within the past 12 months, did the food you bought just not last and you didn t have money to get more?: No   Depression: Not at risk (5/15/2025)    PHQ-2     PHQ-2 Score: 0   Recent Concern: Depression - At risk (3/25/2025)    PHQ-2     PHQ-2 Score: 4   Housing Stability: Low Risk  (4/22/2025)    Housing Stability     Do you have housing? : Yes     Are you worried about losing your housing?: No   Tobacco Use: Medium Risk (5/15/2025)    Patient History     Smoking Tobacco Use: Former     Smokeless Tobacco Use: Never     Passive Exposure: Not on file   Financial Resource Strain: Low Risk  (4/22/2025)    Financial Resource Strain     Within the past 12 months, have you or your family members you live with been unable to get utilities (heat, electricity) when it was really needed?: No    Alcohol Use: Not on file   Transportation Needs: Low Risk  (4/22/2025)    Transportation Needs     Within the past 12 months, has lack of transportation kept you from medical appointments, getting your medicines, non-medical meetings or appointments, work, or from getting things that you need?: No   Physical Activity: Sufficiently Active (9/13/2024)    Exercise Vital Sign     Days of Exercise per Week: 5 days     Minutes of Exercise per Session: 100 min   Interpersonal Safety: Low Risk  (3/25/2025)    Interpersonal Safety     Do you feel physically and emotionally safe where you currently live?: Yes     Within the past 12 months, have you been hit, slapped, kicked or otherwise physically hurt by someone?: No     Within the past 12 months, have you been humiliated or emotionally abused in other ways by your partner or ex-partner?: No   Stress: Stress Concern Present (9/13/2024)    [place] [employer] - Occupational Stress Questionnaire     Feeling of Stress : To some extent   Social Connections: Unknown (9/13/2024)    Social Connection and Isolation Panel [NHANES]     Frequency of Communication with Friends and Family: Not on file     Frequency of Social Gatherings with Friends and Family: Twice a week     Attends Yazidi Services: Not on file     Active Member of Clubs or Organizations: Not on file     Attends Club or Organization Meetings: Not on file     Marital Status: Not on file   Health Literacy: Not on file        Resources and Interventions:  Current Resources:      Care Plan:    Patient/Caregiver understanding: Patient verbalized understanding, engaged in AIDET communication during patient encounter.       Future Appointments                   In 1 week EVA ECHO OP 2; EVA  ECHO STAFF [facility], [facility] LAINEY     In 1 week LAINEY VINSON  1 [facility] Imaging, [facility] DWAYNEN     In 1 week LAINEY VINSON A [facility], [facility] LAINEY     In 1  week JN HC NM STRESS 1; JN HC CVT; JN HC RN M [facility], [facility]     In 1 week LAINEY VINSON A M [facility], [facility]     In 3 weeks Renee Hines, Marcos M [facility] [place], [facility]     In 3 weeks Tanesha Rehman LICSW M [facility], [facility]     In 3 weeks Yvonne Benton APRN CNP M [facility]     In 1 month Renee Hines, Marcos M [facility] [place], [facility]     In 2 months Reji Gordon M [facility], [facility]     In 2 months Renee Hines, Marcos M [facility] [place], [facility]     In 3 months Kayleigh Medina APRN CNP [facility]     In 3 months Meghana Murray MD [facility] [place],             Plan:  CC will update provider and see if further support is needed.  CC will set status to closed and no further outreaches planned at this time.     SERENA Lovelace? Social Work Care Coordinator   [facility]  [email or web address]? [email or web address]    Phone: [phone]  she/her

## 2025-05-19 NOTE — TELEPHONE ENCOUNTER
General Call    Contacts       Contact Date/Time Type Contact Phone/Fax    05/19/2025 10:57 AM CDT Phone (Incoming) Ursula Taylor (Emergency Contact) [phone] ()          Reason for Call:  ANTICOAGULATION    What are your questions or concerns:  Eliquis has been delivered. Caller would like to discuss with INR nurse before starting any medications.    Date of last appointment with provider: Last INR 5/16/25    Could we send this information to you in [place] or would you prefer to receive a phone call?:   Patient would prefer a phone call   Okay to leave a detailed message?: Yes at Home number on file [phone] (home)

## 2025-05-20 ENCOUNTER — ANTICOAGULATION THERAPY VISIT (OUTPATIENT)
Dept: ANTICOAGULATION | Facility: CLINIC | Age: 75
End: 2025-05-20

## 2025-05-20 ENCOUNTER — DOCUMENTATION ONLY (OUTPATIENT)
Dept: ANTICOAGULATION | Facility: CLINIC | Age: 75
End: 2025-05-20

## 2025-05-20 ENCOUNTER — LAB (OUTPATIENT)
Dept: LAB | Facility: CLINIC | Age: 75
End: 2025-05-20
Payer: COMMERCIAL

## 2025-05-20 DIAGNOSIS — I26.99 PULMONARY EMBOLISM, BILATERAL (H): ICD-10-CM

## 2025-05-20 DIAGNOSIS — Z86.711 HISTORY OF PULMONARY EMBOLISM: ICD-10-CM

## 2025-05-20 DIAGNOSIS — I48.21 PERMANENT ATRIAL FIBRILLATION (H): ICD-10-CM

## 2025-05-20 DIAGNOSIS — R79.1 SUPRATHERAPEUTIC INR: ICD-10-CM

## 2025-05-20 DIAGNOSIS — I48.21 PERMANENT ATRIAL FIBRILLATION (H): Primary | ICD-10-CM

## 2025-05-20 LAB — INR BLD: 1.6 (ref 0.9–1.1)

## 2025-05-20 PROCEDURE — 85610 PROTHROMBIN TIME: CPT

## 2025-05-20 PROCEDURE — 36416 COLLJ CAPILLARY BLOOD SPEC: CPT

## 2025-05-20 NOTE — PROGRESS NOTES
ANTICOAGULATION  MANAGEMENT    Prem Taylor is being discharged from the [facility] Anticoagulation Management Program ([facility]).    Reason for discharge: warfarin replaced by alternate therapy, Eliquis    Anticoagulation episode resolved and Standing order discontinued, warfarin rx discontinued    If patient needs warfarin management in the future, please send a new referral    Nicole Luis RN

## 2025-05-20 NOTE — PROGRESS NOTES
ANTICOAGULATION MANAGEMENT     Prem Taylor 74 year old male is on warfarin with subtherapeutic INR result. (Goal INR 2.0-3.0)    Recent labs: (last 7 days)     05/20/25  0756   INR 1.6*       ASSESSMENT     Source(s): Chart Review and Patient/Caregiver Call     Warfarin doses taken: Held for switching to apixaban  recently which may be affecting INR  Diet: No new diet changes identified  Medication/supplement changes: None noted  New illness, injury, or hospitalization: No  Signs or symptoms of bleeding or clotting: No  Previous result: Therapeutic last visit; previously outside of goal range  Additional findings: talked with Aubree, they will begin Ryan's eliquis today. They know it will be twice daily and will not require inrs. He will discontinue warfarin and they will call if questions arise, they know the same symptom precautions apply with Eliquis since it is a blood thinner.       PLAN     Recommended plan for ongoing change(s) affecting INR     Dosing Instructions: discontinue warfarin, begin eliquis twice daily     Summary  As of 5/20/2025      Full warfarin instructions:  3.75 mg every day   Next INR check:  --               Telephone call with Aubree who verbalizes understanding and agrees to plan    Contact [phone] to schedule and with any changes, questions or concerns.     Education provided: please call if questions arise.    Plan made per [facility] anticoagulation protocol    Nicole Luis RN  5/20/2025  Anticoagulation Clinic  Infratel for routing messages: elsie MAURICIO  [facility] patient phone line: [phone]        _______________________________________________________________________     Anticoagulation Episode Summary       Current INR goal:  2.0-3.0   TTR:  44.5% (1 y)   Target end date:  Indefinite   Send INR reminders to:  RUBY MAURICIO    Indications    Pulmonary embolism  bilateral (H) (Resolved) [I26.99]  Permanent atrial fibrillation (H) [I48.21]  Pulmonary embolism  bilateral (H)  [I26.99]  History of pulmonary embolism [Z86.711]  Supratherapeutic INR [R79.1]             Comments:  Approved for 8 week checks per -- see encounter from 07/27/21             Anticoagulation Care Providers       Provider Role Specialty Phone number    Mamadou Baez MD Referring Family Medicine [phone]    Jose Haji DO Referring HOSPITALIST [phone]

## 2025-05-29 ENCOUNTER — HOSPITAL ENCOUNTER (OUTPATIENT)
Dept: CARDIOLOGY | Facility: HOSPITAL | Age: 75
End: 2025-05-29
Attending: INTERNAL MEDICINE
Payer: COMMERCIAL

## 2025-05-29 DIAGNOSIS — R06.02 SOB (SHORTNESS OF BREATH): ICD-10-CM

## 2025-05-29 LAB — LVEF ECHO: NORMAL

## 2025-05-29 PROCEDURE — 93306 TTE W/DOPPLER COMPLETE: CPT

## 2025-05-29 RX ORDER — FUROSEMIDE 20 MG/1
20 TABLET ORAL DAILY
Qty: 90 TABLET | Refills: 1 | Status: SHIPPED | OUTPATIENT
Start: 2025-05-29 | End: 2025-06-29

## 2025-06-10 ENCOUNTER — OFFICE VISIT (OUTPATIENT)
Dept: AUDIOLOGY | Facility: CLINIC | Age: 75
End: 2025-06-10
Payer: COMMERCIAL

## 2025-06-10 DIAGNOSIS — H90.3 SENSORINEURAL HEARING LOSS (SNHL) OF BOTH EARS: Primary | ICD-10-CM

## 2025-06-10 PROCEDURE — 92591 PR HEARING AID EXAM BINAURAL: CPT | Performed by: AUDIOLOGIST

## 2025-06-10 NOTE — PROGRESS NOTES
AUDIOLOGY REPORT    SUBJECTIVE: Prem Taylor is a 74 year old male was seen in the Audiology Clinic at  [facility] on 6/10/25 to discuss concerns with hearing and functional communication difficulties. The patient was accompanied by their wife, Ursula. Mr. Taylor has been seen previously on 3-3-25, and results revealed borderline normal, sloping to severe sensorineural hearing loss, bilaterally. He is an accomplished user of binaural ALICE amplification, and was previously fit through [facility] [employer].    OBJECTIVE:  Patient is a hearing aid candidate. Patient would like to move forward with a hearing aid evaluation today. Therefore, the patient was presented with different options for amplification to help aid in communication. Discussed styles, levels of technology and monaural vs. binaural fitting.     The hearing aid(s) mutually chosen were:  Binaural: Phonak Audeo I50-R  COLOR: P7 (graphite)  BATTERY SIZE: rechargeable  EARMOLD/TIPS: generic dome size to be determined at fitting appointment  CANAL/ LENGTH: 2 S, bilaterally    ASSESSMENT:     ICD-10-CM    1. Sensorineural hearing loss (SNHL) of both ears  H90.3           Reviewed purchase information and warranty information with patient. The 45 day trial period was explained to patient. The patient was given a copy of the [place] Department of Health consumer brochure on purchasing hearing instruments. Patient risk factors have been provided to the patient in writing prior to the sale of the hearing aid per FDA regulation. The risk factors are also available in the User Instructional Booklet to be presented on the day of the hearing aid fitting. Hearing aid(s) ordered. Hearing aid evaluation completed.    PLAN: Prem is scheduled to return 7-10-25 for a hearing aid fitting and programming appointment. Purchase agreement will be completed on that date. Mr. Tayolr expressed verbal understanding of this information and plan. Please  contact this clinic with any questions or concerns.      Gudelia Castellano, [facility]  [place] Licensed Audiologist [phone]

## 2025-06-12 ENCOUNTER — PREP FOR PROCEDURE (OUTPATIENT)
Dept: CARDIOLOGY | Facility: CLINIC | Age: 75
End: 2025-06-12

## 2025-06-12 ENCOUNTER — OFFICE VISIT (OUTPATIENT)
Dept: CARDIOLOGY | Facility: CLINIC | Age: 75
End: 2025-06-12
Payer: COMMERCIAL

## 2025-06-12 ENCOUNTER — TELEPHONE (OUTPATIENT)
Dept: CARDIOLOGY | Facility: CLINIC | Age: 75
End: 2025-06-12

## 2025-06-12 ENCOUNTER — VIRTUAL VISIT (OUTPATIENT)
Dept: BEHAVIORAL HEALTH | Facility: CLINIC | Age: 75
End: 2025-06-12
Payer: COMMERCIAL

## 2025-06-12 ENCOUNTER — VIRTUAL VISIT (OUTPATIENT)
Dept: PSYCHIATRY | Facility: CLINIC | Age: 75
End: 2025-06-12
Payer: COMMERCIAL

## 2025-06-12 VITALS
BODY MASS INDEX: 32.25 KG/M2 | SYSTOLIC BLOOD PRESSURE: 132 MMHG | DIASTOLIC BLOOD PRESSURE: 78 MMHG | HEART RATE: 48 BPM | RESPIRATION RATE: 16 BRPM | WEIGHT: 258 LBS

## 2025-06-12 VITALS — BODY MASS INDEX: 29.84 KG/M2 | HEIGHT: 75 IN | WEIGHT: 240 LBS

## 2025-06-12 DIAGNOSIS — Z01.818 PRE-OP EVALUATION: ICD-10-CM

## 2025-06-12 DIAGNOSIS — I25.10 CORONARY ARTERY DISEASE DUE TO LIPID RICH PLAQUE: ICD-10-CM

## 2025-06-12 DIAGNOSIS — I25.83 CORONARY ARTERY DISEASE DUE TO LIPID RICH PLAQUE: ICD-10-CM

## 2025-06-12 DIAGNOSIS — R06.02 SOB (SHORTNESS OF BREATH): ICD-10-CM

## 2025-06-12 DIAGNOSIS — F41.9 ANXIETY: Primary | ICD-10-CM

## 2025-06-12 DIAGNOSIS — I48.21 PERMANENT ATRIAL FIBRILLATION (H): ICD-10-CM

## 2025-06-12 DIAGNOSIS — R94.39 ABNORMAL STRESS TEST: Primary | ICD-10-CM

## 2025-06-12 DIAGNOSIS — I27.20 PULMONARY HYPERTENSION (H): ICD-10-CM

## 2025-06-12 DIAGNOSIS — I25.118 CORONARY ARTERY DISEASE OF NATIVE ARTERY OF NATIVE HEART WITH STABLE ANGINA PECTORIS: ICD-10-CM

## 2025-06-12 DIAGNOSIS — F41.1 GENERALIZED ANXIETY DISORDER: ICD-10-CM

## 2025-06-12 RX ORDER — ASPIRIN 81 MG/1
243 TABLET, CHEWABLE ORAL ONCE
OUTPATIENT
Start: 2025-06-12

## 2025-06-12 RX ORDER — LIDOCAINE 40 MG/G
CREAM TOPICAL
OUTPATIENT
Start: 2025-06-12

## 2025-06-12 RX ORDER — ASPIRIN 325 MG
325 TABLET ORAL ONCE
OUTPATIENT
Start: 2025-06-12 | End: 2025-06-12

## 2025-06-12 RX ORDER — SODIUM CHLORIDE 9 MG/ML
INJECTION, SOLUTION INTRAVENOUS CONTINUOUS
OUTPATIENT
Start: 2025-06-12

## 2025-06-12 RX ORDER — FENTANYL CITRATE 50 UG/ML
25 INJECTION, SOLUTION INTRAMUSCULAR; INTRAVENOUS
Refills: 0 | OUTPATIENT
Start: 2025-06-12

## 2025-06-12 RX ORDER — SERTRALINE HYDROCHLORIDE 100 MG/1
100 TABLET, FILM COATED ORAL DAILY
Qty: 30 TABLET | Refills: 1 | Status: SHIPPED | OUTPATIENT
Start: 2025-06-12

## 2025-06-12 ASSESSMENT — PAIN SCALES - GENERAL: PAINLEVEL_OUTOF10: NO PAIN (0)

## 2025-06-12 NOTE — LETTER
6/12/2025    Mamadou Baez MD  [address]  [facility] [address]    RE: Prem Taylor       Dear Colleague,     I had the pleasure of seeing Prem Taylor in the [facility].    [facility]   [address]  [place] [address]   [email or web address]   OFFICE: [phone]     CARDIOLOGY CLINIC NOTE     Assessment/Recommendations   Assessment:    SOB: euvolemic on exam, based on elevated RVSP I started him on low-dose Lasix recently which has made a difference in his shortness of breath but he still feels some symptoms.  His stress test is also an abnormal, plan for coronary angiogram for further evaluation.    Afib: rate controlled w bb and on AC; c/w current regimen  CAD: LDL was 79, statin dose increased after that.    Abnormal stress test: Plan for coronary angiogram with ongoing symptom       Patient seen initially 4/17/2025 by me, for shortness of breath.  Previously followed Dr. Velez.      75 yo M [facility]- HLD,  unprovoked bilat PE, afib (both dx 2017) on AC, john paul complaint w CPAP; stress test in dec 2023 for LOERA- reached 84% MPHR therefore had CT that showed mild-mod diffuse dz, moderately severe narrowing noted in a diagonal branch; maintained on medical therpay; recent Alzheimer's diagnosis.     Patient reported that around end of 2024 he started becoming more short of breath; he still goes to the gym and does treadmill elliptical and weights but finds himself pushing harder.  His wife is also noticed that he is short of breath when sitting down.  No orthopnea PND or edema.  His CT scan from 2024 (for lung nodules) is notable for emphysema, they are unaware of this finding. Remote h/o smoking, quit in 1990.     Wears fitbit, HR avgs 60s.      Further workup including VQ scan, stress test and echocardiogram were recommended.  Outlined below.  VQ scan is negative.    PMH:      As above     Cardiac history:    NST:     The nuclear  stress test is abnormal.     There is a medium sized area of mild ischemia in the mid to basal inferior and lateral segment(s) of the left ventricle.     Left ventricular function is normal, EF 57%.     The stress electrocardiogram is negative for inducible ischemic EKG changes.     Baseline hypertension, 171/93     There is no prior study for comparison.     24 hr holter 10/30/24:  abnormal 24-hour Holter monitor recording by virtue the presence of continuous atrial fibrillation.  The ventricular response appears to  be well-controlled.  There were no apparent symptoms referable to this arrhythmia.  The patient also has evidence of conduction disease manifest as an  IVCD.  There is no evidence of high degree AV block.        Coronary Angiogram CT 12/27/23:  cac 541, 71st percentile   LEFT MAIN: The left main arises normally from the left coronary cusp and there is mildly obstructed with calcified plaque.     LEFT ANTERIOR DESCENDING: The left anterior descending is relatively diffusely diseased. In the proximal segment there is a moderately obstructive mixed plaque as well as in the mid segment of the LAD. The distal LAD is mildly obstructed with calcified plaque. There is a large second diagonal branch which appears to have a moderately severe severe ostial narrowing with mixed plaque.     CIRCUMFLEX: The circumflex artery gives off its major branches the first obtuse marginal branch which is mild to moderately obstructive mixed plaque. The distal circumflex is in the AV groove and is small without significant lesions detected.     RIGHT CORONARY ARTERY: The right coronary artery is dominant. There is a mildly obstructed calcified plaque in the proximal segment. The mid RCA is moderately obstructed with mixed plaque. There is mildly obstructive plaque in the distal right coronary artery. PDA is without significant disease.        Physical Examination Review of Systems   Vitals: /78 (BP Location: Right arm,  Patient Position: Sitting, Cuff Size: Adult Large)   Pulse (!) 48   Resp 16   Wt 117 kg (258 lb)   BMI 32.25 kg/m    BMI= Body mass index is 32.25 kg/m .  Wt Readings from Last 3 Encounters:   06/12/25 117 kg (258 lb)   06/12/25 108.9 kg (240 lb)   05/13/25 116.6 kg (257 lb)       General: pleasant male. No acute distress.   Neck: No JVD  Lungs: clear to auscultation  COR:  regular rate and rhythm, No murmurs, rubs, or gallops  Extrem: No edema   Per HPI     Medications  Allergies   Current Outpatient Medications   Medication Sig Dispense Refill     apixaban ANTICOAGULANT (ELIQUIS) 5 MG tablet Take 1 tablet (5 mg) by mouth 2 times daily. 180 tablet 1     donepezil (ARICEPT) 10 MG tablet TAKE 1/2 TABLET BY MOUTH EVERY EVENING. INCREASE TO 1 TABLET BY MOUTH EVERY EVENING       furosemide (LASIX) 20 MG tablet Take 1 tablet (20 mg) by mouth daily. 90 tablet 1     isosorbide mononitrate (IMDUR) 30 MG 24 hr tablet TAKE 2 TABLETS BY MOUTH DAILY 180 tablet 3     metoprolol succinate ER (TOPROL XL) 25 MG 24 hr tablet Take 2 tablets (50 mg) by mouth daily. 180 tablet 3     MULTIVITAMIN (MULTIPLE VITAMIN ORAL) [MULTIVITAMIN (MULTIPLE VITAMIN ORAL)] Take 1 tablet by mouth daily.       rosuvastatin (CRESTOR) 40 MG tablet Take 1 tablet (40 mg) by mouth daily. 90 tablet 3     sertraline (ZOLOFT) 100 MG tablet Take 1 tablet (100 mg) by mouth daily. 30 tablet 1      No Known Allergies          Lab Results    Chemistry/lipid CBC Cardiac Enzymes/BNP/TSH/INR   Lab Results   Component Value Date    CHOL 155 10/11/2024    HDL 65 10/11/2024    TRIG 56 10/11/2024    BUN 16.0 05/13/2025     05/13/2025    CO2 24 05/13/2025    Lab Results   Component Value Date    WBC 3.9 (L) 05/13/2025    HGB 13.2 (L) 05/13/2025    HCT 39.7 (L) 05/13/2025    MCV 91 05/13/2025     05/13/2025    Lab Results   Component Value Date    TSH 1.76 01/14/2025    INR 1.6 (H) 05/20/2025          The longitudinal plan of care for the  diagnosis(es)/condition(s) as documented were addressed during this visit. Due to the added complexity in care, I will continue to support Ryan in the subsequent management and with ongoing continuity of care.          Reji Gordon MD, MPH  Non-invasive Cardiologist  [facility]         Thank you for allowing me to participate in the care of your patient.      Sincerely,     Reji HAMILTON [facility]  cc:   Reji Gordon  [address] [place] [address]

## 2025-06-12 NOTE — TELEPHONE ENCOUNTER
----- Message -----  From: Rosette Rivera  Sent: 6/12/2025   4:38 PM CDT  To: Savannah Trimble, RN; Jaky Bryan  Subject: BERT - IN CLINIC TEACH                         Case type: CA POSS PCI    Procedure Physician(s): NICOLE/HAIDER    Procedure Date and Patient Arrival Time: Friday 6/20, with arrival time of 0630AM    H&P: Completed on 6/12 WITH DR. NOEL    Pre-Procedure Lab Appt: Scheduled 6/17 AT [facility] [place] - Please place lab orders if you haven't already!    Alerts/Important Info: ALISE-CPAP, ELIQUIS    Thank you,  Rosette

## 2025-06-12 NOTE — PROGRESS NOTES
[facility]   [address]  [place] [address]   [email or web address]   OFFICE: [phone]     CARDIOLOGY CLINIC NOTE     Assessment/Recommendations   Assessment:    SOB: euvolemic on exam, based on elevated RVSP I started him on low-dose Lasix recently which has made a difference in his shortness of breath but he still feels some symptoms.  His stress test is also an abnormal, plan for coronary angiogram for further evaluation.    Afib: rate controlled w bb and on AC; c/w current regimen  CAD: LDL was 79, statin dose increased after that.    Abnormal stress test: Plan for coronary angiogram with ongoing symptom       Patient seen initially 4/17/2025 by me, for shortness of breath.  Previously followed Dr. Velez.      73 yo M [facility]- HLD,  unprovoked bilat PE, afib (both dx 2017) on AC, john paul complaint w CPAP; stress test in dec 2023 for LOERA- reached 84% MPHR therefore had CT that showed mild-mod diffuse dz, moderately severe narrowing noted in a diagonal branch; maintained on medical therpay; recent Alzheimer's diagnosis.     Patient reported that around end of 2024 he started becoming more short of breath; he still goes to the gym and does treadmill elliptical and weights but finds himself pushing harder.  His wife is also noticed that he is short of breath when sitting down.  No orthopnea PND or edema.  His CT scan from 2024 (for lung nodules) is notable for emphysema, they are unaware of this finding. Remote h/o smoking, quit in 1990.     Wears fitbit, HR avgs 60s.      Further workup including VQ scan, stress test and echocardiogram were recommended.  Outlined below.  VQ scan is negative.    PMH:      As above     Cardiac history:    NST:    The nuclear stress test is abnormal.    There is a medium sized area of mild ischemia in the mid to basal inferior and lateral segment(s) of the left ventricle.    Left ventricular function is normal, EF 57%.    The stress  electrocardiogram is negative for inducible ischemic EKG changes.    Baseline hypertension, 171/93    There is no prior study for comparison.     24 hr holter 10/30/24:  abnormal 24-hour Holter monitor recording by virtue the presence of continuous atrial fibrillation.  The ventricular response appears to  be well-controlled.  There were no apparent symptoms referable to this arrhythmia.  The patient also has evidence of conduction disease manifest as an  IVCD.  There is no evidence of high degree AV block.        Coronary Angiogram CT 12/27/23:  cac 541, 71st percentile   LEFT MAIN: The left main arises normally from the left coronary cusp and there is mildly obstructed with calcified plaque.     LEFT ANTERIOR DESCENDING: The left anterior descending is relatively diffusely diseased. In the proximal segment there is a moderately obstructive mixed plaque as well as in the mid segment of the LAD. The distal LAD is mildly obstructed with calcified plaque. There is a large second diagonal branch which appears to have a moderately severe severe ostial narrowing with mixed plaque.     CIRCUMFLEX: The circumflex artery gives off its major branches the first obtuse marginal branch which is mild to moderately obstructive mixed plaque. The distal circumflex is in the AV groove and is small without significant lesions detected.     RIGHT CORONARY ARTERY: The right coronary artery is dominant. There is a mildly obstructed calcified plaque in the proximal segment. The mid RCA is moderately obstructed with mixed plaque. There is mildly obstructive plaque in the distal right coronary artery. PDA is without significant disease.        Physical Examination Review of Systems   Vitals: /78 (BP Location: Right arm, Patient Position: Sitting, Cuff Size: Adult Large)   Pulse (!) 48   Resp 16   Wt 117 kg (258 lb)   BMI 32.25 kg/m    BMI= Body mass index is 32.25 kg/m .  Wt Readings from Last 3 Encounters:   06/12/25 117 kg (258  lb)   06/12/25 108.9 kg (240 lb)   05/13/25 116.6 kg (257 lb)       General: pleasant male. No acute distress.   Neck: No JVD  Lungs: clear to auscultation  COR:  regular rate and rhythm, No murmurs, rubs, or gallops  Extrem: No edema   Per HPI     Medications  Allergies   Current Outpatient Medications   Medication Sig Dispense Refill    apixaban ANTICOAGULANT (ELIQUIS) 5 MG tablet Take 1 tablet (5 mg) by mouth 2 times daily. 180 tablet 1    donepezil (ARICEPT) 10 MG tablet TAKE 1/2 TABLET BY MOUTH EVERY EVENING. INCREASE TO 1 TABLET BY MOUTH EVERY EVENING      furosemide (LASIX) 20 MG tablet Take 1 tablet (20 mg) by mouth daily. 90 tablet 1    isosorbide mononitrate (IMDUR) 30 MG 24 hr tablet TAKE 2 TABLETS BY MOUTH DAILY 180 tablet 3    metoprolol succinate ER (TOPROL XL) 25 MG 24 hr tablet Take 2 tablets (50 mg) by mouth daily. 180 tablet 3    MULTIVITAMIN (MULTIPLE VITAMIN ORAL) [MULTIVITAMIN (MULTIPLE VITAMIN ORAL)] Take 1 tablet by mouth daily.      rosuvastatin (CRESTOR) 40 MG tablet Take 1 tablet (40 mg) by mouth daily. 90 tablet 3    sertraline (ZOLOFT) 100 MG tablet Take 1 tablet (100 mg) by mouth daily. 30 tablet 1      No Known Allergies          Lab Results    Chemistry/lipid CBC Cardiac Enzymes/BNP/TSH/INR   Lab Results   Component Value Date    CHOL 155 10/11/2024    HDL 65 10/11/2024    TRIG 56 10/11/2024    BUN 16.0 05/13/2025     05/13/2025    CO2 24 05/13/2025    Lab Results   Component Value Date    WBC 3.9 (L) 05/13/2025    HGB 13.2 (L) 05/13/2025    HCT 39.7 (L) 05/13/2025    MCV 91 05/13/2025     05/13/2025    Lab Results   Component Value Date    TSH 1.76 01/14/2025    INR 1.6 (H) 05/20/2025          The longitudinal plan of care for the diagnosis(es)/condition(s) as documented were addressed during this visit. Due to the added complexity in care, I will continue to support Ryan in the subsequent management and with ongoing continuity of care.          Reji Gordon MD,  MPH  Non-invasive Cardiologist  [facility]

## 2025-06-12 NOTE — NURSING NOTE
Current patient location: [address] [place] [id number]    Is the patient currently in the state of [place]? YES    Visit mode: VIDEO    If the visit is dropped, the patient can be reconnected by:VIDEO VISIT: Send to e-mail at: [email or web address]    Will anyone else be joining the visit? NO  (If patient encounters technical issues they should call [phone])    Are changes needed to the allergy or medication list? No    Are refills needed on medications prescribed by this physician? NO    Rooming Documentation:  Questionnaire(s) completed    Reason for visit: Consult    Fanny LOMELI

## 2025-06-12 NOTE — PATIENT INSTRUCTIONS
Chief Complaint   Patient presents with    Follow-up     Follow up labs       HISTORY OF PRESENT ILLNESS:  63 year old White  female presents for follow up.     She has room to cut back on soda and simran aid.     She notes some GERD, since the fall. She notes significant weight gain since USP. Omeprazole does help. Symptoms have resolved with omeprazole.     Hypothyroidism follow up.   compliance with medication regimen: yes  Feels on correct dose.  Most recent TSH:   TSH (mcUnits/mL)   Date Value   11/26/2024 1.470     Patient is here for HTN follow up.  Denies lightheadedness.  Most recent creatinine:   Creatinine (mg/dL)   Date Value   11/26/2024 0.63     Denies chest pain, dyspnea, abdominal pain.    Patient reports compliance and denies issues with current medication regimen for chronic medical conditions including hyperlipidemia.     Depression has been fine off of medication, declines need to restart. Denies suicidal ideation.     For a fib, follows with cardiology. On eliquis without issue. Denies chest pain.    Noted to have hyperkalemia.    Plan last visit:    CPE today.      For low WBC, recommend repeat level in 4 weeks. If continues to be low, recommend hematology. For infectious symptoms, such as fever, she agrees to seek evaluation, she is aware with neutropenia she is at increased risk for infections/complications. Discussed with patient, any blood abnormality is concerning for possible malignancy and requires reevaluation. For any new concerning symptom including unintentional weight loss, fevers, rectal bleeding, melena, etc patient agrees to seek reevaluation.      For depression, she will continue to wean off of fluoxetine and lyrica. Reviewed withdrawal symptoms. For any severe, worsening or new concerning symptom, return to clinic/walk in/ER for reevaluation.       For hypothyroidism, she is aware levothyroxine is weight based. For additional weight gain, she will notify me and would  Schedule echocardiogram and visit in 3 months (visit after echo or same-day)   repeat TSH and adjust medication as indicated.      Otherwise stable. Will continue current regimen. She will continue to follow with cardiology.      Reviewed as patient is asymptomatic generally do not do routine breast and pelvic exams. Offered pelvic and breast exam regardless, declines today.      Discussed diet and exercise changes for weight loss, hyperlipidemia.      REVIEW OF SYSTEMS:   All other ROS negative except for as noted in the HPI.    I have reviewed the patient's, past medical, surgical, social and family history, updating these as appropriate.  See Histories section of the electronic medical record for a display of this information.    Current Outpatient Medications   Medication Sig Dispense Refill    OMEPRAZOLE PO Take 20 mg by mouth.      benazepril (LOTENSIN) 20 MG tablet Take 1 tablet by mouth daily. 90 tablet 3    amLODIPine (NORVASC) 5 MG tablet Take 1 tablet by mouth daily. 90 tablet 3    amoxicillin (AMOXIL) 500 MG capsule Take 4 capsules by mouth 1 hour prior to dental visit. (Patient not taking: Reported on 12/3/2024) 16 capsule 0    levothyroxine 88 MCG tablet Take 1 tablet by mouth daily. 90 tablet 3    apixaBAN (Eliquis) 5 MG Tab Take 1 tablet by mouth every 12 hours. 180 tablet 3    atorvastatin (LIPITOR) 80 MG tablet Take 1 tablet by mouth daily. 90 tablet 3    metoPROLOL tartrate (Lopressor) 50 MG tablet Take 1 tablet by mouth in the morning and 1 tablet in the evening. 180 tablet 3    Ferrous Sulfate 27 MG Tab Take 1 tablet by mouth daily.      acetaminophen (TYLENOL) 650 MG CR tablet Take 1,300 mg by mouth in the morning and 1,300 mg in the evening. (2 tabs at 5 am,  2 tabs at 10:30 am)      diphenhydramine-acetaminophen (Tylenol PM Extra Strength)  MG Tab Take 2 tablets by mouth nightly.      magnesium 250 MG tablet Take 250 mg by mouth daily.      Ascorbic Acid (vitamin C) 500 MG tablet Take 500 mg by mouth daily.      Multiple Vitamins-Minerals (WOMENS ONE DAILY PO)  Take  by mouth. Take one tablet daily       No current facility-administered medications for this visit.       Allergies as of 2024    (No Known Allergies)       Social History     Tobacco Use   Smoking Status Former    Current packs/day: 0.00    Average packs/day: 1 pack/day for 15.0 years (15.0 ttl pk-yrs)    Types: Cigarettes    Start date: 1974    Quit date: 1989    Years since quittin.9   Smokeless Tobacco Never   Tobacco Comments    quit 20 yrs ago       OBJECTIVE:    Visit Vitals  /74   Pulse 68   Ht 5' 5.5\" (1.664 m)   Wt 96.6 kg (213 lb)   BMI 34.91 kg/m²        PHYSICAL EXAM  Constitutional:  Well-developed, well-nourished, no acute distress, non-toxic appearance.   Eyes:  Pupils equal, conjunctivae normal.  HENT:  Atraumatic.   Respiratory:  Lungs clear to auscultation bilaterally, normal respiratory effort  Cardiovascular:  Normal rate, irregular rhythm  Gastrointestinal: Soft, nontender, nondistended  Integument: warm and pink without rashes  Neurologic:  Alert and oriented times 3, cranial nerves 2-12 grossly normal, no focal deficits noted.   Psychiatric:  Speech and behavior appropriate, affect normal.      ASSESSMENT:    1. Gastroesophageal reflux disease without esophagitis    2. Hyperlipidemia, unspecified hyperlipidemia type    3. Atrial fibrillation, unspecified type  (CMD)    4. Essential hypertension, benign    5. Moderate recurrent major depression (CMD)    6. Hypothyroidism, unspecified type    7. Hyperkalemia    8. Encounter for screening mammogram for malignant neoplasm of breast         PLAN:    For GERD, reviewed recommend work on weight loss with improved diet. She will continue omeprazole at this time, with the hope to be able to wean to half in a couple of months. For any severe, worsening or new concerning symptom, return to clinic/walk in/ER for reevaluation.  Recommend follow up in 6 months, she prefers 1 year, earlier if needed.    For hyperlipidemia,  she will continue current regimen. She will work on diet changes for this.    For hyperkalemia, recommend repeat potassium, declines. She is aware this is possibly related to benazepril, also possibly lab error as repeat in the past has been normal. Discussed consideration to stop benazepril, declines.     Otherwise stable. Will continue current regimen. She will continue to follow with cardiology. Recommend follow up in 6 months, she prefers 1 year, earlier if needed.    Discussed diet and exercise changes for weight loss, hyperlipidemia.    Discussed various health maintenance cancer screening and preventative measures today, educated on importance of keeping up with routine screenings/prevention and failure to do so can result in undiagnosed cancer, preventable infections, need for surgeries and death.  Patient expressed understanding, but declines today.      Medical compliance with plan discussed and risks of non-compliance reviewed.  Proper usage and side effects of medications reviewed & discussed.  Reviewed with patient, for any labs that are ordered to be done prior to a clinic visit, we will plan to discuss results at the follow up visit. Of course for any severe abnormalities we will also contact patient prior.   Return to clinic as planned, earlier if needed.   If a referral has been sent, I have discussed with patient need to contact me within 1 week if they have not heard from specialty to schedule.  For any severe, worsening or new concerning symptom, return to clinic/walk in/ER for reevaluation.    Patient expressed understanding and agreement with plan of care.        Orders Placed This Encounter    MAMMO SCREENING BILATERAL W AKILA    CBC with Automated Differential    Comprehensive Metabolic Panel    Lipid Panel With Reflex    Thyroid Stimulating Hormone Reflex     Return in about 1 year (around 12/3/2025) for Annual physical, Chronic Medical Conditions, labs before visit.
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
## 2025-06-12 NOTE — TELEPHONE ENCOUNTER
Prem Taylor  [address] [place] [address]  [phone] (home)     Procedure cardiologist:  MY/HAIDER  PCP:  Mamadou Baez  H&P completed by:  6/12 [facility]  Admit date: 6/20/25  Arrival time:  0630  Anticoagulation: Eliquis - instructed to hold 48 hours prior  CPAP: Yes - instructed to bring with  Previous PCI: No  Bypass Grafts: No  Renal Issues: No  Diabetic?: No  Device?: No  Type:  N/A  Ambulation status: Independent    Patient Education/  Angiogram Teaching    Reason for Visit:  Patient seen for pre-procedure education in preparation for: CA poss PCI    Procedure Prep:  Primary Cardiologist note dated: 6/12 [facility]  EKG results obtained, dated: To be done on admission  Hemogram results obtained: 6/17/25  Basic Metabolic Panel results obtained: 6/17/25  Lipid Profile results obtained: 6/17/25  Pertinent cardiac test results: 5/29/25 abnormal NM stress    Pre-procedure instructions  Patient instructed to be NPO per anesthesia guidelines.  Patient instructed to shower the evening before or the morning of the procedure.  Patient instructed to arrange for transportation home following procedure from a responsible family member of friend (wife, Aubree). No driving for at least 24 hours.  Patient instructed to have a responsible adult with them for 24 hours post-procedure (wife, Aubree).  Post-procedure follow up process.  Conscious sedation discussed.    Pre-procedure medication instructions  Patient instructed on antiplatelet medication.  Continue medications as scheduled, with a small amount of water on the day of the procedure unless indicated.  Patient instructed to take 4-81 mg of Aspirin AM of procedure: yes  Other medication: Instructed to TAKE Imdur and Sertraline AM of the procedure. Instructed to HOLD Lasix, and all vitamins/supplements AM of the procedure. Instructed to HOLD Eliquis 48 hours prior to procedure (6/18, 6/19, 6/20 AM)       Patient states understanding of procedure and agrees to  proceed.    *PATIENTS RECORDS AVAILABLE IN [facility] UNLESS OTHERWISE INDICATED*      Patient Active Problem List   Diagnosis    Hearing Loss    Hypercholesterolemia    Presbycusis    Sinus bradycardia    BPH without urinary obstruction    Atypical Chest Pain    Blood Pressure Isolated Elevated    Male erectile dysfunction    Basal cell carcinoma, ear    Erectile dysfunction, unspecified erectile dysfunction type    Overweight (BMI 25.0-29.9)    Tubular adenoma of colon    Hyperbilirubinemia    Permanent atrial fibrillation (H)    Mitral valve insufficiency    ALISE on CPAP    Anxiety    Chronic cough    Normochromic normocytic anemia    Left inguinal hernia    Blepharitis of both eyes    History of pulmonary embolism    Pulmonary embolism, bilateral (H)    Dizziness    Abnormal electrocardiogram    Persistent atrial fibrillation (H)    Supratherapeutic INR    Generalized anxiety disorder       Current Outpatient Medications   Medication Sig Dispense Refill    apixaban ANTICOAGULANT (ELIQUIS) 5 MG tablet Take 1 tablet (5 mg) by mouth 2 times daily. 180 tablet 1    donepezil (ARICEPT) 10 MG tablet TAKE 1/2 TABLET BY MOUTH EVERY EVENING. INCREASE TO 1 TABLET BY MOUTH EVERY EVENING      furosemide (LASIX) 20 MG tablet Take 1 tablet (20 mg) by mouth daily. 90 tablet 1    isosorbide mononitrate (IMDUR) 30 MG 24 hr tablet TAKE 2 TABLETS BY MOUTH DAILY 180 tablet 3    metoprolol succinate ER (TOPROL XL) 25 MG 24 hr tablet Take 2 tablets (50 mg) by mouth daily. 180 tablet 3    MULTIVITAMIN (MULTIPLE VITAMIN ORAL) [MULTIVITAMIN (MULTIPLE VITAMIN ORAL)] Take 1 tablet by mouth daily.      rosuvastatin (CRESTOR) 40 MG tablet Take 1 tablet (40 mg) by mouth daily. 90 tablet 3    sertraline (ZOLOFT) 100 MG tablet Take 1 tablet (100 mg) by mouth daily. 30 tablet 1       No Known Allergies      Savannah Trimble, RN, BSN

## 2025-06-12 NOTE — H&P (VIEW-ONLY)
[facility]   [address]  [place] [address]   [email or web address]   OFFICE: [phone]     CARDIOLOGY CLINIC NOTE     Assessment/Recommendations   Assessment:    SOB: euvolemic on exam, based on elevated RVSP I started him on low-dose Lasix recently which has made a difference in his shortness of breath but he still feels some symptoms.  His stress test is also an abnormal, plan for coronary angiogram for further evaluation.    Afib: rate controlled w bb and on AC; c/w current regimen  CAD: LDL was 79, statin dose increased after that.    Abnormal stress test: Plan for coronary angiogram with ongoing symptom       Patient seen initially 4/17/2025 by me, for shortness of breath.  Previously followed Dr. Velez.      75 yo M [facility]- HLD,  unprovoked bilat PE, afib (both dx 2017) on AC, john paul complaint w CPAP; stress test in dec 2023 for LOERA- reached 84% MPHR therefore had CT that showed mild-mod diffuse dz, moderately severe narrowing noted in a diagonal branch; maintained on medical therpay; recent Alzheimer's diagnosis.     Patient reported that around end of 2024 he started becoming more short of breath; he still goes to the gym and does treadmill elliptical and weights but finds himself pushing harder.  His wife is also noticed that he is short of breath when sitting down.  No orthopnea PND or edema.  His CT scan from 2024 (for lung nodules) is notable for emphysema, they are unaware of this finding. Remote h/o smoking, quit in 1990.     Wears fitbit, HR avgs 60s.      Further workup including VQ scan, stress test and echocardiogram were recommended.  Outlined below.  VQ scan is negative.    PMH:      As above     Cardiac history:    NST:    The nuclear stress test is abnormal.    There is a medium sized area of mild ischemia in the mid to basal inferior and lateral segment(s) of the left ventricle.    Left ventricular function is normal, EF 57%.    The stress  electrocardiogram is negative for inducible ischemic EKG changes.    Baseline hypertension, 171/93    There is no prior study for comparison.     24 hr holter 10/30/24:  abnormal 24-hour Holter monitor recording by virtue the presence of continuous atrial fibrillation.  The ventricular response appears to  be well-controlled.  There were no apparent symptoms referable to this arrhythmia.  The patient also has evidence of conduction disease manifest as an  IVCD.  There is no evidence of high degree AV block.        Coronary Angiogram CT 12/27/23:  cac 541, 71st percentile   LEFT MAIN: The left main arises normally from the left coronary cusp and there is mildly obstructed with calcified plaque.     LEFT ANTERIOR DESCENDING: The left anterior descending is relatively diffusely diseased. In the proximal segment there is a moderately obstructive mixed plaque as well as in the mid segment of the LAD. The distal LAD is mildly obstructed with calcified plaque. There is a large second diagonal branch which appears to have a moderately severe severe ostial narrowing with mixed plaque.     CIRCUMFLEX: The circumflex artery gives off its major branches the first obtuse marginal branch which is mild to moderately obstructive mixed plaque. The distal circumflex is in the AV groove and is small without significant lesions detected.     RIGHT CORONARY ARTERY: The right coronary artery is dominant. There is a mildly obstructed calcified plaque in the proximal segment. The mid RCA is moderately obstructed with mixed plaque. There is mildly obstructive plaque in the distal right coronary artery. PDA is without significant disease.        Physical Examination Review of Systems   Vitals: /78 (BP Location: Right arm, Patient Position: Sitting, Cuff Size: Adult Large)   Pulse (!) 48   Resp 16   Wt 117 kg (258 lb)   BMI 32.25 kg/m    BMI= Body mass index is 32.25 kg/m .  Wt Readings from Last 3 Encounters:   06/12/25 117 kg (258  lb)   06/12/25 108.9 kg (240 lb)   05/13/25 116.6 kg (257 lb)       General: pleasant male. No acute distress.   Neck: No JVD  Lungs: clear to auscultation  COR:  regular rate and rhythm, No murmurs, rubs, or gallops  Extrem: No edema   Per HPI     Medications  Allergies   Current Outpatient Medications   Medication Sig Dispense Refill    apixaban ANTICOAGULANT (ELIQUIS) 5 MG tablet Take 1 tablet (5 mg) by mouth 2 times daily. 180 tablet 1    donepezil (ARICEPT) 10 MG tablet TAKE 1/2 TABLET BY MOUTH EVERY EVENING. INCREASE TO 1 TABLET BY MOUTH EVERY EVENING      furosemide (LASIX) 20 MG tablet Take 1 tablet (20 mg) by mouth daily. 90 tablet 1    isosorbide mononitrate (IMDUR) 30 MG 24 hr tablet TAKE 2 TABLETS BY MOUTH DAILY 180 tablet 3    metoprolol succinate ER (TOPROL XL) 25 MG 24 hr tablet Take 2 tablets (50 mg) by mouth daily. 180 tablet 3    MULTIVITAMIN (MULTIPLE VITAMIN ORAL) [MULTIVITAMIN (MULTIPLE VITAMIN ORAL)] Take 1 tablet by mouth daily.      rosuvastatin (CRESTOR) 40 MG tablet Take 1 tablet (40 mg) by mouth daily. 90 tablet 3    sertraline (ZOLOFT) 100 MG tablet Take 1 tablet (100 mg) by mouth daily. 30 tablet 1      No Known Allergies          Lab Results    Chemistry/lipid CBC Cardiac Enzymes/BNP/TSH/INR   Lab Results   Component Value Date    CHOL 155 10/11/2024    HDL 65 10/11/2024    TRIG 56 10/11/2024    BUN 16.0 05/13/2025     05/13/2025    CO2 24 05/13/2025    Lab Results   Component Value Date    WBC 3.9 (L) 05/13/2025    HGB 13.2 (L) 05/13/2025    HCT 39.7 (L) 05/13/2025    MCV 91 05/13/2025     05/13/2025    Lab Results   Component Value Date    TSH 1.76 01/14/2025    INR 1.6 (H) 05/20/2025          The longitudinal plan of care for the diagnosis(es)/condition(s) as documented were addressed during this visit. Due to the added complexity in care, I will continue to support Ryan in the subsequent management and with ongoing continuity of care.          Reji Gordon MD,  MPH  Non-invasive Cardiologist  [facility]

## 2025-06-12 NOTE — PATIENT INSTRUCTIONS
**For crisis resources, please see the information at the end of this document**     Thank you for coming to the [facility] [place] [facility].    TREATMENT PLAN:    Medications:   INCREASE TO sertraline 100 mg every day. Script sent.  Continue all other medications per primary care / speciality providers.    Consults / Referrals:   - Continue care with neurology as planned.    Follow-up:  Schedule an appointment with me and Behavioral Health Consultant in 6 weeks or sooner as needed.  Call [facility] at [phone] to schedule.  Follow up with primary care provider as planned or sooner if needed for acute medical concerns.  Call the psychiatric nurse line with medication questions or concerns at [phone].  [employer] may be used to communicate with your provider, but this is not intended to be used for emergencies.    Psychoeducation:  Side effects for SSRI: sexual dysfunction, decreased appetite, increased appetite, nausea, diarrhea, constipation, dry mouth, insomnia, sedation, agitation, tremors, headaches, dizziness, sweating, bruising and rare bleeding, discontinuation syndrome, rare hyponatremia, rare induction of mykel, suicidal ideations, and serotonin syndrome.      Financial Assistance [phone]  [employer] Billing [phone]  Central Billing Office, [facility]: [phone]  [facility] Billing [phone]  Medical Records [phone]  [facility] Patient Bill of Rights [email or web address]       MENTAL HEALTH CRISIS RESOURCES:  For a emergency help, please call 911 or go to the nearest Emergency Department.     Emergency Walk-In Options:   EmPATH Unit @ [facility] ([place]): [phone] - Specialized mental health emergency area designed to be calming  [facility] ([employer]): [phone]  [facility] ([employer]): [phone]  [facility] ([place]): [phone]    [facility] Crisis Information:   [place]: [phone]  [place]: [phone]  Jose R ([place]) - Adult: [phone]     Child: [phone]  [place] - Adult: [phone]     Child: [phone]  [place]: [phone]  List of all [facility] resources:   https://[place].gov/dhs/people-we-serve/adults/health-care/mental-health/resources/crisis-contacts.jsp    National Crisis Information:   National Suicide & Crisis Lifeline: Call 558        For online chat options, visit https://suicidepreventionAradigmline.org/chat/  Poison Control Center: 2-463-135-3269  Poison Control Center: 1-916-478-0998  Trans Lifeline: 1-430.823.2050 - Hotline for transgender people of all ages  [employer]: 8-609-727-1426 - Hotline for LGBT youth     For Non-Emergency Support:   Fast Tracker: Mental Health & Substance Use Disorder Resources -   https://www.fasttrackermn.org/       Again thank you for choosing [facility] [place] [facility] and please let us know how we can best partner with you to improve you and your family's health.    You may be receiving a survey regarding this appointment. We would love to have your feedback, both positive and negative. The survey is done by an external company, so your answers are anonymous.        Patient Education   Collaborative Care Psychiatry Service  What to Expect  Here's what to expect from your Collaborative Care Psychiatry Service (CCPS).   About CCPS  CCPS means 2 people work together to help you get better. You'll meet with a behavioral health clinician and a psychiatric doctor. A behavioral health clinician helps people with mental health problems by talking with them. A psychiatric doctor helps people by giving them medicine.  How it works  At every visit, you'll see the behavioral health clinician (BHC) first. They'll talk with you about how you're doing and teach you how to feel better.   Then you'll see the psychiatric doctor. This  "doctor can help you deal with troubling thoughts and feelings by giving you medicine. They'll make sure you know the plan for your care.   CCPS usually takes 3 to 6 visits. If you need more visits, we may have you start seeing a different psychiatric doctor for ongoing care.  If you have any questions or concerns, we'll be glad to talk with you.  About visits  Be open  At your visits, please talk openly about your problems. It may feel hard, but it's the best way for us to help you.  Cancelling visits  If you can't come to your visit, please call us right away at [phone]. If you don't cancel at least 24 hours (1 full day) before your visit, that's \"late cancellation.\"  Being late to visits  Being very late is the same as not showing up. You will be a \"no show\" if:  Your appointment starts with a C, and you're more than 15 minutes late for a 30-minute (half hour) visit. This will also cancel your appointment with the psychiatric doctor.  Your appointment is with a psychiatric doctor only, and you're more than 15 minutes late for a 30-minute (half hour) visit.  Your appointment is with a psychiatric doctor only, and you're more than 30 minutes late for a 60-minute (full hour) visit.  If you cancel late or don't show up 2 times within 6 months, we may end your care.   Getting help between visits  If you need help between visits, you can call us Monday to Friday from 8 a.m. to 4:30 p.m. at [phone].  Emergency care  Call 911 or go to the nearest emergency department if your life or someone else's life is in danger.  Call 988 anytime to reach the national Suicide and Crisis hotline.  Medicine refills  To refill your medicine, call your pharmacy. You can also call [facility]'s Behavioral Access at [phone], Monday to Friday, 8 a.m. to 4:30 p.m. It can take 1 to 3 business days to get a refill.   Forms, letters, and tests  You may have papers to fill out, like FMLA, short-term disability, and " workability. We can help you with these forms at your visits, but you must have an appointment. You may need more than 1 visit for this, to be in an intensive therapy program, or both.  Before we can give you medicine for ADHD, we may refer you to get tested for it or confirm it another way.  We may not be able to give you an emotional support animal letter.  We don't do mental health checks ordered by the court.   We don't do mental health testing, but we can refer you to get tested.   Thank you for choosing us for your care.  For informational purposes only. Not to replace the advice of your health care provider. Copyright   2022 [facility]. All rights reserved. [employer] 298117 - Rev 11/24.

## 2025-06-12 NOTE — PROGRESS NOTES
"Virtual Visit Details    Type of service:  Video Visit   Video Start Time: 9:07 AM  Video End Time:9:26 AM    Originating Location (pt. Location): Home    Distant Location (provider location):  Off-site  Platform used for Video Visit: Framebench       PSYCHIATRIC MEDICATION FOLLOW UP APPT     Name: Prem Taylor   : 1950               Telemedicine Visit: The patient's condition can be safely assessed and treated via synchronous audio and visual telemedicine encounter.      Consent:  The patient/guardian has verbally consented to: the potential risks and benefits of telemedicine (video visit or phone) versus in person care; bill my insurance or make self-payment for services provided; and responsibility for payment of non-covered services.     As the provider I attest to compliance with applicable laws and regulations related to telemedicine.         Source of Referral / Care Team:  Primary Care Provider: Mamadou Baez MD   Therapist: none      The [facility] psychiatry providers act as a specialty service for Primary Care Providers in the [facility] who seek to optimize medications for unstable patients. Once medications have been optimized, [facility] providers discharge the patient back to the referring Primary Care Provider for ongoing medication management. This type of system allows [facility] to serve a high volume of patients.      Patient Identification:  Patient is a 74 year old,   White Not  or  male  who presents for return visit with me.   Patient prefers to be called: \"Ryan\".  Patient is currently retired.      Patient attended the session with wife , who they agreed to have interview with.     RECORDS AVAILABLE FOR REVIEW: EHR records through [employer]  and I have reviewed the assessment completed by CHELSEA Kasper, dated today .       Interim History:  Per [facility], CHELSEA Kasper, during today's team-based visit:  \"Better/worse: anxiety is very high this morning and " "were close to a panic attacks, wife wasn't there in the morning to help get things set up with technology, if watching something on TV he is sure he has seen it before, watched the [place] game last night and woke up to check the score when though he watched it to the end, he is worried about the dementia progressing, [facility], went golfing, going to lunch tomorrow, pt isn't driving,   He enjoys listening to music and using this to help with anxiety \"    I last saw Prem Taylor for outpatient psychiatry Return Visit on 5/15/25. During that appointment, we continued recently started sertraline 50 mg QD. Patient reports ADHERING to prescribed medications. Denies any new side effects attributed to sertraline, moved to [facility]. He is still having quite vivid dreams but denies nightmares, acting out dreams, hallucinations. He reports they are not intolerable. Overall day to day he feels his anxiety is doing \"ok\". Per wife, he seems to be about baseline where daily anxiety is generally ok, but he reports \"anything special / unusual / different it goes up\" (eg. This telehealth appointment) which has been the case a long time. He is more anxious about his health now due to the Alzheimers diagnosis. He has follow-up with neurology next week. Continues donepezil. He denies significant depression or anhedonia, no SI/SIB/HI, psychosis, mykel. No changes in gait, falls.        Initial Impression / MREs  5/15/25: At last appointment, we planned to cross taper from escitalopram to sertraline. Importantly, since his last visit he has been seen by neurologist  and diagnosed with Alzheimer's. Started donepezil 10 mg at bedtime. Today Patient / wife reports ADHERING to prescribed medications. Denies any intolerable side effects during the cross taper, and overall think tolerating the sertraline well. He does report an increase in vivid dreams, not currently intolerable and unsure if attributes to sertraline (is taking " "QHS). Denies any AH/VH, delusions. Some impulsive spending / forgetting ordered things multiple times. Overall patient and wife report a mild improvement in anxiety recently (\"All in all, considering how he's doing, He's handled really well\"). Denies panic. Denies significant depression, anhedonia. No SI/SIB/HI. Discussed risk / benefits of medication changes. We will initially trial moving sertraline to AM if benefit to vivid dreams. Will consider increase to medication at follow-up. Follow-up with this provider and Behavioral Health Consultant in 4 weeks or sooner as needed. Patient agreeable to plan.    3/10/25: At initial appointment one month ago, we planned to cross taper from escitalopram 20 mg to sertraline 50 mg. Patient /wife report NOT ADHERING to prescribed medications, as misplaced the sertraline so just picked up. They clarify dosing, and are most interested in taper and discontinue escitalopram versus cross taper. Will plan to start sertraline in 2 weeks. Patient and wife report concern for seemingly very quick memory changes, recently seen by PCP and labs largely unremarkable. Fortunately they were able to reschedule the Noran appointment and were seen last week, no medications started as they are waiting to review the MRI. They report a high level of anxiety today , but did again discuss the risks/ benefits of medication change if symptoms are more related to neurocognitive disorder. Given previous max dose of escitalopram, no other SSRI trials, it is reasonable to trial move to alternative if more effective or better tolerated. We will cross taper to sertraline. Patient to continue with neurology as planned. Follow-up with this provider and Behavioral Health Consultant in 6 weeks or sooner as needed. Patient and wife agreeable to plan.     2/3/2025: Prem Taylor is a 74 year old White, male who presents for initial visit with [facility] ([facility]) for medication " management. Carries past diagnoses: anxiety. Patient denies history of psychiatric providers, hospitalizations, or suicide attempts. Started medications through PCP 4/2021, on escitalopram 20 mg since 03/2022. Denies known side effects from medication, patient reports it has been helpful (although rates anxiety relatively high today), wife Aubree Doesn't think it's been that effective, although does wonder if sx are more related to a neurocognitive process. Patient was previously seen by PCP, due to concern for memory difficulties with family history of Alzheimer's (mother). Labs completed largely unremarkable and completed MRI: nonspecific findings noted, no significant abnormalities. Referred to neurology, scheduled to see Errol 4/1/2025. Denies significant depression, SI/SIB/HI, psychosis, mykel, problematic substance use. Discussed risks / benefits of medication changes at this time. Given max dose of escitalopram, no other SSRI trials, it is reasonable to trial move to alternative if more effective or better tolerated. We will cross taper to sertraline. Patient to establish with neurology as planned. Follow-up with this provider and Behavioral Health Consultant in 1 month or sooner as needed. Patient and wife agreeable to plan.     Current medications include:   Current Outpatient Medications   Medication Sig Dispense Refill    apixaban ANTICOAGULANT (ELIQUIS) 5 MG tablet Take 1 tablet (5 mg) by mouth 2 times daily. 180 tablet 1    donepezil (ARICEPT) 10 MG tablet TAKE 1/2 TABLET BY MOUTH EVERY EVENING. INCREASE TO 1 TABLET BY MOUTH EVERY EVENING      furosemide (LASIX) 20 MG tablet Take 1 tablet (20 mg) by mouth daily. 90 tablet 1    isosorbide mononitrate (IMDUR) 30 MG 24 hr tablet TAKE 2 TABLETS BY MOUTH DAILY 180 tablet 3    metoprolol succinate ER (TOPROL XL) 25 MG 24 hr tablet Take 2 tablets (50 mg) by mouth daily. 180 tablet 3    MULTIVITAMIN (MULTIPLE VITAMIN ORAL) [MULTIVITAMIN (MULTIPLE VITAMIN ORAL)]  "Take 1 tablet by mouth daily.      rosuvastatin (CRESTOR) 40 MG tablet Take 1 tablet (40 mg) by mouth daily. 90 tablet 3    sertraline (ZOLOFT) 50 MG tablet Take 1 tablet (50 mg) by mouth daily. 30 tablet 1     No current facility-administered medications for this visit.         Side effects: Yes: possible vivid dreams with sertraline     The [place] Prescription Monitoring Program :not reviewed today    Psychiatric ROS:  Prem Taylor reports mood has been: \"Up and down - for the most part during regular day anxiety is ok.\"  Depression has been: denies significant depression. appetite change or significant weight loss / gain, sleep changes (insomnia or hypersomnia), fatigue of loss of energy, and difficulty concentrating or indecisiveness self rates as ~2/ 10, where 0 is none at all and 10 being severe depression. Was 2/10 at last appt.  SI/SIB/HI: denies   Anxiety has been: excessive worry, difficult to control, restlessness / feeling keyed up,  easily fatigued,  difficulty concentrating or mind going blank, and sleep disturbances (difficulty falling / staying asleep, or restless / unsatisfying)  self rates as ~3-5/ 10, where 0 is none at all and 10 being severe anxiety. Was 3-5/10 at last appt.  Sleep has been: easy to fall asleep, can have some wakeups + vivid dreams  Energy has been: \"get out and do things on a daily basis\", can be low  Appetite has been: denies any issues or changes (\"this is the heaviest I've been - I sure like food.\")   Luisa sxs: denies   Psychosis sxs: denies   ADHD/ADD sxs:  Poor task completion, Distractibility, Forgetful, Restlessness/fidgety  Trauma sx: No symptoms    PHQ-9 scores:       3/25/2025     7:07 AM 4/24/2025     3:15 PM 5/13/2025    10:33 AM   PHQ-9 SCORE   PHQ-9 Total Score MyChart 19 (Moderately severe depression) 3 (Minimal depression) 8 (Mild depression)   PHQ-9 Total Score 19  3  8        Patient-reported       KALPANA-7 scores:        3/25/2025     7:08 AM 4/24/2025    " " 3:17 PM 5/9/2025     1:51 PM   KALPANA-7 SCORE   Total Score 12 (moderate anxiety) 13 (moderate anxiety) 11 (moderate anxiety)   Total Score 12  13  11        Patient-reported       Current stressors include: Symptoms  Coping mechanisms and supports include: Family    Vital Signs:   There were no vitals taken for this visit.    Review of Systems:  10 systems (general, cardiovascular, respiratory, eyes, ENT, endocrine, GI, , M/S, neurological) were reviewed. Most pertinent finding(s) is/are: + palpitations (Patient in [place] for ~6-7 years \"constantly\").  No acute distress; no wheezing / short of breath / increased work of breathing; denies chest pain / tightness / palpitations; reduced appetite and recent weight loss; no nausea / vomiting / abdominal pain; no tics / tremors / abnormal muscle mvmts; no visible skin changes / rashes . The remaining systems are all unremarkable.    Labs:  Most recent laboratory results reviewed and the pertinent results include:   Recent Labs   Lab Test 05/13/25  1154 04/23/25  0644 04/22/25  0954   WBC 3.9*   < > 4.5   HGB 13.2*   < > 12.3*   HCT 39.7*   < > 36.2*   MCV 91   < > 89      < > 163   ANEU  --   --  3.0    < > = values in this interval not displayed.     Recent Labs   Lab Test 05/13/25  1154 04/22/25  0954 03/25/25  0753 12/27/23  1302 11/21/23  1218      < > 139   < > 140   POTASSIUM 4.9   < > 4.4   < > 4.4   CHLORIDE 105   < > 105   < > 106   CO2 24   < > 23   < > 24   GLC 89   < > 108*   < > 86   DANDY 9.2   < > 9.4   < > 9.4   MAG  --   --   --   --  2.0   BUN 16.0   < > 14.7   < > 13.3   CR 0.92   < > 0.86   < > 0.95   GFRESTIMATED 88   < > >90   < > 85   ALBUMIN  --   --  4.2   < > 4.4   PROTTOTAL  --   --  6.9   < > 7.1   AST  --   --  33   < > 27   ALT 45  --  33   < > 17   ALKPHOS  --   --  69   < > 68   BILITOTAL  --   --  1.0   < > 0.8    < > = values in this interval not displayed.     Recent Labs   Lab Test 10/11/24  0802   CHOL 155   LDL 79   HDL 65 " "  TRIG 56     Recent Labs   Lab Test 01/14/25  1356   TSH 1.76     No results found for: \"PLW101\", \"XTSL464\", \"XTYJ57AXRAV\", \"VITD3\", \"D2VIT\", \"D3VIT\", \"DTOT\", \"QJ33444614\", \"[id number]\", \"[id number]\", \"[id number]\", \"[id number]\", \"[id number]\"     Past Medical/Surgical History:  Past Medical History:   Diagnosis Date    Abnormal ECG     Anemia     Anxiety     Arrhythmia     Atrial fibrillation (H) 10/09/2017    Cancer (H) 2015    CPAP (continuous positive airway pressure) dependence 10/2019    per patient    Depression     Heart valve disease 2017    History of blood clots     Hyperlipidemia 2006    Pulmonary embolism, blood-clot, obstetric 10/09/2017    Sleep apnea 2017    Uses CPAP      has a past medical history of Abnormal ECG, Anemia, Anxiety, Arrhythmia, Atrial fibrillation (H) (10/09/2017), Cancer (H) (2015), CPAP (continuous positive airway pressure) dependence (10/2019), Depression, Heart valve disease (2017), History of blood clots, Hyperlipidemia (2006), Pulmonary embolism, blood-clot, obstetric (10/09/2017), and Sleep apnea (2017).    Medication allergies:  No Known Allergies    Mental Status Exam:  Alertness: alert  and oriented x3  Appearance: well groomed  Behavior/Demeanor: cooperative and pleasant, with good eye contact   Speech: normal and regular rate and rhythm  Language: intact and word finding difficulty at times  Psychomotor: normal or unremarkable  Mood: anxious ( \"Up and down - for the most part during regular day anxiety is ok.\")  Affect: full range and appropriate; was congruent to mood; was congruent to content  Thought Process/Associations: mildly tangential  Thought Content:  Reports none;  Denies suicidal ideation, violent ideation, and delusions  Perception:  Reports none;  Denies auditory hallucinations and visual hallucinations  Insight: adequate  Judgment: adequate for safety and intact  Cognition: does  appear grossly intact; formal cognitive testing was not done  Recent and Remote " Memory: grossly intact to interview.   Attention Span and Concentration: Intact to interview.  Fund of Knowledge:  appropriate  Gait and Station: unremarkable via seated video visit    Suicide Risk Assessment:  Today Prem Taylor reports no suicidal ideation. In addition, there are notable risk factors for self-harm, including age, anxiety, comorbid medical condition of (multiple, including Alzheimers), and mood change. However, risk is mitigated by commitment to family, absence of past attempts, history of seeking help when needed, future oriented, no access to firearms or weapons, denies suicidal intent or plan, and denies homicidal ideation, intent, or plan. Therefore, based on all available evidence including the factors cited above, Prem Taylor does not appear to be at imminent risk for self-harm, does not meet criteria for a 72-hr hold, and therefore remains appropriate for ongoing outpatient level of care.  A thorough assessment of risk factors related to suicide and self-harm have been reviewed and are noted above. The patient convincingly denies suicidality on several occasions. Local community safety resources printed and reviewed for patient to use if needed. There was no deceit detected, and the patient presented in a manner that was believable.     Recommended that patient call 911 or go to the local ED should there be a change in any of these risk factors    DSM5 Diagnosis:  300.02 (F41.1) Generalized Anxiety Disorder      Alzheimers by history     Medical comorbidities include:   Patient Active Problem List    Diagnosis Date Noted    Generalized anxiety disorder 05/15/2025     Priority: Medium    Supratherapeutic INR 04/22/2025     Priority: Medium    Persistent atrial fibrillation (H) 03/25/2025     Priority: Medium    Abnormal electrocardiogram 11/28/2023     Priority: Medium    Dizziness 11/21/2023     Priority: Medium    Pulmonary embolism, bilateral (H) 10/11/2022     Priority: Medium     History of pulmonary embolism 08/10/2021     Priority: Medium    ALISE on CPAP 02/08/2019     Priority: Medium    Anxiety 02/08/2019     Priority: Medium    Chronic cough 02/08/2019     Priority: Medium    Normochromic normocytic anemia 02/08/2019     Priority: Medium    Left inguinal hernia 02/08/2019     Priority: Medium     Small, reducible noted on Annual Wellness Visit exam February 8, 2019.        Sinus bradycardia      Priority: Medium     Created by Conversion        Permanent atrial fibrillation (H) 10/25/2017     Priority: Medium     Diagnosed on 10/10/2017 and had bilateral PEs at the time.  [id number] score of 1+ with mitral insufficiency.  On warfarin        Mitral valve insufficiency 10/25/2017     Priority: Medium    Erectile dysfunction, unspecified erectile dysfunction type 01/03/2017     Priority: Medium    Overweight (BMI 25.0-29.9) 01/03/2017     Priority: Medium     IMO SNOMED LOAD SPRING 2020 [.6/.18/.2020 9:04 PM]  Abad Tse:          Tubular adenoma of colon 01/03/2017     Priority: Medium     tubular adenoma x 2 (rectum) - 4 and 8 mm        Hyperbilirubinemia 01/03/2017     Priority: Medium    BPH without urinary obstruction      Priority: Medium     Created by Conversion        Hearing Loss      Priority: Medium     Created by Conversion  Replacement Utility updated for latest IMO load        Male erectile dysfunction 12/23/2014     Priority: Medium    Basal cell carcinoma, ear 12/23/2014     Priority: Medium    Hypercholesterolemia      Priority: Medium     Created by Conversion        Presbycusis      Priority: Medium     Created by Conversion        Atypical Chest Pain      Priority: Medium     Created by Conversion        Blood Pressure Isolated Elevated      Priority: Medium     Created by Conversion        Blepharitis of both eyes 09/03/2008     Priority: Medium     Formatting of this note might be different from the original.  Epic       Psychosocial & Contextual Factors:  Phase  of Life Difficulties and Medical Comorbidites     DIFFERENTIAL DIAGNOSIS: R/O  anxiety due to other medical condition, adjustment DO     Medical comorbidities impacting or contributing to clinical picture: permanent atrial fibrillation, history of PE, normochromic normocytic anemia, hypercholesterolemia, hyperbilirubinemia  Known issue that I take into account for their medical decisions, no current exacerbations or new concerns.    Impression:  Prem Taylor is a 74 year old White, male who presents for return visit with  [facility] ([facility]) for medication management. At last appointment, we continued recently started sertraline 50 mg QD. Patient denies any new side effects attributed to sertraline, moved to [facility]. He is still having quite vivid dreams but denies nightmares, acting out dreams, hallucinations. He reports they are not intolerable. Overall patient and wife report anxiety day to day is generally ok. Will have exacerbation for specific stressors, that does not seem worsened with change to sertraline. He is more anxious about his health now due to the Alzheimers diagnosis. He has follow-up with neurology next week. Continues donepezil. He denies significant depression or anhedonia, no SI/SIB/HI, psychosis, mykel. Discussed risk / benefits of medication changes and sx of anxiety in context of neurocognitive disorder. We will trial sertraline increase if tolerated. Follow-up with this provider and Behavioral Health Consultant in 5 weeks or sooner as needed. Patient agreeable to plan.     Medication side effects and alternatives were reviewed. Health promotion activities recommended and reviewed today. All questions addressed. Education and counseling completed regarding risks and benefits of medications and psychotherapy options. Recommend therapy for additional support.       Treatment Plan:  INCREASE TO sertraline 100 mg every day. Script sent.  Continue all other medications per  primary care / speciality providers.  Follow-up with neurology as planned.   Safety plan reviewed. To the Emergency Department as needed or call after hours crisis line at [phone] or [phone]. [place] Crisis Text Line. Text [place] to 112327 or Suicide LifeLine Chat: suicidepreventionlifeline.org/chat  Schedule an appointment with me and Behavioral Health Consultant 6 weeks or sooner as needed. [facility] at [phone] to schedule.  Follow up with primary care provider as planned or for acute medical concerns.  Call the psychiatric nurse line with medication questions or concerns at [phone].  MyChart may be used to communicate with your provider, but this is not intended to be used for emergencies.    Patient Education:  Medication side effects and alternatives reviewed. Health promotion activities recommended and reviewed today. All questions addressed. Education and counseling completed regarding risks and benefits of medications and psychotherapy options.  Consent provided by patient/guardian  Call the psychiatric nurse line with medication questions or concerns at [phone].  MyChart may be used to communicate with your provider, but this is not intended to be used for emergencies.  BEERS CRITERIA: The American Geriatrics Society (AGS) released its second updated and expanded Beers Criteria - lists of potentially inappropriate medications for older adults - and one of the most frequently cited reference tools in the field of geriatrics. The Society also unveiled a suite of new  resources - including a list of alternative therapies for potentially inappropriate medications and more detailed guidance on best practices for implementing AGS recommendations.  Discussed with client.   Medlineplus.gov is information for patients.  It is run by the [employer] of [employer] and it contains information about all disorders, diseases and all medications.      Side effects  for SSRI: sexual dysfunction, decreased appetite, increased appetite, nausea, diarrhea, constipation, dry mouth, insomnia, sedation, agitation, tremors, headaches, dizziness, sweating, bruising and rare bleeding, discontinuation syndrome, rare hyponatremia, rare induction of mykel, suicidal ideations, and serotonin syndrome.     Community Resources:    National Suicide Prevention Lifeline: 677.510.3270 (TTY: 413.375.2585). Call anytime for help.  (www.suicidepreventionlifeline.org)  National [place] on Mental Illness (www.melva.org): 372.261.5296 or 290-215-1495.   Mental Health Association (www.mentalhealth.org): 665.579.8904 or 235-034-0764.  [place] Crisis Text Line: Text [place] to 974893  Suicide LifeLine Chat: [place] MD.org/chat    Administrative Billing:     Level of Medical Decision Making:   - At least 1 chronic problem that is not stable  - Engaged in prescription drug management during visit (discussed any medication benefits, side effects, alternatives, etc.)             Patient Status:  [facility] MD/DO/NP/PA providers offer care a specialty service for Primary Care Providers in the [facility] that seek to optimize psychotropic medications for unstable patients.  Once medications have been optimized, our providers discharge the patient back to the referring Primary Care Provider for ongoing medication management.  This type of system allows our providers to serve a high volume of patients.   Patient will continue to be seen for ongoing consultation and stabilization.    Signed:   Yvonne Benton, MSN, APRN, PMHNP-BC  [facility] ([facility])  [facility]    Chart documentation done in part with Dragon Voice Recognition software.  Although reviewed after completion, some word and grammatical errors may remain.

## 2025-06-16 LAB
ABO + RH BLD: NORMAL
BLD GP AB SCN SERPL QL: NEGATIVE
SPECIMEN EXP DATE BLD: NORMAL

## 2025-06-17 ENCOUNTER — LAB (OUTPATIENT)
Dept: CARDIOLOGY | Facility: CLINIC | Age: 75
End: 2025-06-17
Payer: COMMERCIAL

## 2025-06-17 DIAGNOSIS — R94.39 ABNORMAL STRESS TEST: ICD-10-CM

## 2025-06-17 DIAGNOSIS — R06.02 SOB (SHORTNESS OF BREATH): ICD-10-CM

## 2025-06-17 DIAGNOSIS — I25.10 CORONARY ARTERY DISEASE DUE TO LIPID RICH PLAQUE: ICD-10-CM

## 2025-06-17 DIAGNOSIS — Z01.818 PRE-OP EVALUATION: ICD-10-CM

## 2025-06-17 DIAGNOSIS — I25.83 CORONARY ARTERY DISEASE DUE TO LIPID RICH PLAQUE: ICD-10-CM

## 2025-06-17 LAB
ANION GAP SERPL CALCULATED.3IONS-SCNC: 7 MMOL/L (ref 7–15)
BLOOD BANK CHART COMMENT: NORMAL
BUN SERPL-MCNC: 15.7 MG/DL (ref 8–23)
CALCIUM SERPL-MCNC: 9.6 MG/DL (ref 8.8–10.4)
CHLORIDE SERPL-SCNC: 110 MMOL/L (ref 98–107)
CHOLEST SERPL-MCNC: 152 MG/DL
CREAT SERPL-MCNC: 0.9 MG/DL (ref 0.67–1.17)
EGFRCR SERPLBLD CKD-EPI 2021: 90 ML/MIN/1.73M2
ERYTHROCYTE [DISTWIDTH] IN BLOOD BY AUTOMATED COUNT: 12.7 % (ref 10–15)
FASTING STATUS PATIENT QL REPORTED: NO
FASTING STATUS PATIENT QL REPORTED: NO
GLUCOSE SERPL-MCNC: 127 MG/DL (ref 70–99)
HCO3 SERPL-SCNC: 27 MMOL/L (ref 22–29)
HCT VFR BLD AUTO: 39.4 % (ref 40–53)
HDLC SERPL-MCNC: 60 MG/DL
HGB BLD-MCNC: 13.3 G/DL (ref 13.3–17.7)
LDLC SERPL CALC-MCNC: 72 MG/DL
MCH RBC QN AUTO: 30.4 PG (ref 26.5–33)
MCHC RBC AUTO-ENTMCNC: 33.8 G/DL (ref 31.5–36.5)
MCV RBC AUTO: 90 FL (ref 78–100)
NONHDLC SERPL-MCNC: 92 MG/DL
PLATELET # BLD AUTO: 162 10E3/UL (ref 150–450)
POTASSIUM SERPL-SCNC: 4.9 MMOL/L (ref 3.4–5.3)
RBC # BLD AUTO: 4.38 10E6/UL (ref 4.4–5.9)
SODIUM SERPL-SCNC: 144 MMOL/L (ref 135–145)
SPECIMEN EXP DATE BLD: NORMAL
TRIGL SERPL-MCNC: 99 MG/DL
WBC # BLD AUTO: 5.7 10E3/UL (ref 4–11)

## 2025-06-17 PROCEDURE — 3048F LDL-C <100 MG/DL: CPT

## 2025-06-17 PROCEDURE — 80048 BASIC METABOLIC PNL TOTAL CA: CPT

## 2025-06-17 PROCEDURE — 80061 LIPID PANEL: CPT

## 2025-06-17 PROCEDURE — 85027 COMPLETE CBC AUTOMATED: CPT

## 2025-06-17 PROCEDURE — 36415 COLL VENOUS BLD VENIPUNCTURE: CPT

## 2025-06-17 PROCEDURE — 86901 BLOOD TYPING SEROLOGIC RH(D): CPT

## 2025-06-17 PROCEDURE — 86850 RBC ANTIBODY SCREEN: CPT

## 2025-06-17 PROCEDURE — 86900 BLOOD TYPING SEROLOGIC ABO: CPT

## 2025-06-20 ENCOUNTER — HOSPITAL ENCOUNTER (OUTPATIENT)
Facility: HOSPITAL | Age: 75
Setting detail: OBSERVATION
Discharge: HOME | End: 2025-06-21
Attending: INTERNAL MEDICINE | Admitting: INTERNAL MEDICINE
Payer: COMMERCIAL

## 2025-06-20 ENCOUNTER — APPOINTMENT (OUTPATIENT)
Dept: RADIOLOGY | Facility: HOSPITAL | Age: 75
End: 2025-06-20
Attending: INTERNAL MEDICINE
Payer: COMMERCIAL

## 2025-06-20 DIAGNOSIS — R94.39 ABNORMAL STRESS TEST: ICD-10-CM

## 2025-06-20 DIAGNOSIS — I25.83 CORONARY ARTERY DISEASE DUE TO LIPID RICH PLAQUE: ICD-10-CM

## 2025-06-20 DIAGNOSIS — I48.19 PERSISTENT ATRIAL FIBRILLATION (H): ICD-10-CM

## 2025-06-20 DIAGNOSIS — E78.00 PURE HYPERCHOLESTEROLEMIA: Primary | ICD-10-CM

## 2025-06-20 DIAGNOSIS — R06.02 SOB (SHORTNESS OF BREATH): ICD-10-CM

## 2025-06-20 DIAGNOSIS — I48.21 PERMANENT ATRIAL FIBRILLATION (H): ICD-10-CM

## 2025-06-20 DIAGNOSIS — I25.10 CORONARY ARTERY DISEASE DUE TO LIPID RICH PLAQUE: ICD-10-CM

## 2025-06-20 DIAGNOSIS — R94.31 ABNORMAL ELECTROCARDIOGRAM: ICD-10-CM

## 2025-06-20 DIAGNOSIS — G47.33 OSA ON CPAP: ICD-10-CM

## 2025-06-20 PROBLEM — Z98.61 PERCUTANEOUS TRANSLUMINAL CORONARY ANGIOPLASTY STATUS: Status: ACTIVE | Noted: 2025-06-20

## 2025-06-20 LAB
ACT BLD: 276 SECONDS (ref 74–150)
ACT BLD: 284 SECONDS (ref 74–150)
ACT BLD: 296 SECONDS (ref 74–150)
ACT BLD: 308 SECONDS (ref 74–150)
ACT BLD: 312 SECONDS (ref 74–150)
ATRIAL RATE - MUSE: NORMAL BPM
ATRIAL RATE - MUSE: NORMAL BPM
CHOLEST SERPL-MCNC: 138 MG/DL
DIASTOLIC BLOOD PRESSURE - MUSE: NORMAL MMHG
DIASTOLIC BLOOD PRESSURE - MUSE: NORMAL MMHG
FASTING STATUS PATIENT QL REPORTED: YES
HDLC SERPL-MCNC: 59 MG/DL
INTERPRETATION ECG - MUSE: NORMAL
INTERPRETATION ECG - MUSE: NORMAL
LDLC SERPL CALC-MCNC: 72 MG/DL
NONHDLC SERPL-MCNC: 79 MG/DL
NT-PROBNP SERPL-MCNC: 542 PG/ML (ref 0–229)
NT-PROBNP SERPL-MCNC: 595 PG/ML (ref 0–229)
P AXIS - MUSE: NORMAL DEGREES
P AXIS - MUSE: NORMAL DEGREES
PR INTERVAL - MUSE: NORMAL MS
PR INTERVAL - MUSE: NORMAL MS
QRS DURATION - MUSE: 148 MS
QRS DURATION - MUSE: 160 MS
QT - MUSE: 482 MS
QT - MUSE: 488 MS
QTC - MUSE: 431 MS
QTC - MUSE: 457 MS
R AXIS - MUSE: -48 DEGREES
R AXIS - MUSE: -51 DEGREES
SYSTOLIC BLOOD PRESSURE - MUSE: NORMAL MMHG
SYSTOLIC BLOOD PRESSURE - MUSE: NORMAL MMHG
T AXIS - MUSE: -9 DEGREES
T AXIS - MUSE: 3 DEGREES
TRIGL SERPL-MCNC: 33 MG/DL
VENTRICULAR RATE- MUSE: 47 BPM
VENTRICULAR RATE- MUSE: 54 BPM

## 2025-06-20 PROCEDURE — 999N000054 HC STATISTIC EKG NON-CHARGEABLE

## 2025-06-20 PROCEDURE — C1760 CLOSURE DEV, VASC: HCPCS | Performed by: INTERNAL MEDICINE

## 2025-06-20 PROCEDURE — 255N000002 HC RX 255 OP 636: Performed by: INTERNAL MEDICINE

## 2025-06-20 PROCEDURE — 93458 L HRT ARTERY/VENTRICLE ANGIO: CPT | Performed by: INTERNAL MEDICINE

## 2025-06-20 PROCEDURE — 250N000013 HC RX MED GY IP 250 OP 250 PS 637: Performed by: INTERNAL MEDICINE

## 2025-06-20 PROCEDURE — 92929 PR PRQ TRLUML CORONARY BM STENT W/ANGIO ADDL ART/BRNCH: CPT | Mod: LD | Performed by: INTERNAL MEDICINE

## 2025-06-20 PROCEDURE — 99223 1ST HOSP IP/OBS HIGH 75: CPT | Performed by: INTERNAL MEDICINE

## 2025-06-20 PROCEDURE — 83880 ASSAY OF NATRIURETIC PEPTIDE: CPT | Performed by: INTERNAL MEDICINE

## 2025-06-20 PROCEDURE — 85347 COAGULATION TIME ACTIVATED: CPT

## 2025-06-20 PROCEDURE — G0378 HOSPITAL OBSERVATION PER HR: HCPCS

## 2025-06-20 PROCEDURE — C1725 CATH, TRANSLUMIN NON-LASER: HCPCS | Performed by: INTERNAL MEDICINE

## 2025-06-20 PROCEDURE — C1753 CATH, INTRAVAS ULTRASOUND: HCPCS | Performed by: INTERNAL MEDICINE

## 2025-06-20 PROCEDURE — 250N000011 HC RX IP 250 OP 636: Performed by: INTERNAL MEDICINE

## 2025-06-20 PROCEDURE — 71045 X-RAY EXAM CHEST 1 VIEW: CPT

## 2025-06-20 PROCEDURE — 93010 ELECTROCARDIOGRAM REPORT: CPT | Mod: 77 | Performed by: INTERNAL MEDICINE

## 2025-06-20 PROCEDURE — 82465 ASSAY BLD/SERUM CHOLESTEROL: CPT | Performed by: INTERNAL MEDICINE

## 2025-06-20 PROCEDURE — C1894 INTRO/SHEATH, NON-LASER: HCPCS | Performed by: INTERNAL MEDICINE

## 2025-06-20 PROCEDURE — C1874 STENT, COATED/COV W/DEL SYS: HCPCS | Performed by: INTERNAL MEDICINE

## 2025-06-20 PROCEDURE — 92978 ENDOLUMINL IVUS OCT C 1ST: CPT | Mod: LD | Performed by: INTERNAL MEDICINE

## 2025-06-20 PROCEDURE — 99153 MOD SED SAME PHYS/QHP EA: CPT | Performed by: INTERNAL MEDICINE

## 2025-06-20 PROCEDURE — C1887 CATHETER, GUIDING: HCPCS | Performed by: INTERNAL MEDICINE

## 2025-06-20 PROCEDURE — 93005 ELECTROCARDIOGRAM TRACING: CPT

## 2025-06-20 PROCEDURE — 258N000003 HC RX IP 258 OP 636: Performed by: INTERNAL MEDICINE

## 2025-06-20 PROCEDURE — 99152 MOD SED SAME PHYS/QHP 5/>YRS: CPT | Performed by: INTERNAL MEDICINE

## 2025-06-20 PROCEDURE — C1769 GUIDE WIRE: HCPCS | Performed by: INTERNAL MEDICINE

## 2025-06-20 PROCEDURE — C9600 PERC DRUG-EL COR STENT SING: HCPCS | Mod: LD | Performed by: INTERNAL MEDICINE

## 2025-06-20 PROCEDURE — C9601 PERC DRUG-EL COR STENT BRAN: HCPCS | Performed by: INTERNAL MEDICINE

## 2025-06-20 PROCEDURE — 93458 L HRT ARTERY/VENTRICLE ANGIO: CPT | Mod: 26 | Performed by: INTERNAL MEDICINE

## 2025-06-20 PROCEDURE — 36415 COLL VENOUS BLD VENIPUNCTURE: CPT | Performed by: INTERNAL MEDICINE

## 2025-06-20 PROCEDURE — 92928 PRQ TCAT PLMT NTRAC ST 1 LES: CPT | Mod: 22 | Performed by: INTERNAL MEDICINE

## 2025-06-20 PROCEDURE — 250N000009 HC RX 250: Performed by: INTERNAL MEDICINE

## 2025-06-20 PROCEDURE — 272N000001 HC OR GENERAL SUPPLY STERILE: Performed by: INTERNAL MEDICINE

## 2025-06-20 PROCEDURE — 92978 ENDOLUMINL IVUS OCT C 1ST: CPT | Mod: 26 | Performed by: INTERNAL MEDICINE

## 2025-06-20 PROCEDURE — 93010 ELECTROCARDIOGRAM REPORT: CPT | Performed by: INTERNAL MEDICINE

## 2025-06-20 DEVICE — STENT CORONARY DES SYNERGY XD MR US 4.00X20MM H7493941820400: Type: IMPLANTABLE DEVICE | Status: FUNCTIONAL

## 2025-06-20 DEVICE — STENT CORONARY DES SYNERGY XD MR US 3.00X28MM H7493941828300: Type: IMPLANTABLE DEVICE | Status: FUNCTIONAL

## 2025-06-20 RX ORDER — NALOXONE HYDROCHLORIDE 0.4 MG/ML
0.2 INJECTION, SOLUTION INTRAMUSCULAR; INTRAVENOUS; SUBCUTANEOUS
Status: ACTIVE | OUTPATIENT
Start: 2025-06-20 | End: 2025-06-20

## 2025-06-20 RX ORDER — NALOXONE HYDROCHLORIDE 0.4 MG/ML
0.4 INJECTION, SOLUTION INTRAMUSCULAR; INTRAVENOUS; SUBCUTANEOUS
Status: ACTIVE | OUTPATIENT
Start: 2025-06-20 | End: 2025-06-20

## 2025-06-20 RX ORDER — ASPIRIN 81 MG/1
243 TABLET, CHEWABLE ORAL ONCE
Status: COMPLETED | OUTPATIENT
Start: 2025-06-20 | End: 2025-06-20

## 2025-06-20 RX ORDER — FENTANYL CITRATE 50 UG/ML
INJECTION, SOLUTION INTRAMUSCULAR; INTRAVENOUS
Status: DISCONTINUED | OUTPATIENT
Start: 2025-06-20 | End: 2025-06-20 | Stop reason: HOSPADM

## 2025-06-20 RX ORDER — CHOLECALCIFEROL (VITAMIN D3) 50 MCG
1 TABLET ORAL DAILY
COMMUNITY

## 2025-06-20 RX ORDER — LIDOCAINE 40 MG/G
CREAM TOPICAL
Status: DISCONTINUED | OUTPATIENT
Start: 2025-06-20 | End: 2025-06-20 | Stop reason: HOSPADM

## 2025-06-20 RX ORDER — ATROPINE SULFATE 0.1 MG/ML
0.5 INJECTION INTRAVENOUS
Status: ACTIVE | OUTPATIENT
Start: 2025-06-20 | End: 2025-06-20

## 2025-06-20 RX ORDER — SODIUM CHLORIDE 9 MG/ML
INJECTION, SOLUTION INTRAVENOUS CONTINUOUS
Status: ACTIVE | OUTPATIENT
Start: 2025-06-20 | End: 2025-06-20

## 2025-06-20 RX ORDER — ROSUVASTATIN CALCIUM 40 MG/1
40 TABLET, COATED ORAL DAILY
Status: DISCONTINUED | OUTPATIENT
Start: 2025-06-20 | End: 2025-06-21 | Stop reason: HOSPADM

## 2025-06-20 RX ORDER — ASPIRIN 81 MG/1
81 TABLET ORAL DAILY
Qty: 30 TABLET | Refills: 3 | Status: SHIPPED | OUTPATIENT
Start: 2025-06-21

## 2025-06-20 RX ORDER — ROSUVASTATIN CALCIUM 40 MG/1
40 TABLET, COATED ORAL AT BEDTIME
Status: ON HOLD | COMMUNITY
End: 2025-06-21

## 2025-06-20 RX ORDER — CLOPIDOGREL BISULFATE 75 MG/1
75 TABLET ORAL DAILY
Qty: 90 TABLET | Refills: 3 | Status: SHIPPED | OUTPATIENT
Start: 2025-06-22

## 2025-06-20 RX ORDER — THERA TABS 400 MCG
1 TAB ORAL DAILY
Status: DISCONTINUED | OUTPATIENT
Start: 2025-06-21 | End: 2025-06-21 | Stop reason: HOSPADM

## 2025-06-20 RX ORDER — ASPIRIN 81 MG/1
81 TABLET ORAL DAILY
Status: DISCONTINUED | OUTPATIENT
Start: 2025-06-21 | End: 2025-06-21 | Stop reason: HOSPADM

## 2025-06-20 RX ORDER — ONDANSETRON 4 MG/1
4 TABLET, ORALLY DISINTEGRATING ORAL EVERY 6 HOURS PRN
Status: DISCONTINUED | OUTPATIENT
Start: 2025-06-20 | End: 2025-06-21 | Stop reason: HOSPADM

## 2025-06-20 RX ORDER — SERTRALINE HYDROCHLORIDE 100 MG/1
100 TABLET, FILM COATED ORAL DAILY
Status: DISCONTINUED | OUTPATIENT
Start: 2025-06-21 | End: 2025-06-21 | Stop reason: HOSPADM

## 2025-06-20 RX ORDER — TICAGRELOR 90 MG/1
TABLET, FILM COATED ORAL
Status: DISCONTINUED | OUTPATIENT
Start: 2025-06-20 | End: 2025-06-20 | Stop reason: HOSPADM

## 2025-06-20 RX ORDER — NITROGLYCERIN 0.4 MG/1
0.4 TABLET SUBLINGUAL EVERY 5 MIN PRN
Status: DISCONTINUED | OUTPATIENT
Start: 2025-06-20 | End: 2025-06-21 | Stop reason: HOSPADM

## 2025-06-20 RX ORDER — ONDANSETRON 2 MG/ML
4 INJECTION INTRAMUSCULAR; INTRAVENOUS EVERY 6 HOURS PRN
Status: DISCONTINUED | OUTPATIENT
Start: 2025-06-20 | End: 2025-06-21 | Stop reason: HOSPADM

## 2025-06-20 RX ORDER — HYDRALAZINE HYDROCHLORIDE 20 MG/ML
10 INJECTION INTRAMUSCULAR; INTRAVENOUS EVERY 4 HOURS PRN
Status: DISCONTINUED | OUTPATIENT
Start: 2025-06-20 | End: 2025-06-21 | Stop reason: HOSPADM

## 2025-06-20 RX ORDER — ROSUVASTATIN CALCIUM 40 MG/1
40 TABLET, COATED ORAL DAILY
Qty: 90 TABLET | Refills: 3 | Status: SHIPPED | OUTPATIENT
Start: 2025-06-20

## 2025-06-20 RX ORDER — TICAGRELOR 90 MG/1
90 TABLET, FILM COATED ORAL ONCE
Status: COMPLETED | OUTPATIENT
Start: 2025-06-20 | End: 2025-06-20

## 2025-06-20 RX ORDER — FUROSEMIDE 20 MG/1
20 TABLET ORAL DAILY
Status: DISCONTINUED | OUTPATIENT
Start: 2025-06-20 | End: 2025-06-21 | Stop reason: HOSPADM

## 2025-06-20 RX ORDER — UBIDECARENONE 100 MG
100 CAPSULE ORAL DAILY
COMMUNITY

## 2025-06-20 RX ORDER — OXYCODONE HYDROCHLORIDE 5 MG/1
10 TABLET ORAL EVERY 4 HOURS PRN
Status: DISCONTINUED | OUTPATIENT
Start: 2025-06-20 | End: 2025-06-21 | Stop reason: HOSPADM

## 2025-06-20 RX ORDER — DONEPEZIL HYDROCHLORIDE 5 MG/1
10 TABLET, FILM COATED ORAL AT BEDTIME
Status: DISCONTINUED | OUTPATIENT
Start: 2025-06-20 | End: 2025-06-21 | Stop reason: HOSPADM

## 2025-06-20 RX ORDER — TICAGRELOR 90 MG/1
90 TABLET, FILM COATED ORAL EVERY 12 HOURS
Status: COMPLETED | OUTPATIENT
Start: 2025-06-20 | End: 2025-06-20

## 2025-06-20 RX ORDER — MAGNESIUM 200 MG
1000 TABLET ORAL DAILY
COMMUNITY

## 2025-06-20 RX ORDER — FLUMAZENIL 0.1 MG/ML
0.2 INJECTION, SOLUTION INTRAVENOUS
Status: ACTIVE | OUTPATIENT
Start: 2025-06-20 | End: 2025-06-20

## 2025-06-20 RX ORDER — SODIUM CHLORIDE 9 MG/ML
INJECTION, SOLUTION INTRAVENOUS CONTINUOUS
Status: DISCONTINUED | OUTPATIENT
Start: 2025-06-20 | End: 2025-06-20 | Stop reason: HOSPADM

## 2025-06-20 RX ORDER — CLOPIDOGREL 300 MG/1
600 TABLET, FILM COATED ORAL ONCE
Status: COMPLETED | OUTPATIENT
Start: 2025-06-21 | End: 2025-06-21

## 2025-06-20 RX ORDER — CLOPIDOGREL BISULFATE 75 MG/1
75 TABLET ORAL DAILY
Qty: 90 TABLET | Refills: 3 | Status: SHIPPED | OUTPATIENT
Start: 2025-06-22 | End: 2025-06-20

## 2025-06-20 RX ORDER — METOPROLOL TARTRATE 1 MG/ML
5 INJECTION, SOLUTION INTRAVENOUS
Status: DISCONTINUED | OUTPATIENT
Start: 2025-06-20 | End: 2025-06-21 | Stop reason: HOSPADM

## 2025-06-20 RX ORDER — ASPIRIN 325 MG
325 TABLET ORAL ONCE
Status: COMPLETED | OUTPATIENT
Start: 2025-06-20 | End: 2025-06-20

## 2025-06-20 RX ORDER — NITROGLYCERIN 5 MG/ML
VIAL (ML) INTRAVENOUS
Status: DISCONTINUED | OUTPATIENT
Start: 2025-06-20 | End: 2025-06-20 | Stop reason: HOSPADM

## 2025-06-20 RX ORDER — FENTANYL CITRATE 50 UG/ML
25 INJECTION, SOLUTION INTRAMUSCULAR; INTRAVENOUS
Status: DISCONTINUED | OUTPATIENT
Start: 2025-06-20 | End: 2025-06-20

## 2025-06-20 RX ORDER — OXYCODONE HYDROCHLORIDE 5 MG/1
5 TABLET ORAL EVERY 4 HOURS PRN
Status: DISCONTINUED | OUTPATIENT
Start: 2025-06-20 | End: 2025-06-21 | Stop reason: HOSPADM

## 2025-06-20 RX ORDER — ASPIRIN 81 MG/1
81 TABLET, CHEWABLE ORAL ONCE
Status: COMPLETED | OUTPATIENT
Start: 2025-06-20 | End: 2025-06-20

## 2025-06-20 RX ORDER — LORAZEPAM 0.5 MG/1
0.5 TABLET ORAL EVERY 4 HOURS PRN
Status: DISCONTINUED | OUTPATIENT
Start: 2025-06-20 | End: 2025-06-21 | Stop reason: HOSPADM

## 2025-06-20 RX ORDER — DIAZEPAM 5 MG/1
5 TABLET ORAL ONCE
Status: COMPLETED | OUTPATIENT
Start: 2025-06-20 | End: 2025-06-20

## 2025-06-20 RX ORDER — HEPARIN SODIUM 1000 [USP'U]/ML
INJECTION, SOLUTION INTRAVENOUS; SUBCUTANEOUS
Status: DISCONTINUED | OUTPATIENT
Start: 2025-06-20 | End: 2025-06-20 | Stop reason: HOSPADM

## 2025-06-20 RX ORDER — ISOSORBIDE MONONITRATE 60 MG/1
60 TABLET, EXTENDED RELEASE ORAL DAILY
Status: DISCONTINUED | OUTPATIENT
Start: 2025-06-21 | End: 2025-06-21 | Stop reason: HOSPADM

## 2025-06-20 RX ORDER — ACETAMINOPHEN 325 MG/1
650 TABLET ORAL EVERY 4 HOURS PRN
Status: DISCONTINUED | OUTPATIENT
Start: 2025-06-20 | End: 2025-06-21 | Stop reason: HOSPADM

## 2025-06-20 RX ORDER — METOPROLOL SUCCINATE 50 MG/1
50 TABLET, EXTENDED RELEASE ORAL DAILY
Status: DISCONTINUED | OUTPATIENT
Start: 2025-06-20 | End: 2025-06-21 | Stop reason: HOSPADM

## 2025-06-20 RX ORDER — FENTANYL CITRATE 50 UG/ML
25 INJECTION, SOLUTION INTRAMUSCULAR; INTRAVENOUS
Status: DISCONTINUED | OUTPATIENT
Start: 2025-06-20 | End: 2025-06-20 | Stop reason: HOSPADM

## 2025-06-20 RX ORDER — IODIXANOL 320 MG/ML
INJECTION, SOLUTION INTRAVASCULAR
Status: DISCONTINUED | OUTPATIENT
Start: 2025-06-20 | End: 2025-06-20 | Stop reason: HOSPADM

## 2025-06-20 RX ORDER — LORAZEPAM 2 MG/ML
.5-1 INJECTION INTRAMUSCULAR EVERY 6 HOURS PRN
Status: DISCONTINUED | OUTPATIENT
Start: 2025-06-20 | End: 2025-06-20

## 2025-06-20 RX ORDER — HYDRALAZINE HYDROCHLORIDE 20 MG/ML
INJECTION INTRAMUSCULAR; INTRAVENOUS
Status: DISCONTINUED | OUTPATIENT
Start: 2025-06-20 | End: 2025-06-20 | Stop reason: HOSPADM

## 2025-06-20 RX ADMIN — SODIUM CHLORIDE: 0.9 INJECTION, SOLUTION INTRAVENOUS at 07:09

## 2025-06-20 RX ADMIN — ROSUVASTATIN 40 MG: 40 TABLET, FILM COATED ORAL at 17:01

## 2025-06-20 RX ADMIN — LORAZEPAM 0.5 MG: 0.5 TABLET ORAL at 17:01

## 2025-06-20 RX ADMIN — DONEPEZIL HYDROCHLORIDE 10 MG: 5 TABLET, FILM COATED ORAL at 21:56

## 2025-06-20 RX ADMIN — TICAGRELOR 90 MG: 90 TABLET ORAL at 20:29

## 2025-06-20 RX ADMIN — FUROSEMIDE 20 MG: 20 TABLET ORAL at 18:16

## 2025-06-20 ASSESSMENT — ACTIVITIES OF DAILY LIVING (ADL)
ADLS_ACUITY_SCORE: 47
ADLS_ACUITY_SCORE: 38
ADLS_ACUITY_SCORE: 47
ADLS_ACUITY_SCORE: 38
ADLS_ACUITY_SCORE: 47
ADLS_ACUITY_SCORE: 38
ADLS_ACUITY_SCORE: 47
ADLS_ACUITY_SCORE: 38
ADLS_ACUITY_SCORE: 38

## 2025-06-20 NOTE — Clinical Note
Max pressure = 12 alberto. Total duration = 11 seconds.     Max pressure = 12 alberto. Total duration = 7 seconds.

## 2025-06-20 NOTE — PRE-PROCEDURE
GENERAL PRE-PROCEDURE:   Procedure:  Coronary angiogram with possible PCI  Date/Time:  6/20/2025 6:54 AM    Written consent obtained?: Yes    Risks and benefits: Risks, benefits and alternatives were discussed    Consent given by:  Patient  Patient states understanding of procedure being performed: Yes    Patient's understanding of procedure matches consent: Yes    Procedure consent matches procedure scheduled: Yes    Expected level of sedation:  Moderate  Appropriately NPO:  Yes  ASA Class:  3 (dyspnea, abnormal stress test, CAD, AF; on chronic NOAC, ALISE; on CPAP)  Mallampati  :  Grade 2- soft palate, base of uvula, tonsillar pillars, and portion of posterior pharyngeal wall visible  Lungs:  Lungs clear with good breath sounds bilaterally  Heart:  A-fib and other (comment)  Heart exam comment:  Mayank  History & Physical reviewed:  History and physical reviewed and updates made (see comment)  H&P Comments:  Clinically Significant Risk Factors Present on Admission    Cardiovascular : dyspnea, abnormal stress test, CAD, AF; on chronic NOAC    Fluid & Electrolyte Disorders : Not present on admission    Gastroenterology : Not present on admission    Hematology/Oncology : Not present on admission    Nephrology : Not present on admission    Neurology : Not present on admission    Pulmonology : ALISE; on CPAP    Systemic : Not present on admission    Statement of review:  I have reviewed the lab findings, diagnostic data, medications, and the plan for sedation

## 2025-06-20 NOTE — Clinical Note
The first balloon was inserted into the left anterior descending.Max pressure = 14 alberto. Total duration = 14 seconds.

## 2025-06-20 NOTE — Clinical Note
Single balloon inflation. The first balloon was inserted into the left anterior descending and middle left anterior descending.Max pressure = 6 alberto. Total duration = 20 seconds.     The second balloon was inserted into the Left Anterior Descending and middle left anterior descending. Max pressure = 6 alberto. Total duration = 15 seconds.   Max pressure = 6 alberto. Total duration = 15 seconds.

## 2025-06-20 NOTE — Clinical Note
Max pressure = 3 alberto. Total duration = 7 seconds.     Max pressure = 12 alberto. Total duration = 8 seconds.  Balloon reinflated a fourth time: Max pressure = 12 alberto. Total duration = 6 seconds.

## 2025-06-20 NOTE — DISCHARGE INSTRUCTIONS
Interventional Cardiology  Coronary Angiogram/Angioplasty/Stent/Atherectomy Discharge  Instructions -   Radial (wrist) Approach     The instructions below are to help you understand how to take care of yourself. There is also information about when to call the doctor or emergency services.    **Do not stop your aspirin or platelet inhibitor unless directed by your Cardiologist.  These medications help to prevent platelets in your blood from sticking together and forming a clot.   Examples of these medications are: Ticagrelor (Brilinta), Clopidogrel (Plavix), Prasugrel (Effient)    For 24 hours after procedure:  Do not subject hand/arm to any forceful movements for 24 hours, such as supporting weight when rising from a chair or bed.  Do not drive a car for 24 hours.  The dressing on the puncture site may be removed after 24 hours and left open to air. If minor oozing, you may apply a Band-Aid and remove after 12 hours.   You may shower on the day after your procedure. Do not take a tub bath or wash dishes (no soaking wrist) with the puncture site in water for 3 days after the procedure.    For 48 hours following the procedure:  Do not operate a lawnmower, motorcycle, chainsaw or all-terrain vehicle.  Do not lift anything heavier than 5-10 pounds with affected arm for 5 days.  Avoid excessive bending (flexion/extension) wrist movement.  Do not engage in vigorous exercise (i.e. tennis, golf) using the affected arm for 5 days after discharge.  You may return to work in 72 hours if no complications and no heavy lifting.    If bleeding should occur following discharge:  Sit down and apply firm pressure with your thumb against the puncture site and fingers against back of wrist for 10 minutes.  If the bleeding stops, continue to rest, keeping your wrist still for 2 hours. Notify your doctor as soon as possible.  If bleeding does not stop after 10 minutes or if there is a large amount of bleeding or spurting, call 911  immediately. Do not drive yourself to the hospital.           Contact the Heart Clinic at [phone] if you develop:  Fever over 100.4, that lasts more than one day  Redness, heat, or pus at the puncture site  Change in color or temperature of the hand or arm    Expect mild tingling of hand and tenderness at the puncture site for up to 3 days. You may take Tylenol or a pain medicine recommended by your doctor.                       Our Cardiac Rehab staff may visit briefly with you while your in the hospital.   If they miss you, someone will contact you after you are home.  You are encouraged to enroll in an Outpatient Cardiac Rehab Program     Interventional Cardiology  Coronary Angiogram/Angioplasty/Stent/Atherectomy Discharge Instructions -   Femoral Approach    The instructions below are to help you understand how to take care of yourself. There is also information about when to call the doctor or emergency services    **Do not stop your aspirin or platelet inhibitor unless directed by your Cardiologist.  These medications help to prevent platelets in your blood from sticking together and forming a clot.  Examples of these medications are:  Ticagrelor (Brilinta), Clopidigrel (Plavix), Prasugrel (Effient)          For the first 72 hours after your procedure:  No driving for 24 hours  Do not lift more than 15 pounds.  If you usually lift 50 pounds or more daily, please contact your cardiologist  Avoid any hard work or tiring activities, this includes, yard work, jogging, biking, sexual activity  During the day get up and walk around every 2 hours  You may return to work after 72 hours if you are feeling well and your job does not involve heavy lifting          Groin Site / Wound Care / Bleeding    You may take off the dressing on your groin the day after your procedure  Keep the area dry and clean  It is ok to shower with regular soap.  Pat dry, do not rub  You do not need to use a bandage  No tub/pool or hot  tub for 1 week  If your groin starts to bleed or begins to swell suddenly after leaving the hospital, lie flat and apply firm pressure above the site for 15 minutes.  If bleeding continues, call 9-1-1  Bruising around the groin area is normal.  It may take 2-3 weeks for this to go away.  It is normal for the bruised area to turn green and/or yellow as it is healing.  A small lump may also be present and may last 2-3 months.  Your leg may be sore or stiff for a few days.  You may take Tylenol or a pain medicine recommended by your doctor  If you have a fever over 100.4, that lasts more than one day - call your cardiologist.            [facility]:  [phone]  If you are calling after hours, please listen to the entire voicemail, a live  will answer at the end of the message

## 2025-06-20 NOTE — PLAN OF CARE
Patient prepped and consented for coronary angiogram. Denies pain, VSS and able to make needs known. Ready for pickup from cath lab.  Priscilla Wilkinson RN

## 2025-06-20 NOTE — Clinical Note
Balloon removed intact. In collaboration with PT/OT, pt was seen for cardiopulmonary rehab. Patient is sitting in the bed and reports feeling fair. Pre exercise VSS. Pt stood at first time at EOB, reported some dizziness while standing. BP was taken (146/76). Pt was seated on EOB. Pt stood a second time and walked approximately 3' to the chair,  with three assist and a fourth to help with IV pole and lines. Pt was pushing 2WW, and a gait belt for added safety. Ambulated on 3L. Pt reports c/o SOB and joint pain. Post exercise VSS. Call light given and voices no other needs at this time. RN aware of session tolerance.    60 mins spent with this pt on education, questions, and exercise/patient care.   02/10/23 1045   Session Information   Phase Phase I   Session Number 1   Pre-Session Evaluation   Is patient on O2? Y   Resting Vital Signs   Resting Heart Rate 86   Resting /65   Resting Dyspnea No   SpO2 94 %   Pulse Ox  Mode Continuous   O2 Device Nasal cannula   O2 Flow Rate (L/min) 3 L/min   Exercise/Activity   Exercise/Activity Stand;Walking   Stand Exercise Assessment   Stand Intensity Moderate   Stand Adjusted Workload 2WW; gait belt   Walking Exercise Assessment    Walking Intensity Moderate   Walking Adjusted Workload 2WW; gait belt   Walking Distance (ft) 3 Feet   During Exercise   Number of Assists Used Three  (one to help with lines)   Gait Slightly steady   Signs & Symptoms SOB;Joint discomfort   Exercise /76   Exercise Dyspnea Yes   Pulse Ox  Mode Intermittent   O2 Device Nasal cannula   O2 Flow Rate (L/min) 3 L/min   Recovery Vital Signs   Recovery HR 91   Recovery /81   Recovery Dyspnea Yes   SpO2 93 %   Pulse Ox  Mode Continuous   O2 Device Nasal cannula   O2 Flow Rate (L/min) 3 L/min

## 2025-06-20 NOTE — PROGRESS NOTES
"Brief CV progress note:    Prem \"Ryan\" TERRIE Taylor is a 74 year old WM with past medical history of abnormal stress test, CAD by virtue of CT scan, atrial fibrillation; on chronic NOAC anticoagulation, and ALISE; on CPAP who underwent coronary angiography in evaluation of dyspnea which demonstrated severe two-vessel coronary artery disease which was successfully intervened upon with drug-eluting stent x 2 to the LAD and GITA x1 to the first diagonal branch. The patient was given a Brilinta loading dose intra-procedurally, however will be switched to clopidogrel at discharge with loading dose of 600 mg to be given tomorrow a.m. and will continue on 75 mg p.o. daily thereafter. Ryan reported subjective dyspnea post procedure which is most likely due to Brilinta administration. EKG post PCI remains stable and unchanged from pre procedure tracings demonstrating AF with SVR.     Ryan will continue on triple therapy for a minimum of 3 months, then discontinue ASA and continue clopidogrel/apixaban indefinitely. He will continue statin therapy as previously prescribed. Continue to reinforce aggressive lifestyle modification with diet, exercise and medication compliance. Cardiac rehab to follow on an outpatient basis. Cardiology follow up is arranged with Dr. Gordon on 6/30/2025 at 0850. Patient will be monitored overnight on telemetry. OK to discharge in AM pending clearance from rounding cardiology team.     Thank you for the opportunity to participate in the care of this patient                  "

## 2025-06-20 NOTE — Clinical Note
Max pressure = 12 alberto. Total duration = 7 seconds.     Max pressure = 12 alberto. Total duration = 7 seconds.  Balloon reinflated a fourth time: Max pressure = 12 alberto. Total duration = 7 seconds.

## 2025-06-20 NOTE — CONSULTS
[facility]  Consult Note - Hospitalist Service  Date of Admission:  6/20/2025  Consult Requested by: cardiology  Reason for Consult: medical management    Assessment & Plan       Prem Taylor is a 74 year old male admitted on 6/20/2025.       H/o abnormal stress test, CAD by virtue of CT scan, atrial fibrillation; on chronic NOAC anticoagulation, and ALISE; on CPAP.  He underwent coronary angiography in evaluation of dyspnea which demonstrated severe two-vessel coronary artery disease which was successfully intervened upon with drug-eluting stent x 2 to the LAD and GITA x1 to the first diagonal branch.         A/p :       CAD   Elective procedure today ; S/p coronary angio and s/p drug-eluting stent x 2 to the LAD and GITA x1 to the first diagonal branch - 6/20    H/o abnormal stress test, CAD by virtue of CT scan, atrial fibrillation; on chronic NOAC anticoagulation, and ALISE; on CPAP.  He underwent coronary angiography in evaluation of dyspnea which demonstrated severe two-vessel coronary artery disease which was successfully intervened upon with drug-eluting stent x 2 to the LAD and GITA x1 to the first diagonal branch.     The patient was given a Brilinta loading dose intra-procedurally, however patient had sob issues and  will be switched to clopidogrel at discharge with loading dose of 600 mg to be given tomorrow a.m. and will continue on 75 mg p.o. daily thereafter.     Ryan reported subjective dyspnea post procedure which is most likely due to Brilinta administration.   EKG post PCI remains stable and unchanged from pre procedure tracings demonstrating AF with SVR.   Ryan will continue on triple therapy for a minimum of 3 months, then discontinue ASA and continue clopidogrel/apixaban indefinitely.   He will continue statin therapy as previously prescribed.   Continue to reinforce aggressive lifestyle modification with diet, exercise and medication compliance.   . Cardiology follow up is  "arranged with Dr. Gordon on 6/30/2025 at 0850.   Patient will be monitored overnight on telemetry.         Acute sob and anxiety post procedure - PCI + stent  :   H/o panic attacks    Could be related to brillinta or possible fluid overload  The patient was given a Brilinta loading dose intra-procedurally, however will be switched to clopidogrel at discharge with loading dose of 600 mg to be given tomorrow a.m. and will continue on 75 mg p.o. daily thereafter.   Discussed with cardiology on call again: and plan to give 2nd dose of brillinta tonight considering status post 3 cardiac stents today and then switch to plavix tomorrow.  Check CXR, Lasix iv prn, ativan prn        A fib : on eliquis  Chronic diastolic CHF : on lasix 20 mg daily at home, monitor  ALISE ; on cpap   H/o dementia - new diagnosis : on aricept  HLD : on statin  HTN, Essential : on imdur 30 mg daily, metoprolol 50 mg daily.  H/o panic attacks : on zoloft 100 mg daily  H/o PE - 5-6 yrs ago, on eliquis               Clinically Significant Risk Factors Present on Admission                # Drug Induced Coagulation Defect: home medication list includes an anticoagulant medication              # Obesity: Estimated body mass index is 32.5 kg/m  as calculated from the following:    Height as of this encounter: 1.905 m (6' 3\").    Weight as of this encounter: 117.9 kg (260 lb).              Frank Walker MD  Hospitalist Service  Securely message with [employer] (more info)  Text page via [facility] Paging/Directory   ______________________________________________________________________    Chief Complaint   sob    History is obtained from the patient    History of Present Illness       H/o abnormal stress test, CAD by virtue of CT scan, atrial fibrillation; on chronic NOAC anticoagulation, and ALISE; on CPAP.  He underwent coronary angiography in evaluation of dyspnea which demonstrated severe two-vessel coronary artery disease which was successfully intervened upon " with drug-eluting stent x 2 to the LAD and GITA x1 to the first diagonal branch.   The patient was given a Brilinta loading dose intra-procedurally, however will be switched to clopidogrel at discharge with loading dose of 600 mg to be given tomorrow a.m. and will continue on 75 mg p.o. daily thereafter.   Ryan reported subjective dyspnea post procedure which is most likely due to Brilinta administration.   EKG post PCI remains stable and unchanged from pre procedure tracings demonstrating AF with SVR.   Ryan will continue on triple therapy for a minimum of 3 months, then discontinue ASA and continue clopidogrel/apixaban indefinitely.   He will continue statin therapy as previously prescribed.   Continue to reinforce aggressive lifestyle modification with diet, exercise and medication compliance.   . Cardiology follow up is arranged with Dr. Gordon on 6/30/2025 at 0850.   Patient will be monitored overnight on telemetry.           Past Medical History    Past Medical History:   Diagnosis Date    Abnormal ECG     Anemia     Anxiety     Arrhythmia     Atrial fibrillation (H) 10/09/2017    Cancer (H) 2015    CPAP (continuous positive airway pressure) dependence 10/2019    per patient    Depression     Heart valve disease 2017    History of blood clots     Hyperlipidemia 2006    Pulmonary embolism, blood-clot, obstetric 10/09/2017    Sleep apnea 2017    Uses CPAP       Past Surgical History   Past Surgical History:   Procedure Laterality Date    BACK SURGERY      CERVICAL FUSION  2001    HERNIA REPAIR  2005    OR LAP,INGUINAL HERNIA REPR,INITIAL Left 8/15/2019    Procedure: HERNIORRHAPHY, INGUINAL, LAPAROSCOPIC;  Surgeon: Yan Leiva MD;  Location: [facility];  Service: General    TONSILLECTOMY      childhood       Medications   I have reviewed this patient's current medications       Review of Systems         No cough or phlegm  No fevers  No abdominal symptoms  No urinary symptoms  No alcohol or smoking  issues        Physical Exam   Vital Signs: Temp: 97.4  F (36.3  C) Temp src: Oral BP: 133/86 Pulse: 66   Resp: 22 SpO2: 94 % O2 Device: None (Room air) Oxygen Delivery: 2 LPM  Weight: 260 lbs 0 oz       GENERAL: The patient is not in any acute distressed. Awake and alert.  HEENT: Nonicteric sclerae, PERRLA, EOMI. Oropharynx clear. Moist mucous membranes. Conjunctivae appear well perfused.  HEART: Regular rate and rhythm without murmurs.  LUNGS: Clear to auscultation bilaterally. No wheezing or crackles.  ABDOMEN: Soft, positive bowel sounds, nontender.  SKIN: No rash, no excessive bruising, petechiae, or purpura.  EXTREMITIES : no rashes, no swelling in legs.  NEUROLOGIC: conscious and oriented, follows commands, no obvious focal deficits.  ROS: All other systems negative       Medical Decision Making       70 MINUTES SPENT BY ME on the date of service doing chart review, history, exam, documentation & further activities per the note.  MANAGEMENT DISCUSSED with the following over the past 24 hours: rn, patient, family       Data     I have personally reviewed the following data over the past 24 hrs:    Trop: N/A BNP: 542 (H)

## 2025-06-20 NOTE — PHARMACY-ADMISSION MEDICATION HISTORY
Pharmacist Admission Medication History    Admission medication history is complete. The information provided in this note is only as accurate as the sources available at the time of the update.    Information Source(s): Patient, Family member, Clinic records, and CareEverywhere/SureScripts via in-person    Pertinent Information: Spoke with patient's wife who manages his medications.    Changes made to PTA medication list:  Added: B12  Deleted: None  Changed: aricept to 10 mg    Allergies reviewed with patient and updates made in EHR: yes    Medication History Completed By: Juan Loyd [facility] 6/20/2025 12:56 PM    PTA Med List   Medication Sig Last Dose/Taking    apixaban ANTICOAGULANT (ELIQUIS) 5 MG tablet Take 1 tablet (5 mg) by mouth 2 times daily. 6/19/2025 Morning    [START ON 6/21/2025] aspirin 81 MG EC tablet Take 1 tablet (81 mg) by mouth daily. Start tomorrow. Taking    [START ON 6/22/2025] clopidogrel (PLAVIX) 75 MG tablet Take 1 tablet (75 mg) by mouth daily. Dose to start tomorrow. Taking    co-enzyme Q-10 100 MG CAPS capsule Take 100 mg by mouth daily. 6/19/2025    cyanocobalamin 1000 MCG sublingual tablet Place 1,000 mcg under the tongue daily. 6/19/2025 Morning    donepezil (ARICEPT) 10 MG tablet Take 10 mg by mouth at bedtime. 6/19/2025 Bedtime    furosemide (LASIX) 20 MG tablet Take 1 tablet (20 mg) by mouth daily. 6/19/2025 Morning    isosorbide mononitrate (IMDUR) 30 MG 24 hr tablet TAKE 2 TABLETS BY MOUTH DAILY 6/20/2025 Morning    metoprolol succinate ER (TOPROL XL) 25 MG 24 hr tablet Take 2 tablets (50 mg) by mouth daily. 6/19/2025 Bedtime    MULTIVITAMIN (MULTIPLE VITAMIN ORAL) [MULTIVITAMIN (MULTIPLE VITAMIN ORAL)] Take 1 tablet by mouth daily. 6/19/2025 Morning    rosuvastatin (CRESTOR) 40 MG tablet Take 1 tablet (40 mg) by mouth daily. Taking    rosuvastatin (CRESTOR) 40 MG tablet Take 40 mg by mouth at bedtime. 6/19/2025 Bedtime    sertraline (ZOLOFT) 100 MG tablet Take 1 tablet (100  mg) by mouth daily. 6/20/2025 Morning    vitamin D3 (CHOLECALCIFEROL) 50 mcg (2000 units) tablet Take 1 tablet by mouth daily. 6/19/2025 Morning

## 2025-06-20 NOTE — INTERVAL H&P NOTE
"I have reviewed the surgical (or preoperative) H&P that is linked to this encounter, and examined the patient. There are no significant changes      Clinical Conditions Present on Arrival:  Clinically Significant Risk Factors Present on Admission          # Hyperchloremia: Highest Cl = 110 mmol/L in last 30 days, will monitor as appropriate            # Drug Induced Coagulation Defect: home medication list includes an anticoagulant medication       # Obesity: Estimated body mass index is 32.5 kg/m  as calculated from the following:    Height as of this encounter: 1.905 m (6' 3\").    Weight as of this encounter: 117.9 kg (260 lb).       "

## 2025-06-20 NOTE — Clinical Note
The first balloon was inserted into the left anterior descending and left anterior descending diagonal.Max pressure = 12 alberto. Total duration = 10 seconds.

## 2025-06-20 NOTE — Clinical Note
The first balloon was inserted into the left anterior descending and middle left anterior descending.Max pressure = 12 alberto. Total duration = 10 seconds.     Max pressure = 20 alberto. Total duration = 10 seconds.    Balloon reinflated a second time: Max pressure = 20 alberto. Total duration = 10 seconds.  Balloon reinflated a third time: Max pressure = 16 alberto. Total duration = 10 seconds.

## 2025-06-20 NOTE — Clinical Note
The first balloon was inserted into the left anterior descending.Max pressure = 14 alberto. Total duration = 5 seconds.     Max pressure = 16 alberto. Total duration = 6 seconds.    Balloon reinflated a second time: Max pressure = 16 alberto. Total duration = 6 seconds.  Balloon reinflated a third time: Max pressure = 12 alberto. Total duration = 6 seconds.

## 2025-06-21 ENCOUNTER — APPOINTMENT (OUTPATIENT)
Dept: OCCUPATIONAL THERAPY | Facility: HOSPITAL | Age: 75
End: 2025-06-21
Attending: INTERNAL MEDICINE
Payer: COMMERCIAL

## 2025-06-21 VITALS
TEMPERATURE: 97.8 F | HEIGHT: 75 IN | RESPIRATION RATE: 16 BRPM | OXYGEN SATURATION: 97 % | DIASTOLIC BLOOD PRESSURE: 89 MMHG | BODY MASS INDEX: 31.9 KG/M2 | HEART RATE: 57 BPM | SYSTOLIC BLOOD PRESSURE: 150 MMHG | WEIGHT: 256.6 LBS

## 2025-06-21 LAB
ANION GAP SERPL CALCULATED.3IONS-SCNC: 9 MMOL/L (ref 7–15)
BUN SERPL-MCNC: 14.9 MG/DL (ref 8–23)
CALCIUM SERPL-MCNC: 9.3 MG/DL (ref 8.8–10.4)
CHLORIDE SERPL-SCNC: 104 MMOL/L (ref 98–107)
CREAT SERPL-MCNC: 0.88 MG/DL (ref 0.67–1.17)
EGFRCR SERPLBLD CKD-EPI 2021: >90 ML/MIN/1.73M2
GLUCOSE SERPL-MCNC: 94 MG/DL (ref 70–99)
HCO3 SERPL-SCNC: 26 MMOL/L (ref 22–29)
HOLD SPECIMEN: NORMAL
POTASSIUM SERPL-SCNC: 4.1 MMOL/L (ref 3.4–5.3)
SODIUM SERPL-SCNC: 139 MMOL/L (ref 135–145)

## 2025-06-21 PROCEDURE — 250N000013 HC RX MED GY IP 250 OP 250 PS 637: Performed by: INTERNAL MEDICINE

## 2025-06-21 PROCEDURE — 99232 SBSQ HOSP IP/OBS MODERATE 35: CPT | Performed by: GENERAL ACUTE CARE HOSPITAL

## 2025-06-21 PROCEDURE — 99232 SBSQ HOSP IP/OBS MODERATE 35: CPT | Performed by: INTERNAL MEDICINE

## 2025-06-21 PROCEDURE — 97535 SELF CARE MNGMENT TRAINING: CPT | Mod: GO

## 2025-06-21 PROCEDURE — 97165 OT EVAL LOW COMPLEX 30 MIN: CPT | Mod: GO

## 2025-06-21 PROCEDURE — 80048 BASIC METABOLIC PNL TOTAL CA: CPT | Performed by: INTERNAL MEDICINE

## 2025-06-21 PROCEDURE — G0378 HOSPITAL OBSERVATION PER HR: HCPCS

## 2025-06-21 PROCEDURE — 97110 THERAPEUTIC EXERCISES: CPT | Mod: GO

## 2025-06-21 PROCEDURE — 93005 ELECTROCARDIOGRAM TRACING: CPT | Performed by: INTERNAL MEDICINE

## 2025-06-21 PROCEDURE — 36415 COLL VENOUS BLD VENIPUNCTURE: CPT | Performed by: INTERNAL MEDICINE

## 2025-06-21 RX ADMIN — CLOPIDOGREL BISULFATE 600 MG: 300 TABLET, FILM COATED ORAL at 07:01

## 2025-06-21 RX ADMIN — ASPIRIN 81 MG: 81 TABLET, COATED ORAL at 09:30

## 2025-06-21 RX ADMIN — ISOSORBIDE MONONITRATE 60 MG: 60 TABLET, EXTENDED RELEASE ORAL at 09:30

## 2025-06-21 RX ADMIN — APIXABAN 5 MG: 5 TABLET, FILM COATED ORAL at 09:30

## 2025-06-21 RX ADMIN — SERTRALINE HYDROCHLORIDE 100 MG: 100 TABLET ORAL at 09:30

## 2025-06-21 RX ADMIN — ROSUVASTATIN 40 MG: 40 TABLET, FILM COATED ORAL at 09:30

## 2025-06-21 RX ADMIN — THERA TABS 1 TABLET: TAB at 09:31

## 2025-06-21 RX ADMIN — LORAZEPAM 0.5 MG: 0.5 TABLET ORAL at 02:41

## 2025-06-21 RX ADMIN — FUROSEMIDE 20 MG: 20 TABLET ORAL at 09:30

## 2025-06-21 RX ADMIN — METOPROLOL SUCCINATE 50 MG: 50 TABLET, EXTENDED RELEASE ORAL at 09:30

## 2025-06-21 ASSESSMENT — ACTIVITIES OF DAILY LIVING (ADL)
ADLS_ACUITY_SCORE: 38

## 2025-06-21 NOTE — DISCHARGE SUMMARY
[facility]  CARDIOLOGY  DISCHARGE SUMMARY     Primary Care Physician: Mamadou Baez  Admission Date: 6/20/2025   Discharge Provider: Jovanni Wallis MD Discharge Date: 6/21/2025   Diet: cardiac dieth   Code Status: Full Code   Activity: as tolerated        Condition at Discharge: stable to discharge home     REASON FOR PRESENTATION(See Admission Note for Details)   Left heart catheterization with coronary angiography and possible percutaneous coronary intervention for an abnormal stress test.    PRINCIPAL & ACTIVE DISCHARGE DIAGNOSES     Active Problems:    Percutaneous transluminal coronary angioplasty status  Coronary artery disease currently without angina  Atrial fibrillation unspecified    SIGNIFICANT FINDINGS (Imaging, labs):   Cardiac cath 6/20/2025  Findings:  LM:no obstruction  LAD: ectatic, mid-vessel 50% Ca2+ narrowing at the take off of a large D2 branch w/ ostial 99%. Mid-distal 75% narrowing  Lcx:ectatic, mildly irregular  RCA:ectatic, dominant, 40% diffuse irregularity     LVEDP:15     Access:  R Radial artery switched to   R Femoral artery     PCI:  LMT was engaged w/ a 6F EBU 3.5 Guide catheter and the lesion in LAD was wired w/ a Forte wire, w/ another Forte into D2. We ballooned the D2 w/ a 2.5x12mm NC Emerge to facilitate delivery of a cutting balloon, however there was a focal disruption, so we covered that w/ a 3.0x28 Synergy EE at 11 alberto, post-dilating w/ a 3.5x12mm NC Emerge at 12 alberto inflations; we tried but were unable to park an NC balloon in the LAD due to interaction within guide/guideliner. We then had difficulty traversing minimally protruding D stent and moderate Ca2+ plaque at this level in spite of trying balloon-assistet tracking, jacinda wire, wiggle wire, and breathing maneuvers, hence we switched access to R CFA 8F and an EBU 4.0. Through that, we delivered 2.0x12m, 2.5x12, 3.0x12, and 3.5x12mm Emerge balloons, mini-crushing the D stent. We then stented  mid-distal lesion w/ a 4.0x20mm Synergy EES at 11 alberto, post-dilating w/ a 4.0x12mm NC Emerge at 12 alberto inflations, pre-dilated mid-LAD at D take off w/ the same balloon. We then re-crossed into D2, ballooned w/ a 3.5 balloon, removed the wire and stented mLAD w/ another 4.0x20mm Synergy EES at 11 alberto. We post-dilated the stent w/ a 4.0x12mm NC Emerge at 12-20 alberto inflations, re-crossed the D2 and ballooned it w/ a 3.5x12mm NC Emerge at high pressure. Finally, we finished w/ a kissing inflation of a 3.0x12mm and 3.0x25mm Emerge at 6 alberto inflations. Lastly, IVUS demonstrated appropriate stent sizing and apposition of both stents. Final angiography showed no dissection or perforation and a WESTLEY 3 flow.     Closure:   Vasc Band     This is a complex modifier 22 procedure due to high risk anatomy, difficult access, poor guide support       PENDING LABS     none    PROCEDURES ( this hospitalization only)      Procedure(s):  Coronary Angiogram  Percutaneous Coronary Intervention stent  Left Heart Catheterization  Intravascular Ultrasound    RECOMMENDATIONS TO OUTPATIENT PROVIDER FOR F/U VISIT     Cardiology clinic follow-up with Dr. Gordon scheduled for 6/30/2025  Outpatient cardiac rehabilitation    DISPOSITION     Discharge home    SUMMARY OF HOSPITAL COURSE:      63 year old man with suspected CAD following an abnormal stress test who presented for outpatient left heart catheterization. He was found to have obstructive coronary artery disease requiring GITA x2 to the LAD and GITA x1 to the 1st diagonal artery. There were no complications. He was monitored overnight with no issue and discharged home the following day.    Discharge Medications with Med changes:        Review of your medicines        START taking        Dose / Directions   aspirin 81 MG EC tablet  Used for: Abnormal stress test, Coronary artery disease due to lipid rich plaque, Hypercholesterolemia, Permanent atrial fibrillation (H), ALISE on CPAP, Persistent  atrial fibrillation (H), Abnormal electrocardiogram      Dose: 81 mg  Take 1 tablet (81 mg) by mouth daily. Start tomorrow.  Quantity: 30 tablet  Refills: 3     clopidogrel 75 MG tablet  Commonly known as: PLAVIX  Used for: Abnormal stress test, Coronary artery disease due to lipid rich plaque, Hypercholesterolemia, Permanent atrial fibrillation (H), ALISE on CPAP, Persistent atrial fibrillation (H), Abnormal electrocardiogram      Dose: 75 mg  Start taking on: June 22, 2025  Take 1 tablet (75 mg) by mouth daily. Dose to start tomorrow.  Quantity: 90 tablet  Refills: 3            CONTINUE these medicines which have NOT CHANGED        Dose / Directions   apixaban ANTICOAGULANT 5 MG tablet  Commonly known as: ELIQUIS  Used for: Permanent atrial fibrillation (H), History of pulmonary embolism      Dose: 5 mg  Take 1 tablet (5 mg) by mouth 2 times daily.  Quantity: 180 tablet  Refills: 1     co-enzyme Q-10 100 MG Caps capsule      Dose: 100 mg  Take 100 mg by mouth daily.  Refills: 0     cyanocobalamin 1000 MCG sublingual tablet  Commonly known as: VITAMIN B-12      Dose: 1,000 mcg  Place 1,000 mcg under the tongue daily.  Refills: 0     donepezil 10 MG tablet  Commonly known as: ARICEPT      Dose: 10 mg  Take 10 mg by mouth at bedtime.  Refills: 0     furosemide 20 MG tablet  Commonly known as: Lasix  Used for: SOB (shortness of breath), Benign essential hypertension      Dose: 20 mg  Take 1 tablet (20 mg) by mouth daily.  Quantity: 90 tablet  Refills: 1     isosorbide mononitrate 30 MG 24 hr tablet  Commonly known as: IMDUR  Used for: LOERA (dyspnea on exertion), Coronary artery disease involving native coronary artery of native heart with other form of angina pectoris      Dose: 60 mg  TAKE 2 TABLETS BY MOUTH DAILY  Quantity: 180 tablet  Refills: 3     metoprolol succinate ER 25 MG 24 hr tablet  Commonly known as: TOPROL XL  Used for: Atrial fibrillation, unspecified type (H)      Dose: 50 mg  Take 2 tablets (50 mg) by  mouth daily.  Quantity: 180 tablet  Refills: 3     MULTIPLE VITAMIN PO      Dose: 1 tablet  [MULTIVITAMIN (MULTIPLE VITAMIN ORAL)] Take 1 tablet by mouth daily.  Refills: 0     * rosuvastatin 40 MG tablet  Commonly known as: CRESTOR      Dose: 40 mg  Take 40 mg by mouth at bedtime.  Refills: 0     * rosuvastatin 40 MG tablet  Commonly known as: CRESTOR  Used for: Abnormal stress test, Coronary artery disease due to lipid rich plaque, Hypercholesterolemia, Permanent atrial fibrillation (H), ALISE on CPAP, Persistent atrial fibrillation (H), Abnormal electrocardiogram      Dose: 40 mg  Take 1 tablet (40 mg) by mouth daily.  Quantity: 90 tablet  Refills: 3     sertraline 100 MG tablet  Commonly known as: ZOLOFT  Used for: Generalized anxiety disorder      Dose: 100 mg  Take 1 tablet (100 mg) by mouth daily.  Quantity: 30 tablet  Refills: 1     vitamin D3 50 mcg (2000 units) tablet  Commonly known as: CHOLECALCIFEROL      Dose: 1 tablet  Take 1 tablet by mouth daily.  Refills: 0           * This list has 2 medication(s) that are the same as other medications prescribed for you. Read the directions carefully, and ask your doctor or other care provider to review them with you.                   Where to get your medicines        [id number] [address] AT [address] [address]      Phone: [phone]   aspirin 81 MG EC tablet  clopidogrel 75 MG tablet  rosuvastatin 40 MG tablet               Rationale for medication changes:      Starting aspirin and clopidogrel for coronary stents        Consults   Hospital medicine  Cardiac rehabilitation      Immunizations given this encounter     none       Anticoagulation Information      Taking apixaban.      Discharge Orders     Discharge Procedure Orders   Cardiac Rehab  Referral   Standing Status: Future   Referral Priority: Routine: Next available opening  "Referral Type: Rehab Therapy Cardiac Therapy   Number of Visits Requested: 1     Ambulatory Cardiologist Referral   Standing Status: Future   Referral Priority: Routine: Next available opening Referral Type: Consultation   Requested Specialty: Cardiovascular Disease   Number of Visits Requested: 1     Follow-Up with Cardiology Ambulatory [facility] Referral   Standing Status: Future   Referral Priority: Routine: Next available opening Referral Type: Consultation   Requested Specialty: Cardiovascular Disease   Number of Visits Requested: 1     Reason for your hospital stay   Order Comments: Coronary artery stenting     Follow Up   Order Comments: Follow up with Cardiology SUR[facility]ER in 7-10 days, Follow up with Cardiologist in 6 weeks     Activity   Order Comments: Your activity upon discharge: no lifting or strenuous exercise for 3 days. No driving for 1 day.     Order Specific Question Answer Comments   Is discharge order? Yes      Diet   Order Comments: Follow this diet upon discharge: Heart Healthy; low fat, low cholesterol, low sodium     Order Specific Question Answer Comments   Is discharge order? Yes      Examination     Vital Signs in last 24 hours:   BP (!) 157/96 (BP Location: Left arm)   Pulse 64   Temp 97.8  F (36.6  C) (Oral)   Resp 17   Ht 1.905 m (6' 3\")   Wt 116.4 kg (256 lb 9.6 oz)   SpO2 94%   BMI 32.07 kg/m      General: alert, oriented x3, in no acute distress.  Heart: regular, no murmurs.  Lungs: clear to auscultation bilaterally.  Extremities: right radial artery and right femoral artery cath access sites without swelling or tenderness.      Please see EMR for more detailed significant labs, imaging, consultant notes etc.  Total time spent on discharge: 35 minutes    Jovanni Wallis MD [facility]  Non-Invasive Cardiologist  [facility]  Pager [phone]    "

## 2025-06-21 NOTE — PROGRESS NOTES
"Cardiac Rehab     06/21/25 0905   Appointment Info   Signing Clinician's Name / Credentials (OT) April Graves OTR/L   Rehab Comments (OT) Nurse had ok'd to see; Cardiology came in to see & nurse needing to give meds (initial /92). Pt seen @ 10:00 to complete eval/educ/ex.. At end of session, pt back in bed w/alarm activated & call light/needs in reach.   Quick Adds   Quick Adds [facility] Auth & Certification;Certification   Living Environment   People in Home spouse   Current Living Arrangements house  (split entry)   Home Accessibility stairs to enter home;stairs within home   Number of Stairs, Within Home, Primary eight;six   Stair Railings, Within Home, Primary   (one sided railing)   Self-Care   Usual Activity Tolerance excellent   Current Activity Tolerance good   Regular Exercise Yes   Activity/Exercise Type walking;biking;strength training   Exercise Amount/Frequency daily  (pt reports ~2.5 hrs @ the gym 6 days/wk)   Equipment Currently Used at Home none   Fall history within last six months no   Activity/Exercise/Self-Care Comment @ baseline, pt is independent w/ADLs & ambulation.   General Information   Onset of Illness/Injury or Date of Surgery 06/20/25   Referring Physician Pascual Hess MD   Patient/Family Therapy Goal Statement (OT) none stated   Existing Precautions/Restrictions cardiac;fall  (s/p angio w/R femoral & R radial approaches)   General Observations and Info Per chart:  \"Elective procedure today ; S/p coronary angio and s/p drug-eluting stent x 2 to the LAD and GITA x1 to the first diagonal branch - 6/20\"   Cognitive Status Examination   Orientation Status orientation to person, place and time   Follows Commands WFL   Pain Assessment   Patient Currently in Pain No   Posture   Posture not impaired   Coordination   Upper Extremity Coordination No deficits were identified   Bed Mobility   Comment (Bed Mobility) independent   Transfers   Transfer Comments SBA/I room trfs   Balance "   Balance Comments SBA/mod I functional mobility in the room w/o AD-no unsteadiness (wearing his shoes)   Activities of Daily Living   BADL Assessment/Intervention lower body dressing   Lower Body Dressing Assessment/Training   Comment, (Lower Body Dressing) SBA/mod I don/doff slip on shoes   Clinical Impression   Criteria for Skilled Therapeutic Interventions Met (OT) Yes, treatment indicated   OT Diagnosis decreased activity tolerance   OT Problem List-Impairments impacting ADL problems related to;activity tolerance impaired;cognition;post-surgical precautions;mobility   Assessment of Occupational Performance 1-3 Performance Deficits   Identified Performance Deficits post-angio precautions (R femoral & radial), decreased activity tolerance, decreased mobility   Planned Therapy Interventions (OT) transfer training;strengthening;progressive activity/exercise;home program guidelines   Clinical Decision Making Complexity (OT) problem focused assessment/low complexity   Risk & Benefits of therapy have been explained evaluation/treatment results reviewed;care plan/treatment goals reviewed;risks/benefits reviewed;participants included;patient   Clinical Impression Comments Pt presents post-angio w/R femoral & radial access sites & stents placed impacting activity tolerance.   OT Total Evaluation Time   OT Eval, Low Complexity Minutes (74771) 9   Therapy Certification   Start of Care Date 06/21/25   Certification date from 06/21/25   Certification date to 06/21/25   Medical Diagnosis Percutaneous transluminal coronary angioplasty   [facility] Authorization Information   Condition type Initial onset (within last 3 months)   Cause of current episode Unspecified   Nature of treatment Rehabilitative   Functional ability Minimal functional limitations   Documented POC (choose all that apply) Patient agrees to program participation including home program   Briefly describe symptoms SOB   How did the symptoms start SOB with exercise    Last 24H: avg pain/symptom intensity  4/10  (SOB; denies pain)   Past wk: avg pain/symptom intensity 4/10  (SOB)   Frequency of Symptoms Frequently (51-75% of the time)   Symptom impact on ADLs A little bit   Condition change since eval Better   General health reported by patient Good   OT Goals   Therapy Frequency (OT) One time eval and treatment   OT Predicted Duration/Target Date for Goal Attainment 06/21/25   OT Goals Cardiac Phase 1   OT: Understanding of cardiac education to maximize quality of life, condition management, and health outcomes Patient;Caregiver;Verbalize;Demonstrate   OT: Perform aerobic activity with stable cardiovascular response intermittent;15 minutes;Goal Met   OT: Functional/aerobic ambulation tolerance with stable cardiovascular response in order to return to home and community environment Supervision/SBA;Greater than 300 feet;Goal Met   OT: Navigation of stairs simulating home set up with stable cardiovascular response in order to return to home and community environment Supervision/SBA;Greater than 10 stairs;Goal Met   Interventions   Interventions Quick Adds Therapeutic Procedures/Exercise;Cardiac Rehab   Self-Care/Home Management   Self-Care/Home Mgmt/ADL, Compensatory, Meal Prep Minutes (09066) 15   Symptoms Noted During/After Treatment (Meal Preparation/Planning Training) none   Treatment Detail/Skilled Intervention Completed CR education including post-angio precautions-pt needing icnr time for info & occ. repetition (family not present). Answered pt's questions/appropriate questions. Additional room trfs w/mod I-pt does well w/following post-angio prec w/rare reminder for temporary restrictions. Additional room trfs mod I/I. Incr effort w/donning shoes-instructed on easiest tech/clothing & footwear options.   Therapeutic Procedures/Exercise   Therapeutic Procedure: strength, endurance, ROM, flexibillity minutes (24844) 15   Treatment Detail/Skilled Intervention see ambulation &  stairs   Treatment Time Includes (CR Only) See specific exercise details intervention group(s)   Ambulation   Workload 610 ft   Symptoms Denies symptoms   Cardiovascular Response Hypertensive response to exercise  (AFib, rare PVCs)   Exercise Details SBA amb w/o unsteadiness   Stairs   Workload 13   Symptoms Dyspnea   Cardiovascular Response Hypertensive response to exercise  (Monitor shows AFib; bigeminy x1)   Exercise Details Up/down stairs with needing initial cue for full foot placement on stair-no unsteadiness.   Vital Signs Details (S)  pre-activity:  HR 62, 97%, /89. post amb & stairs: HR 81, 97% on RA, /100 (spoke w/pt's nurse/reported vitals)   Cardiac Education   Education Provided Diagnosis;Emergency plan;Energy conservation;Home exercise program;OMNI Scale;Outpatient Cardiac Rehab;Precautions;Resuming home activities;Signs and symptoms   Education Packet Given to Patient Yes   All Patient Education Handouts Reviewed with Patient and/or Family Yes  (spouse not avail during session.)   Cardiac Rehab Phase II Plan   Phase II Order Received Yes   Phase II Appointment Status Scheduled   Date/Time 6/30 @ 12:45PM   Location [facility]   OT Discharge Planning   OT Plan DC home today   OT Discharge Recommendation (DC Rec) home with outpatient cardiac rehab   OT Rationale for DC Rec Rec. A as needed for ADLs/IADLs until post-angio precautions are completed. Pt demo SBA/I with basic ADLs, mod I/I trfs & SBA with amb & stairs in order to DC home w/family assist/support. He may need reinforcement or reminders for educ.   OT Brief overview of current status SBA/mod I ADLs, mob   OT Total Distance Amb During Session (feet) 635   Total Session Time   Timed Code Treatment Minutes 40   Total Session Time (sum of timed and untimed services) 49     [facility]                                                                                   OUTPATIENT OCCUPATIONAL THERAPY    PLAN OF  TREATMENT FOR OUTPATIENT REHABILITATION   Patient's Last Name, First Name, DEVAN OlsenPrem carlos YOB: 1950   Provider's Name   [facility]   Medical Record No.  [id number]     Onset Date: 06/20/25 Start of Care Date: 06/21/25     Medical Diagnosis:  Percutaneous transluminal coronary angioplasty               OT Diagnosis:  decreased activity tolerance Certification Dates:  From: 06/21/25  To: 06/21/25     See note for plan of treatment, functional goals, and certification details.    I CERTIFY THE NEED FOR THESE SERVICES FURNISHED UNDER        THIS PLAN OF TREATMENT AND WHILE UNDER MY CARE (Physician co-signature of this document indicates review and certification of the therapy plan).

## 2025-06-21 NOTE — PROGRESS NOTES
PRIMARY DIAGNOSIS: TR BAND RECOVERY   OUTPATIENT/OBSERVATION GOALS TO BE MET BEFORE DISCHARGE:  TR band status: removed  Radial pulse and CMS (circulation, motion, sensation) are WDL: Yes  Bleeding or hematoma present at site: No      Activity restriction education reviewed with patient: Yes. Pink wrist band applied, arm board applied  Stable vital signs:  Yes  ADLs back to baseline:  Yes  Activity and level of assistance: Up with standby assistance.  Interpretation of rhythm per telemetry tech: Normal sinus  and Atrial fibrillation - controlled     Discharge Planner Nurse   Safe discharge environment identified: No  Barriers to discharge: Yes       Entered by: Tayler Hernandez RN 06/20/2025 8:15 PM     Please review provider order for any additional goals.   Nurse to notify provider when observation goals have been met and patient is ready for discharge.    VSS on RA, tele is a fib with bradycardia. Denies any pain. Needing reorientation occasionally. Provider paged regarding Brilinta orders. Writer originally received orders to hold the Brilinta, but after provider discussed with cardiology, a single dose of Brilinta was reordered.

## 2025-06-21 NOTE — PLAN OF CARE
PRIMARY DIAGNOSIS: TR BAND RECOVERY   OUTPATIENT/OBSERVATION GOALS TO BE MET BEFORE DISCHARGE:  TR band status: removed  Radial pulse and CMS (circulation, motion, sensation) are WDL: Yes  Bleeding or hematoma present at site: No      Activity restriction education reviewed with patient: Yes. Pink wrist band applied, arm board applied  Stable vital signs:  Yes  ADLs back to baseline:  Yes  Activity and level of assistance: Up with standby assistance.  Interpretation of rhythm per telemetry tech: Atrial fibrillation - controlled     Discharge Planner Nurse   Safe discharge environment identified: No  Barriers to discharge: Yes       Entered by: Tayler Hernandez RN 06/20/2025 9:53 PM     Please review provider order for any additional goals.   Nurse to notify provider when observation goals have been met and patient is ready for discharge.    VSS on RA. Pt reporting that he is still experiencing intermittent SOB, but feels like it is improving. R radial and R groin sites without bleeding or hematoma, CMS intact. Denies any pain. Pt reporting being much calmer after PRN ativan.

## 2025-06-21 NOTE — PLAN OF CARE
Problem: Adult Inpatient Plan of Care  Goal: Plan of Care Review  Description: The Plan of Care Review/Shift note should be completed every shift.  The Outcome Evaluation is a brief statement about your assessment that the patient is improving, declining, or no change.  This information will be displayed automatically on your shift  note.  Outcome: Progressing     Problem: Delirium  Goal: Optimal Coping  Outcome: Progressing     Problem: Delirium  Goal: Improved Behavioral Control  Outcome: Progressing  Intervention: Minimize Safety Risk  Recent Flowsheet Documentation  Taken 6/21/2025 0415 by Yesenia Sears RN  Enhanced Safety Measures:   room near unit station   review medications for side effects with activity   patient/family teach back on injury risk   pain management  Taken 6/21/2025 0000 by Yesenia Sears RN  Enhanced Safety Measures:   room near unit station   review medications for side effects with activity   patient/family teach back on injury risk   pain management     Problem: Delirium  Goal: Improved Behavioral Control  Intervention: Minimize Safety Risk  Recent Flowsheet Documentation  Taken 6/21/2025 0415 by Yesenia Sears RN  Enhanced Safety Measures:   room near unit station   review medications for side effects with activity   patient/family teach back on injury risk   pain management  Taken 6/21/2025 0000 by Yesenia Sears RN  Enhanced Safety Measures:   room near unit station   review medications for side effects with activity   patient/family teach back on injury risk   pain management     Problem: Cardiac Catheterization (Diagnostic/Interventional)  Goal: Absence of Bleeding  Outcome: Progressing     Problem: Cardiac Catheterization (Diagnostic/Interventional)  Goal: Stable Heart Rate and Rhythm  Outcome: Progressing     Problem: Cardiac Catheterization (Diagnostic/Interventional)  Goal: Absence of Vascular Access Complication  Outcome: Progressing  Intervention: Prevent and  Manage Access Complications  Recent Flowsheet Documentation  Taken 6/21/2025 0415 by Yesenia Sears, RN  Activity Management: activity adjusted per tolerance  Taken 6/21/2025 0000 by Yesenia Sears, RN  Activity Management: activity adjusted per tolerance  Taken 6/20/2025 2354 by Yesenia Sears, RN  Activity Management: activity adjusted per tolerance     Problem: Cardiac Catheterization (Diagnostic/Interventional)  Goal: Absence of Vascular Access Complication  Intervention: Prevent and Manage Access Complications  Recent Flowsheet Documentation  Taken 6/21/2025 0415 by Yesenia eSars, RN  Activity Management: activity adjusted per tolerance  Taken 6/21/2025 0000 by Yesenia Sears, RN  Activity Management: activity adjusted per tolerance  Taken 6/20/2025 2354 by Yesenia Sears, RN  Activity Management: activity adjusted per tolerance   Goal Outcome Evaluation:

## 2025-06-21 NOTE — PROGRESS NOTES
[facility]    Medicine Consult f/up Note - Hospitalist Service    Date of Admission:  6/20/2025    Assessment & Plan   Prem Taylor is a 74 year old male admitted on 6/20/2025.       H/o abnormal stress test, CAD by virtue of CT scan, atrial fibrillation; on chronic NOAC anticoagulation, and ALISE; on CPAP.  He underwent coronary angiography in evaluation of dyspnea which demonstrated severe two-vessel coronary artery disease which was successfully intervened upon with drug-eluting stent x 2 to the LAD and GITA x1 to the first diagonal branch.      Hospital medicine consulted for possible panic attack.     CAD   POD 1 - coronary angio and s/p drug-eluting stent x 2 to the LAD and GITA x1 to the first diagonal branch     H/o abnormal stress test, CAD by virtue of CT scan, atrial fibrillation; on chronic NOAC anticoagulation, and ALISE; on CPAP.  He underwent coronary angiography in evaluation of dyspnea which demonstrated severe two-vessel coronary artery disease which was successfully intervened upon with drug-eluting stent x 2 to the LAD and GITA x1 to the first diagonal branch.      The patient was given a Brilinta loading dose intra-procedurally, however patient had sob issues and  will be switched to clopidogrel at discharge with loading dose of 600 mg to be given tomorrow a.m. and will continue on 75 mg p.o. daily thereafter.      Per cardiology - will discharge today on asa 81mg and plavix 75mg daily in addition to PTA DOAC (apixaban)    Acute sob and anxiety post procedure - PCI + stent    H/o panic attacks     SOB symptoms suspected to be a side effect of the brillinta given    CXR - CM but clear lungs, some increase in pulmonary vasculature but clear lungs  Was given one dose of IV lasix  Symptoms have resolved and he is doing well    A fib   Cardiology recommending continuation of PTA eliquis  No changes in metoprolol dosing    Other medical issues:  Chronic diastolic CHF : on lasix 20  "mg daily at home, monitor  ALISE ; on cpap   H/o dementia - new diagnosis : on aricept  HLD : on statin  HTN, Essential : on imdur 30 mg daily, metoprolol 50 mg daily.  H/o panic attacks : on zoloft 100 mg daily  H/o PE - 5-6 yrs ago, on eliquis    PPE Used:  Mask, gloves          Diet: Low Saturated Fat Na <2400 mg  Diet    DVT Prophylaxis: DOAC  Pittman Catheter: Not present  Lines: None     Cardiac Monitoring: ACTIVE order. Indication: Post- PCI/Angiogram (24 hours)  Code Status: Full Code      Clinically Significant Risk Factors Present on Admission                # Drug Induced Coagulation Defect: home medication list includes an anticoagulant medication              # Obesity: Estimated body mass index is 32.07 kg/m  as calculated from the following:    Height as of this encounter: 1.905 m (6' 3\").    Weight as of this encounter: 116.4 kg (256 lb 9.6 oz).              Disposition Plan     Medically Ready for Discharge: Anticipated Today         Ashleigh Lopez DO  Hospitalist Service  [facility]  Securely message with [employer] (more info)  Text page via [facility] Paging/Directory   ______________________________________________________________________    Interval History     Did not sleep very well---just could not get comfortable. No HA, CP, SOB or F/C.  No current N/V.  Standing upright in room wanting to discharge.     Physical Exam   Vital Signs: Temp: 97.8  F (36.6  C) Temp src: Oral BP: (!) 157/96 Pulse: 64   Resp: 17 SpO2: 94 % O2 Device: None (Room air) Oxygen Delivery: 2 LPM  Weight: 256 lbs 9.6 oz    GEN:  Alert, awake, appears somewhat restless but comfortable, no overt distress.  HEENT:  Normocephalic/atraumatic, no scleral icterus, no nasal discharge  CV:  irregular rate and rhythm.  S1 + S2 noted, no S3 or S4.  LUNGS:  Clear to auscultation bilaterally without rales/rhonchi/wheezing/retractions.  Symmetric chest rise on inhalation noted.      Medical Decision Making "       47 MINUTES SPENT BY ME on the date of service doing chart review, history, exam, documentation & further activities per the note.      Data   Medications   Current Facility-Administered Medications   Medication Dose Route Frequency Provider Last Rate Last Admin    Continuing beta blocker from home medication list OR beta blocker order already placed during this visit   Does not apply DOES NOT GO TO Pascual Ott MD        Continuing statin from home medication list OR statin order already placed during this visit   Does not apply DOES NOT GO TO Pascual Ott MD        Percutaneous Coronary Intervention orders placed (this is information for BPA alerting)   Does not apply DOES NOT GO TO Pascual Ott MD         Current Facility-Administered Medications   Medication Dose Route Frequency Provider Last Rate Last Admin    apixaban ANTICOAGULANT (ELIQUIS) tablet 5 mg  5 mg Oral BID Pascual Hess MD        aspirin EC tablet 81 mg  81 mg Oral Daily Pascual Hess MD        donepezil (ARICEPT) tablet 10 mg  10 mg Oral At Bedtime Pascual Hess MD   10 mg at 06/20/25 2156    furosemide (LASIX) tablet 20 mg  20 mg Oral Daily Pascual Hess MD   20 mg at 06/20/25 1816    isosorbide mononitrate (IMDUR) 24 hr tablet 60 mg  60 mg Oral Daily Pascual Hess MD        metoprolol succinate ER (TOPROL XL) 24 hr tablet 50 mg  50 mg Oral Daily Frank Walker MD        multivitamin, therapeutic (THERA-VIT) tablet 1 tablet  1 tablet Oral Daily Pascual Hess MD        rosuvastatin (CRESTOR) tablet 40 mg  40 mg Oral Daily Pascual Hess MD   40 mg at 06/20/25 1701    sertraline (ZOLOFT) tablet 100 mg  100 mg Oral Daily Pascual Hess MD         Labs and Imaging results below reviewed today.  Recent Labs   Lab 06/17/25  0956   WBC 5.7   HGB 13.3   HCT 39.4*   MCV 90        Recent Labs   Lab 06/21/25  0537 06/17/25  0956    144   POTASSIUM 4.1 4.9   CHLORIDE 104  110*   CO2 26 27   ANIONGAP 9 7   GLC 94 127*   BUN 14.9 15.7   CR 0.88 0.90   GFRESTIMATED >90 90   DANDY 9.3 9.6     7-Day Micro Results       No results found for the last 168 hours.            Recent Results (from the past 24 hours)   Cardiac Catheterization    Narrative    Images from the original result were not included.  Prem Taylor is a 74 year old old male with LOERA, CAD, AF; on chronic   NOAC, ALISE; on CPAP  who is here for w/up of an abnormal stress test.    - severe 2-vessel CAD s/p DESx2 to LAD and DESx1 to D1; normal L-sided   filling pressures  - continue ASA 81mg daily indefinitely, ticagrelor 180mg once now, 90mg   tonight. Clopidogrel 600mg once, followed by 75mg daily for at least 12   months. Would do triple therapy w/ ASA/clopidogrel/apixaban for 3 mos,   then clopidogrel/apixaban indefinitely   - rosuva 40, low threshold for PCSK9 inhibitors  - continue aggressive risk factor modification          Findings:  LM:no obstruction  LAD: ectatic, mid-vessel 50% Ca2+ narrowing at the take off of a large D2   branch w/ ostial 99%. Mid-distal 75% narrowing  Lcx:ectatic, mildly irregular  RCA:ectatic, dominant, 40% diffuse irregularity    LVEDP:15    Access:  R Radial artery switched to   R Femoral artery    PCI:  LMT was engaged w/ a 6F EBU 3.5 Guide catheter and the lesion in LAD was   wired w/ a Forte wire, w/ another Forte into D2. We ballooned the D2 w/ a   2.5x12mm NC Emerge to facilitate delivery of a cutting balloon, however   there was a focal disruption, so we covered that w/ a 3.0x28 Synergy EE at   11 alberto, post-dilating w/ a 3.5x12mm NC Emerge at 12 alberto inflations; we   tried but were unable to park an NC balloon in the LAD due to interaction   within guide/guideliner. We then had difficulty traversing minimally   protruding D stent and moderate Ca2+ plaque at this level in spite of   trying balloon-assistet tracking, jacinda wire, wiggle wire, and breathing   maneuvers, hence we switched  access to R CFA 8F and an EBU 4.0. Through   that, we delivered 2.0x12m, 2.5x12, 3.0x12, and 3.5x12mm Emerge balloons,   mini-crushing the D stent. We then stented mid-distal lesion w/ a 4.0x20mm   Synergy EES at 11 alberto, post-dilating w/ a 4.0x12mm NC Emerge at 12 alberto   inflations, pre-dilated mid-LAD at D take off w/ the same balloon. We then   re-crossed into D2, ballooned w/ a 3.5 balloon, removed the wire and   stented mLAD w/ another 4.0x20mm Synergy EES at 11 alberto. We post-dilated   the stent w/ a 4.0x12mm NC Emerge at 12-20 alberto inflations, re-crossed the   D2 and ballooned it w/ a 3.5x12mm NC Emerge at high pressure. Finally, we   finished w/ a kissing inflation of a 3.0x12mm and 3.0x25mm Emerge at 6 alberto   inflations. Lastly, IVUS demonstrated appropriate stent sizing and   apposition of both stents. Final angiography showed no dissection or   perforation and a WESTLEY 3 flow.    Closure:   Vasc Band    This is a complex modifier 22 procedure due to high risk anatomy,   difficult access, poor guide support         XR Chest Port 1 View    Narrative    EXAM: XR CHEST PORT 1 VIEW  LOCATION: [facility]  DATE: 6/20/2025    INDICATION: sob  COMPARISON: 4/21/2025      Impression    IMPRESSION: Cardiomegaly. Pulmonary vasculature cephalization compatible with fluid overload. Clear lungs.

## 2025-06-21 NOTE — PROGRESS NOTES
Pt a/o to self, wife, location, month, year, forgetful at times and re date. Pleasant, calm, cooperative. Tele tracing a-fib, rate 50's-60's. Pt denies pain. Ate well for breakfast. Up to bathroom w/ stand by assist, gait steady, voiding in urinal. Wife at bedside for last couple hrs. Pt states he is looking forward to going home. Plan to discharge shortly.Reminded pt  and wife to use call light for needs. Continue to monitor pt.

## 2025-06-21 NOTE — PROGRESS NOTES
PRIMARY DIAGNOSIS: TR BAND RECOVERY   OUTPATIENT/OBSERVATION GOALS TO BE MET BEFORE DISCHARGE:  TR band status: removed  Radial pulse and CMS (circulation, motion, sensation) are WDL: Yes  Bleeding or hematoma present at site: No      Activity restriction education reviewed with patient: Yes. Pink wrist band applied, arm board applied  Stable vital signs:  Yes  ADLs back to baseline:  Yes  Activity and level of assistance: Up with standby assistance.  Interpretation of rhythm per telemetry tech: Normal sinus  and A-fib SVR, HR=54     Discharge Planner Nurse   Safe discharge environment identified: No  Barriers to discharge: Yes       Entered by: Yesenia Sears RN 06/21/2025 4:20 AM     Please review provider order for any additional goals.   Nurse to notify provider when observation goals have been met and patient is ready for discharge.    Patient needs reminders to call before getting out of bed. Rhythm has been flucuating between NSR to a-fib and renea. Urine output good. Bed alarm continues to be on for safety and Ativan 0.5 mg's given for anxiety/sleep.

## 2025-06-21 NOTE — PLAN OF CARE
Cardiac Rehab Discharge Summary    Reason for therapy discharge:    Discharged home.    Progress towards therapy goal(s). See goals on Care Plan in Epic electronic health record for goals  Goals mostly met; spouse not avail/present during session for caregiver education.    Therapy recommendation(s):    Home w/assist as needed; outpt CR (ordered & scheduled).

## 2025-06-21 NOTE — PROGRESS NOTES
PRIMARY DIAGNOSIS: TR BAND RECOVERY   OUTPATIENT/OBSERVATION GOALS TO BE MET BEFORE DISCHARGE:  TR band status: removed  Radial pulse and CMS (circulation, motion, sensation) are WDL: Yes  Bleeding or hematoma present at site: No      Activity restriction education reviewed with patient: Yes. Pink wrist band applied, arm board applied  Stable vital signs:  Yes  ADLs back to baseline:  Yes  Activity and level of assistance: Up with standby assistance.  Interpretation of rhythm per telemetry tech: Normal sinus      Discharge Planner Nurse   Safe discharge environment identified: No  Barriers to discharge: Yes       Entered by: Yesenia Sears RN 06/21/2025 2:02 AM     Please review provider order for any additional goals.   Nurse to notify provider when observation goals have been met and patient is ready for discharge.    VSS, 02 sat's 97% on room air. Patient with some intermittent confusion, but reorientation provided. Urine output to be monitored and bed alarm on for safety.

## 2025-06-21 NOTE — PROGRESS NOTES
Discharge instructions reviewed w/ pt and wife, they verbalized understanding, are aware need to  prescriptions at outpatient pharmacy. Pt discharging at this time to home w/ wife. Taken  down by writer to car via w/c w/ belongings.

## 2025-06-24 ENCOUNTER — PATIENT OUTREACH (OUTPATIENT)
Dept: CARE COORDINATION | Facility: CLINIC | Age: 75
End: 2025-06-24
Payer: COMMERCIAL

## 2025-06-24 NOTE — PROGRESS NOTES
Connected Care Resource Center:   [facility] Contact  [facility]/Voicemail     Clinical Data: Post-Discharge Outreach     Outreach attempted x 2.  Left message on patient's voicemail, providing [facility]'s central phone number of [phone] ([phone]) for questions/concerns and/or to schedule an appt with an [facility] provider, if they do not have a PCP.      Plan:  [facility] will do no further outreaches at this time.       Maria Dolores Mcgraw MA  Connected Care Resource Center, [facility]    *Connected Care Resource Team does NOT follow patient ongoing. Referrals are identified based on internal discharge reports and the outreach is to ensure patient has an understanding of their discharge instructions.

## 2025-06-25 ENCOUNTER — HOSPITAL ENCOUNTER (OUTPATIENT)
Dept: CARDIAC REHAB | Facility: HOSPITAL | Age: 75
Discharge: HOME | End: 2025-06-25
Attending: INTERNAL MEDICINE
Payer: COMMERCIAL

## 2025-06-25 PROCEDURE — 93798 PHYS/QHP OP CAR RHAB W/ECG: CPT

## 2025-06-25 PROCEDURE — 93797 PHYS/QHP OP CAR RHAB WO ECG: CPT

## 2025-06-27 ENCOUNTER — HOSPITAL ENCOUNTER (OUTPATIENT)
Dept: CARDIAC REHAB | Facility: HOSPITAL | Age: 75
Discharge: HOME | End: 2025-06-27
Attending: INTERNAL MEDICINE
Payer: COMMERCIAL

## 2025-06-27 PROCEDURE — 93798 PHYS/QHP OP CAR RHAB W/ECG: CPT

## 2025-06-29 ENCOUNTER — MYC REFILL (OUTPATIENT)
Dept: FAMILY MEDICINE | Facility: CLINIC | Age: 75
End: 2025-06-29
Payer: COMMERCIAL

## 2025-06-29 ENCOUNTER — MYC REFILL (OUTPATIENT)
Dept: CARDIOLOGY | Facility: CLINIC | Age: 75
End: 2025-06-29
Payer: COMMERCIAL

## 2025-06-29 ENCOUNTER — MYC REFILL (OUTPATIENT)
Dept: ANTICOAGULATION | Facility: CLINIC | Age: 75
End: 2025-06-29
Payer: COMMERCIAL

## 2025-06-29 DIAGNOSIS — R06.09 DOE (DYSPNEA ON EXERTION): ICD-10-CM

## 2025-06-29 DIAGNOSIS — I48.91 ATRIAL FIBRILLATION, UNSPECIFIED TYPE (H): ICD-10-CM

## 2025-06-29 DIAGNOSIS — R06.02 SOB (SHORTNESS OF BREATH): ICD-10-CM

## 2025-06-29 DIAGNOSIS — Z86.711 HISTORY OF PULMONARY EMBOLISM: ICD-10-CM

## 2025-06-29 DIAGNOSIS — I48.21 PERMANENT ATRIAL FIBRILLATION (H): ICD-10-CM

## 2025-06-29 DIAGNOSIS — I25.118 CORONARY ARTERY DISEASE INVOLVING NATIVE CORONARY ARTERY OF NATIVE HEART WITH OTHER FORM OF ANGINA PECTORIS: ICD-10-CM

## 2025-06-29 DIAGNOSIS — R41.3 MEMORY DIFFICULTIES: Primary | ICD-10-CM

## 2025-06-29 DIAGNOSIS — I10 BENIGN ESSENTIAL HYPERTENSION: ICD-10-CM

## 2025-06-30 ENCOUNTER — OFFICE VISIT (OUTPATIENT)
Dept: CARDIOLOGY | Facility: CLINIC | Age: 75
End: 2025-06-30
Attending: INTERNAL MEDICINE
Payer: COMMERCIAL

## 2025-06-30 VITALS
WEIGHT: 264 LBS | BODY MASS INDEX: 32.83 KG/M2 | DIASTOLIC BLOOD PRESSURE: 90 MMHG | HEART RATE: 53 BPM | SYSTOLIC BLOOD PRESSURE: 156 MMHG | RESPIRATION RATE: 16 BRPM | HEIGHT: 75 IN

## 2025-06-30 DIAGNOSIS — I48.91 ATRIAL FIBRILLATION, UNSPECIFIED TYPE (H): ICD-10-CM

## 2025-06-30 DIAGNOSIS — R06.2 WHEEZING: ICD-10-CM

## 2025-06-30 DIAGNOSIS — E78.00 PURE HYPERCHOLESTEROLEMIA: ICD-10-CM

## 2025-06-30 DIAGNOSIS — I48.21 PERMANENT ATRIAL FIBRILLATION (H): ICD-10-CM

## 2025-06-30 DIAGNOSIS — I25.118 CORONARY ARTERY DISEASE INVOLVING NATIVE CORONARY ARTERY OF NATIVE HEART WITH OTHER FORM OF ANGINA PECTORIS: ICD-10-CM

## 2025-06-30 DIAGNOSIS — I27.20 PULMONARY HYPERTENSION (H): Primary | ICD-10-CM

## 2025-06-30 DIAGNOSIS — R06.09 DOE (DYSPNEA ON EXERTION): ICD-10-CM

## 2025-06-30 DIAGNOSIS — I25.83 CORONARY ARTERY DISEASE DUE TO LIPID RICH PLAQUE: ICD-10-CM

## 2025-06-30 DIAGNOSIS — I25.10 CORONARY ARTERY DISEASE DUE TO LIPID RICH PLAQUE: ICD-10-CM

## 2025-06-30 PROCEDURE — G2211 COMPLEX E/M VISIT ADD ON: HCPCS | Performed by: INTERNAL MEDICINE

## 2025-06-30 PROCEDURE — 3077F SYST BP >= 140 MM HG: CPT | Performed by: INTERNAL MEDICINE

## 2025-06-30 PROCEDURE — 99214 OFFICE O/P EST MOD 30 MIN: CPT | Performed by: INTERNAL MEDICINE

## 2025-06-30 PROCEDURE — 3080F DIAST BP >= 90 MM HG: CPT | Performed by: INTERNAL MEDICINE

## 2025-06-30 RX ORDER — ISOSORBIDE MONONITRATE 30 MG/1
60 TABLET, EXTENDED RELEASE ORAL DAILY
Qty: 180 TABLET | Refills: 2 | Status: SHIPPED | OUTPATIENT
Start: 2025-06-30 | End: 2025-06-30

## 2025-06-30 RX ORDER — FUROSEMIDE 20 MG/1
20 TABLET ORAL DAILY
Qty: 90 TABLET | Refills: 1 | Status: SHIPPED | OUTPATIENT
Start: 2025-06-30

## 2025-06-30 RX ORDER — METOPROLOL SUCCINATE 50 MG/1
50 TABLET, EXTENDED RELEASE ORAL DAILY
Qty: 90 TABLET | Refills: 3 | Status: SHIPPED | OUTPATIENT
Start: 2025-06-30

## 2025-06-30 RX ORDER — ALBUTEROL SULFATE 90 UG/1
2 INHALANT RESPIRATORY (INHALATION) EVERY 6 HOURS PRN
Qty: 18 G | Refills: 0 | Status: SHIPPED | OUTPATIENT
Start: 2025-06-30

## 2025-06-30 RX ORDER — EZETIMIBE 10 MG/1
10 TABLET ORAL DAILY
Qty: 90 TABLET | Refills: 3 | Status: SHIPPED | OUTPATIENT
Start: 2025-06-30

## 2025-06-30 RX ORDER — DONEPEZIL HYDROCHLORIDE 10 MG/1
10 TABLET, FILM COATED ORAL AT BEDTIME
Qty: 90 TABLET | Refills: 3 | Status: SHIPPED | OUTPATIENT
Start: 2025-06-30

## 2025-06-30 RX ORDER — METOPROLOL SUCCINATE 25 MG/1
50 TABLET, EXTENDED RELEASE ORAL DAILY
Qty: 180 TABLET | Refills: 2 | Status: SHIPPED | OUTPATIENT
Start: 2025-06-30 | End: 2025-06-30

## 2025-06-30 RX ORDER — METOPROLOL SUCCINATE 25 MG/1
50 TABLET, EXTENDED RELEASE ORAL DAILY
Qty: 180 TABLET | Refills: 3 | Status: SHIPPED | OUTPATIENT
Start: 2025-06-30 | End: 2025-06-30

## 2025-06-30 RX ORDER — ISOSORBIDE MONONITRATE 30 MG/1
60 TABLET, EXTENDED RELEASE ORAL DAILY
Qty: 180 TABLET | Refills: 3 | Status: SHIPPED | OUTPATIENT
Start: 2025-06-30 | End: 2025-06-30

## 2025-06-30 NOTE — PROGRESS NOTES
[facility] HEART [facility]   [address]  [place] [address]   [email or web address]   OFFICE: [phone]     CARDIOLOGY CLINIC NOTE     Assessment/Recommendations   Assessment:    SOB:   - Echocardiogram showed elevated PASP; VQ scan negative.  Started him on Lasix, plan for repeat echo in 6 months to reassess hemodynamics  -optimize blood pressure.    - Stress test abnormal; s/p PCI    2. CAD: s/p recent PCI, c/w DAPT+AC x 3 months, then plavix + eliquis.     3. Afib: rate controlled w bb and on AC; c/w current regimen  HLD: LDL was 79, statin dose increased after that.  Follow-up labs- 72; add zetia.   Wheezing: I prescribed albuterol today  Pulmonary HTN: c/w lasix, BP control, repeat echo in 6 months       Initially seen 4/17/2025 by me, for shortness of breath.  Previously followed Dr. Velez.        73 yo M [facility]- HLD,  unprovoked bilat PE, afib (both dx 2017) on AC, john paul complaint w CPAP; stress test in dec 2023 for LOERA- reached 84% MPHR therefore had CT that showed mild-mod diffuse dz, moderately severe narrowing noted in a diagonal branch; maintained on medical therpay; recent Alzheimer's diagnosis.     Patient reported that around end of 2024 he started becoming more short of breath; he still goes to the gym and does treadmill elliptical and weights but finds himself pushing harder.  His wife is also noticed that he is short of breath when sitting down.  No orthopnea PND or edema.  His CT scan from 2024 (for lung nodules) is notable for emphysema, they are unaware of this finding. Remote h/o smoking, quit in 1990.     Wears fitbit, HR avgs 60s.       Further workup including VQ scan, stress test and echocardiogram were recommended.  Outlined below.  VQ scan is negative.    Started on low dose lasix.    Referred for corangio- 2VD s/p PCI.    Return for Follow-up today       Cardiac history:     NST:    The nuclear stress test is abnormal.    There is a medium sized area of  "mild ischemia in the mid to basal inferior and lateral segment(s) of the left ventricle.    Left ventricular function is normal, EF 57%.    The stress electrocardiogram is negative for inducible ischemic EKG changes.    Baseline hypertension, 171/93    There is no prior study for comparison.     24 hr holter 10/30/24:  abnormal 24-hour Holter monitor recording by virtue the presence of continuous atrial fibrillation.  The ventricular response appears to  be well-controlled.  There were no apparent symptoms referable to this arrhythmia.  The patient also has evidence of conduction disease manifest as an  IVCD.  There is no evidence of high degree AV block.          Echocardiogram5/29/25:  EF 55-60%. Mild LVH, elevated LVEDP moderate MR, mild TR, RVSP 45    Coronary Angiogram (reviewed)  - severe 2-vessel CAD s/p DESx2 to LAD and DESx1 to D1; normal L-sided filling pressures  - continue ASA 81mg daily indefinitely, ticagrelor 180mg once now, 90mg tonight. Clopidogrel 600mg once, followed by 75mg daily for at least 12 months. Would do triple therapy w/ ASA/clopidogrel/apixaban for 3 mos, then clopidogrel/apixaban indefinitely   - rosuva 40, low threshold for PCSK9 inhibitors  - continue aggressive risk factor modification      LVEDP:15            Physical Examination Review of Systems   Vitals: BP (!) 156/90 (BP Location: Right arm, Patient Position: Sitting, Cuff Size: Adult Large)   Pulse 53   Resp 16   Ht 1.905 m (6' 3\")   Wt 119.7 kg (264 lb)   BMI 33.00 kg/m    BMI= Body mass index is 33 kg/m .  Wt Readings from Last 3 Encounters:   06/30/25 119.7 kg (264 lb)   06/21/25 116.4 kg (256 lb 9.6 oz)   06/12/25 117 kg (258 lb)       General: pleasant male. No acute distress.   Neck: No JVD  Lungs: wheeze+   COR:  irregular rate and rhythm, No murmurs, rubs, or gallops  Extrem: No edema   Per HPI     Medications  Allergies   Current Outpatient Medications   Medication Sig Dispense Refill    apixaban ANTICOAGULANT " (ELIQUIS) 5 MG tablet Take 1 tablet (5 mg) by mouth 2 times daily. 180 tablet 1    aspirin 81 MG EC tablet Take 1 tablet (81 mg) by mouth daily. Start tomorrow. 30 tablet 3    clopidogrel (PLAVIX) 75 MG tablet Take 1 tablet (75 mg) by mouth daily. Dose to start tomorrow. 90 tablet 3    co-enzyme Q-10 100 MG CAPS capsule Take 100 mg by mouth daily.      cyanocobalamin 1000 MCG sublingual tablet Place 1,000 mcg under the tongue daily.      donepezil (ARICEPT) 10 MG tablet Take 1 tablet (10 mg) by mouth at bedtime. 90 tablet 3    furosemide (LASIX) 20 MG tablet Take 1 tablet (20 mg) by mouth daily. 90 tablet 1    isosorbide mononitrate (IMDUR) 30 MG 24 hr tablet Take 2 tablets (60 mg) by mouth daily. 180 tablet 2    metoprolol succinate ER (TOPROL XL) 25 MG 24 hr tablet Take 2 tablets (50 mg) by mouth daily. 180 tablet 2    MULTIVITAMIN (MULTIPLE VITAMIN ORAL) [MULTIVITAMIN (MULTIPLE VITAMIN ORAL)] Take 1 tablet by mouth daily.      rosuvastatin (CRESTOR) 40 MG tablet Take 1 tablet (40 mg) by mouth daily. 90 tablet 3    sertraline (ZOLOFT) 100 MG tablet Take 1 tablet (100 mg) by mouth daily. 30 tablet 1    vitamin D3 (CHOLECALCIFEROL) 50 mcg (2000 units) tablet Take 1 tablet by mouth daily.        No Known Allergies          Lab Results    Chemistry/lipid CBC Cardiac Enzymes/BNP/TSH/INR   Lab Results   Component Value Date    CHOL 138 06/20/2025    HDL 59 06/20/2025    TRIG 33 06/20/2025    BUN 14.9 06/21/2025     06/21/2025    CO2 26 06/21/2025    Lab Results   Component Value Date    WBC 5.7 06/17/2025    HGB 13.3 06/17/2025    HCT 39.4 (L) 06/17/2025    MCV 90 06/17/2025     06/17/2025    Lab Results   Component Value Date    TSH 1.76 01/14/2025    INR 1.6 (H) 05/20/2025          The longitudinal plan of care for the diagnosis(es)/condition(s) as documented were addressed during this visit. Due to the added complexity in care, I will continue to support Ryan in the subsequent management and with ongoing  continuity of care.          Reji Gordon MD, MPH  Non-invasive Cardiologist  [facility]

## 2025-06-30 NOTE — LETTER
6/30/2025    Mamadou Baez MD  [address]  [facility] [address]    RE: Prem Taylor       Dear Colleague,     I had the pleasure of seeing Prem Taylor in the [facility].    [facility]   [address]  [place] [address]   [email or web address]   OFFICE: [phone]     CARDIOLOGY CLINIC NOTE     Assessment/Recommendations   Assessment:    SOB:   - Echocardiogram showed elevated PASP; VQ scan negative.  Started him on Lasix, plan for repeat echo in 6 months to reassess hemodynamics  -optimize blood pressure.    - Stress test abnormal; s/p PCI    2. CAD: s/p recent PCI, c/w DAPT+AC x 3 months, then plavix + eliquis.     3. Afib: rate controlled w bb and on AC; c/w current regimen  HLD: LDL was 79, statin dose increased after that.  Follow-up labs- 72; add zetia.   Wheezing: I prescribed albuterol today  Pulmonary HTN: c/w lasix, BP control, repeat echo in 6 months       Initially seen 4/17/2025 by me, for shortness of breath.  Previously followed Dr. Velez.        73 yo M Pmh- HLD,  unprovoked bilat PE, afib (both dx 2017) on AC, john paul complaint w CPAP; stress test in dec 2023 for LOERA- reached 84% MPHR therefore had CT that showed mild-mod diffuse dz, moderately severe narrowing noted in a diagonal branch; maintained on medical therpay; recent Alzheimer's diagnosis.     Patient reported that around end of 2024 he started becoming more short of breath; he still goes to the gym and does treadmill elliptical and weights but finds himself pushing harder.  His wife is also noticed that he is short of breath when sitting down.  No orthopnea PND or edema.  His CT scan from 2024 (for lung nodules) is notable for emphysema, they are unaware of this finding. Remote h/o smoking, quit in 1990.     Wears fitbit, HR avgs 60s.       Further workup including VQ scan, stress test and echocardiogram were recommended.  Outlined below.  VQ scan is  "negative.    Started on low dose lasix.    Referred for corangio- 2VD s/p PCI.    Return for Follow-up today       Cardiac history:     NST:     The nuclear stress test is abnormal.     There is a medium sized area of mild ischemia in the mid to basal inferior and lateral segment(s) of the left ventricle.     Left ventricular function is normal, EF 57%.     The stress electrocardiogram is negative for inducible ischemic EKG changes.     Baseline hypertension, 171/93     There is no prior study for comparison.     24 hr holter 10/30/24:  abnormal 24-hour Holter monitor recording by virtue the presence of continuous atrial fibrillation.  The ventricular response appears to  be well-controlled.  There were no apparent symptoms referable to this arrhythmia.  The patient also has evidence of conduction disease manifest as an  IVCD.  There is no evidence of high degree AV block.          Echocardiogram5/29/25:  EF 55-60%. Mild LVH, elevated LVEDP moderate MR, mild TR, RVSP 45    Coronary Angiogram (reviewed)  - severe 2-vessel CAD s/p DESx2 to LAD and DESx1 to D1; normal L-sided filling pressures  - continue ASA 81mg daily indefinitely, ticagrelor 180mg once now, 90mg tonight. Clopidogrel 600mg once, followed by 75mg daily for at least 12 months. Would do triple therapy w/ ASA/clopidogrel/apixaban for 3 mos, then clopidogrel/apixaban indefinitely   - rosuva 40, low threshold for PCSK9 inhibitors  - continue aggressive risk factor modification      LVEDP:15            Physical Examination Review of Systems   Vitals: BP (!) 156/90 (BP Location: Right arm, Patient Position: Sitting, Cuff Size: Adult Large)   Pulse 53   Resp 16   Ht 1.905 m (6' 3\")   Wt 119.7 kg (264 lb)   BMI 33.00 kg/m    BMI= Body mass index is 33 kg/m .  Wt Readings from Last 3 Encounters:   06/30/25 119.7 kg (264 lb)   06/21/25 116.4 kg (256 lb 9.6 oz)   06/12/25 117 kg (258 lb)       General: pleasant male. No acute distress.   Neck: No JVD  Lungs: " wheeze+   COR:  irregular rate and rhythm, No murmurs, rubs, or gallops  Extrem: No edema   Per HPI     Medications  Allergies   Current Outpatient Medications   Medication Sig Dispense Refill     apixaban ANTICOAGULANT (ELIQUIS) 5 MG tablet Take 1 tablet (5 mg) by mouth 2 times daily. 180 tablet 1     aspirin 81 MG EC tablet Take 1 tablet (81 mg) by mouth daily. Start tomorrow. 30 tablet 3     clopidogrel (PLAVIX) 75 MG tablet Take 1 tablet (75 mg) by mouth daily. Dose to start tomorrow. 90 tablet 3     co-enzyme Q-10 100 MG CAPS capsule Take 100 mg by mouth daily.       cyanocobalamin 1000 MCG sublingual tablet Place 1,000 mcg under the tongue daily.       donepezil (ARICEPT) 10 MG tablet Take 1 tablet (10 mg) by mouth at bedtime. 90 tablet 3     furosemide (LASIX) 20 MG tablet Take 1 tablet (20 mg) by mouth daily. 90 tablet 1     isosorbide mononitrate (IMDUR) 30 MG 24 hr tablet Take 2 tablets (60 mg) by mouth daily. 180 tablet 2     metoprolol succinate ER (TOPROL XL) 25 MG 24 hr tablet Take 2 tablets (50 mg) by mouth daily. 180 tablet 2     MULTIVITAMIN (MULTIPLE VITAMIN ORAL) [MULTIVITAMIN (MULTIPLE VITAMIN ORAL)] Take 1 tablet by mouth daily.       rosuvastatin (CRESTOR) 40 MG tablet Take 1 tablet (40 mg) by mouth daily. 90 tablet 3     sertraline (ZOLOFT) 100 MG tablet Take 1 tablet (100 mg) by mouth daily. 30 tablet 1     vitamin D3 (CHOLECALCIFEROL) 50 mcg (2000 units) tablet Take 1 tablet by mouth daily.        No Known Allergies          Lab Results    Chemistry/lipid CBC Cardiac Enzymes/BNP/TSH/INR   Lab Results   Component Value Date    CHOL 138 06/20/2025    HDL 59 06/20/2025    TRIG 33 06/20/2025    BUN 14.9 06/21/2025     06/21/2025    CO2 26 06/21/2025    Lab Results   Component Value Date    WBC 5.7 06/17/2025    HGB 13.3 06/17/2025    HCT 39.4 (L) 06/17/2025    MCV 90 06/17/2025     06/17/2025    Lab Results   Component Value Date    TSH 1.76 01/14/2025    INR 1.6 (H) 05/20/2025           The longitudinal plan of care for the diagnosis(es)/condition(s) as documented were addressed during this visit. Due to the added complexity in care, I will continue to support Ryan in the subsequent management and with ongoing continuity of care.          Reji Gordon MD, MPH  Non-invasive Cardiologist  [facility]         Thank you for allowing me to participate in the care of your patient.      Sincerely,     eRji HAMILTON [facility]  cc:   Pascual Hess MD  [address] [address]

## 2025-06-30 NOTE — PATIENT INSTRUCTIONS
Starting 9/1/25 stop aspirin and only take plavix and eliquis.    Start checking Bp at home and follow-up with PCP.    Take the albuterol inhaler before exercise, also when wheezing.      Note changes in medications.    See me in 6 months    Schedule echo before that

## 2025-07-07 ENCOUNTER — MYC REFILL (OUTPATIENT)
Dept: PSYCHIATRY | Facility: CLINIC | Age: 75
End: 2025-07-07
Payer: COMMERCIAL

## 2025-07-07 DIAGNOSIS — F41.1 GENERALIZED ANXIETY DISORDER: ICD-10-CM

## 2025-07-07 NOTE — TELEPHONE ENCOUNTER
1) Reviewed refill request(s) from      [facility] - [address]     2) Any Controlled Substance(s)? No    3) Refill(s) requested for:     - sertraline (ZOLOFT) 100 MG tablet  Date last ordered: 06/12/2025 Qty: 30 Refills: 1   refill has been requested too soon and pharmacy should have refill(s) on file          4) Action taken: routed encounter back to RN Pool for review; not within LPN/MA Scope of Practice to eliecer.    Marcia Guillaume MA on 7/7/2025 at 3:02 PM

## 2025-07-08 ENCOUNTER — MYC MEDICAL ADVICE (OUTPATIENT)
Dept: CARDIOLOGY | Facility: CLINIC | Age: 75
End: 2025-07-08
Payer: COMMERCIAL

## 2025-07-08 RX ORDER — SERTRALINE HYDROCHLORIDE 100 MG/1
100 TABLET, FILM COATED ORAL DAILY
OUTPATIENT
Start: 2025-07-08

## 2025-07-08 NOTE — TELEPHONE ENCOUNTER
Reviewed refill request for Zoloft 100mg  Reviewed documentation by MA.     This refill request will be denied for the following reasons:    pharmacy should have refill(s) on file      Additional action taken? refill request(s) sent back to pharmacy as DENIED.      JACQUELINE GRIFFIN RN on 7/8/2025 at 10:35 AM

## 2025-07-09 ENCOUNTER — MYC REFILL (OUTPATIENT)
Dept: FAMILY MEDICINE | Facility: CLINIC | Age: 75
End: 2025-07-09
Payer: COMMERCIAL

## 2025-07-09 ENCOUNTER — MYC REFILL (OUTPATIENT)
Dept: ANTICOAGULATION | Facility: CLINIC | Age: 75
End: 2025-07-09
Payer: COMMERCIAL

## 2025-07-09 DIAGNOSIS — R94.39 ABNORMAL STRESS TEST: ICD-10-CM

## 2025-07-09 DIAGNOSIS — Z86.711 HISTORY OF PULMONARY EMBOLISM: ICD-10-CM

## 2025-07-09 DIAGNOSIS — R94.31 ABNORMAL ELECTROCARDIOGRAM: ICD-10-CM

## 2025-07-09 DIAGNOSIS — E53.8 VITAMIN B12 DEFICIENCY (NON ANEMIC): ICD-10-CM

## 2025-07-09 DIAGNOSIS — I25.10 CORONARY ARTERY DISEASE DUE TO LIPID RICH PLAQUE: ICD-10-CM

## 2025-07-09 DIAGNOSIS — I48.19 PERSISTENT ATRIAL FIBRILLATION (H): ICD-10-CM

## 2025-07-09 DIAGNOSIS — I48.21 PERMANENT ATRIAL FIBRILLATION (H): ICD-10-CM

## 2025-07-09 DIAGNOSIS — E55.9 VITAMIN D INSUFFICIENCY: Primary | ICD-10-CM

## 2025-07-09 DIAGNOSIS — R41.3 MEMORY DIFFICULTIES: ICD-10-CM

## 2025-07-09 DIAGNOSIS — E78.00 PURE HYPERCHOLESTEROLEMIA: ICD-10-CM

## 2025-07-09 DIAGNOSIS — G47.33 OSA ON CPAP: ICD-10-CM

## 2025-07-09 DIAGNOSIS — I25.83 CORONARY ARTERY DISEASE DUE TO LIPID RICH PLAQUE: ICD-10-CM

## 2025-07-09 RX ORDER — CLOPIDOGREL BISULFATE 75 MG/1
75 TABLET ORAL DAILY
Qty: 90 TABLET | Refills: 3 | OUTPATIENT
Start: 2025-07-09

## 2025-07-09 RX ORDER — ROSUVASTATIN CALCIUM 40 MG/1
40 TABLET, COATED ORAL DAILY
Qty: 90 TABLET | Refills: 3 | Status: SHIPPED | OUTPATIENT
Start: 2025-07-09

## 2025-07-09 RX ORDER — ASPIRIN 81 MG/1
81 TABLET ORAL DAILY
Qty: 30 TABLET | Refills: 3 | Status: SHIPPED | OUTPATIENT
Start: 2025-07-09

## 2025-07-09 RX ORDER — DONEPEZIL HYDROCHLORIDE 10 MG/1
10 TABLET, FILM COATED ORAL AT BEDTIME
Qty: 90 TABLET | Refills: 2 | Status: SHIPPED | OUTPATIENT
Start: 2025-07-09

## 2025-07-10 ENCOUNTER — OFFICE VISIT (OUTPATIENT)
Dept: AUDIOLOGY | Facility: CLINIC | Age: 75
End: 2025-07-10
Payer: COMMERCIAL

## 2025-07-10 DIAGNOSIS — H90.3 SENSORINEURAL HEARING LOSS (SNHL) OF BOTH EARS: Primary | ICD-10-CM

## 2025-07-10 NOTE — TELEPHONE ENCOUNTER
RN called Ryan on 7/10 and left a message to return call to clinic.      TC if patient returns call please verify the dose and frequency of each medication requested, they are listed as historical and if Dr. Baez is to place orders we need to be sure we have the accurate information.    Leonard Ferrer RN  [facility]

## 2025-07-10 NOTE — PATIENT INSTRUCTIONS

## 2025-07-10 NOTE — PROGRESS NOTES
"AUDIOLOGY REPORT    SUBJECTIVE: Prem Taylor, a 74 year old male, was seen in the Audiology Clinic at [facility] today for a Binaural hearing aid fitting. Previous results have revealed a bilateral mild to moderate-severe sensorineural hearing loss. He was accompanied to today's appointment by his wife, Ursula.    OBJECTIVE:  Prior to fitting, a hearing aid check was performed to ensure device functionality. The hearing aid conformity evaluation was completed.The hearing aids were placed and they provided a good fit. Real-ear-probe-microphone measurements were completed on the Safend system and were a good match to NAL-NL2 target with soft sounds audible, moderate sounds comfortable, and loud sounds below discomfort. UCLs are verified through maximum power output measures and demonstrate appropriate limiting of loud inputs. Mr. Taylor was oriented to proper hearing aid use, care, cleaning (no water, dry brush), use of , aid insertion/removal, user booklet, warranty information, storage cases, and other hearing aid details. The patient confirmed understanding of hearing aid use and care, and showed proper insertion of hearing aid and batteries while in the office today. Mr. Taylor reported good volume and sound quality today. The patient's phone was paired to the devices via Bluetooth today; audio streaming was successfully practiced in office today.    EAR(S) FIT: Binaural  MA HEARING AID MAKE: Right: Phonak; Left: Phonak    MA HEARING AID MODEL #: Right: Audeo I50-R; Left: Audeo I50-R  HEARING AID STYLE: Right: ALICE; Left: ALICE  DOME SIZE: Right:  medium open; Left::  medium open   LENGTH: Right:  2 S 6.0; Left:  2 S 6.0  SERIAL NUMBERS: Right: [id number]; Left: [id number]  WARRANTY END DATE: Right: 7/22/2028; Left:: 7/22/2028      The patient signed a Notice of Non-Covered Service form, which is available under the \"Media\" tab with a scan date of 6-12-25.       ASSESSMENT: " Binaural hearing aid fitting completed today. Verification measures were performed. The 45 day trial period was explained to patient, and they expressed understanding. Mr. Taylor signed the Hearing Aid Purchase Agreement and was given a copy, as well as details on his hearing aids. Patient was counseled that exact out of pocket amounts cannot be determined for hearing aid claims being sent to insurance. Any insurance coverage information presented to the patient is an estimate only, and is not a guarantee of payment. Patient has been advised to check with their own insurance.    PLAN: Mr. Taylor will return for follow-up 7-31-25 for a hearing aid review appointment. He expressed verbal understanding of this information and plan. Please call this clinic with questions regarding today s appointment.    Gudelia Castellano, [facility]  [place] Licensed Audiologist [phone]

## 2025-07-14 RX ORDER — MULTIVITAMIN
1 TABLET ORAL DAILY
Qty: 100 TABLET | Refills: 3 | Status: SHIPPED | OUTPATIENT
Start: 2025-07-14

## 2025-07-14 RX ORDER — UBIDECARENONE 100 MG
100 CAPSULE ORAL DAILY
Qty: 100 CAPSULE | Refills: 3 | Status: SHIPPED | OUTPATIENT
Start: 2025-07-14

## 2025-07-14 RX ORDER — CHOLECALCIFEROL (VITAMIN D3) 50 MCG
1 TABLET ORAL DAILY
Qty: 100 TABLET | Refills: 2 | Status: SHIPPED | OUTPATIENT
Start: 2025-07-14

## 2025-07-14 RX ORDER — MAGNESIUM 200 MG
1000 TABLET ORAL DAILY
Qty: 100 TABLET | Refills: 3 | Status: SHIPPED | OUTPATIENT
Start: 2025-07-14

## 2025-07-14 NOTE — TELEPHONE ENCOUNTER
Writer called ju to verify how he is taking his requested refill medication.     Patient confirms he is takin multivitamin daily  100 mg co-enzyme Q 1 tablet daily   2000 unit vit D3 - 1 tablet daily   1000 mcg Vit B12 sublingual - 1 tab daily    FAISAL SuarezN, RN  [facility]

## 2025-07-15 ENCOUNTER — NURSE TRIAGE (OUTPATIENT)
Dept: FAMILY MEDICINE | Facility: CLINIC | Age: 75
End: 2025-07-15
Payer: COMMERCIAL

## 2025-07-15 NOTE — TELEPHONE ENCOUNTER
Secure chat sent to PCP to notify of triage. PCP reports he will review and respond now.    Ora Giles RN  [facility]

## 2025-07-15 NOTE — TELEPHONE ENCOUNTER
Nurse Triage SBAR    Is this a 2nd Level Triage? NO, but LICENSED PRACTITIONER REVIEW IS REQUIRED    Situation: Patient and wife calling into clinic to report elevated blood pressure. Wife states is returning a call from the patient's cardiologist Dr. Gordon to update on patient's blood pressure    Background:   Endorses history of hypertension  Taking Metoprolol, dose was increased on Sunday to 50mg    Hx: hypercholesterolemia, sinus bradycardia, overweight, permanent afib, history of pulmonary embolism, mitral valve insufficiency, KALPANA, percutaneous transluminal coronary angioplasty status    Assessment:   This morning at 1030am blood pressure was 164/109, 182/109  Yesterday evening around 7pm readings were similar.  Rechecked BP at time of call 172/93, recheck 5 minutes later 158/85  Using automatic arm cuff at home  Takes metoprolol in the evening, so has not yet taken a dose today  Denies blurred vision or double vision, chest pain, difficulty breathing outside baseline, headache, weakness or numbness that is new    Protocol Recommended Disposition:   See in Office Within 3 Days    Recommendation: Recommended patient be scheduled for an office visit within 3 days. Patient and wife endorse understanding and agree with plan. Scheduled for office visit on 7/17 with Bronwyn Gonzalez NP. Advised with any headache, vision changes, difficulty talking or walking, chest pain or difficulty breathing to seek emergency evaluation. Advised to continue to monitor blood pressure at home and keep a diary of blood pressure readings. Provided care advice.    Routing the above information to PCP and cardiology to review and advise if timing of appointment is appropriate.    Routed to provider    Does the patient meet one of the following criteria for ADS visit consideration? 16+ years old, with an [facility] PCP     Ora Giles RN  [facility]      TIP  Providers, please consider if this condition is appropriate  "for management at one of our Acute and Diagnostic Services sites.     If patient is a good candidate, please use dotphrase <dot>triageresponse and select Refer to ADS to document.     Reason for Disposition   Systolic BP >= 160 OR Diastolic >= 100    Additional Information   Negative: Sounds like a life-threatening emergency to the triager   Negative: Symptom is main concern (e.g., headache, chest pain)   Negative: Low blood pressure is main concern   Negative: Systolic BP >= 160 OR Diastolic >= 100, and any cardiac (e.g., breathing difficulty, chest pain) or neurologic symptoms (e.g., new-onset blurred or double vision)   Negative: Pregnant 20 or more weeks (or postpartum < 6 weeks) with new hand or face swelling   Negative: Pregnant 20 or more weeks (or postpartum < 6 weeks) and Systolic BP >= 160 OR Diastolic >= 110   Negative: Patient sounds very sick or weak to the triager   Negative: Systolic BP >= 200 OR Diastolic >= 120 and having NO cardiac or neurologic symptoms   Negative: Pregnant 20 or more weeks (or postpartum < 6 weeks) with Systolic BP >= 140 OR Diastolic >= 90   Negative: Systolic BP >= 180 OR Diastolic >= 110, and missed most recent dose of blood pressure medication   Negative: Systolic BP >= 180 OR Diastolic >= 110   Negative: Patient wants to be seen   Negative: Ran out of BP medications   Negative: Taking BP medications and feels is having side effects (e.g., impotence, cough, dizziness)    Answer Assessment - Initial Assessment Questions  1. BLOOD PRESSURE: \"What is your blood pressure?\" \"Did you take at least two measurements 5 minutes apart?\"      This morning at 1030am blood pressure was 164/109, 182/109  Yesterday evening around 7pm readings were similar.  Rechecked BP at time of call 172/93, recheck 5 minutes later 158/85  2. ONSET: \"When did you take your blood pressure?\"      Last evening, this morning and again at time of call  3. HOW: \"How did you take your blood pressure?\" (e.g., " "automatic home BP monitor, visiting nurse)      Automatic arm cuff at home  4. HISTORY: \"Do you have a history of high blood pressure?\"      Endorses history of hypertension  5. MEDICINES: \"Are you taking any medicines for blood pressure?\" \"Have you missed any doses recently?\"      Taking metoprolol, was just increased on Sunday on 50mg  Takes in the evening, so has not yet taken a dose today  6. OTHER SYMPTOMS: \"Do you have any symptoms?\" (e.g., blurred vision, chest pain, difficulty breathing, headache, weakness)      Denies blurred vision or double vision, chest pain, difficulty breathing outside baseline, headache, weakness or numbness that is new  7. PREGNANCY: \"Is there any chance you are pregnant?\" \"When was your last menstrual period?\"      NA    Protocols used: Blood Pressure - High-A-OH    "

## 2025-07-15 NOTE — TELEPHONE ENCOUNTER
Recent cold symptoms.  COVID negative.  Not on decongestants.  No headache, etc.  Okay for scheduled appointment July 18, 2025 with Bronwyn Gonzalez CNP regarding blood pressure control.  Spoke with patient and wife by phone.  They would be happy to get on my schedule Thursday, July 17, 2025 at 10:50 AM with arrival at 10:30 AM.  It is now scheduled.  Patient appointment with Bronwyn Gonzalez CNP was canceled for that day.

## 2025-07-15 NOTE — TELEPHONE ENCOUNTER
[place] Community Measures Blood Pressure [place] reviewed.  Patients recent blood pressure is outside of guideline parameters.      Called pt to review, no answer.  Left voicemail message asking patient to check their blood pressure using a home blood pressure cuff or by going to a [facility].      Patient instructed to then call [phone] ([facility]) and leave a message with their name, date of birth, and blood pressure reading that was completed within the last 24 hours and where it was completed.      Will await call back for further review. Thanks     VERA Archibald

## 2025-07-16 ASSESSMENT — PATIENT HEALTH QUESTIONNAIRE - PHQ9
SUM OF ALL RESPONSES TO PHQ QUESTIONS 1-9: 2
SUM OF ALL RESPONSES TO PHQ QUESTIONS 1-9: 2
10. IF YOU CHECKED OFF ANY PROBLEMS, HOW DIFFICULT HAVE THESE PROBLEMS MADE IT FOR YOU TO DO YOUR WORK, TAKE CARE OF THINGS AT HOME, OR GET ALONG WITH OTHER PEOPLE: NOT DIFFICULT AT ALL

## 2025-07-17 ENCOUNTER — OFFICE VISIT (OUTPATIENT)
Dept: FAMILY MEDICINE | Facility: CLINIC | Age: 75
End: 2025-07-17
Payer: COMMERCIAL

## 2025-07-17 VITALS
DIASTOLIC BLOOD PRESSURE: 78 MMHG | TEMPERATURE: 98.2 F | WEIGHT: 260 LBS | HEART RATE: 63 BPM | HEIGHT: 75 IN | RESPIRATION RATE: 17 BRPM | SYSTOLIC BLOOD PRESSURE: 122 MMHG | OXYGEN SATURATION: 96 % | BODY MASS INDEX: 32.33 KG/M2

## 2025-07-17 DIAGNOSIS — F41.1 GENERALIZED ANXIETY DISORDER: ICD-10-CM

## 2025-07-17 DIAGNOSIS — R03.0 ELEVATED BLOOD PRESSURE READING WITHOUT DIAGNOSIS OF HYPERTENSION: Primary | ICD-10-CM

## 2025-07-17 DIAGNOSIS — E78.00 PURE HYPERCHOLESTEROLEMIA: ICD-10-CM

## 2025-07-17 DIAGNOSIS — I25.10 CORONARY ARTERY DISEASE INVOLVING NATIVE CORONARY ARTERY OF NATIVE HEART, UNSPECIFIED WHETHER ANGINA PRESENT: ICD-10-CM

## 2025-07-17 DIAGNOSIS — L60.0 INGROWN NAIL: ICD-10-CM

## 2025-07-17 DIAGNOSIS — I48.19 PERSISTENT ATRIAL FIBRILLATION (H): ICD-10-CM

## 2025-07-17 ASSESSMENT — PAIN SCALES - GENERAL: PAINLEVEL_OUTOF10: NO PAIN (0)

## 2025-07-17 NOTE — TELEPHONE ENCOUNTER
Patient's wife returned call and left voicemail message with update blood pressure reading.      Blood Pressure: 136/90 (L), 148/86 (R)  Date: 07/16/25  Location: Home BP    Today's Blood Pressure: 122/78  Today's Heart Rate: 63  Location: PCP    Patient reported blood pressure updated in Epic. Blood pressure falls within [place] Community Measures guidelines.  Patient will follow up as previously advised.

## 2025-07-17 NOTE — PROGRESS NOTES
"Assessment/Plan:    Blood Pressure Isolated Elevated  Elevated blood pressures reviewed.  Home monitor with blood pressures as high as 164/109 however subsequent blood pressure improvements noted and today in office 136/76 followed by 132/72 from left arm with home blood pressure machine showing 124/79 currently.  Will continue metoprolol succinate 50 mg daily.  Reassess at follow-up wellness visit in 3 months.    Coronary artery disease involving native coronary artery of native heart, unspecified whether angina present  CAD, stable.    Persistent atrial fibrillation (H)  Metoprolol succinate 50 mg daily.  Eliquis 5 mg twice daily.    Hypercholesterolemia  Continue statin therapy for cholesterol management with known history of CAD.  He is on rosuvastatin 40 mg daily and Zetia 10 mg daily.    Generalized anxiety disorder  Benefits of sertraline 100 mg daily.    Ingrown nail  Right foot second toe ingrown nail, mild.  Dr. Gannon's corn pad or padding between great toe and second toe in order to prevent direct pressure over this area reviewed.    The longitudinal plan of care for the diagnosis(es)/condition(s) as documented were addressed during this visit. Due to the added complexity in care, I will continue to support Ryan in the subsequent management and with ongoing continuity of care.            Subjective:    Prem Taylor is seen today for follow-up assessment of elevated blood pressure noted at home on home blood pressure machine showing 164/109 and subsequently 158/85.  Otherwise asymptomatic without headache.  Persistent atrial fibrillation is on Eliquis 5 mg twice daily with metoprolol succinate 50 mg daily for rate control.  Zetia 10 mg daily and rosuvastatin 40 mg daily for cholesterol management.  Sertraline 100 mg daily for anxiety with benefit.  Memory loss concerns with ongoing evaluation and monitoring.  Comprehensive review of systems as above otherwise all negative.       \"Aubree\" x 52 " "years   2 daughters - Renee (46) and Susannah (43)   3 grandchildren   No smoke (quit  after 1 and 1/2 ppd)   EtOH: weekends \"few beers\"   Surgeries: anterior cervical fusion; inguinal hernia; tonsils; right eye cataract 3/4/20 and left eye cataract 3/11/20; \"right eye vitrectomy scheduled for 20 due to floaters\"   Hospitalizations: [facility] 10/9/17-10/10/17 bilateral pulmonary emboli with a. 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 LAB CV    CV LEFT HEART CATH N/A 2025    Procedure: Left Heart Catheterization;  Surgeon: Pascual Hess MD;  Location: [facility]    CV PCI N/A 2025    Procedure: Percutaneous Coronary Intervention stent;  Surgeon: Pascual Hess MD;  Location: [facility] LAB CV    HERNIA REPAIR      CO LAP,INGUINAL HERNIA REPR,INITIAL Left 8/15/2019    Procedure: HERNIORRHAPHY, INGUINAL, LAPAROSCOPIC;  Surgeon: Yan Leiva MD;  Location: [facility];  Service: General    TONSILLECTOMY      childhood        Family History   Problem Relation Age of Onset    Heart Disease Mother     Hypertension Mother     Dementia Mother     Coronary Artery Disease Father     LUNG DISEASE No family hx of         Past Medical History:   Diagnosis Date    Abnormal ECG     Anemia     Anxiety     Arrhythmia     Atrial fibrillation (H) 10/09/2017    Cancer (H)     CPAP (continuous positive airway pressure) " dependence 10/2019    per patient    Depression     Heart valve disease 2017    History of blood clots     Hyperlipidemia 2006    Pulmonary embolism, blood-clot, obstetric 10/09/2017    Sleep apnea 2017    Uses CPAP        Social History     Tobacco Use    Smoking status: Former     Current packs/day: 0.00     Average packs/day: 1.5 packs/day for 22.9 years (34.3 ttl pk-yrs)     Types: Cigarettes     Start date:      Quit date: 1990     Years since quittin.6    Smokeless tobacco: Never   Vaping Use    Vaping status: Never Used   Substance Use Topics    Alcohol use: Yes     Comment: one drink a day    Drug use: No        Current Outpatient Medications   Medication Sig Dispense Refill    albuterol (PROAIR HFA/PROVENTIL HFA/VENTOLIN HFA) 108 (90 Base) MCG/ACT inhaler Inhale 2 puffs into the lungs every 6 hours as needed for shortness of breath, wheezing or cough. 18 g 0    apixaban ANTICOAGULANT (ELIQUIS) 5 MG tablet Take 1 tablet (5 mg) by mouth 2 times daily. 180 tablet 1    aspirin 81 MG EC tablet Take 1 tablet (81 mg) by mouth daily. Start tomorrow. 30 tablet 3    clopidogrel (PLAVIX) 75 MG tablet Take 1 tablet (75 mg) by mouth daily. Dose to start tomorrow. 90 tablet 3    co-enzyme Q-10 100 MG CAPS capsule Take 1 capsule (100 mg) by mouth daily. 100 capsule 3    cyanocobalamin 1000 MCG sublingual tablet Place 1 tablet (1,000 mcg) under the tongue daily. 100 tablet 3    donepezil (ARICEPT) 10 MG tablet Take 1 tablet (10 mg) by mouth at bedtime. 90 tablet 2    ezetimibe (ZETIA) 10 MG tablet Take 1 tablet (10 mg) by mouth daily. 90 tablet 3    furosemide (LASIX) 20 MG tablet Take 1 tablet (20 mg) by mouth daily. 90 tablet 1    metoprolol succinate ER (TOPROL XL) 50 MG 24 hr tablet Take 1 tablet (50 mg) by mouth daily. 90 tablet 3    Multiple vitamin TABS Take 1 tablet by mouth daily. 100 tablet 3    rosuvastatin (CRESTOR) 40 MG tablet Take 1 tablet (40 mg) by mouth daily. 90 tablet 3    sertraline  "(ZOLOFT) 100 MG tablet Take 1 tablet (100 mg) by mouth daily. 30 tablet 5    vitamin D3 (CHOLECALCIFEROL) 50 mcg (2000 units) tablet Take 1 tablet (50 mcg) by mouth daily. 100 tablet 2          Objective:    Vitals:    07/17/25 1043   BP: 122/78   Pulse: 63   Resp: 17   Temp: 98.2  F (36.8  C)   SpO2: 96%   Weight: 117.9 kg (260 lb)   Height: 1.905 m (6' 3\")      Body mass index is 32.5 kg/m .    Alert.  No apparent distress.  Cardiac exam irregular, rate controlled.  Chest clear.  Extremities warm and dry.      This note has been dictated using voice recognition software and as a result may contain minor grammatical errors and unintended word substitutions.         Answers submitted by the patient for this visit:  Lipid Visit (Submitted on 7/16/2025)  Chief Complaint: Chronic problems general questions HPI Form  Are you regularly taking any medication or supplement to lower your cholesterol?: Yes  Are you having muscle aches or other side effects that you think could be caused by your cholesterol lowering medication?: No  Patient Health Questionnaire (Submitted on 7/16/2025)  If you checked off any problems, how difficult have these problems made it for you to do your work, take care of things at home, or get along with other people?: Not difficult at all  PHQ9 TOTAL SCORE: 2  Hypertension Visit (Submitted on 7/16/2025)  Chief Complaint: Chronic problems general questions HPI Form  Do you check your blood pressure regularly outside of the clinic?: Yes  Are your blood pressures ever more than 140 on the top number (systolic) OR more than 90 on the bottom number (diastolic)? (For example, greater than 140/90): Yes  Are you following a low salt diet?: Yes  General Questionnaire (Submitted on 7/16/2025)  Chief Complaint: Chronic problems general questions HPI Form  How many servings of fruits and vegetables do you eat daily?: 0-1  On average, how many sweetened beverages do you drink each day (Examples: soda, juice, " sweet tea, etc.  Do NOT count diet or artificially sweetened beverages)?: 1  How many minutes a day do you exercise enough to make your heart beat faster?: 60 or more  How many days a week do you exercise enough to make your heart beat faster?: 7  How many days per week do you miss taking your medication?: 0  Questionnaire about: Chronic problems general questions HPI Form (Submitted on 7/16/2025)  Chief Complaint: Chronic problems general questions HPI Form

## 2025-07-18 ENCOUNTER — HOSPITAL ENCOUNTER (OUTPATIENT)
Dept: CARDIAC REHAB | Facility: CLINIC | Age: 75
Discharge: HOME | End: 2025-07-18
Attending: INTERNAL MEDICINE
Payer: COMMERCIAL

## 2025-07-18 PROCEDURE — 93797 PHYS/QHP OP CAR RHAB WO ECG: CPT

## 2025-07-18 PROCEDURE — 93798 PHYS/QHP OP CAR RHAB W/ECG: CPT

## 2025-07-22 ENCOUNTER — HOSPITAL ENCOUNTER (OUTPATIENT)
Dept: CARDIAC REHAB | Facility: CLINIC | Age: 75
Discharge: HOME | End: 2025-07-22
Attending: INTERNAL MEDICINE
Payer: COMMERCIAL

## 2025-07-22 PROCEDURE — 93798 PHYS/QHP OP CAR RHAB W/ECG: CPT

## 2025-07-23 ENCOUNTER — HOSPITAL ENCOUNTER (OUTPATIENT)
Dept: CT IMAGING | Facility: CLINIC | Age: 75
Discharge: HOME | End: 2025-07-23
Attending: INTERNAL MEDICINE
Payer: COMMERCIAL

## 2025-07-23 DIAGNOSIS — R91.8 PULMONARY NODULES: ICD-10-CM

## 2025-07-23 PROCEDURE — 71250 CT THORAX DX C-: CPT

## 2025-07-23 ASSESSMENT — SLEEP AND FATIGUE QUESTIONNAIRES
HOW LIKELY ARE YOU TO NOD OFF OR FALL ASLEEP WHILE WATCHING TV: SLIGHT CHANCE OF DOZING
HOW LIKELY ARE YOU TO NOD OFF OR FALL ASLEEP WHILE SITTING AND READING: HIGH CHANCE OF DOZING
HOW LIKELY ARE YOU TO NOD OFF OR FALL ASLEEP WHILE SITTING AND TALKING TO SOMEONE: SLIGHT CHANCE OF DOZING
HOW LIKELY ARE YOU TO NOD OFF OR FALL ASLEEP IN A CAR, WHILE STOPPED FOR A FEW MINUTES IN TRAFFIC: WOULD NEVER DOZE
HOW LIKELY ARE YOU TO NOD OFF OR FALL ASLEEP WHILE SITTING INACTIVE IN A PUBLIC PLACE: WOULD NEVER DOZE
HOW LIKELY ARE YOU TO NOD OFF OR FALL ASLEEP WHILE LYING DOWN TO REST IN THE AFTERNOON WHEN CIRCUMSTANCES PERMIT: HIGH CHANCE OF DOZING
HOW LIKELY ARE YOU TO NOD OFF OR FALL ASLEEP WHEN YOU ARE A PASSENGER IN A CAR FOR AN HOUR WITHOUT A BREAK: SLIGHT CHANCE OF DOZING

## 2025-07-24 ENCOUNTER — HOSPITAL ENCOUNTER (OUTPATIENT)
Dept: CARDIAC REHAB | Facility: CLINIC | Age: 75
End: 2025-07-24
Attending: INTERNAL MEDICINE
Payer: COMMERCIAL

## 2025-07-24 ENCOUNTER — VIRTUAL VISIT (OUTPATIENT)
Dept: PSYCHIATRY | Facility: CLINIC | Age: 75
End: 2025-07-24
Payer: COMMERCIAL

## 2025-07-24 DIAGNOSIS — F41.1 GENERALIZED ANXIETY DISORDER: ICD-10-CM

## 2025-07-24 PROCEDURE — 93798 PHYS/QHP OP CAR RHAB W/ECG: CPT

## 2025-07-24 RX ORDER — SERTRALINE HYDROCHLORIDE 100 MG/1
100 TABLET, FILM COATED ORAL DAILY
Qty: 90 TABLET | Refills: 0 | Status: SHIPPED | OUTPATIENT
Start: 2025-07-24

## 2025-07-24 ASSESSMENT — PAIN SCALES - GENERAL: PAINLEVEL_OUTOF10: NO PAIN (0)

## 2025-07-24 NOTE — PATIENT INSTRUCTIONS
**For crisis resources, please see the information at the end of this document**     Thank you for coming to the [facility] [place] [facility].    TREATMENT PLAN:    Medications:   CONTINUE sertraline 100 mg every day. Script sent for 90 day.  Continue all other medications per primary care / speciality providers.    Consults / Referrals:   - Continue care with neurology as planned.    Follow-up:  Schedule an appointment with me and Behavioral Health Consultant in 6 weeks or sooner as needed.  Call [facility] at [phone] to schedule.  Follow up with primary care provider as planned or sooner if needed for acute medical concerns.  Call the psychiatric nurse line with medication questions or concerns at [phone].  TapFwdhart may be used to communicate with your provider, but this is not intended to be used for emergencies.    Psychoeducation:  Side effects for SSRI: sexual dysfunction, decreased appetite, increased appetite, nausea, diarrhea, constipation, dry mouth, insomnia, sedation, agitation, tremors, headaches, dizziness, sweating, bruising and rare bleeding, discontinuation syndrome, rare hyponatremia, rare induction of mykel, suicidal ideations, and serotonin syndrome.      Financial Assistance [phone]  [facility] Billing [phone]  Central Billing Office, [facility]: [phone]  [facility] Billing [phone]  Medical Records [phone]  [facility] Patient Bill of Rights [email or web address]       MENTAL HEALTH CRISIS RESOURCES:  For a emergency help, please call 911 or go to the nearest Emergency Department.     Emergency Walk-In Options:   EmPATH Unit @ [facility] ([place]): [phone] - Specialized mental health emergency area designed to be calming  [facility] ([place]): [phone]  [facility] ([place]): [phone]  [facility]  [facility]): [phone]    [facility] Crisis Information:   [place]: [phone]  [place]: [phone]  Jose R DRAKE) - Adult: [phone]     Child: [phone]  [place] - Adult: [phone]     Child: [phone]  [place]: [phone]  List of all [facility] resources:   https://[place].gov/dhs/people-we-serve/adults/health-care/mental-health/resources/crisis-contacts.jsp    National Crisis Information:   National Suicide & Crisis Lifeline: Call 918        For online chat options, visit [email or web address].org/chat/  Poison Control Center: 5-061-792-4823  Poison Control Center: 0-882-539-7640  Trans Lifeline: 1-739.164.4049 - Hotline for transgender people of all ages  The [employer]: 5-035-625-3584 - Hotline for LGBT youth     For Non-Emergency Support:   Fast Tracker: Mental Health & Substance Use Disorder Resources -   https://www.fasttrackermn.org/       Again thank you for choosing [facility] [place] [facility] and please let us know how we can best partner with you to improve you and your family's health.    You may be receiving a survey regarding this appointment. We would love to have your feedback, both positive and negative. The survey is done by an external company, so your answers are anonymous.        Patient Education   Collaborative Care Psychiatry Service  What to Expect  Here's what to expect from your Collaborative Care Psychiatry Service (CCPS).   About CCPS  CCPS means 2 people work together to help you get better. You'll meet with a behavioral health clinician and a psychiatric doctor. A behavioral health clinician helps people with mental health problems by talking with them. A psychiatric doctor helps people by giving them medicine.  How it works  At every visit, you'll see the behavioral health clinician (BHC) first. They'll talk with you about how you're doing and teach you how to feel better.   Then you'll see the psychiatric  "doctor. This doctor can help you deal with troubling thoughts and feelings by giving you medicine. They'll make sure you know the plan for your care.   CCPS usually takes 3 to 6 visits. If you need more visits, we may have you start seeing a different psychiatric doctor for ongoing care.  If you have any questions or concerns, we'll be glad to talk with you.  About visits  Be open  At your visits, please talk openly about your problems. It may feel hard, but it's the best way for us to help you.  Cancelling visits  If you can't come to your visit, please call us right away at [phone]. If you don't cancel at least 24 hours (1 full day) before your visit, that's \"late cancellation.\"  Being late to visits  Being very late is the same as not showing up. You will be a \"no show\" if:  Your appointment starts with a C, and you're more than 15 minutes late for a 30-minute (half hour) visit. This will also cancel your appointment with the psychiatric doctor.  Your appointment is with a psychiatric doctor only, and you're more than 15 minutes late for a 30-minute (half hour) visit.  Your appointment is with a psychiatric doctor only, and you're more than 30 minutes late for a 60-minute (full hour) visit.  If you cancel late or don't show up 2 times within 6 months, we may end your care.   Getting help between visits  If you need help between visits, you can call us Monday to Friday from 8 a.m. to 4:30 p.m. at [phone].  Emergency care  Call 911 or go to the nearest emergency department if your life or someone else's life is in danger.  Call 988 anytime to reach the national Suicide and Crisis hotline.  Medicine refills  To refill your medicine, call your pharmacy. You can also call [facility]'s Behavioral Access at [phone], Monday to Friday, 8 a.m. to 4:30 p.m. It can take 1 to 3 business days to get a refill.   Forms, letters, and tests  You may have papers to fill out, like FMLA, short-term " disability, and workability. We can help you with these forms at your visits, but you must have an appointment. You may need more than 1 visit for this, to be in an intensive therapy program, or both.  Before we can give you medicine for ADHD, we may refer you to get tested for it or confirm it another way.  We may not be able to give you an emotional support animal letter.  We don't do mental health checks ordered by the court.   We don't do mental health testing, but we can refer you to get tested.   Thank you for choosing us for your care.  For informational purposes only. Not to replace the advice of your health care provider. Copyright   2022 [facility]. All rights reserved. [employer] 145991 - Rev 11/24.

## 2025-07-24 NOTE — PROGRESS NOTES
"Virtual Visit Details    Type of service:  Video Visit   Video Start Time: 11:30 AM  Video End Time:11:45 AM    Originating Location (pt. Location): Home    Distant Location (provider location):  Off-site  Platform used for Video Visit: [employer]       PSYCHIATRIC MEDICATION FOLLOW UP APPT     Name: Prem Taylor   : 1950               Telemedicine Visit: The patient's condition can be safely assessed and treated via synchronous audio and visual telemedicine encounter.      Consent:  The patient/guardian has verbally consented to: the potential risks and benefits of telemedicine (video visit or phone) versus in person care; bill my insurance or make self-payment for services provided; and responsibility for payment of non-covered services.     As the provider I attest to compliance with applicable laws and regulations related to telemedicine.         Source of Referral / Care Team:  Primary Care Provider: Mamadou Baez MD   Therapist: none      The [facility] psychiatry providers act as a specialty service for Primary Care Providers in the [facility] who seek to optimize medications for unstable patients. Once medications have been optimized, [facility] providers discharge the patient back to the referring Primary Care Provider for ongoing medication management. This type of system allows [facility] to serve a high volume of patients.      Patient Identification:  Patient is a 74 year old,   White Not  or  male  who presents for return visit with me.   Patient prefers to be called: \"Ryan\".  Patient is currently retired.      Patient attended the session with wife , who they agreed to have interview with.    RECORDS AVAILABLE FOR REVIEW: EHR records through [employer] .       Interim History:  I last saw Prem Taylor for outpatient psychiatry Return Visit on 25. During that appointment, we increased to sertraline 100 mg QD. Patient reports ADHERING to prescribed medications. Overall they " "do report anxiety and mood are improved with current sertraline, particularly given other medical stressors (\"He is really happy, doing well day to day.\") They did move sertraline to be QHS as thought maybe making him more drowsy during the day, but have also noticed an increase in nightmares \"really horrible dreams\" over the last several weeks that may be attributed to sertraline. They do describe dream enactment behaviors (thrashing, kicking, yelling out). Denies any AH/VH, delusions or paranoia. No SI/SIB/HI.       Initial Impression / MREs:  6/12/25: At last appointment, we continued recently started sertraline 50 mg QD. Patient denies any new side effects attributed to sertraline, moved to [facility]. He is still having quite vivid dreams but denies nightmares, acting out dreams, hallucinations. He reports they are not intolerable. Overall patient and wife report anxiety day to day is generally ok. Will have exacerbation for specific stressors, that does not seem worsened with change to sertraline. He is more anxious about his health now due to the Alzheimers diagnosis. He has follow-up with neurology next week. Continues donepezil. He denies significant depression or anhedonia, no SI/SIB/HI, psychosis, mykel. Discussed risk / benefits of medication changes and sx of anxiety in context of neurocognitive disorder. We will trial sertraline increase if tolerated. Follow-up with this provider and Behavioral Health Consultant in 5 weeks or sooner as needed. Patient agreeable to plan.     5/15/25: At last appointment, we planned to cross taper from escitalopram to sertraline. Importantly, since his last visit he has been seen by neurologist  and diagnosed with Alzheimer's. Started donepezil 10 mg at bedtime. Today Patient / wife reports ADHERING to prescribed medications. Denies any intolerable side effects during the cross taper, and overall think tolerating the sertraline well. He does report an increase in vivid dreams, " "not currently intolerable and unsure if attributes to sertraline (is taking QHS). Denies any AH/VH, delusions. Some impulsive spending / forgetting ordered things multiple times. Overall patient and wife report a mild improvement in anxiety recently (\"All in all, considering how he's doing, He's handled really well\"). Denies panic. Denies significant depression, anhedonia. No SI/SIB/HI. Discussed risk / benefits of medication changes. We will initially trial moving sertraline to AM if benefit to vivid dreams. Will consider increase to medication at follow-up. Follow-up with this provider and Behavioral Health Consultant in 4 weeks or sooner as needed. Patient agreeable to plan.     3/10/25: At initial appointment one month ago, we planned to cross taper from escitalopram 20 mg to sertraline 50 mg. Patient /wife report NOT ADHERING to prescribed medications, as misplaced the sertraline so just picked up. They clarify dosing, and are most interested in taper and discontinue escitalopram versus cross taper. Will plan to start sertraline in 2 weeks. Patient and wife report concern for seemingly very quick memory changes, recently seen by PCP and labs largely unremarkable. Fortunately they were able to reschedule the Noran appointment and were seen last week, no medications started as they are waiting to review the MRI. They report a high level of anxiety today , but did again discuss the risks/ benefits of medication change if symptoms are more related to neurocognitive disorder. Given previous max dose of escitalopram, no other SSRI trials, it is reasonable to trial move to alternative if more effective or better tolerated. We will cross taper to sertraline. Patient to continue with neurology as planned. Follow-up with this provider and Behavioral Health Consultant in 6 weeks or sooner as needed. Patient and wife agreeable to plan.     2/3/2025: Prem Taylor is a 74 year old White, male who presents for initial " visit with [facility] ([facility]) for medication management. Carries past diagnoses: anxiety. Patient denies history of psychiatric providers, hospitalizations, or suicide attempts. Started medications through PCP 4/2021, on escitalopram 20 mg since 03/2022. Denies known side effects from medication, patient reports it has been helpful (although rates anxiety relatively high today), wife Aubree Doesn't think it's been that effective, although does wonder if sx are more related to a neurocognitive process. Patient was previously seen by PCP, due to concern for memory difficulties with family history of Alzheimer's (mother). Labs completed largely unremarkable and completed MRI: nonspecific findings noted, no significant abnormalities. Referred to neurology, scheduled to see Errol 4/1/2025. Denies significant depression, SI/SIB/HI, psychosis, mykel, problematic substance use. Discussed risks / benefits of medication changes at this time. Given max dose of escitalopram, no other SSRI trials, it is reasonable to trial move to alternative if more effective or better tolerated. We will cross taper to sertraline. Patient to establish with neurology as planned. Follow-up with this provider and Behavioral Health Consultant in 1 month or sooner as needed. Patient and wife agreeable to plan.     Current medications include:   Current Outpatient Medications   Medication Sig Dispense Refill    albuterol (PROAIR HFA/PROVENTIL HFA/VENTOLIN HFA) 108 (90 Base) MCG/ACT inhaler Inhale 2 puffs into the lungs every 6 hours as needed for shortness of breath, wheezing or cough. 18 g 0    apixaban ANTICOAGULANT (ELIQUIS) 5 MG tablet Take 1 tablet (5 mg) by mouth 2 times daily. 180 tablet 1    aspirin 81 MG EC tablet Take 1 tablet (81 mg) by mouth daily. Start tomorrow. 30 tablet 3    clopidogrel (PLAVIX) 75 MG tablet Take 1 tablet (75 mg) by mouth daily. Dose to start tomorrow. 90 tablet 3    co-enzyme Q-10 100 MG CAPS  "capsule Take 1 capsule (100 mg) by mouth daily. 100 capsule 3    cyanocobalamin 1000 MCG sublingual tablet Place 1 tablet (1,000 mcg) under the tongue daily. 100 tablet 3    donepezil (ARICEPT) 10 MG tablet Take 1 tablet (10 mg) by mouth at bedtime. 90 tablet 2    ezetimibe (ZETIA) 10 MG tablet Take 1 tablet (10 mg) by mouth daily. 90 tablet 3    furosemide (LASIX) 20 MG tablet Take 1 tablet (20 mg) by mouth daily. 90 tablet 1    metoprolol succinate ER (TOPROL XL) 50 MG 24 hr tablet Take 1 tablet (50 mg) by mouth daily. 90 tablet 3    Multiple vitamin TABS Take 1 tablet by mouth daily. 100 tablet 3    rosuvastatin (CRESTOR) 40 MG tablet Take 1 tablet (40 mg) by mouth daily. 90 tablet 3    sertraline (ZOLOFT) 100 MG tablet Take 1 tablet (100 mg) by mouth daily. 30 tablet 5    vitamin D3 (CHOLECALCIFEROL) 50 mcg (2000 units) tablet Take 1 tablet (50 mcg) by mouth daily. 100 tablet 2     No current facility-administered medications for this visit.         Side effects: Yes: possible worsening of nightmares    The [place] Prescription Monitoring Program has been reviewed and there are no concerns about diversionary activity for controlled substances at this time.     Psychiatric ROS:  Prem Taylor reports mood has been: \"Ok, pretty good\"  Depression has been: fatigue of loss of energy and difficulty concentrating or indecisiveness self rates as ~2/ 10, where 0 is none at all and 10 being severe depression. Was 2/10 at last appt.  SI/SIB/HI: denies    Anxiety has been: excessive worry, difficult to control, restlessness / feeling keyed up,  easily fatigued, and  difficulty concentrating or mind going blank  self rates as ~3/ 10, where 0 is none at all and 10 being severe anxiety. Was 3-5/10 at last appt.  Sleep has been: Ok sleeping but + vivid dreams with enactment   Energy has been: low, maybe worse with sertraline in AM  Appetite has been: unremarkable  Luisa sxs: denies   Psychosis sxs: denies   ADHD/ADD sxs:  " "Poor task completion, Distractibility, Forgetful, Restlessness/fidgety  Trauma sx: No symptoms    Most recent PHQ9 and GAD2 (2) scores were reviewed today.   PHQ-9 scores:       4/24/2025     3:15 PM 5/13/2025    10:33 AM 7/16/2025     2:08 PM   PHQ-9 SCORE   PHQ-9 Total Score MyChart 3 (Minimal depression) 8 (Mild depression) 2 (Minimal depression)   PHQ-9 Total Score 3  8  2        Patient-reported       KALPANA-7 scores:        3/25/2025     7:08 AM 4/24/2025     3:17 PM 5/9/2025     1:51 PM   KALPANA-7 SCORE   Total Score 12 (moderate anxiety) 13 (moderate anxiety) 11 (moderate anxiety)   Total Score 12  13  11        Patient-reported       Current stressors include: Symptoms  Coping mechanisms and supports include: Family    Vital Signs:   There were no vitals taken for this visit.    Review of Systems:  10 systems (general, cardiovascular, respiratory, eyes, ENT, endocrine, GI, , M/S, neurological) were reviewed. Most pertinent finding(s) is/are: + palpitations (Patient in Afib for ~6-7 years \"constantly\").  No acute distress; no wheezing / short of breath / increased work of breathing; denies chest pain / tightness / palpitations; reduced appetite and recent weight loss; no nausea / vomiting / abdominal pain; no tics / tremors / abnormal muscle mvmts; no visible skin changes / rashes . The remaining systems are all unremarkable.      Labs:  Most recent laboratory results reviewed and the pertinent results include:     Recent Labs   Lab Test 06/17/25  0956 04/23/25  0644 04/22/25  0954   WBC 5.7   < > 4.5   HGB 13.3   < > 12.3*   HCT 39.4*   < > 36.2*   MCV 90   < > 89      < > 163   ANEU  --   --  3.0    < > = values in this interval not displayed.     Recent Labs   Lab Test 06/21/25  0537 06/17/25  0956 05/13/25  1154 04/22/25  0954 03/25/25  0753 12/27/23  1302 11/21/23  1218      < > 140   < > 139   < > 140   POTASSIUM 4.1   < > 4.9   < > 4.4   < > 4.4   CHLORIDE 104   < > 105   < > 105   < > 106 " "  CO2 26   < > 24   < > 23   < > 24   GLC 94   < > 89   < > 108*   < > 86   DANDY 9.3   < > 9.2   < > 9.4   < > 9.4   MAG  --   --   --   --   --   --  2.0   BUN 14.9   < > 16.0   < > 14.7   < > 13.3   CR 0.88   < > 0.92   < > 0.86   < > 0.95   GFRESTIMATED >90   < > 88   < > >90   < > 85   ALBUMIN  --   --   --   --  4.2   < > 4.4   PROTTOTAL  --   --   --   --  6.9   < > 7.1   AST  --   --   --   --  33   < > 27   ALT  --   --  45  --  33   < > 17   ALKPHOS  --   --   --   --  69   < > 68   BILITOTAL  --   --   --   --  1.0   < > 0.8    < > = values in this interval not displayed.     Recent Labs   Lab Test 06/20/25  1235   CHOL 138   LDL 72   HDL 59   TRIG 33     Recent Labs   Lab Test 01/14/25  1356   TSH 1.76     No results found for: \"QRK292\", \"HBYY879\", \"OTUF38GPACI\", \"VITD3\", \"D2VIT\", \"D3VIT\", \"DTOT\", \"[id number]\", \"[id number]\", \"[id number]\", \"[id number]\", \"[id number]\", \"[id number]\"     Past Medical/Surgical History:  Past Medical History:   Diagnosis Date    Abnormal ECG     Anemia     Anxiety     Arrhythmia     Atrial fibrillation (H) 10/09/2017    Cancer (H) 2015    CPAP (continuous positive airway pressure) dependence 10/2019    per patient    Depression     Heart valve disease 2017    History of blood clots     Hyperlipidemia 2006    Pulmonary embolism, blood-clot, obstetric 10/09/2017    Sleep apnea 2017    Uses CPAP      has a past medical history of Abnormal ECG, Anemia, Anxiety, Arrhythmia, Atrial fibrillation (H) (10/09/2017), Cancer (H) (2015), CPAP (continuous positive airway pressure) dependence (10/2019), Depression, Heart valve disease (2017), History of blood clots, Hyperlipidemia (2006), Pulmonary embolism, blood-clot, obstetric (10/09/2017), and Sleep apnea (2017).    Medication allergies:  No Known Allergies    Mental Status Exam:  Alertness: alert  and oriented x3  Appearance: well groomed  Behavior/Demeanor: cooperative and pleasant, with good eye contact   Speech: normal and regular rate " "and rhythm  Language: intact and word finding difficulty  Psychomotor: normal or unremarkable  Mood: \"Ok, pretty good day to day\"  Affect: full range and appropriate, bright; was congruent to mood; was congruent to content  Thought Process/Associations: tangential  Thought Content:  Reports none;  Denies suicidal ideation, violent ideation, and delusions  Perception:  Reports none;  Denies auditory hallucinations and visual hallucinations  Insight: adequate  Judgment: adequate for safety and intact  Cognition: does  appear grossly intact; formal cognitive testing was not done  Recent and Remote Memory: grossly intact to interview.   Attention Span and Concentration: Intact to interview.  Fund of Knowledge:  appropriate  Gait and Station: unremarkable via seated video visit    Suicide Risk Assessment:  Today rPem Taylor  reports no suicidal ideation. In addition, there are notable risk factors for self-harm, including age, anxiety, comorbid medical condition of (multiple, including Alzheimers), and mood change. However, risk is mitigated by commitment to family, absence of past attempts, history of seeking help when needed, future oriented, no access to firearms or weapons, denies suicidal intent or plan, and denies homicidal ideation, intent, or plan. Therefore, based on all available evidence including the factors cited above, Prem Taylor does not appear to be at imminent risk for self-harm, does not meet criteria for a 72-hr hold, and therefore remains appropriate for ongoing outpatient level of care.  A thorough assessment of risk factors related to suicide and self-harm have been reviewed and are noted above. The patient convincingly denies suicidality on several occasions. Local community safety resources printed and reviewed for patient to use if needed. There was no deceit detected, and the patient presented in a manner that was believable.     Recommended that patient call 911 or go to the local ED " should there be a change in any of these risk factors    DSM5 Diagnosis:  300.02 (F41.1) Generalized Anxiety Disorder      Alzheimers by history     Medical comorbidities include:   Patient Active Problem List    Diagnosis Date Noted    Percutaneous transluminal coronary angioplasty status 06/20/2025     Priority: Medium    Generalized anxiety disorder 05/15/2025     Priority: Medium    Supratherapeutic INR 04/22/2025     Priority: Medium    Persistent atrial fibrillation (H) 03/25/2025     Priority: Medium    Abnormal electrocardiogram 11/28/2023     Priority: Medium    Dizziness 11/21/2023     Priority: Medium    Pulmonary embolism, bilateral (H) 10/11/2022     Priority: Medium    History of pulmonary embolism 08/10/2021     Priority: Medium    ALISE on CPAP 02/08/2019     Priority: Medium    Anxiety 02/08/2019     Priority: Medium    Chronic cough 02/08/2019     Priority: Medium    Normochromic normocytic anemia 02/08/2019     Priority: Medium    Left inguinal hernia 02/08/2019     Priority: Medium     Small, reducible noted on Annual Wellness Visit exam February 8, 2019.        Sinus bradycardia      Priority: Medium     Created by Conversion        Permanent atrial fibrillation (H) 10/25/2017     Priority: Medium     Diagnosed on 10/10/2017 and had bilateral PEs at the time.  [id number] score of 1+ with mitral insufficiency.  On warfarin        Mitral valve insufficiency 10/25/2017     Priority: Medium    Erectile dysfunction, unspecified erectile dysfunction type 01/03/2017     Priority: Medium    Overweight (BMI 25.0-29.9) 01/03/2017     Priority: Medium     IMO SNOMED LOAD SPRING 2020 [.6/.18/.2020 9:04 PM]  Abad Tse:          Tubular adenoma of colon 01/03/2017     Priority: Medium     tubular adenoma x 2 (rectum) - 4 and 8 mm        Hyperbilirubinemia 01/03/2017     Priority: Medium    BPH without urinary obstruction      Priority: Medium     Created by Conversion        Hearing Loss      Priority:  Medium     Created by Conversion  Replacement Utility updated for latest IMO load        Male erectile dysfunction 12/23/2014     Priority: Medium    Basal cell carcinoma, ear 12/23/2014     Priority: Medium    Hypercholesterolemia      Priority: Medium     Created by Conversion        Presbycusis      Priority: Medium     Created by Conversion        Atypical Chest Pain      Priority: Medium     Created by Conversion        Blood Pressure Isolated Elevated      Priority: Medium     Created by Conversion        Blepharitis of both eyes 09/03/2008     Priority: Medium     Formatting of this note might be different from the original.  Epic         Psychosocial & Contextual Factors:  Phase of Life Difficulties and Medical Comorbidites     DIFFERENTIAL DIAGNOSIS: R/O  anxiety due to other medical condition, adjustment DO     Medical comorbidities impacting or contributing to clinical picture: permanent atrial fibrillation, history of PE, normochromic normocytic anemia, hypercholesterolemia, hyperbilirubinemia  Known issue that I take into account for their medical decisions, no current exacerbations or new concerns.    Impression:  Prem Taylor is a 74 year old White, male who presents for return visit with  [facility] ([facility]) for medication management. At last appointment, we increased to sertraline 100 mg QD. Patient and wife report he is overall doing well, and anxiety and mood are improved with current sertraline, particularly given other medical stressors. They did move sertraline to be QHS, but have noticed an increase in nightmares and dream enactment behaviors over the last several weeks that may be attributed to sertraline (versus neurocognitive do). Denies any AH/VH, delusions or paranoia. No SI/SIB/HI. Discussed risks / benefits of medication changes, and initially recommending returning sertraline to QA if tolerated, could consider reduction in dose as trial which patient  declines at this time given overall feeling better. Follow-up in six weeks with this provider and Behavioral Health Consultant. Could consider return to PCP at that time if ongoing stability. Patient / wife agreeable to plan.         Medication side effects and alternatives were reviewed. Health promotion activities recommended and reviewed today. All questions addressed. Education and counseling completed regarding risks and benefits of medications and psychotherapy options. Recommend therapy for additional support.       Treatment Plan:  CONTINUE sertraline 100 mg every day. Script sent for 90 day.  Continue all other medications per primary care / speciality providers.  Follow-up with neurology as planned.   Safety plan reviewed. To the Emergency Department as needed or call after hours crisis line at [phone] or [phone]. [place] Crisis Text Line. Text [place] to 916802 or Suicide LifeLine Chat: suicidepreLaser Light Engines.org/chat  Schedule an appointment with me and Behavioral Health Consultant in 6 weeks or sooner as needed. Call [facility] at [phone] to schedule.  Follow up with primary care provider as planned or for acute medical concerns.  Call the psychiatric nurse line with medication questions or concerns at [phone].  [id number] may be used to communicate with your provider, but this is not intended to be used for emergencies.    Patient Education:  Medication side effects and alternatives reviewed. Health promotion activities recommended and reviewed today. All questions addressed. Education and counseling completed regarding risks and benefits of medications and psychotherapy options.  Consent provided by patient/guardian  Call the psychiatric nurse line with medication questions or concerns at [phone].  MyChart may be used to communicate with your provider, but this is not intended to be used for emergencies.  [employer] is information for patients.  It is run by  the [employer] and it contains information about all disorders, diseases and all medications.      Side effects for SSRI: sexual dysfunction, decreased appetite, increased appetite, nausea, diarrhea, constipation, dry mouth, insomnia, sedation, agitation, tremors, headaches, dizziness, sweating, bruising and rare bleeding, discontinuation syndrome, rare hyponatremia, rare induction of mykel, suicidal ideations, and serotonin syndrome.     Community Resources:    National Suicide Prevention Lifeline: 551.509.6088 (TTY: 159.499.9639). Call anytime for help.  (www.suicidepreventionlifeline.org)  National [place] on Mental Illness (www.melva.org): 874.814.5114 or 765-045-2543.   Mental Health Association (www.mentalhealth.org): 656.144.9277 or 138-205-1937.  [place] Crisis Text Line: Text [place] to 957342  Suicide LifeLine Chat: [employer].org/chat    Administrative Billing:     Level of Medical Decision Making:   - At least 1 chronic problem that is not stable  - Engaged in prescription drug management during visit (discussed any medication benefits, side effects, alternatives, etc.)             Patient Status:  [facility] MD/DO/NP/PA providers offer care a specialty service for Primary Care Providers in the [facility] that seek to optimize psychotropic medications for unstable patients.  Once medications have been optimized, our providers discharge the patient back to the referring Primary Care Provider for ongoing medication management.  This type of system allows our providers to serve a high volume of patients.   Patient will continue to be seen for ongoing consultation and stabilization.    Signed:   Yvonne Benton, MSN, APRN, PMHNP-BC  [facility] ([facility])  [facility]    Chart documentation done in part with Dragon Voice Recognition software.  Although reviewed after completion, some word and grammatical errors may remain.

## 2025-07-24 NOTE — NURSING NOTE
Is the patient currently in the state of [place]? YES    Current patient location: [address] [place] [id number]    Visit mode:Video    If the visit is dropped, the patient can be reconnected by: VIDEO VISIT:  Send e-mail to at [email or web address]    Will anyone else be joining the visit? No  (If patient encounters technical issues they should call [phone])    Are changes needed to the allergy or medication list? No    Are refills needed on medications prescribed by this physician? No    Rooming Documentation: Questionnaire(s) completed.    Reason for visit: YANI Corona

## 2025-07-28 ENCOUNTER — CARE COORDINATION (OUTPATIENT)
Dept: SLEEP MEDICINE | Facility: CLINIC | Age: 75
End: 2025-07-28

## 2025-07-28 ENCOUNTER — OFFICE VISIT (OUTPATIENT)
Dept: SLEEP MEDICINE | Facility: CLINIC | Age: 75
End: 2025-07-28
Payer: COMMERCIAL

## 2025-07-28 VITALS
DIASTOLIC BLOOD PRESSURE: 96 MMHG | OXYGEN SATURATION: 97 % | SYSTOLIC BLOOD PRESSURE: 153 MMHG | WEIGHT: 263 LBS | HEART RATE: 52 BPM | BODY MASS INDEX: 32.7 KG/M2 | HEIGHT: 75 IN

## 2025-07-28 DIAGNOSIS — G47.30 OBESITY WITH SLEEP APNEA: ICD-10-CM

## 2025-07-28 DIAGNOSIS — G47.33 OSA ON CPAP: Primary | ICD-10-CM

## 2025-07-28 DIAGNOSIS — G47.33 OSA (OBSTRUCTIVE SLEEP APNEA): ICD-10-CM

## 2025-07-28 DIAGNOSIS — E66.9 OBESITY WITH SLEEP APNEA: ICD-10-CM

## 2025-07-28 PROCEDURE — 3077F SYST BP >= 140 MM HG: CPT | Performed by: INTERNAL MEDICINE

## 2025-07-28 PROCEDURE — 1126F AMNT PAIN NOTED NONE PRSNT: CPT | Performed by: INTERNAL MEDICINE

## 2025-07-28 PROCEDURE — 3080F DIAST BP >= 90 MM HG: CPT | Performed by: INTERNAL MEDICINE

## 2025-07-28 PROCEDURE — 99214 OFFICE O/P EST MOD 30 MIN: CPT | Performed by: INTERNAL MEDICINE

## 2025-07-28 ASSESSMENT — PATIENT HEALTH QUESTIONNAIRE - PHQ9: SUM OF ALL RESPONSES TO PHQ QUESTIONS 1-9: 5

## 2025-07-28 NOTE — PROGRESS NOTES
Writer called in to [facility] to place overnight oximetry order for patient.  Reina Quiñonez MA on 7/28/2025 at 9:59 AM

## 2025-07-28 NOTE — PATIENT INSTRUCTIONS
Summary recommendations: Based on compliance measures, your sleep apnea is adequately controlled with CPAP at the current settings.    We have revised the pressure settings on your CPAP to range 11 to 16 cm water (the only change we made was increasing the maximum setting from 15 to 16 cm water ). Prescription also was generated for renewal of CPAP supplies.  Please take your CPAP machine to [facility] in order for them to do a diagnostic check on the water chamber  Recommend continuing CPAP use regularly during sleep, using it for the entire duration of sleep to derive maximum benefit and getting the supplies for the CPAP replaced regularly.   Patient instructed to remember to bring PAP with you if hospitalized and if anticipating procedure that requires sedation/surgery to be sure to discuss with the provider/surgeon that you have has sleep apnea and use CPAP therapy.  Since you report daytime sleepiness we have recommended an additional test called overnight oximetry which is using the finger probe to check the oxygen levels when you are sleeping.  [facility] will reach out to you in scheduling the oximetry test and you will be doing the test while you are in your own home sleeping in your own bed while you are using the CPAP at the new revised pressure settings.  Results of the oximetry will be communicated with you via [employer].  If the overnight oximetry tests are normal, we plan to see you with the sleep clinic in 1 year or sooner if any concerns.  We discussed weight management with healthy diet, and exercise.     Please avoid driving, operating any heavy machinery or other hazardous situations while drowsy or sleepy.      Your Body mass index is 32.87 kg/m .  Weight management is a personal decision.  If you are interested in exploring weight loss strategies, the following discussion covers the approaches that may be successful. Body mass index (BMI) is one way to tell whether you are  at a healthy weight, overweight, or obese. It measures your weight in relation to your height.  A BMI of 18.5 to 24.9 is in the healthy range. A person with a BMI of 25 to 29.9 is considered overweight, and someone with a BMI of 30 or greater is considered obese. More than two-thirds of American adults are considered overweight or obese.  Being overweight or obese increases the risk for further weight gain. Excess weight may lead to heart disease and diabetes.  Creating and following plans for healthy eating and physical activity may help you improve your health.  Weight control is part of healthy lifestyle and includes exercise, emotional health, and healthy eating habits. Careful eating habits lifelong are the mainstay of weight control. Though there are significant health benefits from weight loss, long-term weight loss with diet alone may be very difficult to achieve- studies show long-term success with dietary management in less than 10% of people. Attaining a healthy weight may be especially difficult to achieve in those with severe obesity. In some cases, medications, devices and surgical management might be considered.  What can you do?  If you are overweight or obese and are interested in methods for weight loss, you should discuss this with your provider.   Consider reducing daily calorie intake by 500 calories.   Keep a food journal.   Avoiding skipping meals, consider cutting portions instead.    Diet combined with exercise helps maintain muscle while optimizing fat loss. Strength training is particularly important for building and maintaining muscle mass. Exercise helps reduce stress, increase energy, and improves fitness. Increasing exercise without diet control, however, may not burn enough calories to loose weight.     Start walking three days a week 10-20 minutes at a time  Work towards walking thirty minutes five days a week   Eventually, increase the speed of your walking for 1-2 minutes at  time    In addition, we recommend that you review healthy lifestyles and methods for weight loss available through the National Institutes of Health patient information sites:  http://win.niddk.nih.gov/publications/index.htm    And look into health and wellness programs that may be available through your health insurance provider, employer, local community center, or horace club.

## 2025-07-28 NOTE — PROGRESS NOTES
[facility]  Sleep clinic follow-up visit note                Date: July 28, 2025      Chief complaint:  Follow-up of ALISE, review CPAP compliance measures    Chief Complaint   Patient presents with    CPAP Follow Up     New CPAP machine, compliance - Mask leakage, water left in the machine.       Prem Talyor comes in today for follow-up of their moderate sleep apnea, with CPAP.   Prem Taylor was set up at [facility] on May 14, 2025. Patient received a replacement  Resmed Airsense 10 Pressures were set at 11-15 cm H2O.   Patient s ramp is 4 cm H2O for Auto and FLEX/EPR is EPR, 2.  Patient received a Live Shuttle Mask name: Vitera Full Face mask size Large, heated tubing and heated humidifier.  Patient has the following compliance requirements: using and visit requirements.     Do you use a CPAP Machine at home: (Patient-Rptd) Yes  Overall, on a scale of 0-10 how would you rate your CPAP (0 poor, 10 great): (Patient-Rptd) 10  Is your mask comfortable: (Patient-Rptd) Yes  If not, why:    Is you mask leaking: (Patient-Rptd) No  If yes, where do you feel it:    How many night per week does the mask leak (0-7):    Do you notice snoring with mask on: (Patient-Rptd) No  Do you notice gasping arousals with mask on: (Patient-Rptd) No  Are you having significant oral or nasal dryness:Yes- reports water is not getting used up in water chamber   Is the pressure setting comfortable: (Patient-Rptd) Yes  In not, why:    What type of mask do you use: (Patient-Rptd) Full Face Mask  What is your typical bedtime: (Patient-Rptd) 10:30 PM  How long does it take you to go to sleep on PAP therapy: (Patient-Rptd) 10 mins  What time do you typically get out of bed for the day: (Patient-Rptd) 6:00 AM  How many hours on average per night are you using PAP therapy: (Patient-Rptd) 7 1/2  How many hours are you sleeping per night: (Patient-Rptd) 7 1/2  Do you feel well rested in the morning: (Patient-Rptd) Yes    [place]  SLEEPINESS SCALE   Patient endorsed an [place] sleepiness score of 10 out of 24 when I personally reviewed the ESS with him during today's visit.        7/23/2025     2:49 PM    [place] Sleepiness Scale ( KATE Mensah  [id number]<br>ESS - [place]/English - Final version - 21 Nov 07 - [facility] [place].)   Sitting and reading High chance of dozing   Watching TV Slight chance of dozing   Sitting, inactive in a public place (e.g. a theatre or a meeting) Would never doze   As a passenger in a car for an hour without a break Slight chance of dozing   Lying down to rest in the afternoon when circumstances permit High chance of dozing   Sitting and talking to someone Slight chance of dozing   Sitting quietly after a lunch without alcohol Moderate chance of dozing   In a car, while stopped for a few minutes in traffic Would never doze   [place] Score (MC) 11   [place] Score (Sleep) 11        Patient-reported         INSOMNIA SEVERITY INDEX (NEYDA)          7/23/2025     2:43 PM   Insomnia Severity Index (NEYDA)   Difficulty falling asleep 0   Difficulty staying asleep 2   Problems waking up too early 2   How SATISFIED/DISSATISFIED are you with your CURRENT sleep pattern? 3   How NOTICEABLE to others do you think your sleep problem is in terms of impairing the quality of your life? 1   How WORRIED/DISTRESSED are you about your current sleep problem? 2   To what extent do you consider your sleep problem to INTERFERE with your daily functioning (e.g. daytime fatigue, mood, ability to function at work/daily chores, concentration, memory, mood, etc.) CURRENTLY? 2   NEYDA Total Score 12        Patient-reported         Guidelines for Scoring/Interpretation:  Total score categories:  0-7 = No clinically significant insomnia   8-14 = Subthreshold insomnia   15-21 = Clinical insomnia (moderate severity)  22-28 = Clinical insomnia (severe)  Used via courtesy of www.VDPth.va.gov with permission from Gerardo Mahmood PhD., [facility]  [facility]    ResMed   Auto-PAP 11.0 - 15.0 cmH2O 30 day usage data:    80% of days with > 4 hours of use. 6/30 days with no use.   Average use 7 hours and 45 minutes per day.   95%ile Leak 8.7 L/min.   CPAP 95% pressure 13.9 cm H2O  AHI 1.5.  Events per hour.       Past medical/surgical history, family history, social history, medications and allergies were reviewed.         Problem List:  Patient Active Problem List    Diagnosis Date Noted    Percutaneous transluminal coronary angioplasty status 06/20/2025     Priority: Medium    Generalized anxiety disorder 05/15/2025     Priority: Medium    Supratherapeutic INR 04/22/2025     Priority: Medium    Persistent atrial fibrillation (H) 03/25/2025     Priority: Medium    Abnormal electrocardiogram 11/28/2023     Priority: Medium    Dizziness 11/21/2023     Priority: Medium    Pulmonary embolism, bilateral (H) 10/11/2022     Priority: Medium    History of pulmonary embolism 08/10/2021     Priority: Medium    ALISE on CPAP 02/08/2019     Priority: Medium    Anxiety 02/08/2019     Priority: Medium    Chronic cough 02/08/2019     Priority: Medium    Normochromic normocytic anemia 02/08/2019     Priority: Medium    Left inguinal hernia 02/08/2019     Priority: Medium     Small, reducible noted on Annual Wellness Visit exam February 8, 2019.        Sinus bradycardia      Priority: Medium     Created by Conversion        Permanent atrial fibrillation (H) 10/25/2017     Priority: Medium     Diagnosed on 10/10/2017 and had bilateral PEs at the time.  [id number] score of 1+ with mitral insufficiency.  On warfarin        Mitral valve insufficiency 10/25/2017     Priority: Medium    Erectile dysfunction, unspecified erectile dysfunction type 01/03/2017     Priority: Medium    Overweight (BMI 25.0-29.9) 01/03/2017     Priority: Medium     IMO SNOMED LOAD SPRING 2020 [.6/.18/.2020 9:04 PM]  Abad Tse:          Tubular adenoma of colon 01/03/2017     Priority: Medium     tubular  "adenoma x 2 (rectum) - 4 and 8 mm        Hyperbilirubinemia 01/03/2017     Priority: Medium    BPH without urinary obstruction      Priority: Medium     Created by Conversion        Hearing Loss      Priority: Medium     Created by Conversion  Replacement Utility updated for latest IMO load        Male erectile dysfunction 12/23/2014     Priority: Medium    Basal cell carcinoma, ear 12/23/2014     Priority: Medium    Hypercholesterolemia      Priority: Medium     Created by Conversion        Presbycusis      Priority: Medium     Created by Conversion        Atypical Chest Pain      Priority: Medium     Created by Conversion        Blood Pressure Isolated Elevated      Priority: Medium     Created by Conversion        Blepharitis of both eyes 09/03/2008     Priority: Medium     Formatting of this note might be different from the original.  [facility]                 Physical Examination:   BP (!) 153/96   Pulse 52   Ht 1.905 m (6' 3\")   Wt 119.3 kg (263 lb)   SpO2 97%   BMI 32.87 kg/m    General: Pleasant. Cooperative. In no apparent distress.  Pulmonary: Able to speak in full sentences easily. No cough or wheeze.   Neurologic: Alert, oriented x3.  Psychiatric: Mood euthymic. Affect congruent with full range and intensity.     ASSESSMENT/PLAN:  Obstructive sleep apnea: Pt reports adequate compliance with CPAP and reports positive benefits with CPAP treatment. Based on compliance measures,ALISE is adequately controlled with CPAP at the current settings.    DME orders were generated for revision of the pressure settings on the auto CPAP to range 11 to 16 cm water , based on the compliance download and for renewal of CPAP supplies.  Recommended to continue using the CPAP regularly during sleep using the device for the entire duration of sleep to derive maximum benefit and getting the supplies for the CPAP replaced regularly.  He was instructed to take his CPAP to [facility] medical DME to address the concerns regarding " "water chamber.  Patient instructed to remember to bring PAP with him  if hospitalized and if anticipating procedure that requires sedation/surgery to be sure to discuss with the provider/surgeon that he  has sleep apnea and uses PAP therapy.   Patient has gained approximately 40 pounds of weight since the polysomnogram obtained in November 2018. Recommended obtaining overnight oximetry with auto CPAP at the revised pressure settings to rule out ypoxemia as a potential contributing factor for the symptoms of excessive daytime sleepiness.    Elevated blood-pressure readings: Patient reports at home his blood pressure readings are not that high- systolic blood pressure typically is in the 120s and the diastolic blood pressure typically in the 80s. Patient was instructed to continue to monitor blood pressure readings, maintain BP logs and follow-up with PCP if the readings are high.    Obesity: We discussed weight management with healthy diet, and exercise.     Patient was strongly advised to avoid driving, operating any heavy machinery or other hazardous situations while drowsy or sleepy.  Patient was counseled on the importance of driving while alert, to pull over if drowsy, or nap before getting into the vehicle if sleepy.      If the overnight oximetry does not show evidence of hypoxemia, follow-up with sleep clinic in 1 year, or sooner if any concerns.     The above note was dictated using voice recognition software. Although reviewed after completion, some word and grammatical error may remain . Please contact the author for any clarifications.      \" Total time spent was 30 minutes  for this appointment on this date of service which include time spent before, during and after the visit for chart review, patient care, counseling and coordination of care. Including documentation\"      Reuben Delcid MD  [facility]  [address] , [place] [address]     "

## 2025-07-28 NOTE — NURSING NOTE
"Chief Complaint   Patient presents with    CPAP Follow Up     New CPAP machine, compliance - Mask leakage, water left in the machine.       Initial BP (!) 153/96   Pulse 52   Ht 1.905 m (6' 3\")   Wt 119.3 kg (263 lb)   SpO2 97%   BMI 32.87 kg/m   Estimated body mass index is 32.87 kg/m  as calculated from the following:    Height as of this encounter: 1.905 m (6' 3\").    Weight as of this encounter: 119.3 kg (263 lb).    Medication Reconciliation: complete    DME: [facility]    ESS: 11  NEYDA: 12    "

## 2025-07-29 ENCOUNTER — ANCILLARY PROCEDURE (OUTPATIENT)
Dept: GENERAL RADIOLOGY | Facility: CLINIC | Age: 75
End: 2025-07-29
Attending: FAMILY MEDICINE
Payer: COMMERCIAL

## 2025-07-29 ENCOUNTER — HOSPITAL ENCOUNTER (OUTPATIENT)
Dept: CARDIAC REHAB | Facility: CLINIC | Age: 75
Discharge: HOME | End: 2025-07-29
Attending: INTERNAL MEDICINE
Payer: COMMERCIAL

## 2025-07-29 ENCOUNTER — OFFICE VISIT (OUTPATIENT)
Dept: FAMILY MEDICINE | Facility: CLINIC | Age: 75
End: 2025-07-29
Payer: COMMERCIAL

## 2025-07-29 VITALS
HEIGHT: 75 IN | DIASTOLIC BLOOD PRESSURE: 68 MMHG | HEART RATE: 60 BPM | TEMPERATURE: 97.7 F | RESPIRATION RATE: 18 BRPM | SYSTOLIC BLOOD PRESSURE: 110 MMHG | WEIGHT: 262 LBS | BODY MASS INDEX: 32.58 KG/M2 | OXYGEN SATURATION: 92 %

## 2025-07-29 DIAGNOSIS — R05.2 SUBACUTE COUGH: ICD-10-CM

## 2025-07-29 DIAGNOSIS — R05.2 SUBACUTE COUGH: Primary | ICD-10-CM

## 2025-07-29 DIAGNOSIS — G47.33 OSA ON CPAP: ICD-10-CM

## 2025-07-29 DIAGNOSIS — I48.19 PERSISTENT ATRIAL FIBRILLATION (H): ICD-10-CM

## 2025-07-29 DIAGNOSIS — Z86.711 HISTORY OF PULMONARY EMBOLISM: ICD-10-CM

## 2025-07-29 DIAGNOSIS — I25.83 CORONARY ARTERY DISEASE DUE TO LIPID RICH PLAQUE: ICD-10-CM

## 2025-07-29 DIAGNOSIS — I25.10 CORONARY ARTERY DISEASE DUE TO LIPID RICH PLAQUE: ICD-10-CM

## 2025-07-29 PROCEDURE — 1126F AMNT PAIN NOTED NONE PRSNT: CPT | Performed by: FAMILY MEDICINE

## 2025-07-29 PROCEDURE — 71046 X-RAY EXAM CHEST 2 VIEWS: CPT | Mod: TC | Performed by: RADIOLOGY

## 2025-07-29 PROCEDURE — 99214 OFFICE O/P EST MOD 30 MIN: CPT | Performed by: FAMILY MEDICINE

## 2025-07-29 PROCEDURE — 93798 PHYS/QHP OP CAR RHAB W/ECG: CPT

## 2025-07-29 PROCEDURE — 3078F DIAST BP <80 MM HG: CPT | Performed by: FAMILY MEDICINE

## 2025-07-29 PROCEDURE — 3074F SYST BP LT 130 MM HG: CPT | Performed by: FAMILY MEDICINE

## 2025-07-29 PROCEDURE — G2211 COMPLEX E/M VISIT ADD ON: HCPCS | Performed by: FAMILY MEDICINE

## 2025-07-29 RX ORDER — DOXYCYCLINE HYCLATE 100 MG
100 TABLET ORAL 2 TIMES DAILY
Qty: 20 TABLET | Refills: 0 | Status: SHIPPED | OUTPATIENT
Start: 2025-07-29 | End: 2025-07-29

## 2025-07-29 RX ORDER — DOXYCYCLINE HYCLATE 100 MG
100 TABLET ORAL 2 TIMES DAILY
Qty: 20 TABLET | Refills: 0 | Status: SHIPPED | OUTPATIENT
Start: 2025-07-29

## 2025-07-29 ASSESSMENT — PAIN SCALES - GENERAL: PAINLEVEL_OUTOF10: NO PAIN (0)

## 2025-07-29 NOTE — PROGRESS NOTES
Assessment/Plan:    Subacute cough  Persistent cough described.  Chest x-ray as noted without acute findings of heart failure, community-acquired pneumonia etc.  We did discuss trial of Mucinex DM 1200 mg twice daily for as needed use for symptomatic management plus doxycycline 100 mg twice daily x 10 days.  Notify if further concerns.  - XR Chest 2 Views    Coronary artery disease due to lipid rich plaque  CAD status post percutaneous intervention reviewed.    Persistent atrial fibrillation (H)  Persistent atrial fibrillation historically.  Continues chronic anticoagulation.    ALISE on CPAP  CPAP for ALISE management.    History of pulmonary embolism  Continues chronic anticoagulation.    The longitudinal plan of care for the diagnosis(es)/condition(s) as documented were addressed during this visit. Due to the added complexity in care, I will continue to support Ryan in the subsequent management and with ongoing continuity of care.            Subjective:    Prem Taylor is seen today for concern for ongoing cough.  Symptoms over past month.  Had traveled to [place].  Return July 8, 2025.  Cold symptoms persist.  Apparent exposure to individuals with COVID then.  Patient has tested negative for COVID since.  No fevers.  Patient describes some wheeziness that was detected while in cardiology office who then prescribed albuterol MDI.  He did use this this morning but not since.  History of unprovoked bilateral PE as well as atrial fibrillation both diagnosed in 2017.  ALISE on CPAP.  Did review prior cardiac testing as well with known history of CAD status post recent percutaneous intervention and continuing dual antiplatelet therapy plus anticoagulation x 3 months then clopidogrel and Eliquis to continue.  Patient with history of atrial fibrillation and continues metoprolol succinate 50 mg daily.  Rosuvastatin 40 mg daily with recent addition of Zetia 10 mg daily also.  No orthopnea or PND.  No ankle  "swelling.        Cardiac history:     NST:    The nuclear stress test is abnormal.    There is a medium sized area of mild ischemia in the mid to basal inferior and lateral segment(s) of the left ventricle.    Left ventricular function is normal, EF 57%.    The stress electrocardiogram is negative for inducible ischemic EKG changes.    Baseline hypertension, 171/93    There is no prior study for comparison.     24 hr holter 10/30/24:  abnormal 24-hour Holter monitor recording by virtue the presence of continuous atrial fibrillation.  The ventricular response appears to  be well-controlled.  There were no apparent symptoms referable to this arrhythmia.  The patient also has evidence of conduction disease manifest as an  IVCD.  There is no evidence of high degree AV block.           Echocardiogram25:  EF 55-60%. Mild LVH, elevated LVEDP moderate MR, mild TR, RVSP 45     Coronary Angiogram (reviewed)  - severe 2-vessel CAD s/p DESx2 to LAD and DESx1 to D1; normal L-sided filling pressures  - continue ASA 81mg daily indefinitely, ticagrelor 180mg once now, 90mg tonight. Clopidogrel 600mg once, followed by 75mg daily for at least 12 months. Would do triple therapy w/ ASA/clopidogrel/apixaban for 3 mos, then clopidogrel/apixaban indefinitely   - rosuva 40, low threshold for PCSK9 inhibitors  - continue aggressive risk factor modification      LVEDP:15           \"Aubree\" x 52 years   2 daughters - Renee (46) and Susannah (43)   3 grandchildren   No smoke (quit  after 1 and 1/ ppd)   EtOH: weekends \"few beers\"   Surgeries: anterior cervical fusion; inguinal hernia; tonsils; right eye cataract 3/4/20 and left eye cataract 3/11/20; \"right eye vitrectomy scheduled for 20 due to floaters\"   Hospitalizations: [facility] 10/9/17-10/10/17 bilateral pulmonary emboli with a. 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)   Exercise: run 4x/week at 6-6.5 mph; situps and pushups       Past Surgical History:   Procedure Laterality Date    BACK SURGERY      CERVICAL FUSION      CV CORONARY ANGIOGRAM N/A 2025    Procedure: Coronary Angiogram;  Surgeon: Pascual Hess MD;  Location: [facility] CV    CV INTRAVASULAR ULTRASOUND N/A 2025    Procedure: Intravascular Ultrasound;  Surgeon: Pascual Hess MD;  Location: [facility] CATH LAB CV    CV LEFT HEART CATH N/A 2025    Procedure: Left Heart Catheterization;  Surgeon: Pascual Hess MD;  Location: [facility] CATH LAB CV    CV PCI N/A 2025    Procedure: Percutaneous Coronary Intervention stent;  Surgeon: Pascual Hess MD;  Location: [facility] CV    HERNIA REPAIR      NV LAP,INGUINAL HERNIA REPR,INITIAL Left 8/15/2019    Procedure: HERNIORRHAPHY, INGUINAL, LAPAROSCOPIC;  Surgeon: Yan Leiva MD;  Location: [facility];  Service: General    TONSILLECTOMY      childhood        Family History   Problem Relation Age of Onset    Heart Disease Mother     Hypertension Mother     Dementia Mother     Coronary Artery Disease Father     LUNG DISEASE No family hx of         Past Medical History:   Diagnosis Date    Abnormal ECG     Anemia     Anxiety     Arrhythmia     Atrial fibrillation (H) 10/09/2017    Cancer (H)     CPAP (continuous positive airway pressure) dependence 10/2019    per patient    Depression     Heart valve disease 2017    History of blood clots     Hyperlipidemia 2006    Pulmonary embolism, blood-clot, obstetric 10/09/2017    Sleep apnea 2017    Uses CPAP        Social History     Tobacco Use    Smoking status: Former     Current packs/day: 0.00     Average packs/day: 1.5 packs/day for 22.9 years (34.3 ttl pk-yrs)     Types: Cigarettes     Start date:      Quit date: 1990     Years since quittin.7    Smokeless tobacco: Never   Vaping Use    Vaping status: Never Used  "  Substance Use Topics    Alcohol use: Yes     Comment: one drink a day    Drug use: No        Current Outpatient Medications   Medication Sig Dispense Refill    albuterol (PROAIR HFA/PROVENTIL HFA/VENTOLIN HFA) 108 (90 Base) MCG/ACT inhaler Inhale 2 puffs into the lungs every 6 hours as needed for shortness of breath, wheezing or cough. 18 g 0    apixaban ANTICOAGULANT (ELIQUIS) 5 MG tablet Take 1 tablet (5 mg) by mouth 2 times daily. 180 tablet 1    aspirin 81 MG EC tablet Take 1 tablet (81 mg) by mouth daily. Start tomorrow. 30 tablet 3    clopidogrel (PLAVIX) 75 MG tablet Take 1 tablet (75 mg) by mouth daily. Dose to start tomorrow. 90 tablet 3    co-enzyme Q-10 100 MG CAPS capsule Take 1 capsule (100 mg) by mouth daily. 100 capsule 3    cyanocobalamin 1000 MCG sublingual tablet Place 1 tablet (1,000 mcg) under the tongue daily. 100 tablet 3    donepezil (ARICEPT) 10 MG tablet Take 1 tablet (10 mg) by mouth at bedtime. 90 tablet 2    ezetimibe (ZETIA) 10 MG tablet Take 1 tablet (10 mg) by mouth daily. 90 tablet 3    furosemide (LASIX) 20 MG tablet Take 1 tablet (20 mg) by mouth daily. 90 tablet 1    metoprolol succinate ER (TOPROL XL) 50 MG 24 hr tablet Take 1 tablet (50 mg) by mouth daily. 90 tablet 3    Multiple vitamin TABS Take 1 tablet by mouth daily. 100 tablet 3    rosuvastatin (CRESTOR) 40 MG tablet Take 1 tablet (40 mg) by mouth daily. 90 tablet 3    sertraline (ZOLOFT) 100 MG tablet Take 1 tablet (100 mg) by mouth daily. 90 tablet 0    vitamin D3 (CHOLECALCIFEROL) 50 mcg (2000 units) tablet Take 1 tablet (50 mcg) by mouth daily. 100 tablet 2          Objective:    Vitals:    07/29/25 1305   BP: 110/68   Pulse: 60   Resp: 18   Temp: 97.7  F (36.5  C)   SpO2: 92%   Weight: 118.8 kg (262 lb)   Height: 1.905 m (6' 3\")      Body mass index is 32.75 kg/m .    Alert.  No apparent distress.  No dyspnea on exertion.  Chest appears clear.  Cardiac exam regular.  Extremities warm and dry.  No ankle edema.    CXR: " Chest x-ray appears negative for evidence for heart failure, acute infiltrate or consolidation, pleural effusion etc.      This note has been dictated using voice recognition software and as a result may contain minor grammatical errors and unintended word substitutions.           Answers submitted by the patient for this visit:  COPD (Chronic Obstructive Pulmonary Disease) Visit Questionnaire (Submitted on 7/28/2025)  Chief Complaint: Chronic problems general questions HPI Form  Current status of COPD symptom:: no change  Status of fatigue and dyspnea with ambulation:: none  Status of dyspnea:: none  Increase or change in cough or sputum:: never  Have you noticed a change in your sputum/phlegm?: No  Have you experienced a recent fever?: No  Baseline ambulation without stopping to rest:: 1-2 miles  Number of flights of stairs without resting:: 1  Have you had any Emergency Room, Urgent Care visits or a Hospital admission because of your COPD since your last office visit?: No  General Questionnaire (Submitted on 7/28/2025)  Chief Complaint: Chronic problems general questions HPI Form  How many servings of fruits and vegetables do you eat daily?: 0-1  On average, how many sweetened beverages do you drink each day (Examples: soda, juice, sweet tea, etc.  Do NOT count diet or artificially sweetened beverages)?: 2  How many minutes a day do you exercise enough to make your heart beat faster?: 60 or more  How many days a week do you exercise enough to make your heart beat faster?: 7  How many days per week do you miss taking your medication?: 7  What makes it hard for you to take your medication every day?: remembering to take  Questionnaire about: Chronic problems general questions HPI Form (Submitted on 7/28/2025)  Chief Complaint: Chronic problems general questions HPI Form

## 2025-07-31 ENCOUNTER — TRANSFERRED RECORDS (OUTPATIENT)
Dept: CARDIAC REHAB | Facility: CLINIC | Age: 75
End: 2025-07-31

## 2025-07-31 ENCOUNTER — OFFICE VISIT (OUTPATIENT)
Dept: AUDIOLOGY | Facility: CLINIC | Age: 75
End: 2025-07-31
Payer: COMMERCIAL

## 2025-07-31 ENCOUNTER — HOSPITAL ENCOUNTER (OUTPATIENT)
Dept: CARDIAC REHAB | Facility: CLINIC | Age: 75
End: 2025-07-31
Attending: INTERNAL MEDICINE
Payer: COMMERCIAL

## 2025-07-31 DIAGNOSIS — H90.3 SENSORINEURAL HEARING LOSS (SNHL) OF BOTH EARS: Primary | ICD-10-CM

## 2025-07-31 PROCEDURE — 93798 PHYS/QHP OP CAR RHAB W/ECG: CPT

## 2025-07-31 NOTE — PROGRESS NOTES
"AUDIOLOGY REPORT    SUBJECTIVE: Prem Taylor is a 74 year old male who was seen in the Audiology Clinic at the [facility] on 7/31/2025  for a follow-up check regarding the fitting of new hearing aids. Previous results have revealed borderline normal to moderate-severe sensorineural hearing loss, bilaterally. The patient has been seen previously in this clinic and was fit binaurally with [employer]keo I50-R ALICE devices on 7-10-25.  Mr. Taylor reported good sound quality with the hearing aids, with \"better microphones\" and better sound quality to his own speaking voice, in comparison with his previous devices. He finds the Bluetooth connectivity to be beneficial and easy to use. He was accompanied to today's appointment by his wife, Ursula.    OBJECTIVE:   Datalogging indicated average daily wear of 11.6-11.7 hours, over the past 21 days. Targets were at 100% in each device. Low- and mid-frequencies were decreased for wind noise management, per patient request. No additional programming changes were requested or made.     Reviewed 45 day trial period, care, cleaning (no water, dry brush), use of , aid insertion/removal, volume adjustment, user booklet, warranty information, storage cases, and other hearing aid details.     The process of removing the dome and exchanging wax protection was demonstrated on one device; Mr. Taylor was able to independently perform this task on the second device. Additional medium open domes were dispensed. The drop off procedure for items requiring repair and methods for requesting hearing aid supplies were discussed.    No charge visit today (in warranty hearing aid check).    ASSESSMENT: A follow-up appointment for hearing aid fitting was completed today. Changes to hearing aids were completed as outlined above.     PLAN: Mr. Taylor will return for follow-up as needed, and is aware that any exchanges or returns need to be made in writing prior to the end of " his trial period (tentatively by 8-25-25). He expressed verbal understanding of this information and plan. Please call this clinic with any questions regarding today s appointment.    Gudelia Castellano, [facility]  [place] Licensed Audiologist [id number]

## 2025-08-05 ENCOUNTER — HOSPITAL ENCOUNTER (OUTPATIENT)
Dept: CARDIAC REHAB | Facility: CLINIC | Age: 75
Discharge: HOME | End: 2025-08-05
Attending: INTERNAL MEDICINE
Payer: COMMERCIAL

## 2025-08-05 ENCOUNTER — CARE COORDINATION (OUTPATIENT)
Dept: SLEEP MEDICINE | Facility: CLINIC | Age: 75
End: 2025-08-05
Payer: COMMERCIAL

## 2025-08-05 PROCEDURE — 93798 PHYS/QHP OP CAR RHAB W/ECG: CPT

## 2025-08-07 ENCOUNTER — HOSPITAL ENCOUNTER (OUTPATIENT)
Dept: CARDIAC REHAB | Facility: CLINIC | Age: 75
End: 2025-08-07
Attending: INTERNAL MEDICINE
Payer: COMMERCIAL

## 2025-08-07 PROCEDURE — 93798 PHYS/QHP OP CAR RHAB W/ECG: CPT

## 2025-08-14 ENCOUNTER — HOSPITAL ENCOUNTER (OUTPATIENT)
Dept: CARDIAC REHAB | Facility: CLINIC | Age: 75
End: 2025-08-14
Attending: INTERNAL MEDICINE
Payer: COMMERCIAL

## 2025-08-14 PROCEDURE — 93798 PHYS/QHP OP CAR RHAB W/ECG: CPT

## 2025-08-18 ENCOUNTER — NURSE TRIAGE (OUTPATIENT)
Dept: FAMILY MEDICINE | Facility: CLINIC | Age: 75
End: 2025-08-18
Payer: COMMERCIAL

## 2025-08-19 ENCOUNTER — HOSPITAL ENCOUNTER (OUTPATIENT)
Dept: CARDIAC REHAB | Facility: CLINIC | Age: 75
Discharge: HOME | End: 2025-08-19
Attending: INTERNAL MEDICINE
Payer: COMMERCIAL

## 2025-08-19 DIAGNOSIS — I25.83 CORONARY ARTERY DISEASE DUE TO LIPID RICH PLAQUE: ICD-10-CM

## 2025-08-19 DIAGNOSIS — I48.19 PERSISTENT ATRIAL FIBRILLATION (H): ICD-10-CM

## 2025-08-19 DIAGNOSIS — I48.21 PERMANENT ATRIAL FIBRILLATION (H): ICD-10-CM

## 2025-08-19 DIAGNOSIS — R94.31 ABNORMAL ELECTROCARDIOGRAM: ICD-10-CM

## 2025-08-19 DIAGNOSIS — R94.39 ABNORMAL STRESS TEST: ICD-10-CM

## 2025-08-19 DIAGNOSIS — G47.33 OSA ON CPAP: ICD-10-CM

## 2025-08-19 DIAGNOSIS — E78.00 PURE HYPERCHOLESTEROLEMIA: ICD-10-CM

## 2025-08-19 DIAGNOSIS — I25.10 CORONARY ARTERY DISEASE DUE TO LIPID RICH PLAQUE: ICD-10-CM

## 2025-08-19 PROCEDURE — 93798 PHYS/QHP OP CAR RHAB W/ECG: CPT

## 2025-08-19 RX ORDER — ROSUVASTATIN CALCIUM 40 MG/1
40 TABLET, COATED ORAL DAILY
Qty: 90 TABLET | Refills: 2 | Status: SHIPPED | OUTPATIENT
Start: 2025-08-19

## 2025-08-19 ASSESSMENT — ANXIETY QUESTIONNAIRES
3. WORRYING TOO MUCH ABOUT DIFFERENT THINGS: NEARLY EVERY DAY
7. FEELING AFRAID AS IF SOMETHING AWFUL MIGHT HAPPEN: NOT AT ALL
2. NOT BEING ABLE TO STOP OR CONTROL WORRYING: MORE THAN HALF THE DAYS
IF YOU CHECKED OFF ANY PROBLEMS ON THIS QUESTIONNAIRE, HOW DIFFICULT HAVE THESE PROBLEMS MADE IT FOR YOU TO DO YOUR WORK, TAKE CARE OF THINGS AT HOME, OR GET ALONG WITH OTHER PEOPLE: SOMEWHAT DIFFICULT
1. FEELING NERVOUS, ANXIOUS, OR ON EDGE: MORE THAN HALF THE DAYS
5. BEING SO RESTLESS THAT IT IS HARD TO SIT STILL: SEVERAL DAYS
6. BECOMING EASILY ANNOYED OR IRRITABLE: SEVERAL DAYS
4. TROUBLE RELAXING: MORE THAN HALF THE DAYS
GAD7 TOTAL SCORE: 11

## 2025-08-21 ENCOUNTER — APPOINTMENT (OUTPATIENT)
Dept: CT IMAGING | Facility: HOSPITAL | Age: 75
End: 2025-08-21
Attending: EMERGENCY MEDICINE
Payer: COMMERCIAL

## 2025-08-21 ENCOUNTER — HOSPITAL ENCOUNTER (EMERGENCY)
Facility: HOSPITAL | Age: 75
Discharge: HOME | End: 2025-08-21
Attending: EMERGENCY MEDICINE | Admitting: EMERGENCY MEDICINE
Payer: COMMERCIAL

## 2025-08-21 VITALS
TEMPERATURE: 97.6 F | HEIGHT: 75 IN | OXYGEN SATURATION: 95 % | SYSTOLIC BLOOD PRESSURE: 155 MMHG | HEART RATE: 65 BPM | BODY MASS INDEX: 32.45 KG/M2 | RESPIRATION RATE: 19 BRPM | DIASTOLIC BLOOD PRESSURE: 92 MMHG | WEIGHT: 261 LBS

## 2025-08-21 DIAGNOSIS — Z79.01 ANTICOAGULATED BY ANTICOAGULATION TREATMENT: ICD-10-CM

## 2025-08-21 DIAGNOSIS — J44.1 COPD EXACERBATION (H): Primary | ICD-10-CM

## 2025-08-21 DIAGNOSIS — R06.00 DYSPNEA, UNSPECIFIED TYPE: ICD-10-CM

## 2025-08-21 LAB
ALBUMIN SERPL BCG-MCNC: 4.5 G/DL (ref 3.5–5.2)
ALP SERPL-CCNC: 83 U/L (ref 40–150)
ALT SERPL W P-5'-P-CCNC: 49 U/L (ref 0–70)
ANION GAP SERPL CALCULATED.3IONS-SCNC: 18 MMOL/L (ref 7–15)
AST SERPL W P-5'-P-CCNC: 42 U/L (ref 0–45)
BASE EXCESS BLDV CALC-SCNC: 0 MMOL/L (ref -3–3)
BASOPHILS # BLD AUTO: 0.03 10E3/UL (ref 0–0.2)
BASOPHILS NFR BLD AUTO: 0.4 %
BILIRUB DIRECT SERPL-MCNC: 0.35 MG/DL (ref 0–0.3)
BILIRUB SERPL-MCNC: 1.1 MG/DL
BUN SERPL-MCNC: 15.4 MG/DL (ref 8–23)
CALCIUM SERPL-MCNC: 9.6 MG/DL (ref 8.8–10.4)
CHLORIDE SERPL-SCNC: 101 MMOL/L (ref 98–107)
CREAT SERPL-MCNC: 0.95 MG/DL (ref 0.67–1.17)
CRP SERPL-MCNC: 11.7 MG/L
EGFRCR SERPLBLD CKD-EPI 2021: 85 ML/MIN/1.73M2
EOSINOPHIL # BLD AUTO: 0.19 10E3/UL (ref 0–0.7)
EOSINOPHIL NFR BLD AUTO: 2.6 %
ERYTHROCYTE [DISTWIDTH] IN BLOOD BY AUTOMATED COUNT: 13.3 % (ref 10–15)
FLUAV RNA SPEC QL NAA+PROBE: NEGATIVE
FLUBV RNA RESP QL NAA+PROBE: NEGATIVE
GLUCOSE SERPL-MCNC: 93 MG/DL (ref 70–99)
HCO3 BLDV-SCNC: 25 MMOL/L (ref 21–28)
HCO3 SERPL-SCNC: 21 MMOL/L (ref 22–29)
HCT VFR BLD AUTO: 42.1 % (ref 40–53)
HGB BLD-MCNC: 13.8 G/DL (ref 13.3–17.7)
IMM GRANULOCYTES # BLD: 0.03 10E3/UL
IMM GRANULOCYTES NFR BLD: 0.4 %
INR PPP: 1.19 (ref 0.85–1.15)
LACTATE SERPL-SCNC: 1.4 MMOL/L (ref 0.7–2)
LACTATE SERPL-SCNC: 3.1 MMOL/L (ref 0.7–2)
LIPASE SERPL-CCNC: 35 U/L (ref 13–60)
LYMPHOCYTES # BLD AUTO: 1.69 10E3/UL (ref 0.8–5.3)
LYMPHOCYTES NFR BLD AUTO: 23 %
MAGNESIUM SERPL-MCNC: 2.2 MG/DL (ref 1.7–2.3)
MCH RBC QN AUTO: 29.9 PG (ref 26.5–33)
MCHC RBC AUTO-ENTMCNC: 32.8 G/DL (ref 31.5–36.5)
MCV RBC AUTO: 91.1 FL (ref 78–100)
MONOCYTES # BLD AUTO: 1.02 10E3/UL (ref 0–1.3)
MONOCYTES NFR BLD AUTO: 13.9 %
MRSA DNA SPEC QL NAA+PROBE: NEGATIVE
NEUTROPHILS # BLD AUTO: 4.38 10E3/UL (ref 1.6–8.3)
NEUTROPHILS NFR BLD AUTO: 59.7 %
NRBC # BLD AUTO: <0.03 10E3/UL
NRBC BLD AUTO-RTO: 0 /100
NT-PROBNP SERPL-MCNC: 862 PG/ML (ref 0–229)
O2/TOTAL GAS SETTING VFR VENT: 21 %
OXYHGB MFR BLDV: 44 % (ref 70–75)
PCO2 BLDV: 41 MM HG (ref 40–50)
PH BLDV: 7.39 [PH] (ref 7.32–7.43)
PLATELET # BLD AUTO: 185 10E3/UL (ref 150–450)
PO2 BLDV: 26 MM HG (ref 25–47)
POTASSIUM SERPL-SCNC: 3.5 MMOL/L (ref 3.4–5.3)
PROCALCITONIN SERPL IA-MCNC: 0.05 NG/ML
PROT SERPL-MCNC: 7.4 G/DL (ref 6.4–8.3)
PROTHROMBIN TIME: 15.2 SECONDS (ref 11.8–14.8)
RBC # BLD AUTO: 4.62 10E6/UL (ref 4.4–5.9)
RSV RNA SPEC NAA+PROBE: NEGATIVE
SA TARGET DNA: NEGATIVE
SAO2 % BLDV: 44.4 % (ref 70–75)
SARS-COV-2 RNA RESP QL NAA+PROBE: NEGATIVE
SODIUM SERPL-SCNC: 140 MMOL/L (ref 135–145)
TROPONIN T SERPL HS-MCNC: 11 NG/L
TROPONIN T SERPL HS-MCNC: 12 NG/L
TSH SERPL DL<=0.005 MIU/L-ACNC: 1.82 UIU/ML (ref 0.3–4.2)
WBC # BLD AUTO: 7.34 10E3/UL (ref 4–11)

## 2025-08-21 PROCEDURE — 93005 ELECTROCARDIOGRAM TRACING: CPT | Performed by: EMERGENCY MEDICINE

## 2025-08-21 PROCEDURE — 84443 ASSAY THYROID STIM HORMONE: CPT | Performed by: EMERGENCY MEDICINE

## 2025-08-21 PROCEDURE — 250N000011 HC RX IP 250 OP 636: Mod: JZ | Performed by: EMERGENCY MEDICINE

## 2025-08-21 PROCEDURE — 999N000157 HC STATISTIC RCP TIME EA 10 MIN

## 2025-08-21 PROCEDURE — 258N000003 HC RX IP 258 OP 636: Performed by: EMERGENCY MEDICINE

## 2025-08-21 PROCEDURE — 83880 ASSAY OF NATRIURETIC PEPTIDE: CPT | Performed by: EMERGENCY MEDICINE

## 2025-08-21 PROCEDURE — 84484 ASSAY OF TROPONIN QUANT: CPT | Performed by: EMERGENCY MEDICINE

## 2025-08-21 PROCEDURE — 84145 PROCALCITONIN (PCT): CPT | Performed by: EMERGENCY MEDICINE

## 2025-08-21 PROCEDURE — 82248 BILIRUBIN DIRECT: CPT | Performed by: EMERGENCY MEDICINE

## 2025-08-21 PROCEDURE — 87040 BLOOD CULTURE FOR BACTERIA: CPT | Performed by: EMERGENCY MEDICINE

## 2025-08-21 PROCEDURE — 83605 ASSAY OF LACTIC ACID: CPT | Performed by: EMERGENCY MEDICINE

## 2025-08-21 PROCEDURE — 96368 THER/DIAG CONCURRENT INF: CPT

## 2025-08-21 PROCEDURE — 99285 EMERGENCY DEPT VISIT HI MDM: CPT | Mod: 25

## 2025-08-21 PROCEDURE — 85025 COMPLETE CBC W/AUTO DIFF WBC: CPT | Performed by: EMERGENCY MEDICINE

## 2025-08-21 PROCEDURE — 99285 EMERGENCY DEPT VISIT HI MDM: CPT | Mod: 25 | Performed by: EMERGENCY MEDICINE

## 2025-08-21 PROCEDURE — 96375 TX/PRO/DX INJ NEW DRUG ADDON: CPT | Mod: 59

## 2025-08-21 PROCEDURE — 83690 ASSAY OF LIPASE: CPT | Performed by: EMERGENCY MEDICINE

## 2025-08-21 PROCEDURE — 85610 PROTHROMBIN TIME: CPT | Performed by: EMERGENCY MEDICINE

## 2025-08-21 PROCEDURE — 250N000009 HC RX 250: Performed by: EMERGENCY MEDICINE

## 2025-08-21 PROCEDURE — 71275 CT ANGIOGRAPHY CHEST: CPT

## 2025-08-21 PROCEDURE — 82805 BLOOD GASES W/O2 SATURATION: CPT | Performed by: EMERGENCY MEDICINE

## 2025-08-21 PROCEDURE — 87641 MR-STAPH DNA AMP PROBE: CPT | Performed by: EMERGENCY MEDICINE

## 2025-08-21 PROCEDURE — 36415 COLL VENOUS BLD VENIPUNCTURE: CPT | Performed by: EMERGENCY MEDICINE

## 2025-08-21 PROCEDURE — 87637 SARSCOV2&INF A&B&RSV AMP PRB: CPT | Performed by: EMERGENCY MEDICINE

## 2025-08-21 PROCEDURE — 86140 C-REACTIVE PROTEIN: CPT | Performed by: EMERGENCY MEDICINE

## 2025-08-21 PROCEDURE — 96365 THER/PROPH/DIAG IV INF INIT: CPT | Mod: 59

## 2025-08-21 PROCEDURE — 250N000011 HC RX IP 250 OP 636: Performed by: EMERGENCY MEDICINE

## 2025-08-21 PROCEDURE — 80053 COMPREHEN METABOLIC PANEL: CPT | Performed by: EMERGENCY MEDICINE

## 2025-08-21 PROCEDURE — 83735 ASSAY OF MAGNESIUM: CPT | Performed by: EMERGENCY MEDICINE

## 2025-08-21 PROCEDURE — 94640 AIRWAY INHALATION TREATMENT: CPT

## 2025-08-21 RX ORDER — MAGNESIUM SULFATE HEPTAHYDRATE 40 MG/ML
2 INJECTION, SOLUTION INTRAVENOUS ONCE
Status: COMPLETED | OUTPATIENT
Start: 2025-08-21 | End: 2025-08-21

## 2025-08-21 RX ORDER — INHALER, ASSIST DEVICES
SPACER (EA) MISCELLANEOUS
Qty: 1 EACH | Refills: 0 | Status: SHIPPED | OUTPATIENT
Start: 2025-08-21

## 2025-08-21 RX ORDER — IPRATROPIUM BROMIDE AND ALBUTEROL SULFATE 2.5; .5 MG/3ML; MG/3ML
1 SOLUTION RESPIRATORY (INHALATION) EVERY 6 HOURS PRN
Qty: 90 ML | Refills: 0 | Status: SHIPPED | OUTPATIENT
Start: 2025-08-21

## 2025-08-21 RX ORDER — IPRATROPIUM BROMIDE AND ALBUTEROL SULFATE 2.5; .5 MG/3ML; MG/3ML
3 SOLUTION RESPIRATORY (INHALATION) ONCE
Status: COMPLETED | OUTPATIENT
Start: 2025-08-21 | End: 2025-08-21

## 2025-08-21 RX ORDER — ALBUTEROL SULFATE 90 UG/1
2 INHALANT RESPIRATORY (INHALATION) EVERY 6 HOURS PRN
Qty: 6.7 G | Refills: 3 | Status: SHIPPED | OUTPATIENT
Start: 2025-08-21

## 2025-08-21 RX ORDER — PREDNISONE 20 MG/1
TABLET ORAL
Qty: 10 TABLET | Refills: 0 | Status: SHIPPED | OUTPATIENT
Start: 2025-08-22 | End: 2025-08-26

## 2025-08-21 RX ORDER — METHYLPREDNISOLONE SODIUM SUCCINATE 125 MG/2ML
125 INJECTION INTRAMUSCULAR; INTRAVENOUS ONCE
Status: COMPLETED | OUTPATIENT
Start: 2025-08-21 | End: 2025-08-21

## 2025-08-21 RX ORDER — IOPAMIDOL 755 MG/ML
90 INJECTION, SOLUTION INTRAVASCULAR ONCE
Status: COMPLETED | OUTPATIENT
Start: 2025-08-21 | End: 2025-08-21

## 2025-08-21 RX ORDER — PIPERACILLIN SODIUM, TAZOBACTAM SODIUM 4; .5 G/20ML; G/20ML
4.5 INJECTION, POWDER, LYOPHILIZED, FOR SOLUTION INTRAVENOUS ONCE
Status: COMPLETED | OUTPATIENT
Start: 2025-08-21 | End: 2025-08-21

## 2025-08-21 RX ORDER — CEFAZOLIN SODIUM 1 G/50ML
2500 SOLUTION INTRAVENOUS ONCE
Status: COMPLETED | OUTPATIENT
Start: 2025-08-21 | End: 2025-08-21

## 2025-08-21 RX ADMIN — AZITHROMYCIN MONOHYDRATE 500 MG: 500 INJECTION, POWDER, LYOPHILIZED, FOR SOLUTION INTRAVENOUS at 12:40

## 2025-08-21 RX ADMIN — SODIUM CHLORIDE 1268 ML: 0.9 INJECTION, SOLUTION INTRAVENOUS at 12:04

## 2025-08-21 RX ADMIN — SODIUM CHLORIDE 2500 MG: 0.9 INJECTION, SOLUTION INTRAVENOUS at 12:44

## 2025-08-21 RX ADMIN — MAGNESIUM SULFATE HEPTAHYDRATE 2 G: 2 INJECTION, SOLUTION INTRAVENOUS at 14:32

## 2025-08-21 RX ADMIN — IPRATROPIUM BROMIDE AND ALBUTEROL SULFATE 3 ML: .5; 3 SOLUTION RESPIRATORY (INHALATION) at 10:53

## 2025-08-21 RX ADMIN — METHYLPREDNISOLONE SODIUM SUCCINATE 125 MG: 125 INJECTION, POWDER, FOR SOLUTION INTRAMUSCULAR; INTRAVENOUS at 13:45

## 2025-08-21 RX ADMIN — PIPERACILLIN AND TAZOBACTAM 4.5 G: 4; .5 INJECTION, POWDER, FOR SOLUTION INTRAVENOUS at 12:05

## 2025-08-21 RX ADMIN — IOPAMIDOL 90 ML: 755 INJECTION, SOLUTION INTRAVENOUS at 12:36

## 2025-08-21 ASSESSMENT — ACTIVITIES OF DAILY LIVING (ADL)
ADLS_ACUITY_SCORE: 53

## 2025-08-21 ASSESSMENT — COLUMBIA-SUICIDE SEVERITY RATING SCALE - C-SSRS
6. HAVE YOU EVER DONE ANYTHING, STARTED TO DO ANYTHING, OR PREPARED TO DO ANYTHING TO END YOUR LIFE?: NO
1. IN THE PAST MONTH, HAVE YOU WISHED YOU WERE DEAD OR WISHED YOU COULD GO TO SLEEP AND NOT WAKE UP?: NO
2. HAVE YOU ACTUALLY HAD ANY THOUGHTS OF KILLING YOURSELF IN THE PAST MONTH?: NO

## 2025-08-22 LAB
ATRIAL RATE - MUSE: 107 BPM
DIASTOLIC BLOOD PRESSURE - MUSE: 81 MMHG
INTERPRETATION ECG - MUSE: NORMAL
P AXIS - MUSE: NORMAL DEGREES
PR INTERVAL - MUSE: NORMAL MS
QRS DURATION - MUSE: 156 MS
QT - MUSE: 486 MS
QTC - MUSE: 549 MS
R AXIS - MUSE: -34 DEGREES
SYSTOLIC BLOOD PRESSURE - MUSE: 140 MMHG
T AXIS - MUSE: 21 DEGREES
VENTRICULAR RATE- MUSE: 77 BPM

## 2025-08-22 ASSESSMENT — ANXIETY QUESTIONNAIRES
GAD7 TOTAL SCORE: 11
7. FEELING AFRAID AS IF SOMETHING AWFUL MIGHT HAPPEN: NOT AT ALL
5. BEING SO RESTLESS THAT IT IS HARD TO SIT STILL: SEVERAL DAYS
3. WORRYING TOO MUCH ABOUT DIFFERENT THINGS: NEARLY EVERY DAY
6. BECOMING EASILY ANNOYED OR IRRITABLE: SEVERAL DAYS
GAD7 TOTAL SCORE: 11
GAD7 TOTAL SCORE: 11
IF YOU CHECKED OFF ANY PROBLEMS ON THIS QUESTIONNAIRE, HOW DIFFICULT HAVE THESE PROBLEMS MADE IT FOR YOU TO DO YOUR WORK, TAKE CARE OF THINGS AT HOME, OR GET ALONG WITH OTHER PEOPLE: SOMEWHAT DIFFICULT
8. IF YOU CHECKED OFF ANY PROBLEMS, HOW DIFFICULT HAVE THESE MADE IT FOR YOU TO DO YOUR WORK, TAKE CARE OF THINGS AT HOME, OR GET ALONG WITH OTHER PEOPLE?: SOMEWHAT DIFFICULT
1. FEELING NERVOUS, ANXIOUS, OR ON EDGE: MORE THAN HALF THE DAYS
7. FEELING AFRAID AS IF SOMETHING AWFUL MIGHT HAPPEN: NOT AT ALL
2. NOT BEING ABLE TO STOP OR CONTROL WORRYING: MORE THAN HALF THE DAYS
4. TROUBLE RELAXING: MORE THAN HALF THE DAYS

## 2025-08-26 ENCOUNTER — PATIENT OUTREACH (OUTPATIENT)
Dept: CARE COORDINATION | Facility: CLINIC | Age: 75
End: 2025-08-26

## 2025-08-26 ENCOUNTER — HOSPITAL ENCOUNTER (OUTPATIENT)
Dept: CARDIAC REHAB | Facility: CLINIC | Age: 75
Discharge: HOME | End: 2025-08-26
Attending: INTERNAL MEDICINE
Payer: COMMERCIAL

## 2025-08-26 ENCOUNTER — OFFICE VISIT (OUTPATIENT)
Dept: FAMILY MEDICINE | Facility: CLINIC | Age: 75
End: 2025-08-26
Payer: COMMERCIAL

## 2025-08-26 VITALS
WEIGHT: 260.6 LBS | HEART RATE: 75 BPM | RESPIRATION RATE: 18 BRPM | OXYGEN SATURATION: 98 % | SYSTOLIC BLOOD PRESSURE: 134 MMHG | TEMPERATURE: 98.1 F | DIASTOLIC BLOOD PRESSURE: 82 MMHG | HEIGHT: 75 IN | BODY MASS INDEX: 32.4 KG/M2

## 2025-08-26 DIAGNOSIS — Z09 FOLLOW-UP EXAM: Primary | ICD-10-CM

## 2025-08-26 DIAGNOSIS — J18.9 PNEUMONIA OF LEFT LOWER LOBE DUE TO INFECTIOUS ORGANISM: ICD-10-CM

## 2025-08-26 DIAGNOSIS — R06.02 SOB (SHORTNESS OF BREATH): ICD-10-CM

## 2025-08-26 LAB
BACTERIA SPEC CULT: NO GROWTH
BACTERIA SPEC CULT: NO GROWTH
NT-PROBNP SERPL-MCNC: 1501 PG/ML (ref 0–229)

## 2025-08-26 PROCEDURE — 93798 PHYS/QHP OP CAR RHAB W/ECG: CPT

## 2025-08-26 PROCEDURE — 1126F AMNT PAIN NOTED NONE PRSNT: CPT

## 2025-08-26 PROCEDURE — 3079F DIAST BP 80-89 MM HG: CPT

## 2025-08-26 PROCEDURE — G2211 COMPLEX E/M VISIT ADD ON: HCPCS

## 2025-08-26 PROCEDURE — 99214 OFFICE O/P EST MOD 30 MIN: CPT

## 2025-08-26 PROCEDURE — 83880 ASSAY OF NATRIURETIC PEPTIDE: CPT

## 2025-08-26 PROCEDURE — 36415 COLL VENOUS BLD VENIPUNCTURE: CPT

## 2025-08-26 PROCEDURE — 3075F SYST BP GE 130 - 139MM HG: CPT

## 2025-08-26 RX ORDER — METHYLPREDNISOLONE 4 MG/1
TABLET ORAL
Qty: 21 TABLET | Refills: 0 | Status: SHIPPED | OUTPATIENT
Start: 2025-08-26

## 2025-08-26 ASSESSMENT — PAIN SCALES - GENERAL: PAINLEVEL_OUTOF10: NO PAIN (0)

## 2025-08-28 ENCOUNTER — TELEPHONE (OUTPATIENT)
Dept: CARDIOLOGY | Facility: CLINIC | Age: 75
End: 2025-08-28

## 2025-08-28 ENCOUNTER — HOSPITAL ENCOUNTER (OUTPATIENT)
Dept: CARDIAC REHAB | Facility: CLINIC | Age: 75
End: 2025-08-28
Attending: INTERNAL MEDICINE
Payer: COMMERCIAL

## 2025-08-28 PROCEDURE — 93798 PHYS/QHP OP CAR RHAB W/ECG: CPT

## 2025-09-02 ENCOUNTER — HOSPITAL ENCOUNTER (OUTPATIENT)
Dept: CARDIAC REHAB | Facility: CLINIC | Age: 75
Discharge: HOME | End: 2025-09-02
Attending: INTERNAL MEDICINE
Payer: COMMERCIAL

## 2025-09-02 PROCEDURE — 93798 PHYS/QHP OP CAR RHAB W/ECG: CPT

## 2025-09-03 ENCOUNTER — HOSPITAL ENCOUNTER (OUTPATIENT)
Dept: CARDIOLOGY | Facility: HOSPITAL | Age: 75
Discharge: HOME | End: 2025-09-03
Attending: INTERNAL MEDICINE
Payer: COMMERCIAL

## 2025-09-03 DIAGNOSIS — I27.20 PULMONARY HYPERTENSION (H): ICD-10-CM

## 2025-09-03 PROCEDURE — 93308 TTE F-UP OR LMTD: CPT | Mod: 26 | Performed by: INTERNAL MEDICINE

## 2025-09-03 PROCEDURE — 93321 DOPPLER ECHO F-UP/LMTD STD: CPT

## 2025-09-03 PROCEDURE — 93321 DOPPLER ECHO F-UP/LMTD STD: CPT | Mod: 26 | Performed by: INTERNAL MEDICINE

## 2025-09-04 ENCOUNTER — HOSPITAL ENCOUNTER (OUTPATIENT)
Dept: CARDIAC REHAB | Facility: CLINIC | Age: 75
End: 2025-09-04
Attending: INTERNAL MEDICINE
Payer: COMMERCIAL

## 2025-09-04 PROCEDURE — 93798 PHYS/QHP OP CAR RHAB W/ECG: CPT

## (undated) DEVICE — DEVICE INFLATION SYR W/ HEMOSTASIS VALVE 12IN EXT IN4904

## (undated) DEVICE — CATH BALLOON NC EMERGE 2.50X8MM H7493926708250

## (undated) DEVICE — CATH BALLOON EMERGE 3.0X15MM H7493918915300

## (undated) DEVICE — GUIDEWIRE VASC MICROGLIDE 0.014INX190CM 22299M-W2

## (undated) DEVICE — SLEEVE TR BAND RADIAL COMPRESSION DEVICE 24CM TRB24-REG

## (undated) DEVICE — MANIFOLD KIT ANGIO AUTOMATED 014613

## (undated) DEVICE — CATH BALLOON EMERGE 3.0X12MM H7493918912300

## (undated) DEVICE — RAD CLOSURE ANGIOSEAL 8FR  610131

## (undated) DEVICE — CUTTING BALLOON WOLVERINE 2.5X6MM

## (undated) DEVICE — KIT HAND CONTROL ACIST 014644 AR-P54

## (undated) DEVICE — Device

## (undated) DEVICE — CATH BALLOON NC EMERGE 3.50X12MM H7493926712350

## (undated) DEVICE — CATH BALLOON NC EMERGE 3.00X12MM H7493926712300

## (undated) DEVICE — CATH DIAG RADIAL 5FR TIG 4.5 100CM

## (undated) DEVICE — INTRO SHEATH 8FRX10CM PINNACLE RSS802

## (undated) DEVICE — CATH BALLOON EMERGE 2.0X12MM H7493918912200

## (undated) DEVICE — INTRO MICRO MINI STICK 4FR STD NITINOL

## (undated) DEVICE — SHTH INTRO 0.021IN ID 6FR DIA

## (undated) DEVICE — CATH US OD 5FR OD SEC 2.9FR EAGLE EYE PLATINUM 0.014 85900P

## (undated) DEVICE — CATH RX TAKERU PTCA BALLOON 1.5X12MM DC-RY1512UA1

## (undated) DEVICE — INTRO SHEATH 6FRX10CM PINNACLE RSS602

## (undated) DEVICE — CATH SPRINTER 2.0 X 12MM RX SPL20012X

## (undated) DEVICE — SHEATH GUIDING R2P DESTINATION 75CM 6FR STERIL GS-R6ST1C75W

## (undated) DEVICE — ELECTRODE DEFIB CADENCE 22550R

## (undated) DEVICE — CUSTOM PACK CORONARY SAN5BCRHEA

## (undated) DEVICE — CATH BALLOON NC EMERGE 4.00X12MM H7493926712400

## (undated) DEVICE — EXCHANGE WIRE .035 260 STAR/JFC/035/260/ M001491681

## (undated) DEVICE — GUIDEWIRE FORTE FLOPPY J TOP 34949-05J

## (undated) DEVICE — SYR ANGIOGRAPHY MULTIUSE KIT ACIST 014612

## (undated) DEVICE — CATH GUIDELINER 6FR 5571

## (undated) DEVICE — VALVE HEMOSTATIC WATCHDOG 8FR INNER LUMEN H74939343021

## (undated) DEVICE — CATH LAUNCHER 6FR EBU 3.5 LA6EBU35

## (undated) RX ORDER — HYDRALAZINE HYDROCHLORIDE 20 MG/ML
INJECTION INTRAMUSCULAR; INTRAVENOUS
Status: DISPENSED
Start: 2025-06-20

## (undated) RX ORDER — FENTANYL CITRATE 50 UG/ML
INJECTION, SOLUTION INTRAMUSCULAR; INTRAVENOUS
Status: DISPENSED
Start: 2025-06-20

## (undated) RX ORDER — TICAGRELOR 90 MG/1
TABLET, FILM COATED ORAL
Status: DISPENSED
Start: 2025-06-20

## (undated) RX ORDER — METOPROLOL TARTRATE 50 MG
TABLET ORAL
Status: DISPENSED
Start: 2023-12-27